# Patient Record
Sex: MALE | Race: WHITE | NOT HISPANIC OR LATINO | Employment: OTHER | URBAN - METROPOLITAN AREA
[De-identification: names, ages, dates, MRNs, and addresses within clinical notes are randomized per-mention and may not be internally consistent; named-entity substitution may affect disease eponyms.]

---

## 2017-01-04 ENCOUNTER — TRANSFERRED RECORDS (OUTPATIENT)
Dept: HEALTH INFORMATION MANAGEMENT | Facility: CLINIC | Age: 42
End: 2017-01-04

## 2017-01-26 ENCOUNTER — ALLIED HEALTH/NURSE VISIT (OUTPATIENT)
Dept: TRANSPLANT | Facility: CLINIC | Age: 42
End: 2017-01-26
Attending: INTERNAL MEDICINE
Payer: MEDICARE

## 2017-01-26 ENCOUNTER — RESULTS ONLY (OUTPATIENT)
Dept: OTHER | Facility: CLINIC | Age: 42
End: 2017-01-26

## 2017-01-26 ENCOUNTER — RADIANT APPOINTMENT (OUTPATIENT)
Dept: CARDIOLOGY | Facility: CLINIC | Age: 42
End: 2017-01-26
Attending: PHYSICIAN ASSISTANT

## 2017-01-26 ENCOUNTER — OFFICE VISIT (OUTPATIENT)
Dept: TRANSPLANT | Facility: CLINIC | Age: 42
End: 2017-01-26
Attending: TRANSPLANT SURGERY
Payer: MEDICARE

## 2017-01-26 VITALS
OXYGEN SATURATION: 96 % | HEIGHT: 64 IN | TEMPERATURE: 98.1 F | SYSTOLIC BLOOD PRESSURE: 149 MMHG | DIASTOLIC BLOOD PRESSURE: 88 MMHG | RESPIRATION RATE: 16 BRPM | HEART RATE: 81 BPM | WEIGHT: 150.5 LBS | BODY MASS INDEX: 25.7 KG/M2

## 2017-01-26 DIAGNOSIS — Z99.2 END STAGE RENAL DISEASE ON DIALYSIS (H): ICD-10-CM

## 2017-01-26 DIAGNOSIS — Q87.81 ALPORT'S SYNDROME: Primary | ICD-10-CM

## 2017-01-26 DIAGNOSIS — N18.6 END STAGE KIDNEY DISEASE (H): ICD-10-CM

## 2017-01-26 DIAGNOSIS — N18.6 CHRONIC KIDNEY DISEASE WITH END STAGE RENAL FAILURE ON DIALYSIS (H): Primary | ICD-10-CM

## 2017-01-26 DIAGNOSIS — Q87.81 ALPORT'S SYNDROME: ICD-10-CM

## 2017-01-26 DIAGNOSIS — N18.6 END STAGE RENAL DISEASE ON DIALYSIS (H): ICD-10-CM

## 2017-01-26 DIAGNOSIS — Z99.2 CHRONIC KIDNEY DISEASE WITH END STAGE RENAL FAILURE ON DIALYSIS (H): Primary | ICD-10-CM

## 2017-01-26 DIAGNOSIS — Z85.6: ICD-10-CM

## 2017-01-26 DIAGNOSIS — Z76.82 ORGAN TRANSPLANT CANDIDATE: Primary | ICD-10-CM

## 2017-01-26 DIAGNOSIS — N18.6 ESRD (END STAGE RENAL DISEASE) (H): Primary | ICD-10-CM

## 2017-01-26 LAB
ALBUMIN SERPL-MCNC: 3.7 G/DL (ref 3.4–5)
ALP SERPL-CCNC: 59 U/L (ref 40–150)
ALT SERPL W P-5'-P-CCNC: 34 U/L (ref 0–70)
ANION GAP SERPL CALCULATED.3IONS-SCNC: 9 MMOL/L (ref 3–14)
APTT PPP: 29 SEC (ref 22–37)
AST SERPL W P-5'-P-CCNC: 25 U/L (ref 0–45)
BASOPHILS # BLD AUTO: 0.1 10E9/L (ref 0–0.2)
BASOPHILS NFR BLD AUTO: 0.9 %
BILIRUB SERPL-MCNC: 0.4 MG/DL (ref 0.2–1.3)
BUN SERPL-MCNC: 39 MG/DL (ref 7–30)
CALCIUM SERPL-MCNC: 8.8 MG/DL (ref 8.5–10.1)
CARDIOLIPIN ANTIBODY IGG: NORMAL GPL-U/ML (ref 0–19.9)
CARDIOLIPIN ANTIBODY IGM: 0.3 MPL-U/ML (ref 0–19.9)
CHLORIDE SERPL-SCNC: 100 MMOL/L (ref 94–109)
CHOLEST SERPL-MCNC: 172 MG/DL
CMV IGG SERPL QL IA: NORMAL AI (ref 0–0.8)
CO2 SERPL-SCNC: 29 MMOL/L (ref 20–32)
CREAT SERPL-MCNC: 9.03 MG/DL (ref 0.66–1.25)
DIFFERENTIAL METHOD BLD: ABNORMAL
EBV VCA IGG SER QL IA: 7.5 AI (ref 0–0.8)
EOSINOPHIL # BLD AUTO: 0.9 10E9/L (ref 0–0.7)
EOSINOPHIL NFR BLD AUTO: 13.4 %
ERYTHROCYTE [DISTWIDTH] IN BLOOD BY AUTOMATED COUNT: 12.8 % (ref 10–15)
GFR SERPL CREATININE-BSD FRML MDRD: 7 ML/MIN/1.7M2
GLUCOSE SERPL-MCNC: 92 MG/DL (ref 70–99)
HBV CORE AB SERPL QL IA: NONREACTIVE
HBV SURFACE AB SERPL IA-ACNC: 29.01 M[IU]/ML
HBV SURFACE AG SERPL QL IA: NONREACTIVE
HCT VFR BLD AUTO: 36.6 % (ref 40–53)
HCV AB SERPL QL IA: NORMAL
HDLC SERPL-MCNC: 38 MG/DL
HGB BLD-MCNC: 11.8 G/DL (ref 13.3–17.7)
HIV 1+2 AB+HIV1 P24 AG SERPL QL IA: NORMAL
HLA TYPING COMPLETE SOT RECIPIENT: NORMAL
IMM GRANULOCYTES # BLD: 0 10E9/L (ref 0–0.4)
IMM GRANULOCYTES NFR BLD: 0.1 %
INR PPP: 0.99 (ref 0.86–1.14)
LDLC SERPL CALC-MCNC: 96 MG/DL
LYMPHOCYTES # BLD AUTO: 1.9 10E9/L (ref 0.8–5.3)
LYMPHOCYTES NFR BLD AUTO: 27.8 %
MCH RBC QN AUTO: 30.2 PG (ref 26.5–33)
MCHC RBC AUTO-ENTMCNC: 32.2 G/DL (ref 31.5–36.5)
MCV RBC AUTO: 94 FL (ref 78–100)
MONOCYTES # BLD AUTO: 0.6 10E9/L (ref 0–1.3)
MONOCYTES NFR BLD AUTO: 9.4 %
NEUTROPHILS # BLD AUTO: 3.3 10E9/L (ref 1.6–8.3)
NEUTROPHILS NFR BLD AUTO: 48.4 %
NONHDLC SERPL-MCNC: 135 MG/DL
NRBC # BLD AUTO: 0 10*3/UL
NRBC BLD AUTO-RTO: 0 /100
PLATELET # BLD AUTO: 152 10E9/L (ref 150–450)
POTASSIUM SERPL-SCNC: 5 MMOL/L (ref 3.4–5.3)
PRA SINGLE ANTIGEN IGG ANTIBODY: NORMAL
PROT SERPL-MCNC: 7 G/DL (ref 6.8–8.8)
RBC # BLD AUTO: 3.91 10E12/L (ref 4.4–5.9)
SODIUM SERPL-SCNC: 138 MMOL/L (ref 133–144)
T PALLIDUM IGG+IGM SER QL: NEGATIVE
THROMBIN TIME: 18.7 SEC (ref 13–19)
TRIGL SERPL-MCNC: 193 MG/DL
VZV IGG SER QL IA: NORMAL AI (ref 0–0.8)
WBC # BLD AUTO: 6.7 10E9/L (ref 4–11)

## 2017-01-26 PROCEDURE — 85730 THROMBOPLASTIN TIME PARTIAL: CPT | Performed by: PHYSICIAN ASSISTANT

## 2017-01-26 PROCEDURE — 86787 VARICELLA-ZOSTER ANTIBODY: CPT | Performed by: PHYSICIAN ASSISTANT

## 2017-01-26 PROCEDURE — 86147 CARDIOLIPIN ANTIBODY EA IG: CPT | Performed by: PHYSICIAN ASSISTANT

## 2017-01-26 PROCEDURE — 87340 HEPATITIS B SURFACE AG IA: CPT | Performed by: PHYSICIAN ASSISTANT

## 2017-01-26 PROCEDURE — 81240 F2 GENE: CPT | Performed by: PHYSICIAN ASSISTANT

## 2017-01-26 PROCEDURE — 86704 HEP B CORE ANTIBODY TOTAL: CPT | Performed by: PHYSICIAN ASSISTANT

## 2017-01-26 PROCEDURE — 86833 HLA CLASS II HIGH DEFIN QUAL: CPT | Performed by: TRANSPLANT SURGERY

## 2017-01-26 PROCEDURE — 86644 CMV ANTIBODY: CPT | Performed by: PHYSICIAN ASSISTANT

## 2017-01-26 PROCEDURE — 86832 HLA CLASS I HIGH DEFIN QUAL: CPT | Performed by: TRANSPLANT SURGERY

## 2017-01-26 PROCEDURE — 86665 EPSTEIN-BARR CAPSID VCA: CPT | Performed by: PHYSICIAN ASSISTANT

## 2017-01-26 PROCEDURE — 40000866 ZZHCL STATISTIC HIV 1/2 ANTIGEN/ANTIBODY PRETRANSPLANT ONLY: Performed by: PHYSICIAN ASSISTANT

## 2017-01-26 PROCEDURE — 86780 TREPONEMA PALLIDUM: CPT | Performed by: PHYSICIAN ASSISTANT

## 2017-01-26 PROCEDURE — 86706 HEP B SURFACE ANTIBODY: CPT | Performed by: PHYSICIAN ASSISTANT

## 2017-01-26 PROCEDURE — 87799 DETECT AGENT NOS DNA QUANT: CPT | Performed by: PHYSICIAN ASSISTANT

## 2017-01-26 PROCEDURE — 86803 HEPATITIS C AB TEST: CPT | Performed by: PHYSICIAN ASSISTANT

## 2017-01-26 PROCEDURE — 85670 THROMBIN TIME PLASMA: CPT | Performed by: PHYSICIAN ASSISTANT

## 2017-01-26 PROCEDURE — 86480 TB TEST CELL IMMUN MEASURE: CPT | Performed by: PHYSICIAN ASSISTANT

## 2017-01-26 PROCEDURE — 85613 RUSSELL VIPER VENOM DILUTED: CPT | Performed by: PHYSICIAN ASSISTANT

## 2017-01-26 PROCEDURE — 81241 F5 GENE: CPT | Performed by: PHYSICIAN ASSISTANT

## 2017-01-26 RX ORDER — CLONIDINE HYDROCHLORIDE 0.1 MG/1
0.1 TABLET ORAL 2 TIMES DAILY
Status: ON HOLD | COMMUNITY
End: 2020-03-06

## 2017-01-26 RX ORDER — SEVELAMER CARBONATE 800 MG/1
800 TABLET, FILM COATED ORAL 2 TIMES DAILY WITH MEALS
Status: ON HOLD | COMMUNITY
End: 2020-03-02

## 2017-01-26 ASSESSMENT — PAIN SCALES - GENERAL: PAINLEVEL: NO PAIN (0)

## 2017-01-26 NOTE — PROGRESS NOTES
Pre Abdominal Organ Transplant Coordinator Evaluation Note:    MD present: Dr. Imtiaz Bain, Alvin Varner MD and CAMMY More    Attendees: self and friend Danay    Type of transplant: kidney    Required Topic(s) Discussed: Evaluation notification document, SRTR data, Multiple wait list brochure, KDPI, Evaluation/approval process, Selection committee process, Wait list process and Living donor process    Teaching: Instruct patient on living donor process/provided contact info, Provided my business card and To call me with any questions    Assessment/Plan: 1) Selection committee 2/1/2017 to review first evaluation day 2) Ed had two kidney transplants 12/7/1988; 3/2/2005 which ended in primary graft non function and he resumed dialysis. 3) After first kidney transplant and before 2nd Ed  received IVIG for high antibody and developed severe complication of spinal Meningitis. He was told never to get IVIG again or he may die. Added to Allergies  4)Ed was delisted for CML and wants to know exactly what dates he will Listed at when we list him again.  How much wait time will he have? 5) Ed will see oncology at [facility] 2/2/2017 Dr. Dee Galeano 2/2/2017 at 2pm  6) Dr. Bain requests the transplant team discuss plan for immunosuppressive medication.  7) Mr. Cruz aware he has 100% antibody. 8) when team approves him, he will remain on the active list 6 months then resume Plan B and after 1 yr on list Ed to come back to meet Dr. Rafi Tavares to discuss the Plan C     Time spent with patient: 25 minutes

## 2017-01-26 NOTE — Clinical Note
1/26/2017       RE: Jeremiah Cruz  [address] [place] [id number]     Dear Colleague,    Thank you for referring your patient, Jeremiah Cruz, to the [facility] SOLID ORGAN TRANSPLANT at [facility]. Please see a copy of my visit note below.    Pt attended the Pre Kidney Transplant Patient Teaching Class today with his BENNIE Jon. Viewed My Transplant Place - written and verbal instructions provided for use. Both patient and SO were very engaged and attentive during class. Both asked good questions which I answered to the best of my ability. Reviewed option of LD and Paired Exchange. Reviewed pictures of recipient/donor kidney transplant surgery and incisions.     Again, thank you for allowing me to participate in the care of your patient.      Sincerely,    Transplant Class

## 2017-01-26 NOTE — Clinical Note
1/26/2017       RE: Jeremiah Cruz  [address] [address]     Dear Colleague,    Thank you for referring your patient, Jeremiah Cruz, to the [facility] SOLID ORGAN TRANSPLANT at [facility]. Please see a copy of my visit note below.    Assessment and Plan:  1. Kidney transplant evaluation - patient is a good candidate overall. Benefits of a living donor transplant were discussed.  2. 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 He admits to some non-compliance during his first transplant due to personal issues, although this does not appear to have been a problem since. He is also s/p DDKT (2005) with failure due to primary non-function and graft nephrectomy within a few weeks after transplant. He has been on hemodialysis since 1999 (except for the few weeks following the 2005 transplant), which is going okay. He would likely benefit from a third kidney transplant. He was previously listed from 2005 until 2015 but was de-listed following a diagnosis of CML. He is highly sensitized and reports no potential offers while he was previously listed.  3. CML - diagnosed 2/2015 and continues to be treated with dasatinib. He is followed by his oncologist, Dr. Tomlinson, in [place] at [facility], and has been noted to have had an excellent response to treatment. He is scheduled to see oncology here in 2/2017.  4. Cardiac risk - no known history of cardiac disease or events. He had a normal DSE in 2015 and denies chest pain or SOB with exertion. Given his chronic dialysis therapy and cardiac risk factors, along with moderate diastolic dysfunction on today's ECHO, he should have cardiac risk assessment.  5. Former tobacco use - he will need PFTs.  6. Possible claudication symptoms - he does have good femoral pulses, but given symptoms and previous transplants, he should have iliac US with  dopplers to assess for potential vascular targets.  7. Scalp lesions, previously considered to be related to chemotherapy treatment, although they continue to recur. He should see dermatology here for further evaluation.     Discussed the risks and benefits of a transplant, including the risk of surgery and immunosuppression medications.  Patients overall evaluation will be discussed in the Transplant Program's regular meeting with a final recommendation on the patients suitability for transplant to be made at that time.  Patient was seen in conjunction with Dr. Alvin Varner as part of a shared visit.    Patient was seen by myself, Dr. Alvin Varner, in conjunction with Loni Rendon PA-C as part of a shared visit.    I personally reviewed past medical and surgical history, vital signs, medications and labs.  Present and past medical history, along with significant physical exam findings were all reviewed with DARRON.    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  Patient reports feeling okay overall with some medical complaints.    Key management decisions made by me and discussed with DARRON:  1. Kidney transplant evaluation - patient is a good candidate overall. Benefits of a living donor transplant were discussed.  2. 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  3. CML - patient appears to be stable on dasatinib.  Will need Oncology clearance prior to transplant.  4. Cardiac risk - patient will require Cardiology evaluation prior transplant.  5. Skin lesions - patient reports recurrent skin infections on his scalp and will refer him to Dermatology for evaluation.  6. H/o medical noncompliance - this was with previous transplant, but patient appears to be compliant lately.    Evaluation:  Jeremiah Cruz was  seen in consultation at the request of Dr. Imtiaz Bain for evaluation as a potential kidney transplant recipient.    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    HPI:  Mr. 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 He admits to some non-compliance with the immunosuppressive medications during that time as he was going through a difficult personal situation with a significant other. Since then, he reports complete compliance with medications and dialysis. He was then on hemodialysis from 1999 until he received a DDKT in 2005 that unfortunately failed due to primary nonfunction due to severe antibody mediated rejection. The graft was removed within the first few weeks following transplant and he re-initiated hemodialysis. His first graft from 1988 has also been removed. He had previously developed aseptic meningitis following plasmapheresis and IVIG treatment in the past and therefore neither was used following the 2005 transplant rejection and failure. He was then put back on the transplant wait list in 2005 and was listed until 2/2015 (he is highly sensitized, and not received any potential offers) when he was diagnosed with CML and subsequently removed from the list. He started treatment with dasatinib in 2015 and continues on it currently. He is followed by his oncologist, Dr. Tomlinson, in [place] at [facility], and has been noted to have had an excellent response to treatment. He is unaware how long he is to be on the dasatinib. His other medical history is significant for former tobacco use (~ 1 PPD x 12-14 years, quit 6 years ago), GERD, and HTN. He has no known cardiac disease or events. He had a normal dobutamine stress ECHO in 2/2015. He stays physically active with  outdoor activities. He reports some bilateral calf aching and soreness with exertion. He denies chest pain or SOB with exertion. He is s/p SVC angioplasty for obstruction thought secondary to retained catheter fragments. Dialysis has been going okay. He has been dialyzing via a right AVF for the past 2 years. He previously had a graft and AVF in his LUE that have since failed. His energy level is lower on dialysis days. He reports a good appetite without nausea, vomiting, or diarrhea. He also denies fevers, sweats, or chills.          Kidney Disease Hx:        Kidney Disease Dx: Alport syndrome        Primary Nephrologist: Dr. Meneses         [facility] Hx:       h/o HTN: Yes         h/o DM:  No       h/o Kidney Stones:  No       h/o UTI: No       h/o Chronic NSAID Use: No         Previous Transplant Hx:        Yes         Transplant Sensitization Hx:       Previous Tx: Yes       Blood Transfusion: Yes              Uremic Symptoms:       Cold: No; Metallic Taste: No; Edema: Yes;         Cardiovascular Hx:       h/o Cardiac Issues: No       Exercise Tolerance: no chest pain or shortness of breath with exertion.            Potential Donor(s): Yes    ROS:  A comprehensive review of systems was obtained and negative, except as noted in the HPI or PMH.    PMH:   Medical record was reviewed and PMH was discussed with patient and noted below.  Past Medical History   Diagnosis Date     Kidney transplant rejection      Tobacco dependence      Anemia      Thrombocytopenia (H)      S/p nephrectomy 12/7/11     Dialysis care      Alport's syndrome      End stage renal disease (H)      Former tobacco use      Anemia in chronic kidney disease      Secondary renal hyperparathyroidism (H)      GERD (gastroesophageal reflux disease)      Hypertension      CML (chronic myelocytic leukemia) (H)      End stage kidney disease (H)        PSH:   Past Surgical History   Procedure Laterality Date     Nephrectomy Bilateral      native      Nephrectomy       transplant     Nephrectomy  2005     transplant     Create fistula arteriovenous upper extremity       Hernia repair, incisional       Svc angioplasty       Insert catheter peritoneal dialysis       Remove catheter peritoneal       Personal or family history of bleeding or anesthesia problems: No    Family Hx:  Family History   Problem Relation Age of Onset     Bone Cancer Brother        Personal Hx:   Social History     Social History     Marital Status: Single     Spouse Name: N/A     Number of Children: N/A     Years of Education: N/A     Occupational History     Not on file.     Social History Main Topics     Smoking status: Former Smoker -- 1.00 packs/day for 12 years     Types: Cigarettes     Quit date: 02/24/2011     Smokeless tobacco: Not on file     Alcohol Use: No     Drug Use: No     Sexual Activity: Not on file     Other Topics Concern     Not on file     Social History Narrative       Allergies:  Allergies   Allergen Reactions     Cephalosporins Other (See Comments) and Rash     fever     Compazine [Prochlorperazine]      Gammagard [Immune Globulin] Other (See Comments)     Ed got spinal meningitis and MD stated to never get again for fear of death.       Penicillins      Vancomycin        Medications:  Prior to Admission medications    Medication Sig Start Date End Date Taking? Authorizing Provider   CLONIDINE HCL PO Take 0.2 mg by mouth 2 times daily   Yes Reported, Patient   AMLODIPINE BESYLATE PO Take 10 mg by mouth daily   Yes Reported, Patient   B complex-C-folic acid (NEPHROCAPS/TRIPHROCAPS) 1 MG capsule Take 1 capsule by mouth daily   Yes Reported, Patient   RANITIDINE HCL PO Take 150 mg by mouth   Yes Reported, Patient   SULFAMETHOXAZOLE-TRIMETHOPRIM PO Take 800 mg by mouth every 12 hours   Yes Reported, Patient   dasatinib (SPRYCEL) 20 MG tablet CHEMO Take 20 mg by mouth daily   Yes Reported, Patient   sevelamer (RENVELA) 800 MG tablet Take 1,600 mg by mouth 3 times daily  "(with meals) 1 tab with snacks   Yes Reported, Patient       Vitals:  /88 mmHg  Pulse 81  Temp(Src) 98.1  F (36.7  C) (Oral)  Resp 16  Ht 1.626 m (5' 4\")  Wt 68.266 kg (150 lb 8 oz)  BMI 25.82 kg/m2  SpO2 96%    Exam:  GENERAL APPEARANCE: alert and no distress  HENT: mouth without ulcers or lesions. Good dentition  LYMPHATICS: no cervical or supraclavicular nodes  RESP: lungs clear to auscultation - no rales, rhonchi or wheezes  CV: regular rhythm, normal rate, no rub, no murmur. No carotid bruit bilaterally  EDEMA: no LE edema bilaterally  ABDOMEN: soft, nondistended, nontender  MS: extremities normal - no gross deformities noted, no evidence of inflammation in joints, no muscle tenderness  SKIN: no rash  Femoral pulses 2+ equal bilaterally    Results:   Recent Results (from the past 336 hour(s))   Lipid Profile [LAB18]    Collection Time: 01/26/17  6:47 AM   Result Value Ref Range    Cholesterol 172 <200 mg/dL    Triglycerides 193 (H) <150 mg/dL    HDL Cholesterol 38 (L) >39 mg/dL    LDL Cholesterol Calculated 96 <100 mg/dL    Non HDL Cholesterol 135 (H) <130 mg/dL   Comprehensive metabolic panel [LAB17]    Collection Time: 01/26/17  6:47 AM   Result Value Ref Range    Sodium 138 133 - 144 mmol/L    Potassium 5.0 3.4 - 5.3 mmol/L    Chloride 100 94 - 109 mmol/L    Carbon Dioxide 29 20 - 32 mmol/L    Anion Gap 9 3 - 14 mmol/L    Glucose 92 70 - 99 mg/dL    Urea Nitrogen 39 (H) 7 - 30 mg/dL    Creatinine 9.03 (H) 0.66 - 1.25 mg/dL    GFR Estimate 7 (L) >60 mL/min/1.7m2    GFR Estimate If Black 8 (L) >60 mL/min/1.7m2    Calcium 8.8 8.5 - 10.1 mg/dL    Bilirubin Total 0.4 0.2 - 1.3 mg/dL    Albumin 3.7 3.4 - 5.0 g/dL    Protein Total 7.0 6.8 - 8.8 g/dL    Alkaline Phosphatase 59 40 - 150 U/L    ALT 34 0 - 70 U/L    AST 25 0 - 45 U/L   Cardiolipin Ariella IgG and IgM [[id number]]    Collection Time: 01/26/17  6:47 AM   Result Value Ref Range    Cardiolipin Antibody IgG  0.0 - 19.9 GPL-U/mL     <1.6  Negative   " New method in use November 28, 2016.      Cardiolipin Antibody IgM 0.3 0.0 - 19.9 MPL-U/mL   M Tuberculosis by Quantiferon [[id number]]    Collection Time: 01/26/17  6:47 AM   Result Value Ref Range    M Tuberculosis Result Negative NEG    M Tuberculosis Antigen Value 0.00 IU/mL   INR [[id number]]    Collection Time: 01/26/17  6:47 AM   Result Value Ref Range    INR 0.99 0.86 - 1.14   Partial thromboplastin time [LAB56]    Collection Time: 01/26/17  6:47 AM   Result Value Ref Range    PTT 29 22 - 37 sec   Thrombin time [[id number]]    Collection Time: 01/26/17  6:47 AM   Result Value Ref Range    Thrombin Time 18.7 13.0 - 19.0 sec   Lupus panel [[id number]]    Collection Time: 01/26/17  6:47 AM   Result Value Ref Range    Lupus Result  NEG     Negative  (Note)  COMMENTS:  The INR is normal.  APTT ratio is normal.  DRVVT Screen ratio is normal.  Thrombin time is normal.  NEGATIVE TEST; A LUPUS ANTICOAGULANT WAS NOT DETECTED IN THIS  SPECIMEN WITHIN THE LIMITS OF THE TESTING REPERTOIRE.  If the clinical picture is strongly suggestive of an antiphospholipid  syndrome, recommend anticardiolipin and beta-2-glycoprotein (IgG and  IgM) antibody tests.  Tressa Perez M.D.  [phone]  1/27/2017    APTT:       Ratio  Patient  =  0.95  1:2 Mix  =  N/A  Reference:  Negative: Less than or equal to 1.16  Positive: Greater than or equal to 1.17     DILUTE SHAYNA VIPER VENOM TEST:  Screen Ratio = 0.93   Normal is less than 1.21       CBC with platelets differential [[id number]]    Collection Time: 01/26/17  6:47 AM   Result Value Ref Range    WBC 6.7 4.0 - 11.0 10e9/L    RBC Count 3.91 (L) 4.4 - 5.9 10e12/L    Hemoglobin 11.8 (L) 13.3 - 17.7 g/dL    Hematocrit 36.6 (L) 40.0 - 53.0 %    MCV 94 78 - 100 fl    MCH 30.2 26.5 - 33.0 pg    MCHC 32.2 31.5 - 36.5 g/dL    RDW 12.8 10.0 - 15.0 %    Platelet Count 152 150 - 450 10e9/L    Diff Method Automated Method     % Neutrophils 48.4 %    % Lymphocytes 27.8 %    % Monocytes 9.4 %    %  Eosinophils 13.4 %    % Basophils 0.9 %    % Immature Granulocytes 0.1 %    Nucleated RBCs 0 0 /100    Absolute Neutrophil 3.3 1.6 - 8.3 10e9/L    Absolute Lymphocytes 1.9 0.8 - 5.3 10e9/L    Absolute Monocytes 0.6 0.0 - 1.3 10e9/L    Absolute Eosinophils 0.9 (H) 0.0 - 0.7 10e9/L    Absolute Basophils 0.1 0.0 - 0.2 10e9/L    Abs Immature Granulocytes 0.0 0 - 0.4 10e9/L    Absolute Nucleated RBC 0.0    HLA Typing Complete SOT Recipient    Collection Time: 01/26/17  6:47 AM   Result Value Ref Range    HLA Typing Complete SOT Recipient       Specimen received - Immunology report to follow upon completion.   PRA Single Antigen IgG Antibody    Collection Time: 01/26/17  6:47 AM   Result Value Ref Range    PRA Single Antigen IgG Antibody       Specimen received - Immunology report to follow upon completion.   BK virus PCR quantitative [[id number]]    Collection Time: 01/26/17  6:47 AM   Result Value Ref Range    BK Virus Specimen Plasma     BK Virus Result BK Virus DNA Not Detected BKNEG copies/mL    BK Virus Log  <2.7 Log copies/mL     Not Calculated   The Real-Time quantitative BK Virus assay was developed and its performance   characteristics determined by the [facility] Laboratory at   the [facility] in [place], [place]. The   primers and probes for each analyte are Analyte Specific Reagents (ASRs)   manufactured by [employer].   ASRs are used in many laboratory tests necessary for standard medical care and   generally do not require U.S. Food and Drug Administration approval. The FDA   has determined that such clearance or approval is not necessary.   This test is used for clinical purposes. It should not be regarded as   investigational or for research. This laboratory is certified under the   Clinical Laboratory Improvement Amendments of 1988 (CLIA-88) as qualified to   perform high complexity clinical laboratory testing.     CMV Antibody IgG [[id number]]    Collection  Time: 01/26/17  6:47 AM   Result Value Ref Range    CMV Antibody IgG  0.0 - 0.8 AI     <0.2  Negative   Antibody index (AI) values reflect qualitative changes in antibody   concentration that cannot be directly associated with clinical condition or   disease state.     EBV Capsid Antibody IgG [[id number]]    Collection Time: 01/26/17  6:47 AM   Result Value Ref Range    EBV Capsid Antibody IgG 7.5 (H) 0.0 - 0.8 AI   Hepatitis B core antibody [[id number]]    Collection Time: 01/26/17  6:47 AM   Result Value Ref Range    Hepatitis B Core Ariella Nonreactive NR   Hepatitis B Surface Antibody [[id number]]    Collection Time: 01/26/17  6:47 AM   Result Value Ref Range    Hepatitis B Surface Antibody 29.01 (H) <8.00 m[IU]/mL   Hepatitis B surface antigen [[id number]]    Collection Time: 01/26/17  6:47 AM   Result Value Ref Range    Hep B Surface Agn Nonreactive NR   Hepatitis C antibody [[id number]]    Collection Time: 01/26/17  6:47 AM   Result Value Ref Range    Hepatitis C Antibody  NR     Nonreactive   Assay performance characteristics have not been established for newborns,   infants, and children     HIV Antigen Antibody Combo Pretransplant    Collection Time: 01/26/17  6:47 AM   Result Value Ref Range    HIV Antigen Antibody Combo Pretransplant  NR     Nonreactive   HIV-1 p24 Ag & HIV-1/HIV-2 Ab Not Detected     Anti Treponema [[id number]]    Collection Time: 01/26/17  6:47 AM   Result Value Ref Range    Treponema pallidum Antibody Negative NEG   Varicella Zoster Virus Antibody IgG [[id number]]    Collection Time: 01/26/17  6:47 AM   Result Value Ref Range    Varicella Zoster Virus Antibody IgG  0.0 - 0.8 AI     <0.2  Negative, suggests no immunologic exposure.   Antibody index (AI) values reflect qualitative changes in antibody   concentration that cannot be directly associated with clinical condition or   disease state.     Factor 2 and 5 mutation analysis    Collection Time: 01/26/17  6:47 AM   Result Value Ref Range    Copath Report        Patient Name: JONNY KINCAID  MR#: [id number]  Specimen #:   Collected: 1/26/2017 06:47  Received: 1/26/2017 10:01  Reported: 1/27/2017 16:28  Ordering Phy(s): HALI WHITMORE    For improved result formatting, select 'View Enhanced Report Format'  under Linked Documents section.  _________________________________________    TEST(S) REQUESTED:  Factor 5 Leiden and Factor 2 by PCR    SPECIMEN DESCRIPTION:  Blood    METHODOLOGY:   The regions of genomic DNA containing the [id number] Factor 5  gene mutation (Factor V Leiden) and the Factor 2(Prothrombin P58088C)  gene mutation were simultaneously amplified using the polymerase chain  reaction.  The amplified products were digested with restriction  endonuclease TaqI and products were analyzed by gel electrophoresis.    RESULTS:    Factor V 1691G>A (Leiden)  RESULTS:  Mutation analyzed:     1691G>A  Factor V 1691G>A (Leiden)  Interpretation:      ABSENT  Factor V 1691G>A (Leiden) mutation  genotype:      G/G    FACTOR 2/PROTHROMBI N RESULTS:  Mutation analyzed:     41027O>A  Factor 2 Mutation Interpretation:      ABSENT  Factor 2 Mutation genotype:      G/G    INTERPRETATION:  The patient is negative for the Factor V 1691G>A (Leiden) and negative  for the Factor 2 mutation.    COMMENTS:  If a patient is the recipient of an allogeneic bone marrow transplant,  this test must be done on a pre-transplant sample or buccal swab.  A  previous allogeneic bone marrow transplant will interfere with test  results.  Call the [facility]([phone]) for  instructions on sample collection for these patients.    This test was developed and its performance characteristics determined  by the [facility],  [facility]  [facility]atory. It has not been cleared or approved by the FDA. The  laboratory is regulated under CLIA as qualified to perform  high-complexity testing. This test is used for clinical purposes. It  should not be  regarded as investigational or for research.    Electr onically Signed Out By:  RICHELLE GreerPhysicians    CPT Codes:  A: 52152-N1FZWW, 34197-Q4FRYZ, -KPUSFR(2)    TESTING LAB LOCATION:  [address] [id number] [phone]    COLLECTION SITE:  Client:  [facility]  Location:  [facility] (B)         Again, thank you for allowing me to participate in the care of your patient.      Sincerely,    ROMARIO

## 2017-01-26 NOTE — PROGRESS NOTES
Psychosocial Assessment  Patient Name/ Age: Jeremiah Cruz 41 year old   Medical Record #: [id number]  Duration of Interview: 45 min  Process:   Face-to-Face Interview                (counseling < 50%)   Present at Appointment: Jeremiah and friend Danay        : STEFFI Emerson  Date:  2017        Type of transplant: Kidney    Donor type:      Cadaver and friend   Prior Transplants:    Yes - Kidney  and  (this one failed immediately) Status of Transplant: on dialysis       Current Living Situation    Location:   [address] [place] [address]  With Whom: lives with their daughter Kiley        Family/ Social Support:    Jeremiah reported he has two children, daughter Kiley (19) and son Martín (20). Jeremiah reported he has three sisters and two brothers. One sister he is not close to, but the others are in his life. Jeremiah reported his father is . Jeremiah reported his mother and stepfather are very supportive.  available, helpful   Committed relationship: Single     other: none   Other supports: Friends   available, helpful       Activities/ Functional Ability    Current level: ambulatory, hearing impaired (wears hearing aids in both ears) and independent with ADL's     Transportation drives self       Vocational/Employment/Financial     Employment   disabled   Job Description   Jeremiah reported he is on SSDI due to ESRD. Discussed possibility of Jeremiah losing SSDI one years post transplant.      Income   SSD   Insurance      At this time, patient can afford medication costs:  Yes  Medicare and [place]    Discussed with Jeremiah having him check with [employer]ON[employer] LOMBARDO regarding travel/lodging assistance.       Medical Status    Current mode of treatment for ESRD Dialysis since    Complications none       Behavioral    Tobacco Use No Chemical Dependency No   Jeremiah denied any tobacco use.  Jeremiah denied any alcohol, substance use, or history of chemical dependency treatment.       Psychiatric Impairment No  Jeremiah reported he has a history of depression but does not have any at this time. Jeremiah reported he has some anxiety, but does not feel it impacts his life. Jeremiah denied any other mental health concerns.     Reading ability Good  Education level: High School Recent Legal History No      Coping Style/Strategies: Staying away from crowds       Ability to Adhere to Complex Medical Regime: Yes     Adherence History: Jeremiah reported he follows his physician's recommendations, takes his medications as prescribed, and attends his appointments.         Education  _X_ Medicare  _X_ Rehabilitation  _X_ Donor issues  _X_ Community resources  _X_ Post discharge housing  _X_ Financial resources  _X_ Medical insurance options  _X_ Psych adjustment  _X_ Family adjustment  _X_ Health Care Directive Yes   Psychosocial Risks of Transplant Reviewed and Discussed:  _X_ Increased stress related to emotional,            family, social, employment or financial           situation  _X_ Affect on work and/or disability benefits  _X_ Affect on future health and life           insurance  _X_ Transplant outcome expectations may           not be met  _X_ Mental Health Risks: anxiety,           depression, PTSD, guilt, grief and           chronic fatigue     Notable Items:   None noted.       Final Evaluation/Assessment   Patient seemed to process information well. Appeared well informed, motivated and able to follow post transplant requirements. Behavior was appropriate during interview. Has adequate income and insurance coverage. Adequate social support. No major contraindications noted for transplant.  At this time patient appears to understand the risks and benefits of transplant.      Recommendation  Acceptable    Selection Criteria Met:  Plan for support Yes   Chemical Dependence Yes   Smoking Yes   Mental Health Yes   Adequate Finances Yes    Signature: STEFFI Emerson   Title: Clinical Social  Worker

## 2017-01-26 NOTE — Clinical Note
2017       RE: Jeremiah Cruz  [address] [place] [id number]     Dear Colleague,    Thank you for referring your patient, Jeremiah Cruz, to the [facility] SOLID ORGAN TRANSPLANT at [facility]. Please see a copy of my visit note below.             RE: Jeremiah Cruz    [facility]# [id number]        I saw your patient, Jeremiah rCuz, in consultation in our pretransplant clinic.  He was at clinic to discuss care for his end-stage renal disease.  Prior to clinic, he attended our pretransplant class.       He has actually had two transplants, the first lasting a number of years and the second failing fairly quickly after transplantation. Since then he has had a number of medical problems including CML. He tells me that he is currently cleared by his oncologist for another transplant. Of course, we will need to get the records and discuss the coordination of immunosuppression with the drug maintenance program he is on for his CML.      We discussed the fact that he has been on dialysis for over 10 years and therefore would be very high on the waiting list for a  donor transplant. He does, however, have a high antibody titer which will make it hard to find a donor kidney for him - perhaps making him wait longer than if he had the waiting time combined with a lower antibody titer.      We talked about the pros and cons of transplantation vs. dialysis.  We discussed the fact that it was important that he think about the pros and cons of each treatment option and make an active decision.  We also discussed the fact that the two were interconnected and he may need to go on dialysis before transplant (if he chose to have a transplant) and that if the transplant failed, he might need dialysis before another transplant.       We also discussed the fact that if he chose to have a transplant, he would need to decide between going on the wait list for a  donor  transplant vs. having a living donor transplant.  We talked about the pros and cons of each option.  Although I didn't express an opinion regarding transplantation or dialysis, I suggested that if he chose to have a transplant, a living donor transplant would be preferable in that the surgery is the same, the immunosuppressive drugs and the risks are the same, but the transplant could be done sooner and the results are better.  I told him that the wait for  donor kidney was approximately five years for patients who are newly put on the waiting list.  In addition, we talked about the fact that the disadvantage of a living donor transplant was the risk to the donor.  I also discussed the new ( donor) kidney (KDPI) scoring system with him.       Because he was interested in living donation, we spent some time discussing donor risks, including the risks of mortality, morbidity, and long-term risks with a single kidney. He says there are possible living donors. Of course, with his high antibody titer there is a low chance that they will match; however, we discussed the possibility of them being evaluated to be entered into a paired-exchange program.     I attempted to answer any remaining questions.  I also told him that should he have any questions, he should feel free to contact us.  We would be glad to answer any questions either over the phone or at another clinic visit.  His transplant coordinator is Ann Barroso and may be reached at [phone].  Thank you for the opportunity to see him.     I spent 20 minutes with this patient.  Over 90% of that time was spent in counseling and coordination of care.             Yours truly,               Imtiaz Bain MD         Professor of Surgery         ([phone])    HORACIO/

## 2017-01-26 NOTE — PROGRESS NOTES
"NUTRITION KIDNEY TRANSPLANT EVALUATION  Medical Nutrition Therapy    Malnutrition Status:    Patient does not meet two of the criteria necessary for diagnosing malnutrition    Weight and BMI:  Current BMI: 25.8  BMI is within criteria for kidney transplant       Visit Type: Initial Assessment    Jeremiah GU Cruz referred by Dr. Bain for MNT related to kidney transplant evaluation    Patient accompanied by his friend    H/o previous txp: kidney 1988, 2005 (failed immediately)    Nutrition Assessment:  Anthropometrics  Height:   64 in   BMI:    25.8    Weight Status:Overweight BMI 25-29.9   Weight:  150 lbs            IBW (lb): 130  % IBW: 115    Wt Hx: No major changes    Adj/dosing BW: 150 lbs/68 kg       Recent Labs   Lab Test  01/26/17   0647   CHOL  172   HDL  38*   LDL  96   TRIG  193*     No results found for this basename: a1c  POTASSIUM   Date Value Ref Range Status   01/26/2017 5.0 3.4 - 5.3 mmol/L Final   Phos HIGH at 9.7 on 12/7/16, he reports it has dropped to the 5's       Vitamins, Supplements, Herbals, Pertinent Meds:   Nephrocaps, renvela     Dialysis Modality: HD  Days/Times: [facility] 6a-1130a  Dialysis Start: 18 years ago   Pt receives protein supplement with dialysis: N    Nutrition History  Pt-reported special diets/eating habits: N/A   Dining out/food not made at home: 1x/week   Appetite: good/baseline  Pt doesn't cook much. He uses a lot of high sodium, processed, packaged, boxed items. He doesn't add salt to food, but he is consuming a lot regardless. He is non-compliant with his diet and reports his K+ and Phos have been elevated with dietary non-compliances several times, yet he \"likes these foods\" so he is not more concerned about following a kidney friendly diet.     Recall:  B: none   L: corn dogs, burgers from a gas station  D: enchilada chix bake, chix fajitas, tacos, 6+ oz meat; they do not use LS products   Sn: pineapple, peaches, doritos, candy, crackers, donuts   Beverages: 60-80 oz " Regular Cherry 7UP or Dr Pepper  ETOH (1 drink = 12 oz beer, 5 oz wine, 1.5 oz liquor): none    Physical Activity  None      MALNUTRITION  % Intake:  No decreased intake noted  % Weight Loss:  None noted  Subcutaneous Fat Loss:  None  Muscle Loss:  None  Fluid Accumulation/Edema:  None noted  Malnutrition Diagnosis: Patient does not meet two of the criteria necessary for diagnosing malnutrition    Nutrition Prescription  Energy:  2728-2461     (25-30 kcal/kg for maintenance)     Protein:  82-95    (1.2-1.4 g/kg for HD/PD needs)         Fluid:  1 ml/kcal or per MD        Micronutrient:  Na+: <2000 mg/day  K+: 0654-2151 mg/day  Phos: 800-1000 mg/day          Nutrition Diagnosis:  Excessive Na+ intake r/t food and nutrition related knowledge deficit AEB diet recall reveals high Na+ foods.    Nutrition Intervention:  Nutrition education provided:  Discussed sodium intake (low sodium foods and drinks, seasoning food without salt and tips for low sodium diet). Reviewed some food labels (corn dogs, taco seasoning) and daily goal for 2000 mg/day. Encouraged him to buy designated low sodium products when able. Provided tips for low sodium diet handout with high sodium foods to avoid. Discussed the fact that he has been on HD x 18 years and that he should be an expert on the dialysis diet. Us clinicians may perceive his non-compliance with diet as overall noncompliance as a txp patient. Also reviewed K+/Phos intakes and avoiding dark sodas, dairy, certain fruits, and overall processed foods with Phos content. Provided him with list of protein and goal per above.   Reviewed post txp diet guidelines in brief (will review in further detail post txp): med regimen and possible side effects, following a low sat/trans fat, low sodium, low simple CHO, high protein and calcium diet, K+ levels post-txp; avoiding excessive weight gain, adequate exercise, and review of proper food safety measures d/t immunosuppressant therapy post-op.  Stressed importance of not taking any herbal/Chinese/alternative medicines or supplements post txp (d/t risk for rejection, unknown effects on the organs, potential interactions with immunosuppresants).  Patient Understanding: Pt verbalized understanding of education provided.  Expected Compliance: Poor  Follow-Up Plans: PRN     Nutrition Goals:  1. Limit Na+ <2000mg/day  2. Limit K+/Phos intakes     Provided pt with contact info.   Time spent with patient: 15 minutes.  Subha Garcia RD, LD

## 2017-01-27 LAB
BKV DNA # SPEC NAA+PROBE: NORMAL COPIES/ML
BKV DNA SPEC NAA+PROBE-LOG#: NORMAL LOG COPIES/ML
COPATH REPORT: NORMAL
LA PPP-IMP: NORMAL
M TB TUBERC IFN-G BLD QL: NEGATIVE
M TB TUBERC IFN-G/MITOGEN IGNF BLD: 0 IU/ML
SPECIMEN SOURCE: NORMAL

## 2017-01-27 NOTE — PROGRESS NOTES
RE: Jeremiah Cruz    [facility]# [id number]        I saw your patient, Jeremiah Cruz, in consultation in our pretransplant clinic.  He was at clinic to discuss care for his end-stage renal disease.  Prior to clinic, he attended our pretransplant class.       He has actually had two transplants, the first lasting a number of years and the second failing fairly quickly after transplantation. Since then he has had a number of medical problems including CML. He tells me that he is currently cleared by his oncologist for another transplant. Of course, we will need to get the records and discuss the coordination of immunosuppression with the drug maintenance program he is on for his CML.      We discussed the fact that he has been on dialysis for over 10 years and therefore would be very high on the waiting list for a  donor transplant. He does, however, have a high antibody titer which will make it hard to find a donor kidney for him - perhaps making him wait longer than if he had the waiting time combined with a lower antibody titer.      We talked about the pros and cons of transplantation vs. dialysis.  We discussed the fact that it was important that he think about the pros and cons of each treatment option and make an active decision.  We also discussed the fact that the two were interconnected and he may need to go on dialysis before transplant (if he chose to have a transplant) and that if the transplant failed, he might need dialysis before another transplant.       We also discussed the fact that if he chose to have a transplant, he would need to decide between going on the wait list for a  donor transplant vs. having a living donor transplant.  We talked about the pros and cons of each option.  Although I didn't express an opinion regarding transplantation or dialysis, I suggested that if he chose to have a transplant, a living donor transplant would be preferable in that the surgery is  the same, the immunosuppressive drugs and the risks are the same, but the transplant could be done sooner and the results are better.  I told him that the wait for  donor kidney was approximately five years for patients who are newly put on the waiting list.  In addition, we talked about the fact that the disadvantage of a living donor transplant was the risk to the donor.  I also discussed the new ( donor) kidney (KDPI) scoring system with him.       Because he was interested in living donation, we spent some time discussing donor risks, including the risks of mortality, morbidity, and long-term risks with a single kidney. He says there are possible living donors. Of course, with his high antibody titer there is a low chance that they will match; however, we discussed the possibility of them being evaluated to be entered into a paired-exchange program.     I attempted to answer any remaining questions.  I also told him that should he have any questions, he should feel free to contact us.  We would be glad to answer any questions either over the phone or at another clinic visit.  His transplant coordinator is Ann Barroso and may be reached at [phone].  Thank you for the opportunity to see him.     I spent 20 minutes with this patient.  Over 90% of that time was spent in counseling and coordination of care.             Yours truly,               Imtiaz Bain MD         Professor of Surgery         ([phone])    HORACIO/

## 2017-01-27 NOTE — PROGRESS NOTES
Pt attended the Pre Kidney Transplant Patient Teaching Class today with his BENNIE Jon. Viewed My Transplant Place - written and verbal instructions provided for use. Both patient and SO were very engaged and attentive during class. Both asked good questions which I answered to the best of my ability. Reviewed option of LD and Paired Exchange. Reviewed pictures of recipient/donor kidney transplant surgery and incisions.

## 2017-02-01 ENCOUNTER — COMMITTEE REVIEW (OUTPATIENT)
Dept: TRANSPLANT | Facility: CLINIC | Age: 42
End: 2017-02-01

## 2017-02-01 NOTE — PROGRESS NOTES
Assessment and Plan:  1. Kidney transplant evaluation - patient is a good candidate overall. Benefits of a living donor transplant were discussed.  2. 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 He admits to some non-compliance during his first transplant due to personal issues, although this does not appear to have been a problem since. He is also s/p DDKT (2005) with failure due to primary non-function and graft nephrectomy within a few weeks after transplant. He has been on hemodialysis since 1999 (except for the few weeks following the 2005 transplant), which is going okay. He would likely benefit from a third kidney transplant. He was previously listed from 2005 until 2015 but was de-listed following a diagnosis of CML. He is highly sensitized and reports no potential offers while he was previously listed.  3. CML - diagnosed 2/2015 and continues to be treated with dasatinib. He is followed by his oncologist, Dr. Tomlinson, in [place] at [facility], and has been noted to have had an excellent response to treatment. He is scheduled to see oncology here in 2/2017.  4. Cardiac risk - no known history of cardiac disease or events. He had a normal DSE in 2015 and denies chest pain or SOB with exertion. Given his chronic dialysis therapy and cardiac risk factors, along with moderate diastolic dysfunction on today's ECHO, he should have cardiac risk assessment.  5. Former tobacco use - he will need PFTs.  6. Possible claudication symptoms - he does have good femoral pulses, but given symptoms and previous transplants, he should have iliac US with dopplers to assess for potential vascular targets.  7. Scalp lesions, previously considered to be related to chemotherapy treatment, although they continue to recur. He should see dermatology here for further evaluation.     Discussed the risks and benefits of a transplant, including the risk of  surgery and immunosuppression medications.  Patients overall evaluation will be discussed in the Transplant Program's regular meeting with a final recommendation on the patients suitability for transplant to be made at that time.  Patient was seen in conjunction with Dr. Alvin Varner as part of a shared visit.    Patient was seen by myself, Dr. Alvin Varner, in conjunction with Loni Rendon PA-C as part of a shared visit.    I personally reviewed past medical and surgical history, vital signs, medications and labs.  Present and past medical history, along with significant physical exam findings were all reviewed with DARRON.    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  Patient reports feeling okay overall with some medical complaints.    Key management decisions made by me and discussed with DARRON:  1. Kidney transplant evaluation - patient is a good candidate overall. Benefits of a living donor transplant were discussed.  2. 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  3. CML - patient appears to be stable on dasatinib.  Will need Oncology clearance prior to transplant.  4. Cardiac risk - patient will require Cardiology evaluation prior transplant.  5. Skin lesions - patient reports recurrent skin infections on his scalp and will refer him to Dermatology for evaluation.  6. H/o medical noncompliance - this was with previous transplant, but patient appears to be compliant lately.    Evaluation:  Jeremiah Cruz was seen in consultation at the request of Dr. Imtiaz Bain for evaluation as a potential kidney transplant recipient.    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    HPI:  Mr. 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 He admits to some non-compliance with the immunosuppressive medications during that time as he was going through a difficult personal situation with a significant other. Since then, he reports complete compliance with medications and dialysis. He was then on hemodialysis from 1999 until he received a DDKT in 2005 that unfortunately failed due to primary nonfunction due to severe antibody mediated rejection. The graft was removed within the first few weeks following transplant and he re-initiated hemodialysis. His first graft from 1988 has also been removed. He had previously developed aseptic meningitis following plasmapheresis and IVIG treatment in the past and therefore neither was used following the 2005 transplant rejection and failure. He was then put back on the transplant wait list in 2005 and was listed until 2/2015 (he is highly sensitized, and not received any potential offers) when he was diagnosed with CML and subsequently removed from the list. He started treatment with dasatinib in 2015 and continues on it currently. He is followed by his oncologist, Dr. Tomlinson, in [place] at [facility], and has been noted to have had an excellent response to treatment. He is unaware how long he is to be on the dasatinib. His other medical history is significant for former tobacco use (~ 1 PPD x 12-14 years, quit 6 years ago), GERD, and HTN. He has no known cardiac disease or events. He had a normal dobutamine stress ECHO in 2/2015. He stays physically active with outdoor activities. He reports some bilateral calf aching and soreness with exertion. He denies chest pain or SOB with exertion. He is s/p SVC angioplasty for obstruction thought secondary to retained catheter fragments. Dialysis has been going okay. He has been dialyzing via a right AVF for the past  2 years. He previously had a graft and AVF in his LUE that have since failed. His energy level is lower on dialysis days. He reports a good appetite without nausea, vomiting, or diarrhea. He also denies fevers, sweats, or chills.          Kidney Disease Hx:        Kidney Disease Dx: Alport syndrome        Primary Nephrologist: Dr. Meneses         [facility] Hx:       h/o HTN: Yes         h/o DM:  No       h/o Kidney Stones:  No       h/o UTI: No       h/o Chronic NSAID Use: No         Previous Transplant Hx:        Yes         Transplant Sensitization Hx:       Previous Tx: Yes       Blood Transfusion: Yes              Uremic Symptoms:       Cold: No; Metallic Taste: No; Edema: Yes;         Cardiovascular Hx:       h/o Cardiac Issues: No       Exercise Tolerance: no chest pain or shortness of breath with exertion.            Potential Donor(s): Yes    ROS:  A comprehensive review of systems was obtained and negative, except as noted in the HPI or PMH.    PMH:   Medical record was reviewed and PMH was discussed with patient and noted below.  Past Medical History   Diagnosis Date     Kidney transplant rejection      Tobacco dependence      Anemia      Thrombocytopenia (H)      S/p nephrectomy 12/7/11     Dialysis care      Alport's syndrome      End stage renal disease (H)      Former tobacco use      Anemia in chronic kidney disease      Secondary renal hyperparathyroidism (H)      GERD (gastroesophageal reflux disease)      Hypertension      CML (chronic myelocytic leukemia) (H)      End stage kidney disease (H)        PSH:   Past Surgical History   Procedure Laterality Date     Nephrectomy Bilateral      native     Nephrectomy       transplant     Nephrectomy  2005     transplant     Create fistula arteriovenous upper extremity       Hernia repair, incisional       Svc angioplasty       Insert catheter peritoneal dialysis       Remove catheter peritoneal       Personal or family history of bleeding or anesthesia  "problems: No    Family Hx:  Family History   Problem Relation Age of Onset     Bone Cancer Brother        Personal Hx:   Social History     Social History     Marital Status: Single     Spouse Name: N/A     Number of Children: N/A     Years of Education: N/A     Occupational History     Not on file.     Social History Main Topics     Smoking status: Former Smoker -- 1.00 packs/day for 12 years     Types: Cigarettes     Quit date: 02/24/2011     Smokeless tobacco: Not on file     Alcohol Use: No     Drug Use: No     Sexual Activity: Not on file     Other Topics Concern     Not on file     Social History Narrative       Allergies:  Allergies   Allergen Reactions     Cephalosporins Other (See Comments) and Rash     fever     Compazine [Prochlorperazine]      Gammagard [Immune Globulin] Other (See Comments)     Ed got spinal meningitis and MD stated to never get again for fear of death.       Penicillins      Vancomycin        Medications:  Prior to Admission medications    Medication Sig Start Date End Date Taking? Authorizing Provider   CLONIDINE HCL PO Take 0.2 mg by mouth 2 times daily   Yes Reported, Patient   AMLODIPINE BESYLATE PO Take 10 mg by mouth daily   Yes Reported, Patient   B complex-C-folic acid (NEPHROCAPS/TRIPHROCAPS) 1 MG capsule Take 1 capsule by mouth daily   Yes Reported, Patient   RANITIDINE HCL PO Take 150 mg by mouth   Yes Reported, Patient   SULFAMETHOXAZOLE-TRIMETHOPRIM PO Take 800 mg by mouth every 12 hours   Yes Reported, Patient   dasatinib (SPRYCEL) 20 MG tablet CHEMO Take 20 mg by mouth daily   Yes Reported, Patient   sevelamer (RENVELA) 800 MG tablet Take 1,600 mg by mouth 3 times daily (with meals) 1 tab with snacks   Yes Reported, Patient       Vitals:  /88 mmHg  Pulse 81  Temp(Src) 98.1  F (36.7  C) (Oral)  Resp 16  Ht 1.626 m (5' 4\")  Wt 68.266 kg (150 lb 8 oz)  BMI 25.82 kg/m2  SpO2 96%    Exam:  GENERAL APPEARANCE: alert and no distress  HENT: mouth without ulcers or " lesions. Good dentition  LYMPHATICS: no cervical or supraclavicular nodes  RESP: lungs clear to auscultation - no rales, rhonchi or wheezes  CV: regular rhythm, normal rate, no rub, no murmur. No carotid bruit bilaterally  EDEMA: no LE edema bilaterally  ABDOMEN: soft, nondistended, nontender  MS: extremities normal - no gross deformities noted, no evidence of inflammation in joints, no muscle tenderness  SKIN: no rash  Femoral pulses 2+ equal bilaterally    Results:   Recent Results (from the past 336 hour(s))   Lipid Profile [LAB18]    Collection Time: 01/26/17  6:47 AM   Result Value Ref Range    Cholesterol 172 <200 mg/dL    Triglycerides 193 (H) <150 mg/dL    HDL Cholesterol 38 (L) >39 mg/dL    LDL Cholesterol Calculated 96 <100 mg/dL    Non HDL Cholesterol 135 (H) <130 mg/dL   Comprehensive metabolic panel [LAB17]    Collection Time: 01/26/17  6:47 AM   Result Value Ref Range    Sodium 138 133 - 144 mmol/L    Potassium 5.0 3.4 - 5.3 mmol/L    Chloride 100 94 - 109 mmol/L    Carbon Dioxide 29 20 - 32 mmol/L    Anion Gap 9 3 - 14 mmol/L    Glucose 92 70 - 99 mg/dL    Urea Nitrogen 39 (H) 7 - 30 mg/dL    Creatinine 9.03 (H) 0.66 - 1.25 mg/dL    GFR Estimate 7 (L) >60 mL/min/1.7m2    GFR Estimate If Black 8 (L) >60 mL/min/1.7m2    Calcium 8.8 8.5 - 10.1 mg/dL    Bilirubin Total 0.4 0.2 - 1.3 mg/dL    Albumin 3.7 3.4 - 5.0 g/dL    Protein Total 7.0 6.8 - 8.8 g/dL    Alkaline Phosphatase 59 40 - 150 U/L    ALT 34 0 - 70 U/L    AST 25 0 - 45 U/L   Cardiolipin Ariella IgG and IgM [[id number]]    Collection Time: 01/26/17  6:47 AM   Result Value Ref Range    Cardiolipin Antibody IgG  0.0 - 19.9 GPL-U/mL     <1.6  Negative   New method in use November 28, 2016.      Cardiolipin Antibody IgM 0.3 0.0 - 19.9 MPL-U/mL   M Tuberculosis by Quantiferon [[id number]]    Collection Time: 01/26/17  6:47 AM   Result Value Ref Range    M Tuberculosis Result Negative NEG    M Tuberculosis Antigen Value 0.00 IU/mL   INR [[id number]]    Collection  Time: 01/26/17  6:47 AM   Result Value Ref Range    INR 0.99 0.86 - 1.14   Partial thromboplastin time [LAB56]    Collection Time: 01/26/17  6:47 AM   Result Value Ref Range    PTT 29 22 - 37 sec   Thrombin time [[id number]]    Collection Time: 01/26/17  6:47 AM   Result Value Ref Range    Thrombin Time 18.7 13.0 - 19.0 sec   Lupus panel [[id number]]    Collection Time: 01/26/17  6:47 AM   Result Value Ref Range    Lupus Result  NEG     Negative  (Note)  COMMENTS:  The INR is normal.  APTT ratio is normal.  DRVVT Screen ratio is normal.  Thrombin time is normal.  NEGATIVE TEST; A LUPUS ANTICOAGULANT WAS NOT DETECTED IN THIS  SPECIMEN WITHIN THE LIMITS OF THE TESTING REPERTOIRE.  If the clinical picture is strongly suggestive of an antiphospholipid  syndrome, recommend anticardiolipin and beta-2-glycoprotein (IgG and  IgM) antibody tests.  Tressa Perez M.D.  [phone]  1/27/2017    APTT:       Ratio  Patient  =  0.95  1:2 Mix  =  N/A  Reference:  Negative: Less than or equal to 1.16  Positive: Greater than or equal to 1.17     DILUTE SHAYNA VIPER VENOM TEST:  Screen Ratio = 0.93   Normal is less than 1.21       CBC with platelets differential [IHI200]    Collection Time: 01/26/17  6:47 AM   Result Value Ref Range    WBC 6.7 4.0 - 11.0 10e9/L    RBC Count 3.91 (L) 4.4 - 5.9 10e12/L    Hemoglobin 11.8 (L) 13.3 - 17.7 g/dL    Hematocrit 36.6 (L) 40.0 - 53.0 %    MCV 94 78 - 100 fl    MCH 30.2 26.5 - 33.0 pg    MCHC 32.2 31.5 - 36.5 g/dL    RDW 12.8 10.0 - 15.0 %    Platelet Count 152 150 - 450 10e9/L    Diff Method Automated Method     % Neutrophils 48.4 %    % Lymphocytes 27.8 %    % Monocytes 9.4 %    % Eosinophils 13.4 %    % Basophils 0.9 %    % Immature Granulocytes 0.1 %    Nucleated RBCs 0 0 /100    Absolute Neutrophil 3.3 1.6 - 8.3 10e9/L    Absolute Lymphocytes 1.9 0.8 - 5.3 10e9/L    Absolute Monocytes 0.6 0.0 - 1.3 10e9/L    Absolute Eosinophils 0.9 (H) 0.0 - 0.7 10e9/L    Absolute Basophils 0.1 0.0 -  0.2 10e9/L    Abs Immature Granulocytes 0.0 0 - 0.4 10e9/L    Absolute Nucleated RBC 0.0    HLA Typing Complete SOT Recipient    Collection Time: 01/26/17  6:47 AM   Result Value Ref Range    HLA Typing Complete SOT Recipient       Specimen received - Immunology report to follow upon completion.   PRA Single Antigen IgG Antibody    Collection Time: 01/26/17  6:47 AM   Result Value Ref Range    PRA Single Antigen IgG Antibody       Specimen received - Immunology report to follow upon completion.   BK virus PCR quantitative [[id number]]    Collection Time: 01/26/17  6:47 AM   Result Value Ref Range    BK Virus Specimen Plasma     BK Virus Result BK Virus DNA Not Detected BKNEG copies/mL    BK Virus Log  <2.7 Log copies/mL     Not Calculated   The Real-Time quantitative BK Virus assay was developed and its performance   characteristics determined by the [facility] Laboratory at   the [facility] in [place], [place]. The   primers and probes for each analyte are Analyte Specific Reagents (ASRs)   manufactured by [employer].   ASRs are used in many laboratory tests necessary for standard medical care and   generally do not require U.S. Food and Drug Administration approval. The FDA   has determined that such clearance or approval is not necessary.   This test is used for clinical purposes. It should not be regarded as   investigational or for research. This laboratory is certified under the   Clinical Laboratory Improvement Amendments of 1988 (CLIA-88) as qualified to   perform high complexity clinical laboratory testing.     CMV Antibody IgG [[id number]]    Collection Time: 01/26/17  6:47 AM   Result Value Ref Range    CMV Antibody IgG  0.0 - 0.8 AI     <0.2  Negative   Antibody index (AI) values reflect qualitative changes in antibody   concentration that cannot be directly associated with clinical condition or   disease state.     EBV Capsid Antibody IgG [[id number]]     Collection Time: 01/26/17  6:47 AM   Result Value Ref Range    EBV Capsid Antibody IgG 7.5 (H) 0.0 - 0.8 AI   Hepatitis B core antibody [[id number]]    Collection Time: 01/26/17  6:47 AM   Result Value Ref Range    Hepatitis B Core Ariella Nonreactive NR   Hepatitis B Surface Antibody [[id number]]    Collection Time: 01/26/17  6:47 AM   Result Value Ref Range    Hepatitis B Surface Antibody 29.01 (H) <8.00 m[IU]/mL   Hepatitis B surface antigen [HNO448]    Collection Time: 01/26/17  6:47 AM   Result Value Ref Range    Hep B Surface Agn Nonreactive NR   Hepatitis C antibody [SFT521]    Collection Time: 01/26/17  6:47 AM   Result Value Ref Range    Hepatitis C Antibody  NR     Nonreactive   Assay performance characteristics have not been established for newborns,   infants, and children     HIV Antigen Antibody Combo Pretransplant    Collection Time: 01/26/17  6:47 AM   Result Value Ref Range    HIV Antigen Antibody Combo Pretransplant  NR     Nonreactive   HIV-1 p24 Ag & HIV-1/HIV-2 Ab Not Detected     Anti Treponema [[id number]]    Collection Time: 01/26/17  6:47 AM   Result Value Ref Range    Treponema pallidum Antibody Negative NEG   Varicella Zoster Virus Antibody IgG [[id number]]    Collection Time: 01/26/17  6:47 AM   Result Value Ref Range    Varicella Zoster Virus Antibody IgG  0.0 - 0.8 AI     <0.2  Negative, suggests no immunologic exposure.   Antibody index (AI) values reflect qualitative changes in antibody   concentration that cannot be directly associated with clinical condition or   disease state.     Factor 2 and 5 mutation analysis    Collection Time: 01/26/17  6:47 AM   Result Value Ref Range    Copath Report       Patient Name: JONNY KINCAID  MR#: [id number]  Specimen #:   Collected: 1/26/2017 06:47  Received: 1/26/2017 10:01  Reported: 1/27/2017 16:28  Ordering Phy(s): HALI WHITMORE    For improved result formatting, select 'View Enhanced Report Format'  under Linked Documents  section.  _________________________________________    TEST(S) REQUESTED:  Factor 5 Leiden and Factor 2 by PCR    SPECIMEN DESCRIPTION:  Blood    METHODOLOGY:   The regions of genomic DNA containing the R2125G Factor 5  gene mutation (Factor V Leiden) and the Factor 2(Prothrombin [id number])  gene mutation were simultaneously amplified using the polymerase chain  reaction.  The amplified products were digested with restriction  endonuclease TaqI and products were analyzed by gel electrophoresis.    RESULTS:    Factor V 1691G>A (Leiden)  RESULTS:  Mutation analyzed:     1691G>A  Factor V 1691G>A (Leiden)  Interpretation:      ABSENT  Factor V 1691G>A (Leiden) mutation  genotype:      G/G    FACTOR 2/PROTHROMBI N RESULTS:  Mutation analyzed:     19609G>A  Factor 2 Mutation Interpretation:      ABSENT  Factor 2 Mutation genotype:      G/G    INTERPRETATION:  The patient is negative for the Factor V 1691G>A (Leiden) and negative  for the Factor 2 mutation.    COMMENTS:  If a patient is the recipient of an allogeneic bone marrow transplant,  this test must be done on a pre-transplant sample or buccal swab.  A  previous allogeneic bone marrow transplant will interfere with test  results.  Call the [facility]([phone]) for  instructions on sample collection for these patients.    This test was developed and its performance characteristics determined  by the [facility],  [facility]. It has not been cleared or approved by the FDA. The  laboratory is regulated under CLIA as qualified to perform  high-complexity testing. This test is used for clinical purposes. It  should not be regarded as investigational or for research.    Electr onically Signed Out By:  RICHELLE Greer    CPT Codes:  A: 13948-E7IUJJ, 09832-T1FTFI, -FTKCSA(2)    TESTING LAB LOCATION:  [address] [place] [address]  [phone]    COLLECTION SITE:  Client[facility]  [facility] acute hospital  [facility]

## 2017-02-01 NOTE — COMMITTEE REVIEW
Abdominal Committee Review Note     Evaluation Date: 1/26/2017  Committee Review Date: 2/1/2017    Organ being evaluated for: Kidney    Transplant Phase: Evaluation  Transplant Status: Active    Transplant Coordinator: Ann Barroso  Transplant Surgeon:       Referring Physician: Alexander Tomlinson    Primary Diagnosis: Alport's Syndrome  Secondary Diagnosis:     Committee Review Members:  Diogo Garcia, PIETER    - Clinical Rosario Shane, MSW, Michelle Montalvo, MSW   Transplant Lonibrit Rendon PA-C, Halie Bee, RN, Christi Klein LPN, Jody Hastings, AURA, Alvin Varner MD, Rafi Tavares MD       Transplant Eligibility: Irreversible chronic kidney disease treated w/dialysis or expected need for dialysis    Committee Review Decision: Needs Re-presentation    Relative Contraindications: None    Absolute Contraindications: Other    Committee Chair Rafi Tavares MD verbally attested to the committee's decision.    Committee Discussion Details:     Here for repeat evaluation; Loni Cruz.   1. Kidney transplant evaluation - patient is a good candidate   2. 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 He admits to some non-compliance during his first transplant due to personal issues, although this does not appear to have been a problem since. He is also s/p DDKT (2005) with failure due to primary non-function and graft nephrectomy within a few weeks after transplant. He has been on hemodialysis since 1999 (except for the few weeks following the 2005 transplant), which is going okay. He would likely benefit from a third kidney transplant. He was previously listed from 2005 until 2015 but was de-listed following a diagnosis of CML. He is highly sensitized and reports no potential offers while he was previously listed.  3. CML - diagnosed 2/2015 and continues to be treated with  dasatinib. He is followed by his oncologist, Dr. Tomlinson, in [place] at [facility], and has been noted to have had an excellent response to treatment. He is scheduled to see oncology here in 2/2017.  4. Cardiac  he should have cardiac risk assessment.  5. Former tobacco use - he will need PFTs. scheduled  6. Possible claudication symptoms - he does have good femoral pulses, but given symptoms and previous transplants, he should have iliac US with dopplers to assess for potential vascular targets.    CML Dee Galeano 2/2/2017 pending  PFT's , cards, iliacs scheduled   Add Derm order to see at [facility] lesion on top of head   Pt on dialysis not listed until evaluation complete       Ed and Danay are able to come 3/23; 3/28; or 3/30 for reevaluation.

## 2017-02-02 ENCOUNTER — OFFICE VISIT (OUTPATIENT)
Dept: TRANSPLANT | Facility: CLINIC | Age: 42
End: 2017-02-02
Attending: INTERNAL MEDICINE
Payer: MEDICARE

## 2017-02-02 ENCOUNTER — TELEPHONE (OUTPATIENT)
Dept: TRANSPLANT | Facility: CLINIC | Age: 42
End: 2017-02-02

## 2017-02-02 VITALS
RESPIRATION RATE: 16 BRPM | HEIGHT: 64 IN | OXYGEN SATURATION: 97 % | WEIGHT: 151.1 LBS | HEART RATE: 72 BPM | SYSTOLIC BLOOD PRESSURE: 132 MMHG | TEMPERATURE: 97.8 F | DIASTOLIC BLOOD PRESSURE: 78 MMHG | BODY MASS INDEX: 25.8 KG/M2

## 2017-02-02 DIAGNOSIS — C92.10 CML (CHRONIC MYELOCYTIC LEUKEMIA) (H): Primary | ICD-10-CM

## 2017-02-02 DIAGNOSIS — L98.9 SKIN LESION: Primary | ICD-10-CM

## 2017-02-02 DIAGNOSIS — Z76.82 ORGAN TRANSPLANT CANDIDATE: ICD-10-CM

## 2017-02-02 DIAGNOSIS — N18.6 END STAGE RENAL DISEASE (H): ICD-10-CM

## 2017-02-02 LAB
SA1 CELL: NORMAL
SA1 COMMENTS: NORMAL
SA1 HI RISK ABY: NORMAL
SA1 MOD RISK ABY: NORMAL
SA1 TEST METHOD: NORMAL
SA2 CELL: NORMAL
SA2 COMMENTS: NORMAL
SA2 HI RISK ABY UA: NORMAL
SA2 MOD RISK ABY: NORMAL
SA2 TEST METHOD: NORMAL
UNOS CPRA: 100

## 2017-02-02 PROCEDURE — 99212 OFFICE O/P EST SF 10 MIN: CPT

## 2017-02-02 NOTE — PROGRESS NOTES
[facility]  January 2, 2017            Imtiaz Bain MD   [facility]   Department of Surgery    [address] [facility] [address]    [place]  [address]      Loni Rendon PA-C   [facility]       Hannah Riley RN, BSN   [facility]       Alexander Tomlinson MD   [facility]   Oncology/Hematology   [address] [place]  [address]        RE: Jeremiah Cruz    REASON FOR VISIT:  Evaluation of CML and clearance prior to kidney transplant.      Dear Doctors and SOT Team:      I had the pleasure of meeting Jeremiah Cruz at the [facility] the [facility].  As you know his history well, I will review it now just for our records.      As you know, he is a very pleasant 41-year-old gentleman who has had a very complicated past medical history notable for Alport's syndrome with resultant visual changes, hearing changes and kidney failure, who underwent a living donor kidney transplant from his sister in 1988 that did well until 1999, and then he underwent a second transplant in 2005, but had relatively quick rejection.  He has had numerous issues with his kidneys and dialysis with prior issues with his peritoneal dialysis catheters, as well as numerous grafts and fistulas that have needed to be repaired and changed and revised.  He has been on dialysis since the last failed kidney transplant.      Of note, in approximately 06/2014 it was noted that his white count was rising and his hemoglobin was falling, and he actually required a handful of units of red cells.  By 02/2015, his white count was up to 27,000, and peripheral blood testing on 02/11/2015 was positive for Branch chromosome, consistent with CML.  He underwent a bone marrow biopsy on 02/25/2015 that was read as chronic phase CML with translocation 09;22 in all 20 cells, and a retained platelet count.      He was started on therapy with dasatinib at 50 mg  daily in 2015, but that was held in April due to thrombocytopenia, and then it was restarted at a lower dose on 2015 at 20 mg daily, and, amazingly, his BCR-ABL PCR dropped really quite quickly to a major molecular remission within a good 3-4 months.  At the lower dose, it did bump up to 0.5% BCR-ABL positivity PCR in 2016, and he has thus been on 40 mg daily since that time with good responses.      The last record that I have from his blood on 10/31/2016 was a BCR-ABL that was undetectable by PCR; thus, indicating a complete molecular remission. He notes he had a repeat BCR-ABL drawn and is seeing his primary oncologist within the upcoming week.     He comes in today to discuss the implications of his CML on his possible upcoming third transplant.  He currently notes that he is overall feeling okay.  He is tolerating the medications fine, and is just anxious to be able to get moving forward with the transplant process.      PAST MEDICAL HISTORY:   1.  Alport's syndrome.   2.  Living donor kidney transplant in , failed in .   3.   donor transplant in  with subsequent rejection.   4.  History of SVC stenosis requiring dilation.   5.  Hyperparathyroidism secondary to his kidney disease.   6.  Hypertension.   7.  GERD.   8.  Bilateral sensorineural hearing loss.   9.  History of 2 prior peritoneal catheters that did not work.   10.  Incisional hernia repair in .   11.  Numerous left arm graft and right arm fistula repairs over the years, with the most recent arm revision as of yesterday.      SOCIAL HISTORY:  Notable for being a previous tobacco smoker, quit in .  Minimal alcohol.  He is not working.  He has a son and daughter. The son is 20, the daughter is 19 and is currently pregnant.  He is not .  He has good support from a friend, who accompanies him today, and his mother.  He does active things such as 4-wheeling and fishing.      ALLERGIES:  Listed in Epic.       FAMILY HISTORY:  Notable for a brother with bone cancer, but no blood cancers.      PHYSICAL EXAMINATION:  He is a chronically ill-appearing gentleman in no acute distress, nontoxic appearing.  He has got bilateral hearing aids.  He does have of kind of a june appearance to his skin.  He is of shorter stature, and a full exam was not performed today as all of our visit was spent reviewing his medical history and recommendations regarding clearance for transplant.      LABORATORY DATA:  Reviewed from all the documents scanned into Epic on 12/14/2016, and all of his BCR-ABL levels, as well as his CBCs, as well as his bone marrow biopsies were reviewed.  I also reviewed all of his imaging studies as well.      ASSESSMENT AND PLAN:  Mr. Cruz is a 41-year-old gentleman with a history of Alport's syndrome, currently on dialysis after 2 previously failed kidney transplants, being worked up for a third transplant with a recent diagnosis approximately 2 years ago of CML.     1.  CML.  I discussed today with Mr. Cruz and his friend the biology of CML, the [place] chromosome and the monitoring with BCR-ABL PCR in the blood.  We discussed the different levels of remission status, specifically the hematologic remission, cytogenetic remission and molecular remission.      I discussed with him that based on the results that I have seen, he obtained a relatively quick hematologic remission, he attained a cytogenetic remission documented on bone marrow biopsy in 02/2016, and as of the 10/2016 BCR-ABL in the blood he had achieved a complete molecular remission.  We discussed that this is a great response to the tyrosine kinase inhibitor therapy.      In terms of his CML it is interesting to think about whether or not this could be related to his prior immunosuppression with his previous kidney transplants. In reviewing the literature there are reports of CML occurring following kidney transplantation.  While there are  numerous publications of post transplant lymphoma, skin cancers, etc, CML is not high on the list of expected cancers post solid organ transplant. In terms of the publications, one by Kwaku Weeks in Leukemia Research published in 2005 described a case report of a CML patient, and a review of the literature that at that time showed approximately 13 patients who had kidney transplants  treated with azathioprine-based immunosuppression and developed CML within on average a couple of years post-transplant.  They commented that it obviously was not definitive that the immunosuppression directly led to the development of the CML, but that it is a possibility.  Other reports have described CML developing post kidney transplant with a cellcept based immunosuppression approach. Numerous studies have described treatment of CML with imatinib, dasatinib, and nilotinib actually after kidney transplantation with successful disease control.    While the concept of post kidney transplant immunosupression potentially leading to CML is interesting from an academic standpoint,  whether his CML was caused by alterations in immune surveillance due to immunosuppression from a prior kidney transplants or developed out of the blue, it should not have major bearing on him moving ahead with the third kidney transplant as he has great disease control currently with a complete molecular remission on dasatinib.  Given that he will remain on dasatinib (or another TKI) indefinitely for disease control of his CML and with that should have an excellent long term survival, moving head with kidney transplant is quite reasonable.     Thus, I see no contraindication to him moving forward with a third kidney transplant, especially if it is a living donor.  I did review medication interactions with our PharmD and there are no interactions with Cellcept, prograf, cyclosporine and thus there should be no issues moving ahead from a  pharmacologic standpoint either.     I spent approximately 30 minutes with this patient, with all in direct face-to-face consultation. Of note, he has a BCR-ABL PCR pending from his primary oncologist and we should get these results for our records here when they are available.    Dee Galeano

## 2017-02-02 NOTE — TELEPHONE ENCOUNTER
Nel Ivey NP called reportedly Adrian Cruz was asked to  repeat Hep B  I am not exactly sure what she means, but see Ed s  virology report in results review.    She states that his quantitative Hep B is 21.5 from last results. She will send the last results.  Any comment? Ann  Sent epic inbasket to Loni.     Oncology Note from Roxane Galeano to Dr. Bain and CAMMY More via Tunezy messages.  I sent to Dr. Bain and Loni via email.    Hello Dr. Bain, I am not sure you view Epic inbasket messages, if you do, forgive the duplicate, if not ok. Roxane Galeano MD Oncology note on Jeremiah Cruz.  (I pasted your /Lnoi s note below).  Mr. Cruz returns for a second day in March, we will rediscuss immunos/candidacy at committee. [facility] Oncology/[place] note, Ann   From: Roxane Galeano MD    Sent: 2/2/2017   2:49 PM To: Imtiaz Bain MD, Loni Rendon PA-C,    I saw Mr. Cruz in oncology clinic today regarding clearance of his CML before moving forward to the third kidney transplant. Per his outside doctors BCR-ABL testing his CML was in a complete molecular remission which is great news. He will need to stay on his dasatinib, though, indefinitely. Interestingly I found a number of case reports of patients (n = < 20) developing CML post kidney transplant with the majority of those previously treated with Azathioprine. Whether or not his CML was caused by this previous treatment or true true and unrelated is unknown but as long as he stays on his dasatinib it shouldn't be a huge issue. What immunosuppression do you plan to use for the transplant? I discussed with our PharmD and there should be no interactions with Cellcept, prednisone, cyclosporine, prograf. Let me know if there are any other questions that I can answer.  roxane

## 2017-02-02 NOTE — NURSING NOTE
"Jeremiah Cruz is a 41 year old male who presents for:  Chief Complaint   Patient presents with     Oncology Clinic Visit     Transplant clearance        Initial Vitals:  /78 mmHg  Pulse 72  Temp(Src) 97.8  F (36.6  C) (Oral)  Resp 16  Ht 1.626 m (5' 4.02\")  Wt 68.539 kg (151 lb 1.6 oz)  BMI 25.92 kg/m2  SpO2 97% Estimated body mass index is 25.92 kg/(m^2) as calculated from the following:    Height as of this encounter: 1.626 m (5' 4.02\").    Weight as of this encounter: 68.539 kg (151 lb 1.6 oz).. Body surface area is 1.76 meters squared. BP completed using cuff size: regular  Data Unavailable No LMP for male patient. Allergies and medications reviewed.     Medications: Medication refills not needed today.  Pharmacy name entered into EPIC: Data Unavailable    Comments:     7 minutes for nursing intake (face to face time)   Jennifer Alford MA          "

## 2017-02-06 ENCOUNTER — DOCUMENTATION ONLY (OUTPATIENT)
Dept: TRANSPLANT | Facility: CLINIC | Age: 42
End: 2017-02-06

## 2017-02-06 NOTE — PROGRESS NOTES
2/3/2017 per Dr. Tavares: Given 11 yrs dialysis time, and that he's still 100%, we should leave him at B and put him forward for C consideration

## 2017-02-07 ENCOUNTER — TELEPHONE (OUTPATIENT)
Dept: TRANSPLANT | Facility: CLINIC | Age: 42
End: 2017-02-07

## 2017-02-08 ENCOUNTER — COMMITTEE REVIEW (OUTPATIENT)
Dept: TRANSPLANT | Facility: CLINIC | Age: 42
End: 2017-02-08

## 2017-02-08 ENCOUNTER — TRANSFERRED RECORDS (OUTPATIENT)
Dept: HEALTH INFORMATION MANAGEMENT | Facility: CLINIC | Age: 42
End: 2017-02-08

## 2017-02-08 NOTE — COMMITTEE REVIEW
Abdominal Patient Discussion Note Transplant Coordinator: Ann Barroso  Transplant Surgeon:       Referring Physician: Alexander Tomlinson    Committee Review Members:  Nephrology Nehemiah Wilkinson MD   Nurse Practitioner Phuc Olivarez, APRN CNP   Nutrition Subha Garcia,    Pharmacist iTp Ornelas, [facility]    - Clinical Rosario Shane, [facility], Michelle Montalvo, [facility]   Transplant Loni Rendon PA-C, Brisa Chnace, Mattehw Magana MD, Ibeth Patel, AURA, Christi Klein LPN, Hannah Riley, AURA, Ann Barroso, AURA, Jody Rosales, AURA, Alvin Varner MD       Additional Discussion Notes and Findings:     Dr. Bain requested Ed be placed on the agenda to talk about Oncology drug for CML and immunosuppressive inactions   Dr. Bain not present, this case is not discussed this week.

## 2017-02-14 ENCOUNTER — TELEPHONE (OUTPATIENT)
Dept: TRANSPLANT | Facility: CLINIC | Age: 42
End: 2017-02-14

## 2017-03-03 ENCOUNTER — DOCUMENTATION ONLY (OUTPATIENT)
Dept: TRANSPLANT | Facility: CLINIC | Age: 42
End: 2017-03-03

## 2017-03-07 NOTE — TELEPHONE ENCOUNTER
Ed called to report he can come on 3/14/2017, are the appts still scheduled? YES.  He will have a  bring him on 3/14/2017.  He requests a DERM appt at [facility].  Order in [employer].  OK to keep the benigno Dhruv appt on 5/16/2017, as the cardiologist may want to see him again after 3/14/3017 appt.  Please schedule DERM consult, see Loni's order in Epic.  laura Jain. I connected Ed with Susy's phone. Susy is out this afternoon 3/7/2017.

## 2017-03-11 ASSESSMENT — ENCOUNTER SYMPTOMS
BOWEL INCONTINENCE: 0
VOMITING: 0
JAUNDICE: 0
BLOOD IN STOOL: 0
HEARTBURN: 0
DIARRHEA: 0
NAUSEA: 0
RECTAL BLEEDING: 0
ABDOMINAL PAIN: 1
RECTAL PAIN: 0
BLOATING: 0
CONSTIPATION: 0

## 2017-03-13 ENCOUNTER — PRE VISIT (OUTPATIENT)
Dept: CARDIOLOGY | Facility: CLINIC | Age: 42
End: 2017-03-13

## 2017-03-13 NOTE — TELEPHONE ENCOUNTER
1/26/17--Echocardiogram  Interpretation Summary  Global and regional left ventricular function is normal with an EF of 60-65%.  Grade II or moderate diastolic dysfunction.  Global right ventricular function is normal.  Severe left atrial enlargement is present.  The inferior vena cava is normal in size with preserved respiratory  variability. The estimated right atrial pressure is < 5 mmHg.  No pericardial effusion is present.    2/24/15--Dobutamine stress echocardiogram  Interpretation Summary  Normal dobutamine stress echocardiogram. No wall motion abnormalities at rest   and with dobutamine infusion. Normal LVEF at rest 55-60% with adequate   increase to 70-75% with dobutamine infusion. No ECG changes at rest and with   dobutamine infusion. No chest pain at rest and with dobutamine infusion. No   significant valvular abnormalities and normal size aortic root.

## 2017-03-14 ENCOUNTER — OFFICE VISIT (OUTPATIENT)
Dept: CARDIOLOGY | Facility: CLINIC | Age: 42
End: 2017-03-14
Attending: INTERNAL MEDICINE
Payer: MEDICARE

## 2017-03-14 VITALS
WEIGHT: 150.1 LBS | BODY MASS INDEX: 25.62 KG/M2 | DIASTOLIC BLOOD PRESSURE: 90 MMHG | HEART RATE: 86 BPM | HEIGHT: 64 IN | SYSTOLIC BLOOD PRESSURE: 154 MMHG | OXYGEN SATURATION: 97 %

## 2017-03-14 DIAGNOSIS — N18.6 END STAGE KIDNEY DISEASE (H): Primary | ICD-10-CM

## 2017-03-14 DIAGNOSIS — Z85.6: ICD-10-CM

## 2017-03-14 DIAGNOSIS — I25.10 CORONARY ARTERY DISEASE DUE TO LIPID RICH PLAQUE: ICD-10-CM

## 2017-03-14 DIAGNOSIS — Q87.81 ALPORT'S SYNDROME: ICD-10-CM

## 2017-03-14 DIAGNOSIS — N18.6 END STAGE RENAL DISEASE ON DIALYSIS (H): ICD-10-CM

## 2017-03-14 DIAGNOSIS — I25.83 CORONARY ARTERY DISEASE DUE TO LIPID RICH PLAQUE: ICD-10-CM

## 2017-03-14 DIAGNOSIS — Z99.2 END STAGE RENAL DISEASE ON DIALYSIS (H): ICD-10-CM

## 2017-03-14 DIAGNOSIS — Z01.818 PRE-TRANSPLANT EVALUATION FOR KIDNEY TRANSPLANT: Primary | ICD-10-CM

## 2017-03-14 PROCEDURE — 99214 OFFICE O/P EST MOD 30 MIN: CPT | Performed by: INTERNAL MEDICINE

## 2017-03-14 PROCEDURE — 99213 OFFICE O/P EST LOW 20 MIN: CPT | Mod: 25,ZF

## 2017-03-14 ASSESSMENT — PAIN SCALES - GENERAL: PAINLEVEL: NO PAIN (0)

## 2017-03-14 NOTE — NURSING NOTE
Chief Complaint   Patient presents with     Eval/Assessment     Cardiac evaluation prior to possible kidney transplant.

## 2017-03-14 NOTE — NURSING NOTE
Cardiac Testing: Patient given instructions regarding  stress echocardiogram in the near future. Discussed purpose, preparation, procedure and when to expect results reported back to the patient. Patient demonstrated understanding of this information and agreed to call with further questions or concerns.  Med Reconcile: Reviewed and verified all current medications with the patient. The updated medication list was printed and given to the patient.  Return Appointment: PRN. Patient given instructions regarding scheduling next clinic visit. Patient demonstrated understanding of this information and agreed to call with further questions or concerns.  Patient stated he understood all health information given and agreed to call with further questions or concerns.

## 2017-03-14 NOTE — PATIENT INSTRUCTIONS
Please fax results of dobutamine stress echo to DR. Bartlett at [phone]    Thank you for your visit today.  Please call me with any questions or concerns.   Arvin Schmidt RN  Cardiology Care Coordinator  [phone], press option 1 then option 3

## 2017-03-14 NOTE — LETTER
3/14/2017      RE: Jeremiah Cruz  [address] [address]       Dear Colleague,    Thank you for the opportunity to participate in the care of your patient, Jeremiah Cruz, at the [facility]. Please see a copy of my visit note below.       SUBJECTIVE:  Jeremiah Cruz is a 41 year old male who presents for Renal Tx evaluation.    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 Had first Tx in 1988. Second transplant in 2005 which did not last. On HD since 1999.    No prior cardiac history. No DM. HTN+. Lipids OK. Non smoker. No premature CAD in familt.    Not very active but no symptoms.    CML in 2015. Under remission as per patient.    Patient Active Problem List    Diagnosis Date Noted     Alport's syndrome      Priority: Medium     Anemia in chronic kidney disease      Priority: Medium     Secondary renal hyperparathyroidism (H)      Priority: Medium     Hypertension      Priority: Medium     CML (chronic myelocytic leukemia) (H)      Priority: Medium     End stage kidney disease (H)      Priority: Medium     GERD (gastroesophageal reflux disease)      Priority: Medium    .  Current Outpatient Prescriptions   Medication Sig     CLONIDINE HCL PO Take 0.1 mg by mouth 2 times daily      AMLODIPINE BESYLATE PO Take 10 mg by mouth daily     B complex-C-folic acid (NEPHROCAPS/TRIPHROCAPS) 1 MG capsule Take 1 capsule by mouth daily     RANITIDINE HCL PO Take 150 mg by mouth daily      dasatinib (SPRYCEL) 20 MG tablet CHEMO Take 20 mg by mouth 2 times daily      sevelamer (RENVELA) 800 MG tablet Take 800 mg by mouth 2 times daily (with meals) 1 tab with snacks     SULFAMETHOXAZOLE-TRIMETHOPRIM PO Take 800 mg by mouth every 12 hours Reported on 3/14/2017     No current facility-administered medications for this visit.      Past Medical History   Diagnosis Date     Alport's syndrome      Anemia      Anemia in chronic kidney disease      CML (chronic  myelocytic leukemia) (H)      Dialysis care      End stage kidney disease (H)      End stage renal disease (H)      Former tobacco use      GERD (gastroesophageal reflux disease)      Hypertension      Kidney transplant rejection      S/p nephrectomy 12/7/11     Secondary renal hyperparathyroidism (H)      Thrombocytopenia (H)      Tobacco dependence      Past Surgical History   Procedure Laterality Date     Nephrectomy Bilateral      native     Nephrectomy       transplant     Nephrectomy  2005     transplant     Create fistula arteriovenous upper extremity       Hernia repair, incisional       Svc angioplasty       Insert catheter peritoneal dialysis       Remove catheter peritoneal       Appendectomy       Parathyroidectomy       Allergies   Allergen Reactions     Cephalosporins Other (See Comments) and Rash     fever     Compazine [Prochlorperazine]      Gammagard [Immune Globulin] Other (See Comments)     Ed got spinal meningitis and MD stated to never get again for fear of death.       Penicillins      Vancomycin      Social History     Social History     Marital status: Single     Spouse name: N/A     Number of children: N/A     Years of education: N/A     Occupational History     Not on file.     Social History Main Topics     Smoking status: Former Smoker     Packs/day: 1.00     Years: 12.00     Types: Cigarettes     Quit date: 2/24/2011     Smokeless tobacco: Not on file     Alcohol use No     Drug use: No     Sexual activity: Not on file     Other Topics Concern     Not on file     Social History Narrative     Family History   Problem Relation Age of Onset     Bone Cancer Brother           REVIEW OF SYSTEMS:  General: negative, fever, chills, night sweats  Skin: negative, acne, rash and scaling  Eyes: negative, double vision, eye pain and photophobia  Ears/Nose/Throat: negative, nasal congestion and purulent rhinorrhea  Respiratory: No dyspnea on exertion, No cough, No hemoptysis and  "negative  Cardiovascular: negative, palpitations, tachycardia, irregular heart beat, chest pain, exertional chest pain or pressure, paroxysmal nocturnal dyspnea, dyspnea on exertion and orthopnea  Gastrointestinal: negative, dysphagia, nausea and vomiting  Genitourinary: negative, frequency, urgency and hesitancy  Musculoskeletal: negative, fracture, back pain and neck pain  Neurologic: negative, headaches, syncope, stroke and seizures  Psychiatric: negative, nervous breakdown, thoughts of self-harm and thoughts of hurting someone else  Hematologic/Lymphatic/Immunologic: negative, bleeding disorder, chills and fever  Endocrine: negative, cold intolerance, heat intolerance and hot flashes       OBJECTIVE:  Blood pressure 154/90, pulse 86, height 1.626 m (5' 4\"), weight 68.1 kg (150 lb 1.6 oz), SpO2 97 %.  General Appearance: alert and no distress  Head: Normocephalic. No masses, lesions, tenderness or abnormalities  Eyes: conjuctiva clear, PERRL, EOM intact  Ears: External ears normal. Canals clear. TM's normal.  Nose: Nares normal  Mouth: normal  Neck: Supple, no cervical adenopathy, no thyromegaly  Lungs: clear to auscultation  Cardiac: regular rate and rhythm, normal S1 and S2, SM.  Abdomen: Soft, nontender.  Normal bowel sounds.  No hepatosplenomegaly or abnormal masses  Extremities: no peripheral edema, peripheral pulses normal  Musculoskeletal: negative  Neurological: Cranial nerves 2-12 intact, motor strength intact       ASSESSMENT/PLAN:  Patient with Alport's syndrome and CKD.  Kidney Txs in 1998 and 2005. On HD dince 1999 after loss of first kidney.  Non smoker. No DM. HTN+. No premature CAD in family. Lipids OK.  Will plan for a DSE,.  DSE from 2015 reviewed. Normal.  Per orders.   Return to Clinic PRN.        Please do not hesitate to contact me if you have any questions/concerns.     Sincerely,     CHRISTIE Larsen MD    "

## 2017-03-14 NOTE — PROGRESS NOTES
SUBJECTIVE:  Jeremiah Cruz is a 41 year old male who presents for Renal Tx evaluation.    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 Had first Tx in 1988. Second transplant in 2005 which did not last. On HD since 1999.    No prior cardiac history. No DM. HTN+. Lipids OK. Non smoker. No premature CAD in familt.    Not very active but no symptoms.    CML in 2015. Under remission as per patient.    Patient Active Problem List    Diagnosis Date Noted     Alport's syndrome      Priority: Medium     Anemia in chronic kidney disease      Priority: Medium     Secondary renal hyperparathyroidism (H)      Priority: Medium     Hypertension      Priority: Medium     CML (chronic myelocytic leukemia) (H)      Priority: Medium     End stage kidney disease (H)      Priority: Medium     GERD (gastroesophageal reflux disease)      Priority: Medium    .  Current Outpatient Prescriptions   Medication Sig     CLONIDINE HCL PO Take 0.1 mg by mouth 2 times daily      AMLODIPINE BESYLATE PO Take 10 mg by mouth daily     B complex-C-folic acid (NEPHROCAPS/TRIPHROCAPS) 1 MG capsule Take 1 capsule by mouth daily     RANITIDINE HCL PO Take 150 mg by mouth daily      dasatinib (SPRYCEL) 20 MG tablet CHEMO Take 20 mg by mouth 2 times daily      sevelamer (RENVELA) 800 MG tablet Take 800 mg by mouth 2 times daily (with meals) 1 tab with snacks     SULFAMETHOXAZOLE-TRIMETHOPRIM PO Take 800 mg by mouth every 12 hours Reported on 3/14/2017     No current facility-administered medications for this visit.      Past Medical History   Diagnosis Date     Alport's syndrome      Anemia      Anemia in chronic kidney disease      CML (chronic myelocytic leukemia) (H)      Dialysis care      End stage kidney disease (H)      End stage renal disease (H)      Former tobacco use      GERD (gastroesophageal reflux disease)      Hypertension      Kidney transplant rejection      S/p nephrectomy 12/7/11     Secondary renal hyperparathyroidism (H)       Thrombocytopenia (H)      Tobacco dependence      Past Surgical History   Procedure Laterality Date     Nephrectomy Bilateral      native     Nephrectomy       transplant     Nephrectomy  2005     transplant     Create fistula arteriovenous upper extremity       Hernia repair, incisional       Svc angioplasty       Insert catheter peritoneal dialysis       Remove catheter peritoneal       Appendectomy       Parathyroidectomy       Allergies   Allergen Reactions     Cephalosporins Other (See Comments) and Rash     fever     Compazine [Prochlorperazine]      Gammagard [Immune Globulin] Other (See Comments)     Ed got spinal meningitis and MD stated to never get again for fear of death.       Penicillins      Vancomycin      Social History     Social History     Marital status: Single     Spouse name: N/A     Number of children: N/A     Years of education: N/A     Occupational History     Not on file.     Social History Main Topics     Smoking status: Former Smoker     Packs/day: 1.00     Years: 12.00     Types: Cigarettes     Quit date: 2/24/2011     Smokeless tobacco: Not on file     Alcohol use No     Drug use: No     Sexual activity: Not on file     Other Topics Concern     Not on file     Social History Narrative     Family History   Problem Relation Age of Onset     Bone Cancer Brother           REVIEW OF SYSTEMS:  General: negative, fever, chills, night sweats  Skin: negative, acne, rash and scaling  Eyes: negative, double vision, eye pain and photophobia  Ears/Nose/Throat: negative, nasal congestion and purulent rhinorrhea  Respiratory: No dyspnea on exertion, No cough, No hemoptysis and negative  Cardiovascular: negative, palpitations, tachycardia, irregular heart beat, chest pain, exertional chest pain or pressure, paroxysmal nocturnal dyspnea, dyspnea on exertion and orthopnea  Gastrointestinal: negative, dysphagia, nausea and vomiting  Genitourinary: negative, frequency, urgency and  "hesitancy  Musculoskeletal: negative, fracture, back pain and neck pain  Neurologic: negative, headaches, syncope, stroke and seizures  Psychiatric: negative, nervous breakdown, thoughts of self-harm and thoughts of hurting someone else  Hematologic/Lymphatic/Immunologic: negative, bleeding disorder, chills and fever  Endocrine: negative, cold intolerance, heat intolerance and hot flashes       OBJECTIVE:  Blood pressure 154/90, pulse 86, height 1.626 m (5' 4\"), weight 68.1 kg (150 lb 1.6 oz), SpO2 97 %.  General Appearance: alert and no distress  Head: Normocephalic. No masses, lesions, tenderness or abnormalities  Eyes: conjuctiva clear, PERRL, EOM intact  Ears: External ears normal. Canals clear. TM's normal.  Nose: Nares normal  Mouth: normal  Neck: Supple, no cervical adenopathy, no thyromegaly  Lungs: clear to auscultation  Cardiac: regular rate and rhythm, normal S1 and S2, SM.  Abdomen: Soft, nontender.  Normal bowel sounds.  No hepatosplenomegaly or abnormal masses  Extremities: no peripheral edema, peripheral pulses normal  Musculoskeletal: negative  Neurological: Cranial nerves 2-12 intact, motor strength intact       ASSESSMENT/PLAN:  Patient with Alport's syndrome and CKD.  Kidney Txs in 1998 and 2005. On HD dince 1999 after loss of first kidney.  Non smoker. No DM. HTN+. No premature CAD in family. Lipids OK.  Will plan for a DSE,.  DSE from 2015 reviewed. Normal.  Per orders.   Return to Clinic PRN.  Answers for HPI/ROS submitted by the patient on 3/11/2017   General Symptoms: No  Skin Symptoms: No  HENT Symptoms: No  EYE SYMPTOMS: No  HEART SYMPTOMS: No  LUNG SYMPTOMS: No  INTESTINAL SYMPTOMS: Yes  URINARY SYMPTOMS: No  REPRODUCTIVE SYMPTOMS: No  SKELETAL SYMPTOMS: No  BLOOD SYMPTOMS: No  NERVOUS SYSTEM SYMPTOMS: No  MENTAL HEALTH SYMPTOMS: No  Heart burn or indigestion: No  Nausea: No  Vomiting: No  Abdominal pain: Yes  Bloating: No  Constipation: No  Diarrhea: No  Blood in stool: No  Black stools: " No  Rectal or Anal pain: No  Fecal incontinence: No  Rectal bleeding: No  Yellowing of skin or eyes: No  Vomit with blood: No  Change in stools: No  Hemorrhoids: No    Reviewed stress echo report.Normal.  Patient may proceed with transplant.

## 2017-03-14 NOTE — MR AVS SNAPSHOT
After Visit Summary   3/14/2017    Jeremiah Cruz    MRN: [id number]           Patient Information     Date Of Birth          1975        Visit Information        Provider Department      3/14/2017 2:00 PM CHRISTIE Larsen MD [facility]        Today's Diagnoses     Pre-transplant evaluation for kidney transplant    -  1      Care Instructions    Please fax results of dobutamine stress echo to DR. Bartlett at [phone]    Thank you for your visit today.  Please call me with any questions or concerns.   Arvin Schmidt RN  Cardiology Care Coordinator  [phone], press option [phone] then option 3        Follow-ups after your visit        Follow-up notes from your care team     Return if symptoms worsen or fail to improve.      Your next 10 appointments already scheduled     May 16, 2017  2:00 PM CDT   (Arrive by 1:45 PM)   NEW PANCREAS/KIDNEY TRANSPLANT WORK-UP with CHRISTIE Larsen MD   [facility] ([facility])    [address] [place] [id number] [phone]              Future tests that were ordered for you today     Open Future Orders        Priority Expected Expires Ordered    Dobutamine Stress Echocardiogram Routine  3/14/2018 3/14/2017            Who to contact     If you have questions or need follow up information about today's clinic visit or your schedule please contact [facility] directly at [phone].  Normal or non-critical lab and imaging results will be communicated to you by MyChart, letter or phone within 4 business days after the clinic has received the results. If you do not hear from us within 7 days, please contact the clinic through MyChart or phone. If you have a critical or abnormal lab result, we will notify you by phone as soon as possible.  Submit refill requests through [employer] or call your pharmacy and they will forward the refill request to us. Please allow 3 business days for your  "refill to be completed.          Additional Information About Your Visit        [employer] Information     [employer] Easy gives you secure access to your electronic health record. If you see a primary care provider, you can also send messages to your care team and make appointments. If you have questions, please call your primary care clinic.  If you do not have a primary care provider, please call [phone] and they will assist you.        Care EveryWhere ID     This is your Care EveryWhere ID. This could be used by other organizations to access your [facility] medical records  [id number]        Your Vitals Were     Pulse Height Pulse Oximetry BMI (Body Mass Index)          86 1.626 m (5' 4\") 97% 25.76 kg/m2         Blood Pressure from Last 3 Encounters:   03/14/17 154/90   02/02/17 132/78   01/26/17 149/88    Weight from Last 3 Encounters:   03/14/17 68.1 kg (150 lb 1.6 oz)   02/02/17 68.5 kg (151 lb 1.6 oz)   01/26/17 68.3 kg (150 lb 8 oz)               Primary Care Provider    None Specified       No primary provider on file.        Thank you!     Thank you for choosing [facility]  for your care. Our goal is always to provide you with excellent care. Hearing back from our patients is one way we can continue to improve our services. Please take a few minutes to complete the written survey that you may receive in the mail after your visit with us. Thank you!             Your Updated Medication List - Protect others around you: Learn how to safely use, store and throw away your medicines at www.disposemymeds.org.          This list is accurate as of: 3/14/17  2:41 PM.  Always use your most recent med list.                   Brand Name Dispense Instructions for use    AMLODIPINE BESYLATE PO      Take 10 mg by mouth daily       B complex-C-folic acid 1 MG capsule      Take 1 capsule by mouth daily       CLONIDINE HCL PO      Take 0.1 mg by mouth 2 times daily       RANITIDINE HCL PO      Take 150 mg by mouth daily "       sevelamer 800 MG tablet    RENVELA     Take 800 mg by mouth 2 times daily (with meals) 1 tab with snacks       SPRYCEL 20 MG tablet CHEMO   Generic drug:  dasatinib      Take 20 mg by mouth 2 times daily       SULFAMETHOXAZOLE-TRIMETHOPRIM PO      Take 800 mg by mouth every 12 hours Reported on 3/14/2017

## 2017-03-15 LAB — INTERPRETATION ECG - MUSE: NORMAL

## 2017-03-21 ENCOUNTER — TELEPHONE (OUTPATIENT)
Dept: CARDIOLOGY | Facility: CLINIC | Age: 42
End: 2017-03-21

## 2017-03-21 NOTE — TELEPHONE ENCOUNTER
"Pt's PA, Vicky Navas, called with a question regarding pt's dobutamine stress ECHO. Vicky stated that pt was there to see her and left an AVS that stated, \"Please fax results of dobutamine stress echo to DR. Bartlett at [phone].\" Vicky was wondering if the test was to be done at their facility and, if so, if she needed to do anything to set it up for the patient. Pt did not seem to know any specific instructions regarding the test. Vicky's number is [phone].    "

## 2017-03-24 ENCOUNTER — TELEPHONE (OUTPATIENT)
Dept: TRANSPLANT | Facility: CLINIC | Age: 42
End: 2017-03-24

## 2017-03-24 NOTE — TELEPHONE ENCOUNTER
Ed Anthony saw Nel Navas NP locally, she called to verify what further immunizations/vaccinations are necessary?  None, Hep B and Pneumococcal are complete.  Dr. Bartlett ordered Lexiscan to be done locally.  Ed wanted to get in better shape before Lexiscan, not necessary, do as soon as possible.  I asked Ed to call when his Lexiscan is done so I can send to Dhruv and review with team for Kidney candidacy.

## 2017-04-07 LAB
DLCOUNC-%PRED-PRE: 72 %
DLCOUNC-PRE: 22.05 ML/MIN/MMHG
DLCOUNC-PRED: 30.34 ML/MIN/MMHG
ERV-%PRED-PRE: 49 %
ERV-PRE: 0.59 L
ERV-PRED: 1.18 L
EXPTIME-PRE: 6.94 SEC
FEF2575-%PRED-PRE: 57 %
FEF2575-PRE: 2 L/SEC
FEF2575-PRED: 3.5 L/SEC
FEFMAX-%PRED-PRE: 76 %
FEFMAX-PRE: 6.79 L/SEC
FEFMAX-PRED: 8.86 L/SEC
FEV1-%PRED-PRE: 71 %
FEV1-PRE: 2.45 L
FEV1FEV6-PRE: 77 %
FEV1FEV6-PRED: 82 %
FEV1FVC-PRE: 77 %
FEV1FVC-PRED: 82 %
FEV1SVC-PRE: 75 %
FEV1SVC-PRED: 79 %
FIFMAX-PRE: 6.41 L/SEC
FRCPLETH-%PRED-PRE: 71 %
FRCPLETH-PRE: 2.2 L
FRCPLETH-PRED: 3.08 L
FVC-%PRED-PRE: 74 %
FVC-PRE: 3.16 L
FVC-PRED: 4.22 L
IC-%PRED-PRE: 84 %
IC-PRE: 2.7 L
IC-PRED: 3.2 L
RVPLETH-%PRED-PRE: 90 %
RVPLETH-PRE: 1.62 L
RVPLETH-PRED: 1.79 L
TLCPLETH-%PRED-PRE: 82 %
TLCPLETH-PRE: 4.9 L
TLCPLETH-PRED: 5.91 L
VA-%PRED-PRE: 82 %
VA-PRE: 4.69 L
VC-%PRED-PRE: 75 %
VC-PRE: 3.29 L
VC-PRED: 4.38 L

## 2017-04-19 ENCOUNTER — COMMITTEE REVIEW (OUTPATIENT)
Dept: TRANSPLANT | Facility: CLINIC | Age: 42
End: 2017-04-19

## 2017-04-19 DIAGNOSIS — Z76.82 ORGAN TRANSPLANT CANDIDATE: Primary | ICD-10-CM

## 2017-04-19 DIAGNOSIS — N18.6 END-STAGE RENAL DISEASE ON PERITONEAL DIALYSIS (H): ICD-10-CM

## 2017-04-19 DIAGNOSIS — Z85.6 HISTORY OF CHRONIC MYELOID LEUKEMIA: ICD-10-CM

## 2017-04-19 DIAGNOSIS — Z99.2 END-STAGE RENAL DISEASE ON PERITONEAL DIALYSIS (H): ICD-10-CM

## 2017-04-19 DIAGNOSIS — Z79.60 LONG-TERM USE OF IMMUNOSUPPRESSANT MEDICATION: ICD-10-CM

## 2017-04-19 NOTE — COMMITTEE REVIEW
Abdominal Committee Review Note     Evaluation Date: 1/26/2017  Committee Review Date: 4/19/2017    Organ being evaluated for: Kidney    Transplant Phase: Evaluation  Transplant Status: Active    Transplant Coordinator: Ann Barroso  Transplant Surgeon:       Referring Physician: Alexander Tomlinson    Primary Diagnosis: Alport's Syndrome  Secondary Diagnosis:     Committee Review Members:  Dietitian, Emily Garcia RD   Nephrology Nehemiah Wilkinson MD   Nurse Practitioner Phuc Olivarez, APRN [facility]   Pharmacy Annemarie Santos [facility]    - Clinical Rosario Shane, [facility]   Transplant Loni Rendon PA-C, Halie Bee, RN, Ibeth Patel, RN, Christi Klein LPN, Hannah Riley, AURA, Ann Barroso, AURA, Jody Rosales, AURA, Jody Hastings, RN, Alvin Varner MD, Rafi Tavares MD       Transplant Eligibility: Irreversible chronic kidney disease treated w/dialysis or expected need for dialysis    Committee Review Decision: Needs Re-presentation    Relative Contraindications:     Absolute Contraindications:     Committee Chair Rafi Tavares MD verbally attested to the committee's decision.    Committee Discussion Details:   Adrian Cruz is 41 yrs old with 2 previous kidney transplant   1988, 2005 on dialysis     Oncology CML on Dasatinib 50 mg QD stable ; remains in complete cytogenetic response; 1031/2016 we requested Oncology note 2/8/2017 report arrived and scanned to CAMMY Garrido from [facility] for transplant.     I sent note to Arvin Rodriguez RN asking [facility] for clearence risk    Needs DSE per [facility] scheduled for 5/16/2017   rediscuss on 5/17/2017 for kidney candidacy

## 2017-04-19 NOTE — TELEPHONE ENCOUNTER
Magdaleno RODRIGUEZ recommends a DSE.  Order in [facility] to Susy. I called Ed Anthony with this plan and Left voice message to Ed.   Order in [facility] to Susy.

## 2017-04-24 ENCOUNTER — TELEPHONE (OUTPATIENT)
Dept: TRANSPLANT | Facility: CLINIC | Age: 42
End: 2017-04-24

## 2017-04-25 DIAGNOSIS — R06.00 DYSPNEA: ICD-10-CM

## 2017-04-25 DIAGNOSIS — R06.09 DYSPNEA ON EXERTION: Primary | ICD-10-CM

## 2017-04-25 NOTE — TELEPHONE ENCOUNTER
Adrian Anthony, left note stating the followin) Stress echo is arranged locally on 2017. 2) He will have carpel tunnel surgery on L hand on 2017 3) Derm is ok to schedule locally.  I called Mr. Cruz back and left him a VM stating good to know about stress test on 2017, please have this faxed to Ann in tx office.  Ok for him to schedule Derm locally.  laura Jain does not need to schedule stress echo or derm here at [facility].

## 2017-05-11 ENCOUNTER — PRE VISIT (OUTPATIENT)
Dept: CARDIOLOGY | Facility: CLINIC | Age: 42
End: 2017-05-11

## 2017-05-11 NOTE — TELEPHONE ENCOUNTER
1/26/17--Echo  Interpretation Summary  Global and regional left ventricular function is normal with an EF of 60-65%.  Grade II or moderate diastolic dysfunction.  Global right ventricular function is normal.  Severe left atrial enlargement is present.  The inferior vena cava is normal in size with preserved respiratory  variability. The estimated right atrial pressure is < 5 mmHg.  No pericardial effusion is present.    2/24/15--Dobutamine stress echo  Interpretation Summary  Normal dobutamine stress echocardiogram. No wall motion abnormalities at rest   and with dobutamine infusion. Normal LVEF at rest 55-60% with adequate   increase to 70-75% with dobutamine infusion. No ECG changes at rest and with   dobutamine infusion. No chest pain at rest and with dobutamine infusion. No   significant valvular abnormalities and normal size aortic root.

## 2017-05-15 ENCOUNTER — TELEPHONE (OUTPATIENT)
Dept: TRANSPLANT | Facility: CLINIC | Age: 42
End: 2017-05-15

## 2017-05-15 NOTE — TELEPHONE ENCOUNTER
From: Jeremiah Cruz Sent: 5/13/2017  2:05 PM CDT      To: Ann Barroso RN Subject: Ech Dobutamine Stress Test on May 16th at 3pm  Rolf Juarez  I thought this test was suppose to be cancelled as I already scheduled it up here at [facility] at 11am on May 16th it was scheduled for earlier but because I had carpal tunnel release surgery on my left hand I had to reschedule it.  So I need my appointments at the clinic down there cancelled as I won't be able to make them due to having the   Ech Dobutamine Stress Test done here    Note to Ed in [employer] 5:58pm   Ed,    I did not receive the Stress Echocardiogram report from [facility].  Can you have the report sent to transplant office fax [phone] on 5/16.     Dr. Bartlett will need to review the outside stress echocardiograt m to give risk assessment from cardiology standpoint.      I called and left you a voice message. I have been out of the office most of today, just read your [employer] message a 5:50 pm 5/15/2017.    I will send this note to Arvin Rodriguez RN with Dr. Bartlett about appt tomorrow.  Call me at [phone].  Thank you Ann

## 2017-05-16 ENCOUNTER — TELEPHONE (OUTPATIENT)
Dept: TRANSPLANT | Facility: CLINIC | Age: 42
End: 2017-05-16

## 2017-05-16 ENCOUNTER — TRANSFERRED RECORDS (OUTPATIENT)
Dept: HEALTH INFORMATION MANAGEMENT | Facility: CLINIC | Age: 42
End: 2017-05-16

## 2017-05-16 NOTE — TELEPHONE ENCOUNTER
I called [place] Leonides's office at [phone] for Jeremiah Cruz's Stress Echocardiogram.   [phone]   The stress echocardiogram will be done today 2017 at 11am  at [facility].  I faxed note: [facility] requests Jeremiah Cruz  1975 Stress echocardiogram done TODAY 2017 at 11AM at [facility].   ONCE completed please FAX to [phone]   I will send the Stress echo to Arvin Rodriguez for Dr. Bartlett to review. I am not sure Adrian Cruz will attend Dr. Bartlett appt today 2017.

## 2017-06-07 ENCOUNTER — TELEPHONE (OUTPATIENT)
Dept: TRANSPLANT | Facility: CLINIC | Age: 42
End: 2017-06-07

## 2017-06-07 ENCOUNTER — COMMITTEE REVIEW (OUTPATIENT)
Dept: TRANSPLANT | Facility: CLINIC | Age: 42
End: 2017-06-07

## 2017-06-07 NOTE — COMMITTEE REVIEW
Abdominal Patient Discussion Note Transplant Coordinator: Ann Barroso  Transplant Surgeon:       Referring Physician: Alexander Tomlinson    Committee Review Members:  Nutrition Subha Garcia, PIETER   Pharmacy [facility]    - Clinical Rosario Shane, MSW, Michelle Montalvo, MSW   Transplant Imtiaz Bain MD, Ibeth Patel, AURA, Christi Klein LPN, Hannah Riley RN, Ann Barroso, AURA, Jody Rosales, AURA, Dong Doan MD       Additional Discussion Notes and Findings:     Unable to present to the team today, not enough time  Pt on agenda for 6/14/2017

## 2017-06-14 ENCOUNTER — COMMITTEE REVIEW (OUTPATIENT)
Dept: TRANSPLANT | Facility: CLINIC | Age: 42
End: 2017-06-14

## 2017-06-14 NOTE — COMMITTEE REVIEW
Abdominal Patient Discussion Note Transplant Coordinator: Ann Barroso  Transplant Surgeon:   Imtiaz Bain    Referring Physician: Alexander Tomlinson    Committee Review Members:  Nephrology Phuc Olivarez, APRN CNP, Tressa Gil PA-C, Samreen Rincon MD   Nurse Jody Hastings, AURA, Nehemiah Wilkinson MD, Amy Murcia, AURA   Nutrition Subha Garcia,    Pharmacy [facility], [facility]    - Clinical Rosario Shane, [facility], Michelle Montalvo, [facility]   Transplant Loni Rendon PA-C, Imtiaz Bain MD, Ibeth Patel, RN, Christi Klein LPN, Hannah Riley RN, Ann Barroso, AURA, Jody Rosales, AURA, Alvin Varner MD, Rafi Tavares MD       Additional Discussion Notes and Findings:   Ann morfin Jeremiah Cruz 41 yr old two previous kidney transplant on dialysis 12.18.1998    Cards Pt may proceed with transplant   Hx of CML Dee Galeano MD From: Dee Galeano MD Sent: 3/1/2017  10:41 AM  To: Ann Barroso RN  Subject: RE: Outside Oncology noteThanks. The report shows he is still in a complete molecular remission so I have no reserve about him moving forward to kidney transplant. dee FOSTER Wait List ACTIVE 6/16/2017 Candidate Jeremiah Cruz (SSN: ) has been added to the Pending list. The candidate will not be eligible for match runs until the ABO is verified by a different user    From: Amanda Yin Sent: 2/2/2017  10:39 AMTo: Rafi Tavares MD, Aaron Mahoney, PHD, Subject: Plan B question       Hi Ty,  [facility] Cruz was plan B, [facility] too [facility] to [facility]. Now he's back in eval, looks like maybe go back to Plan B in 6 months per EPIC. His HistoTrac chart still has him as Plan B currently. Do you want him switched back to Plan A for now and then re-evaluate for Plan B?  ThanksAmanda     From: Christi Klein LPN Sent: 2/6/2017   9:25 AM  To: Ann Barroso RN Subject: FW: Plan B  "question       From: Rafi Tavares MDSent: 2/4/2017  12:49 PM      To: Aaron Mahoney, PHD, Lily Crawford MD, * Subject: RE: Plan B question    Have reviewed all.   Given 11 yrs dialysis time, and that he's still 100%, we should leave him at B and put him forward for C consideration.  \"If approved for relisting, would list as plan B (resume where he was before delisted, and now in the new allocation system see if he can get offers).  If no offers in 6 mo we can do dilution studies and flip to plan C.\" Rafi Tavares MD 12/6/2016 note       UNOS   EPTS:  43 %    Total UNOS Waiting Time:  6758 days 10 hrs 17 mins 42 secs    Waiting Time Qualifying Date:  12/18/1998 00:00:00    Will receive Donation Status points:  No    Number of previous Kidney Transplants:  2    Number of previous solid organ transplants from OPTN database:  2    Organ Transplant Date Graft Fail Date    Kidney 3/2/2005 3/12/2005    Kidney 12/7/1988 12/18/1998    Number of previous solid organ transplants:  2      I reviewed the UNOS listing with Mr. Cruz and his 100% HLA status with the IKTP team review possibly in 6 months.         "

## 2017-06-14 NOTE — LETTER
2017    Jeremiah Cruz  [address] [place] [id number]      Dear Mr. Cruz,    This letter is sent to confirm that you have completed your transplant work-up and you are a candidate in the kidney transplant program at the [facility].  You were placed on the kidney active waitlist on 2017.  Mr. Cruz your wait time will begin with [employer] (UNOS) first chronic dialysis date of 1998.      When you are active on the waitlist and an organ becomes available, an On Call staff provider will need to speak to you immediately.  You could be contacted at any time during the day or night as an organ could become available at any time.  Please make certain our office always has your current telephone numbers and address.      Items we will need from you:      We have received approval from your insurance company for the transplant procedure.  It is critical that you notify us if there is any change in your insurance.  It is also important that you familiarize yourself with the details of your specific insurance policy.  Our patient  is available to assist you if you should have any questions regarding your coverage.      An ALA or PRA blood sample will need to be sent here every 3 months to match you with  donors or any potential living donors.  Special mailing boxes (called mailers) will be sent to you directly from the Outreach Department.  You should take the physician order form and the  to your home laboratory immediately for this testing to be done. Then the ALA is drawn every three months while on active Wait List. Additional mailers can be obtained by calling the Transplant Office and asking to speak to a kidney .      During this waiting period, we may request additional periodic laboratory tests with your primary physician.  It will be your responsibility to remind your  physician to forward your results to the Transplant Office.      We need to be kept informed of any changes in your medical condition such as:    o changes in your medications,   o significant changes in your health  o significant infections (such as pneumonia or abscesses)  o blood transfusions  o any condition which requires hospitalization  o any surgery      We want you to know that our program has physician and surgeon coverage 24 hours a day, 365 days a year. If this coverage changes or there are substantial program changes, you will be notified in writing by letter.     Attached is a letter from the [employer] (UNOS). It describes the services and information offered to patients by UNOS and the Organ Procurement and Transplantation Network.    We appreciate the opportunity to participate in your care.  If you or your providers have questions, please feel free to call the Transplant Office at [phone] option 5.  When you are active on list you have been assigned to Wait List team members: Christi Klein [phone] and Ibeth Patel [phone].      Sincerely,       Kidney Transplant Program    Enclosures: ALA/PRA Physician Order, Telephone Contact List, Travel Resources, UNOS Letter, Waitlist Information Update and While You Are Waiting  CC:   Aaron Michele MD; Alexander Tomlinson MD; Shar Meneses MD; [facility]

## 2017-06-14 NOTE — LETTER
PHYSICIAN ORDER   ALA/PRA BLOOD    DATE & TIME ISSUED: 20175:03 PM  PATIENT NAME: Jeremiah Cruz   : 1975     [facility] MR# [if applicable]: [id number]     DIAGNOSIS:  ERSD on dialysis / HTN   ICD-10 CODE: N18.6 / I 10   EXPIRES: (1 YEAR AFTER DATE ISSUED)  Every 3 months: Immediately then every 3 months    1. Please draw 20ml of blood in red top (plain) tube for Antileukocyte Antibody (ALA or PRA).  2. Label tubes with the patient s name, complete lab slip.    3. Mailers, lab slips with instructions are sent to patient separately.  4. Call the Outreach Lab at [phone] to reorder mailers.  5. Mail blood to (this address is also on the mailers):    IMMUNOLOGY LABORATORY  [facility]  [address] [place] [address]      .

## 2017-06-16 ENCOUNTER — TELEPHONE (OUTPATIENT)
Dept: TRANSPLANT | Facility: CLINIC | Age: 42
End: 2017-06-16

## 2017-06-16 NOTE — TELEPHONE ENCOUNTER
Contacted patient to review outcome of selection committee meeting (See selection committee encounter).   Explained to patient that he is to make dental appointment.  He is  eligible for active status on the waiting list  and can  proceed with a live donor kidney transplant or  donor kidney wait list.    Patient is ACTIVE on [facility] Wait list. He is on dialysis and evaluation is complete), will receive:    -A listing letter indicating active status   -A PRA Order   -PRA Mailers  Notified that patients Transplant Coordinator will change when waitlisted, new coordinator is:  Ibeth Patel RN ; LORELEI Savage reviewed with Jeremiah the need to answer his phone at all times as the K offer can come anytime and they do not leave voice messages.    Pt is 100% HLA and Plan A, B C were reviewed.  HLA lab/Dr. Tavares to determine PLAN B at this time, 6 months for possible Plan C.

## 2017-06-19 ENCOUNTER — DOCUMENTATION ONLY (OUTPATIENT)
Dept: TRANSPLANT | Facility: CLINIC | Age: 42
End: 2017-06-19

## 2017-06-20 ENCOUNTER — TELEPHONE (OUTPATIENT)
Dept: DERMATOLOGY | Facility: CLINIC | Age: 42
End: 2017-06-20

## 2017-06-20 ENCOUNTER — OFFICE VISIT (OUTPATIENT)
Dept: DERMATOLOGY | Facility: CLINIC | Age: 42
End: 2017-06-20

## 2017-06-20 VITALS — DIASTOLIC BLOOD PRESSURE: 84 MMHG | SYSTOLIC BLOOD PRESSURE: 148 MMHG | HEART RATE: 79 BPM

## 2017-06-20 DIAGNOSIS — L28.1 PRURIGO NODULARIS: ICD-10-CM

## 2017-06-20 DIAGNOSIS — L21.9 DERMATITIS, SEBORRHEIC: Primary | ICD-10-CM

## 2017-06-20 DIAGNOSIS — Z99.2 END STAGE RENAL DISEASE ON DIALYSIS (H): Primary | ICD-10-CM

## 2017-06-20 DIAGNOSIS — N18.6 END STAGE RENAL DISEASE ON DIALYSIS (H): Primary | ICD-10-CM

## 2017-06-20 DIAGNOSIS — Z76.82 ORGAN TRANSPLANT CANDIDATE: ICD-10-CM

## 2017-06-20 RX ORDER — KETOCONAZOLE 20 MG/ML
SHAMPOO TOPICAL
Qty: 120 ML | Refills: 1 | Status: SHIPPED | OUTPATIENT
Start: 2017-06-20 | End: 2017-12-11

## 2017-06-20 RX ORDER — CLOBETASOL PROPIONATE 0.5 MG/G
OINTMENT TOPICAL
Qty: 60 G | Refills: 3 | Status: SHIPPED | OUTPATIENT
Start: 2017-06-20 | End: 2019-03-18

## 2017-06-20 ASSESSMENT — PAIN SCALES - GENERAL: PAINLEVEL: NO PAIN (0)

## 2017-06-20 NOTE — PROGRESS NOTES
[facility] Dermatology Note      Dermatology Problem List:  1.prurigo nodules    Encounter Date: Jun 20, 2017    CC:   No chief complaint on file.        History of Present Illness:  Mr. 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 They are not pruritic. He picks at them because they are painful, and when they pop, they drain purulent drainage. Has not had treatment for this in the past.     No family history of cysts.     Past Medical History:   Patient Active Problem List   Diagnosis     Alport's syndrome     Anemia in chronic kidney disease     Secondary renal hyperparathyroidism (H)     Hypertension     CML (chronic myelocytic leukemia) (H)     End stage kidney disease (H)     GERD (gastroesophageal reflux disease)     Past Medical History:   Diagnosis Date     Alport's syndrome      Anemia      Anemia in chronic kidney disease      CML (chronic myelocytic leukemia) (H)      Dialysis care      End stage kidney disease (H)      End stage renal disease (H)      Former tobacco use      GERD (gastroesophageal reflux disease)      Hypertension      Kidney transplant rejection      S/p nephrectomy 12/7/11     Secondary renal hyperparathyroidism (H)      Thrombocytopenia (H)      Tobacco dependence      Past Surgical History:   Procedure Laterality Date     APPENDECTOMY       CREATE FISTULA ARTERIOVENOUS UPPER EXTREMITY       HERNIA REPAIR, INCISIONAL       INSERT CATHETER PERITONEAL DIALYSIS       NEPHRECTOMY Bilateral     native     NEPHRECTOMY      transplant     NEPHRECTOMY  2005    transplant     PARATHYROIDECTOMY       REMOVE CATHETER PERITONEAL       svc angioplasty         Social History:  The patient smoked 1ppd quit 11/11, smoked for 12 yrs    Family History: No hx of skin conditions except  for mom with eczema  Medications:  Current Outpatient Prescriptions   Medication Sig Dispense Refill     CLONIDINE HCL PO Take 0.1 mg by mouth 2 times daily        AMLODIPINE BESYLATE PO Take 10 mg by mouth daily       B complex-C-folic acid (NEPHROCAPS/TRIPHROCAPS) 1 MG capsule Take 1 capsule by mouth daily       RANITIDINE HCL PO Take 150 mg by mouth daily        SULFAMETHOXAZOLE-TRIMETHOPRIM PO Take 800 mg by mouth every 12 hours Reported on 3/14/2017       dasatinib (SPRYCEL) 20 MG tablet CHEMO Take 20 mg by mouth 2 times daily        sevelamer (RENVELA) 800 MG tablet Take 800 mg by mouth 2 times daily (with meals) 1 tab with snacks          Allergies   Allergen Reactions     Cephalosporins Other (See Comments) and Rash     fever     Compazine [Prochlorperazine]      Gammagard [Immune Globulin] Other (See Comments)     Ed got spinal meningitis and MD stated to never get again for fear of death.       Penicillins      Vancomycin          Review of Systems:  -Constitutional: The patient denies fatigue, fevers, chills, unintended weight loss, and night sweats.  -HEENT: Patient denies nonhealing oral sores.  -Skin: As above in HPI. No additional skin concerns.    Physical exam:  Vitals: There were no vitals taken for this visit.  GEN: This is a well developed, well-nourished male in no acute distress, in a pleasant mood.    SKIN: Focused examination of the face, scalp, ears was performed.  -violaceous lichenified nodules with excoriations on the scalp, both hair bearing and nonhairbearing areas. Intermixed atrophic shiny white scars  - one firm ~3mm subq nodule on R frontal scalp  -No other lesions of concern on areas examined.     Impression/Plan:  1. Prurigo nodules- patient admits to picking at cystic lesions on the scalp. Discussed using BPO wash and ketoconazole to reduce inflammation and development of more cystic lesions. Will treat the residual prurigo nodules with topical clobetasol ointment, though at  follow up may need injection of ILK.     Start benzoyl peroxide 5% wash (over the counter) every other day on the scalp, rub in for 20 seconds then rinse    Start ketoconazole 2% shampoo(prescription) to hair bearing areas every other day    Start clobetasol 0.1% ointment (prescription) twice daily to the lesions    Consider ILK or cryo at followup.         CC Loni Rendon on close of this encounter.  Follow-up in 3 months, earlier for new or changing lesions.        staffed the patient.    Staff Involved:  Resident(Rhina Bloom)/Staff(as above)      Patient was seen and examined with the medicine/dermatology resident. I agree with the history, review of systems, physical examination, assessments and plan.    Anai Han MD  Professor and  Chair  Department of Dermatology  [facility]

## 2017-06-20 NOTE — LETTER
6/20/2017       RE: Jeremiah Cruz  [address] [facility] [address]     Dear Colleague,    Thank you for referring your patient, Jeremiah Cruz, to the [facility] DERMATOLOGY at [facility]. Please see a copy of my visit note below.    [facility] Dermatology Note      Dermatology Problem List:  1.prurigo nodules    Encounter Date: Jun 20, 2017    CC:   No chief complaint on file.        History of Present Illness:  Mr. 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 They are not pruritic. He picks at them because they are painful, and when they pop, they drain purulent drainage. Has not had treatment for this in the past.     No family history of cysts.     Past Medical History:   Patient Active Problem List   Diagnosis     Alport's syndrome     Anemia in chronic kidney disease     Secondary renal hyperparathyroidism (H)     Hypertension     CML (chronic myelocytic leukemia) (H)     End stage kidney disease (H)     GERD (gastroesophageal reflux disease)     Past Medical History:   Diagnosis Date     Alport's syndrome      Anemia      Anemia in chronic kidney disease      CML (chronic myelocytic leukemia) (H)      Dialysis care      End stage kidney disease (H)      End stage renal disease (H)      Former tobacco use      GERD (gastroesophageal reflux disease)      Hypertension      Kidney transplant rejection      S/p nephrectomy 12/7/11     Secondary renal hyperparathyroidism (H)      Thrombocytopenia (H)      Tobacco dependence      Past Surgical History:   Procedure Laterality Date     APPENDECTOMY       CREATE FISTULA ARTERIOVENOUS UPPER EXTREMITY       HERNIA REPAIR, INCISIONAL       INSERT CATHETER PERITONEAL DIALYSIS       NEPHRECTOMY Bilateral     native      NEPHRECTOMY      transplant     NEPHRECTOMY  2005    transplant     PARATHYROIDECTOMY       REMOVE CATHETER PERITONEAL       svc angioplasty         Social History:  The patient smoked 1ppd quit 11/11, smoked for 12 yrs    Family History: No hx of skin conditions except for mom with eczema  Medications:  Current Outpatient Prescriptions   Medication Sig Dispense Refill     CLONIDINE HCL PO Take 0.1 mg by mouth 2 times daily        AMLODIPINE BESYLATE PO Take 10 mg by mouth daily       B complex-C-folic acid (NEPHROCAPS/TRIPHROCAPS) 1 MG capsule Take 1 capsule by mouth daily       RANITIDINE HCL PO Take 150 mg by mouth daily        SULFAMETHOXAZOLE-TRIMETHOPRIM PO Take 800 mg by mouth every 12 hours Reported on 3/14/2017       dasatinib (SPRYCEL) 20 MG tablet CHEMO Take 20 mg by mouth 2 times daily        sevelamer (RENVELA) 800 MG tablet Take 800 mg by mouth 2 times daily (with meals) 1 tab with snacks          Allergies   Allergen Reactions     Cephalosporins Other (See Comments) and Rash     fever     Compazine [Prochlorperazine]      Gammagard [Immune Globulin] Other (See Comments)     Ed got spinal meningitis and MD stated to never get again for fear of death.       Penicillins      Vancomycin          Review of Systems:  -Constitutional: The patient denies fatigue, fevers, chills, unintended weight loss, and night sweats.  -HEENT: Patient denies nonhealing oral sores.  -Skin: As above in HPI. No additional skin concerns.    Physical exam:  Vitals: There were no vitals taken for this visit.  GEN: This is a well developed, well-nourished male in no acute distress, in a pleasant mood.    SKIN: Focused examination of the face, scalp, ears was performed.  -violaceous lichenified nodules with excoriations on the scalp, both hair bearing and nonhairbearing areas. Intermixed atrophic shiny white scars  - one firm ~3mm subq nodule on R frontal scalp  -No other lesions of concern on areas examined.      Impression/Plan:  1. Prurigo nodules- patient admits to picking at cystic lesions on the scalp. Discussed using BPO wash and ketoconazole to reduce inflammation and development of more cystic lesions. Will treat the residual prurigo nodules with topical clobetasol ointment, though at follow up may need injection of ILK.     Start benzoyl peroxide 5% wash (over the counter) every other day on the scalp, rub in for 20 seconds then rinse    Start ketoconazole 2% shampoo(prescription) to hair bearing areas every other day    Start clobetasol 0.1% ointment (prescription) twice daily to the lesions    Consider ILK or cryo at followup.         CC Loni Rendon on close of this encounter.  Follow-up in 3 months, earlier for new or changing lesions.        staffed the patient.    Staff Involved:  Resident(Rhina Bloom)/Staff(as above)      Patient was seen and examined with the medicine/dermatology resident. I agree with the history, review of systems, physical examination, assessments and plan.    Anai Han MD  Professor and  Chair  Department of Dermatology  [facility]            Pictures taken of patient today to be placed in chart for future reference.      Again, thank you for allowing me to participate in the care of your patient.      Sincerely,    Anai Han MD

## 2017-06-20 NOTE — PATIENT INSTRUCTIONS
Start benzoyl peroxide 5% wash (over the counter) every other day on the scalp, rub in for 20 seconds then rinse  Start ketoconazole 2% shampoo(prescription) to hair bearing areas every other day  Start clobetasol 0.1% ointment (prescription) twice daily to the lesions

## 2017-06-20 NOTE — NURSING NOTE
"Dermatology Rooming Note    Jeremiah Cruz's goals for this visit include:   Chief Complaint   Patient presents with     Derm Problem     Adrianmakayla comes to clinic with concerns of \" bumps\" on his head. Jeremiah states \" They flare up and have pus.\"     Fernanda Landry,LORELEI    "

## 2017-06-20 NOTE — MR AVS SNAPSHOT
After Visit Summary   6/20/2017    Jeremiah Cruz    MRN: [id number]           Patient Information     Date Of Birth          1975        Visit Information        Provider Department      6/20/2017 3:10 PM Anai Han MD [facility] Dermatology        Today's Diagnoses     Dermatitis, seborrheic    -  1    Prurigo nodularis          Care Instructions    Start benzoyl peroxide 5% wash (over the counter) every other day on the scalp, rub in for 20 seconds then rinse  Start ketoconazole 2% shampoo(prescription) to hair bearing areas every other day  Start clobetasol 0.1% ointment (prescription) twice daily to the lesions          Follow-ups after your visit        Follow-up notes from your care team     Return in about 3 months (around 9/20/2017).      Your next 10 appointments already scheduled     Sep 19, 2017  2:55 PM CDT   (Arrive by 2:40 PM)   Return Visit with Anai Han MD   [facility] Dermatology ([facility])    [address] [place] [id number] [phone]              Who to contact     Please call your clinic at [phone] to:    Ask questions about your health    Make or cancel appointments    Discuss your medicines    Learn about your test results    Speak to your doctor   If you have compliments or concerns about an experience at your clinic, or if you wish to file a complaint, please contact [facility] Patient Relations at [phone] or email us at [email or web address]         Additional Information About Your Visit        MyChart Information     [employer] gives you secure access to your electronic health record. If you see a primary care provider, you can also send messages to your care team and make appointments. If you have questions, please call your primary care clinic.  If you do not have a primary care provider, please call [phone] and they will assist  you.      [employer] is an electronic gateway that provides easy, online access to your medical records. With [employer], you can request a clinic appointment, read your test results, renew a prescription or communicate with your care team.     To access your existing account, please contact your [facility] or call [phone] for assistance.        Care EveryWhere ID     This is your Care EveryWhere ID. This could be used by other organizations to access your [place] medical records  [id number]        Your Vitals Were     Pulse                   79            Blood Pressure from Last 3 Encounters:   06/20/17 148/84   03/14/17 154/90   02/02/17 132/78    Weight from Last 3 Encounters:   03/14/17 68.1 kg (150 lb 1.6 oz)   02/02/17 68.5 kg (151 lb 1.6 oz)   01/26/17 68.3 kg (150 lb 8 oz)              Today, you had the following     No orders found for display         Today's Medication Changes          These changes are accurate as of: 6/20/17  3:55 PM.  If you have any questions, ask your nurse or doctor.               Start taking these medicines.        Dose/Directions    clobetasol 0.05 % ointment   Commonly known as:  TEMOVATE   Used for:  Prurigo nodularis   Started by:  Anai Han MD        Apply to scalp lesions twice daily until they flatten   Quantity:  60 g   Refills:  3       ketoconazole 2 % shampoo   Commonly known as:  NIZORAL   Used for:  Dermatitis, seborrheic   Started by:  Anai Han MD        Apply to the scalp and wash off after 5 minutes every other day   Quantity:  120 mL   Refills:  1            Where to get your medicines      These medications were sent to [facility] [place], [place] - [address] [facility]  [address] [address]     Phone:  [phone]     clobetasol 0.05 % ointment    ketoconazole 2 % shampoo                Primary Care Provider Office Phone # Fax #    Aaron WU  MD Ryne [phone]       [facility] [address] [place] [address]        Thank you!     Thank you for choosing [facility] DERMATOLOGY  for your care. Our goal is always to provide you with excellent care. Hearing back from our patients is one way we can continue to improve our services. Please take a few minutes to complete the written survey that you may receive in the mail after your visit with us. Thank you!             Your Updated Medication List - Protect others around you: Learn how to safely use, store and throw away your medicines at www.disposemymeds.org.          This list is accurate as of: 6/20/17  3:55 PM.  Always use your most recent med list.                   Brand Name Dispense Instructions for use    AMLODIPINE BESYLATE PO      Take 10 mg by mouth daily       B complex-C-folic acid 1 MG capsule      Take 1 capsule by mouth daily       clobetasol 0.05 % ointment    TEMOVATE    60 g    Apply to scalp lesions twice daily until they flatten       CLONIDINE HCL PO      Take 0.1 mg by mouth 2 times daily       ketoconazole 2 % shampoo    NIZORAL    120 mL    Apply to the scalp and wash off after 5 minutes every other day       RANITIDINE HCL PO      Take 150 mg by mouth daily       sevelamer 800 MG tablet    RENVELA     Take 800 mg by mouth 2 times daily (with meals) 1 tab with snacks       SPRYCEL 20 MG tablet CHEMO   Generic drug:  dasatinib      Take 20 mg by mouth 2 times daily       SULFAMETHOXAZOLE-TRIMETHOPRIM PO      Take 800 mg by mouth every 12 hours Reported on 3/14/2017

## 2017-06-23 ENCOUNTER — RESULTS ONLY (OUTPATIENT)
Dept: OTHER | Facility: CLINIC | Age: 42
End: 2017-06-23

## 2017-06-23 DIAGNOSIS — N18.6 END STAGE RENAL DISEASE ON DIALYSIS (H): ICD-10-CM

## 2017-06-23 DIAGNOSIS — Z99.2 END STAGE RENAL DISEASE ON DIALYSIS (H): ICD-10-CM

## 2017-06-23 DIAGNOSIS — Z76.82 ORGAN TRANSPLANT CANDIDATE: ICD-10-CM

## 2017-06-23 PROCEDURE — 86832 HLA CLASS I HIGH DEFIN QUAL: CPT | Performed by: TRANSPLANT SURGERY

## 2017-06-23 PROCEDURE — 86833 HLA CLASS II HIGH DEFIN QUAL: CPT | Performed by: TRANSPLANT SURGERY

## 2017-06-23 NOTE — TELEPHONE ENCOUNTER
Prior Authorization Retail Medication Request  Medication/Dose: clobetasol prop (temovate) 0.05% ointment  Diagnosis and ICD code: Prurigo nodularis (L28.1)  New/Renewal/Insurance Change [place]: New  Previously Tried and Failed Therapies: BPO wash, ketoconazole    Insurance ID (if provided): [id number]  Insurance Phone (if provided): [phone]

## 2017-06-26 NOTE — TELEPHONE ENCOUNTER
[facility] Prior Authorization Team   Phone: [phone]  Fax: [phone]      PA Initiation    Medication: clobetasol prop (temovate) 0.05% ointment  Insurance Company: [employer] Part D - Phone [phone] Fax [phone]  Pharmacy Filling the Rx: [facility] [place], [place] - [address]  Filling Pharmacy Phone: [phone]  Filling Pharmacy Fax: [phone]  Start Date: 6/26/2017

## 2017-06-27 LAB — PRA SINGLE ANTIGEN IGG ANTIBODY: NORMAL

## 2017-06-28 NOTE — TELEPHONE ENCOUNTER
Prior Authorization Approval    Authorization Effective Date: 3/28/2017  Authorization Expiration Date: 6/26/2018  Medication: clobetasol prop (temovate) 0.05% ointment - approved  Approved Dose/Quantity:   Reference #:     Insurance Company: [employer] Part D - Phone [phone] Fax [phone]  Expected CoPay: $0.00     CoPay Card Available:      Foundation Assistance Needed:    Which Pharmacy is filling the prescription (Not needed for infusion/clinic administered): [facility] [place], [place] - [address] [place] [address]  Pharmacy Notified: Yes  Patient Notified: Yes

## 2017-07-05 NOTE — TELEPHONE ENCOUNTER
Rafi Tavares, Transplant Surgical Lead Director called Ann and left voice message asking to verify the Wait Time in [facility] as compared with Epic Transplant Episode list date.   I checked [facility] website listing and Mr. Jeremiah Cruz has  6, 774 days (18.5yrs) to collate with his chronic dialysis start date of 12/18/1998.  I sent note to Ibeth Patel RN asking the process at this point to correct the Transplant list date to reflect 12/18/1998.   Currently Epic has the active list date of 6/16/2017.   His [facility] qualification time has been updated to reflect his initial dialysis start date and the same as [facility] 12/18/1998.

## 2017-07-14 LAB
SA1 CELL: NORMAL
SA1 COMMENTS: NORMAL
SA1 HI RISK ABY: NORMAL
SA1 MOD RISK ABY: NORMAL
SA1 TEST METHOD: NORMAL
SA2 CELL: NORMAL
SA2 COMMENTS: NORMAL
SA2 HI RISK ABY UA: NORMAL
SA2 MOD RISK ABY: NORMAL
SA2 TEST METHOD: NORMAL

## 2017-09-06 ENCOUNTER — RESULTS ONLY (OUTPATIENT)
Dept: OTHER | Facility: CLINIC | Age: 42
End: 2017-09-06

## 2017-09-06 DIAGNOSIS — Z99.2 END STAGE RENAL DISEASE ON DIALYSIS (H): ICD-10-CM

## 2017-09-06 DIAGNOSIS — Z76.82 ORGAN TRANSPLANT CANDIDATE: ICD-10-CM

## 2017-09-06 DIAGNOSIS — N18.6 END STAGE RENAL DISEASE ON DIALYSIS (H): ICD-10-CM

## 2017-09-06 PROCEDURE — 86833 HLA CLASS II HIGH DEFIN QUAL: CPT | Performed by: TRANSPLANT SURGERY

## 2017-09-06 PROCEDURE — 86832 HLA CLASS I HIGH DEFIN QUAL: CPT | Performed by: TRANSPLANT SURGERY

## 2017-09-12 LAB — PRA SINGLE ANTIGEN IGG ANTIBODY: NORMAL

## 2017-09-19 ENCOUNTER — OFFICE VISIT (OUTPATIENT)
Dept: DERMATOLOGY | Facility: CLINIC | Age: 42
End: 2017-09-19

## 2017-09-19 DIAGNOSIS — L73.8 SENILE SEBACEOUS GLAND HYPERPLASIA: ICD-10-CM

## 2017-09-19 DIAGNOSIS — L28.1 PRURIGO NODULARIS: Primary | ICD-10-CM

## 2017-09-19 ASSESSMENT — PAIN SCALES - GENERAL: PAINLEVEL: NO PAIN (0)

## 2017-09-19 NOTE — PROGRESS NOTES
"[facility] Dermatology Note      Dermatology Problem List:  1.Prurigo nodules    Encounter Date: Sep 19, 2017    CC:   Chief Complaint   Patient presents with     Derm Problem     Jeremiah comes to clinic today for Prurigo nodules. States \"they went away three weeks after using the shampoo and were gone all summer but they started coming back again at the end of August.\"         History of Present Illness:  Mr. Jeremiah Cruz is a 42 year old male with history of CML (on dasatinib since 2015- had cutaneous eruption 2/2 this), ESRD 2/2 Alport syndrome (on HD three times a week, s/p kidney transplant x2) who presents for follow up of prurigo nodules. He was last seen on 6/20/17 when he was started on ketoconazole shampoo, clobetasol ointment to apply to scalp BID. He reports complete resolution of scalp nodules within 2 weeks of starting ketoconazole shampoo. More recently over the last 2-3 weeks he's noticed a few red bumps on the posterior scalp. Lesions are not pruritic but are occasionally tender and drain purulent material when manipulated. He has not applied clobetasol ointment to these new lesions. Also notable since the last visit are new skin colored bumps on his bilateral cheeks and forehead. These bumps are not pruritic either but are occasionally tender. Jeremiah has no other complaints today.     Past Medical History:   Patient Active Problem List   Diagnosis     Alport's syndrome     Anemia in chronic kidney disease     Secondary renal hyperparathyroidism (H)     Hypertension     CML (chronic myelocytic leukemia) (H)     End stage kidney disease (H)     GERD (gastroesophageal reflux disease)     Past Medical History:   Diagnosis Date     Alport's syndrome      Anemia      Anemia in chronic kidney disease      CML (chronic myelocytic leukemia) (H)      Dialysis care      End stage kidney disease (H)      End stage renal disease (H)      Former tobacco use      GERD (gastroesophageal " reflux disease)      Hypertension      Kidney transplant rejection      S/p nephrectomy 12/7/11     Secondary renal hyperparathyroidism (H)      Thrombocytopenia (H)      Tobacco dependence      Past Surgical History:   Procedure Laterality Date     APPENDECTOMY       CREATE FISTULA ARTERIOVENOUS UPPER EXTREMITY       HERNIA REPAIR, INCISIONAL       INSERT CATHETER PERITONEAL DIALYSIS       NEPHRECTOMY Bilateral     native     NEPHRECTOMY      transplant     NEPHRECTOMY  2005    transplant     PARATHYROIDECTOMY       REMOVE CATHETER PERITONEAL       svc angioplasty         Social History:  The patient smoked 1ppd quit 11/11, smoked for 12 yrs    Family History: No hx of skin conditions except for mom with eczema    Medications:  Current Outpatient Prescriptions   Medication Sig Dispense Refill     ketoconazole (NIZORAL) 2 % shampoo Apply to the scalp and wash off after 5 minutes every other day 120 mL 1     clobetasol (TEMOVATE) 0.05 % ointment Apply to scalp lesions twice daily until they flatten 60 g 3     CLONIDINE HCL PO Take 0.1 mg by mouth 2 times daily        AMLODIPINE BESYLATE PO Take 10 mg by mouth daily       B complex-C-folic acid (NEPHROCAPS/TRIPHROCAPS) 1 MG capsule Take 1 capsule by mouth daily       RANITIDINE HCL PO Take 150 mg by mouth daily        SULFAMETHOXAZOLE-TRIMETHOPRIM PO Take 800 mg by mouth every 12 hours Reported on 3/14/2017       dasatinib (SPRYCEL) 20 MG tablet CHEMO Take 20 mg by mouth 2 times daily        sevelamer (RENVELA) 800 MG tablet Take 800 mg by mouth 2 times daily (with meals) 1 tab with snacks          Allergies   Allergen Reactions     Cephalosporins Other (See Comments) and Rash     fever     Compazine [Prochlorperazine]      Gammagard [Immune Globulin] Other (See Comments)     Ed got spinal meningitis and MD stated to never get again for fear of death.       Penicillins      Vancomycin          Review of Systems:  -Constitutional: The patient denies fatigue, fevers,  chills, unintended weight loss, and night sweats.  -HEENT: Patient denies nonhealing oral sores.  -Skin: As above in HPI. No additional skin concerns.    Physical exam:  Vitals: There were no vitals taken for this visit.  GEN: This is a well developed, well-nourished male in no acute distress, in a pleasant mood.    SKIN: Focused examination of the face, scalp, ears was performed.  - Vertex scalp is completely clear of nodules compared to last visit. There are still a few erythematous 1-2 mm papules on the occipital scalp.   - Several 1-3 mm oily flesh-colored papules on bilateral cheeks and forehead  - No other lesions of concern on areas examined.         Impression/Plan:  1. Prurigo nodules- almost disease free on scalp after starting ketoconazole shampoo and clobetasol ointment. BPO wash was recommended at last visit as well but patient never started this. Overall patient is happy with results on scalp. He does have several skin-colored papules on the bilateral cheeks and forehead which seem to be more consistent with sebaceous hyperplasia than prurigo nodules.     Apply tretinoin cream nightly to papules on the cheeks and forehead. Will reevaluate lesions at next visit     Continue ketoconazole 2% shampoo(prescription) to hair bearing areas every other day    Continue clobetasol 0.1% ointment (prescription) twice daily to the lesions    Follow up in 4 months               CC Loni Rendon on close of this encounter.  Follow-up in 4 months, earlier for new or changing lesions.       Staff Involved:  Scribed by Delia Butler MS4 for Dr. Han      I agree with the PFSH and ROS as completed by the Medical Student. The remainder of the encounter was performed by me and scribed by the Medical Student. The scribed note accurately reflects my personal services and the medical decisions made by me.      Anai Han MD  Professor and Chair  Department of Dermatology  [facility]

## 2017-09-19 NOTE — NURSING NOTE
"Dermatology Rooming Note    Jeremiah Cruz's goals for this visit include:   Chief Complaint   Patient presents with     Derm Problem     Jeremiah comes to clinic today for Prurigo nodules. States \"they went away three weeks after using the shampoo and were gone all summer but they started coming back again at the end of August.\"     Bre Brown, [facility]    "

## 2017-09-19 NOTE — LETTER
"9/19/2017       RE: Jeremiah Cruz  [address] [facility] [address]     Dear Colleague,    Thank you for referring your patient, Jeremiah Cruz, to the [facility] DERMATOLOGY at [facility]. Please see a copy of my visit note below.    [facility] Dermatology Note      Dermatology Problem List:  1.Prurigo nodules    Encounter Date: Sep 19, 2017    CC:   Chief Complaint   Patient presents with     Derm Problem     Jeremiah comes to clinic today for Prurigo nodules. States \"they went away three weeks after using the shampoo and were gone all summer but they started coming back again at the end of August.\"         History of Present Illness:  Mr. Jeremiah Cruz is a 42 year old male with history of CML (on dasatinib since 2015- had cutaneous eruption 2/2 this), ESRD 2/2 Alport syndrome (on HD three times a week, s/p kidney transplant x2) who presents for follow up of prurigo nodules. He was last seen on 6/20/17 when he was started on ketoconazole shampoo, clobetasol ointment to apply to scalp BID. He reports complete resolution of scalp nodules within 2 weeks of starting ketoconazole shampoo. More recently over the last 2-3 weeks he's noticed a few red bumps on the posterior scalp. Lesions are not pruritic but are occasionally tender and drain purulent material when manipulated. He has not applied clobetasol ointment to these new lesions. Also notable since the last visit are new skin colored bumps on his bilateral cheeks and forehead. These bumps are not pruritic either but are occasionally tender. Jeremiah has no other complaints today.     Past Medical History:   Patient Active Problem List   Diagnosis     Alport's syndrome     Anemia in chronic kidney disease     Secondary renal hyperparathyroidism (H)     Hypertension     CML (chronic myelocytic leukemia) (H)     End stage kidney disease (H)     GERD (gastroesophageal reflux disease)     Past Medical History: "   Diagnosis Date     Alport's syndrome      Anemia      Anemia in chronic kidney disease      CML (chronic myelocytic leukemia) (H)      Dialysis care      End stage kidney disease (H)      End stage renal disease (H)      Former tobacco use      GERD (gastroesophageal reflux disease)      Hypertension      Kidney transplant rejection      S/p nephrectomy 12/7/11     Secondary renal hyperparathyroidism (H)      Thrombocytopenia (H)      Tobacco dependence      Past Surgical History:   Procedure Laterality Date     APPENDECTOMY       CREATE FISTULA ARTERIOVENOUS UPPER EXTREMITY       HERNIA REPAIR, INCISIONAL       INSERT CATHETER PERITONEAL DIALYSIS       NEPHRECTOMY Bilateral     native     NEPHRECTOMY      transplant     NEPHRECTOMY  2005    transplant     PARATHYROIDECTOMY       REMOVE CATHETER PERITONEAL       svc angioplasty         Social History:  The patient smoked 1ppd quit 11/11, smoked for 12 yrs    Family History: No hx of skin conditions except for mom with eczema    Medications:  Current Outpatient Prescriptions   Medication Sig Dispense Refill     ketoconazole (NIZORAL) 2 % shampoo Apply to the scalp and wash off after 5 minutes every other day 120 mL 1     clobetasol (TEMOVATE) 0.05 % ointment Apply to scalp lesions twice daily until they flatten 60 g 3     CLONIDINE HCL PO Take 0.1 mg by mouth 2 times daily        AMLODIPINE BESYLATE PO Take 10 mg by mouth daily       B complex-C-folic acid (NEPHROCAPS/TRIPHROCAPS) 1 MG capsule Take 1 capsule by mouth daily       RANITIDINE HCL PO Take 150 mg by mouth daily        SULFAMETHOXAZOLE-TRIMETHOPRIM PO Take 800 mg by mouth every 12 hours Reported on 3/14/2017       dasatinib (SPRYCEL) 20 MG tablet CHEMO Take 20 mg by mouth 2 times daily        sevelamer (RENVELA) 800 MG tablet Take 800 mg by mouth 2 times daily (with meals) 1 tab with snacks          Allergies   Allergen Reactions     Cephalosporins Other (See Comments) and Rash     fever     Compazine  [Prochlorperazine]      Gammagard [Immune Globulin] Other (See Comments)     Ed got spinal meningitis and MD stated to never get again for fear of death.       Penicillins      Vancomycin          Review of Systems:  -Constitutional: The patient denies fatigue, fevers, chills, unintended weight loss, and night sweats.  -HEENT: Patient denies nonhealing oral sores.  -Skin: As above in HPI. No additional skin concerns.    Physical exam:  Vitals: There were no vitals taken for this visit.  GEN: This is a well developed, well-nourished male in no acute distress, in a pleasant mood.    SKIN: Focused examination of the face, scalp, ears was performed.  - Vertex scalp is completely clear of nodules compared to last visit. There are still a few erythematous 1-2 mm papules on the occipital scalp.   - Several 1-3 mm oily flesh-colored papules on bilateral cheeks and forehead  - No other lesions of concern on areas examined.         Impression/Plan:  1. Prurigo nodules- almost disease free on scalp after starting ketoconazole shampoo and clobetasol ointment. BPO wash was recommended at last visit as well but patient never started this. Overall patient is happy with results on scalp. He does have several skin-colored papules on the bilateral cheeks and forehead which seem to be more consistent with sebaceous hyperplasia than prurigo nodules.     Apply tretinoin cream nightly to papules on the cheeks and forehead. Will reevaluate lesions at next visit     Continue ketoconazole 2% shampoo(prescription) to hair bearing areas every other day    Continue clobetasol 0.1% ointment (prescription) twice daily to the lesions    Follow up in 4 months               CC Loni Rendon on close of this encounter.  Follow-up in 4 months, earlier for new or changing lesions.       Staff Involved:  Scribed by Delia Butler, MS4 for Dr. Han      I agree with the PFSH and ROS as completed by the Medical Student. The remainder of the encounter  was performed by me and scribed by the Medical Student. The scribed note accurately reflects my personal services and the medical decisions made by me.      Anai Han MD  Professor and Chair  Department of Dermatology  [facility]

## 2017-09-19 NOTE — MR AVS SNAPSHOT
After Visit Summary   9/19/2017    Jeremiah Cruz    MRN: [id number]           Patient Information     Date Of Birth          1975        Visit Information        Provider Department      9/19/2017 2:55 PM Anai Han MD [facility] Dermatology        Today's Diagnoses     Prurigo nodularis    -  1    Senile sebaceous gland hyperplasia           Follow-ups after your visit        Who to contact     Please call your clinic at [phone] to:    Ask questions about your health    Make or cancel appointments    Discuss your medicines    Learn about your test results    Speak to your doctor   If you have compliments or concerns about an experience at your clinic, or if you wish to file a complaint, please contact [facility] Patient Relations at [phone] or email us at [email or web address]         Additional Information About Your Visit        MyChart Information     [employer] gives you secure access to your electronic health record. If you see a primary care provider, you can also send messages to your care team and make appointments. If you have questions, please call your primary care clinic.  If you do not have a primary care provider, please call [phone] and they will assist you.      [employer] is an electronic gateway that provides easy, online access to your medical records. With [employer], you can request a clinic appointment, read your test results, renew a prescription or communicate with your care team.     To access your existing account, please contact your [facility] or call [phone] for assistance.        Care EveryWhere ID     This is your Care EveryWhere ID. This could be used by other organizations to access your [facility] medical records  [id number]         Blood Pressure from Last 3 Encounters:   06/20/17 148/84   03/14/17 154/90   02/02/17 132/78    Weight from Last 3 Encounters:    03/14/17 68.1 kg (150 lb 1.6 oz)   02/02/17 68.5 kg (151 lb 1.6 oz)   01/26/17 68.3 kg (150 lb 8 oz)              Today, you had the following     No orders found for display       Primary Care Provider Office Phone # Fax #    Aaron Michele [phone]       [address] [address]        Equal Access to Services     DAVID MULTANI : Hadii aad ku hadasho Soomaali, waaxda luqadaha, qaybta kaalmada adeegyada, waxay idiin hayaan adeeg kharaevaristo lanikko . So [facility] [phone].    ATENCIÓN: Si habla español, tiene a kumar disposición servicios gratuitos de asistencia lingüística. [facility] [phone].    We comply with applicable federal civil rights laws and [place] laws. We do not discriminate on the basis of race, color, national origin, age, disability, sex, sexual orientation, or gender identity.            Thank you!     Thank you for choosing [facility] DERMATOLOGY  for your care. Our goal is always to provide you with excellent care. Hearing back from our patients is one way we can continue to improve our services. Please take a few minutes to complete the written survey that you may receive in the mail after your visit with us. Thank you!             Your Updated Medication List - Protect others around you: Learn how to safely use, store and throw away your medicines at www.disposemymeds.org.          This list is accurate as of: 9/19/17 11:59 PM.  Always use your most recent med list.                   Brand Name Dispense Instructions for use Diagnosis    AMLODIPINE BESYLATE PO      Take 10 mg by mouth daily        clobetasol 0.05 % ointment    TEMOVATE    60 g    Apply to scalp lesions twice daily until they flatten    Prurigo nodularis       CLONIDINE HCL PO      Take 0.1 mg by mouth 2 times daily        ketoconazole 2 % shampoo    NIZORAL    120 mL    Apply to the scalp and wash off after 5 minutes every other day    Dermatitis, seborrheic        NEPHROCAPS 1 MG capsule      Take 1 capsule by mouth daily        RANITIDINE HCL PO      Take 150 mg by mouth daily        sevelamer 800 MG tablet    RENVELA     Take 800 mg by mouth 2 times daily (with meals) 1 tab with snacks        SPRYCEL 20 MG tablet CHEMO   Generic drug:  dasatinib      Take 20 mg by mouth 2 times daily        SULFAMETHOXAZOLE-TRIMETHOPRIM PO      Take 800 mg by mouth every 12 hours Reported on 3/14/2017

## 2017-10-15 PROBLEM — L28.1 PRURIGO NODULARIS: Status: ACTIVE | Noted: 2017-10-15

## 2017-10-15 PROBLEM — L73.8 SENILE SEBACEOUS GLAND HYPERPLASIA: Status: ACTIVE | Noted: 2017-10-15

## 2017-12-06 ENCOUNTER — RESULTS ONLY (OUTPATIENT)
Dept: OTHER | Facility: CLINIC | Age: 42
End: 2017-12-06

## 2017-12-06 DIAGNOSIS — Z99.2 END STAGE RENAL DISEASE ON DIALYSIS (H): ICD-10-CM

## 2017-12-06 DIAGNOSIS — N18.6 END STAGE RENAL DISEASE ON DIALYSIS (H): ICD-10-CM

## 2017-12-06 DIAGNOSIS — Z76.82 ORGAN TRANSPLANT CANDIDATE: ICD-10-CM

## 2017-12-06 PROCEDURE — 86833 HLA CLASS II HIGH DEFIN QUAL: CPT | Performed by: TRANSPLANT SURGERY

## 2017-12-06 PROCEDURE — 86832 HLA CLASS I HIGH DEFIN QUAL: CPT | Performed by: TRANSPLANT SURGERY

## 2017-12-11 DIAGNOSIS — L21.9 DERMATITIS, SEBORRHEIC: ICD-10-CM

## 2017-12-11 DIAGNOSIS — Z76.82 ORGAN TRANSPLANT CANDIDATE: Primary | ICD-10-CM

## 2017-12-11 DIAGNOSIS — N18.6 END STAGE RENAL DISEASE (H): ICD-10-CM

## 2017-12-11 LAB — PRA SINGLE ANTIGEN IGG ANTIBODY: NORMAL

## 2017-12-11 RX ORDER — KETOCONAZOLE 20 MG/ML
SHAMPOO TOPICAL
Qty: 120 ML | Refills: 9 | Status: SHIPPED | OUTPATIENT
Start: 2017-12-11 | End: 2019-03-19

## 2017-12-11 NOTE — TELEPHONE ENCOUNTER
Refill request received  for ketoconazole 2% shampoo. Dr. Han's notes reviewed. Refill appropriate, and accepted under RN Refill protocol.    Rosaura Dominguez RN      LOV 9/19/2017  No f/u scheduled, is on recall list       Impression/Plan:  1. Prurigo nodules- almost disease free on scalp after starting ketoconazole shampoo and clobetasol ointment. BPO wash was recommended at last visit as well but patient never started this. Overall patient is happy with results on scalp. He does have several skin-colored papules on the bilateral cheeks and forehead which seem to be more consistent with sebaceous hyperplasia than prurigo nodules.     Apply tretinoin cream nightly to papules on the cheeks and forehead. Will reevaluate lesions at next visit     Continue ketoconazole 2% shampoo(prescription) to hair bearing areas every other day    Continue clobetasol 0.1% ointment (prescription) twice daily to the lesions    Follow up in 4 months

## 2017-12-27 DIAGNOSIS — Z76.82 ORGAN TRANSPLANT CANDIDATE: Primary | ICD-10-CM

## 2017-12-27 DIAGNOSIS — N18.6 END STAGE RENAL DISEASE (H): ICD-10-CM

## 2018-01-02 LAB — PRA INHIBITOR: NORMAL

## 2018-01-30 ENCOUNTER — TELEPHONE (OUTPATIENT)
Dept: TRANSPLANT | Facility: CLINIC | Age: 43
End: 2018-01-30

## 2018-01-30 NOTE — TELEPHONE ENCOUNTER
Spoke with patient and discussed the schedule set up for [facility] on 3/27 to include meeting with Dr. Tavares to discuss Plan C options. Patient verbalized understanding and has no further questions at this time.

## 2018-01-31 ENCOUNTER — DOCUMENTATION ONLY (OUTPATIENT)
Dept: TRANSPLANT | Facility: CLINIC | Age: 43
End: 2018-01-31

## 2018-01-31 NOTE — PROGRESS NOTES
[facility]:    Dr. Connor presented patient today, specifically his histology history and antibodies. Team decision to suggest changing from plan B to plan C.  Dr. Tavares to see Edward March 27, 2018 during his return wait list appointments to present the plan and gain patient consent.

## 2018-03-07 ENCOUNTER — RESULTS ONLY (OUTPATIENT)
Dept: OTHER | Facility: CLINIC | Age: 43
End: 2018-03-07

## 2018-03-07 DIAGNOSIS — Z76.82 ORGAN TRANSPLANT CANDIDATE: ICD-10-CM

## 2018-03-07 DIAGNOSIS — Z99.2 END STAGE RENAL DISEASE ON DIALYSIS (H): ICD-10-CM

## 2018-03-07 DIAGNOSIS — N18.6 END STAGE RENAL DISEASE ON DIALYSIS (H): ICD-10-CM

## 2018-03-12 LAB — PRA SINGLE ANTIGEN IGG ANTIBODY: NORMAL

## 2018-03-14 ENCOUNTER — TEAM CONFERENCE (OUTPATIENT)
Dept: TRANSPLANT | Facility: CLINIC | Age: 43
End: 2018-03-14

## 2018-03-14 ENCOUNTER — TELEPHONE (OUTPATIENT)
Dept: TRANSPLANT | Facility: CLINIC | Age: 43
End: 2018-03-14

## 2018-03-14 NOTE — TELEPHONE ENCOUNTER
Coordinator left pt & RU Sheridan at dialysis ms that our center will be accepting secondary [place] as of April 1, 2018 but patients will potentially be responsible for 20%. Encouraged pt to call his PFR. Will change pt's status to inactive on the kidney wait list. Also will be cancelling phone call arranged with Dr. Tavares on 3/15/18. Encouraged pt to call back with any questions, contact information provided.

## 2018-03-14 NOTE — TELEPHONE ENCOUNTER
Team Conference:      Attendees: Jaja Varner Dunn, Schumacher, Schenk,   Coordinator, , Dietician, Pharmacy    Discussion and Outcome: Patient status changed to Inactive approved. Status changed due to insurance issues.

## 2018-03-15 LAB
PROTOCOL CUTOFF: NORMAL
SA1 CELL: NORMAL
SA1 COMMENTS: NORMAL
SA1 HI RISK ABY: NORMAL
SA1 MOD RISK ABY: NORMAL
SA1 TEST METHOD: NORMAL
SA2 CELL: NORMAL
SA2 COMMENTS: NORMAL
SA2 HI RISK ABY UA: NORMAL
SA2 MOD RISK ABY: NORMAL
SA2 TEST METHOD: NORMAL
UNACCEPTABLE ANTIGEN: NORMAL

## 2018-03-19 ENCOUNTER — TELEPHONE (OUTPATIENT)
Dept: TRANSPLANT | Facility: CLINIC | Age: 43
End: 2018-03-19

## 2018-03-19 NOTE — TELEPHONE ENCOUNTER
Coordinator returned pt's call. Pt does not need to come to [facility] appointments scheduled on 3/27/18. Requested pt call back if he wants to cancel.

## 2018-03-21 ENCOUNTER — TELEPHONE (OUTPATIENT)
Dept: TRANSPLANT | Facility: CLINIC | Age: 43
End: 2018-03-21

## 2018-03-26 NOTE — TELEPHONE ENCOUNTER
Pt left msg that he would like to cancel appointments scheduled for 3/27/18 d/t being inactive on kidney WL. Pt states he is currently looking at other transplant facilities d/t having [place] for his insurance.

## 2018-12-26 DIAGNOSIS — Z76.82 ORGAN TRANSPLANT CANDIDATE: ICD-10-CM

## 2018-12-26 DIAGNOSIS — N18.6 ESRD (END STAGE RENAL DISEASE) (H): ICD-10-CM

## 2019-03-06 ENCOUNTER — DOCUMENTATION ONLY (OUTPATIENT)
Dept: CARE COORDINATION | Facility: CLINIC | Age: 44
End: 2019-03-06

## 2019-03-18 ENCOUNTER — OFFICE VISIT (OUTPATIENT)
Dept: TRANSPLANT | Facility: CLINIC | Age: 44
End: 2019-03-18
Attending: SURGERY
Payer: MEDICARE

## 2019-03-18 VITALS
RESPIRATION RATE: 18 BRPM | SYSTOLIC BLOOD PRESSURE: 173 MMHG | DIASTOLIC BLOOD PRESSURE: 95 MMHG | BODY MASS INDEX: 26.48 KG/M2 | HEIGHT: 64 IN | WEIGHT: 155.1 LBS | HEART RATE: 66 BPM

## 2019-03-18 DIAGNOSIS — Z76.82 ORGAN TRANSPLANT CANDIDATE: Primary | ICD-10-CM

## 2019-03-18 PROCEDURE — G0463 HOSPITAL OUTPT CLINIC VISIT: HCPCS | Mod: ZF

## 2019-03-18 ASSESSMENT — MIFFLIN-ST. JEOR: SCORE: 1514.53

## 2019-03-18 ASSESSMENT — PAIN SCALES - GENERAL: PAINLEVEL: NO PAIN (0)

## 2019-03-18 NOTE — NURSING NOTE
"Chief Complaint   Patient presents with     Transplant Waitlist Maintenance     wait list      Blood pressure (!) 173/95, pulse 66, resp. rate 18, height 1.626 m (5' 4\"), weight 70.4 kg (155 lb 1.6 oz).    Amina Zavala CMA on 3/18/2019 at 3:24 PM    "

## 2019-03-18 NOTE — LETTER
RE: Jeremiah Cruz  [address]  [facility] [address]     Dear Colleague,    Thank you for referring your patient, Jeremiah Cruz, to the [facility] Please see a copy of my visit note below.  Transplant Surgery Consult Note     Medical record number: [id number]  YOB: 1975,   Consult requested by Dr. Morrell for evaluation of kidney transplant candidacy.    Assessment and Recommendations:Mr. Cruz appears to be a fair candidate for kidney transplantation and has a good understanding of the risks and benefits of this approach to the management of renal failure. The following issues should be addressed prior to finalizing his transplant candidacy:     1) cardiology clearance- will need stress echo. Last in 2017 with EF 65%, increased to 73% with dobutamine stimulation. No WMA. No symptoms with exertion  2) oncology clearance- has CML. In letter from Dr. Tomlinson (2016), CML is in remission with chronic dasatinib 40mg daily and poses no barrier to transplant  3) high sensitization- cPRA 100. Past poor reaction to IVIG requiring hospitalization. During last listing period he received no offers due to extremely high antibody titers. Discussed that we could adjust his listed antibodies under one of the plans to potentially increase his offers. He should also work to find a live donor to give him access to the paired exchange pool. As a third time kidney transplant, he also has a better outcome with live donors.  4) would also discuss at incompatible kidney transplant meeting    The majority of our visit was spent in counselling, discussing the medical and surgical risks of kidney transplantation. We discussed approximate wait time and how that is influenced by issues such as blood type and sensitization (PRA) and access to a living donor. I contrasted potential waiting time for living vs  donor kidneys from  normal (0-85%) or  higher (%) kidney donor profile index (KDPI) donors and their associated outcomes. I would recommend this individual to consider kidneys from high KDPI donors. The reason for this decision is best summarized as: decreased dialysis related morbidity/mortality, accepting lower kidney graft survival rates. Potential surgical complications of kidney transplantation include bleeding, superficial or deep wound complications (infection, hernia, lymphocele), ureteral anastomotic failure (leak or stenosis), graft thrombosis, need for reoperation and other issues such as cardiac complications, pneumonia, deep venous thrombosis, pulmonary embolism, post transplant diabetes and death. The potential for recurrent disease or need for retransplantation was also addressed. We discussed the possible need for ureteral stent (and subsequent removal), and the utility of protocol biopsy and laboratory studies to evaluate for rejection or recurrent disease. We discussed the risk of graft rejection, our center's average graft and patient survival rates, immunosuppression protocols, as well as the potential opportunity to participate in clinical trials.  We also discussed the average length of stay, recovery process, and posttransplant lab and monitoring protocol.  I emphasized the need for strict immunosuppression medication adherence and the potential for complications of immunosuppression such as skin cancer or lymphoma, as well as a very low but not zero risk of donor-derived disease transmission risks (infection, cancer). Mr. Cruz asked good questions and his candidacy will be reviewed at our Multidisciplinary Selection Committee. Thank you for the opportunity to participate in Mr. Cruz's care.    Fellow: Herman Vickers MD    I have reviewed history, examined patient and discussed plan with the fellow/resident/DARRON.  I concur with the findings in this note.       Risks of the surgical procedure including but not  limited to the rare risk of mortality discussed in detail. Patient verbalized good understanding and had several pertinent questions which were answered satisfactorily.     Immunosuppressive regimen, management and long term risks discussed in detail.         Total time: 45 minutes  Counselling time: 40 minutes    .  ---------------------------------------------------------------------------------------------------    HPI: Mr. 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 The patient is non-diabetic. He first underwent living related (sister) kidney transplant in  (R iliac fossa). This kidney functioned well for eleven years until failing (). He was on dialysis from  until now. He underwent  donor transplant in  that was complicated by primary nonfunction with explant a few weeks following. He has remained on dialysis since that time. He currently dialyzes through a right forearm radiocephalic AVF. He tolerates dialysis well except for fatigue following his runs. He is otherwise in his usual state of health, ambulating well without shortness of breath. He does have some arthritis and arthralgias.     The patient is on dialysis.    Has potential kidney donors:  Doesn't know .  Interested in participation in paired exchange if a donor is willing: Yes     The patient has the following pertinent history:       No    Yes  Dialysis:    []      [x] via:  R forearm AVF     Blood Transfusion                  [x]      []  Number of units:   Most recently:  Pregnancy:    [x]      [] Number:       Previous Transplant:  []      [x] Details:   LDKT  R iliac fossa, DDKT  L iliac fossa (explanted)  Cancer    []      [x] Comment: CML in remission. dasatinib 40mg daily  Kidney stones   [x]      [] Comment:      Recurrent infections  [x]      []  Type:                  Bladder dysfunction  [x]      [] Cause:    Claudication   [x]      [] Distance:    Previous Amputation  [x]       [] Cause:     Chronic anticoagulation  [x]      [] Indication:   Sabianism  [x]      []      Past Medical History:   Diagnosis Date     Alport's syndrome      Anemia      Anemia in chronic kidney disease      CML (chronic myelocytic leukemia) (H)      Dialysis care      End stage kidney disease (H)      End stage renal disease (H)      Former tobacco use      GERD (gastroesophageal reflux disease)      Hypertension      Kidney transplant rejection      S/p nephrectomy 11     Secondary renal hyperparathyroidism (H)      Thrombocytopenia (H)      Tobacco dependence      Past Surgical History:   Procedure Laterality Date     APPENDECTOMY       CREATE FISTULA ARTERIOVENOUS UPPER EXTREMITY       HERNIA REPAIR, INCISIONAL       INSERT CATHETER PERITONEAL DIALYSIS       NEPHRECTOMY Bilateral     native     NEPHRECTOMY      transplant     NEPHRECTOMY  2005    transplant     PARATHYROIDECTOMY       REMOVE CATHETER PERITONEAL       svc angioplasty       Family History   Problem Relation Age of Onset     Bone Cancer Brother      Melanoma No family hx of      Skin Cancer No family hx of      Social History     Socioeconomic History     Marital status: Single     Spouse name: Not on file     Number of children: Not on file     Years of education: Not on file     Highest education level: Not on file   Occupational History     Not on file   Social Needs     Financial resource strain: Not on file     Food insecurity:     Worry: Not on file     Inability: Not on file     Transportation needs:     Medical: Not on file     Non-medical: Not on file   Tobacco Use     Smoking status: Former Smoker     Packs/day: 1.00     Years: 12.00     Pack years: 12.00     Types: Cigarettes     Last attempt to quit: 2011     Years since quittin.0     Smokeless tobacco: Never Used   Substance and Sexual Activity     Alcohol use: No     Alcohol/week: 0.0 oz     Drug use: No     Sexual activity: Not on file   Lifestyle     Physical  activity:     Days per week: Not on file     Minutes per session: Not on file     Stress: Not on file   Relationships     Social connections:     Talks on phone: Not on file     Gets together: Not on file     Attends Gnosticist service: Not on file     Active member of club or organization: Not on file     Attends meetings of clubs or organizations: Not on file     Relationship status: Not on file     Intimate partner violence:     Fear of current or ex partner: Not on file     Emotionally abused: Not on file     Physically abused: Not on file     Forced sexual activity: Not on file   Other Topics Concern     Parent/sibling w/ CABG, MI or angioplasty before 65F 55M? Not Asked   Social History Narrative     Not on file     Allergies:   Allergies   Allergen Reactions     Cephalosporins Other (See Comments) and Rash     fever     Compazine [Prochlorperazine]      Gammagard [Immune Globulin] Other (See Comments)     Ed got spinal meningitis and MD stated to never get again for fear of death.       Penicillins      Vancomycin      Medications:  Prescription Medications as of 3/18/2019       Rx Number Disp Refills Start End Last Dispensed Date Next Fill Date Owning Pharmacy    AMLODIPINE BESYLATE PO            Sig: Take 10 mg by mouth daily    Class: Historical    Route: Oral    B complex-C-folic acid (NEPHROCAPS/TRIPHROCAPS) 1 MG capsule            Sig: Take 1 capsule by mouth daily    Class: Historical    Route: Oral    CLONIDINE HCL PO            Sig: Take 0.1 mg by mouth 2 times daily     Class: Historical    Route: Oral    dasatinib (SPRYCEL) 20 MG tablet CHEMO            Sig: Take 20 mg by mouth 2 times daily     Class: Historical    Route: Oral    ketoconazole (NIZORAL) 2 % shampoo  120 mL 9 12/11/2017    [facility] [place], [place] - [address] [address] [facility]    Sig: APPLY TO THE SCALP AND WASH OFF ATER 5 MINUTES EVERY OTHER DAY    Class: E-Prescribe    RANITIDINE HCL PO            Sig: Take 150 mg  by mouth daily     Class: Historical    Route: Oral    sevelamer (RENVELA) 800 MG tablet            Sig: Take 800 mg by mouth 2 times daily (with meals) 1 tab with snacks    Class: Historical    Route: Oral        Exam:   Pulse:  [66] 66  Resp:  [18] 18  BP: (173)/(95) 173/95  Appearance: in no apparent distress.   Skin: normal, warm, dry  Head and Neck: Normal, no rashes or jaundice  Respiratory: easy respirations, no audible wheezing.  Cardiovascular: RRR  Abdomen: soft, nontender/nondistended. Well healed midline, right and left hockeystick incisions. No hernia  Extremeties: failed fistulas/grafts in left forearm/arm. Right forearm fistula with palpable thrill. Palpable radial pulse distally. Palpable femoral pulses bilaterally  Neuro: without deficit     Diagnostics:   No results found for this or any previous visit (from the past 672 hour(s)).  UNOS cPRA   Date Value Ref Range Status   02/02/2017 100  Final     Again, thank you for allowing me to participate in the care of your patient.      Sincerely,    Ron Benítez MD

## 2019-03-18 NOTE — PROGRESS NOTES
Transplant Surgery Consult Note     Medical record number: [id number]  YOB: 1975,   Consult requested by Dr. Morrell for evaluation of kidney transplant candidacy.    Assessment and Recommendations:Mr. Cruz appears to be a fair candidate for kidney transplantation and has a good understanding of the risks and benefits of this approach to the management of renal failure. The following issues should be addressed prior to finalizing his transplant candidacy:     1) cardiology clearance- will need stress echo. Last in 2017 with EF 65%, increased to 73% with dobutamine stimulation. No WMA. No symptoms with exertion  2) oncology clearance- has CML. In letter from Dr. Tomlinson (2016), CML is in remission with chronic dasatinib 40mg daily and poses no barrier to transplant  3) high sensitization- cPRA 100. Past poor reaction to IVIG requiring hospitalization. During last listing period he received no offers due to extremely high antibody titers. Discussed that we could adjust his listed antibodies under one of the plans to potentially increase his offers. He should also work to find a live donor to give him access to the paired exchange pool. As a third time kidney transplant, he also has a better outcome with live donors.  4) would also discuss at incompatible kidney transplant meeting    The majority of our visit was spent in counselling, discussing the medical and surgical risks of kidney transplantation. We discussed approximate wait time and how that is influenced by issues such as blood type and sensitization (PRA) and access to a living donor. I contrasted potential waiting time for living vs  donor kidneys from  normal (0-85%) or higher (%) kidney donor profile index (KDPI) donors and their associated outcomes. I would recommend this individual to consider kidneys from high KDPI donors. The reason for this decision is best summarized as: decreased dialysis related morbidity/mortality,  accepting lower kidney graft survival rates. Potential surgical complications of kidney transplantation include bleeding, superficial or deep wound complications (infection, hernia, lymphocele), ureteral anastomotic failure (leak or stenosis), graft thrombosis, need for reoperation and other issues such as cardiac complications, pneumonia, deep venous thrombosis, pulmonary embolism, post transplant diabetes and death. The potential for recurrent disease or need for retransplantation was also addressed. We discussed the possible need for ureteral stent (and subsequent removal), and the utility of protocol biopsy and laboratory studies to evaluate for rejection or recurrent disease. We discussed the risk of graft rejection, our center's average graft and patient survival rates, immunosuppression protocols, as well as the potential opportunity to participate in clinical trials.  We also discussed the average length of stay, recovery process, and posttransplant lab and monitoring protocol.  I emphasized the need for strict immunosuppression medication adherence and the potential for complications of immunosuppression such as skin cancer or lymphoma, as well as a very low but not zero risk of donor-derived disease transmission risks (infection, cancer). Mr. Cruz asked good questions and his candidacy will be reviewed at our Multidisciplinary Selection Committee. Thank you for the opportunity to participate in Mr. Cruz's care.    Fellow: Herman Vickers MD    I have reviewed history, examined patient and discussed plan with the fellow/resident/DARRON.  I concur with the findings in this note.       Risks of the surgical procedure including but not limited to the rare risk of mortality discussed in detail. Patient verbalized good understanding and had several pertinent questions which were answered satisfactorily.     Immunosuppressive regimen, management and long term risks discussed in detail.         Total time:  45 minutes  Counselling time: 40 minutes    .  ---------------------------------------------------------------------------------------------------    HPI: Mr. 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 The patient is non-diabetic. He first underwent living related (sister) kidney transplant in  (R iliac fossa). This kidney functioned well for eleven years until failing (). He was on dialysis from  until now. He underwent  donor transplant in  that was complicated by primary nonfunction with explant a few weeks following. He has remained on dialysis since that time. He currently dialyzes through a right forearm radiocephalic AVF. He tolerates dialysis well except for fatigue following his runs. He is otherwise in his usual state of health, ambulating well without shortness of breath. He does have some arthritis and arthralgias.     The patient is on dialysis.    Has potential kidney donors:  Doesn't know .  Interested in participation in paired exchange if a donor is willing: Yes     The patient has the following pertinent history:       No    Yes  Dialysis:    []      [x] via:  R forearm AVF     Blood Transfusion                  [x]      []  Number of units:   Most recently:  Pregnancy:    [x]      [] Number:       Previous Transplant:  []      [x] Details:   LDKT  R iliac fossa, DDKT  L iliac fossa (explanted)  Cancer    []      [x] Comment: CML in remission. dasatinib 40mg daily  Kidney stones   [x]      [] Comment:      Recurrent infections  [x]      []  Type:                  Bladder dysfunction  [x]      [] Cause:    Claudication   [x]      [] Distance:    Previous Amputation  [x]      [] Cause:     Chronic anticoagulation  [x]      [] Indication:   Religious  [x]      []      Past Medical History:   Diagnosis Date     Alport's syndrome      Anemia      Anemia in chronic kidney disease      CML (chronic myelocytic leukemia) (H)      Dialysis care       End stage kidney disease (H)      End stage renal disease (H)      Former tobacco use      GERD (gastroesophageal reflux disease)      Hypertension      Kidney transplant rejection      S/p nephrectomy 11     Secondary renal hyperparathyroidism (H)      Thrombocytopenia (H)      Tobacco dependence      Past Surgical History:   Procedure Laterality Date     APPENDECTOMY       CREATE FISTULA ARTERIOVENOUS UPPER EXTREMITY       HERNIA REPAIR, INCISIONAL       INSERT CATHETER PERITONEAL DIALYSIS       NEPHRECTOMY Bilateral     native     NEPHRECTOMY      transplant     NEPHRECTOMY  2005    transplant     PARATHYROIDECTOMY       REMOVE CATHETER PERITONEAL       svc angioplasty       Family History   Problem Relation Age of Onset     Bone Cancer Brother      Melanoma No family hx of      Skin Cancer No family hx of      Social History     Socioeconomic History     Marital status: Single     Spouse name: Not on file     Number of children: Not on file     Years of education: Not on file     Highest education level: Not on file   Occupational History     Not on file   Social Needs     Financial resource strain: Not on file     Food insecurity:     Worry: Not on file     Inability: Not on file     Transportation needs:     Medical: Not on file     Non-medical: Not on file   Tobacco Use     Smoking status: Former Smoker     Packs/day: 1.00     Years: 12.00     Pack years: 12.00     Types: Cigarettes     Last attempt to quit: 2011     Years since quittin.0     Smokeless tobacco: Never Used   Substance and Sexual Activity     Alcohol use: No     Alcohol/week: 0.0 oz     Drug use: No     Sexual activity: Not on file   Lifestyle     Physical activity:     Days per week: Not on file     Minutes per session: Not on file     Stress: Not on file   Relationships     Social connections:     Talks on phone: Not on file     Gets together: Not on file     Attends Hinduism service: Not on file     Active member of club or  organization: Not on file     Attends meetings of clubs or organizations: Not on file     Relationship status: Not on file     Intimate partner violence:     Fear of current or ex partner: Not on file     Emotionally abused: Not on file     Physically abused: Not on file     Forced sexual activity: Not on file   Other Topics Concern     Parent/sibling w/ CABG, MI or angioplasty before 65F 55M? Not Asked   Social History Narrative     Not on file       ROS:   CONSTITUTIONAL:  No fevers or chills  EYES: negative for icterus  ENT:  negative for hearing loss, tinnitus and sore throat  RESPIRATORY:  negative for cough, sputum, dyspnea  CARDIOVASCULAR:  negative for chest pain Gangrene  Neuropathy  GASTROINTESTINAL:  negative for nausea, vomiting, diarrhea or constipation  GENITOURINARY:  negative for incontinence, dysuria, bladder emptying problems  HEME:  No easy bruising  INTEGUMENT:  negative for rash and pruritus  NEURO:  Negative for headache, seizure disorder  Allergies:   Allergies   Allergen Reactions     Cephalosporins Other (See Comments) and Rash     fever     Compazine [Prochlorperazine]      Gammagard [Immune Globulin] Other (See Comments)     Ed got spinal meningitis and MD stated to never get again for fear of death.       Penicillins      Vancomycin      Medications:  Prescription Medications as of 3/18/2019       Rx Number Disp Refills Start End Last Dispensed Date Next Fill Date Owning Pharmacy    AMLODIPINE BESYLATE PO            Sig: Take 10 mg by mouth daily    Class: Historical    Route: Oral    B complex-C-folic acid (NEPHROCAPS/TRIPHROCAPS) 1 MG capsule            Sig: Take 1 capsule by mouth daily    Class: Historical    Route: Oral    CLONIDINE HCL PO            Sig: Take 0.1 mg by mouth 2 times daily     Class: Historical    Route: Oral    dasatinib (SPRYCEL) 20 MG tablet CHEMO            Sig: Take 20 mg by mouth 2 times daily     Class: Historical    Route: Oral    ketoconazole (NIZORAL) 2 %  shampoo  120 mL 9 12/11/2017    [facility] [place] [address] [facility]    Sig: APPLY TO THE SCALP AND WASH OFF ATER 5 MINUTES EVERY OTHER DAY    Class: E-Prescribe    RANITIDINE HCL PO            Sig: Take 150 mg by mouth daily     Class: Historical    Route: Oral    sevelamer (RENVELA) 800 MG tablet            Sig: Take 800 mg by mouth 2 times daily (with meals) 1 tab with snacks    Class: Historical    Route: Oral        Exam:   Pulse:  [66] 66  Resp:  [18] 18  BP: (173)/(95) 173/95  Appearance: in no apparent distress.   Skin: normal, warm, dry  Head and Neck: Normal, no rashes or jaundice  Respiratory: easy respirations, no audible wheezing.  Cardiovascular: RRR  Abdomen: soft, nontender/nondistended. Well healed midline, right and left hockeystick incisions. No hernia  Extremeties: failed fistulas/grafts in left forearm/arm. Right forearm fistula with palpable thrill. Palpable radial pulse distally. Palpable femoral pulses bilaterally  Neuro: without deficit     Diagnostics:   No results found for this or any previous visit (from the past 672 hour(s)).  UNOS cPRA   Date Value Ref Range Status   02/02/2017 100  Final

## 2019-03-19 ENCOUNTER — OFFICE VISIT (OUTPATIENT)
Dept: DERMATOLOGY | Facility: CLINIC | Age: 44
End: 2019-03-19
Payer: MEDICARE

## 2019-03-19 ENCOUNTER — APPOINTMENT (OUTPATIENT)
Dept: TRANSPLANT | Facility: CLINIC | Age: 44
End: 2019-03-19
Attending: PHYSICIAN ASSISTANT
Payer: MEDICARE

## 2019-03-19 ENCOUNTER — ALLIED HEALTH/NURSE VISIT (OUTPATIENT)
Dept: TRANSPLANT | Facility: CLINIC | Age: 44
End: 2019-03-19
Attending: INTERNAL MEDICINE
Payer: MEDICARE

## 2019-03-19 ENCOUNTER — OFFICE VISIT (OUTPATIENT)
Dept: NEPHROLOGY | Facility: CLINIC | Age: 44
End: 2019-03-19
Attending: PHYSICIAN ASSISTANT
Payer: MEDICARE

## 2019-03-19 VITALS
HEART RATE: 69 BPM | HEIGHT: 64 IN | SYSTOLIC BLOOD PRESSURE: 163 MMHG | BODY MASS INDEX: 26.69 KG/M2 | DIASTOLIC BLOOD PRESSURE: 89 MMHG | WEIGHT: 156.3 LBS | OXYGEN SATURATION: 99 %

## 2019-03-19 DIAGNOSIS — N18.6 END STAGE KIDNEY DISEASE (H): Primary | ICD-10-CM

## 2019-03-19 DIAGNOSIS — Z76.82 AWAITING ORGAN TRANSPLANT: Primary | ICD-10-CM

## 2019-03-19 DIAGNOSIS — L21.9 DERMATITIS, SEBORRHEIC: ICD-10-CM

## 2019-03-19 DIAGNOSIS — Q87.81 ALPORT'S SYNDROME: ICD-10-CM

## 2019-03-19 DIAGNOSIS — L73.9 FOLLICULITIS: Primary | ICD-10-CM

## 2019-03-19 PROCEDURE — G0463 HOSPITAL OUTPT CLINIC VISIT: HCPCS | Mod: ZF

## 2019-03-19 RX ORDER — KETOCONAZOLE 20 MG/ML
SHAMPOO TOPICAL
Qty: 120 ML | Refills: 9 | Status: SHIPPED | OUTPATIENT
Start: 2019-03-19 | End: 2020-03-31

## 2019-03-19 RX ORDER — DOXYCYCLINE 50 MG/1
50 CAPSULE ORAL 2 TIMES DAILY
Qty: 60 CAPSULE | Refills: 1 | Status: ON HOLD | OUTPATIENT
Start: 2019-03-19 | End: 2020-03-02

## 2019-03-19 ASSESSMENT — PAIN SCALES - GENERAL
PAINLEVEL: NO PAIN (0)
PAINLEVEL: NO PAIN (0)

## 2019-03-19 ASSESSMENT — MIFFLIN-ST. JEOR: SCORE: 1519.97

## 2019-03-19 NOTE — NURSING NOTE
Chief Complaint   Patient presents with     Skin Check     Edward is here today for a skin check. Areas of concern include several scalp lesions.      Julia Carmona LPN

## 2019-03-19 NOTE — LETTER
"3/19/2019       RE: Jeremiah Cruz  [address] [place] [id number]     Dear Colleague,    Thank you for referring your patient, Jeremiah Cruz, to the [facility] DERMATOLOGY at [facility]. Please see a copy of my visit note below.    [facility] Dermatology Note      Dermatology Problem List:  1. Folliculitis   -ketoconazole 2% shampoo, doxycycline 50 mg BID    Encounter Date: Mar 19, 2019    CC:  Chief Complaint   Patient presents with     Skin Check     Jeremiah is here today for a skin check. Areas of concern include several scalp lesions.          History of Present Illness:  Mr. Jeremiah Cruz is a 43 year old male who presents for evaluation of his scalp. The patient reports the lesions of concern on his scalp developed approximately 3 weeks ago as \"really sore bumps.\" He states that he has been developing similar lesions for 10 years, and that the ketoconazole shampoo has decreased the frequency. They initially develop as a bump and then become a deep red color before rupturing. The never come to a head, and he states that they appear in his hair as well as on exposed skin. He notes that they are fairly painful, and he picks at them frequently. The patient voices no other concerns.      Past Medical History:   Patient Active Problem List   Diagnosis     Alport's syndrome     Anemia in chronic kidney disease     Secondary renal hyperparathyroidism (H)     Hypertension     CML (chronic myelocytic leukemia) (H)     End stage kidney disease (H)     GERD (gastroesophageal reflux disease)     Prurigo nodularis     Senile sebaceous gland hyperplasia     Past Medical History:   Diagnosis Date     Alport's syndrome      Anemia in chronic kidney disease      CML (chronic myelocytic leukemia) (H)      Dialysis care      End stage kidney disease (H)      Former tobacco use      GERD (gastroesophageal reflux disease)      Hypertension      Kidney transplant " rejection      S/p nephrectomy 12/7/11     Secondary renal hyperparathyroidism (H)      Thrombocytopenia (H)      Tobacco dependence      Past Surgical History:   Procedure Laterality Date     APPENDECTOMY       CREATE FISTULA ARTERIOVENOUS UPPER EXTREMITY       HERNIA REPAIR, INCISIONAL       INSERT CATHETER PERITONEAL DIALYSIS       NEPHRECTOMY Bilateral     native     NEPHRECTOMY      transplant     NEPHRECTOMY  2005    transplant     PARATHYROIDECTOMY       REMOVE CATHETER PERITONEAL       svc angioplasty         Social History:  Patient reports that he quit smoking about 8 years ago. His smoking use included cigarettes. He has a 12.00 pack-year smoking history. he has never used smokeless tobacco. He reports that he does not drink alcohol or use drugs.    Family History:  Family History   Problem Relation Age of Onset     Bone Cancer Brother      Melanoma No family hx of      Skin Cancer No family hx of        Medications:  Current Outpatient Medications   Medication Sig Dispense Refill     AMLODIPINE BESYLATE PO Take 10 mg by mouth daily       B complex-C-folic acid (NEPHROCAPS/TRIPHROCAPS) 1 MG capsule Take 1 capsule by mouth daily       CLONIDINE HCL PO Take 0.1 mg by mouth 2 times daily        dasatinib (SPRYCEL) 20 MG tablet CHEMO Take 20 mg by mouth 2 times daily        ketoconazole (NIZORAL) 2 % shampoo APPLY TO THE SCALP AND WASH OFF ATER 5 MINUTES EVERY OTHER  mL 9     RANITIDINE HCL PO Take 150 mg by mouth daily        sevelamer (RENVELA) 800 MG tablet Take 800 mg by mouth 2 times daily (with meals) 1 tab with snacks         Allergies   Allergen Reactions     Cephalosporins Other (See Comments) and Rash     fever     Compazine [Prochlorperazine]      Gammagard [Immune Globulin] Other (See Comments)     Ed got spinal meningitis and MD stated to never get again for fear of death.       Penicillins      Vancomycin        Review of Systems:  -As per HPI  -Constitutional: Otherwise feeling well  today, in usual state of health.  -HEENT: Patient denies nonhealing oral sores.  -Skin: As above in HPI. No additional skin concerns.    Physical exam:  Vitals: There were no vitals taken for this visit.  GEN: This is a well developed, well-nourished male in no acute distress, in a pleasant mood.    SKIN: Focused examination of the scalp was performed.  -3mm folliculocentric papule with central excoriation on the L occiput near the nape of the neck   - the lesions of concern are 4 volcaniform pink centrally ulcerated papules with apparent epidermal hyperplasia, central crusting, and angulated eroded borders   - light pink scalp erythema with mild scale  -No other lesions of concern on areas examined.       Impression/Plan:  1. Folliculitis of the scalp    Discussed the risks and benefits of treatment with doxycycline, Hibiclens wash, or intralesional steroid injections    The patient elects to start doxycycline and follow up in a month     Start doxycycline 50 mg BID    2. Seborrheic dermatitis    Continue ketoconazole 2% shampoo      CC Aaron Michele MD  [place] [phone] on close of this encounter.  Follow-up in 1 month, earlier for new or changing lesions.       Staff Involved:  Scribe/Staff    Scribe Disclosure  I, Dominic Najjar, am serving as a scribe to document services personally performed by Dr. Shady Alcocer MD, based on data collection and the provider's statements to me.     Staff attestation:  The documentation recorded by the scribe accurately reflects the services I personally performed and the decisions I personally made. I have made edits where needed.    Shady Alcocer MD  Staff Dermatologist and Dermatopathologist  , Department of Dermatology

## 2019-03-19 NOTE — PROGRESS NOTES
Patient Name: Jeremiah Cruz  : 1975  Age: 43 year old  MRN: [id number]  Date of Initial Social Work Evaluation: 2017    Patient on kidney transplant wait list.  Saw today to update psychosocial assessment.      Presenting Information   Living Situation: Patient lives alone in [place], [place].  If not local, plans for short term stay:  Patient has a friend in [place] who he is going to see if he can stay with post transplant, if not he will stay in a local hotel. Patient to work with SHONDA LOMBARDO regarding travel and lodging reimbursement.   Previous Functional Status: Hearing aids, independent with ADL's  Cultural/Language/Spiritual Considerations: None identified at this time.    Support System  Primary Support Person: Mother,   Other support:  Children Kiley and Martín, stepfather, siblings, friends  Plan for support in immediate post-transplant period: Friends, mother    Health Care Directive  Decision Maker: Self  Alternate Decision Maker: Children are [facility]  Health Care Directive: Provided education    Mental Health/Coping:   History of Mental Health: History of depression and anxiety  History of Chemical Health: Denied  Current status: Patient denied any depression. Patient reported he only has anxiety when around large crowds. Patient did express some exhaustion around waiting long for a kidney and understanding he is going to be a difficult match.  Coping: Patient appears to be coping well.  Services Needed/Recommended: None identified at this time.    Financial   Income: SSDI due to ESRD  Impact of transplant on income: Discussed possibility of patient losing SSDI eligibility one year post transplant.  Insurance and medication coverage: Medicare and [place]  Financial concerns: None identified at this time.  Resources needed: None identified at this time.    Assessment and recommendations and plan:  Patient had his first kidney transplant in  at age 13. He had his second kidney transplant in  2005 which failed quickly. He has been on dialysis since 2008. Discussed once active he has 20 years of wait time and could get a call quickly if the right match came along. Reviewed transplant education (Medicare, rehabilitation, donor issues, community/financial resources, and psych/family adjustment) as well as psychosocial risks of transplant. Provided patient with a copy of post-transplant informational sheet that includes information on potential costs of medications, Medicare ESRD, post-transplant lodging, etc. Patient seemed to process information well. Appeared well informed, motivated, and able to follow post transplant requirements. Behavior was appropriate during interview. Has adequate income and insurance coverage. Adequate social support. No major contraindications noted for transplant. At this time, patient appears to understand the risks and benefits of transplant.     Rosario Shane, [facility]    Kidney/Pancreas/[facility] Transplant Programs

## 2019-03-19 NOTE — LETTER
3/19/2019       RE: Jeremiah Cruz  [address] [facility] [address]     Dear Colleague,    Thank you for referring your patient, Jeremiah Cruz, to the [facility] NEPHROLOGY at [facility]. Please see a copy of my visit note below.    Assessment and Plan:  # Kidney transplant wait list evaluation - Mr. Cruz is a good candidate overall. Pending resolution of insurance issues and completion of stress test, he should be able to be active on the wait list.  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 He is planning to re-discuss living donors with friends who had previously been interested, but were overweight.   # Cardiac risk - he will need an updated stress ECHO per surgery visit yesterday.   # CML - recent records not available, but per patient report he remains in remission. Most recent records will be obtained.  # Insurance issues - appreciate social work input.   # Health maintenance - he will need to be up to date with dental.     Discussed the risks and benefits of a transplant, including the risk of surgery and immunosuppression medications.    KDPI: We discussed approximate remaining wait time and how that is influenced by issues such as blood type and sensitization (PRA) and access to a living donor. I contrasted potential waiting time for living vs  donor kidneys from  normal (0-85%) or higher (%) kidney donor profile index (KDPI) donors and their associated outcomes. I would recommend Mr. Cruz to consider kidneys from high KDPI donors. The reason for this decision is best summarized as: decreased dialysis related morbidity/mortality, accepting lower kidney graft survival rates.    Patient s overall re-evaluation may require further discussion in the Transplant Program s multidisciplinary selection committee for a final recommendation on the patient s  suitability for transplant.     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     Last Evaluation Clinic Visit Date:  February 2017 nephrology, March 2019 transplant surgery        Wait List Date: 12/18/1998  Current phase/status: Waitlist: Inactive as of 5/3/2018  Transplant coordinator: Ariadna Thakur Transplant Office phone number [phone]     Previous Medical Issues:  # CML - followed by local oncology and evaluated by [facility] oncology in February 2017 with no contraindication to moving forward with transplant.   # Former tobacco use - March 2017 PFTs showed possible airflow obstruction and a mild diffusion defect.  # Claudication - normal iliofemoral US with dopplers March 2017.  # Scalp lesions - evaluated by derm, no malignancy identified. Diagnosed as prurigo nodules.     HPI: Mr. 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         Interim events/issues  Dialysis: going OK without significant issues. He continues to dialyze via a right AV fistula. He is anephric.     CML: continues to follow with local oncologist every 3 months, remains on dasatinib, and has next visit scheduled for May 2019.    Insurance: listed inactive due to insurance issues, although he believes this has been sorted out.    Admits: none over the past several years.     Overall, he is doing well. His energy level is stable, but lower after dialysis. His appetite is good without nausea, vomiting, or diarrhea. He stays physically active with mowing the lawn and shoveling snow. He denies chest pain or SOB, but does note mild bilateral calf burning when walking up stairs that  resolves with rest. This has been stable. No fevers, sweats, chills, or recent illness.         Kidney Disease Hx       Kidney Disease Dx: Alport syndrome       Biopsy Proven: No        On Dialysis: Yes, Date initiated: 1999 and Dialysis Type: [facility] HD;       Primary Nephrologist: Dr. Meneses           Uremic Symptoms: Fatigue: No; Cold: No; Nausea: No; Poor Appetite: No; Metallic Taste: No; Edema: No;          Potential Donor(s): No          Cardiac history:       Last cardiac risk assessment: March 2017       Last stress test: May 2017       Last ECHO: January 2017       Exertional symptoms: denies       Recent cardiac events: no     Pertinent issues:   Blood transfusion: Yes, but not recently   Jehovah s Witness: No  Previous transplant: Yes  Urine output: No  Bladder dysfunction: No  Claudication: No  Previous amputation: No  Cancer: No  Recurrent infection: No  Chronic anticoagulation: No      ROS:  A comprehensive review of systems was obtained and negative, except as noted in the HPI or PMH.     PMH:  Medical records were obtained and reviewed.  Past Medical History:   Diagnosis Date     Alport's syndrome      Anemia in chronic kidney disease      CML (chronic myelocytic leukemia) (H)      Dialysis care      End stage kidney disease (H)      Former tobacco use      GERD (gastroesophageal reflux disease)      Hypertension      Kidney transplant rejection      S/p nephrectomy 12/7/11     Secondary renal hyperparathyroidism (H)      Thrombocytopenia (H)      Tobacco dependence         PSH:  Past Surgical History:   Procedure Laterality Date     APPENDECTOMY       CREATE FISTULA ARTERIOVENOUS UPPER EXTREMITY       HERNIA REPAIR, INCISIONAL       INSERT CATHETER PERITONEAL DIALYSIS       NEPHRECTOMY Bilateral     native     NEPHRECTOMY      transplant     NEPHRECTOMY  2005    transplant     PARATHYROIDECTOMY       REMOVE CATHETER PERITONEAL       svc angioplasty          Personal Hx:  Social History      Socioeconomic History     Marital status: Single     Spouse name: Not on file     Number of children: Not on file     Years of education: Not on file     Highest education level: Not on file   Occupational History     Not on file   Social Needs     Financial resource strain: Not on file     Food insecurity:     Worry: Not on file     Inability: Not on file     Transportation needs:     Medical: Not on file     Non-medical: Not on file   Tobacco Use     Smoking status: Former Smoker     Packs/day: 1.00     Years: 12.00     Pack years: 12.00     Types: Cigarettes     Last attempt to quit: 2011     Years since quittin.0     Smokeless tobacco: Never Used   Substance and Sexual Activity     Alcohol use: No     Alcohol/week: 0.0 oz     Drug use: No     Sexual activity: Not on file   Lifestyle     Physical activity:     Days per week: Not on file     Minutes per session: Not on file     Stress: Not on file   Relationships     Social connections:     Talks on phone: Not on file     Gets together: Not on file     Attends [employer] service: Not on file     Active member of club or organization: Not on file     Attends meetings of clubs or organizations: Not on file     Relationship status: Not on file     Intimate partner violence:     Fear of current or ex partner: Not on file     Emotionally abused: Not on file     Physically abused: Not on file     Forced sexual activity: Not on file   Other Topics Concern     Parent/sibling w/ CABG, MI or angioplasty before 65F 55M? Not Asked   Social History Narrative     Not on file        Allergies:  Allergies   Allergen Reactions     Cephalosporins Other (See Comments) and Rash     fever     Compazine [Prochlorperazine]      Gammagard [Immune Globulin] Other (See Comments)     Ed got spinal meningitis and MD stated to never get again for fear of death.       Penicillins      Vancomycin         Medications:  Prior to Admission medications    Medication Sig Start Date End  "Date Taking? Authorizing Provider   AMLODIPINE BESYLATE PO Take 10 mg by mouth daily    Reported, Patient   B complex-C-folic acid (NEPHROCAPS/TRIPHROCAPS) 1 MG capsule Take 1 capsule by mouth daily    Reported, Patient   CLONIDINE HCL PO Take 0.1 mg by mouth 2 times daily     Reported, Patient   dasatinib (SPRYCEL) 20 MG tablet CHEMO Take 20 mg by mouth 2 times daily     Reported, Patient   ketoconazole (NIZORAL) 2 % shampoo APPLY TO THE SCALP AND WASH OFF ATER 5 MINUTES EVERY OTHER DAY 12/11/17   Anai Han MD   RANITIDINE HCL PO Take 150 mg by mouth daily     Reported, Patient   sevelamer (RENVELA) 800 MG tablet Take 800 mg by mouth 2 times daily (with meals) 1 tab with snacks    Reported, Patient        Vitals:  /89   Pulse 69   Ht 1.626 m (5' 4\")   Wt 70.9 kg (156 lb 4.8 oz)   SpO2 99%   BMI 26.83 kg/m        Exam:  GENERAL APPEARANCE: alert and no distress  HENT: mouth without ulcers or lesions. Fair dentition  LYMPHATICS: no cervical or supraclavicular nodes  RESP: lungs clear to auscultation - no rales, rhonchi or wheezes  CV: regular rhythm, normal rate, no rub, no murmur  EDEMA: no LE edema bilaterally  ABDOMEN: soft, nondistended, nontender  MS: extremities normal - no gross deformities noted, no evidence of inflammation in joints, no muscle tenderness  SKIN: no rash         Again, thank you for allowing me to participate in the care of your patient.      Sincerely,    Loni Rendon PA-C      "

## 2019-03-19 NOTE — NURSING NOTE
"Chief Complaint   Patient presents with     Transplant Waitlist Maintenance     Wait list follow up     Blood pressure 163/89, pulse 69, height 1.626 m (5' 4\"), weight 70.9 kg (156 lb 4.8 oz), SpO2 99 %.    Amina Zavala CMA on 3/19/2019 at 12:57 PM    "

## 2019-03-19 NOTE — PROGRESS NOTES
Assessment and Plan:  # Kidney transplant wait list evaluation - Mr. Cruz is a good candidate overall. Pending resolution of insurance issues and completion of stress test, he should be able to be active on the wait list.  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 He is planning to re-discuss living donors with friends who had previously been interested, but were overweight.   # Cardiac risk - he will need an updated stress ECHO per surgery visit yesterday.   # CML - recent records not available, but per patient report he remains in remission. Most recent records will be obtained.  # Insurance issues - appreciate social work input.   # Health maintenance - he will need to be up to date with dental.     Discussed the risks and benefits of a transplant, including the risk of surgery and immunosuppression medications.    KDPI: We discussed approximate remaining wait time and how that is influenced by issues such as blood type and sensitization (PRA) and access to a living donor. I contrasted potential waiting time for living vs  donor kidneys from  normal (0-85%) or higher (%) kidney donor profile index (KDPI) donors and their associated outcomes. I would recommend Mr. Cruz to consider kidneys from high KDPI donors. The reason for this decision is best summarized as: decreased dialysis related morbidity/mortality, accepting lower kidney graft survival rates.    Patient s overall re-evaluation may require further discussion in the Transplant Program s multidisciplinary selection committee for a final recommendation on the patient s suitability for transplant.     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     Last Evaluation Clinic Visit Date:  2017 nephrology, 2019 transplant surgery         Wait List Date: 12/18/1998  Current phase/status: Waitlist: Inactive as of 5/3/2018  Transplant coordinator: Ariadna Thakur Transplant Office phone number [phone]     Previous Medical Issues:  # CML - followed by local oncology and evaluated by [facility] oncology in February 2017 with no contraindication to moving forward with transplant.   # Former tobacco use - March 2017 PFTs showed possible airflow obstruction and a mild diffusion defect.  # Claudication - normal iliofemoral US with dopplers March 2017.  # Scalp lesions - evaluated by derm, no malignancy identified. Diagnosed as prurigo nodules.     HPI: Mr. 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         Interim events/issues  Dialysis: going OK without significant issues. He continues to dialyze via a right AV fistula. He is anephric.     CML: continues to follow with local oncologist every 3 months, remains on dasatinib, and has next visit scheduled for May 2019.    Insurance: listed inactive due to insurance issues, although he believes this has been sorted out.    Admits: none over the past several years.     Overall, he is doing well. His energy level is stable, but lower after dialysis. His appetite is good without nausea, vomiting, or diarrhea. He stays physically active with mowing the lawn and shoveling snow. He denies chest pain or SOB, but does note mild bilateral calf burning when walking up stairs that resolves with rest. This has been stable. No fevers, sweats, chills, or recent illness.         Kidney Disease Hx       Kidney Disease Dx: Alport syndrome       Biopsy Proven: No        On Dialysis: Yes, Date initiated: 1999 and Dialysis Type: [facility];       Primary  Nephrologist: Dr. Meneses           Uremic Symptoms: Fatigue: No; Cold: No; Nausea: No; Poor Appetite: No; Metallic Taste: No; Edema: No;          Potential Donor(s): No          Cardiac history:       Last cardiac risk assessment: March 2017       Last stress test: May 2017       Last ECHO: January 2017       Exertional symptoms: denies       Recent cardiac events: no     Pertinent issues:   Blood transfusion: Yes, but not recently   Jehovah s Witness: No  Previous transplant: Yes  Urine output: No  Bladder dysfunction: No  Claudication: No  Previous amputation: No  Cancer: No  Recurrent infection: No  Chronic anticoagulation: No      ROS:  A comprehensive review of systems was obtained and negative, except as noted in the HPI or PMH.     PMH:  Medical records were obtained and reviewed.  Past Medical History:   Diagnosis Date     Alport's syndrome      Anemia in chronic kidney disease      CML (chronic myelocytic leukemia) (H)      Dialysis care      End stage kidney disease (H)      Former tobacco use      GERD (gastroesophageal reflux disease)      Hypertension      Kidney transplant rejection      S/p nephrectomy 12/7/11     Secondary renal hyperparathyroidism (H)      Thrombocytopenia (H)      Tobacco dependence         PSH:  Past Surgical History:   Procedure Laterality Date     APPENDECTOMY       CREATE FISTULA ARTERIOVENOUS UPPER EXTREMITY       HERNIA REPAIR, INCISIONAL       INSERT CATHETER PERITONEAL DIALYSIS       NEPHRECTOMY Bilateral     native     NEPHRECTOMY      transplant     NEPHRECTOMY  2005    transplant     PARATHYROIDECTOMY       REMOVE CATHETER PERITONEAL       svc angioplasty          Personal Hx:  Social History     Socioeconomic History     Marital status: Single     Spouse name: Not on file     Number of children: Not on file     Years of education: Not on file     Highest education level: Not on file   Occupational History     Not on file   Social Needs     Financial resource strain:  Not on file     Food insecurity:     Worry: Not on file     Inability: Not on file     Transportation needs:     Medical: Not on file     Non-medical: Not on file   Tobacco Use     Smoking status: Former Smoker     Packs/day: 1.00     Years: 12.00     Pack years: 12.00     Types: Cigarettes     Last attempt to quit: 2011     Years since quittin.0     Smokeless tobacco: Never Used   Substance and Sexual Activity     Alcohol use: No     Alcohol/week: 0.0 oz     Drug use: No     Sexual activity: Not on file   Lifestyle     Physical activity:     Days per week: Not on file     Minutes per session: Not on file     Stress: Not on file   Relationships     Social connections:     Talks on phone: Not on file     Gets together: Not on file     Attends Scientologist service: Not on file     Active member of club or organization: Not on file     Attends meetings of clubs or organizations: Not on file     Relationship status: Not on file     Intimate partner violence:     Fear of current or ex partner: Not on file     Emotionally abused: Not on file     Physically abused: Not on file     Forced sexual activity: Not on file   Other Topics Concern     Parent/sibling w/ CABG, MI or angioplasty before 65F 55M? Not Asked   Social History Narrative     Not on file        Allergies:  Allergies   Allergen Reactions     Cephalosporins Other (See Comments) and Rash     fever     Compazine [Prochlorperazine]      Gammagard [Immune Globulin] Other (See Comments)     Ed got spinal meningitis and MD stated to never get again for fear of death.       Penicillins      Vancomycin         Medications:  Prior to Admission medications    Medication Sig Start Date End Date Taking? Authorizing Provider   AMLODIPINE BESYLATE PO Take 10 mg by mouth daily    Reported, Patient   B complex-C-folic acid (NEPHROCAPS/TRIPHROCAPS) 1 MG capsule Take 1 capsule by mouth daily    Reported, Patient   CLONIDINE HCL PO Take 0.1 mg by mouth 2 times daily      "Reported, Patient   dasatinib (SPRYCEL) 20 MG tablet CHEMO Take 20 mg by mouth 2 times daily     Reported, Patient   ketoconazole (NIZORAL) 2 % shampoo APPLY TO THE SCALP AND WASH OFF ATER 5 MINUTES EVERY OTHER DAY 12/11/17   Anai Han MD   RANITIDINE HCL PO Take 150 mg by mouth daily     Reported, Patient   sevelamer (RENVELA) 800 MG tablet Take 800 mg by mouth 2 times daily (with meals) 1 tab with snacks    Reported, Patient        Vitals:  /89   Pulse 69   Ht 1.626 m (5' 4\")   Wt 70.9 kg (156 lb 4.8 oz)   SpO2 99%   BMI 26.83 kg/m       Exam:  GENERAL APPEARANCE: alert and no distress  HENT: mouth without ulcers or lesions. Fair dentition  LYMPHATICS: no cervical or supraclavicular nodes  RESP: lungs clear to auscultation - no rales, rhonchi or wheezes  CV: regular rhythm, normal rate, no rub, no murmur  EDEMA: no LE edema bilaterally  ABDOMEN: soft, nondistended, nontender  MS: extremities normal - no gross deformities noted, no evidence of inflammation in joints, no muscle tenderness  SKIN: no rash     "

## 2019-03-19 NOTE — PROGRESS NOTES
"[facility] Dermatology Note      Dermatology Problem List:  1. Folliculitis   -ketoconazole 2% shampoo, doxycycline 50 mg BID    Encounter Date: Mar 19, 2019    CC:  Chief Complaint   Patient presents with     Skin Check     Jeremiah is here today for a skin check. Areas of concern include several scalp lesions.          History of Present Illness:  Mr. Jeremiah Cruz is a 43 year old male who presents for evaluation of his scalp. The patient reports the lesions of concern on his scalp developed approximately 3 weeks ago as \"really sore bumps.\" He states that he has been developing similar lesions for 10 years, and that the ketoconazole shampoo has decreased the frequency. They initially develop as a bump and then become a deep red color before rupturing. The never come to a head, and he states that they appear in his hair as well as on exposed skin. He notes that they are fairly painful, and he picks at them frequently. The patient voices no other concerns.      Past Medical History:   Patient Active Problem List   Diagnosis     Alport's syndrome     Anemia in chronic kidney disease     Secondary renal hyperparathyroidism (H)     Hypertension     CML (chronic myelocytic leukemia) (H)     End stage kidney disease (H)     GERD (gastroesophageal reflux disease)     Prurigo nodularis     Senile sebaceous gland hyperplasia     Past Medical History:   Diagnosis Date     Alport's syndrome      Anemia in chronic kidney disease      CML (chronic myelocytic leukemia) (H)      Dialysis care      End stage kidney disease (H)      Former tobacco use      GERD (gastroesophageal reflux disease)      Hypertension      Kidney transplant rejection      S/p nephrectomy 12/7/11     Secondary renal hyperparathyroidism (H)      Thrombocytopenia (H)      Tobacco dependence      Past Surgical History:   Procedure Laterality Date     APPENDECTOMY       CREATE FISTULA ARTERIOVENOUS UPPER EXTREMITY       HERNIA REPAIR, " INCISIONAL       INSERT CATHETER PERITONEAL DIALYSIS       NEPHRECTOMY Bilateral     native     NEPHRECTOMY      transplant     NEPHRECTOMY  2005    transplant     PARATHYROIDECTOMY       REMOVE CATHETER PERITONEAL       svc angioplasty         Social History:  Patient reports that he quit smoking about 8 years ago. His smoking use included cigarettes. He has a 12.00 pack-year smoking history. he has never used smokeless tobacco. He reports that he does not drink alcohol or use drugs.    Family History:  Family History   Problem Relation Age of Onset     Bone Cancer Brother      Melanoma No family hx of      Skin Cancer No family hx of        Medications:  Current Outpatient Medications   Medication Sig Dispense Refill     AMLODIPINE BESYLATE PO Take 10 mg by mouth daily       B complex-C-folic acid (NEPHROCAPS/TRIPHROCAPS) 1 MG capsule Take 1 capsule by mouth daily       CLONIDINE HCL PO Take 0.1 mg by mouth 2 times daily        dasatinib (SPRYCEL) 20 MG tablet CHEMO Take 20 mg by mouth 2 times daily        ketoconazole (NIZORAL) 2 % shampoo APPLY TO THE SCALP AND WASH OFF ATER 5 MINUTES EVERY OTHER  mL 9     RANITIDINE HCL PO Take 150 mg by mouth daily        sevelamer (RENVELA) 800 MG tablet Take 800 mg by mouth 2 times daily (with meals) 1 tab with snacks         Allergies   Allergen Reactions     Cephalosporins Other (See Comments) and Rash     fever     Compazine [Prochlorperazine]      Gammagard [Immune Globulin] Other (See Comments)     Ed got spinal meningitis and MD stated to never get again for fear of death.       Penicillins      Vancomycin        Review of Systems:  -As per HPI  -Constitutional: Otherwise feeling well today, in usual state of health.  -HEENT: Patient denies nonhealing oral sores.  -Skin: As above in HPI. No additional skin concerns.    Physical exam:  Vitals: There were no vitals taken for this visit.  GEN: This is a well developed, well-nourished male in no acute distress, in a  pleasant mood.    SKIN: Focused examination of the scalp was performed.  -3mm folliculocentric papule with central excoriation on the L occiput near the nape of the neck   - the lesions of concern are 4 volcaniform pink centrally ulcerated papules with apparent epidermal hyperplasia, central crusting, and angulated eroded borders   - light pink scalp erythema with mild scale  -No other lesions of concern on areas examined.       Impression/Plan:  1. Folliculitis of the scalp    Discussed the risks and benefits of treatment with doxycycline, Hibiclens wash, or intralesional steroid injections    The patient elects to start doxycycline and follow up in a month     Start doxycycline 50 mg BID    2. Seborrheic dermatitis    Continue ketoconazole 2% shampoo      CC Aaron Michele MD  [address] [address] on close of this encounter.  Follow-up in 1 month, earlier for new or changing lesions.       Staff Involved:  Scribe/Staff    Scribe Disclosure  I, Dominic Najjar, am serving as a scribe to document services personally performed by Dr. Shady Alcocer MD, based on data collection and the provider's statements to me.     Staff attestation:  The documentation recorded by the scribe accurately reflects the services I personally performed and the decisions I personally made. I have made edits where needed.    Shady Alcocer MD  Staff Dermatologist and Dermatopathologist  , Department of Dermatology

## 2019-03-29 ENCOUNTER — MEDICAL CORRESPONDENCE (OUTPATIENT)
Dept: TRANSPLANT | Facility: CLINIC | Age: 44
End: 2019-03-29

## 2019-04-01 ENCOUNTER — TELEPHONE (OUTPATIENT)
Dept: TRANSPLANT | Facility: CLINIC | Age: 44
End: 2019-04-01

## 2019-04-01 NOTE — TELEPHONE ENCOUNTER
Left Jeremiah a message to review his last return waitlist appointment's 3/18/19.   Both Dr. Benítez and Loni Rendon request a cardiac stress test for final active clearance.   Wondering if you want to do that in [place] or come to our [facility] for this exam.   Left my name and contact number as well as Christi Klein LPN contact information.

## 2019-04-04 ENCOUNTER — TELEPHONE (OUTPATIENT)
Dept: TRANSPLANT | Facility: CLINIC | Age: 44
End: 2019-04-04

## 2019-04-04 ENCOUNTER — DOCUMENTATION ONLY (OUTPATIENT)
Dept: TRANSPLANT | Facility: CLINIC | Age: 44
End: 2019-04-04

## 2019-04-04 NOTE — TELEPHONE ENCOUNTER
Called to say that for his stress test, he would like to have it done in [place], [place] at the [facility]  Will place order and send him the hard copy.

## 2019-04-10 ENCOUNTER — TELEPHONE (OUTPATIENT)
Dept: TRANSPLANT | Facility: CLINIC | Age: 44
End: 2019-04-10

## 2019-04-16 ENCOUNTER — OFFICE VISIT (OUTPATIENT)
Dept: DERMATOLOGY | Facility: CLINIC | Age: 44
End: 2019-04-16
Payer: MEDICARE

## 2019-04-16 DIAGNOSIS — L73.9 FOLLICULITIS: Primary | ICD-10-CM

## 2019-04-16 RX ORDER — DOXYCYCLINE 100 MG/1
100 CAPSULE ORAL 2 TIMES DAILY
Qty: 60 CAPSULE | Refills: 1 | Status: ON HOLD | OUTPATIENT
Start: 2019-04-16 | End: 2020-03-02

## 2019-04-16 ASSESSMENT — PAIN SCALES - GENERAL: PAINLEVEL: NO PAIN (0)

## 2019-04-16 NOTE — NURSING NOTE
Chief Complaint   Patient presents with     Derm Problem     Edward is here today to follow up on his scalp. Patient states he is breaking out on his scalp and the medication is not helped.       Julia Carmona LPN

## 2019-04-16 NOTE — PROGRESS NOTES
[facility] Dermatology Note      Dermatology Problem List:  1. Likely bacterial folliculitis of scalp - doxycycline 100 mg BID, chlorhexidine wash   - Previous tx: doxycycline 50mg bid x 1 mo.  - Consider biopsy if persistent at next visit  2. Seborrheic Dermatitis - ketoconazole 2% shampoo    Encounter Date: Apr 16, 2019    CC:  Chief Complaint   Patient presents with     Derm Problem     Jeremiah is here today to follow up on his scalp. Patient states he is breaking out on his scalp and the medication is not helped.         History of Present Illness:  Mr. Jeremiah Cruz is a 43 year old male who presents as a follow-up for folliculitis. The patient was last seen 3/19/19 when he started doxycycline 50mg twice daily and continued ketoconazole shampoo for seborrheic dermatitis. Today, the patient reports that the doxycycline has not provided much improvement. He states that about a week after starting the medication, he noticed three new lesions on his scalp, though he admits that the frequency of the development of new lesions has decreased since then. Since the three in the first week, he has only noticed one additional lesion. He states that the new lesions are painful and tender to the touch. He denies any adverse side effects associated with the doxycycline including GI upset. Of note, he states that his PCP took a culture in the past and diagnosed him with folliculitis. Never had a biopsy in the past. He has also continued to use the ketoconazole shampoo regularly for his seborrheic dermatitis. The patient voices no other concerns.     Past Medical History:   Patient Active Problem List   Diagnosis     Alport's syndrome     Anemia in chronic kidney disease     Secondary renal hyperparathyroidism (H)     Hypertension     CML (chronic myelocytic leukemia) (H)     End stage kidney disease (H)     GERD (gastroesophageal reflux disease)     Prurigo nodularis     Senile sebaceous gland  hyperplasia     Past Medical History:   Diagnosis Date     Alport's syndrome      Anemia in chronic kidney disease      CML (chronic myelocytic leukemia) (H)      Dialysis care      End stage kidney disease (H)      Former tobacco use      GERD (gastroesophageal reflux disease)      Hypertension      Kidney transplant rejection      S/p nephrectomy 12/7/11     Secondary renal hyperparathyroidism (H)      Thrombocytopenia (H)      Tobacco dependence      Past Surgical History:   Procedure Laterality Date     APPENDECTOMY       CREATE FISTULA ARTERIOVENOUS UPPER EXTREMITY       HERNIA REPAIR, INCISIONAL       INSERT CATHETER PERITONEAL DIALYSIS       NEPHRECTOMY Bilateral     native     NEPHRECTOMY      transplant     NEPHRECTOMY  2005    transplant     PARATHYROIDECTOMY       REMOVE CATHETER PERITONEAL       svc angioplasty         Social History:  Patient reports that he quit smoking about 8 years ago. His smoking use included cigarettes. He has a 12.00 pack-year smoking history. He has never used smokeless tobacco. He reports that he does not drink alcohol or use drugs.    Family History:  Family History   Problem Relation Age of Onset     Bone Cancer Brother      Melanoma No family hx of      Skin Cancer No family hx of        Medications:  Current Outpatient Medications   Medication Sig Dispense Refill     AMLODIPINE BESYLATE PO Take 10 mg by mouth daily       B complex-C-folic acid (NEPHROCAPS/TRIPHROCAPS) 1 MG capsule Take 1 capsule by mouth daily       CLONIDINE HCL PO Take 0.1 mg by mouth 2 times daily        dasatinib (SPRYCEL) 20 MG tablet CHEMO Take 20 mg by mouth 2 times daily        doxycycline monohydrate (MONODOX) 50 MG capsule Take 1 capsule (50 mg) by mouth 2 times daily 60 capsule 1     ketoconazole (NIZORAL) 2 % external shampoo APPLY TO THE SCALP AND WASH OFF ATER 5 MINUTES EVERY OTHER  mL 9     RANITIDINE HCL PO Take 150 mg by mouth daily        sevelamer (RENVELA) 800 MG tablet Take 800  mg by mouth 2 times daily (with meals) 1 tab with snacks         Allergies   Allergen Reactions     Cephalosporins Other (See Comments) and Rash     fever     Compazine [Prochlorperazine]      Gammagard [Immune Globulin] Other (See Comments)     Ed got spinal meningitis and MD stated to never get again for fear of death.       Penicillins      Vancomycin        Review of Systems:  -Constitutional: Otherwise feeling well today, in usual state of health.  -HEENT: Patient denies nonhealing oral sores.  -Skin: As above in HPI. No additional skin concerns.    Physical exam:  Vitals: There were no vitals taken for this visit.  GEN: This is a well developed, well-nourished male in no acute distress, in a pleasant mood.    SKIN: Focused examination of the scalp was performed.  -Indurated erythematous papule with central grossman colored crusting on left occipital scalp  -Smaller crusted papule on right occipital scalp  -No other lesions of concern on areas examined.       Impression/Plan:  1. Folliculitis of scalp; likely bacterial in nature. Improved with doxycycline 50mg twice daily, however still with development of new lesions. Low suspicion for pityrosporum folliculitis, but will consider at next visit if persistent. Does not appear to be a primary alopecic process.     Will increase Doxycycline to 100mg BID. Reinforced possible side effects of GI upset, increased sun sensitivity. Take with food, avoid dairy, 2 hours before bedtime.    Start chlorhexidine wash twice weekly to scalp    Can still use ketoconazole shampoo on other days.    Will consider a biopsy if not improved at next visit. Other treatment options include oral clindamycin, intralesional kenalog.    2. Seborrheic Dermatitis    Continue ketoconazole 2% shampoo      Follow-up in 4 weeks, earlier for new or changing lesions.       Staff Involved:  Scribe/Resident/Staff    Scribe Disclosure  I, Dominic Najjar, am serving as a scribe to document services  personally performed by Dr. Shady Alcocer MD, based on data collection and the provider's statements to me.     Elysia Reno MD  PGY-2, Dermatology    Staff Physician Comments:   I saw and evaluated the patient with the resident and I agree with the assessment and plan.  I was present for the examination. I have made edits if needed.    Shady Alcocer MD  Staff Dermatologist and Dermatopathologist  , Department of Dermatology    Staff attestation:  The documentation recorded by the scribe accurately reflects the services I personally performed and the decisions I personally made. I have made edits where needed.    Shady Alcocer MD  Staff Dermatologist and Dermatopathologist  , Department of Dermatology

## 2019-04-16 NOTE — PATIENT INSTRUCTIONS
Start using chlorhexidine wash (hibiclens) twice a week on your scalp  Increase doxycycline to 100mg pills twice a day      Doxycycline     1.Doxycycline treats and prevents infections and may be used to treat acne.   2.Do not use it if you had an allergic reaction to doxycycline or another tetracycline antibiotic, or if you are pregnant or breastfeeding.  3. If you miss a dose, take a dose as soon as you remember. If it is almost time for your next dose, wait until then and take a regular dose. Do not take extra medicine to make up for a missed dose.   4 . Some foods and medicines can affect the medication. Tell your doctor if you are using any of the following: bismuth subsalicylate, isotretinoin, acitretin, medications that contain aluminum, calcium, or iron (such an antacid or vitamin supplement)  5. This medicine may cause birth defects if being used during pregnancy.  Use two forms of birth control to keep from getting pregnant.   6. Tell your doctor if you had stomach surgery or if you have a history of yeast infections.   7. This medicine may cause the following problems (stop the medication if you experience these symptoms and call your doctor):  Permanent change in tooth color (in children younger than 8 years old)   Increased pressure inside the head   Yeast infection   Diarrhea    This medicine may make your skin more sun sensitive and increase sun burns. Wear sunscreen and do not tan.     Allergic reaction with itching, hives, facial swelling, throat swelling, chest tightness, trouble breathing     Blistering, peeling, red skin rash     Burning, pain, or irritation in your upper stomach or throat     Joint pain, fever, rash, and unusual tiredness or weakness     Severe headache, dizziness, or vision changes  8. Call your doctor if your symptoms worsen or do not improve  Who should I call with questions?    [facility]: [phone]     [facility]  [place]: [phone]    For urgent needs outside of business hours call the [facility] at [phone] and ask for the dermatology resident on call

## 2019-04-16 NOTE — LETTER
4/16/2019       RE: Jeremiah Cruz  [address] [facility] [address]     Dear Colleague,    Thank you for referring your patient, Jeremiah Cruz, to the [facility] DERMATOLOGY at [facility]. Please see a copy of my visit note below.    [facility] Dermatology Note      Dermatology Problem List:  1. Likely bacterial folliculitis of scalp - doxycycline 100 mg BID, chlorhexidine wash   - Previous tx: doxycycline 50mg bid x 1 mo.  - Consider biopsy if persistent at next visit  2. Seborrheic Dermatitis - ketoconazole 2% shampoo    Encounter Date: Apr 16, 2019    CC:  Chief Complaint   Patient presents with     Derm Problem     Jeremiah is here today to follow up on his scalp. Patient states he is breaking out on his scalp and the medication is not helped.         History of Present Illness:  Mr. Jeremiah Cruz is a 43 year old male who presents as a follow-up for folliculitis. The patient was last seen 3/19/19 when he started doxycycline 50mg twice daily and continued ketoconazole shampoo for seborrheic dermatitis. Today, the patient reports that the doxycycline has not provided much improvement. He states that about a week after starting the medication, he noticed three new lesions on his scalp, though he admits that the frequency of the development of new lesions has decreased since then. Since the three in the first week, he has only noticed one additional lesion. He states that the new lesions are painful and tender to the touch. He denies any adverse side effects associated with the doxycycline including GI upset. Of note, he states that his PCP took a culture in the past and diagnosed him with folliculitis. Never had a biopsy in the past. He has also continued to use the ketoconazole shampoo regularly for his seborrheic dermatitis. The patient voices no other concerns.     Past Medical History:   Patient Active Problem List   Diagnosis     Alport's syndrome      Anemia in chronic kidney disease     Secondary renal hyperparathyroidism (H)     Hypertension     CML (chronic myelocytic leukemia) (H)     End stage kidney disease (H)     GERD (gastroesophageal reflux disease)     Prurigo nodularis     Senile sebaceous gland hyperplasia     Past Medical History:   Diagnosis Date     Alport's syndrome      Anemia in chronic kidney disease      CML (chronic myelocytic leukemia) (H)      Dialysis care      End stage kidney disease (H)      Former tobacco use      GERD (gastroesophageal reflux disease)      Hypertension      Kidney transplant rejection      S/p nephrectomy 12/7/11     Secondary renal hyperparathyroidism (H)      Thrombocytopenia (H)      Tobacco dependence      Past Surgical History:   Procedure Laterality Date     APPENDECTOMY       CREATE FISTULA ARTERIOVENOUS UPPER EXTREMITY       HERNIA REPAIR, INCISIONAL       INSERT CATHETER PERITONEAL DIALYSIS       NEPHRECTOMY Bilateral     native     NEPHRECTOMY      transplant     NEPHRECTOMY  2005    transplant     PARATHYROIDECTOMY       REMOVE CATHETER PERITONEAL       svc angioplasty         Social History:  Patient reports that he quit smoking about 8 years ago. His smoking use included cigarettes. He has a 12.00 pack-year smoking history. He has never used smokeless tobacco. He reports that he does not drink alcohol or use drugs.    Family History:  Family History   Problem Relation Age of Onset     Bone Cancer Brother      Melanoma No family hx of      Skin Cancer No family hx of        Medications:  Current Outpatient Medications   Medication Sig Dispense Refill     AMLODIPINE BESYLATE PO Take 10 mg by mouth daily       B complex-C-folic acid (NEPHROCAPS/TRIPHROCAPS) 1 MG capsule Take 1 capsule by mouth daily       CLONIDINE HCL PO Take 0.1 mg by mouth 2 times daily        dasatinib (SPRYCEL) 20 MG tablet CHEMO Take 20 mg by mouth 2 times daily        doxycycline monohydrate (MONODOX) 50 MG capsule Take 1 capsule (50  mg) by mouth 2 times daily 60 capsule 1     ketoconazole (NIZORAL) 2 % external shampoo APPLY TO THE SCALP AND WASH OFF ATER 5 MINUTES EVERY OTHER  mL 9     RANITIDINE HCL PO Take 150 mg by mouth daily        sevelamer (RENVELA) 800 MG tablet Take 800 mg by mouth 2 times daily (with meals) 1 tab with snacks         Allergies   Allergen Reactions     Cephalosporins Other (See Comments) and Rash     fever     Compazine [Prochlorperazine]      Gammagard [Immune Globulin] Other (See Comments)     Ed got spinal meningitis and MD stated to never get again for fear of death.       Penicillins      Vancomycin        Review of Systems:  -Constitutional: Otherwise feeling well today, in usual state of health.  -HEENT: Patient denies nonhealing oral sores.  -Skin: As above in HPI. No additional skin concerns.    Physical exam:  Vitals: There were no vitals taken for this visit.  GEN: This is a well developed, well-nourished male in no acute distress, in a pleasant mood.    SKIN: Focused examination of the scalp was performed.  -Indurated erythematous papule with central grossman colored crusting on left occipital scalp  -Smaller crusted papule on right occipital scalp  -No other lesions of concern on areas examined.       Impression/Plan:  1. Folliculitis of scalp; likely bacterial in nature. Improved with doxycycline 50mg twice daily, however still with development of new lesions. Low suspicion for pityrosporum folliculitis, but will consider at next visit if persistent. Does not appear to be a primary alopecic process.     Will increase Doxycycline to 100mg BID. Reinforced possible side effects of GI upset, increased sun sensitivity. Take with food, avoid dairy, 2 hours before bedtime.    Start chlorhexidine wash twice weekly to scalp    Can still use ketoconazole shampoo on other days.    Will consider a biopsy if not improved at next visit. Other treatment options include oral clindamycin, intralesional kenalog.    2.  Seborrheic Dermatitis    Continue ketoconazole 2% shampoo      Follow-up in 4 weeks, earlier for new or changing lesions.       Staff Involved:  Scribe/Resident/Staff    Scribe Disclosure  I, Dominic Najjar, am serving as a scribe to document services personally performed by Dr. Shady Alcocer MD, based on data collection and the provider's statements to me.     Elysia Reno MD  PGY-2, Dermatology    Staff Physician Comments:   I saw and evaluated the patient with the resident and I agree with the assessment and plan.  I was present for the examination. I have made edits if needed.    Shady Alcocer MD  Staff Dermatologist and Dermatopathologist  , Department of Dermatology    Staff attestation:  The documentation recorded by the scribe accurately reflects the services I personally performed and the decisions I personally made. I have made edits where needed.

## 2019-05-07 ENCOUNTER — TRANSFERRED RECORDS (OUTPATIENT)
Dept: HEALTH INFORMATION MANAGEMENT | Facility: CLINIC | Age: 44
End: 2019-05-07

## 2019-05-07 LAB — EJECTION FRACTION: 70

## 2019-05-14 ENCOUNTER — TELEPHONE (OUTPATIENT)
Dept: DERMATOLOGY | Facility: CLINIC | Age: 44
End: 2019-05-14

## 2019-05-14 ENCOUNTER — OFFICE VISIT (OUTPATIENT)
Dept: DERMATOLOGY | Facility: CLINIC | Age: 44
End: 2019-05-14
Payer: MEDICARE

## 2019-05-14 DIAGNOSIS — L73.9 FOLLICULITIS: Primary | ICD-10-CM

## 2019-05-14 RX ORDER — CLINDAMYCIN PHOSPHATE 11.9 MG/ML
SOLUTION TOPICAL
Qty: 60 ML | Refills: 5 | Status: ON HOLD | OUTPATIENT
Start: 2019-05-14 | End: 2020-03-02

## 2019-05-14 ASSESSMENT — PAIN SCALES - GENERAL: PAINLEVEL: NO PAIN (0)

## 2019-05-14 NOTE — PATIENT INSTRUCTIONS
Continue chlorhexidine and ketoconazole  Stop doxycycline  Start clindamycin topical to new lesions

## 2019-05-14 NOTE — PROGRESS NOTES
[facility] Dermatology Note      Dermatology Problem List:  1. Bacterial folliculitis of scalp - chlorhexidine wash, clindamycin solution  - Cleared with doxycycline 100mg BID  2. Seborrheic Dermatitis - ketoconazole 2% shampoo    Encounter Date: May 14, 2019    CC:  Chief Complaint   Patient presents with     Derm Problem     Folliculitis of scalp - notes improvement since his last visit.       History of Present Illness:  Mr. Jeremiha Cruz is a 43 year old male who presents as a follow-up for folliculitis. The patient was last seen 4/16/19 when he was increased from doxycycline 50mg BID to 100mg BID and continued ketoconazole shampoo for seborrheic dermatitis in addition to twice weekly chlorhexidine. Today, the patient reports that he is essentially clear. He denies any adverse side effects associated with the doxycycline including GI upset. The patient voices no other concerns.     Past Medical History:   Patient Active Problem List   Diagnosis     Alport's syndrome     Anemia in chronic kidney disease     Secondary renal hyperparathyroidism (H)     Hypertension     CML (chronic myelocytic leukemia) (H)     End stage kidney disease (H)     GERD (gastroesophageal reflux disease)     Prurigo nodularis     Senile sebaceous gland hyperplasia     Past Medical History:   Diagnosis Date     Alport's syndrome      Anemia in chronic kidney disease      CML (chronic myelocytic leukemia) (H)      Dialysis care      End stage kidney disease (H)      Former tobacco use      GERD (gastroesophageal reflux disease)      Hypertension      Kidney transplant rejection      S/p nephrectomy 12/7/11     Secondary renal hyperparathyroidism (H)      Thrombocytopenia (H)      Tobacco dependence      Past Surgical History:   Procedure Laterality Date     APPENDECTOMY       CREATE FISTULA ARTERIOVENOUS UPPER EXTREMITY       HERNIA REPAIR, INCISIONAL       INSERT CATHETER PERITONEAL DIALYSIS       NEPHRECTOMY  Bilateral     native     NEPHRECTOMY      transplant     NEPHRECTOMY  2005    transplant     PARATHYROIDECTOMY       REMOVE CATHETER PERITONEAL       svc angioplasty         Social History:  Patient reports that he quit smoking about 8 years ago. His smoking use included cigarettes. He has a 12.00 pack-year smoking history. He has never used smokeless tobacco. He reports that he does not drink alcohol or use drugs.    Family History:  Family History   Problem Relation Age of Onset     Bone Cancer Brother      Melanoma No family hx of      Skin Cancer No family hx of        Medications:  Current Outpatient Medications   Medication Sig Dispense Refill     AMLODIPINE BESYLATE PO Take 10 mg by mouth daily       B complex-C-folic acid (NEPHROCAPS/TRIPHROCAPS) 1 MG capsule Take 1 capsule by mouth daily       chlorhexidine (HIBICLENS) 4 % liquid Cleanse scalp twice a week as directed. 473 mL 1     CLONIDINE HCL PO Take 0.1 mg by mouth 2 times daily        dasatinib (SPRYCEL) 20 MG tablet CHEMO Take 20 mg by mouth 2 times daily        doxycycline monohydrate (MONODOX) 100 MG capsule Take 1 capsule (100 mg) by mouth 2 times daily 60 capsule 1     doxycycline monohydrate (MONODOX) 50 MG capsule Take 1 capsule (50 mg) by mouth 2 times daily 60 capsule 1     ketoconazole (NIZORAL) 2 % external shampoo APPLY TO THE SCALP AND WASH OFF ATER 5 MINUTES EVERY OTHER  mL 9     RANITIDINE HCL PO Take 150 mg by mouth daily        sevelamer (RENVELA) 800 MG tablet Take 800 mg by mouth 2 times daily (with meals) 1 tab with snacks         Allergies   Allergen Reactions     Cephalosporins Other (See Comments) and Rash     fever     Compazine [Prochlorperazine]      Gammagard [Immune Globulin] Other (See Comments)     Ed got spinal meningitis and MD stated to never get again for fear of death.       Penicillins      Vancomycin        Review of Systems:  -Constitutional: Otherwise feeling well today, in usual state of health.  -HEENT:  Patient denies nonhealing oral sores.  -Skin: As above in HPI. No additional skin concerns.    Physical exam:  Vitals: There were no vitals taken for this visit.  GEN: This is a well developed, well-nourished male in no acute distress, in a pleasant mood.    SKIN: Focused examination of the scalp was performed.  -Smaller crusted papule on left parietooccipital scalp  -No other lesions of concern on areas examined.       Impression/Plan:  1. Folliculitis of scalp--cleared with doxycycline 100mg BID    Discontinue doxycyline    Chlorhexidine wash twice weekly to scalp    Can still use ketoconazole shampoo on other days    Clindamycin solution    2. Seborrheic dermatitis    Continue ketoconazole 2% shampoo    Follow-up in  1 year, earlier for new or changing lesions.     Staff Involved:  Resident/Staff - [facility]    Staff Physician Comments:   I saw and evaluated the patient with the resident and I agree with the assessment and plan.  I was present for the examination. I have made edits if needed.    Shady Alcocer MD  Staff Dermatologist and Dermatopathologist  , Department of Dermatology

## 2019-05-14 NOTE — TELEPHONE ENCOUNTER
Spoke with pharmacy in regards to amount, and how often medication is to be used.   No further questions were asked.  Cyndi Healy LPN

## 2019-05-14 NOTE — NURSING NOTE
Dermatology Rooming Note    Jeremiah Cruz's goals for this visit include:   Chief Complaint   Patient presents with     Derm Problem     Folliculitis of scalp - notes improvement since his last visit.     Laury Craig, CMA

## 2019-05-14 NOTE — LETTER
5/14/2019       RE: Jeremiah Cruz  [address] [place] [id number]     Dear Colleague,    Thank you for referring your patient, Jeremiah Cruz, to the [facility] DERMATOLOGY at [facility]. Please see a copy of my visit note below.    [facility] Dermatology Note      Dermatology Problem List:  1. Bacterial folliculitis of scalp - chlorhexidine wash, clindamycin solution  - Cleared with doxycycline 100mg BID  2. Seborrheic Dermatitis - ketoconazole 2% shampoo    Encounter Date: May 14, 2019    CC:  Chief Complaint   Patient presents with     Derm Problem     Folliculitis of scalp - notes improvement since his last visit.       History of Present Illness:  Mr. Jeremiah Cruz is a 43 year old male who presents as a follow-up for folliculitis. The patient was last seen 4/16/19 when he was increased from doxycycline 50mg BID to 100mg BID and continued ketoconazole shampoo for seborrheic dermatitis in addition to twice weekly chlorhexidine. Today, the patient reports that he is essentially clear. He denies any adverse side effects associated with the doxycycline including GI upset. The patient voices no other concerns.     Past Medical History:   Patient Active Problem List   Diagnosis     Alport's syndrome     Anemia in chronic kidney disease     Secondary renal hyperparathyroidism (H)     Hypertension     CML (chronic myelocytic leukemia) (H)     End stage kidney disease (H)     GERD (gastroesophageal reflux disease)     Prurigo nodularis     Senile sebaceous gland hyperplasia     Past Medical History:   Diagnosis Date     Alport's syndrome      Anemia in chronic kidney disease      CML (chronic myelocytic leukemia) (H)      Dialysis care      End stage kidney disease (H)      Former tobacco use      GERD (gastroesophageal reflux disease)      Hypertension      Kidney transplant rejection      S/p nephrectomy 12/7/11     Secondary renal hyperparathyroidism (H)       Thrombocytopenia (H)      Tobacco dependence      Past Surgical History:   Procedure Laterality Date     APPENDECTOMY       CREATE FISTULA ARTERIOVENOUS UPPER EXTREMITY       HERNIA REPAIR, INCISIONAL       INSERT CATHETER PERITONEAL DIALYSIS       NEPHRECTOMY Bilateral     native     NEPHRECTOMY      transplant     NEPHRECTOMY  2005    transplant     PARATHYROIDECTOMY       REMOVE CATHETER PERITONEAL       svc angioplasty         Social History:  Patient reports that he quit smoking about 8 years ago. His smoking use included cigarettes. He has a 12.00 pack-year smoking history. He has never used smokeless tobacco. He reports that he does not drink alcohol or use drugs.    Family History:  Family History   Problem Relation Age of Onset     Bone Cancer Brother      Melanoma No family hx of      Skin Cancer No family hx of        Medications:  Current Outpatient Medications   Medication Sig Dispense Refill     AMLODIPINE BESYLATE PO Take 10 mg by mouth daily       B complex-C-folic acid (NEPHROCAPS/TRIPHROCAPS) 1 MG capsule Take 1 capsule by mouth daily       chlorhexidine (HIBICLENS) 4 % liquid Cleanse scalp twice a week as directed. 473 mL 1     CLONIDINE HCL PO Take 0.1 mg by mouth 2 times daily        dasatinib (SPRYCEL) 20 MG tablet CHEMO Take 20 mg by mouth 2 times daily        doxycycline monohydrate (MONODOX) 100 MG capsule Take 1 capsule (100 mg) by mouth 2 times daily 60 capsule 1     doxycycline monohydrate (MONODOX) 50 MG capsule Take 1 capsule (50 mg) by mouth 2 times daily 60 capsule 1     ketoconazole (NIZORAL) 2 % external shampoo APPLY TO THE SCALP AND WASH OFF ATER 5 MINUTES EVERY OTHER  mL 9     RANITIDINE HCL PO Take 150 mg by mouth daily        sevelamer (RENVELA) 800 MG tablet Take 800 mg by mouth 2 times daily (with meals) 1 tab with snacks         Allergies   Allergen Reactions     Cephalosporins Other (See Comments) and Rash     fever     Compazine [Prochlorperazine]      Gammagard  [Immune Globulin] Other (See Comments)     Ed got spinal meningitis and MD stated to never get again for fear of death.       Penicillins      Vancomycin        Review of Systems:  -Constitutional: Otherwise feeling well today, in usual state of health.  -HEENT: Patient denies nonhealing oral sores.  -Skin: As above in HPI. No additional skin concerns.    Physical exam:  Vitals: There were no vitals taken for this visit.  GEN: This is a well developed, well-nourished male in no acute distress, in a pleasant mood.    SKIN: Focused examination of the scalp was performed.  -Smaller crusted papule on left parietooccipital scalp  -No other lesions of concern on areas examined.       Impression/Plan:  1. Folliculitis of scalp--cleared with doxycycline 100mg BID    Discontinue doxycyline    Chlorhexidine wash twice weekly to scalp    Can still use ketoconazole shampoo on other days    Clindamycin solution    2. Seborrheic dermatitis    Continue ketoconazole 2% shampoo    Follow-up in  1 year, earlier for new or changing lesions.     Staff Involved:  Resident/Staff - [facility]    Staff Physician Comments:   I saw and evaluated the patient with the resident and I agree with the assessment and plan.  I was present for the examination. I have made edits if needed.    Shady Alcocer MD  Staff Dermatologist and Dermatopathologist  , Department of Dermatology

## 2019-05-14 NOTE — TELEPHONE ENCOUNTER
[facility] Call Center    Phone Message    May a detailed message be left on voicemail: yes    Reason for Call: Medication Question or concern regarding medication   Prescription Clarification  Name of Medication: clindamycin (CLEOCIN T) 1 % external solution  Prescribing Provider:    Pharmacy: [facility]   What on the order needs clarification? Pharmacy stated they need more specific directions for use, such as how much and how often, please call pharmacy back at [phone] thanks!!          Action Taken: Message routed to:  [facility] ([facility] derm

## 2019-05-17 ENCOUNTER — TELEPHONE (OUTPATIENT)
Dept: TRANSPLANT | Facility: CLINIC | Age: 44
End: 2019-05-17

## 2019-05-17 NOTE — LETTER
PHYSICIAN ORDERS    Cardiac Angiogram    DATE & TIME ISSUED: 2019 2:14 PM  PATIENT NAME: Jeremiah Cruz   : 1975     [facility] MR# [if applicable]: [id number]     DIAGNOSIS:  ESRD  ICD-10 CODE: Z76.82 Awaiting Organ Transplant, N18.6 ESRD     Please fax Angiogram results to [phone], and end images to the address above.    Any questions please call: Christi Klein LPN @ [phone]    .    .

## 2019-05-17 NOTE — LETTER
PHYSICIAN ORDERS    Cardiac Angiogram    DATE & TIME ISSUED: 2019 2:14 PM  PATIENT NAME: Jeremiah Cruz   : 1975     [facility] MR# [if applicable]: [id number]     DIAGNOSIS:  ESRD  ICD-10 CODE: Z76.82 Awaiting Organ Transplant, N18.6 ESRD     Please fax Angiogram results to [phone], and end images to the address above.    Any questions please call: Christi Klein LPN @ [phone]    .    .

## 2019-05-20 NOTE — TELEPHONE ENCOUNTER
Returned patient's call asking where he completed the ordered DSE. Reminded Ed that the wait list team needs to know when he has testing or outside visits.

## 2019-05-28 ENCOUNTER — MEDICAL CORRESPONDENCE (OUTPATIENT)
Dept: TRANSPLANT | Facility: CLINIC | Age: 44
End: 2019-05-28

## 2019-06-04 ENCOUNTER — TELEPHONE (OUTPATIENT)
Dept: TRANSPLANT | Facility: CLINIC | Age: 44
End: 2019-06-04

## 2019-06-04 NOTE — TELEPHONE ENCOUNTER
Left message to see if Ed is having any follow up since his cardiology exam. Left my name and number.

## 2019-06-04 NOTE — TELEPHONE ENCOUNTER
Ed called to say that no one did call him from cardiology in [place] about the results.   He said they told him they would.  I stated Dr. Varner would like him to see cardiology and/or have a liason weight in. I did sent the result to an DARRON for our cardiology here and the  and will wait for their thoughts.  Jeremiah reported about 5+years ago he did have an angiogram and balloon plasty to a vessel. Will wait for DARRON input and make a plan.

## 2019-06-21 ENCOUNTER — TELEPHONE (OUTPATIENT)
Dept: TRANSPLANT | Facility: CLINIC | Age: 44
End: 2019-06-21

## 2019-06-21 NOTE — TELEPHONE ENCOUNTER
Called Ed as he left me a vm stating the need for the angiogram was because of diabetes.   I called and left him a message and said I was sorry I said diabetes, I meant to say DIALYSIS since 1998.  Contact number left for return call.

## 2019-06-21 NOTE — TELEPHONE ENCOUNTER
Called Jeremiah and left him a message regarding the transplant recommendation for him to have a cardiac angiogram given the echo results of ischemia (although small section) and his 8 years of dialysis (per Dr. Varner).   I mentioned that we reviewed his results with our cardiology team here and they agree-you can have this done close to home or come to the [facility].  Left my number and Christi CRONIN's number to call and let us know if he would like us to send the order.

## 2019-08-15 ENCOUNTER — TRANSFERRED RECORDS (OUTPATIENT)
Dept: HEALTH INFORMATION MANAGEMENT | Facility: CLINIC | Age: 44
End: 2019-08-15

## 2019-08-26 ENCOUNTER — DOCUMENTATION ONLY (OUTPATIENT)
Dept: TRANSPLANT | Facility: CLINIC | Age: 44
End: 2019-08-26

## 2019-08-28 ENCOUNTER — TELEPHONE (OUTPATIENT)
Dept: TRANSPLANT | Facility: CLINIC | Age: 44
End: 2019-08-28

## 2019-08-28 ENCOUNTER — COMMITTEE REVIEW (OUTPATIENT)
Dept: TRANSPLANT | Facility: CLINIC | Age: 44
End: 2019-08-28

## 2019-08-28 NOTE — TELEPHONE ENCOUNTER
Left voice message for patient requesting return call regarding his most recent clinic visit with local oncologist.  Need current information prior to being able to activate patient on the kidney list.

## 2019-08-28 NOTE — COMMITTEE REVIEW
Abdominal Patient Discussion Note Transplant Coordinator: Ariadna Thakur  Transplant Surgeon:   Imtiaz Bain    Referring Physician: Alexander Tomlinson    Committee Review Members:  Nickian, Registered Vivienne Bradshaw RD   Nephrology Phuc Olivarez, APRN CNP, Nehemiah Wilkinson MD   Pharmacist Clinician- Clinical Pharmacy Specialist [place], [facility]    - Clinical Rosario Shane, [facility], Michelle Montalvo, [facility]   Transplant Loni Rajni Rendon PA-C, Lisa Beaulieu, AURA, Rebeca Lema, CORBIN, Hannah Riley, AURA, Ann Barroso, AURA, Ron Benítez MD, Alvin Varner MD, Amy Murcia RN       Additional Discussion Notes and Findings:   Case reviewed team. Team reviewed 0815/19 coronary angiogram report.  Team approves patient to have status changed to active on the kidney list, contingent upon current assessment from local oncologist (CML).  Team encourages patient to find a living donor.

## 2019-08-29 ENCOUNTER — TELEPHONE (OUTPATIENT)
Dept: TRANSPLANT | Facility: CLINIC | Age: 44
End: 2019-08-29

## 2019-08-29 NOTE — TELEPHONE ENCOUNTER
Patient reports seeing his oncologist earlier this month; that his leukemia remains in remission.  Note will have a request submitted for these medical records.  Reviewed once coordinator receives the notes, patient may have status changed to Active on the kidney list.  Patient verbalizes understanding and agreement with this plan.

## 2019-09-06 ENCOUNTER — TELEPHONE (OUTPATIENT)
Dept: TRANSPLANT | Facility: CLINIC | Age: 44
End: 2019-09-06

## 2019-09-06 ENCOUNTER — DOCUMENTATION ONLY (OUTPATIENT)
Dept: TRANSPLANT | Facility: CLINIC | Age: 44
End: 2019-09-06

## 2019-09-06 ENCOUNTER — MEDICAL CORRESPONDENCE (OUTPATIENT)
Dept: TRANSPLANT | Facility: CLINIC | Age: 44
End: 2019-09-06

## 2019-09-06 NOTE — TELEPHONE ENCOUNTER
Left voice mail stating we received his oncology notes and he is now cleared for active status.   He needs his ALA drawn every 3 months.   Will send order and kit to dialysis.   Confirmed the location.   Name and contact number left.

## 2019-09-09 ENCOUNTER — DOCUMENTATION ONLY (OUTPATIENT)
Dept: TRANSPLANT | Facility: CLINIC | Age: 44
End: 2019-09-09

## 2019-09-09 NOTE — PROGRESS NOTES
Mailed ALA/JENNY mailers with order to [facility] [place].

## 2019-10-03 DIAGNOSIS — N18.6 ESRD (END STAGE RENAL DISEASE) (H): ICD-10-CM

## 2019-10-03 DIAGNOSIS — Z76.82 ORGAN TRANSPLANT CANDIDATE: ICD-10-CM

## 2019-10-05 ENCOUNTER — HEALTH MAINTENANCE LETTER (OUTPATIENT)
Age: 44
End: 2019-10-05

## 2019-10-28 ENCOUNTER — DOCUMENTATION ONLY (OUTPATIENT)
Dept: TRANSPLANT | Facility: CLINIC | Age: 44
End: 2019-10-28

## 2019-11-06 DIAGNOSIS — Z76.82 ORGAN TRANSPLANT CANDIDATE: ICD-10-CM

## 2019-11-06 DIAGNOSIS — N18.6 ESRD (END STAGE RENAL DISEASE) (H): ICD-10-CM

## 2019-11-12 LAB
PROTOCOL CUTOFF: NORMAL
SA1 CELL: NORMAL
SA1 COMMENTS: NORMAL
SA1 HI RISK ABY: NORMAL
SA1 MOD RISK ABY: NORMAL
SA1 TEST METHOD: NORMAL
SA2 CELL: NORMAL
SA2 COMMENTS: NORMAL
SA2 HI RISK ABY UA: NORMAL
SA2 MOD RISK ABY: NORMAL
SA2 TEST METHOD: NORMAL
UNACCEPTABLE ANTIGEN: NORMAL
UNOS CPRA: 100

## 2019-12-06 ENCOUNTER — TELEPHONE (OUTPATIENT)
Dept: TRANSPLANT | Facility: CLINIC | Age: 44
End: 2019-12-06

## 2020-01-01 ENCOUNTER — TELEPHONE (OUTPATIENT)
Dept: TRANSPLANT | Facility: CLINIC | Age: 45
End: 2020-01-01

## 2020-01-01 ENCOUNTER — INFUSION THERAPY VISIT (OUTPATIENT)
Dept: INFUSION THERAPY | Facility: CLINIC | Age: 45
End: 2020-01-01
Attending: INTERNAL MEDICINE
Payer: MEDICARE

## 2020-01-01 ENCOUNTER — TELEPHONE (OUTPATIENT)
Dept: PHARMACY | Facility: CLINIC | Age: 45
End: 2020-01-01

## 2020-01-01 ENCOUNTER — OFFICE VISIT (OUTPATIENT)
Dept: NEPHROLOGY | Facility: CLINIC | Age: 45
End: 2020-01-01
Attending: INTERNAL MEDICINE
Payer: MEDICARE

## 2020-01-01 ENCOUNTER — RESULTS ONLY (OUTPATIENT)
Dept: OTHER | Facility: CLINIC | Age: 45
End: 2020-01-01

## 2020-01-01 ENCOUNTER — TELEPHONE (OUTPATIENT)
Dept: NEPHROLOGY | Facility: CLINIC | Age: 45
End: 2020-01-01
Payer: MEDICARE

## 2020-01-01 ENCOUNTER — HEALTH MAINTENANCE LETTER (OUTPATIENT)
Age: 45
End: 2020-01-01

## 2020-01-01 VITALS
DIASTOLIC BLOOD PRESSURE: 74 MMHG | OXYGEN SATURATION: 97 % | RESPIRATION RATE: 18 BRPM | HEART RATE: 79 BPM | SYSTOLIC BLOOD PRESSURE: 115 MMHG

## 2020-01-01 VITALS
BODY MASS INDEX: 24.19 KG/M2 | OXYGEN SATURATION: 98 % | DIASTOLIC BLOOD PRESSURE: 71 MMHG | SYSTOLIC BLOOD PRESSURE: 120 MMHG | HEART RATE: 60 BPM | WEIGHT: 141 LBS

## 2020-01-01 DIAGNOSIS — N18.4 ANEMIA OF CHRONIC RENAL FAILURE, STAGE 4 (SEVERE) (H): ICD-10-CM

## 2020-01-01 DIAGNOSIS — Z94.0 HTN, KIDNEY TRANSPLANT RELATED: ICD-10-CM

## 2020-01-01 DIAGNOSIS — I15.1 HTN, KIDNEY TRANSPLANT RELATED: ICD-10-CM

## 2020-01-01 DIAGNOSIS — C92.10 CML (CHRONIC MYELOCYTIC LEUKEMIA) (H): ICD-10-CM

## 2020-01-01 DIAGNOSIS — R05.9 COUGH: ICD-10-CM

## 2020-01-01 DIAGNOSIS — E55.9 VITAMIN D DEFICIENCY: ICD-10-CM

## 2020-01-01 DIAGNOSIS — K21.9 GASTROESOPHAGEAL REFLUX DISEASE, ESOPHAGITIS PRESENCE NOT SPECIFIED: ICD-10-CM

## 2020-01-01 DIAGNOSIS — D84.9 IMMUNOSUPPRESSION (H): ICD-10-CM

## 2020-01-01 DIAGNOSIS — E87.20 METABOLIC ACIDOSIS: ICD-10-CM

## 2020-01-01 DIAGNOSIS — Z48.298 AFTERCARE FOLLOWING ORGAN TRANSPLANT: ICD-10-CM

## 2020-01-01 DIAGNOSIS — Z78.9 POOR INTRAVENOUS ACCESS: ICD-10-CM

## 2020-01-01 DIAGNOSIS — Z94.0 KIDNEY REPLACED BY TRANSPLANT: Primary | ICD-10-CM

## 2020-01-01 DIAGNOSIS — E83.42 HYPOMAGNESEMIA: ICD-10-CM

## 2020-01-01 DIAGNOSIS — N25.81 SECONDARY RENAL HYPERPARATHYROIDISM (H): ICD-10-CM

## 2020-01-01 DIAGNOSIS — Z94.0 KIDNEY REPLACED BY TRANSPLANT: ICD-10-CM

## 2020-01-01 DIAGNOSIS — D63.1 ANEMIA OF CHRONIC RENAL FAILURE, STAGE 4 (SEVERE) (H): ICD-10-CM

## 2020-01-01 DIAGNOSIS — Z87.898 HISTORY OF DIFFICULT VENOUS ACCESS: ICD-10-CM

## 2020-01-01 DIAGNOSIS — Q87.81 ALPORT'S SYNDROME: ICD-10-CM

## 2020-01-01 DIAGNOSIS — Z79.899 LONG TERM USE OF DRUG: ICD-10-CM

## 2020-01-01 DIAGNOSIS — D63.1 ANEMIA IN STAGE 3 CHRONIC KIDNEY DISEASE (H): ICD-10-CM

## 2020-01-01 DIAGNOSIS — N18.4 CKD (CHRONIC KIDNEY DISEASE) STAGE 4, GFR 15-29 ML/MIN (H): ICD-10-CM

## 2020-01-01 DIAGNOSIS — N18.30 ANEMIA IN STAGE 3 CHRONIC KIDNEY DISEASE (H): ICD-10-CM

## 2020-01-01 LAB
DONOR IDENTIFICATION: NORMAL
DSA COMMENTS: NORMAL
DSA PRESENT: NO
DSA TEST METHOD: NORMAL
HCT VFR BLD AUTO: 29.3 %
HCT VFR BLD AUTO: 29.3 %
HCT VFR BLD AUTO: 30.1 %
HEMOGLOBIN: 9.1 G/DL (ref 13.3–17.7)
HEMOGLOBIN: 9.2 G/DL (ref 13.3–17.7)
HEMOGLOBIN: 9.4 G/DL (ref 13.3–17.7)
HEMOGLOBIN: 9.4 G/DL (ref 13.3–17.7)
HEMOGLOBIN: 9.5 G/DL (ref 13.3–17.7)
HEMOGLOBIN: 9.5 G/DL (ref 13.3–17.7)
HEMOGLOBIN: 9.8 G/DL (ref 13.3–17.7)
ORGAN: NORMAL
SA1 CELL: NORMAL
SA1 COMMENTS: NORMAL
SA1 HI RISK ABY: NORMAL
SA1 MOD RISK ABY: NORMAL
SA1 TEST METHOD: NORMAL
SA2 CELL: NORMAL
SA2 COMMENTS: NORMAL
SA2 HI RISK ABY UA: NORMAL
SA2 MOD RISK ABY: NORMAL
SA2 TEST METHOD: NORMAL
UNACCEPTABLE ANTIGEN: NORMAL
UNOS CPRA: 100

## 2020-01-01 PROCEDURE — 86833 HLA CLASS II HIGH DEFIN QUAL: CPT | Performed by: TRANSPLANT SURGERY

## 2020-01-01 PROCEDURE — 99214 OFFICE O/P EST MOD 30 MIN: CPT | Mod: 95

## 2020-01-01 PROCEDURE — G0463 HOSPITAL OUTPT CLINIC VISIT: HCPCS

## 2020-01-01 PROCEDURE — 86832 HLA CLASS I HIGH DEFIN QUAL: CPT | Performed by: TRANSPLANT SURGERY

## 2020-01-01 RX ORDER — TAMSULOSIN HYDROCHLORIDE 0.4 MG/1
0.4 CAPSULE ORAL AT BEDTIME
Qty: 90 CAPSULE | Refills: 1 | Status: SHIPPED | OUTPATIENT
Start: 2020-01-01 | End: 2021-01-01

## 2020-01-01 RX ORDER — CETIRIZINE HYDROCHLORIDE 10 MG/1
10 TABLET ORAL DAILY
COMMUNITY

## 2020-01-01 RX ORDER — DAPSONE 25 MG/1
50 TABLET ORAL DAILY
Qty: 60 TABLET | Refills: 11 | Status: SHIPPED | OUTPATIENT
Start: 2020-01-01

## 2020-01-01 RX ORDER — SODIUM BICARBONATE 650 MG/1
1950 TABLET ORAL 3 TIMES DAILY
Qty: 120 TABLET | Refills: 11 | Status: SHIPPED | OUTPATIENT
Start: 2020-01-01 | End: 2021-01-01

## 2020-01-01 ASSESSMENT — PAIN SCALES - GENERAL: PAINLEVEL: NO PAIN (0)

## 2020-01-10 DIAGNOSIS — N18.6 ESRD (END STAGE RENAL DISEASE) (H): ICD-10-CM

## 2020-01-10 DIAGNOSIS — Z76.82 ORGAN TRANSPLANT CANDIDATE: ICD-10-CM

## 2020-02-10 ENCOUNTER — HEALTH MAINTENANCE LETTER (OUTPATIENT)
Age: 45
End: 2020-02-10

## 2020-02-13 ENCOUNTER — TELEPHONE (OUTPATIENT)
Dept: TRANSPLANT | Facility: CLINIC | Age: 45
End: 2020-02-13

## 2020-02-13 NOTE — TELEPHONE ENCOUNTER
Left Arvin a message updating him that patient is still Active on our wait list and that we have orders in for him to come back for [facility] visits in the near future.

## 2020-02-27 ENCOUNTER — DOCUMENTATION ONLY (OUTPATIENT)
Dept: TRANSPLANT | Facility: CLINIC | Age: 45
End: 2020-02-27

## 2020-02-27 ENCOUNTER — ORGAN (OUTPATIENT)
Dept: TRANSPLANT | Facility: CLINIC | Age: 45
End: 2020-02-27

## 2020-02-27 DIAGNOSIS — Z76.82 AWAITING ORGAN TRANSPLANT: Primary | ICD-10-CM

## 2020-02-27 NOTE — PROGRESS NOTES
PATHOLOGY HLA CROSSMATCH CONSULTATION: DONOR/RECIPIENT VIRTUAL CROSSMATCH - Kidney  Consultation Date: 2020  Consultation Requested by: Dr. Herman Vickers  Regarding: Compatibility of  donor organ UNOS #[id number] from OPO: OKOP with patient Jeremiah Cruz  Findings: Regarding a virtual crossmatch between Jeremiah Cruz and  donor listed above (match ID [id number]):  The most recent and peak patient sera were analyzed.  The patient has one (1) donor-specific antibody(ies)  (DSA) as listed in table below. No other antibodies listed with specificity against donor organ.      ANTIBODY MOST RECENT SERUM (mfi) 19 Peak Serum #1 (mfi)  17      Cw16 None 1752          Record Review Indicates: I personally reviewed the most recent serum and the  peak serum/sera.  In addition, I analyzed 10 more sera:  The patient has only anti-C-locus antibodies against the donor organ; however, there are no DSA in the most recent serum tested dated 19.   Based on historical data from this hospital's histocompatibility lab, this donor organ is considered compatible because DSA to C-locus antigens rarely contribute to a positive lymphocyte crossmatch test; therefore, they were not considered in deriving the probability of positive crossmatch.    The results of this virtual XM are:   -most recent serum: Compatible  -peak #1:  Compatible    Disclaimer: Clinical judgement must take into account other factors, such as non-HLA antibodies not detected in the assay.   The VXM gives probabilities only.  The probability does not account for the potential for auto-antibodies that may be present in the patient's serum.  These autoantibodies may render the physical crossmatch falsely positive, and would be detected by an autologous crossmatch.  When possible, confirm findings with a prospective allogeneic and autologous flow crossmatches before going to transplant.    Aaron Mahoney, PhD PhD,  ANGELA  Medical Director, Immunology/Histocompatibility Laboratory  Pager: [phone]

## 2020-02-27 NOTE — TELEPHONE ENCOUNTER
"\"Donors get scored 1-100 based on their age, sex, race, cause of death and organ function.  This score is a very general predictor of future kidney performance. On average, kidneys with a higher score may not function as long as kidneys with a lower score.  This score does not necessarily predict how this specific kidney will perform.  We use this score plus other information like the creatinine and urine output to help us match kidneys with a higher score with people like you who are predicted to have a survival benefit by getting transplanted earlier rather than staying on dialysis.  Your surgeon has reviewed this offer and feels that it is appropriate for you to consider.\"    "

## 2020-02-28 ENCOUNTER — RESULTS ONLY (OUTPATIENT)
Dept: OTHER | Facility: CLINIC | Age: 45
End: 2020-02-28

## 2020-02-28 ENCOUNTER — HOSPITAL ENCOUNTER (INPATIENT)
Facility: CLINIC | Age: 45
LOS: 11 days | Discharge: HOME | DRG: 652 | End: 2020-03-10
Attending: SURGERY | Admitting: SURGERY
Payer: MEDICARE

## 2020-02-28 ENCOUNTER — APPOINTMENT (OUTPATIENT)
Dept: GENERAL RADIOLOGY | Facility: CLINIC | Age: 45
DRG: 652 | End: 2020-02-28
Attending: SURGERY
Payer: MEDICARE

## 2020-02-28 ENCOUNTER — APPOINTMENT (OUTPATIENT)
Dept: CT IMAGING | Facility: CLINIC | Age: 45
DRG: 652 | End: 2020-02-28
Attending: SURGERY
Payer: MEDICARE

## 2020-02-28 ENCOUNTER — DOCUMENTATION ONLY (OUTPATIENT)
Dept: TRANSPLANT | Facility: CLINIC | Age: 45
End: 2020-02-28

## 2020-02-28 ENCOUNTER — APPOINTMENT (OUTPATIENT)
Dept: ULTRASOUND IMAGING | Facility: CLINIC | Age: 45
DRG: 652 | End: 2020-02-28
Attending: SURGERY
Payer: MEDICARE

## 2020-02-28 ENCOUNTER — ANESTHESIA EVENT (OUTPATIENT)
Dept: SURGERY | Facility: CLINIC | Age: 45
DRG: 652 | End: 2020-02-28
Payer: MEDICARE

## 2020-02-28 ENCOUNTER — ANESTHESIA (OUTPATIENT)
Dept: SURGERY | Facility: CLINIC | Age: 45
DRG: 652 | End: 2020-02-28
Payer: MEDICARE

## 2020-02-28 DIAGNOSIS — Z94.0 KIDNEY REPLACED BY TRANSPLANT: Primary | ICD-10-CM

## 2020-02-28 DIAGNOSIS — Z45.2 PICC (PERIPHERALLY INSERTED CENTRAL CATHETER) IN PLACE: ICD-10-CM

## 2020-02-28 DIAGNOSIS — D84.9 IMMUNOSUPPRESSED STATUS (H): ICD-10-CM

## 2020-02-28 LAB
ALBUMIN SERPL-MCNC: 4 G/DL (ref 3.4–5)
ALP SERPL-CCNC: 108 U/L (ref 40–150)
ALT SERPL W P-5'-P-CCNC: 48 U/L (ref 0–70)
ANION GAP SERPL CALCULATED.3IONS-SCNC: 13 MMOL/L (ref 3–14)
ANION GAP SERPL CALCULATED.3IONS-SCNC: 14 MMOL/L (ref 3–14)
APTT PPP: 30 SEC (ref 22–37)
AST SERPL W P-5'-P-CCNC: 30 U/L (ref 0–45)
BASE DEFICIT BLDA-SCNC: 4.3 MMOL/L
BASE DEFICIT BLDA-SCNC: 5.5 MMOL/L
BASOPHILS # BLD AUTO: 0.1 10E9/L (ref 0–0.2)
BASOPHILS NFR BLD AUTO: 0.8 %
BILIRUB SERPL-MCNC: 0.6 MG/DL (ref 0.2–1.3)
BLD PROD TYP BPU: NORMAL
BLD UNIT ID BPU: 0
BLOOD PRODUCT CODE: NORMAL
BPU ID: NORMAL
BUN SERPL-MCNC: 63 MG/DL (ref 7–30)
BUN SERPL-MCNC: 65 MG/DL (ref 7–30)
CA-I BLD-MCNC: 3.6 MG/DL (ref 4.4–5.2)
CA-I BLD-MCNC: 3.7 MG/DL (ref 4.4–5.2)
CALCIUM SERPL-MCNC: 6.7 MG/DL (ref 8.5–10.1)
CALCIUM SERPL-MCNC: 7.7 MG/DL (ref 8.5–10.1)
CHLORIDE SERPL-SCNC: 102 MMOL/L (ref 94–109)
CHLORIDE SERPL-SCNC: 103 MMOL/L (ref 94–109)
CHOLEST SERPL-MCNC: 231 MG/DL
CMV IGG SERPL QL IA: <0.2 AI (ref 0–0.8)
CMV IGM SERPL QL IA: <0.2 AI (ref 0–0.8)
CO2 SERPL-SCNC: 20 MMOL/L (ref 20–32)
CO2 SERPL-SCNC: 22 MMOL/L (ref 20–32)
CREAT SERPL-MCNC: 11.6 MG/DL (ref 0.66–1.25)
CREAT SERPL-MCNC: 11.9 MG/DL (ref 0.66–1.25)
DIFFERENTIAL METHOD BLD: ABNORMAL
EBV VCA IGG SER QL IA: >8 AI (ref 0–0.8)
EBV VCA IGM SER QL IA: <0.2 AI (ref 0–0.8)
EOSINOPHIL # BLD AUTO: 1.5 10E9/L (ref 0–0.7)
EOSINOPHIL NFR BLD AUTO: 15.1 %
ERYTHROCYTE [DISTWIDTH] IN BLOOD BY AUTOMATED COUNT: 14.1 % (ref 10–15)
ERYTHROCYTE [DISTWIDTH] IN BLOOD BY AUTOMATED COUNT: 14.2 % (ref 10–15)
GFR SERPL CREATININE-BSD FRML MDRD: 5 ML/MIN/{1.73_M2}
GFR SERPL CREATININE-BSD FRML MDRD: 5 ML/MIN/{1.73_M2}
GLUCOSE BLD-MCNC: 107 MG/DL (ref 70–99)
GLUCOSE BLD-MCNC: 95 MG/DL (ref 70–99)
GLUCOSE BLDC GLUCOMTR-MCNC: 114 MG/DL (ref 70–99)
GLUCOSE SERPL-MCNC: 129 MG/DL (ref 70–99)
GLUCOSE SERPL-MCNC: 92 MG/DL (ref 70–99)
HBA1C MFR BLD: 5.2 % (ref 0–5.6)
HBV CORE IGM SERPL QL IA: NONREACTIVE
HBV SURFACE AG SERPL QL IA: NONREACTIVE
HCO3 BLD-SCNC: 19 MMOL/L (ref 21–28)
HCO3 BLD-SCNC: 20 MMOL/L (ref 21–28)
HCT VFR BLD AUTO: 26.4 % (ref 40–53)
HCT VFR BLD AUTO: 37.4 % (ref 40–53)
HCV AB SERPL QL IA: NONREACTIVE
HDLC SERPL-MCNC: 40 MG/DL
HGB BLD-MCNC: 10.2 G/DL (ref 13.3–17.7)
HGB BLD-MCNC: 11.8 G/DL (ref 13.3–17.7)
HGB BLD-MCNC: 8.1 G/DL (ref 13.3–17.7)
HGB BLD-MCNC: 8.3 G/DL (ref 13.3–17.7)
HGB BLD-MCNC: 8.8 G/DL (ref 13.3–17.7)
HIV 1+2 AB+HIV1 P24 AG SERPL QL IA: NONREACTIVE
IMM GRANULOCYTES # BLD: 0 10E9/L (ref 0–0.4)
IMM GRANULOCYTES NFR BLD: 0.3 %
INR PPP: 1.11 (ref 0.86–1.14)
INTERPRETATION ECG - MUSE: NORMAL
LACTATE BLD-SCNC: 0.6 MMOL/L (ref 0.7–2)
LACTATE BLD-SCNC: 0.8 MMOL/L (ref 0.7–2)
LDLC SERPL CALC-MCNC: 156 MG/DL
LYMPHOCYTES # BLD AUTO: 1.8 10E9/L (ref 0.8–5.3)
LYMPHOCYTES NFR BLD AUTO: 18.6 %
MAGNESIUM SERPL-MCNC: 1.9 MG/DL (ref 1.6–2.3)
MCH RBC QN AUTO: 29.2 PG (ref 26.5–33)
MCH RBC QN AUTO: 29.3 PG (ref 26.5–33)
MCHC RBC AUTO-ENTMCNC: 31.4 G/DL (ref 31.5–36.5)
MCHC RBC AUTO-ENTMCNC: 31.6 G/DL (ref 31.5–36.5)
MCV RBC AUTO: 93 FL (ref 78–100)
MCV RBC AUTO: 93 FL (ref 78–100)
MONOCYTES # BLD AUTO: 0.7 10E9/L (ref 0–1.3)
MONOCYTES NFR BLD AUTO: 7.4 %
NEUTROPHILS # BLD AUTO: 5.6 10E9/L (ref 1.6–8.3)
NEUTROPHILS NFR BLD AUTO: 57.8 %
NONHDLC SERPL-MCNC: 191 MG/DL
NRBC # BLD AUTO: 0 10*3/UL
NRBC BLD AUTO-RTO: 0 /100
O2/TOTAL GAS SETTING VFR VENT: 53 %
O2/TOTAL GAS SETTING VFR VENT: 54 %
PCO2 BLD: 35 MM HG (ref 35–45)
PCO2 BLD: 35 MM HG (ref 35–45)
PH BLD: 7.36 PH (ref 7.35–7.45)
PH BLD: 7.38 PH (ref 7.35–7.45)
PHOSPHATE SERPL-MCNC: 7.3 MG/DL (ref 2.5–4.5)
PLATELET # BLD AUTO: 100 10E9/L (ref 150–450)
PLATELET # BLD AUTO: 195 10E9/L (ref 150–450)
PO2 BLD: 202 MM HG (ref 80–105)
PO2 BLD: 222 MM HG (ref 80–105)
POTASSIUM BLD-SCNC: 5.2 MMOL/L (ref 3.4–5.3)
POTASSIUM BLD-SCNC: 5.7 MMOL/L (ref 3.4–5.3)
POTASSIUM SERPL-SCNC: 4.6 MMOL/L (ref 3.4–5.3)
POTASSIUM SERPL-SCNC: 4.8 MMOL/L (ref 3.4–5.3)
POTASSIUM SERPL-SCNC: 5 MMOL/L (ref 3.4–5.3)
PROT SERPL-MCNC: 7.9 G/DL (ref 6.8–8.8)
RBC # BLD AUTO: 2.84 10E12/L (ref 4.4–5.9)
RBC # BLD AUTO: 4.03 10E12/L (ref 4.4–5.9)
SODIUM BLD-SCNC: 138 MMOL/L (ref 133–144)
SODIUM BLD-SCNC: 140 MMOL/L (ref 133–144)
SODIUM SERPL-SCNC: 137 MMOL/L (ref 133–144)
SODIUM SERPL-SCNC: 137 MMOL/L (ref 133–144)
TRANSFUSION STATUS PATIENT QL: NORMAL
TRIGL SERPL-MCNC: 176 MG/DL
WBC # BLD AUTO: 1.4 10E9/L (ref 4–11)
WBC # BLD AUTO: 9.7 10E9/L (ref 4–11)

## 2020-02-28 PROCEDURE — 25000128 H RX IP 250 OP 636: Performed by: NURSE ANESTHETIST, CERTIFIED REGISTERED

## 2020-02-28 PROCEDURE — 86665 EPSTEIN-BARR CAPSID VCA: CPT

## 2020-02-28 PROCEDURE — 83735 ASSAY OF MAGNESIUM: CPT | Performed by: SURGERY

## 2020-02-28 PROCEDURE — 71000015 ZZH RECOVERY PHASE 1 LEVEL 2 EA ADDTL HR: Performed by: SURGERY

## 2020-02-28 PROCEDURE — 71000014 ZZH RECOVERY PHASE 1 LEVEL 2 FIRST HR: Performed by: SURGERY

## 2020-02-28 PROCEDURE — 0T770DZ DILATION OF LEFT URETER WITH INTRALUMINAL DEVICE, OPEN APPROACH: ICD-10-PCS | Performed by: SURGERY

## 2020-02-28 PROCEDURE — 86644 CMV ANTIBODY: CPT

## 2020-02-28 PROCEDURE — 25000125 ZZHC RX 250: Performed by: NURSE ANESTHETIST, CERTIFIED REGISTERED

## 2020-02-28 PROCEDURE — 82330 ASSAY OF CALCIUM: CPT | Performed by: ANESTHESIOLOGY

## 2020-02-28 PROCEDURE — P9041 ALBUMIN (HUMAN),5%, 50ML: HCPCS | Performed by: NURSE ANESTHETIST, CERTIFIED REGISTERED

## 2020-02-28 PROCEDURE — 84132 ASSAY OF SERUM POTASSIUM: CPT | Performed by: SURGERY

## 2020-02-28 PROCEDURE — 36000062 ZZH SURGERY LEVEL 4 1ST 30 MIN - UMMC: Performed by: SURGERY

## 2020-02-28 PROCEDURE — G0499 HEPB SCREEN HIGH RISK INDIV: HCPCS

## 2020-02-28 PROCEDURE — 85018 HEMOGLOBIN: CPT | Performed by: SURGERY

## 2020-02-28 PROCEDURE — 40000169 ZZH STATISTIC PRE-PROCEDURE ASSESSMENT I: Performed by: SURGERY

## 2020-02-28 PROCEDURE — 71046 X-RAY EXAM CHEST 2 VIEWS: CPT

## 2020-02-28 PROCEDURE — 36415 COLL VENOUS BLD VENIPUNCTURE: CPT

## 2020-02-28 PROCEDURE — 25800030 ZZH RX IP 258 OP 636: Performed by: NURSE ANESTHETIST, CERTIFIED REGISTERED

## 2020-02-28 PROCEDURE — 85025 COMPLETE CBC W/AUTO DIFF WBC: CPT

## 2020-02-28 PROCEDURE — 36415 COLL VENOUS BLD VENIPUNCTURE: CPT | Performed by: SURGERY

## 2020-02-28 PROCEDURE — 86900 BLOOD TYPING SEROLOGIC ABO: CPT

## 2020-02-28 PROCEDURE — 83036 HEMOGLOBIN GLYCOSYLATED A1C: CPT

## 2020-02-28 PROCEDURE — 86870 RBC ANTIBODY IDENTIFICATION: CPT

## 2020-02-28 PROCEDURE — 40000985 XR CHEST PORT 1 VW

## 2020-02-28 PROCEDURE — C2617 STENT, NON-COR, TEM W/O DEL: HCPCS | Performed by: SURGERY

## 2020-02-28 PROCEDURE — 84295 ASSAY OF SERUM SODIUM: CPT | Performed by: ANESTHESIOLOGY

## 2020-02-28 PROCEDURE — 80061 LIPID PANEL: CPT

## 2020-02-28 PROCEDURE — 85730 THROMBOPLASTIN TIME PARTIAL: CPT

## 2020-02-28 PROCEDURE — 25800030 ZZH RX IP 258 OP 636

## 2020-02-28 PROCEDURE — 25000128 H RX IP 250 OP 636: Performed by: SURGERY

## 2020-02-28 PROCEDURE — 81200002 ZZH ACQUISITION KIDNEY CADAVER

## 2020-02-28 PROCEDURE — 25000566 ZZH SEVOFLURANE, EA 15 MIN: Performed by: SURGERY

## 2020-02-28 PROCEDURE — 40000196 ZZH STATISTIC RAPCV CVP MONITORING

## 2020-02-28 PROCEDURE — 93010 ELECTROCARDIOGRAM REPORT: CPT | Mod: 59 | Performed by: INTERNAL MEDICINE

## 2020-02-28 PROCEDURE — 93005 ELECTROCARDIOGRAM TRACING: CPT

## 2020-02-28 PROCEDURE — 37000008 ZZH ANESTHESIA TECHNICAL FEE, 1ST 30 MIN: Performed by: SURGERY

## 2020-02-28 PROCEDURE — 25000128 H RX IP 250 OP 636

## 2020-02-28 PROCEDURE — 85027 COMPLETE CBC AUTOMATED: CPT | Performed by: SURGERY

## 2020-02-28 PROCEDURE — 74176 CT ABD & PELVIS W/O CONTRAST: CPT

## 2020-02-28 PROCEDURE — 80053 COMPREHEN METABOLIC PANEL: CPT

## 2020-02-28 PROCEDURE — 37000009 ZZH ANESTHESIA TECHNICAL FEE, EACH ADDTL 15 MIN: Performed by: SURGERY

## 2020-02-28 PROCEDURE — 86922 COMPATIBILITY TEST ANTIGLOB: CPT

## 2020-02-28 PROCEDURE — P9016 RBC LEUKOCYTES REDUCED: HCPCS

## 2020-02-28 PROCEDURE — 86705 HEP B CORE ANTIBODY IGM: CPT

## 2020-02-28 PROCEDURE — 84132 ASSAY OF SERUM POTASSIUM: CPT | Performed by: ANESTHESIOLOGY

## 2020-02-28 PROCEDURE — 87389 HIV-1 AG W/HIV-1&-2 AB AG IA: CPT

## 2020-02-28 PROCEDURE — 40000275 ZZH STATISTIC RCP TIME EA 10 MIN

## 2020-02-28 PROCEDURE — 25000125 ZZHC RX 250

## 2020-02-28 PROCEDURE — 27210794 ZZH OR GENERAL SUPPLY STERILE: Performed by: SURGERY

## 2020-02-28 PROCEDURE — 40000986 XR CHEST PORT 1 VW

## 2020-02-28 PROCEDURE — 40000014 ZZH STATISTIC ARTERIAL MONITORING DAILY

## 2020-02-28 PROCEDURE — 36000064 ZZH SURGERY LEVEL 4 EA 15 ADDTL MIN - UMMC: Performed by: SURGERY

## 2020-02-28 PROCEDURE — 25000132 ZZH RX MED GY IP 250 OP 250 PS 637: Mod: GY | Performed by: SURGERY

## 2020-02-28 PROCEDURE — 25000132 ZZH RX MED GY IP 250 OP 250 PS 637: Mod: GY

## 2020-02-28 PROCEDURE — 00000146 ZZHCL STATISTIC GLUCOSE BY METER IP

## 2020-02-28 PROCEDURE — 25800030 ZZH RX IP 258 OP 636: Performed by: SURGERY

## 2020-02-28 PROCEDURE — 0TY10Z0 TRANSPLANTATION OF LEFT KIDNEY, ALLOGENEIC, OPEN APPROACH: ICD-10-PCS | Performed by: SURGERY

## 2020-02-28 PROCEDURE — 86901 BLOOD TYPING SEROLOGIC RH(D): CPT

## 2020-02-28 PROCEDURE — 12000004 ZZH R&B IMCU UMMC

## 2020-02-28 PROCEDURE — 83605 ASSAY OF LACTIC ACID: CPT | Performed by: ANESTHESIOLOGY

## 2020-02-28 PROCEDURE — 25000131 ZZH RX MED GY IP 250 OP 636 PS 637: Mod: GY | Performed by: SURGERY

## 2020-02-28 PROCEDURE — 86850 RBC ANTIBODY SCREEN: CPT

## 2020-02-28 PROCEDURE — 25000128 H RX IP 250 OP 636: Performed by: ANESTHESIOLOGY

## 2020-02-28 PROCEDURE — 82803 BLOOD GASES ANY COMBINATION: CPT | Performed by: ANESTHESIOLOGY

## 2020-02-28 PROCEDURE — 86803 HEPATITIS C AB TEST: CPT

## 2020-02-28 PROCEDURE — 86645 CMV ANTIBODY IGM: CPT

## 2020-02-28 PROCEDURE — 40000556 ZZH STATISTIC PERIPHERAL IV START W US GUIDANCE

## 2020-02-28 PROCEDURE — 85610 PROTHROMBIN TIME: CPT

## 2020-02-28 PROCEDURE — 40000985 XR ABDOMEN PORT 1 VW

## 2020-02-28 PROCEDURE — 82947 ASSAY GLUCOSE BLOOD QUANT: CPT | Performed by: ANESTHESIOLOGY

## 2020-02-28 PROCEDURE — 80048 BASIC METABOLIC PNL TOTAL CA: CPT | Performed by: SURGERY

## 2020-02-28 PROCEDURE — 76776 US EXAM K TRANSPL W/DOPPLER: CPT

## 2020-02-28 PROCEDURE — 84100 ASSAY OF PHOSPHORUS: CPT | Performed by: SURGERY

## 2020-02-28 DEVICE — STENT URETERAL DBL PIGTAIL INLAY 6FRX16CM G14865
Type: IMPLANTABLE DEVICE | Site: URETER | Status: NON-FUNCTIONAL
Removed: 2020-04-03

## 2020-02-28 RX ORDER — HYDROMORPHONE HYDROCHLORIDE 1 MG/ML
.3-.5 INJECTION, SOLUTION INTRAMUSCULAR; INTRAVENOUS; SUBCUTANEOUS EVERY 5 MIN PRN
Status: DISCONTINUED | OUTPATIENT
Start: 2020-02-28 | End: 2020-02-28 | Stop reason: HOSPADM

## 2020-02-28 RX ORDER — FENTANYL CITRATE 50 UG/ML
INJECTION, SOLUTION INTRAMUSCULAR; INTRAVENOUS PRN
Status: DISCONTINUED | OUTPATIENT
Start: 2020-02-28 | End: 2020-02-28

## 2020-02-28 RX ORDER — LANTHANUM CARBONATE 500 MG/1
500 TABLET, CHEWABLE ORAL
Status: ON HOLD | COMMUNITY
End: 2020-03-06

## 2020-02-28 RX ORDER — SODIUM CHLORIDE, SODIUM GLUCONATE, SODIUM ACETATE, POTASSIUM CHLORIDE AND MAGNESIUM CHLORIDE 526; 502; 368; 37; 30 MG/100ML; MG/100ML; MG/100ML; MG/100ML; MG/100ML
INJECTION, SOLUTION INTRAVENOUS CONTINUOUS PRN
Status: DISCONTINUED | OUTPATIENT
Start: 2020-02-28 | End: 2020-02-28

## 2020-02-28 RX ORDER — LIDOCAINE 40 MG/G
CREAM TOPICAL
Status: DISCONTINUED | OUTPATIENT
Start: 2020-02-28 | End: 2020-02-28 | Stop reason: HOSPADM

## 2020-02-28 RX ORDER — ONDANSETRON 4 MG/1
4 TABLET, ORALLY DISINTEGRATING ORAL EVERY 6 HOURS PRN
Status: DISCONTINUED | OUTPATIENT
Start: 2020-02-28 | End: 2020-02-28

## 2020-02-28 RX ORDER — AMOXICILLIN 250 MG
1 CAPSULE ORAL 2 TIMES DAILY
Status: DISCONTINUED | OUTPATIENT
Start: 2020-02-28 | End: 2020-03-10 | Stop reason: HOSPADM

## 2020-02-28 RX ORDER — LIDOCAINE HYDROCHLORIDE 20 MG/ML
INJECTION, SOLUTION INFILTRATION; PERINEURAL PRN
Status: DISCONTINUED | OUTPATIENT
Start: 2020-02-28 | End: 2020-02-28

## 2020-02-28 RX ORDER — NALOXONE HYDROCHLORIDE 0.4 MG/ML
.1-.4 INJECTION, SOLUTION INTRAMUSCULAR; INTRAVENOUS; SUBCUTANEOUS
Status: DISCONTINUED | OUTPATIENT
Start: 2020-02-28 | End: 2020-03-10 | Stop reason: HOSPADM

## 2020-02-28 RX ORDER — ONDANSETRON 2 MG/ML
4 INJECTION INTRAMUSCULAR; INTRAVENOUS EVERY 6 HOURS PRN
Status: DISCONTINUED | OUTPATIENT
Start: 2020-02-28 | End: 2020-02-28

## 2020-02-28 RX ORDER — AMOXICILLIN 250 MG
2 CAPSULE ORAL 2 TIMES DAILY
Status: DISCONTINUED | OUTPATIENT
Start: 2020-02-28 | End: 2020-03-10 | Stop reason: HOSPADM

## 2020-02-28 RX ORDER — ONDANSETRON 2 MG/ML
4 INJECTION INTRAMUSCULAR; INTRAVENOUS EVERY 30 MIN PRN
Status: DISCONTINUED | OUTPATIENT
Start: 2020-02-28 | End: 2020-02-28 | Stop reason: HOSPADM

## 2020-02-28 RX ORDER — SODIUM CHLORIDE 450 MG/100ML
INJECTION, SOLUTION INTRAVENOUS CONTINUOUS PRN
Status: DISCONTINUED | OUTPATIENT
Start: 2020-02-28 | End: 2020-02-29

## 2020-02-28 RX ORDER — ESMOLOL HYDROCHLORIDE 10 MG/ML
INJECTION INTRAVENOUS PRN
Status: DISCONTINUED | OUTPATIENT
Start: 2020-02-28 | End: 2020-02-28

## 2020-02-28 RX ORDER — FUROSEMIDE 10 MG/ML
INJECTION INTRAMUSCULAR; INTRAVENOUS PRN
Status: DISCONTINUED | OUTPATIENT
Start: 2020-02-28 | End: 2020-02-28

## 2020-02-28 RX ORDER — SODIUM CHLORIDE, SODIUM LACTATE, POTASSIUM CHLORIDE, CALCIUM CHLORIDE 600; 310; 30; 20 MG/100ML; MG/100ML; MG/100ML; MG/100ML
INJECTION, SOLUTION INTRAVENOUS CONTINUOUS
Status: DISCONTINUED | OUTPATIENT
Start: 2020-02-28 | End: 2020-02-28 | Stop reason: HOSPADM

## 2020-02-28 RX ORDER — EPHEDRINE SULFATE 50 MG/ML
INJECTION, SOLUTION INTRAVENOUS PRN
Status: DISCONTINUED | OUTPATIENT
Start: 2020-02-28 | End: 2020-02-28

## 2020-02-28 RX ORDER — ALBUMIN, HUMAN INJ 5% 5 %
SOLUTION INTRAVENOUS CONTINUOUS PRN
Status: DISCONTINUED | OUTPATIENT
Start: 2020-02-28 | End: 2020-02-28

## 2020-02-28 RX ORDER — SULFAMETHOXAZOLE AND TRIMETHOPRIM 400; 80 MG/1; MG/1
1 TABLET ORAL DAILY
Status: DISCONTINUED | OUTPATIENT
Start: 2020-02-29 | End: 2020-03-02

## 2020-02-28 RX ORDER — LIDOCAINE 4 G/G
1 PATCH TOPICAL
Status: DISCONTINUED | OUTPATIENT
Start: 2020-02-29 | End: 2020-03-10 | Stop reason: HOSPADM

## 2020-02-28 RX ORDER — TACROLIMUS 1 MG/1
2 CAPSULE ORAL
Status: DISCONTINUED | OUTPATIENT
Start: 2020-02-29 | End: 2020-03-02

## 2020-02-28 RX ORDER — FENTANYL CITRATE 50 UG/ML
25-50 INJECTION, SOLUTION INTRAMUSCULAR; INTRAVENOUS
Status: DISCONTINUED | OUTPATIENT
Start: 2020-02-28 | End: 2020-02-28 | Stop reason: HOSPADM

## 2020-02-28 RX ORDER — ACETAMINOPHEN 325 MG/1
975 TABLET ORAL ONCE
Status: COMPLETED | OUTPATIENT
Start: 2020-02-28 | End: 2020-02-28

## 2020-02-28 RX ORDER — ATORVASTATIN CALCIUM 10 MG/1
10 TABLET, FILM COATED ORAL DAILY
Status: DISCONTINUED | OUTPATIENT
Start: 2020-02-29 | End: 2020-03-10 | Stop reason: HOSPADM

## 2020-02-28 RX ORDER — PROPOFOL 10 MG/ML
INJECTION, EMULSION INTRAVENOUS PRN
Status: DISCONTINUED | OUTPATIENT
Start: 2020-02-28 | End: 2020-02-28

## 2020-02-28 RX ORDER — LABETALOL 20 MG/4 ML (5 MG/ML) INTRAVENOUS SYRINGE
10
Status: DISCONTINUED | OUTPATIENT
Start: 2020-02-28 | End: 2020-02-28 | Stop reason: HOSPADM

## 2020-02-28 RX ORDER — ONDANSETRON 4 MG/1
4 TABLET, ORALLY DISINTEGRATING ORAL EVERY 30 MIN PRN
Status: DISCONTINUED | OUTPATIENT
Start: 2020-02-28 | End: 2020-02-28 | Stop reason: HOSPADM

## 2020-02-28 RX ORDER — MAGNESIUM OXIDE 400 MG/1
400 TABLET ORAL
Status: DISCONTINUED | OUTPATIENT
Start: 2020-03-01 | End: 2020-03-03

## 2020-02-28 RX ORDER — ONDANSETRON 2 MG/ML
INJECTION INTRAMUSCULAR; INTRAVENOUS PRN
Status: DISCONTINUED | OUTPATIENT
Start: 2020-02-28 | End: 2020-02-28

## 2020-02-28 RX ORDER — CALCIUM CARBONATE 500 MG/1
TABLET, CHEWABLE ORAL
Status: ON HOLD | COMMUNITY
End: 2020-03-06

## 2020-02-28 RX ORDER — LEVOFLOXACIN 5 MG/ML
500 INJECTION, SOLUTION INTRAVENOUS ONCE
Status: COMPLETED | OUTPATIENT
Start: 2020-02-28 | End: 2020-02-28

## 2020-02-28 RX ORDER — MYCOPHENOLATE MOFETIL 250 MG/1
750 CAPSULE ORAL
Status: DISCONTINUED | OUTPATIENT
Start: 2020-02-28 | End: 2020-03-02

## 2020-02-28 RX ORDER — NALOXONE HYDROCHLORIDE 0.4 MG/ML
.1-.4 INJECTION, SOLUTION INTRAMUSCULAR; INTRAVENOUS; SUBCUTANEOUS
Status: DISCONTINUED | OUTPATIENT
Start: 2020-02-28 | End: 2020-02-28

## 2020-02-28 RX ORDER — MANNITOL 20 G/100ML
INJECTION, SOLUTION INTRAVENOUS PRN
Status: DISCONTINUED | OUTPATIENT
Start: 2020-02-28 | End: 2020-02-28

## 2020-02-28 RX ORDER — SODIUM CHLORIDE 9 MG/ML
1000 INJECTION, SOLUTION INTRAVENOUS CONTINUOUS PRN
Status: DISCONTINUED | OUTPATIENT
Start: 2020-02-28 | End: 2020-02-29

## 2020-02-28 RX ORDER — HEPARIN SODIUM 1000 [USP'U]/ML
INJECTION, SOLUTION INTRAVENOUS; SUBCUTANEOUS PRN
Status: DISCONTINUED | OUTPATIENT
Start: 2020-02-28 | End: 2020-02-28

## 2020-02-28 RX ORDER — VALGANCICLOVIR 450 MG/1
450 TABLET, FILM COATED ORAL
Status: DISCONTINUED | OUTPATIENT
Start: 2020-03-02 | End: 2020-03-02

## 2020-02-28 RX ORDER — SODIUM CHLORIDE 9 MG/ML
INJECTION, SOLUTION INTRAVENOUS CONTINUOUS
Status: DISCONTINUED | OUTPATIENT
Start: 2020-02-28 | End: 2020-02-28 | Stop reason: HOSPADM

## 2020-02-28 RX ORDER — ONDANSETRON 2 MG/ML
4 INJECTION INTRAMUSCULAR; INTRAVENOUS EVERY 6 HOURS PRN
Status: DISCONTINUED | OUTPATIENT
Start: 2020-02-28 | End: 2020-03-10

## 2020-02-28 RX ORDER — CALCIUM CHLORIDE 100 MG/ML
INJECTION INTRAVENOUS; INTRAVENTRICULAR PRN
Status: DISCONTINUED | OUTPATIENT
Start: 2020-02-28 | End: 2020-02-28

## 2020-02-28 RX ADMIN — SUGAMMADEX 200 MG: 100 INJECTION, SOLUTION INTRAVENOUS at 16:55

## 2020-02-28 RX ADMIN — FUROSEMIDE 10 MG/HR: 10 INJECTION, SOLUTION INTRAVENOUS at 22:56

## 2020-02-28 RX ADMIN — FENTANYL CITRATE 50 MCG: 50 INJECTION, SOLUTION INTRAMUSCULAR; INTRAVENOUS at 15:29

## 2020-02-28 RX ADMIN — FENTANYL CITRATE 25 MCG: 50 INJECTION, SOLUTION INTRAMUSCULAR; INTRAVENOUS at 17:57

## 2020-02-28 RX ADMIN — ONDANSETRON 4 MG: 2 INJECTION INTRAMUSCULAR; INTRAVENOUS at 22:52

## 2020-02-28 RX ADMIN — SODIUM CHLORIDE 500 MG: 9 INJECTION, SOLUTION INTRAVENOUS at 12:00

## 2020-02-28 RX ADMIN — LIDOCAINE HYDROCHLORIDE 100 MG: 20 INJECTION, SOLUTION INFILTRATION; PERINEURAL at 10:15

## 2020-02-28 RX ADMIN — PHENYLEPHRINE HYDROCHLORIDE 100 MCG: 10 INJECTION INTRAVENOUS at 10:55

## 2020-02-28 RX ADMIN — SODIUM CHLORIDE, SODIUM GLUCONATE, SODIUM ACETATE, POTASSIUM CHLORIDE AND MAGNESIUM CHLORIDE: 526; 502; 368; 37; 30 INJECTION, SOLUTION INTRAVENOUS at 12:00

## 2020-02-28 RX ADMIN — FUROSEMIDE 10 MG/HR: 10 INJECTION, SOLUTION INTRAVENOUS at 14:49

## 2020-02-28 RX ADMIN — PHENYLEPHRINE HYDROCHLORIDE 100 MCG: 10 INJECTION INTRAVENOUS at 11:01

## 2020-02-28 RX ADMIN — EPHEDRINE SULFATE 5 MG: 50 INJECTION, SOLUTION INTRAVENOUS at 10:22

## 2020-02-28 RX ADMIN — SENNOSIDES AND DOCUSATE SODIUM 2 TABLET: 8.6; 5 TABLET ORAL at 22:52

## 2020-02-28 RX ADMIN — PHENYLEPHRINE HYDROCHLORIDE 100 MCG: 10 INJECTION INTRAVENOUS at 11:10

## 2020-02-28 RX ADMIN — MIDAZOLAM 1 MG: 1 INJECTION INTRAMUSCULAR; INTRAVENOUS at 10:04

## 2020-02-28 RX ADMIN — HEPARIN SODIUM 2000 UNITS: 1000 INJECTION INTRAVENOUS; SUBCUTANEOUS at 13:12

## 2020-02-28 RX ADMIN — FENTANYL CITRATE 50 MCG: 50 INJECTION, SOLUTION INTRAMUSCULAR; INTRAVENOUS at 17:19

## 2020-02-28 RX ADMIN — FUROSEMIDE 100 MG: 10 INJECTION, SOLUTION INTRAVENOUS at 13:45

## 2020-02-28 RX ADMIN — ESMOLOL HYDROCHLORIDE 20 MG: 10 INJECTION, SOLUTION INTRAVENOUS at 16:33

## 2020-02-28 RX ADMIN — FENTANYL CITRATE 25 MCG: 50 INJECTION, SOLUTION INTRAMUSCULAR; INTRAVENOUS at 17:50

## 2020-02-28 RX ADMIN — HYDROMORPHONE HYDROCHLORIDE 0.5 MG: 1 INJECTION, SOLUTION INTRAMUSCULAR; INTRAVENOUS; SUBCUTANEOUS at 18:36

## 2020-02-28 RX ADMIN — ROCURONIUM BROMIDE 50 MG: 10 INJECTION INTRAVENOUS at 10:15

## 2020-02-28 RX ADMIN — PHENYLEPHRINE HYDROCHLORIDE 100 MCG: 10 INJECTION INTRAVENOUS at 10:21

## 2020-02-28 RX ADMIN — MYCOPHENOLATE MOFETIL 1000 MG: 500 INJECTION, POWDER, LYOPHILIZED, FOR SOLUTION INTRAVENOUS at 12:00

## 2020-02-28 RX ADMIN — ONDANSETRON 4 MG: 2 INJECTION INTRAMUSCULAR; INTRAVENOUS at 16:26

## 2020-02-28 RX ADMIN — Medication: at 18:08

## 2020-02-28 RX ADMIN — PHENYLEPHRINE HYDROCHLORIDE 100 MCG: 10 INJECTION INTRAVENOUS at 10:39

## 2020-02-28 RX ADMIN — ALBUMIN HUMAN: 0.05 INJECTION, SOLUTION INTRAVENOUS at 13:29

## 2020-02-28 RX ADMIN — ROCURONIUM BROMIDE 50 MG: 10 INJECTION INTRAVENOUS at 12:10

## 2020-02-28 RX ADMIN — FENTANYL CITRATE 50 MCG: 50 INJECTION, SOLUTION INTRAMUSCULAR; INTRAVENOUS at 18:00

## 2020-02-28 RX ADMIN — PROPOFOL 50 MG: 10 INJECTION, EMULSION INTRAVENOUS at 10:15

## 2020-02-28 RX ADMIN — MANNITOL 25 G: 20 INJECTION, SOLUTION INTRAVENOUS at 13:46

## 2020-02-28 RX ADMIN — FENTANYL CITRATE 50 MCG: 50 INJECTION, SOLUTION INTRAMUSCULAR; INTRAVENOUS at 11:34

## 2020-02-28 RX ADMIN — DEXTROSE AND SODIUM CHLORIDE: 5; 900 INJECTION, SOLUTION INTRAVENOUS at 18:19

## 2020-02-28 RX ADMIN — MYCOPHENOLATE MOFETIL 750 MG: 250 CAPSULE ORAL at 22:52

## 2020-02-28 RX ADMIN — HYDROMORPHONE HYDROCHLORIDE 0.4 MG: 1 INJECTION, SOLUTION INTRAMUSCULAR; INTRAVENOUS; SUBCUTANEOUS at 17:57

## 2020-02-28 RX ADMIN — EPHEDRINE SULFATE 5 MG: 50 INJECTION, SOLUTION INTRAVENOUS at 10:21

## 2020-02-28 RX ADMIN — FENTANYL CITRATE 50 MCG: 50 INJECTION, SOLUTION INTRAMUSCULAR; INTRAVENOUS at 14:22

## 2020-02-28 RX ADMIN — FENTANYL CITRATE 50 MCG: 50 INJECTION, SOLUTION INTRAMUSCULAR; INTRAVENOUS at 14:20

## 2020-02-28 RX ADMIN — ROCURONIUM BROMIDE 50 MG: 10 INJECTION INTRAVENOUS at 11:48

## 2020-02-28 RX ADMIN — FENTANYL CITRATE 50 MCG: 50 INJECTION, SOLUTION INTRAMUSCULAR; INTRAVENOUS at 16:05

## 2020-02-28 RX ADMIN — HEPARIN SODIUM 150 MG: 1000 INJECTION INTRAVENOUS; SUBCUTANEOUS at 12:33

## 2020-02-28 RX ADMIN — ROCURONIUM BROMIDE 50 MG: 10 INJECTION INTRAVENOUS at 11:04

## 2020-02-28 RX ADMIN — CALCIUM CHLORIDE 100 MG: 100 INJECTION, SOLUTION INTRAVENOUS at 14:03

## 2020-02-28 RX ADMIN — ALBUMIN HUMAN: 0.05 INJECTION, SOLUTION INTRAVENOUS at 14:01

## 2020-02-28 RX ADMIN — FENTANYL CITRATE 100 MCG: 50 INJECTION, SOLUTION INTRAMUSCULAR; INTRAVENOUS at 12:11

## 2020-02-28 RX ADMIN — ALBUMIN HUMAN: 0.05 INJECTION, SOLUTION INTRAVENOUS at 13:38

## 2020-02-28 RX ADMIN — FENTANYL CITRATE 100 MCG: 50 INJECTION, SOLUTION INTRAMUSCULAR; INTRAVENOUS at 10:18

## 2020-02-28 RX ADMIN — ALBUMIN HUMAN: 0.05 INJECTION, SOLUTION INTRAVENOUS at 11:44

## 2020-02-28 RX ADMIN — ESMOLOL HYDROCHLORIDE 20 MG: 10 INJECTION, SOLUTION INTRAVENOUS at 16:54

## 2020-02-28 RX ADMIN — MIDAZOLAM 1 MG: 1 INJECTION INTRAMUSCULAR; INTRAVENOUS at 10:05

## 2020-02-28 RX ADMIN — LEVOFLOXACIN 500 MG: 5 INJECTION, SOLUTION INTRAVENOUS at 10:51

## 2020-02-28 RX ADMIN — HYDROMORPHONE HYDROCHLORIDE 0.3 MG: 1 INJECTION, SOLUTION INTRAMUSCULAR; INTRAVENOUS; SUBCUTANEOUS at 17:42

## 2020-02-28 RX ADMIN — FENTANYL CITRATE 50 MCG: 50 INJECTION, SOLUTION INTRAMUSCULAR; INTRAVENOUS at 16:57

## 2020-02-28 RX ADMIN — FUROSEMIDE 100 MG: 10 INJECTION, SOLUTION INTRAVENOUS at 14:36

## 2020-02-28 RX ADMIN — ESMOLOL HYDROCHLORIDE 20 MG: 10 INJECTION, SOLUTION INTRAVENOUS at 12:13

## 2020-02-28 RX ADMIN — FENTANYL CITRATE 100 MCG: 50 INJECTION, SOLUTION INTRAMUSCULAR; INTRAVENOUS at 10:15

## 2020-02-28 RX ADMIN — EPHEDRINE SULFATE 5 MG: 50 INJECTION, SOLUTION INTRAVENOUS at 11:43

## 2020-02-28 RX ADMIN — CALCIUM CHLORIDE 100 MG: 100 INJECTION, SOLUTION INTRAVENOUS at 14:06

## 2020-02-28 RX ADMIN — CALCIUM CHLORIDE 600 MG: 100 INJECTION, SOLUTION INTRAVENOUS at 15:25

## 2020-02-28 RX ADMIN — PROPOFOL 50 MG: 10 INJECTION, EMULSION INTRAVENOUS at 10:16

## 2020-02-28 RX ADMIN — ONDANSETRON 4 MG: 2 INJECTION INTRAMUSCULAR; INTRAVENOUS at 18:56

## 2020-02-28 RX ADMIN — CALCIUM CHLORIDE 200 MG: 100 INJECTION, SOLUTION INTRAVENOUS at 13:59

## 2020-02-28 RX ADMIN — SODIUM CHLORIDE, SODIUM GLUCONATE, SODIUM ACETATE, POTASSIUM CHLORIDE AND MAGNESIUM CHLORIDE: 526; 502; 368; 37; 30 INJECTION, SOLUTION INTRAVENOUS at 10:03

## 2020-02-28 RX ADMIN — FENTANYL CITRATE 150 MCG: 50 INJECTION, SOLUTION INTRAMUSCULAR; INTRAVENOUS at 12:14

## 2020-02-28 RX ADMIN — PROPOFOL 170 MG: 10 INJECTION, EMULSION INTRAVENOUS at 10:17

## 2020-02-28 RX ADMIN — PHENYLEPHRINE HYDROCHLORIDE 0.3 MCG/KG/MIN: 10 INJECTION INTRAVENOUS at 11:42

## 2020-02-28 RX ADMIN — ACETAMINOPHEN 975 MG: 325 TABLET, FILM COATED ORAL at 08:37

## 2020-02-28 RX ADMIN — PHENYLEPHRINE HYDROCHLORIDE 100 MCG: 10 INJECTION INTRAVENOUS at 10:51

## 2020-02-28 ASSESSMENT — ACTIVITIES OF DAILY LIVING (ADL)
AMBULATION: 0-->INDEPENDENT
TOILETING: 0-->INDEPENDENT
FALL_HISTORY_WITHIN_LAST_SIX_MONTHS: NO
SWALLOWING: 0-->SWALLOWS FOODS/LIQUIDS WITHOUT DIFFICULTY
TRANSFERRING: 0-->INDEPENDENT
DRESS: 0-->INDEPENDENT
RETIRED_EATING: 0-->INDEPENDENT
COGNITION: 0 - NO COGNITION ISSUES REPORTED
BATHING: 0-->INDEPENDENT
RETIRED_COMMUNICATION: 0-->UNDERSTANDS/COMMUNICATES WITHOUT DIFFICULTY

## 2020-02-28 ASSESSMENT — MIFFLIN-ST. JEOR: SCORE: 1487

## 2020-02-28 ASSESSMENT — LIFESTYLE VARIABLES: TOBACCO_USE: 1

## 2020-02-28 NOTE — LETTER
Transition Communication Hand-off for Care Transitions to Next Level of Care Provider    Name: Jeremiah Cruz  : 1975  MRN #: [id number]  Primary Care Provider: Jimmie Michele     Primary Clinic: [facility] [address] [facility] [address]     Reason for Hospitalization:  Getting kidney transplant  Kidney transplant recipient  Kidney replaced by transplant  Admit Date/Time: 2020  2:11 AM  Discharge Date: 3.10  Payor Source: Payor: MEDICARE / Plan: MEDICARE / Product Type: Medicare /              Reason for Communication Hand-off Referral: Other KT #3    Discharge Plan: [facility] and ATC  +++Need to figure out PICC line--[facility] is aware--will he have [facility]????or just go to PCP clinic for labs/PICC dressing changes???       Concern for non-adherence with plan of care:   Y/N YES!!!!!  He doesn't know what Tacrolimus/Prograf is after his 3rd KT!!!!  He does NOT have a caregiver to stay with him while at hot  Discharge Needs Assessment:  Needs      Most Recent Value   Home Infusion Provider  [facility] [phone], Fax: [phone]          Follow-up specialty is recommended: Yes    Follow-up plan:    Future Appointments   Date Time Provider Department Center   3/11/2020  7:00 AM [facility]   3/11/2020  8:00 AM [facility]kinson [facility]   3/12/2020  7:00 AM [facility] SPEC INFUSION [facility]   3/12/2020  8:00 AM Nehemiah Wilkinson MD [facility]   3/17/2020 11:45 AM Carol Samuel MD [facility]   3/31/2020 10:45 AM UC LAB [facility]   3/31/2020 11:30 AM Transplant, Uc Early Post [facility]   2020 10:00 AM Rebeca Lema NP [facility]   2020  8:45 AM UC LAB [facility]   2020  9:30 AM Transplant, Uc Early Post [facility]   2020 10:30 AM [facility]   2020  8:30 AM Rosario Shane MSW TXO [facility]   2020 10:45 AM  LAB UCLAB [facility]   2020 11:30  AM Transplant, Uc Early Post [email or web address]   8/28/2020 10:45 AM UC LAB UCLAB [facility] [[email or web address]   8/28/2020 11:30 AM [email or web address]       Any outstanding tests or procedures:        Referrals     Future Labs/Procedures    Home infusion referral     Comments:    Your provider has referred you to: [facility]: [facility] [place] [phone]   [email or web address]  PICC line    Local Address (if different from home address): Other (specify full address and phone number): [place] [address]--pt will be coming to the [facility] clinic([phone]), so they can care for PICC while there--will need to f/u with pt for his HOME plan---he prefers [facility] [phone]    Anticipated Length of Therapy: needs for at least 1 month--very hard stick for lab draws    Home Infusion Pharmacist to adjust therapy based on labs and clinical assessments: No (Home Infusion will call for order)    Labs:  May draw labs from Venous Catheter: Yes  Home Infusion Pharmacist to order labs based on therapy type and clinical assessments: No   Labs to be drawn: Outpatient Care Coordinator    Call/Fax Lab Results to: Jody Raquel  Ph: [phone]  Fax: [phone]    Agency Staff to assess nursing needs for Infusion Therapy.    Access Device Management:  IV Access Type: PICC 3/5   Flush with Heparin and Normal Saline IVP PRN and routine site care (per agency protocol) to maintain access device? Yes            Key Recommendations:      Eli Garcia RN    AVS/Discharge Summary is the source of truth; this is a helpful guide for improved communication of patient story

## 2020-02-28 NOTE — TELEPHONE ENCOUNTER
"TRANSPLANT OR REPORT    Organ: Kidney  Laterality (if known): LEFT  Organ Location: Import    UNOS ID: [id number]  Donor OR Time: 1600  Actual Cross Clamp Time: 1806  Expected Organ Arrival Time: 0930    Surgeon: Jimmie  Time in OR: 0930  Time in 3C (N/A for LI): 0830    Recipient Details  Admission ETA: Admitted  Unit: 7A  Isolation: No  Latex Allergy: No  : No  Diagnosis: ESKD    Liver Transplants  Bypass: N/A  Hemodialysis: N/A  ~ \"RENAL STAFF TEACHING SERVICE MEDICINE\" : N/A  ~ CRRT Resource Nurse: N/A  (Telephone Number for CRRT [phone])    Kidney/Panc Transplants  XM Status (Need to wait for XM?): Yes    Liver or KP/PA Recipients:  Can Vessels be Banked: N/A      Transplant Coordinator Contact Info:   Before 0700 Yolanda   After 0700 Sarah       Vessel Bank Information  Transplant hospitals must not store a donor s extra vessels if the donor has tested positive for any of the following:   - HIV by antibody, antigen, or nucleic acid test (SOL)   - Hepatitis B surface antigen (HBsAg)   - Hepatitis B (HBV) by SOL   - Hepatitis C (HCV) by antibody or SOL     Extra vessels from donors that do not test positive for HIV, HBV, or HCV as above may be stored      "

## 2020-02-28 NOTE — PLAN OF CARE
"7963 - 4292  ADMIT:  Reason for Admit: Pre-op kidney  Admitted from home. Belongings kept with patient.    Admit Documentation:   PCS complete  Adult Profile complete  Med Rec complete    Plan of Care: TBD    BP (!) 142/96 (BP Location: Left arm)   Pulse 70   Temp 98.8  F (37.1  C) (Oral)   Resp 16   Ht 1.626 m (5' 4\")   Wt 68.1 kg (150 lb 2.1 oz)   SpO2 100%   BMI 25.77 kg/m      VSS on RA  Labs: Unable to collect UA - pt anuric  Pain/Nausea: Denied  Diet: NPO  LDA: R & L fistula. L PIV saline locked  GI: No BM overnight  : Anuric - pt on hemodialysis  Skin: intact  Mobility: up ad dick  Plan: tentative OR scheduled for 1000    "

## 2020-02-28 NOTE — H&P
[facility]    Transplant Surgery  History and Physical    Jeremiah Cruz  : 1975  MRN # [id number]    ADMIT DATE: 2020    PCP: Aaron Michele    CHIEF COMPLAINT: Kidney Transplant Recipent    HPI: Jeremiah Cruz is a 44 year old male w/PMH of ESRD 2/2 Alport's Disease, HTN and CML who is presenting for Kidney Transplantation. He has undergone two prior Kidney Transplantations, the first in  and the second in . The first failed after 11 years and the second was complicated by primary nonfunction with explant a few weeks later. He was on dialysis starting  and has remained on dialysis since. He dialyzes through his R forearm. He tolerates dialysis well.     Over the past couple weeks to months he reports being in his usual state of health. He has noticed some new concerns with his L eye and has made an appointment with an eye specialist. He denies new symptoms related to fever, chills, nausea, vomiting, chest pain or shortness of breath. He is anuric at baseline but has not had recent change with food/fluid intake. He denies diarrhea or changes to bowel function.     ROS:   Review of Systems was otherwise negative     PMH:  Past Medical History:   Diagnosis Date     Alport's syndrome      Anemia in chronic kidney disease      CML (chronic myelocytic leukemia) (H)      Dialysis care      End stage kidney disease (H)      Former tobacco use      GERD (gastroesophageal reflux disease)      Hypertension      Kidney transplant rejection      S/p nephrectomy 11     Secondary renal hyperparathyroidism (H)      Thrombocytopenia (H)      Tobacco dependence        PSH:  Past Surgical History:   Procedure Laterality Date     APPENDECTOMY       CREATE FISTULA ARTERIOVENOUS UPPER EXTREMITY       HERNIA REPAIR, INCISIONAL       INSERT CATHETER PERITONEAL DIALYSIS       NEPHRECTOMY Bilateral     native     NEPHRECTOMY      transplant     NEPHRECTOMY       transplant     PARATHYROIDECTOMY       REMOVE CATHETER PERITONEAL       svc angioplasty         MEDICATIONS:  Prior to Admission Medications   Prescriptions Last Dose Informant Patient Reported? Taking?   AMLODIPINE BESYLATE PO   Yes No   Sig: Take 10 mg by mouth daily   B complex-C-folic acid (NEPHROCAPS/TRIPHROCAPS) 1 MG capsule   Yes No   Sig: Take 1 capsule by mouth daily   CLONIDINE HCL PO   Yes No   Sig: Take 0.1 mg by mouth 2 times daily    RANITIDINE HCL PO   Yes No   Sig: Take 150 mg by mouth daily    chlorhexidine (HIBICLENS) 4 % liquid   No No   Sig: Cleanse scalp twice a week as directed.   clindamycin (CLEOCIN T) 1 % external solution   No No   Sig: To the scalp   dasatinib (SPRYCEL) 20 MG tablet CHEMO   Yes No   Sig: Take 20 mg by mouth 2 times daily    doxycycline monohydrate (MONODOX) 100 MG capsule   No No   Sig: Take 1 capsule (100 mg) by mouth 2 times daily   doxycycline monohydrate (MONODOX) 50 MG capsule   No No   Sig: Take 1 capsule (50 mg) by mouth 2 times daily   ketoconazole (NIZORAL) 2 % external shampoo   No No   Sig: APPLY TO THE SCALP AND WASH OFF ATER 5 MINUTES EVERY OTHER DAY   sevelamer (RENVELA) 800 MG tablet   Yes No   Sig: Take 800 mg by mouth 2 times daily (with meals) 1 tab with snacks      Facility-Administered Medications: None        ALLERGIES:     Allergies   Allergen Reactions     Cephalosporins Other (See Comments) and Rash     fever     Compazine [Prochlorperazine]      Gammagard [Immune Globulin] Other (See Comments)     Ed got spinal meningitis and MD stated to never get again for fear of death.       Penicillins      Vancomycin        FAMILY HISTORY:  Family History   Problem Relation Age of Onset     Bone Cancer Brother      Melanoma No family hx of      Skin Cancer No family hx of        SOCIAL HISTORY:  Social History     Socioeconomic History     Marital status: Single     Spouse name: Not on file     Number of children: Not on file     Years of education: Not on  "file     Highest education level: Not on file   Occupational History     Not on file   Social Needs     Financial resource strain: Not on file     Food insecurity:     Worry: Not on file     Inability: Not on file     Transportation needs:     Medical: Not on file     Non-medical: Not on file   Tobacco Use     Smoking status: Former Smoker     Packs/day: 1.00     Years: 12.00     Pack years: 12.00     Types: Cigarettes     Last attempt to quit: 2011     Years since quittin.0     Smokeless tobacco: Never Used   Substance and Sexual Activity     Alcohol use: No     Alcohol/week: 0.0 standard drinks     Drug use: No     Sexual activity: Not on file   Lifestyle     Physical activity:     Days per week: Not on file     Minutes per session: Not on file     Stress: Not on file   Relationships     Social connections:     Talks on phone: Not on file     Gets together: Not on file     Attends Anabaptism service: Not on file     Active member of club or organization: Not on file     Attends meetings of clubs or organizations: Not on file     Relationship status: Not on file     Intimate partner violence:     Fear of current or ex partner: Not on file     Emotionally abused: Not on file     Physically abused: Not on file     Forced sexual activity: Not on file   Other Topics Concern     Parent/sibling w/ CABG, MI or angioplasty before 65F 55M? Not Asked   Social History Narrative     Not on file       PHYSICAL EXAM:  Blood pressure (!) 142/96, pulse 70, temperature 98.8  F (37.1  C), temperature source Oral, resp. rate 16, height 1.626 m (5' 4\"), weight 68.1 kg (150 lb 2.1 oz), SpO2 100 %.  GENERAL: A&Ox3. Sitting up at edge of bed.   HEENT: Scleral Icterus. Pupils equal and reactive to light  NECK: Supple and without lymphadenopathy.  CV: RRR, S1S2, has a grade 3/6 systolic murmur   RESPIRATORY: Clear to auscultation bilaterally, no rales or ronchi  GI: Soft and non distended with normoactive bowel sounds present in " all quadrants. No signs of peritonitis  EXTREMITIES: No peripheral edema. 2+ bilateral pedal pulses. AVF in R forearm. Prior AVF site in L arm.   NEUROLOGIC: Alert and orientated x 3. CN II-XII grossly intact. No focal deficits.   MUSCULOSKELETAL: No joint swelling or tenderness.   SKIN: No jaundice. No rashes or lesions.     LABS:  CBC:  Recent Labs   Lab Test 01/26/17  0647   WBC 6.7   RBC 3.91*   HGB 11.8*   HCT 36.6*   MCV 94   MCH 30.2   MCHC 32.2   RDW 12.8          CMP:  Recent Labs   Lab Test 01/26/17  0647      POTASSIUM 5.0   CHLORIDE 100   ANTWAN 8.8   CO2 29   BUN 39*   CR 9.03*   GLC 92   AST 25   ALT 34   BILITOTAL 0.4   ALBUMIN 3.7   PROTTOTAL 7.0   ALKPHOS 59       IMAGING:  CXR:  Pending    CT Non-Cont A/P: Pending        ASSESSMENT & PLAN:  Jeremiah Cruz is a 44 year old M w/PMH of ESRD 2/2 Alport's Syndrome who is presenting for Kidney Transplantation. Has had two prior kidney transplant, one failing after 11 years and the second being explanted weeks following placement. He continues to dialyze and is anuric. No recent illnesses and remains hemodynamically stable.    Plan:  - STAT Labs  - HLA Final Crossmatch  - CT Non-Contrast Abd/Pelvis  - Chest XR   - Admit to Outpatient        Sancho Connelly MD  General Surgery Resident

## 2020-02-28 NOTE — ANESTHESIA PROCEDURE NOTES
Arterial Line Procedure Note  Staff:     Anesthesiologist:  Abelino Plummer MD    Resident/CRNA:  Darius Aleman MD    Arterial line performed by resident/CRNA in presence of a teaching physician    Location: In OR After Induction  Procedure Start/Stop Times:     patient identified, IV checked, site marked, risks and benefits discussed, informed consent, monitors and equipment checked, pre-op evaluation and at physician/surgeon's request      Correct Patient: Yes      Correct Position: Yes      Correct Site: Yes      Correct Procedure: Yes      Correct Laterality:  Yes    Site Marked:  Yes  Line Placement:     Procedure:  Arterial Line    Insertion Site:  Radial    Insertion laterality:  Left    Skin Prep: Chloraprep      Patient Prep: patient draped, mask, sterile gloves, hat and hand hygiene      Local skin infiltration:  None    Ultrasound Guided?: Yes      Artery evaluated via ultrasound confirming patency.   Using realtime imaging, the artery was punctured and the needle was observed entering the artery.      A permanent image is NOT entered into the patient's record.      Catheter size:  20 gauge, Quick cath    Cath secured with: suture      Dressing:  Tegaderm    Complications:  None obvious    Arterial waveform: Yes      IBP within 10% of NIBP: Yes

## 2020-02-28 NOTE — ANESTHESIA PREPROCEDURE EVALUATION
"Anesthesia Pre-Procedure Evaluation    Patient: Jeremiah Cruz   MRN:     [id number] Gender:   male   Age:    44 year old :      1975        Preoperative Diagnosis: End-stage renal disease (ESRD) (H) [N18.6]   Procedure(s):  TRANSPLANT, KIDNEY, RECIPIENT,  DONOR     LABS:  CBC:   Lab Results   Component Value Date    WBC 9.7 2020    WBC 6.7 2017    HGB 11.8 (L) 2020    HGB 11.8 (L) 2017    HCT 37.4 (L) 2020    HCT 36.6 (L) 2017     2020     2017     BMP:   Lab Results   Component Value Date     2020     2017    POTASSIUM 5.0 2020    POTASSIUM 5.0 2017    CHLORIDE 102 2020    CHLORIDE 100 2017    CO2 22 2020    CO2 29 2017    BUN 65 (H) 2020    BUN 39 (H) 2017    CR 11.90 (H) 2020    CR 9.03 (H) 2017    GLC 92 2020    GLC 92 2017     COAGS:   Lab Results   Component Value Date    PTT 30 2020    INR 1.11 2020     POC: No results found for: BGM, HCG, HCGS  OTHER:   Lab Results   Component Value Date    A1C 5.2 2020    ANWTAN 7.7 (L) 2020    PHOS 6.7 (H) 2005    MAG 1.8 2005    ALBUMIN 4.0 2020    PROTTOTAL 7.9 2020    ALT 48 2020    AST 30 2020    ALKPHOS 108 2020    BILITOTAL 0.6 2020        Preop Vitals    BP Readings from Last 3 Encounters:   20 (!) 142/96   19 163/89   19 (!) 173/95    Pulse Readings from Last 3 Encounters:   20 70   19 69   19 66      Resp Readings from Last 3 Encounters:   20 16   19 18   17 16    SpO2 Readings from Last 3 Encounters:   20 100%   19 99%   17 97%      Temp Readings from Last 1 Encounters:   20 37.1  C (98.8  F) (Oral)    Ht Readings from Last 1 Encounters:   20 1.626 m (5' 4\")      Wt Readings from Last 1 Encounters:   20 68.1 kg (150 lb 2.1 oz)    " "Estimated body mass index is 25.77 kg/m  as calculated from the following:    Height as of this encounter: 1.626 m (5' 4\").    Weight as of this encounter: 68.1 kg (150 lb 2.1 oz).     LDA:  Peripheral IV 02/28/20 Left;Medial Lower forearm (Active)   Site Assessment WDL 2/28/2020  3:29 AM   Line Status Saline locked 2/28/2020  3:29 AM   Number of days: 0       Hemodialysis Vascular Access Arteriovenous fistula Right Forearm (Active)   Site Assessment WDL 2/28/2020  3:00 AM   Number of days:        Hemodialysis Vascular Access Arteriovenous fistula Left Forearm (Active)   Site Assessment WDL except;Bruit not present;Thrill not present 2/28/2020  3:00 AM   Number of days:         Past Medical History:   Diagnosis Date     Alport's syndrome      Anemia in chronic kidney disease      CML (chronic myelocytic leukemia) (H)      Dialysis care      End stage kidney disease (H)      Former tobacco use      GERD (gastroesophageal reflux disease)      Hypertension      Kidney transplant rejection      S/p nephrectomy 12/7/11     Secondary renal hyperparathyroidism (H)      Thrombocytopenia (H)      Tobacco dependence       Past Surgical History:   Procedure Laterality Date     APPENDECTOMY       CREATE FISTULA ARTERIOVENOUS UPPER EXTREMITY       HERNIA REPAIR, INCISIONAL       INSERT CATHETER PERITONEAL DIALYSIS       NEPHRECTOMY Bilateral     native     NEPHRECTOMY      transplant     NEPHRECTOMY  2005    transplant     PARATHYROIDECTOMY       REMOVE CATHETER PERITONEAL       svc angioplasty        Allergies   Allergen Reactions     Cephalosporins Other (See Comments) and Rash     fever     Compazine [Prochlorperazine]      Gammagard [Immune Globulin] Other (See Comments)     Ed got spinal meningitis and MD stated to never get again for fear of death.       Penicillins      Vancomycin         Anesthesia Evaluation     . Pt has had prior anesthetic. Type: General           ROS/MED HX    ENT/Pulmonary:     (+)tobacco use, Past " use , . .    Neurologic:     (+)other neuro Alport's Syndrome    Cardiovascular:     (+) hypertension----. : . . . :. .       METS/Exercise Tolerance:     Hematologic:     (+) Anemia, -      Musculoskeletal:         GI/Hepatic:     (+) GERD       Renal/Genitourinary:     (+) chronic renal disease, type: ESRD, Pt requires dialysis, Pt has history of transplant, date: rejected,       Endo:     (+) Other Endocrine Disorder hyperparathyroidism.      Psychiatric:  - neg psychiatric ROS       Infectious Disease:  - neg infectious disease ROS       Malignancy:   (+) Malignancy History of Lymphoma/Leukemia  Lymph CA Remission status post,         Other:                         PHYSICAL EXAM:   Mental Status/Neuro: A/A/O   Airway: Facies: Feasible  Mallampati: I  Mouth/Opening: Full  TM distance: > 6 cm  Neck ROM: Full   Respiratory: Auscultation: CTAB     Resp. Rate: Normal     Resp. Effort: Normal      CV: Rhythm: Regular  Rate: Age appropriate  Heart: Normal Sounds  Edema: None   Comments: Poor dentition. Multiple missing teeth.     Dental: Details                Assessment:   ASA SCORE: 3       NPO Status: NPO Appropriate     Plan:   Anes. Type:  General   Pre-Medication: None   Induction:  IV (Standard)   Airway: ETT; Oral   Access/Monitoring: PIV; CVL; 2nd PIV   Maintenance: Balanced     Postop Plan:   Postop Pain: Opioids  Postop Sedation/Airway: Not planned     PONV Management:   Adult Risk Factors:, Postop Opioids   Prevention: Ondansetron, Dexamethasone     CONSENT: Direct conversation   Plan and risks discussed with: Patient                      Abelino Plummer MD

## 2020-02-28 NOTE — PLAN OF CARE
Pt. Took a pre-op shower.  Discussed transplant procedure with  at bedside.  Transported to pre-op area via wheelchair.  Has one hearing aid in place; the second hearing aid is in a specimen cup in the pt.'s red backpack and is labeled.  Belongings remain on Unit 7A at present.  Pt. Asked that his chart be updated to include his friend, Jere Samuels as a contact who may have information regarding his care and status.  's # is [phone]

## 2020-02-28 NOTE — ANESTHESIA CARE TRANSFER NOTE
Patient: Jeremiah Cruz    Procedure(s):  TRANSPLANT, KIDNEY, RECIPIENT,  DONOR, with ureteral stent placement    Diagnosis: End-stage renal disease (ESRD) (H) [N18.6]  Diagnosis Additional Information: No value filed.    Anesthesia Type:   General     Note:  Airway :Face Mask  Patient transferred to:PACU  Handoff Report: Identifed the Patient, Identified the Reponsible Provider, Reviewed the pertinent medical history, Discussed the surgical course, Reviewed Intra-OP anesthesia mangement and issues during anesthesia, Set expectations for post-procedure period and Allowed opportunity for questions and acknowledgement of understanding      Vitals: (Last set prior to Anesthesia Care Transfer)    CRNA VITALS  2020 1643 - 2020 1723      2020             Resp Rate (observed):  (!) 7                Electronically Signed By: YAYO Moon CRNA  2020  5:23 PM

## 2020-02-29 ENCOUNTER — APPOINTMENT (OUTPATIENT)
Dept: GENERAL RADIOLOGY | Facility: CLINIC | Age: 45
DRG: 652 | End: 2020-02-29
Attending: SURGERY
Payer: MEDICARE

## 2020-02-29 ENCOUNTER — APPOINTMENT (OUTPATIENT)
Dept: ULTRASOUND IMAGING | Facility: CLINIC | Age: 45
DRG: 652 | End: 2020-02-29
Attending: SURGERY
Payer: MEDICARE

## 2020-02-29 LAB
ANION GAP SERPL CALCULATED.3IONS-SCNC: 16 MMOL/L (ref 3–14)
BASOPHILS # BLD AUTO: 0 10E9/L (ref 0–0.2)
BASOPHILS NFR BLD AUTO: 0.1 %
BUN SERPL-MCNC: 71 MG/DL (ref 7–30)
CALCIUM SERPL-MCNC: 6.8 MG/DL (ref 8.5–10.1)
CHLORIDE SERPL-SCNC: 102 MMOL/L (ref 94–109)
CO2 SERPL-SCNC: 18 MMOL/L (ref 20–32)
CREAT SERPL-MCNC: 12.6 MG/DL (ref 0.66–1.25)
DIFFERENTIAL METHOD BLD: ABNORMAL
EOSINOPHIL # BLD AUTO: 0 10E9/L (ref 0–0.7)
EOSINOPHIL NFR BLD AUTO: 0.1 %
ERYTHROCYTE [DISTWIDTH] IN BLOOD BY AUTOMATED COUNT: 14.2 % (ref 10–15)
GFR SERPL CREATININE-BSD FRML MDRD: 4 ML/MIN/{1.73_M2}
GLUCOSE SERPL-MCNC: 213 MG/DL (ref 70–99)
HCT VFR BLD AUTO: 30.6 % (ref 40–53)
HGB BLD-MCNC: 9.2 G/DL (ref 13.3–17.7)
HGB BLD-MCNC: 9.5 G/DL (ref 13.3–17.7)
HGB BLD-MCNC: 9.7 G/DL (ref 13.3–17.7)
IMM GRANULOCYTES # BLD: 0.3 10E9/L (ref 0–0.4)
IMM GRANULOCYTES NFR BLD: 1.6 %
LYMPHOCYTES # BLD AUTO: 0.1 10E9/L (ref 0.8–5.3)
LYMPHOCYTES NFR BLD AUTO: 0.3 %
MAGNESIUM SERPL-MCNC: 2 MG/DL (ref 1.6–2.3)
MCH RBC QN AUTO: 29.8 PG (ref 26.5–33)
MCHC RBC AUTO-ENTMCNC: 31 G/DL (ref 31.5–36.5)
MCV RBC AUTO: 96 FL (ref 78–100)
MONOCYTES # BLD AUTO: 0.4 10E9/L (ref 0–1.3)
MONOCYTES NFR BLD AUTO: 2.4 %
NEUTROPHILS # BLD AUTO: 17.4 10E9/L (ref 1.6–8.3)
NEUTROPHILS NFR BLD AUTO: 95.5 %
NRBC # BLD AUTO: 0 10*3/UL
NRBC BLD AUTO-RTO: 0 /100
PHOSPHATE SERPL-MCNC: 8.5 MG/DL (ref 2.5–4.5)
PLATELET # BLD AUTO: 132 10E9/L (ref 150–450)
POTASSIUM SERPL-SCNC: 4.6 MMOL/L (ref 3.4–5.3)
POTASSIUM SERPL-SCNC: 4.6 MMOL/L (ref 3.4–5.3)
POTASSIUM SERPL-SCNC: 6.6 MMOL/L (ref 3.4–5.3)
RBC # BLD AUTO: 3.19 10E12/L (ref 4.4–5.9)
SODIUM SERPL-SCNC: 136 MMOL/L (ref 133–144)
WBC # BLD AUTO: 18.2 10E9/L (ref 4–11)

## 2020-02-29 PROCEDURE — 25800030 ZZH RX IP 258 OP 636: Performed by: SURGERY

## 2020-02-29 PROCEDURE — 40000556 ZZH STATISTIC PERIPHERAL IV START W US GUIDANCE

## 2020-02-29 PROCEDURE — 90937 HEMODIALYSIS REPEATED EVAL: CPT

## 2020-02-29 PROCEDURE — 25000131 ZZH RX MED GY IP 250 OP 636 PS 637: Mod: GY | Performed by: SURGERY

## 2020-02-29 PROCEDURE — 85018 HEMOGLOBIN: CPT | Performed by: SURGERY

## 2020-02-29 PROCEDURE — 83735 ASSAY OF MAGNESIUM: CPT | Performed by: SURGERY

## 2020-02-29 PROCEDURE — 40000985 XR KUB

## 2020-02-29 PROCEDURE — 84132 ASSAY OF SERUM POTASSIUM: CPT | Performed by: SURGERY

## 2020-02-29 PROCEDURE — 80048 BASIC METABOLIC PNL TOTAL CA: CPT | Performed by: SURGERY

## 2020-02-29 PROCEDURE — 93931 UPPER EXTREMITY STUDY: CPT | Mod: LT

## 2020-02-29 PROCEDURE — 25800030 ZZH RX IP 258 OP 636: Performed by: INTERNAL MEDICINE

## 2020-02-29 PROCEDURE — 25800030 ZZH RX IP 258 OP 636

## 2020-02-29 PROCEDURE — 5A1D70Z PERFORMANCE OF URINARY FILTRATION, INTERMITTENT, LESS THAN 6 HOURS PER DAY: ICD-10-PCS | Performed by: INTERNAL MEDICINE

## 2020-02-29 PROCEDURE — 25000132 ZZH RX MED GY IP 250 OP 250 PS 637: Mod: GY

## 2020-02-29 PROCEDURE — 85025 COMPLETE CBC W/AUTO DIFF WBC: CPT | Performed by: SURGERY

## 2020-02-29 PROCEDURE — 25000128 H RX IP 250 OP 636: Performed by: SURGERY

## 2020-02-29 PROCEDURE — 25000125 ZZHC RX 250: Performed by: SURGERY

## 2020-02-29 PROCEDURE — 83605 ASSAY OF LACTIC ACID: CPT | Performed by: SURGERY

## 2020-02-29 PROCEDURE — 25000128 H RX IP 250 OP 636: Performed by: NURSE PRACTITIONER

## 2020-02-29 PROCEDURE — 25000132 ZZH RX MED GY IP 250 OP 250 PS 637: Mod: GY | Performed by: SURGERY

## 2020-02-29 PROCEDURE — 25800030 ZZH RX IP 258 OP 636: Performed by: NURSE PRACTITIONER

## 2020-02-29 PROCEDURE — 12000004 ZZH R&B IMCU UMMC

## 2020-02-29 PROCEDURE — 36415 COLL VENOUS BLD VENIPUNCTURE: CPT | Performed by: SURGERY

## 2020-02-29 PROCEDURE — 84100 ASSAY OF PHOSPHORUS: CPT | Performed by: SURGERY

## 2020-02-29 PROCEDURE — 25000132 ZZH RX MED GY IP 250 OP 250 PS 637: Mod: GY | Performed by: NURSE PRACTITIONER

## 2020-02-29 RX ORDER — DIPHENHYDRAMINE HCL 12.5MG/5ML
25-50 LIQUID (ML) ORAL ONCE
Status: COMPLETED | OUTPATIENT
Start: 2020-02-29 | End: 2020-02-29

## 2020-02-29 RX ORDER — ACETAMINOPHEN 325 MG/1
650 TABLET ORAL ONCE
Status: COMPLETED | OUTPATIENT
Start: 2020-02-29 | End: 2020-02-29

## 2020-02-29 RX ORDER — ONDANSETRON 2 MG/ML
4 INJECTION INTRAMUSCULAR; INTRAVENOUS EVERY 6 HOURS PRN
Status: DISCONTINUED | OUTPATIENT
Start: 2020-02-29 | End: 2020-02-29

## 2020-02-29 RX ORDER — DIPHENHYDRAMINE HCL 25 MG
25-50 CAPSULE ORAL ONCE
Status: COMPLETED | OUTPATIENT
Start: 2020-02-29 | End: 2020-02-29

## 2020-02-29 RX ORDER — CARVEDILOL 6.25 MG/1
6.25 TABLET ORAL 2 TIMES DAILY WITH MEALS
Status: DISCONTINUED | OUTPATIENT
Start: 2020-02-29 | End: 2020-03-01

## 2020-02-29 RX ORDER — LIDOCAINE HYDROCHLORIDE 20 MG/ML
JELLY TOPICAL ONCE
Status: COMPLETED | OUTPATIENT
Start: 2020-02-29 | End: 2020-02-29

## 2020-02-29 RX ORDER — CALCIUM CARBONATE 500 MG/1
500 TABLET, CHEWABLE ORAL DAILY PRN
Status: DISCONTINUED | OUTPATIENT
Start: 2020-02-29 | End: 2020-03-03

## 2020-02-29 RX ORDER — OXYCODONE HYDROCHLORIDE 10 MG/1
10 TABLET ORAL EVERY 4 HOURS PRN
Status: DISCONTINUED | OUTPATIENT
Start: 2020-02-29 | End: 2020-02-29

## 2020-02-29 RX ORDER — OXYCODONE HYDROCHLORIDE 10 MG/1
10 TABLET ORAL ONCE
Status: COMPLETED | OUTPATIENT
Start: 2020-02-29 | End: 2020-02-29

## 2020-02-29 RX ORDER — DIPHENHYDRAMINE HCL 12.5MG/5ML
25-50 LIQUID (ML) ORAL ONCE
Status: DISCONTINUED | OUTPATIENT
Start: 2020-02-29 | End: 2020-02-29

## 2020-02-29 RX ORDER — LIDOCAINE HYDROCHLORIDE 20 MG/ML
JELLY TOPICAL
Status: DISCONTINUED
Start: 2020-02-29 | End: 2020-03-01 | Stop reason: HOSPADM

## 2020-02-29 RX ORDER — DIPHENHYDRAMINE HCL 25 MG
25-50 CAPSULE ORAL ONCE
Status: DISCONTINUED | OUTPATIENT
Start: 2020-02-29 | End: 2020-02-29

## 2020-02-29 RX ORDER — ACETAMINOPHEN 325 MG/1
650 TABLET ORAL ONCE
Status: DISCONTINUED | OUTPATIENT
Start: 2020-02-29 | End: 2020-02-29

## 2020-02-29 RX ADMIN — SODIUM CHLORIDE 500 ML: 9 INJECTION, SOLUTION INTRAVENOUS at 00:28

## 2020-02-29 RX ADMIN — ONDANSETRON 4 MG: 2 INJECTION INTRAMUSCULAR; INTRAVENOUS at 08:12

## 2020-02-29 RX ADMIN — FUROSEMIDE 20 MG/HR: 10 INJECTION, SOLUTION INTRAVENOUS at 19:31

## 2020-02-29 RX ADMIN — LIDOCAINE HYDROCHLORIDE: 20 JELLY TOPICAL at 17:27

## 2020-02-29 RX ADMIN — FAMOTIDINE 20 MG: 20 INJECTION, SOLUTION INTRAVENOUS at 16:47

## 2020-02-29 RX ADMIN — SODIUM CHLORIDE 300 ML: 9 INJECTION, SOLUTION INTRAVENOUS at 11:00

## 2020-02-29 RX ADMIN — PROMETHAZINE HYDROCHLORIDE 12.5 MG: 25 INJECTION INTRAMUSCULAR; INTRAVENOUS at 18:48

## 2020-02-29 RX ADMIN — DIPHENHYDRAMINE HYDROCHLORIDE 25 MG: 25 CAPSULE ORAL at 19:52

## 2020-02-29 RX ADMIN — PROMETHAZINE HYDROCHLORIDE 12.5 MG: 25 INJECTION INTRAMUSCULAR; INTRAVENOUS at 11:40

## 2020-02-29 RX ADMIN — FUROSEMIDE 20 MG/HR: 10 INJECTION, SOLUTION INTRAVENOUS at 14:27

## 2020-02-29 RX ADMIN — Medication: at 11:41

## 2020-02-29 RX ADMIN — MYCOPHENOLATE MOFETIL 750 MG: 250 CAPSULE ORAL at 19:36

## 2020-02-29 RX ADMIN — DEXTROSE AND SODIUM CHLORIDE: 5; 900 INJECTION, SOLUTION INTRAVENOUS at 14:27

## 2020-02-29 RX ADMIN — ACETAMINOPHEN 650 MG: 325 TABLET, FILM COATED ORAL at 19:48

## 2020-02-29 RX ADMIN — CARVEDILOL 6.25 MG: 6.25 TABLET, FILM COATED ORAL at 19:42

## 2020-02-29 RX ADMIN — ONDANSETRON 4 MG: 2 INJECTION INTRAMUSCULAR; INTRAVENOUS at 15:44

## 2020-02-29 RX ADMIN — HEPARIN SODIUM 150 MG: 1000 INJECTION INTRAVENOUS; SUBCUTANEOUS at 20:31

## 2020-02-29 RX ADMIN — TACROLIMUS 2 MG: 1 CAPSULE ORAL at 19:26

## 2020-02-29 RX ADMIN — SODIUM CHLORIDE 250 ML: 9 INJECTION, SOLUTION INTRAVENOUS at 11:00

## 2020-02-29 RX ADMIN — METHYLPREDNISOLONE SODIUM SUCCINATE 250 MG: 125 INJECTION, POWDER, LYOPHILIZED, FOR SOLUTION INTRAMUSCULAR; INTRAVENOUS at 19:22

## 2020-02-29 RX ADMIN — OXYCODONE HYDROCHLORIDE 10 MG: 10 TABLET ORAL at 02:34

## 2020-02-29 ASSESSMENT — ACTIVITIES OF DAILY LIVING (ADL)
ADLS_ACUITY_SCORE: 14
ADLS_ACUITY_SCORE: 12
ADLS_ACUITY_SCORE: 17

## 2020-02-29 ASSESSMENT — MIFFLIN-ST. JEOR: SCORE: 1546

## 2020-02-29 NOTE — PLAN OF CARE
"BP (!) 167/88   Pulse 104   Temp 98  F (36.7  C) (Oral)   Resp 9   Ht 1.626 m (5' 4\")   Wt 74 kg (163 lb 2.3 oz)   SpO2 100%   BMI 28.00 kg/m      Neuro: Drowsy, oriented x4   Cardiac: SR to ST. BPs 140-160 SBP   Respiratory: Sating >92% on RA.   GI/: Minimal pink/clear urine output via tan catheter- 7-20mL q1h. Hypoactive BS, denies passing gas. + Hiccups and abdominal distention. Awaiting NG placement by team   Diet/appetite: Clear liquid diet. Nausea and vomiting.   Activity:  Repositioning q2h in bed. Pt reports significant nausea with movement.   Pain: Dilaudid PCA at 0.3mg g12adbg. Tolerating well. Patient reports minimal pain  Skin: No new deficits noted. Incision on abdomen with drainage, marked.   LDA's: PIV x2. AV Fistula x2. R. EMMA drain     Plan: Continue with POC. Notify primary team with changes.    Pt in dialysis at this time. Plan for NG placement.     "

## 2020-02-29 NOTE — PROGRESS NOTES
"SURGERY CROSS COVER NOTE     Evaluated patient around 1AM for short episodes of apnea and low O2 sats. Able to wean patient down to 4L while maintaining saturations >98%.     Patient began to complain of severe L forearm pain. Patient has a prior AV Fistula used for dialysis in the past, now uses R AVF for dialysis. Stated pain started suddenly. Denied numbness, tingling. Two separate Peripheral IV's running into hand/forearm.      Vital signs:  Temp: 98.5  F (36.9  C) Temp src: Oral BP: 139/89 Pulse: 101 Heart Rate: 92 Resp: 15 SpO2: 94 % O2 Device: Nasal cannula Oxygen Delivery: 4 LPM Height: 162.6 cm (5' 4\") Weight: 68.1 kg (150 lb 2.1 oz)  Estimated body mass index is 25.77 kg/m  as calculated from the following:    Height as of this encounter: 1.626 m (5' 4\").    Weight as of this encounter: 68.1 kg (150 lb 2.1 oz).       Exam:   General: Patient in moderate distress d/t L forearm  Resp: Non labored breathing, intermittent cough, sats >95% with mask on and off.   Ext: Bilateral extremities cool, no ischemic changes noted.   - L forearm: Swollen, tender with palpation and active movement, Non-erythematous, not warm. Dopplerable Radial pulse.  - R forearm: non-tender, non-swollen, AVF w/thrill,      A/P:   44 year old M POD#1 from Kidney Transplantation. Developed severe sudden pain in L forearm. Arterial line placed in L forearm for surgery yesterday. Hematoma noted by previous RN who removed arterial line. Radial pulse present on doppler.     Plan:  - Removed forearm PIVs, replaced into L upper arm  - Duplex Ultrasound of L forearm  - Continue q4h K+/Hgb  - Recheck Doppler throughout night.     Sancho Connelly MD  General Surgery Resident      Update:  0421 AM    Exam: Patient able to flex and extend all digits, wrist and forearm. Pain has drastically decreased. Radial pulse present without need for Doppler. Arm is mildly swollen, has decreased significantly, no longer tense and tender.    P:   - Continue to " monitor  - Alert if signs of swelling, erythema or numbness occur

## 2020-02-29 NOTE — PROGRESS NOTES
Post-Operative Check  2/28/2020     Subjective:  No acute concerns. Patient sitting up in bed. Denies significant pain. Reports some abdominal soreness. Denies chest pain or shortness of breath. Having some nausea, no vomiting.      Objective:  Temp:  [97.9  F (36.6  C)-99.1  F (37.3  C)] 98.4  F (36.9  C)  Pulse:  [] 101  Heart Rate:  [102-124] 102  Resp:  [14-18] 18  BP: (126-152)/(76-96) 152/86  MAP:  [94 mmHg-102 mmHg] 94 mmHg  Arterial Line BP: (128-143)/(66-78) 128/66  SpO2:  [92 %-100 %] 93 %  I/O last 3 completed shifts:  In: 4000   Out: -     Gen: Awake, alert, NAD, still groggy  Resp: NLB on 3L NC, sats 98+  Abd: Soft, ND, minimal tenderness over incision. Midline incision covered with some saturations through dressing. EMMA drain x1 with serosanguinous discharge.  Ext: WWP  Dressing/Incision: C/d/i w/o erythema/drainage    BMP  Recent Labs   Lab 02/28/20  1740 02/28/20  1417 02/28/20  1308 02/28/20  0341    138 140 137   POTASSIUM 4.6 5.2 5.7* 5.0   CHLORIDE 103  --   --  102   ANTWAN 6.7*  --   --  7.7*   CO2 20  --   --  22   BUN 63*  --   --  65*   CR 11.60*  --   --  11.90*   * 95 107* 92   MAG 1.9  --   --   --    PHOS 7.3*  --   --   --      CBC  Recent Labs   Lab 02/28/20  1740 02/28/20  1417 02/28/20  1308 02/28/20  0341   WBC 1.4*  --   --  9.7   RBC 2.84*  --   --  4.03*   HGB 8.3* 8.1* 10.2* 11.8*   HCT 26.4*  --   --  37.4*   MCV 93  --   --  93   MCH 29.2  --   --  29.3   MCHC 31.4*  --   --  31.6   RDW 14.2  --   --  14.1   *  --   --  195     INR  Recent Labs   Lab 02/28/20  0341   INR 1.11      AST/ALT & Alk Phos  Recent Labs   Lab 02/28/20  0341   AST 30   ALT 48   ALKPHOS 108     Bili  Recent Labs   Lab Test 02/28/20 0341 01/26/17  0647   BILITOTAL 0.6 0.4     Lipase/AmlyaseNo lab results found in last 7 days.    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 Urine output was been anywhere from 10-30cc/hr. He remains on a lasix drip. No central line access, therefore, no CVP available. Hemodynamics remain stable with slight tachycardia.     Plan:  - Continue Lasix gtt  - Hgb/K+ q4h  - Monitor Urine output  - D5/NS @ 50cc  - Dilaudid PCA for pain control   - CLD  - Restart home immunosuppresion    Disposition: per primary team recommendations    Sancho Connelly MD  General Surgery Resident

## 2020-02-29 NOTE — PLAN OF CARE
Neuro: A&Ox4.Drowsy at times. Flat   Cardiac: SR/ST rates in 90's-low 1-teens. Hypertensive. MD's aware. Afebrile.    Respiratory: Sating mid 90's on 4-6L Oxymask, patient apneic during sleep. Capno.  GI/: Low UOP, MD's aware, lasix gtt running and 500cc NS bolug given.  hypoactive bowel sounds. Zofran given for nauseax1  Diet/appetite: Clears  Activity:  Attempted to stand at edge of bed for weight, patient was able to sit at the edge of the bed but reported feeling dizzy during attempt of standing up  Pain: Acute left arm pain reported by patient. IV's removed from arm, ultrasound completed and ordered. Arm firm, a bit cool to the touch, oxycodone 10mg given, --one time dose. PCA dose increased, heat applied. Patient reported decrease in pain shortly after. MD's aware and at bedside during acute change in pain.     Skin: generalized scabbing, L&R fistula. Midline incision covered with dressing, drainage marked.   LDA's: 2 IV's. Stein. EMMA, PCA dilaudid running at 0.3mg/hr. MIVF @ 50ml/hr, Lasix gtt running at 10ml/hr        Plan: Continue with POC. Notify primary team with changes.

## 2020-02-29 NOTE — PROGRESS NOTES
HEMODIALYSIS TREATMENT NOTE    Date: 2/29/2020  Time: 3:11 PM    Data:  Pre Wt: 74 kg (163 lb 2.3 oz)   Desired Wt: 72 kg   Post Wt: 72.3 kg (159 lb 6.3 oz)  Weight change: 1.7 kg  Ultrafiltration - Post Run Net Total Removed (mL): 1700 mL  Vascular Access Status: Fistula  Dialyzer Rinse: Clotted  Total Blood Volume Processed: 86.84 L   Total Dialysis (Treatment) Time: 3 hours 40 mins   Dialysate Bath: K 2, Ca 3  Heparin: None    Lab:   No    Interventions/Assessment:  Dialyzed for 3 hours 40 minutes with 1.7kg fluid removed. Patient hypertensive throughout treatment despite very steep crit-line. AV fistula is in right lower arm and patient also has PIV infusing in right wrist. Cannulated with 15g needles without lidocaine.  for most of run but began clotting in last hour of run so BFR was reduced. System clotted with 20 minutes left in run, blood rinsed back to patient. Hand off report given to floor nurse.     Plan:    Next HD per renal team.

## 2020-02-29 NOTE — OR NURSING
Pt pain not controlled ater 0.7 of dilaudid and 100 fent. Spoke with QI Temple on the phone, continue to give dilaudid IV. Labs resulted, US and xray completed. Kidney transplant MD Vickers aware. Alee aware that urine output has been 35cc 1 hr 15 mins after arriving to PACU. Hearing aide placed in pts ears. Pt will continue to be monitored and assessed.

## 2020-02-29 NOTE — TELEPHONE ENCOUNTER
"Organ Offer Encounter Information    Organ Offer Information  Organ offer date & time:  2/27/2020  3:30 PM  Coordinator/Fellow/Attending name:  Marie Oscar RN   Organ(s):   Organ UNOS ID Match Run ID Comment Organ Laterality   Kidney [id number]       Recent infections?:  No    New medications?:  Yes (Comment: New phosphorus binder- Lanthanum Carbonate 500mg) Recent pregnancy?:  No (Comment: NA)   Angicoagulation medications?:  No Recent vaccinations?:  No (Comment: May have had flu shot after 11/6 serum date)   Recent blood transfusions?:  Yes (Comment: Sprycel--chemo drug \"thins his blood\") Recent hospitalizations?:  No   Has your insurance changed in the last 6-12 months?:  Neg    Patient last dialyzed:  2/26/2020 10:45 AM  Dialysis type:  Hemo  Discussed organ offer with:  Patient  Discussed risk category with Patient/Other:  KDPI > 85  Did not understand donor criteria, questions answered, verbalized understanding  Patient/Other asked to speak to a surgeon?:  No  Discussed program-specific outcomes:  Did not have questions regarding SRTR  Right to decline organ offer without penalty, Patient/Other:  Aware of option to decline without penalty  Organ offer decision status Patient/Other:  Accepted Offer  Organ disposition:  Transplanted  Additional Comments:    2/27/2020 3:52 PM  Kidney: Primary  MD: Alee/Jimmie  OPO Contact: [phone]  [facility] Results: Compatible--per Dr. Mahoney  Plan (XM, NPO, Donor OR): Called patient regarding primary kidney offer. Discussed donor was high KDPI, patient had a lot of questions. Patient is interested in moving forward. Donor OR 1600 due to donor instability. Zayda from [employer] working on potential flight options. Planning to biopsy and pump kidneys. Provided contact information--will update shortly. Blood was requested earlier--ETA 2200. [employer] Job # [id number].  Marie Oscar  Transplant Coordinator    2/27/2020 5:00 PM:  Zayda from " [employer] called back. If we accept kidney, likely flight would be picked up at 0330, delivered to airport desk at 0430--flight would depart 0700--land 0920--Delivered to [facility] 1130.  Marie Oscar  Transplant Coordinator    2/27/2020 10:51 PM:  Anatomy and biopsy discussed with Dr. Vickers. Asked OPO to rebiopsy due to small sample size. Waiting on new biopsy and pump numbers. Called patient to head towards the hospital and be drawn for final XM and possible admission. Discussed we are waiting on biopsy and pump numbers and may decline at any point but wanted to keep this offer moving along if it is good for transplant. He understands. Provided instructions for lab draw at acute care outpatient lab. Placed orders. Notified lab and Nadine in immunology.   Marie Oscar  Transplant Coordinator    Admissions: Alo 2300  Unit: Dawna 2302--Informed patient should already have final xm drawn at outpatient lab unless they were unable to get blood as he is a hard stick.  Update Provider Entering Orders:  Paged [phone]  SABINA DAVIES [ Msg Id [id number] ] --called back and informed patient was stopping at acute care lab unless able to get blood. Informed patient needed non-contrast CT  Immunology: Nadine 2300  Book OR: Jesus Candelaria15--booked for 0930  Vessel Storage Confirmation: N/A  Blood Bank: [place] [id number]  Research: N/A--recips 3rd transplant  TransNet/ABO Verification: Done 0514--confirmed received by Jesus 0520  Add Organ: Done 0507 2/28/2020 2:50 AM:  Accepted the LEFT kidney per instruction of Dr. Jansen after reviewing newest biopsy and pump numbers. Informed Nancy ([phone])- OSC. Given XM waivers and local backup. Called Jessica in [facility] to set up transportation. Job #[id number]. [id number] Depart [facility] 0700--land [facility] 0920 with delivery by [facility]. Waiting on final XM result.  Marie Oscar  Transplant Coordinator  Attestation I have discussed all of the above with the Patient/Legal Guardian/Caregiver  regarding this organ offer.:  Yes  Coordinator/Fellow/Attending name:  Marie Oscar, RN

## 2020-02-29 NOTE — PROGRESS NOTES
Patient removed from UNOS waitlist after  donor kidney transplant. UNOS ID [id number].   Donor PHS increased risk: No.   If Yes, MD documentation N/A.

## 2020-02-29 NOTE — ANESTHESIA POSTPROCEDURE EVALUATION
Anesthesia POST Procedure Evaluation    Patient: Jeremiah Cruz   MRN:     [id number] Gender:   male   Age:    44 year old :      1975        Preoperative Diagnosis: End-stage renal disease (ESRD) (H) [N18.6]   Procedure(s):  TRANSPLANT, KIDNEY, RECIPIENT,  DONOR, with ureteral stent placement   Postop Comments: No value filed.     Anesthesia Type: General     JZG FV AN POST EVALUATION    Last Anesthesia Record Vitals:  CRNA VITALS  2020 1643 - 2020 1743      2020             Resp Rate (observed):  (!) 7          Last PACU Vitals:  Vitals Value Taken Time   /79 2020  7:00 PM   Temp 37.3  C (99.1  F) 2020  5:45 PM   Pulse 111 2020  7:00 PM   Resp 16 2020  6:45 PM   SpO2 100 % 2020  7:07 PM   Temp src     NIBP     Pulse     SpO2     Resp     Temp     Ht Rate     Temp 2     Vitals shown include unvalidated device data.      Electronically Signed By: Lucía Temple MD, 2020, 7:08 PM

## 2020-02-29 NOTE — PROGRESS NOTES
"CLINICAL NUTRITION SERVICES - ASSESSMENT NOTE     Nutrition Prescription    Malnutrition Status:    Patient does not meet two of the established criteria necessary for diagnosing malnutrition    Recommendations already ordered by Registered Dietitian (RD):  None today    Future/Additional Recommendations:  If suspect eating poorly, would offer supplements and start on kcal counts.      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 On HD for 21 years per chart note.   --POD#1 from Kidney Transplantation ( DONOR, with ureteral stent placement).     NUTRITION HISTORY  Chart reviewed: Seen in outpatient clinic back in 2017 for Nutrition therapy related to kidney transplant eval.   --Previously, patient did not follow a low salt diet and did receive education on Low salt/ Low K+/Low Phos diet and post op kidney transplant guidelines.     CURRENT NUTRITION ORDERS  Diet: Clear Liquid  Intake/Tolerance: Ate 50% of popsicle this am per review of RN/flow sheet.    --Attempted to see patient. He was off floor for HD     LABS  BUN: 71, Cr: 12.6, Phos: 8.5, K+: 6.6 this am, GFR: 4 (L)  Mg++: 2.0  WBC: 18.2 (H)    MEDICATIONS  Lasix  D5/NS @ 50cc  Immunosuppression meds (mycophenolate, tacrolimus, prednisone)  Senna and mag oxide    ANTHROPOMETRICS  Height: 162.6 cm (5' 4\")  Most Recent Weight: 68.1 kg (150 lb 2.1 oz) admit dry wt on 20  IBW: 59 kg ( 115% IBW)  BMI: 25.77 kg /m2 Overweight BMI 25-29.9  Weight History:   Wt Readings from Last 10 Encounters:   20 74 kg (163 lb 2.3 oz)   19 70.9 kg (156 lb 4.8 oz)   19 70.4 kg (155 lb 1.6 oz)   17 68.1 kg (150 lb 1.6 oz)   17 68.5 kg (151 lb 1.6 oz)   17 " 68.3 kg (150 lb 8 oz)   02/24/15 72.1 kg (158 lb 14.4 oz)   02/24/15 71.8 kg (158 lb 6.4 oz)   05/16/12 67 kg (147 lb 11.3 oz)       Dosing Weight: 68 kg dry admit wt     ASSESSED NUTRITION NEEDS:  Estimated Energy Needs: 8632-5195 kcals (30-35 Kcal/Kg)  Justification: increased needs post-op SOT  Estimated Protein Needs:  grams protein (1.3-2 gm/Kg)  Justification: increased needs post op SOT  Estimated Fluid Needs: (25-30 mL/Kg)  Justification: maintenance, or per MD pending fluid status and adequate UOP    PHYSICAL FINDINGS  EXTREMITIES: No peripheral edema.    MALNUTRITION  % Intake: NPO/CL diet status since admit x 2 days    % Weight Loss: None noted  Subcutaneous Fat Loss: Unable to assess,   Muscle Loss: Unable to assess,  Fluid Accumulation/Edema: None noted  Malnutrition Diagnosis: Unable to determine due to patient off floor for HD    NUTRITION DIAGNOSIS:  Food and nutrition-related knowledge deficit r/t length of time since previous post-transplant education AEB review of chart record, and MD consult for Assess and educate post SOT    INTERVENTIONS  Implementation  Nutrition Education: Unable to see patient, he was off floor for HD  ---Left handout at bedside: Post-transplant diet guidelines at bedside + food safety booklet post txp    Goals  1. Patient will verbalize understanding of 3 important aspects of post-transplant diet guidelines.   2. PO intake >50% meals TID.    Monitoring/Evaluation  Progress toward goals will be monitored and evaluated per protocol.    Jakub De Leon RD/ELIZABETH  Pager [phone]      .

## 2020-02-29 NOTE — ANESTHESIA POSTPROCEDURE EVALUATION
Anesthesia POST Procedure Evaluation    Patient: Jeremiah Cruz   MRN:     [id number] Gender:   male   Age:    44 year old :      1975        Preoperative Diagnosis: End-stage renal disease (ESRD) (H) [N18.6]   Procedure(s):  TRANSPLANT, KIDNEY, RECIPIENT,  DONOR, with ureteral stent placement   Postop Comments: No value filed.     Anesthesia Type: General       Disposition: Admission   Postop Pain Control: Challenging            Challenges/Interventions: Acute Pain            Sign Out: ONGOING pain issues (increased PCA hydromorpohne dose )   PONV: No   Neuro/Psych:    Airway/Respiratory: Uneventful            Sign Out: Acceptable/Baseline resp. status   CV/Hemodynamics: Uneventful            Sign Out: OTHER CV status               Blood Pressure: Normal               Rate/Rhythm: Tachycardia               Perfusion Indices: unable to assess; 35 cc urine output at OR, 5cc in the PaCU    Other NRE: NONE   DID A NON-ROUTINE EVENT OCCUR?          Last Anesthesia Record Vitals:  CRNA VITALS  2020 1643 - 2020 1743      2020             Resp Rate (observed):  (!) 7          Last PACU Vitals:  Vitals Value Taken Time   /79 2020  7:15 PM   Temp 37.3  C (99.1  F) 2020  5:45 PM   Pulse 109 2020  7:15 PM   Resp 16 2020  7:00 PM   SpO2 99 % 2020  7:19 PM   Temp src     NIBP     Pulse     SpO2     Resp     Temp     Ht Rate     Temp 2     Vitals shown include unvalidated device data.      Electronically Signed By: Lucía Temple MD, 2020, 7:21 PM

## 2020-02-29 NOTE — PROGRESS NOTES
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  He is s/p Kidney transplant x2 (1988, 2005). 2nd graft lost to primary nonfunction with explant a few weeks later. He has been on dialysis since 1999. He is now s/p DDKT 2/28/20.    Graft function:s/p DBD DDKTx with stent: 2/28: POD #1 with delayed graft function. UOP 10-30cc/hr. Lasix gtt 10-increase to 20cc/hr.  Plan for HD today due to hyperkalemia, K  6.6 uncertain, stable.  Immunosuppression management:   Induction with Thymo 150mg Intraop, give 150 today  SM 500mg intraop, give 250mg today  Tacrolimus 2mg BID. Titrate to a goal of 8-10.  MMF 750mg BID.   Complexity of management:Medium. Contributing factors: anemia and delayed graft function  Hematology:Anemia of chronic disease: Hgb stable ~9.   Neurology: Acute postoperative Pain: Continue dilaudid PCA, adequate pain control.   Cardiorespiratory: HTN: -180s, start coreg 6.25 BID.   Tachycardic: 100-110s.   GI/Nutrition: CLD, abdominal distention. Place NGT  Continue bowel regimen.   Endocrine:Steroid induced hyperglycemia: -210. Continue to monitor with Steroid induction.  HgbA1c =5.2  Fluid/Electrolytes: MIVF:D5NS @ 50cc., continue with poor po intake. Stop replacements  Hypervolemia: EDW =67.8 Kg, +7Kg from admit with shortness of breath, dialysis today.  : Stein to remain due to new surgical anastomosis.  Infectious disease: Afebrile, Leukocytosis, WBC 18. Most likely due to steroids. Continue to monitor.   Prophylaxis: DVT, fall, GI, fungal  Disposition: 6B     Medical Decision Making: Medium  Subsequent visit 71544 (moderate level decision making)    DARRON/Fellow/Resident Provider: Marissa Jang NP    Faculty: Carol Samuel MD  _________________________________________________________________  Transplant History: Admitted 2/28/2020 for Kidney  "transplant.  2/28/2020 (Kidney), 12/7/1988 (Kidney), 3/2/2005 (Kidney), Postoperative day: 1     Interval History: History is obtained from the patient  Overnight events: Remains hyperkalemic with slow graft function. UOP has only been 10-30cc/hr despite lasix gtt. C/o abdominal bloating and nausea.     ROS:   A 10-point review of systems was negative except as noted above.    Meds:    acetaminophen  650 mg Oral Once     antithymocyte globulin (THYMOGLOBULIN - Rabbit) w/ hydrocortisone peripheral infusion  150 mg Intravenous Once     atorvastatin  10 mg Oral Daily     diphenhydrAMINE  25-50 mg Oral Once    Or     diphenhydrAMINE  25-50 mg Per NG tube Once     lidocaine  1 patch Transdermal Q24H     lidocaine   Transdermal Q8H     [START ON 3/1/2020] magnesium oxide  400 mg Oral Daily with lunch     methylPREDNISolone  250 mg Intravenous Once     mycophenolate  750 mg Oral BID IS     senna-docusate  1 tablet Oral BID    Or     senna-docusate  2 tablet Oral BID     sodium chloride (PF)  10 mL Intracatheter Q8H     sulfamethoxazole-trimethoprim  1 tablet Oral Daily     tacrolimus  2 mg Oral BID IS     [START ON 3/2/2020] valGANciclovir  450 mg Oral Once per day on Mon Thu       Physical Exam:     Admit Weight: 68.1 kg (150 lb 2.1 oz)    Current vitals:   BP (!) 179/87 (BP Location: Left leg)   Pulse 104   Temp 98.5  F (36.9  C) (Oral)   Resp 14   Ht 1.626 m (5' 4\")   Wt 74 kg (163 lb 2.3 oz)   SpO2 96%   BMI 28.00 kg/m           Vital sign ranges:    Temp:  [97.7  F (36.5  C)-99.1  F (37.3  C)] 98.5  F (36.9  C)  Pulse:  [101-123] 104  Heart Rate:  [] 113  Resp:  [8-20] 14  BP: (126-184)/() 179/87  MAP:  [94 mmHg-102 mmHg] 94 mmHg  Arterial Line BP: (128-143)/(66-78) 128/66  SpO2:  [91 %-100 %] 96 %  Patient Vitals for the past 24 hrs:   BP Temp Temp src Pulse Heart Rate Resp SpO2 Weight   02/29/20 1514 (!) 179/87 98.5  F (36.9  C) Oral -- 113 14 96 % --   02/29/20 1451 (!) 169/81 -- -- -- 103 12 100 " % --   02/29/20 1445 (!) 168/86 98.5  F (36.9  C) Oral -- 118 14 100 % --   02/29/20 1430 (!) 172/75 -- -- -- 112 8 100 % --   02/29/20 1415 (!) 162/79 -- -- -- 112 13 100 % --   02/29/20 1400 (!) 154/75 -- -- -- 112 9 100 % --   02/29/20 1345 (!) 151/89 -- -- -- 109 8 100 % --   02/29/20 1330 (!) 164/80 -- -- -- 116 9 100 % --   02/29/20 1315 (!) 153/74 -- -- -- 120 9 100 % --   02/29/20 1300 (!) 157/82 -- -- -- 117 9 100 % --   02/29/20 1245 (!) 170/76 -- -- -- 105 11 100 % --   02/29/20 1230 (!) 161/104 -- -- -- 112 8 100 % --   02/29/20 1215 (!) 167/88 -- -- -- 106 9 100 % --   02/29/20 1200 (!) 178/88 -- -- -- 110 9 100 % --   02/29/20 1145 (!) 158/102 -- -- -- 110 9 100 % --   02/29/20 1130 (!) 155/108 -- -- -- 96 15 100 % --   02/29/20 1115 (!) 167/94 -- -- -- 90 9 95 % --   02/29/20 1100 (!) 154/76 -- -- -- 101 10 100 % --   02/29/20 1052 (!) 148/82 98  F (36.7  C) Oral -- 99 9 98 % --   02/29/20 1000 (!) 147/87 -- -- -- -- -- 100 % --   02/29/20 0900 (!) 140/88 98.3  F (36.8  C) Oral -- 82 12 -- --   02/29/20 0800 (!) 146/77 -- -- -- -- -- 94 % --   02/29/20 0700 (!) 145/79 97.7  F (36.5  C) Axillary -- 98 12 99 % --   02/29/20 0600 (!) 158/85 98.9  F (37.2  C) -- -- -- -- 97 % --   02/29/20 0500 (!) 161/74 98.5  F (36.9  C) Axillary -- -- 12 -- 74 kg (163 lb 2.3 oz)   02/29/20 0400 (!) 179/64 98  F (36.7  C) Axillary 104 -- -- 97 % --   02/29/20 0300 (!) 184/65 97.9  F (36.6  C) Axillary -- 101 20 93 % --   02/29/20 0200 (!) 183/60 97.9  F (36.6  C) Oral -- -- 18 95 % --   02/29/20 0100 139/89 98.5  F (36.9  C) -- -- -- -- -- --   02/29/20 0000 129/83 98.8  F (37.1  C) -- -- -- 15 94 % --   02/28/20 2300 (!) 148/87 98.5  F (36.9  C) Oral -- 92 16 97 % --   02/28/20 2200 (!) 143/85 98.8  F (37.1  C) Oral -- -- -- 91 % --   02/28/20 2100 (!) 152/86 98.4  F (36.9  C) -- -- 102 18 93 % --   02/28/20 2000 139/87 98.1  F (36.7  C) Axillary -- 110 16 100 % --   02/28/20 1945 137/79 97.9  F (36.6  C) Axillary 101  109 15 92 % --   02/28/20 1930 131/81 -- -- 109 105 15 98 % --   02/28/20 1915 137/79 -- -- 109 111 15 99 % --   02/28/20 1900 132/79 -- -- 111 109 16 99 % --   02/28/20 1845 135/76 98.7  F (37.1  C) Axillary 113 118 16 99 % --   02/28/20 1830 126/87 -- -- 123 121 14 100 % --   02/28/20 1815 134/81 -- -- 121 117 16 100 % --   02/28/20 1800 (!) 144/83 -- -- 115 113 15 100 % --   02/28/20 1745 129/84 99.1  F (37.3  C) Axillary 123 124 14 100 % --   02/28/20 1730 139/85 -- -- 113 114 16 100 % --   02/28/20 1715 (!) 140/87 99  F (37.2  C) Axillary 119 109 16 -- --     General Appearance: in no apparent distress.   Skin: normal, warm, dry  Heart: regular rate and rhythm  Lungs: Diminished to bilateral bases, 2L NC, nonlabored  Abdomen: The abdomen is round, distended, tympanic.  he is not tender over the kidney graft. The wound covered with surgical dressing in place.  : tan is present.  Urine has small gross hematuria.   Extremities: edema: present bilateral. 1+  Neurologic: awake, alert and oriented. Tremor absent..     Data:   CMP  Recent Labs   Lab 02/29/20  0902 02/29/20  0317  02/28/20  1740 02/28/20  1417 02/28/20  1308 02/28/20  0341   NA  --  136  --  137 138 140 137   POTASSIUM 6.6* 4.6   < > 4.6 5.2 5.7* 5.0   CHLORIDE  --  102  --  103  --   --  102   CO2  --  18*  --  20  --   --  22   GLC  --  213*  --  129* 95 107* 92   BUN  --  71*  --  63*  --   --  65*   CR  --  12.60*  --  11.60*  --   --  11.90*   GFRESTIMATED  --  4*  --  5*  --   --  5*   GFRESTBLACK  --  5*  --  5*  --   --  5*   ANTWAN  --  6.8*  --  6.7*  --   --  7.7*   ICAW  --   --   --   --  3.7* 3.6*  --    MAG  --  2.0  --  1.9  --   --   --    PHOS  --  8.5*  --  7.3*  --   --   --    ALBUMIN  --   --   --   --   --   --  4.0   BILITOTAL  --   --   --   --   --   --  0.6   ALKPHOS  --   --   --   --   --   --  108   AST  --   --   --   --   --   --  30   ALT  --   --   --   --   --   --  48    < > = values in this interval not displayed.      CBC  Recent Labs   Lab 02/29/20  0902 02/29/20  0317  02/28/20  1740  02/28/20  0341   HGB 9.7* 9.5*   < > 8.3*   < > 11.8*   WBC  --  18.2*  --  1.4*  --  9.7   PLT  --  132*  --  100*  --  195   A1C  --   --   --   --   --  5.2    < > = values in this interval not displayed.     COAGS  Recent Labs   Lab 02/28/20  0341   INR 1.11   PTT 30      Urinalysis  No lab results found.  Virology:  CMV IgG Antibody   Date Value Ref Range Status   03/01/2005 1.4 EU/mL Final     Comment:     Negative for anti-CMV IgG     Hepatitis C Antibody   Date Value Ref Range Status   02/28/2020 Nonreactive NR^Nonreactive Final     Comment:     Assay performance characteristics have not been established for newborns,   infants, and children       Hep B Surface Ariella   Date Value Ref Range Status   03/01/2005 (A) NEG Final    Positive, Patient is considered to be immune to infection with hepatitis B.     BK Virus Result   Date Value Ref Range Status   01/26/2017 BK Virus DNA Not Detected BKNEG copies/mL Final     \

## 2020-02-29 NOTE — CONSULTS
[facility], [place]  Transplant Nephrology Consult  Date of Admission:  2/28/2020  Today's Date: 02/29/2020  Requesting physician: Carol Samuel MD    Assessment & Plan   # DDKT: Increased. Dialysis today.   ml yesterday.    - Baseline Cr ~ TBD   - Proteinuria: Not checked post transplant   - Date DSA Last Checked: Feb/2020      Latest DSA: No DSA at time of transplant   - BK Viremia: No   - Kidney Tx Biopsy: No    # Immunosuppression: Tacrolimus immediate release (goal 8-10) and Mycophenolate mofetil (goal 5-7)   - Changes: No    # Infection Prophylaxis:   - PJP: Sulfa/TMP (Bactrim)  - CMV: Valcyte   - Thrush: None    # Hypertension: Inadequate control;  Goal BP: < 140/90   - Volume status: Moderately hypervolemic  EDW~ 67.8kg   - Changes: Yes - Increased lasix gtt to 20mg/hr.  Dialysis today    # Elevated Blood Glucose: Glucose generally running ~    - Management as per primary team.    # Post-Transplant Erythrocytosis: Hgb: Stable;  On ACEI/ARB: No    # Mineral Bone Disorder:   - Secondary renal hyperparathyroidism; PTH level: Not checked recently        On treatment: None  - Vitamin D; level: Not checked recently        On Supplement: No  - Calcium; level: Low        On Supplement: No  - Phosphorus; level: High        On Supplement: No    # Electrolytes:   - Potassium; level: High        On Supplement: No  - Magnesium; level: Normal        On Supplement: No  - Bicarbonate; level: Low        On Supplement: No  - Sodium; level: Normal        On Supplement: No    # CML: In remission. On dasatinib 40mg daily     # Hyperkalemia:  K is 6.6.  Dialysis today.  Will monitor      # Transplant History:  Etiology of Kidney Failure:  Alport syndrome s/p kidney transplant x 2  Tx: DDKT  Transplant: 2/28/2020 (Kidney), 12/7/1988 (Kidney), 3/2/2005 (Kidney)  Donor Type: Donation after Brain Death Donor Class:   Crossmatch at time of Tx: negative  DSA at time of Tx:  No  Significant changes in immunosuppression: None  Significant transplant-related complications: None    Recommendations were communicated to the primary team verbally.    Seen and discussed with Dr. Telly Lema, NP  Pager: [phone]    Physician Attestation   I, Alvin Varner, saw and evaluated Jeremiah Cruz as part of a shared visit.  I have reviewed and discussed with the advanced practice provider their history, physical and plan.    I personally reviewed the vital signs, medications and labs.    My key history or physical exam findings: Patient feels a bit nauseated.  High serum potassium.  Low urine output.  Significantly above dry weight.    Key management decisions made by me: Will plan HD today for hyperkalemia and volume.  Immunosuppression per protocol.    Alvin Varner  Date of Service (when I saw the patient): 02/29/20    REASON FOR CONSULT   New transplant    History of Present Illness   Jeremiah Cruz is a 44 year old male  With a history of Alport syndrome s/p kidney transplant x 2.  Had a DDKT on 2/28/2020.     He has been on dialysis since 1999. Was doing well on dialysis.  He did not have dialysis yesterday.     He has a remote history of CML and was cleared by her oncologist and has been in remission.      Review of Systems    The 10 point Review of Systems is negative other than noted in the HPI or here.     States he is fatigued and feels nauseated.      Past Medical History    I have reviewed this patient's medical history and updated it with pertinent information if needed.   Past Medical History:   Diagnosis Date     Alport's syndrome      Anemia in chronic kidney disease      CML (chronic myelocytic leukemia) (H)      Dialysis care      End stage kidney disease (H)      Former tobacco use      GERD (gastroesophageal reflux disease)      Hypertension      Kidney transplant rejection      S/p nephrectomy 12/7/11     Secondary renal hyperparathyroidism (H)       Thrombocytopenia (H)      Tobacco dependence        Past Surgical History   I have reviewed this patient's surgical history and updated it with pertinent information if needed.  Past Surgical History:   Procedure Laterality Date     APPENDECTOMY       CREATE FISTULA ARTERIOVENOUS UPPER EXTREMITY       HERNIA REPAIR, INCISIONAL       INSERT CATHETER PERITONEAL DIALYSIS       NEPHRECTOMY Bilateral     native     NEPHRECTOMY      transplant     NEPHRECTOMY  2005    transplant     PARATHYROIDECTOMY       REMOVE CATHETER PERITONEAL       svc angioplasty         Family History   I have reviewed this patient's family history and updated it with pertinent information if needed.   Family History   Problem Relation Age of Onset     Bone Cancer Brother      Melanoma No family hx of      Skin Cancer No family hx of        Social History   I have reviewed this patient's social history and updated it with pertinent information if needed. Jeremiah Cruz  reports that he quit smoking about 9 years ago. His smoking use included cigarettes. He has a 12.00 pack-year smoking history. He has never used smokeless tobacco. He reports that he does not drink alcohol or use drugs.    Allergies   Allergies   Allergen Reactions     Blood Transfusion Related (Informational Only) Other (See Comments)     Patient has a history of a clinically significant antibody against RBC antigens.  A delay in compatible RBCs may occur.     Cephalosporins Other (See Comments) and Rash     fever     Compazine [Prochlorperazine]      Gammagard [Immune Globulin] Other (See Comments)     Ed got spinal meningitis and MD stated to never get again for fear of death.       Penicillins      Vancomycin      Prior to Admission Medications     sodium chloride 0.9%  250 mL Intravenous Once in dialysis     sodium chloride 0.9%  300 mL Hemodialysis Machine Once     acetaminophen  650 mg Oral Once     acetaminophen  650 mg Oral Once     anti-thymocyte globulin  125 mg  "Intravenous Central line Once     antithymocyte globulin (THYMOGLOBULIN - Rabbit) w/ hydrocortisone peripheral infusion  150 mg Intravenous Once     atorvastatin  10 mg Oral Daily     diphenhydrAMINE  25-50 mg Oral Once    Or     diphenhydrAMINE  25-50 mg Per NG tube Once     diphenhydrAMINE  25-50 mg Oral Once    Or     diphenhydrAMINE  25-50 mg Per NG tube Once     gelatin absorbable  1 each Topical During Hemodialysis (from stock)     lidocaine  1 patch Transdermal Q24H     lidocaine   Transdermal Q8H     [START ON 3/1/2020] magnesium oxide  400 mg Oral Daily with lunch     methylPREDNISolone  250 mg Intravenous Once     mycophenolate  750 mg Oral BID IS     - MEDICATION INSTRUCTIONS -   Does not apply Once     senna-docusate  1 tablet Oral BID    Or     senna-docusate  2 tablet Oral BID     sodium chloride (PF)  10 mL Intracatheter Q8H     sulfamethoxazole-trimethoprim  1 tablet Oral Daily     tacrolimus  2 mg Oral BID IS     [START ON 3/2/2020] valGANciclovir  450 mg Oral Once per day on        dextrose 5% and 0.9% NaCl 50 mL/hr at 20 0822     furosemide (LASIX) infusion ADULT STANDARD 20 mg/hr (20 0930)     HYDROmorphone       - MEDICATION INSTRUCTIONS -         Physical Exam   Temp  Av.4  F (36.9  C)  Min: 97.7  F (36.5  C)  Max: 99.1  F (37.3  C)  Arterial Line BP  Min: 128/66  Max: 143/74  Arterial Line MAP (mmHg)  Av mmHg  Min: 94 mmHg  Max: 102 mmHg      Pulse  Av.9  Min: 70  Max: 123 Resp  Av.7  Min: 9  Max: 20  SpO2  Av.3 %  Min: 91 %  Max: 100 %     BP (!) 167/94   Pulse 104   Temp 98  F (36.7  C) (Oral)   Resp 9   Ht 1.626 m (5' 4\")   Wt 74 kg (163 lb 2.3 oz)   SpO2 95%   BMI 28.00 kg/m     Date 20 0700 - 20 0659   Shift 2590-2481 1649-6179 8327-0139 24 Hour Total   INTAKE   P.O. 30   30   Shift Total(mL/kg) 30(0.41)   30(0.41)   OUTPUT   Urine 52   52   Emesis/NG output 225   225   Drains 45   45   Shift Total(mL/kg) 322(4.35)   " 322(4.35)   Weight (kg) 74 74 74 74      Admit Weight: 68.1 kg (150 lb 2.1 oz)     GENERAL APPEARANCE: alert and no distress  HENT: mouth without ulcers or lesions  LYMPHATICS: no cervical or supraclavicular nodes  RESP: lungs clear to auscultation - no rales, rhonchi or wheezes  CV: regular rhythm, normal rate, no rub, no murmur  EDEMA: + 2 LE edema bilaterally  ABDOMEN: soft, nondistended, nontender, bowel sounds normal  MS: extremities normal - no gross deformities noted, no evidence of inflammation in joints, no muscle tenderness  SKIN: no rash    Data   CMP  Recent Labs   Lab 02/29/20  0902 02/29/20  0317 02/28/20  2245 02/28/20  1740 02/28/20  1417 02/28/20  1308 02/28/20  0341   NA  --  136  --  137 138 140 137   POTASSIUM 6.6* 4.6 4.8 4.6 5.2 5.7* 5.0   CHLORIDE  --  102  --  103  --   --  102   CO2  --  18*  --  20  --   --  22   ANIONGAP  --  16*  --  14  --   --  13   GLC  --  213*  --  129* 95 107* 92   BUN  --  71*  --  63*  --   --  65*   CR  --  12.60*  --  11.60*  --   --  11.90*   GFRESTIMATED  --  4*  --  5*  --   --  5*   GFRESTBLACK  --  5*  --  5*  --   --  5*   ANTWAN  --  6.8*  --  6.7*  --   --  7.7*   MAG  --  2.0  --  1.9  --   --   --    PHOS  --  8.5*  --  7.3*  --   --   --    PROTTOTAL  --   --   --   --   --   --  7.9   ALBUMIN  --   --   --   --   --   --  4.0   BILITOTAL  --   --   --   --   --   --  0.6   ALKPHOS  --   --   --   --   --   --  108   AST  --   --   --   --   --   --  30   ALT  --   --   --   --   --   --  48     CBC  Recent Labs   Lab 02/29/20  0902 02/29/20  0317 02/28/20  2245 02/28/20  1740  02/28/20  0341   HGB 9.7* 9.5* 8.8* 8.3*   < > 11.8*   WBC  --  18.2*  --  1.4*  --  9.7   RBC  --  3.19*  --  2.84*  --  4.03*   HCT  --  30.6*  --  26.4*  --  37.4*   MCV  --  96  --  93  --  93   MCH  --  29.8  --  29.2  --  29.3   MCHC  --  31.0*  --  31.4*  --  31.6   RDW  --  14.2  --  14.2  --  14.1   PLT  --  132*  --  100*  --  195    < > = values in this interval not  displayed.     INR  Recent Labs   Lab 02/28/20  0341   INR 1.11   PTT 30     ABG  Recent Labs   Lab 02/28/20  1417 02/28/20  1308   PH 7.36 7.38   PCO2 35 35   PO2 222* 202*   HCO3 19* 20*   O2PER 53.0 54.0      Urine Studies  No lab results found.  No lab results found.  PTH  No lab results found.  Iron Studies  No lab results found.    IMAGING:  All imaging studies reviewed by me.

## 2020-03-01 LAB
ANION GAP SERPL CALCULATED.3IONS-SCNC: 10 MMOL/L (ref 3–14)
BASOPHILS # BLD AUTO: 0 10E9/L (ref 0–0.2)
BASOPHILS NFR BLD AUTO: 0.1 %
BUN SERPL-MCNC: 57 MG/DL (ref 7–30)
CALCIUM SERPL-MCNC: 7.7 MG/DL (ref 8.5–10.1)
CHLORIDE SERPL-SCNC: 100 MMOL/L (ref 94–109)
CO2 SERPL-SCNC: 27 MMOL/L (ref 20–32)
CREAT SERPL-MCNC: 8.45 MG/DL (ref 0.66–1.25)
DIFFERENTIAL METHOD BLD: ABNORMAL
EOSINOPHIL # BLD AUTO: 0 10E9/L (ref 0–0.7)
EOSINOPHIL NFR BLD AUTO: 0 %
ERYTHROCYTE [DISTWIDTH] IN BLOOD BY AUTOMATED COUNT: 14.3 % (ref 10–15)
GFR SERPL CREATININE-BSD FRML MDRD: 7 ML/MIN/{1.73_M2}
GLUCOSE SERPL-MCNC: 110 MG/DL (ref 70–99)
HCT VFR BLD AUTO: 25.2 % (ref 40–53)
HGB BLD-MCNC: 8 G/DL (ref 13.3–17.7)
IMM GRANULOCYTES # BLD: 0 10E9/L (ref 0–0.4)
IMM GRANULOCYTES NFR BLD: 0.4 %
LACTATE BLD-SCNC: 0.7 MMOL/L (ref 0.7–2)
LYMPHOCYTES # BLD AUTO: 0.1 10E9/L (ref 0.8–5.3)
LYMPHOCYTES NFR BLD AUTO: 0.9 %
MAGNESIUM SERPL-MCNC: 2 MG/DL (ref 1.6–2.3)
MCH RBC QN AUTO: 28.9 PG (ref 26.5–33)
MCHC RBC AUTO-ENTMCNC: 31.7 G/DL (ref 31.5–36.5)
MCV RBC AUTO: 91 FL (ref 78–100)
MONOCYTES # BLD AUTO: 0.2 10E9/L (ref 0–1.3)
MONOCYTES NFR BLD AUTO: 2.3 %
NEUTROPHILS # BLD AUTO: 9.4 10E9/L (ref 1.6–8.3)
NEUTROPHILS NFR BLD AUTO: 96.3 %
NRBC # BLD AUTO: 0 10*3/UL
NRBC BLD AUTO-RTO: 0 /100
PHOSPHATE SERPL-MCNC: 8.2 MG/DL (ref 2.5–4.5)
PLATELET # BLD AUTO: 81 10E9/L (ref 150–450)
PLATELET # BLD AUTO: 82 10E9/L (ref 150–450)
POTASSIUM SERPL-SCNC: 4.7 MMOL/L (ref 3.4–5.3)
RBC # BLD AUTO: 2.77 10E12/L (ref 4.4–5.9)
SODIUM SERPL-SCNC: 136 MMOL/L (ref 133–144)
WBC # BLD AUTO: 9.8 10E9/L (ref 4–11)

## 2020-03-01 PROCEDURE — 83605 ASSAY OF LACTIC ACID: CPT | Performed by: SURGERY

## 2020-03-01 PROCEDURE — 85025 COMPLETE CBC W/AUTO DIFF WBC: CPT | Performed by: SURGERY

## 2020-03-01 PROCEDURE — 84100 ASSAY OF PHOSPHORUS: CPT | Performed by: SURGERY

## 2020-03-01 PROCEDURE — 25000128 H RX IP 250 OP 636: Performed by: SURGERY

## 2020-03-01 PROCEDURE — 80048 BASIC METABOLIC PNL TOTAL CA: CPT | Performed by: SURGERY

## 2020-03-01 PROCEDURE — 25000128 H RX IP 250 OP 636: Performed by: NURSE PRACTITIONER

## 2020-03-01 PROCEDURE — 80197 ASSAY OF TACROLIMUS: CPT | Performed by: STUDENT IN AN ORGANIZED HEALTH CARE EDUCATION/TRAINING PROGRAM

## 2020-03-01 PROCEDURE — 25800030 ZZH RX IP 258 OP 636: Performed by: SURGERY

## 2020-03-01 PROCEDURE — 83735 ASSAY OF MAGNESIUM: CPT | Performed by: SURGERY

## 2020-03-01 PROCEDURE — 85049 AUTOMATED PLATELET COUNT: CPT | Performed by: STUDENT IN AN ORGANIZED HEALTH CARE EDUCATION/TRAINING PROGRAM

## 2020-03-01 PROCEDURE — 25000131 ZZH RX MED GY IP 250 OP 636 PS 637: Mod: GY | Performed by: SURGERY

## 2020-03-01 PROCEDURE — 12000004 ZZH R&B IMCU UMMC

## 2020-03-01 PROCEDURE — 25000132 ZZH RX MED GY IP 250 OP 250 PS 637: Mod: GY | Performed by: SURGERY

## 2020-03-01 PROCEDURE — 36415 COLL VENOUS BLD VENIPUNCTURE: CPT | Performed by: SURGERY

## 2020-03-01 PROCEDURE — 25000128 H RX IP 250 OP 636: Performed by: STUDENT IN AN ORGANIZED HEALTH CARE EDUCATION/TRAINING PROGRAM

## 2020-03-01 PROCEDURE — 25000132 ZZH RX MED GY IP 250 OP 250 PS 637: Mod: GY | Performed by: NURSE PRACTITIONER

## 2020-03-01 PROCEDURE — 25800030 ZZH RX IP 258 OP 636: Performed by: NURSE PRACTITIONER

## 2020-03-01 PROCEDURE — 36415 COLL VENOUS BLD VENIPUNCTURE: CPT | Performed by: STUDENT IN AN ORGANIZED HEALTH CARE EDUCATION/TRAINING PROGRAM

## 2020-03-01 RX ORDER — DIPHENHYDRAMINE HCL 12.5MG/5ML
25-50 LIQUID (ML) ORAL ONCE
Status: COMPLETED | OUTPATIENT
Start: 2020-03-01 | End: 2020-03-01

## 2020-03-01 RX ORDER — AMLODIPINE BESYLATE 5 MG/1
5 TABLET ORAL DAILY
Status: DISCONTINUED | OUTPATIENT
Start: 2020-03-01 | End: 2020-03-02

## 2020-03-01 RX ORDER — DIPHENHYDRAMINE HCL 25 MG
25-50 CAPSULE ORAL ONCE
Status: COMPLETED | OUTPATIENT
Start: 2020-03-01 | End: 2020-03-01

## 2020-03-01 RX ORDER — HEPARIN SODIUM 5000 [USP'U]/.5ML
5000 INJECTION, SOLUTION INTRAVENOUS; SUBCUTANEOUS EVERY 12 HOURS
Status: DISCONTINUED | OUTPATIENT
Start: 2020-03-01 | End: 2020-03-10 | Stop reason: HOSPADM

## 2020-03-01 RX ORDER — CARVEDILOL 12.5 MG/1
12.5 TABLET ORAL 2 TIMES DAILY WITH MEALS
Status: DISCONTINUED | OUTPATIENT
Start: 2020-03-01 | End: 2020-03-10 | Stop reason: HOSPADM

## 2020-03-01 RX ORDER — HYDRALAZINE HYDROCHLORIDE 20 MG/ML
20 INJECTION INTRAMUSCULAR; INTRAVENOUS EVERY 6 HOURS PRN
Status: DISCONTINUED | OUTPATIENT
Start: 2020-03-01 | End: 2020-03-05

## 2020-03-01 RX ORDER — HYDRALAZINE HYDROCHLORIDE 20 MG/ML
10-20 INJECTION INTRAMUSCULAR; INTRAVENOUS EVERY 6 HOURS PRN
Status: DISCONTINUED | OUTPATIENT
Start: 2020-03-01 | End: 2020-03-01

## 2020-03-01 RX ORDER — ACETAMINOPHEN 325 MG/1
650 TABLET ORAL ONCE
Status: COMPLETED | OUTPATIENT
Start: 2020-03-01 | End: 2020-03-01

## 2020-03-01 RX ORDER — LABETALOL 20 MG/4 ML (5 MG/ML) INTRAVENOUS SYRINGE
10-20 EVERY 6 HOURS PRN
Status: DISCONTINUED | OUTPATIENT
Start: 2020-03-01 | End: 2020-03-05

## 2020-03-01 RX ORDER — LABETALOL 20 MG/4 ML (5 MG/ML) INTRAVENOUS SYRINGE
10 ONCE
Status: COMPLETED | OUTPATIENT
Start: 2020-03-01 | End: 2020-03-01

## 2020-03-01 RX ORDER — METHYLPREDNISOLONE SODIUM SUCCINATE 125 MG/2ML
100 INJECTION, POWDER, LYOPHILIZED, FOR SOLUTION INTRAMUSCULAR; INTRAVENOUS ONCE
Status: COMPLETED | OUTPATIENT
Start: 2020-03-01 | End: 2020-03-01

## 2020-03-01 RX ORDER — HYDRALAZINE HYDROCHLORIDE 20 MG/ML
20 INJECTION INTRAMUSCULAR; INTRAVENOUS EVERY 6 HOURS PRN
Status: DISCONTINUED | OUTPATIENT
Start: 2020-03-01 | End: 2020-03-01

## 2020-03-01 RX ADMIN — CARVEDILOL 12.5 MG: 12.5 TABLET, FILM COATED ORAL at 18:04

## 2020-03-01 RX ADMIN — SENNOSIDES AND DOCUSATE SODIUM 2 TABLET: 8.6; 5 TABLET ORAL at 08:59

## 2020-03-01 RX ADMIN — LABETALOL 20 MG/4 ML (5 MG/ML) INTRAVENOUS SYRINGE 20 MG: at 21:41

## 2020-03-01 RX ADMIN — SULFAMETHOXAZOLE AND TRIMETHOPRIM 1 TABLET: 400; 80 TABLET ORAL at 08:55

## 2020-03-01 RX ADMIN — ONDANSETRON 4 MG: 2 INJECTION INTRAMUSCULAR; INTRAVENOUS at 08:35

## 2020-03-01 RX ADMIN — METHYLPREDNISOLONE SODIUM SUCCINATE 100 MG: 125 INJECTION, POWDER, FOR SOLUTION INTRAMUSCULAR; INTRAVENOUS at 16:42

## 2020-03-01 RX ADMIN — LABETALOL 20 MG/4 ML (5 MG/ML) INTRAVENOUS SYRINGE 10 MG: at 13:09

## 2020-03-01 RX ADMIN — DIPHENHYDRAMINE HYDROCHLORIDE 25 MG: 25 CAPSULE ORAL at 16:39

## 2020-03-01 RX ADMIN — HEPARIN SODIUM 125 MG: 1000 INJECTION INTRAVENOUS; SUBCUTANEOUS at 17:17

## 2020-03-01 RX ADMIN — FUROSEMIDE 20 MG/HR: 10 INJECTION, SOLUTION INTRAVENOUS at 04:57

## 2020-03-01 RX ADMIN — TACROLIMUS 2 MG: 1 CAPSULE ORAL at 08:52

## 2020-03-01 RX ADMIN — HYDRALAZINE HYDROCHLORIDE 20 MG: 20 INJECTION INTRAMUSCULAR; INTRAVENOUS at 19:59

## 2020-03-01 RX ADMIN — TACROLIMUS 2 MG: 1 CAPSULE ORAL at 18:03

## 2020-03-01 RX ADMIN — HEPARIN SODIUM 5000 UNITS: 5000 INJECTION, SOLUTION INTRAVENOUS; SUBCUTANEOUS at 12:04

## 2020-03-01 RX ADMIN — ONDANSETRON 4 MG: 2 INJECTION INTRAMUSCULAR; INTRAVENOUS at 16:37

## 2020-03-01 RX ADMIN — FUROSEMIDE 20 MG/HR: 10 INJECTION, SOLUTION INTRAVENOUS at 10:51

## 2020-03-01 RX ADMIN — ONDANSETRON 4 MG: 2 INJECTION INTRAMUSCULAR; INTRAVENOUS at 22:24

## 2020-03-01 RX ADMIN — ATORVASTATIN CALCIUM 10 MG: 10 TABLET, FILM COATED ORAL at 08:55

## 2020-03-01 RX ADMIN — FUROSEMIDE 20 MG/HR: 10 INJECTION, SOLUTION INTRAVENOUS at 00:26

## 2020-03-01 RX ADMIN — FAMOTIDINE 20 MG: 20 INJECTION, SOLUTION INTRAVENOUS at 15:46

## 2020-03-01 RX ADMIN — AMLODIPINE BESYLATE 5 MG: 5 TABLET ORAL at 14:39

## 2020-03-01 RX ADMIN — MYCOPHENOLATE MOFETIL 750 MG: 250 CAPSULE ORAL at 18:03

## 2020-03-01 RX ADMIN — LIDOCAINE 1 PATCH: 560 PATCH PERCUTANEOUS; TOPICAL; TRANSDERMAL at 08:53

## 2020-03-01 RX ADMIN — SENNOSIDES AND DOCUSATE SODIUM 2 TABLET: 8.6; 5 TABLET ORAL at 20:10

## 2020-03-01 RX ADMIN — FUROSEMIDE 20 MG/HR: 10 INJECTION, SOLUTION INTRAVENOUS at 16:09

## 2020-03-01 RX ADMIN — HEPARIN SODIUM 5000 UNITS: 5000 INJECTION, SOLUTION INTRAVENOUS; SUBCUTANEOUS at 22:28

## 2020-03-01 RX ADMIN — CARVEDILOL 6.25 MG: 6.25 TABLET, FILM COATED ORAL at 08:52

## 2020-03-01 RX ADMIN — FUROSEMIDE 20 MG/HR: 10 INJECTION, SOLUTION INTRAVENOUS at 21:16

## 2020-03-01 RX ADMIN — ACETAMINOPHEN 650 MG: 325 TABLET, FILM COATED ORAL at 16:39

## 2020-03-01 RX ADMIN — MYCOPHENOLATE MOFETIL 750 MG: 250 CAPSULE ORAL at 08:35

## 2020-03-01 ASSESSMENT — MIFFLIN-ST. JEOR: SCORE: 1528

## 2020-03-01 ASSESSMENT — ACTIVITIES OF DAILY LIVING (ADL)
ADLS_ACUITY_SCORE: 16
ADLS_ACUITY_SCORE: 17
ADLS_ACUITY_SCORE: 16
ADLS_ACUITY_SCORE: 17

## 2020-03-01 NOTE — PLAN OF CARE
Neuro: A&Ox4. Drowsy, arouses easily. Speech clear. Numbness in right hand, baseline per pt.  Cardiac: ST, low 100s. -150, stable. Temp 100, gave scheduled tylenol, recheck 99.0   Respiratory: Sating 95% on RA. Does IS with encouragement.  GI/: Oliguric. Stein in place. NG placed this afternoon, LIS. Nausea has resolved since NG placement. Bowel sounds hypoactive.  Diet/appetite: Tolerating CL diet. Swallows without difficulty.  Activity:  Refused activity this shift due to nausea.  Pain: At acceptable level on current regimen. Dilaudid PCA in place.  Skin: No new deficits noted. Scattered rash, generalized. Pt denies itching/discomfort.  LDA's: PIV x2. EMMA. Stein. NG.    Plan: Continue with POC. Notify primary team with changes.

## 2020-03-01 NOTE — PLAN OF CARE
"Blood pressure (!) 193/101, pulse 97, temperature 98.8  F (37.1  C), temperature source Oral, resp. rate 18, height 1.626 m (5' 4\"), weight 72.2 kg (159 lb 2.8 oz), SpO2 97 %.  Neuro: A&Ox4.  Numbness in right hand, baseline per pt.  Cardiac: ST, low 90's- 100s. BP elevated- Provider notified, Labetalol IV x1 given, PRN hydralazine ordered.  Respiratory: Sating >95% on RA. Does IS with encouragement.  GI/:  Stein in place-improved urine output, no dialysis. NG to LIS. Nausea has resolved since NG placement. Bowel sound hypoactive.  Diet/appetite: NPO with ice chips. Swallows without difficulty.  Activity:  up to chair, ambulated with standby assist of 1-2  Pain: At acceptable level on current regimen. Dilaudid PCA in place.  Skin: No new deficits noted. Pt denies itching/discomfort.  LDA's: PIV x2. EMMA. Stein. NG.     Plan: Continue with POC. Notify primary team with changes.         "

## 2020-03-01 NOTE — PROGRESS NOTES
[facility], [place]   Transplant Nephrology Progress Note  Date of Admission:  2/28/2020  Today's Date: 03/01/2020    Assessment & Plan   # DDKT: .    ml yesterday.               - Baseline Cr ~ TBD              - Proteinuria: Not checked post transplant              - Date DSA Last Checked: Feb/2020      Latest DSA: No DSA at time of transplant              - BK Viremia: No              - Kidney Tx Biopsy: No    # Immunosuppression: Tacrolimus immediate release (goal 8-10) and Mycophenolate mofetil (goal 5-7)              - Changes: No     # Infection Prophylaxis:   - PJP: Sulfa/TMP (Bactrim)  - CMV: Valcyte   - Thrush: None     # Hypertension: Inadequate control;             Goal BP: < 140/90              - Volume status: Moderately hypervolemic                EDW~ 67.8kg              - Changes: Yes - Coreg 12.5mg BID, and Norvasc 5mg daily.       # Elevated Blood Glucose: Glucose generally running ~               - Management as per primary team.     # Post-Transplant Erythrocytosis: Hgb: Stable;    On ACEI/ARB: No     # Mineral Bone Disorder:   - Secondary renal hyperparathyroidism; PTH level: Not checked recently        On treatment: None  - Vitamin D; level: Not checked recently        On Supplement: No  - Calcium; level: Low        On Supplement: No  - Phosphorus; level: High        On Supplement: No     # Electrolytes:   - Potassium; level: High        On Supplement: No  - Magnesium; level: Normal        On Supplement: No  - Bicarbonate; level: Low        On Supplement: No  - Sodium; level: Normal        On Supplement: No     # CML: In remission. On dasatinib 40mg daily     # Hyperkalemia:  Resolved.      Nausea/Vomiting: Resolved with NG tube placement.  Nausea has improved.        # Transplant History:  Etiology of Kidney Failure:  Alport syndrome s/p kidney transplant x 2  Tx: DDKT  Transplant: 2/28/2020 (Kidney), 12/7/1988 (Kidney), 3/2/2005 (Kidney)  Donor Type:  Donation after Brain Death        Donor Class:   Crossmatch at time of Tx: negative  DSA at time of Tx: No  Significant changes in immunosuppression: None  Significant transplant-related complications: None    Recommendations were communicated to the primary team verbally.    Seen and discussed with Dr. Telly Lema NP   Pager: [phone]    Physician Attestation   I, Alvin Varner, saw and evaluated Jeremiah Cruz as part of a shared visit.  I have reviewed and discussed with the advanced practice provider their history, physical and plan.    I personally reviewed the vital signs, medications and labs.    My key history or physical exam findings: Patient dialyzed yesterday and creatinine down appropriately.  Improving urine output.  Patient feels okay, but swollen.  High BP.    Key management decisions made by me: Would make no changes in immunosuppression.  No acute indications for dialysis.  Recommend increasing carvedilol to 12.5 mg bid and starting amlodipine 5 mg nightly.    Alvin Varner  Date of Service (when I saw the patient): 03/01/20    Interval History   No fevers, chills, nausea or vomiting    States he feels better.  Less fatigued.     No chest pain or shortness of breath.      Review of Systems   4 point ROS was obtained and negative except as noted in the Interval History.    MEDICATIONS:    atorvastatin  10 mg Oral Daily     carvedilol  6.25 mg Oral BID w/meals     famotidine  20 mg Intravenous Q24H     heparin ANTICOAGULANT  5,000 Units Subcutaneous Q12H     lidocaine  1 patch Transdermal Q24H     lidocaine   Transdermal Q8H     magnesium oxide  400 mg Oral Daily with lunch     mycophenolate  750 mg Oral BID IS     senna-docusate  1 tablet Oral BID    Or     senna-docusate  2 tablet Oral BID     sodium chloride (PF)  10 mL Intracatheter Q8H     sulfamethoxazole-trimethoprim  1 tablet Oral Daily     tacrolimus  2 mg Oral BID IS     [START ON 3/2/2020] valGANciclovir   "450 mg Oral Once per day on Mon Thu       dextrose 5% and 0.9% NaCl 50 mL/hr at 20 0958     furosemide (LASIX) infusion ADULT STANDARD 20 mg/hr (20 0900)     HYDROmorphone         Physical Exam   Temp  Av.6  F (37  C)  Min: 97.7  F (36.5  C)  Max: 100  F (37.8  C)  Arterial Line BP  Min: 128/66  Max: 143/74  Arterial Line MAP (mmHg)  Av mmHg  Min: 94 mmHg  Max: 102 mmHg      Pulse  Av.3  Min: 70  Max: 123 Resp  Av.4  Min: 8  Max: 20  SpO2  Av.1 %  Min: 91 %  Max: 100 %     BP (!) 167/88 (BP Location: Left leg, Cuff Size: Adult Regular)   Pulse 111   Temp 98.6  F (37  C) (Oral)   Resp 16   Ht 1.626 m (5' 4\")   Wt 72.2 kg (159 lb 2.8 oz)   SpO2 98%   BMI 27.32 kg/m     Date 20 0700 - 20 0659   Shift 1427-6846 1271-3683 0159-0230 24 Hour Total   INTAKE   I.V. 290   290   Shift Total(mL/kg) 290(4.02)   290(4.02)   OUTPUT   Urine 175   175   Emesis/NG output 250   250   Drains 25   25   Shift Total(mL/kg) 450(6.23)   450(6.23)   Weight (kg) 72.2 72.2 72.2 72.2      Admit Weight: 68.1 kg (150 lb 2.1 oz)     GENERAL APPEARANCE: alert and no distress  HENT: mouth without ulcers or lesions.  NG tube in place  LYMPHATICS: no cervical or supraclavicular nodes  RESP: lungs clear to auscultation - no rales, rhonchi or wheezes  CV: regular rhythm, normal rate, no rub, no murmur  EDEMA: no LE edema bilaterally  ABDOMEN: soft, nondistended, nontender, bowel sounds normal  MS: extremities normal - no gross deformities noted, no evidence of inflammation in joints, no muscle tenderness  SKIN: no rash    Data   All labs reviewed by me.  CMP  Recent Labs   Lab 20  1836 20  0902 20  0317 20  2245 20  1740 20  1417 20  1308 20  0341   NA  --   --  136  --  137 138 140 137   POTASSIUM 4.6 6.6* 4.6 4.8 4.6 5.2 5.7* 5.0   CHLORIDE  --   --  102  --  103  --   --  102   CO2  --   --  18*  --  20  --   --  22   ANIONGAP  --   --  16*  --  " 14  --   --  13   GLC  --   --  213*  --  129* 95 107* 92   BUN  --   --  71*  --  63*  --   --  65*   CR  --   --  12.60*  --  11.60*  --   --  11.90*   GFRESTIMATED  --   --  4*  --  5*  --   --  5*   GFRESTBLACK  --   --  5*  --  5*  --   --  5*   ANTWAN  --   --  6.8*  --  6.7*  --   --  7.7*   MAG  --   --  2.0  --  1.9  --   --   --    PHOS  --   --  8.5*  --  7.3*  --   --   --    PROTTOTAL  --   --   --   --   --   --   --  7.9   ALBUMIN  --   --   --   --   --   --   --  4.0   BILITOTAL  --   --   --   --   --   --   --  0.6   ALKPHOS  --   --   --   --   --   --   --  108   AST  --   --   --   --   --   --   --  30   ALT  --   --   --   --   --   --   --  48     CBC  Recent Labs   Lab 02/29/20  1836 02/29/20  0902 02/29/20  0317 02/28/20  2245 02/28/20  1740  02/28/20  0341   HGB 9.2* 9.7* 9.5* 8.8* 8.3*   < > 11.8*   WBC  --   --  18.2*  --  1.4*  --  9.7   RBC  --   --  3.19*  --  2.84*  --  4.03*   HCT  --   --  30.6*  --  26.4*  --  37.4*   MCV  --   --  96  --  93  --  93   MCH  --   --  29.8  --  29.2  --  29.3   MCHC  --   --  31.0*  --  31.4*  --  31.6   RDW  --   --  14.2  --  14.2  --  14.1   PLT  --   --  132*  --  100*  --  195    < > = values in this interval not displayed.     INR  Recent Labs   Lab 02/28/20  0341   INR 1.11   PTT 30     ABG  Recent Labs   Lab 02/28/20  1417 02/28/20  1308   PH 7.36 7.38   PCO2 35 35   PO2 222* 202*   HCO3 19* 20*   O2PER 53.0 54.0      Urine Studies  No lab results found.  No lab results found.  PTH  No lab results found.  Iron Studies  No lab results found.    IMAGING:  All imaging studies reviewed by me.

## 2020-03-01 NOTE — PROGRESS NOTES
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  He is s/p Kidney transplant x2 (1988, 2005). 2nd graft lost to primary nonfunction with explant a few weeks later. He has been on dialysis since 1999. He is now s/p DDKT 2/28/20.    Graft function:s/p DBD DDKTx with stent: 2/28: POD #1 with delayed graft function. UOP 30-50cc/hr., improving. Lasix gtt 20cc/hr. Underwent HD POD#1 due to hyperkalemia, K  4.7 today. SCR 8.4 -reflective of dialysis yesterday.  Immunosuppression management:   Induction with Thymo 150mg/150/ 125mg today  SM 500mg intraop/250/100mg today  Tacrolimus 2mg BID. Titrate to a goal of 8-10.   MMF 750mg BID.   Complexity of management:Medium. Contributing factors: anemia and delayed graft function  Hematology:Anemia of chronic disease: Hgb stable ~8-9. Start 5000U SQH BID  Neurology: Acute postoperative Pain: On dilaudid PCA, adequate pain control, transition to oral pain medications as tolerated  Cardiorespiratory: HTN: uncontrolled, -200s, Coreg 6.25 BID-increase to 12.5, start norvasc 5.  Tachycardic: 100-110s.   GI/Nutrition: CLD, NGT tube in place with 1.8L output.   Continue bowel regimen.   Endocrine:Steroid induced hyperglycemia: -210. Continue to monitor with Steroid induction.  HgbA1c =5.2  Fluid/Electrolytes: MIVF:D5NS @ 50cc, continue with poor po intake. CIV when able.   Hypervolemia: EDW =67.8 Kg, +4Kg from admit improved with volume removal on HD yesterday   : Stein to remain due to new surgical anastomosis.  Infectious disease: Afebrile, Leukocytosis, WBC 10 (18). Improving,  Most likely due to steroids. Continue to monitor.   Prophylaxis: DVT, fall, GI, fungal, PT/OT  Disposition: 6B     Medical Decision Making: Medium  Subsequent visit 02400 (moderate level decision making)    DARRON/Fellow/Resident Provider: Marissa Jang,  "NP      Faculty: Carol Samuel MD  _________________________________________________________________  Transplant History: Admitted 2/28/2020 for Kidney transplant.  2/28/2020 (Kidney), 12/7/1988 (Kidney), 3/2/2005 (Kidney), Postoperative day: 2     Interval History: History is obtained from the patient  Overnight events: Patient underwent hemodialysis yesterday for hyperkalemia/shortness of breath. NGT placed for abdominal distension and nausea, now resolving. Patient denying lab draws this morning after multiple failed attempts. Chloraseptic ordered for sore throat/irritation with NGT. Reports significant improvement in abdominal discomfort with NGT. Feels like he is ready to pass gas soon. Pain well controlled on current pain regimen.    ROS:   A 10-point review of systems was negative except as noted above.    Meds:    atorvastatin  10 mg Oral Daily     carvedilol  6.25 mg Oral BID w/meals     famotidine  20 mg Intravenous Q24H     lidocaine  1 patch Transdermal Q24H     lidocaine   Transdermal Q8H     magnesium oxide  400 mg Oral Daily with lunch     mycophenolate  750 mg Oral BID IS     senna-docusate  1 tablet Oral BID    Or     senna-docusate  2 tablet Oral BID     sodium chloride (PF)  10 mL Intracatheter Q8H     sulfamethoxazole-trimethoprim  1 tablet Oral Daily     tacrolimus  2 mg Oral BID IS     [START ON 3/2/2020] valGANciclovir  450 mg Oral Once per day on Mon Thu       Physical Exam:     Admit Weight: 68.1 kg (150 lb 2.1 oz)    Current vitals:   BP (!) 156/87   Pulse 111   Temp 98  F (36.7  C) (Oral)   Resp 14   Ht 1.626 m (5' 4\")   Wt 72.2 kg (159 lb 2.8 oz)   SpO2 96%   BMI 27.32 kg/m           Vital sign ranges:    Temp:  [98  F (36.7  C)-100  F (37.8  C)] 98  F (36.7  C)  Pulse:  [102-115] 111  Heart Rate:  [] 111  Resp:  [8-18] 14  BP: (136-179)/() 156/87  SpO2:  [91 %-100 %] 96 %  Patient Vitals for the past 24 hrs:   BP Temp Temp src Pulse Heart Rate Resp SpO2 Weight "   03/01/20 0512 -- -- -- -- -- -- -- 72.2 kg (159 lb 2.8 oz)   03/01/20 0320 (!) 156/87 98  F (36.7  C) Oral 111 -- 14 96 % --   03/01/20 0010 (!) 166/95 99  F (37.2  C) Oral 111 -- 14 95 % --   02/29/20 2100 (!) 151/86 99  F (37.2  C) Oral 112 -- 16 98 % --   02/29/20 1930 (!) 149/79 -- -- 115 -- 18 99 % --   02/29/20 1900 136/73 99.7  F (37.6  C) Oral 110 111 16 100 % --   02/29/20 1858 -- -- -- -- -- -- 96 % --   02/29/20 1856 -- -- -- -- -- -- 91 % --   02/29/20 1805 (!) 143/82 100  F (37.8  C) Oral 102 -- 14 100 % --   02/29/20 1650 (!) 157/75 -- -- 104 -- 18 100 % --   02/29/20 1514 (!) 179/87 98.5  F (36.9  C) Oral -- 113 14 96 % --   02/29/20 1451 (!) 169/81 -- -- -- 103 12 100 % --   02/29/20 1445 (!) 168/86 98.5  F (36.9  C) Oral -- 118 14 100 % --   02/29/20 1430 (!) 172/75 -- -- -- 112 8 100 % --   02/29/20 1415 (!) 162/79 -- -- -- 112 13 100 % --   02/29/20 1400 (!) 154/75 -- -- -- 112 9 100 % --   02/29/20 1345 (!) 151/89 -- -- -- 109 8 100 % --   02/29/20 1330 (!) 164/80 -- -- -- 116 9 100 % --   02/29/20 1315 (!) 153/74 -- -- -- 120 9 100 % --   02/29/20 1300 (!) 157/82 -- -- -- 117 9 100 % --   02/29/20 1245 (!) 170/76 -- -- -- 105 11 100 % --   02/29/20 1230 (!) 161/104 -- -- -- 112 8 100 % --   02/29/20 1215 (!) 167/88 -- -- -- 106 9 100 % --   02/29/20 1200 (!) 178/88 -- -- -- 110 9 100 % --   02/29/20 1145 (!) 158/102 -- -- -- 110 9 100 % --   02/29/20 1130 (!) 155/108 -- -- -- 96 15 100 % --   02/29/20 1115 (!) 167/94 -- -- -- 90 9 95 % --   02/29/20 1100 (!) 154/76 -- -- -- 101 10 100 % --   02/29/20 1052 (!) 148/82 98  F (36.7  C) Oral -- 99 9 98 % --   02/29/20 1000 (!) 147/87 -- -- -- -- -- 100 % --   02/29/20 0900 (!) 140/88 98.3  F (36.8  C) Oral -- 82 12 -- --     General Appearance: in no apparent distress.   Skin: normal, warm, dry  HEENT: Atraumatic, normocephalic, NG tube in place  Heart: regular rate and rhythm  Lungs: Breathing comfortably on room air  Abdomen: Abdomen is round,  less-distended. Non-tender over the kidney graft.    Dressing removed, incision well-healing without javier-incisional erythema or drainage  : tan is present.  Clear, yellow urine draining  Extremities: edema: present bilateral. 1+  Neurologic: awake, alert and oriented. Tremor absent.     Data:   CMP  Recent Labs   Lab 02/29/20 1836 02/29/20 0902 02/29/20 0317 02/28/20 1740 02/28/20  1417 02/28/20  1308 02/28/20  0341   NA  --   --  136  --  137 138 140 137   POTASSIUM 4.6 6.6* 4.6   < > 4.6 5.2 5.7* 5.0   CHLORIDE  --   --  102  --  103  --   --  102   CO2  --   --  18*  --  20  --   --  22   GLC  --   --  213*  --  129* 95 107* 92   BUN  --   --  71*  --  63*  --   --  65*   CR  --   --  12.60*  --  11.60*  --   --  11.90*   GFRESTIMATED  --   --  4*  --  5*  --   --  5*   GFRESTBLACK  --   --  5*  --  5*  --   --  5*   ANTWAN  --   --  6.8*  --  6.7*  --   --  7.7*   ICAW  --   --   --   --   --  3.7* 3.6*  --    MAG  --   --  2.0  --  1.9  --   --   --    PHOS  --   --  8.5*  --  7.3*  --   --   --    ALBUMIN  --   --   --   --   --   --   --  4.0   BILITOTAL  --   --   --   --   --   --   --  0.6   ALKPHOS  --   --   --   --   --   --   --  108   AST  --   --   --   --   --   --   --  30   ALT  --   --   --   --   --   --   --  48    < > = values in this interval not displayed.     CBC  Recent Labs   Lab 02/29/20 1836 02/29/20 0902 02/29/20 0317 02/28/20 1740 02/28/20  0341   HGB 9.2* 9.7* 9.5*   < > 8.3*   < > 11.8*   WBC  --   --  18.2*  --  1.4*  --  9.7   PLT  --   --  132*  --  100*  --  195   A1C  --   --   --   --   --   --  5.2    < > = values in this interval not displayed.     COAGS  Recent Labs   Lab 02/28/20  0341   INR 1.11   PTT 30      Urinalysis  No lab results found.  Virology:  CMV IgG Antibody   Date Value Ref Range Status   03/01/2005 1.4 EU/mL Final     Comment:     Negative for anti-CMV IgG     Hepatitis C Antibody   Date Value Ref Range Status   02/28/2020 Nonreactive  NR^Nonreactive Final     Comment:     Assay performance characteristics have not been established for newborns,   infants, and children       Hep B Surface Ariella   Date Value Ref Range Status   03/01/2005 (A) NEG Final    Positive, Patient is considered to be immune to infection with hepatitis B.     BK Virus Result   Date Value Ref Range Status   01/26/2017 BK Virus DNA Not Detected BKNEG copies/mL Final

## 2020-03-01 NOTE — PROGRESS NOTES
"SURGERY CROSS-COVER     I was paged due to concern for triggering sepsis protocol - patient with persistent tachycardia all day and leukocytosis on AM CBC. Patient denying additional blood draws to obtain lactate at this time.    S: Patient reporting minimal javier-incisional abdominal pain. He denies chest pain, shortness of breath, pain in his extremities. No additional complaints at this time.    O:  BP (!) 151/86 (BP Location: Left leg)   Pulse 112   Temp 99  F (37.2  C) (Oral)   Resp 16   Ht 1.626 m (5' 4\")   Wt 74 kg (163 lb 2.3 oz)   SpO2 98%   BMI 28.00 kg/m    Gen: Awake, alert, uncomfortable appearing  Pulm: Some increased work of breathing, saturating well on room air  CV: Tachycardic  Abd: Soft, appropriately-tender, non-distended. EMMA with serosanginous drainage, dressing in place.   : Stein catheter in place  Extremities: Warm and well perfused, no edema bilaterally in lower extremities    A&P:  Patient's leukocytosis is likely in the setting of high dose steroids and induction immunosuppression following DDKT yesterday. Not necessary to undergo blood draw for lactate at this time. Will continue to closely monitor. Patient's vital sign and lab abnormalities have not acutely worsened and most likely due to post-operative immunosuppression induction, very unlikely to be due to underlying infection.    Discussed with transplant surgery staff.    Wood Kaur MD MS  Urology PGY-1  Surgery Cross-Cover  "

## 2020-03-01 NOTE — PLAN OF CARE
Neuro: Lethargic/withdrawn  Cardiac: ST. BP elevated but acceptable.   Respiratory: 90's ORA.   GI/: Min UOP NG with large output  Diet/appetite: Nausea with PO intake  Activity:  Bedrest.   Pain: o use PCA  Skin: Unchanged  LDA's: PIV x2. AV Fistula x2. R. EMMA drain      Plan: Continue with POC. Notify primary team with changes.

## 2020-03-01 NOTE — PROGRESS NOTES
Admission          2/28/2020  2:11 AM  -----------------------------------------------------------  Reason for admission:  Primary team notified of pt arrival.  Admitted from:  Belongings: Placed in closet; valuables sent home with family  Admission Profile: complete  Teaching: orientation to unit and call light- call light within reach, call don't fall, use of console, meal times, when to call for the RN, and enforced importance of safety   Access: PIV  Telemetry:Placed on pt  Ht./Wt.: complete  2 RN Skin Assessment Completed By: Kin

## 2020-03-01 NOTE — PROGRESS NOTES
SURGERY CROSS-COVER     Brief Progress Note    Paged that patient meeting sepsis trigger for tachycardia, leukocytosis yesterday morning and patient still refusing labs. Discussed with nurse that sepsis protocol trigger had been addressed multiple times and nurse confirmed no new acute changes.    Discussed with patient the importance of obtaining labs and that it serves as an indication of function of his transplanted kidney and helps guide further need for dialysis or other therapies. Patient was adamant that his labs be drawn only through his fistula or at dialysis. Patient voiced understanding of my concerns and the necessity for drawing these labs, but still adamantly refusing labs at this time.     Will discuss alternative options for lab draws with day team.    Wood Kaur MD MS  Urology PGY-1  Surgery [facility]

## 2020-03-01 NOTE — PHARMACY-TRANSPLANT NOTE
Adult Kidney Transplant Post Operative Note    44 year old male s/p  donor kidney transplant on 20 for Alports disease, HTN, CML.      Planned immunosuppression regimen per kidney transplant protocol:  INDUCTION: thymoglobulin to total ~ 6mg/kg and methylprednisolone IV daily x 3 doses used as pre-med for thymoglobulin.  MAINTENANCE:  mycophenolate and tacrolimus with goal trough levels of 8-10 mcg/L for first 6 months post-transplant.    Opportunistic pathogen prophylaxis includes: trimethoprim/sulfamethoxazole and valganciclovir.    Patient is not enrolled in medication study.    Pharmacy will monitor for medication interactions and immunosuppression levels in conjunction with the team. Medication therapy needs for discharge planning will continue to be addressed throughout the current admission via multidisciplinary rounds and order review.  Pharmacy will make recommendations as appropriate.    Tressa Acevedo PharmD

## 2020-03-02 ENCOUNTER — TELEPHONE (OUTPATIENT)
Dept: TRANSPLANT | Facility: CLINIC | Age: 45
End: 2020-03-02

## 2020-03-02 ENCOUNTER — APPOINTMENT (OUTPATIENT)
Dept: OCCUPATIONAL THERAPY | Facility: CLINIC | Age: 45
DRG: 652 | End: 2020-03-02
Attending: SURGERY
Payer: MEDICARE

## 2020-03-02 PROBLEM — E87.5 HYPERKALEMIA: Status: ACTIVE | Noted: 2020-03-02

## 2020-03-02 PROBLEM — T86.19 DELAYED GRAFT FUNCTION OF KIDNEY: Status: ACTIVE | Noted: 2020-03-02

## 2020-03-02 PROBLEM — D84.9 IMMUNOSUPPRESSED STATUS (H): Status: ACTIVE | Noted: 2020-02-28

## 2020-03-02 LAB
ANION GAP SERPL CALCULATED.3IONS-SCNC: 12 MMOL/L (ref 3–14)
BASOPHILS # BLD AUTO: 0 10E9/L (ref 0–0.2)
BASOPHILS NFR BLD AUTO: 0 %
BUN SERPL-MCNC: 76 MG/DL (ref 7–30)
CALCIUM SERPL-MCNC: 8.2 MG/DL (ref 8.5–10.1)
CHLORIDE SERPL-SCNC: 102 MMOL/L (ref 94–109)
CO2 SERPL-SCNC: 25 MMOL/L (ref 20–32)
CREAT SERPL-MCNC: 9.25 MG/DL (ref 0.66–1.25)
DEPRECATED CALCIDIOL+CALCIFEROL SERPL-MC: 14 UG/L (ref 20–75)
DIFFERENTIAL METHOD BLD: ABNORMAL
DONOR IDENTIFICATION: NORMAL
DSA COMMENTS: NORMAL
DSA PRESENT: NO
DSA TEST METHOD: NORMAL
EOSINOPHIL # BLD AUTO: 0 10E9/L (ref 0–0.7)
EOSINOPHIL NFR BLD AUTO: 0 %
ERYTHROCYTE [DISTWIDTH] IN BLOOD BY AUTOMATED COUNT: 14.5 % (ref 10–15)
GFR SERPL CREATININE-BSD FRML MDRD: 6 ML/MIN/{1.73_M2}
GLUCOSE SERPL-MCNC: 138 MG/DL (ref 70–99)
HCT VFR BLD AUTO: 26.6 % (ref 40–53)
HGB BLD-MCNC: 8.3 G/DL (ref 13.3–17.7)
IMM GRANULOCYTES # BLD: 0 10E9/L (ref 0–0.4)
IMM GRANULOCYTES NFR BLD: 0.6 %
LYMPHOCYTES # BLD AUTO: 0.1 10E9/L (ref 0.8–5.3)
LYMPHOCYTES NFR BLD AUTO: 2.1 %
MAGNESIUM SERPL-MCNC: 2.1 MG/DL (ref 1.6–2.3)
MCH RBC QN AUTO: 29 PG (ref 26.5–33)
MCHC RBC AUTO-ENTMCNC: 31.2 G/DL (ref 31.5–36.5)
MCV RBC AUTO: 93 FL (ref 78–100)
MONOCYTES # BLD AUTO: 0.2 10E9/L (ref 0–1.3)
MONOCYTES NFR BLD AUTO: 2.9 %
NEUTROPHILS # BLD AUTO: 4.9 10E9/L (ref 1.6–8.3)
NEUTROPHILS NFR BLD AUTO: 94.4 %
NRBC # BLD AUTO: 0 10*3/UL
NRBC BLD AUTO-RTO: 0 /100
ORGAN: NORMAL
PHOSPHATE SERPL-MCNC: 8.9 MG/DL (ref 2.5–4.5)
PLATELET # BLD AUTO: 83 10E9/L (ref 150–450)
POTASSIUM SERPL-SCNC: 4.2 MMOL/L (ref 3.4–5.3)
POTASSIUM SERPL-SCNC: 4.4 MMOL/L (ref 3.4–5.3)
RBC # BLD AUTO: 2.86 10E12/L (ref 4.4–5.9)
SA1 CELL: NORMAL
SA1 COMMENTS: NORMAL
SA1 HI RISK ABY: NORMAL
SA1 MOD RISK ABY: NORMAL
SA1 TEST METHOD: NORMAL
SA2 CELL: NORMAL
SA2 COMMENTS: NORMAL
SA2 HI RISK ABY UA: NORMAL
SA2 MOD RISK ABY: NORMAL
SA2 TEST METHOD: NORMAL
SODIUM SERPL-SCNC: 140 MMOL/L (ref 133–144)
TACROLIMUS BLD-MCNC: 4.8 UG/L (ref 5–15)
TACROLIMUS BLD-MCNC: NORMAL UG/L (ref 5–15)
TME LAST DOSE: ABNORMAL H
TME LAST DOSE: NORMAL H
WBC # BLD AUTO: 5.2 10E9/L (ref 4–11)

## 2020-03-02 PROCEDURE — 82306 VITAMIN D 25 HYDROXY: CPT | Performed by: NURSE PRACTITIONER

## 2020-03-02 PROCEDURE — 84132 ASSAY OF SERUM POTASSIUM: CPT | Performed by: NURSE PRACTITIONER

## 2020-03-02 PROCEDURE — 25000128 H RX IP 250 OP 636: Performed by: SURGERY

## 2020-03-02 PROCEDURE — 25000131 ZZH RX MED GY IP 250 OP 636 PS 637: Mod: GY | Performed by: NURSE PRACTITIONER

## 2020-03-02 PROCEDURE — 97165 OT EVAL LOW COMPLEX 30 MIN: CPT | Mod: GO | Performed by: OCCUPATIONAL THERAPIST

## 2020-03-02 PROCEDURE — 25000128 H RX IP 250 OP 636: Performed by: STUDENT IN AN ORGANIZED HEALTH CARE EDUCATION/TRAINING PROGRAM

## 2020-03-02 PROCEDURE — 80197 ASSAY OF TACROLIMUS: CPT | Performed by: NURSE PRACTITIONER

## 2020-03-02 PROCEDURE — 80048 BASIC METABOLIC PNL TOTAL CA: CPT | Performed by: SURGERY

## 2020-03-02 PROCEDURE — 85025 COMPLETE CBC W/AUTO DIFF WBC: CPT | Performed by: SURGERY

## 2020-03-02 PROCEDURE — 36415 COLL VENOUS BLD VENIPUNCTURE: CPT | Performed by: NURSE PRACTITIONER

## 2020-03-02 PROCEDURE — 25000132 ZZH RX MED GY IP 250 OP 250 PS 637: Mod: GY

## 2020-03-02 PROCEDURE — 25000128 H RX IP 250 OP 636

## 2020-03-02 PROCEDURE — 36415 COLL VENOUS BLD VENIPUNCTURE: CPT | Performed by: SURGERY

## 2020-03-02 PROCEDURE — 25800030 ZZH RX IP 258 OP 636: Performed by: STUDENT IN AN ORGANIZED HEALTH CARE EDUCATION/TRAINING PROGRAM

## 2020-03-02 PROCEDURE — 25000132 ZZH RX MED GY IP 250 OP 250 PS 637: Mod: GY | Performed by: NURSE PRACTITIONER

## 2020-03-02 PROCEDURE — 97530 THERAPEUTIC ACTIVITIES: CPT | Mod: GO | Performed by: OCCUPATIONAL THERAPIST

## 2020-03-02 PROCEDURE — 25800030 ZZH RX IP 258 OP 636: Performed by: SURGERY

## 2020-03-02 PROCEDURE — 25000131 ZZH RX MED GY IP 250 OP 636 PS 637: Mod: GY | Performed by: SURGERY

## 2020-03-02 PROCEDURE — 84100 ASSAY OF PHOSPHORUS: CPT | Performed by: SURGERY

## 2020-03-02 PROCEDURE — 12000026 ZZH R&B TRANSPLANT

## 2020-03-02 PROCEDURE — 25000132 ZZH RX MED GY IP 250 OP 250 PS 637: Mod: GY | Performed by: SURGERY

## 2020-03-02 PROCEDURE — 25000128 H RX IP 250 OP 636: Performed by: NURSE PRACTITIONER

## 2020-03-02 PROCEDURE — 83735 ASSAY OF MAGNESIUM: CPT | Performed by: SURGERY

## 2020-03-02 PROCEDURE — 25800030 ZZH RX IP 258 OP 636: Performed by: NURSE PRACTITIONER

## 2020-03-02 PROCEDURE — 97535 SELF CARE MNGMENT TRAINING: CPT | Mod: GO | Performed by: OCCUPATIONAL THERAPIST

## 2020-03-02 RX ORDER — LABETALOL 20 MG/4 ML (5 MG/ML) INTRAVENOUS SYRINGE
20 ONCE
Status: COMPLETED | OUTPATIENT
Start: 2020-03-02 | End: 2020-03-02

## 2020-03-02 RX ORDER — VALGANCICLOVIR HYDROCHLORIDE 50 MG/ML
450 POWDER, FOR SOLUTION ORAL
Status: DISCONTINUED | OUTPATIENT
Start: 2020-03-05 | End: 2020-03-03

## 2020-03-02 RX ORDER — HYDRALAZINE HYDROCHLORIDE 20 MG/ML
10 INJECTION INTRAMUSCULAR; INTRAVENOUS ONCE
Status: COMPLETED | OUTPATIENT
Start: 2020-03-02 | End: 2020-03-02

## 2020-03-02 RX ORDER — CEFUROXIME SODIUM 1.5 G/16ML
1.5 INJECTION, POWDER, FOR SOLUTION INTRAVENOUS ONCE
Status: DISCONTINUED | OUTPATIENT
Start: 2020-03-02 | End: 2020-03-02

## 2020-03-02 RX ORDER — FAMOTIDINE 20 MG/1
40 TABLET, FILM COATED ORAL DAILY
Status: DISCONTINUED | OUTPATIENT
Start: 2020-03-03 | End: 2020-03-10 | Stop reason: HOSPADM

## 2020-03-02 RX ORDER — OXYCODONE HYDROCHLORIDE 5 MG/1
5-10 TABLET ORAL EVERY 4 HOURS PRN
Status: DISCONTINUED | OUTPATIENT
Start: 2020-03-02 | End: 2020-03-04

## 2020-03-02 RX ORDER — BISACODYL 10 MG
10 SUPPOSITORY, RECTAL RECTAL ONCE
Status: COMPLETED | OUTPATIENT
Start: 2020-03-02 | End: 2020-03-02

## 2020-03-02 RX ORDER — AMLODIPINE BESYLATE 10 MG/1
10 TABLET ORAL DAILY
Status: DISCONTINUED | OUTPATIENT
Start: 2020-03-02 | End: 2020-03-10 | Stop reason: HOSPADM

## 2020-03-02 RX ORDER — MYCOPHENOLATE MOFETIL 200 MG/ML
750 POWDER, FOR SUSPENSION ORAL
Status: DISCONTINUED | OUTPATIENT
Start: 2020-03-02 | End: 2020-03-03

## 2020-03-02 RX ORDER — FUROSEMIDE 10 MG/ML
80 INJECTION INTRAMUSCULAR; INTRAVENOUS EVERY 12 HOURS
Status: DISCONTINUED | OUTPATIENT
Start: 2020-03-02 | End: 2020-03-02

## 2020-03-02 RX ORDER — PREDNISONE 5 MG/1
5 TABLET ORAL DAILY
Status: DISCONTINUED | OUTPATIENT
Start: 2020-03-02 | End: 2020-03-10 | Stop reason: HOSPADM

## 2020-03-02 RX ORDER — ACETAMINOPHEN 325 MG/1
975 TABLET ORAL EVERY 8 HOURS
Status: DISCONTINUED | OUTPATIENT
Start: 2020-03-02 | End: 2020-03-03

## 2020-03-02 RX ORDER — SULFAMETHOXAZOLE AND TRIMETHOPRIM 400; 80 MG/1; MG/1
1 TABLET ORAL
Status: DISCONTINUED | OUTPATIENT
Start: 2020-03-02 | End: 2020-03-10 | Stop reason: HOSPADM

## 2020-03-02 RX ADMIN — LABETALOL 20 MG/4 ML (5 MG/ML) INTRAVENOUS SYRINGE 20 MG: at 03:14

## 2020-03-02 RX ADMIN — FUROSEMIDE 20 MG/HR: 10 INJECTION, SOLUTION INTRAVENOUS at 01:49

## 2020-03-02 RX ADMIN — SULFAMETHOXAZOLE AND TRIMETHOPRIM 1 TABLET: 400; 80 TABLET ORAL at 09:09

## 2020-03-02 RX ADMIN — ACETAMINOPHEN 975 MG: 325 TABLET, FILM COATED ORAL at 10:13

## 2020-03-02 RX ADMIN — AMLODIPINE BESYLATE 10 MG: 10 TABLET ORAL at 09:11

## 2020-03-02 RX ADMIN — HYDRALAZINE HYDROCHLORIDE 20 MG: 20 INJECTION INTRAMUSCULAR; INTRAVENOUS at 02:04

## 2020-03-02 RX ADMIN — DEXTROSE AND SODIUM CHLORIDE: 5; 900 INJECTION, SOLUTION INTRAVENOUS at 11:48

## 2020-03-02 RX ADMIN — ATORVASTATIN CALCIUM 10 MG: 10 TABLET, FILM COATED ORAL at 09:09

## 2020-03-02 RX ADMIN — SENNOSIDES AND DOCUSATE SODIUM 2 TABLET: 8.6; 5 TABLET ORAL at 09:09

## 2020-03-02 RX ADMIN — ONDANSETRON 4 MG: 2 INJECTION INTRAMUSCULAR; INTRAVENOUS at 05:13

## 2020-03-02 RX ADMIN — TACROLIMUS 2 MG: 1 CAPSULE ORAL at 09:11

## 2020-03-02 RX ADMIN — FAMOTIDINE 20 MG: 20 INJECTION, SOLUTION INTRAVENOUS at 10:15

## 2020-03-02 RX ADMIN — MYCOPHENOLATE MOFETIL 750 MG: 200 POWDER, FOR SUSPENSION ORAL at 17:32

## 2020-03-02 RX ADMIN — HEPARIN SODIUM 5000 UNITS: 5000 INJECTION, SOLUTION INTRAVENOUS; SUBCUTANEOUS at 10:13

## 2020-03-02 RX ADMIN — FUROSEMIDE 20 MG/HR: 10 INJECTION, SOLUTION INTRAVENOUS at 08:53

## 2020-03-02 RX ADMIN — PREDNISONE 5 MG: 5 TABLET ORAL at 09:14

## 2020-03-02 RX ADMIN — HEPARIN SODIUM 5000 UNITS: 5000 INJECTION, SOLUTION INTRAVENOUS; SUBCUTANEOUS at 21:53

## 2020-03-02 RX ADMIN — PROMETHAZINE HYDROCHLORIDE 12.5 MG: 25 INJECTION INTRAMUSCULAR; INTRAVENOUS at 09:25

## 2020-03-02 RX ADMIN — HYDRALAZINE HYDROCHLORIDE 10 MG: 20 INJECTION INTRAMUSCULAR; INTRAVENOUS at 05:13

## 2020-03-02 RX ADMIN — CARVEDILOL 12.5 MG: 12.5 TABLET, FILM COATED ORAL at 09:09

## 2020-03-02 RX ADMIN — LABETALOL 20 MG/4 ML (5 MG/ML) INTRAVENOUS SYRINGE 20 MG: at 00:37

## 2020-03-02 RX ADMIN — MYCOPHENOLATE MOFETIL 750 MG: 250 CAPSULE ORAL at 09:11

## 2020-03-02 RX ADMIN — DEXTROSE AND SODIUM CHLORIDE: 5; 900 INJECTION, SOLUTION INTRAVENOUS at 01:02

## 2020-03-02 RX ADMIN — DEXTROSE AND SODIUM CHLORIDE: 5; 900 INJECTION, SOLUTION INTRAVENOUS at 21:53

## 2020-03-02 RX ADMIN — HYDRALAZINE HYDROCHLORIDE 20 MG: 20 INJECTION INTRAMUSCULAR; INTRAVENOUS at 21:02

## 2020-03-02 RX ADMIN — VALGANCICLOVIR 450 MG: 450 TABLET, FILM COATED ORAL at 09:11

## 2020-03-02 RX ADMIN — BISACODYL 10 MG: 10 SUPPOSITORY RECTAL at 10:14

## 2020-03-02 RX ADMIN — Medication 2 MG: at 17:32

## 2020-03-02 ASSESSMENT — ACTIVITIES OF DAILY LIVING (ADL)
ADLS_ACUITY_SCORE: 16

## 2020-03-02 ASSESSMENT — MIFFLIN-ST. JEOR: SCORE: 1529

## 2020-03-02 NOTE — PHARMACY-ADMISSION MEDICATION HISTORY
Admission medication history interview status for the 2/28/2020 admission is complete. See Epic admission navigator for allergy information, pharmacy, prior to admission medications and immunization status.     Medication history interview sources:  patient, SureScripts, chart review    Changes made to PTA medication list (reason)  Added:   - Clonidine 0.1 mg, added strength of tablet   - Ranitidine 150 mg, added strength of tablet    Deleted:   - Amlodipine 10 mg daily, patient reports stopping medication due to controlled blood pressure per MD instruction   - Clindamycin 1% external solution, patient reports no longer using due to resolved condition  - Doxycycline 100 mg capsule, patient no longer taking due to therapy complete  - Doxycycline 50 mg capsule, patient no longer taking due to therapy complete  - Sevelamer 800 mg tablet, patient reports no longer taking due to controlled condition with calcium carbonate and lanthanum     Changed:   - Calcium carbonate 500 mg twice daily > calcium carbonate 500 mg three times daily with meals, patient reports using medication as a phosphate binder to be taken with meals  - Dasatinib 20 mg twice daily > dasatinib 40 mg daily, patient reports taking at night  - Lanthanum 500 mg twice daily > lanthanum 500 mg three times daily with meals, patient reports using medication as a phosphate binder to be taken with meals    Additional medication history information (including reliability of information, actions taken by pharmacist):  - Patient fills prescriptions as [facility] (Ph: [phone])   - Patient is a reliable historian; patient manages his own medications at home    Prior to Admission medications    Medication Sig Last Dose Taking? Auth Provider   B complex-C-folic acid (NEPHROCAPS/TRIPHROCAPS) 1 MG capsule Take 1 capsule by mouth daily 2/27/2020 at AM Yes Reported, Patient   calcium carbonate (TUMS) 500 MG chewable tablet Take 1 chewable tablet by  mouth 3 times daily with meals. 2/27/2020 at AM Yes Reported, Patient   chlorhexidine (HIBICLENS) 4 % liquid Cleanse scalp twice a week as directed. 2/27/2020 Yes Shady Alcocer MD   cloNIDine (CATAPRES) 0.1 MG tablet Take 0.1 mg by mouth 2 times daily  2/27/2020 at PM Yes Reported, Patient   dasatinib (SPRYCEL) 20 MG tablet CHEMO Take 40 mg by mouth daily  2/27/2020 at PM Yes Reported, Patient   ketoconazole (NIZORAL) 2 % external shampoo APPLY TO THE SCALP AND WASH OFF ATER 5 MINUTES EVERY OTHER DAY 2/27/2020 at PM Yes Shady Alcocer MD   lanthanum (FOSRENOL) 500 MG chewable tablet Take 500 mg by mouth 3 times daily (with meals)  2/27/2020 at PM Yes Reported, Patient   ranitidine (ZANTAC) 150 MG tablet Take 150 mg by mouth daily  2/27/2020 at AM Yes Reported, Patient     Medication history completed by:     Meghna Vasquez  PharmD Student  March 2, 2020

## 2020-03-02 NOTE — PLAN OF CARE
"Blood pressure (!) 149/100, pulse 85, temperature 98.2  F (36.8  C), resp. rate 16, height 1.626 m (5' 4\"), weight 72.3 kg (159 lb 6.3 oz), SpO2 100 %.  Transfer  Transferred to: 7A  Via: wheelchair  Reason for transfer:Pt no longer appropriate for 6B- improved patient condition  Family: Aware of transfer  Belongings: Packed and sent with pt  Chart: Delivered with pt to next unit  Medications: Meds sent to new unit with pt  Report given to: SHASHA RN  Pt status: Pt afebrile, vitals stable, c/o persistent nausea- Phenergan given once with some relief- NG remains to LIWS. Suppository given- small BM, will need tap water enema. Pt remains PCA D/C'd- PRN Oxycodone and tylenol scheduled. NPO. B/P improved. Encourage activity.     "

## 2020-03-02 NOTE — PLAN OF CARE
6B - Per discussion with OT and chart review, plan to HOLD PT Evaluation at this time. Pt was up at EOB with OT, SBA-CGA, declining walking due to dizziness but requiring minimal physical assist. Will HOLD PT Evaluation and re-assess pending further identified needs as OT following for education on ADLs and precautions at this time.

## 2020-03-02 NOTE — PLAN OF CARE
Neuro: A&Ox4. Drowsy, wakes easily. Uses call light appropriately.  Cardiac: SR, 90-100s. BP elevated, -170. Gave prn hydralazine, labetalol. Afebrile.   Respiratory: Sating 98% on RA. Lung sounds clear. Encouraged use of IS.  GI/: Adequate urine output. , 250 at q4 checks. No BM, pt reports passing flatus. Hypoactive bowel sounds. Continue NG to LIS.   Diet/appetite: Clear liquid diet, taking ice chips. Taking meds orally.  Activity:  Assist of SBA, up to chair and in halls. Walk in [place] x1 this shift.  Pain: At acceptable level on current regimen. Advised to use dilaudid PCA as needed.   Skin: No new deficits noted. Preventative mepiliex on sacrum.  LDA's: PIV L upper arm, PIV R wrist. GABBY Stein. NG to LIS.    Plan: Continue with POC. Notify primary team with changes.

## 2020-03-02 NOTE — PROGRESS NOTES
CLINICAL NUTRITION SERVICES - BRIEF NOTE     Nutrition Prescription    Recommendations already ordered by Registered Dietitian (RD):  Discharge diet order written.   Provided brief nutrition education.     Future/Additional Recommendations:  1. If suspect eating poorly, would offer supplements and start on kcal cnts.   2. Provide further nutrition education regarding transplant as appropriate. Unable to provide review to follow low fat/low sodium diet long term and monitoring of K+/Phos lab values with possible need for adjustment of these.    See RD note 2/29 for full assessment      NEW FINDINGS   Diet order: Clear Liquids  Intake: Jeremiah reports that he has drank some water this morning. Pt with emesis while writer present and then wanted to rest.     INTERVENTIONS  Implementation  Nutrition education for nutrition relationship to health/disease:   Provided instruction on post-transplant diet with discussion regarding protein sources and high protein needs ( g PRO) in acute post-tx phase. Reviewed need for food safety precautions to prevent food borne illness.  Handouts: Post-transplant diet guidelines at bedside + food safety booklet post txp already at bedside   Unable to do NFPA given pt with emesis and wanted to rest.     Monitoring/Evaluation  Will continue to monitor and evaluate per protocol.    Halie Guerra, RD, LD  6B RD pager [phone]

## 2020-03-02 NOTE — CONSULTS
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 He is s/p kidney transplant x2 (, ). 2nd graft lost to primary nonfunction with explant a few weeks later. He has been on dialysis since . He is now s/p intra-abdominal  donor kidney transplant w/ ureteral stent on 20.  Intervention: SW attempted to meet with patient 2x to complete assessment. The first attempt, patient was not in his room. The 2nd attempt, patient was sleeping soundly and snoring.   Assessment: SOT SW to meet with patient to complete assessment on 3/3/20.  Education provided by SW: None  Plan:    Discharge Plans in Progress: Home with assist    Barriers to d/c plan: Medical stability    Follow up Plan: SW to meet with patient on 3/3/20 to complete assessment    LON Plummer, MAGGIE  7A   Ph: [phone]  Pager: [phone]

## 2020-03-02 NOTE — PROGRESS NOTES
03/02/20 0800   Quick Adds   Type of Visit Initial Occupational Therapy Evaluation   Living Environment   Lives With child(stef), adult   Living Arrangements house   Home Accessibility stairs within home;stairs to enter home   Number of Stairs, Main Entrance 1   Number of Stairs, Within Home, Primary   (one flight to laundry, pt's daughter can do laundry)   Living Environment Comment OT: Pt reports daughter assists w/ laundry so he doesnt need to use basement   Functional Level   Ambulation 0-->independent   Transferring 0-->independent   Toileting 0-->independent   Bathing 0-->independent   Dressing 0-->independent   Eating 0-->independent   Communication 0-->understands/communicates without difficulty   Swallowing 0-->swallows foods/liquids without difficulty   Cognition 0 - no cognition issues reported   Fall history within last six months no   Which of the above functional risks had a recent onset or change? 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  He is s/p Kidney transplant x2 (1988, 2005). 2nd graft lost to primary nonfunction with explant a few weeks later. He has been on dialysis since 1999. He is now s/p DDKT 2/28/20.   Precautions/Limitations fall precautions;abdominal precautions   Weight-Bearing Status - LUE   (10# lifting restriction)   Weight-Bearing Status - RUE   (10# lifting restriction)   Cognitive Status Examination   Cognitive Comment OT: No concerns at this time, OT will continue to assess prn   Range of Motion (ROM)   ROM Comment OT: BUE WFL   Strength   Strength Comments OT: NT formally 2/2 precautions, overall deconditioned   Muscle Tone Assessment   Muscle Tone Comments OT: overall deconditioned   Mobility   Bed Mobility  "Comments OT: min A   Transfer Skills   Transfer Comments OT: CGA   Activities of Daily Living Analysis   Impairments Contributing to Impaired Activities of Daily Living pain;post surgical precautions;strength decreased   General Therapy Interventions   Planned Therapy Interventions ADL retraining;IADL retraining;bed mobility training;transfer training;home program guidelines;progressive activity/exercise   Clinical Impression   Criteria for Skilled Therapeutic Interventions Met yes, treatment indicated   OT Diagnosis decreased ind in ADLS/IADLS   Influenced by the following impairments generalized weakness and precautions   Assessment of Occupational Performance 1-3 Performance Deficits   Identified Performance Deficits decreased ind in ADLS/IADLS   Clinical Decision Making (Complexity) Low complexity   Therapy Frequency Daily   Predicted Duration of Therapy Intervention (days/wks) 3/15/20   Anticipated Discharge Disposition Home with Assist   Risks and Benefits of Treatment have been explained. Yes   Patient, Family & other staff in agreement with plan of care Yes   [facility] TM \"6 Clicks\"   2016, Trustees of [employer], under license to [employer].  All rights reserved.   6 Clicks Short Forms Daily Activity Inpatient Short Form   [facility]  \"6 Clicks\" Daily Activity Inpatient Short Form   1. Putting on and taking off regular lower body clothing? 2 - A Lot   2. Bathing (including washing, rinsing, drying)? 2 - A Lot   3. Toileting, which includes using toilet, bedpan or urinal? 2 - A Lot   4. Putting on and taking off regular upper body clothing? 4 - None   5. Taking care of personal grooming such as brushing teeth? 4 - None   6. Eating meals? 1 - Total   Daily Activity Raw Score (Score out of 24.Lower scores equate to lower levels of function) 15   Total Evaluation Time   Total Evaluation Time (Minutes) 5     "

## 2020-03-02 NOTE — PLAN OF CARE
Discharge Planner OT   Patient plan for discharge: not stated  Current status: OT educated pt on functional transfers and ADLS w/in precautions. Pt min A supine > sitting EOB. OT educated pt on functional transfers and utlizing proper body mechanics sit <> stand, pt CGA STS taking a few steps EOB> chair. Pt declined ambulation 2/2 nausea and dizziness, pt's VSS throughout and Rn notified of pt symptoms  Barriers to return to prior living situation: medical status  Recommendations for discharge: Home w/ A  Rationale for recommendations: A from family for heavy lifting to adhere to precautions       Entered by: Kaley Harrison 03/02/2020 3:26 PM

## 2020-03-02 NOTE — DISCHARGE SUMMARY
[facility] [place]    Discharge Summary  Transplant Surgery    Date of Admission:  2020  Date of Discharge:  3/10/2020  Discharging Provider: Cyndi Alex NP / Carol Samuel MD    Discharge Diagnoses   Principal Problem:    Kidney replaced by transplant  Active Problems:    Anemia in chronic kidney disease    Hypertension    GERD (gastroesophageal reflux disease)    Immunosuppressed status (H)    Delayed graft function of kidney    Hyperkalemia    History of difficult venous access    History of chronic myeloid leukemia    Postoperative ileus (H)    Steroid-induced hyperglycemia      History of Present Illness   Jeremiah Cruz is an 44 year old male with 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 He is s/p kidney transplant x2 (, ). Second graft lost due to primary nonfunction with explant a few weeks later. He has been on dialysis since . He is now s/p intra-abdominal  donor kidney transplant with ureteral stent on 3/5/2020.    Hospital Course   Kidney transplant with delayed graft function: Patient had delayed graft function post-op. Underwent HD POD#1 due to hyperkalemia. His Cr is now improving. Cr 8.5->7.4->7->6->4.9->4.1->3.7 on discharge. Good urine output. Stein to remain until POD#14. Post-operative pain minimal, managed with PRN Tylenol.   Immunosuppression:  Induction immunosuppression with thymoglobulin 425mg (6.2mg/kg) and steroid taper. Maintenance immunosuppression with tacrolimus and Myfortic.  Tacrolimus goal level 8-10 (12 hour trough).  Infectious prophylaxis with valganciclovir (x6 months) and bactrim (indefinitely).     Transplant coordinator Jody García  [phone]  Donor type:  DBD  DSA at time of transplant:  NO  Ureteral stent: YES  CMV:  Donor + / Recipient -  EBV:  Donor ? / Recipient +  Thymoglobulin:  425mg, 6.2mg/kg    Hypertension: Now controlled. On Coreg 12.5mg BID and amlodipine 10 mg  daily.    Anemia of chronic renal disease: Transfused 1u RBC on 3/5. Hgb tamar appropriately and has been stable at ~7. Possibly r/t dasatinib/tac interaction as plt were also low but have improved; now 258.    History of difficult venous access: will discharge home with PICC line. To remain at least one month.    History of CML: On home dose dasatinib. Interacts with tacrolimus, monitor for dasatinib toxicity (rash, edema, pancytopenia, bone pain, prolonged QT). Consulted Hematology- are not concerned about interactions with dasatinib. EKG yesterday with QTc 436 (previously 441).     GERD: Likely reflux r/t steroids. On famotidine. PPI contraindicated with dasatinib. Added carafate spaced away from other medications on 3/4. Switched from MMF to myfortic. Continue Tums PRN. Slowly improving.     Resolved problems:  Post-op ileus: NG placed post-op for distention and nausea. Removed POD #4. Tolerating diet and having BMs. Continue senna/colace and Miralax BID on discharge.  Hyperkalemia: K 6.6 on POD #1, managed with dialysis. Now resolved (4.1 on discharge).  Thrombocytopenia: Possibly r/t thymoglobulin vs. dasatinib/tac interaction. Plt 258; now resolved.   Steroid induced hyperglycemia: Admit HgbA1c 5.2%.  on discharge.    Significant Results and Procedures    Procedure date:  02/28/20    Preoperative diagnosis:  End Stage renal failure due to Alport's syndrome s/p failed kidney transplant X2    Postoperative diagnosis:  Same    Procedure:  1. Left kidney  Re- transplant,  Donation after Brain Death, intra-abdominal, without vascular reconstruction. A J-J ureteral stent was placed.  2. Kidney allograft preparation on Back Table    Surgeon:  ZAC BOOGIE       Pending Results   These results will be followed up by transplant coordinator  Unresulted Labs Ordered in the Past 30 Days of this Admission     Date and Time Order Name Status Description    3/10/2020 0601 Tacrolimus level In process      2/27/2020 2254 HLA FINAL CROSSMATCH RECIPIENT In process           Code Status   Full    Primary Care Physician   Jimmie Michele    Physical Exam   Temp: 97.9  F (36.6  C) Temp src: Oral BP: 124/87 Pulse: 80 Heart Rate: 88 Resp: 16 SpO2: 96 % O2 Device: None (Room air)    Vitals:    03/06/20 0159 03/08/20 0100 03/09/20 0939   Weight: 67.1 kg (148 lb) 65.5 kg (144 lb 6.4 oz) 64.7 kg (142 lb 9.6 oz)     Vital Signs with Ranges  Temp:  [97.9  F (36.6  C)-98.7  F (37.1  C)] 97.9  F (36.6  C)  Pulse:  [80-84] 80  Heart Rate:  [80-88] 88  Resp:  [16] 16  BP: (119-132)/(68-87) 124/87  SpO2:  [96 %-100 %] 96 %  I/O last 3 completed shifts:  In: -   Out: 2550 [Urine:2550]    Constitutional: seen sitting upright in chair; in no apparent distress  Respiratory: Lungs clear to auscultation  Cardiovascular: RRR; systolic murmur noted  GI: Incision midline closed with staples; no erythema/drainage/ecchymosis noted. Abdomen rounded, soft, non-distended.  Genitourinary: Stein with clear, yellow output  Skin: warm, dry. AVF in RUE +bruit/thrill  Neurologic: A&Ox4  Neuropsychiatric: Behavior appropriate to situation    Time Spent on this Encounter   ICyndi NP, personally saw the patient today and spent greater than 30 minutes discharging this patient.    Discharge Disposition   Discharged to [facility]  Condition at discharge: Stable    Consultations This Hospital Stay   NEPHROLOGY KIDNEY/PANCREAS TRANSPLANT ADULT IP CONSULT  VASCULAR ACCESS CARE ADULT IP CONSULT  VASCULAR ACCESS CARE ADULT IP CONSULT  ADVANCE DIRECTIVE IP CONSULT  NEPHROLOGY KIDNEY/PANCREAS TRANSPLANT ADULT IP CONSULT  SOT MEDICATION HISTORY IP PHARMACY CONSULT  SOCIAL WORK IP CONSULT  PHARMACY IP CONSULT  NUTRITION SERVICES ADULT IP CONSULT  VASCULAR ACCESS CARE ADULT IP CONSULT  PHYSICAL THERAPY ADULT IP CONSULT  OCCUPATIONAL THERAPY ADULT IP CONSULT  ADVANCE DIRECTIVE IP CONSULT  WOUND OSTOMY CONTINENCE NURSE  IP CONSULT  INTERVENTIONAL RADIOLOGY  ADULT/PEDS IP CONSULT  PHYSICAL THERAPY ADULT IP CONSULT  HEMATOLOGY ADULT IP CONSULT    Discharge Orders       Home infusion referral      Reason for your hospital stay    You were admitted for a kidney transplant on 2/28/20. You are discharging home in stable condition.     Adult [facility] Follow-up and recommended labs and tests    Over the next 3-5 days you will be seen in the Advanced [facility] at 7 am (ph. [phone], option 7) .  Your labs will be drawn at the beginning of your appointment at 7:00 am.  DO NOT take your medications prior to having labs drawn. Please bring all your medications with you from home to take after labs are drawn.    LABS:  CBC, BMP, Mg, Phos to be drawn daily while in ATC, then every Monday, Thursday by home health care nurse if arranged, or at an outpatient lab.   Tacrolimus levels (12 hours after administration) daily while in ATC then 2 times weekly.     FOLLOW UP APPOINTMENTS:  Remember to always bring an updated medication list to all appointments.     An appointment with your transplant surgeon will be scheduled for approximately 2 weeks following discharge from the hospital.  Your transplant surgeon is: Dr. Samuel.  You will follow up with transplant nephrology in clinic as scheduled.   Follow up with primary care provider in 4-8 weeks. (Pt to schedule)  You have a ureteral stent in place which needs to be removed in 4-6 weeks.  You will be scheduled for stent removal.  If a  does not contact you for this, please contact your transplant coordinator.  Call scheduling at [phone] if you have not heard about your appointments within 48 hours after discharge.  If you have staples in place, they will be removed in 3 weeks after operation.    WHEN TO CONTACT YOUR  COORDINATOR:  Transplant Coordinator [phone]  Notify your coordinator if you have pain over your kidney, increased redness or drainage from your incision, fever greater than 100.5F, or  decreased urine output.  Notify your coordinator immediately if you are ever unable to take your immunosuppressive medications for any reason.  If it is outside of office hours, please call the hospital switchboard at [phone] and ask to have the kidney transplant surgery fellow paged for urgent medical questions, or present to the emergency department.    OTHER DISCHARGE INSTRUCTIONS:  Tan plan: Please monitor color and amount of urine output. Tan will be removed on POD 14.  Monitor blood pressure and weight daily initially post transplant.  Walk at least four times a day, lift no greater than 10 pounds for 6-8 weeks from the time of surgery.  No driving while taking narcotics or 3 weeks after surgery.    Diet recommendations post-transplant: Heart healthy dietary habits long term (low saturated/trans fat, low sodium). High protein diet x 8 weeks. Practice food safety precautions.    Discharged to [facility].      Appointments on [place] and/or [facility] (with [facility] or [facility] provider or service). Call [phone] if you haven't heard regarding these appointments within 7 days of discharge.     Activity    Your activity upon discharge: walk at least five times per day; no lifting greater than 10lbs for the next 6 weeks     Monitor and record    Blood pressure and Tan output     IV access    You are going home with the following vascular access device: PICC.     Tubes and drains    You are going home with the following tubes or drains: tan catheter.  Tube cares per hospital or home care instructions.     Wound care and dressings    Instructions to care for your wound at home: keep wound clean and dry. You may get your incision wet in the shower, but do not soak in tubs or pools until incision is fully healed.     ABO/Rh Type and Screen     Red blood cell have available     Diet    Follow this diet upon discharge: Regular diet     Discharge Medications    Current Discharge Medication List      START  taking these medications    Details   acetaminophen (TYLENOL) 325 MG tablet Take 1-2 tablets (325-650 mg) by mouth every 6 hours as needed for mild pain or fever  Qty: 1 Bottle, Refills: 0    Associated Diagnoses: Kidney replaced by transplant      amLODIPine (NORVASC) 10 MG tablet Take 1 tablet (10 mg) by mouth daily  Qty: 30 tablet, Refills: 11    Associated Diagnoses: Kidney replaced by transplant      atorvastatin (LIPITOR) 10 MG tablet Take 1 tablet (10 mg) by mouth daily  Qty: 30 tablet, Refills: 11    Associated Diagnoses: Kidney replaced by transplant      calcitRIOL (ROCALTROL) 0.25 MCG capsule Take 1 capsule (0.25 mcg) by mouth daily  Qty: 30 capsule, Refills: 11    Associated Diagnoses: Kidney replaced by transplant      carvedilol (COREG) 12.5 MG tablet Take 1 tablet (12.5 mg) by mouth 2 times daily (with meals)  Qty: 60 tablet, Refills: 11    Associated Diagnoses: Kidney replaced by transplant      famotidine (PEPCID) 40 MG tablet Take 1 tablet (40 mg) by mouth daily  Qty: 30 tablet, Refills: 11    Associated Diagnoses: Kidney replaced by transplant      MYFORTIC (BRAND) 180 MG EC tablet Take 3 tablets (540 mg) by mouth 2 times daily  Qty: 180 tablet, Refills: 11    Associated Diagnoses: Kidney replaced by transplant      polyethylene glycol (MIRALAX) packet Take 17 g by mouth 2 times daily Hold for loose stools  Qty: 60 packet, Refills: 0    Associated Diagnoses: Kidney replaced by transplant      predniSONE (DELTASONE) 5 MG tablet Take 1 tablet (5 mg) by mouth daily  Qty: 30 tablet, Refills: 11    Associated Diagnoses: Kidney replaced by transplant      senna-docusate (SENOKOT-S/PERICOLACE) 8.6-50 MG tablet Take 1-2 tablets by mouth 2 times daily May decrease dose or HOLD if having diarrhea  Qty: 100 tablet, Refills: 0    Associated Diagnoses: Kidney replaced by transplant      sucralfate (CARAFATE) 1 GM/10ML suspension Take 10 mLs (1 g) by mouth 2 times daily  Qty: 600 mL, Refills: 1    Associated  Diagnoses: Kidney replaced by transplant      sulfamethoxazole-trimethoprim (BACTRIM) 400-80 MG tablet Take 1 tablet by mouth Every Mon, Wed, Fri Morning  Qty: 30 tablet, Refills: 11    Associated Diagnoses: Kidney replaced by transplant      tacrolimus (GENERIC EQUIVALENT) 0.5 MG capsule Use a combination of 1 mg and 0.5 mg capsules for a total dose of 2.5 mg by mouth twice daily.  Qty: 60 capsule, Refills: 11    Associated Diagnoses: Kidney replaced by transplant      tacrolimus (GENERIC EQUIVALENT) 1 MG capsule 4 capsules (4 mg) by Oral or Feeding Tube route 2 times daily  Qty: 240 capsule, Refills: 11    Associated Diagnoses: Kidney replaced by transplant      valGANciclovir (VALCYTE) 450 MG tablet Take 1 tablet (450 mg) by mouth twice a week Titrate dose up to a max of 2 tabs (900 mg) by mouth daily when directed by your transplant team.  Qty: 60 tablet, Refills: 5    Associated Diagnoses: Kidney replaced by transplant         CONTINUE these medications which have CHANGED    Details   calcium carbonate (TUMS) 500 MG chewable tablet Take 1 tablet (500 mg) by mouth 3 times daily as needed for heartburn Take 1 chewable tablet by mouth 3 times daily with meals.    Associated Diagnoses: Kidney replaced by transplant         CONTINUE these medications which have NOT CHANGED    Details   chlorhexidine (HIBICLENS) 4 % liquid Cleanse scalp twice a week as directed.  Qty: 473 mL, Refills: 11    Associated Diagnoses: Folliculitis      dasatinib (SPRYCEL) 20 MG tablet CHEMO Take 40 mg by mouth daily       ketoconazole (NIZORAL) 2 % external shampoo APPLY TO THE SCALP AND WASH OFF ATER 5 MINUTES EVERY OTHER DAY  Qty: 120 mL, Refills: 9    Associated Diagnoses: Dermatitis, seborrheic         STOP taking these medications       B complex-C-folic acid (NEPHROCAPS/TRIPHROCAPS) 1 MG capsule Comments:   Reason for Stopping:         cloNIDine (CATAPRES) 0.1 MG tablet Comments:   Reason for Stopping:         lanthanum (FOSRENOL)  500 MG chewable tablet Comments:   Reason for Stopping:         ranitidine (ZANTAC) 150 MG tablet Comments:   Reason for Stopping:             Allergies   Allergies   Allergen Reactions     Blood Transfusion Related (Informational Only) Other (See Comments)     Patient has a history of a clinically significant antibody against RBC antigens.  A delay in compatible RBCs may occur.     Cephalosporins Other (See Comments) and Rash     fever     Compazine [Prochlorperazine]      Gammagard [Immune Globulin] Other (See Comments)     Ed got spinal meningitis and MD stated to never get again for fear of death.       Penicillins      Vancomycin      Data   Most Recent 3 CBC's:  Recent Labs   Lab Test 03/10/20  0601 03/09/20  0546 03/08/20  0627   WBC 8.9 6.6 5.3   HGB 7.1* 7.3* 7.3*   MCV 91 90 91    204 147*      Most Recent 3 BMP's:  Recent Labs   Lab Test 03/10/20  0601 03/09/20  0546 03/08/20  0627    138 136   POTASSIUM 4.1 3.8 3.8   CHLORIDE 108 106 105   CO2 22 23 23   BUN 54* 65* 82*   CR 3.66* 4.10* 4.91*   ANIONGAP 8 8 7   ANTWAN 8.5 8.4* 8.2*   * 136* 109*     Most Recent 2 LFT's:  Recent Labs   Lab Test 03/05/20  0530 02/28/20  0341 01/26/17  0647   AST  --  30 25   ALT  --  48 34   ALKPHOS  --  108 59   BILITOTAL 0.6 0.6 0.4     Most Recent INR's and Anticoagulation Dosing History:  Anticoagulation Dose History     Recent Dosing and Labs Latest Ref Rng & Units 3/11/2005 3/11/2005 3/12/2005 3/24/2005 1/26/2017 2/28/2020 3/5/2020    INR 0.86 - 1.14 1.22(H) 1.30(H) 1.24(H) 1.33(H) 0.99 1.11 1.18(H)        Most Recent 3 Troponin's:No lab results found.  Most Recent Cholesterol Panel:  Recent Labs   Lab Test 02/28/20  0341   CHOL 231*   *   HDL 40   TRIG 176*     Most Recent 6 Bacteria Isolates From Any Culture (See EPIC Reports for Culture Details):No lab results found.  Most Recent TSH, T4 and A1c Labs:  Recent Labs   Lab Test 02/28/20  0341   A1C 5.2     Results for orders placed or  performed during the hospital encounter of 02/28/20   XR Chest 2 Views    Narrative    Exam: XR CHEST 2 VW, 2/28/2020 3:54 AM    Indication: Pre-Op Kidney Transplant    Comparison: Chest x-ray 2/14/2017    Findings:   PA and lateral radiograph the chest. Trachea is midline. The  cardiomediastinal silhouette is within normal limits. There is no  pleural effusion. No pneumothorax. No focal airspace opacity.  Prominent pulmonary vasculature.    Multiple small punctate opacities overlie the left upper quadrant and  mid abdomen. There are no acute osseous abnormalities.       Impression    Impression: No acute radiographically evident pulmonary pathology.    I have personally reviewed the examination and initial interpretation  and I agree with the findings.    ORESTES LONGORIA, DO   CT Abdomen Pelvis w/o Contrast    Narrative    EXAMINATION: CT ABDOMEN PELVIS W/O CONTRAST, 2/28/2020 4:01 AM    TECHNIQUE:  Helical CT images from the lung bases through the  symphysis pubis were obtained without IV contrast.     COMPARISON: CT 3/24/2005    HISTORY: Pre-Op Kidney Transplant; Prior history of kidney transplants    FINDINGS:    Abdomen and pelvis: Calcification along the posterior medial aspect of  the right lobe of liver. 9 mm hypodense focus in the dome of the right  lobe of the liver (series 3, image 34), too small to characterize  though likely a hepatic cyst. The spleen, adrenal glands and pancreas  are within normal limits. There is no intrahepatic or extrahepatic  biliary dilation. No main pancreatic ductal dilation. Small amount of  hyperdense layering fluid in the gallbladder, may represent small  amount of sludge. Postoperative changes of explanted left lower  quadrant renal transplant with presence of multiple surgical clips.  Calcified focus in the right lower quadrant, likely failed renal  transplant. Native kidneys are not visualized.    No dilated loops of small or large bowel. Urinary bladder is  decompressed.  No abdominal or pelvic adenopathy. There are some  prominent lymph nodes in the inguinal regions bilaterally, none of  which are enlarged by size criteria. Small amount of free fluid within  the abdomen and pelvis. Mild calcification of the common/external  iliac arteries without aneurysmal dilation.    Lung bases: Lung bases are clear. Heart size is normal.    Bones and soft tissues: Changes of renal osteodystrophy. Multilevel  degenerative changes with numerous Schmorl's nodes throughout the  visualized thoracolumbar spine. No suspicious osseous lesions. The  right rectus abdominis muscle is thinned and appears scarred  inferiorly. Mild anasarca.      Impression    IMPRESSION:   Failed right renal transplant and excision of left renal transplant.  No acute findings within the abdomen or pelvis. Mild calcification of  the common/external iliac arteries without aneurysmal dilation.    I have personally reviewed the examination and initial interpretation  and I agree with the findings.    ORESTES LONGORIA, DO   XR Chest Port 1 View    Narrative    XR CHEST PORT 1 VW, 2/28/2020 12:03 PM.    Comparison: 2/28/2020.    History: check central line.    Technique: AP chest radiograph    Findings:   Trachea is midline. Endotracheal tube tip projects 1.0 cm from the  lawrence. Esophageal temperature probe tip projects at the GE junction.  Mild interstitial prominence. No apical pneumothorax. No acute upper  abdominal abnormality. Speckled opacities in left upper quadrant,  likely stool, unchanged.No acute intra-abdominal abnormalities.      Impression    Impression:   1.  No pneumothorax.  2.  Endotracheal tube tip projects 1.0 cm from the lawrence, recommend  retracting 1 cm.  3.  Interstitial pulmonary edema.    [Result: Endotracheal tube placement, no pneumothorax]    Finding was identified on 2/28/2020 12:02 PM.     Operating room was contacted by Dr. Dread Engle at 2/28/2020 12:06  PM and verbalized understanding of the  findings.     I have personally reviewed the examination and initial interpretation  and I agree with the findings.    CESILIA MURILLO MD   XR Abdomen Port 1 View    Narrative    EXAM: XR ABDOMEN PORT 1 VW  2/28/2020 4:38 PM     HISTORY:  lost needle       COMPARISON:  None    FINDINGS:   Postsurgical changes in the abdomen. Gastric tube projects in stomach.  Abdominal drain. Right lower quadrant presumed renal transplants with  visualized nephrostomy tube. No radiopaque curve shaped foreign body  identified.      Impression    IMPRESSION: No radiopaque foreign body in the shape of a curved suture  needle identified in the abdomen.     I have personally reviewed the examination and initial interpretation  and I agree with the findings.    PAUL RUIZ MD   XR Chest Port 1 View    Narrative    EXAM: XR CHEST PORT 1 VW  2/28/2020 5:51 PM     HISTORY:  s/p DDKT- line check       COMPARISON:  2/28/2020    FINDINGS:   Single frontal radiograph of the chest.    The trachea is midline. The cardiomediastinal silhouette is  well-defined. The pulmonary vasculature are indistinct. Hazy granular  opacities in the perihilar bibasilar regions.    The included osseous structures, soft tissues and upper abdomen and  are within normal limits.        Impression    IMPRESSION: Hazy perihilar and peripheral opacities. No internal lines  or tubes visualized.     I have personally reviewed the examination and initial interpretation  and I agree with the findings.    PAUL RUIZ MD   US Renal Transplant    Narrative    EXAMINATION: US RENAL TRANSPLANT,  2/28/2020 6:45 PM     COMPARISON: None.    HISTORY: intra-abdominal right transplant kidney- single vessels. on  common iliac vessels of right kidney    TECHNIQUE:  Grey-scale, color Doppler and spectral flow analysis.    FINDINGS:  The transplant kidney is located right lower quadrant, and measures 10  cm. Parenchyma is of normal thickness and echogenicity. No  focal  lesions. No hydronephrosis. Small perinephric fluid collection.    Renal artery flow:   102 cm/sec peak systolic at hilum.  135 peak systolic at anastomosis.  Arcuate artery resistive indices (upper to lower): 0.59 and 0.57    Renal Vein Flow:  67at hilum.   70 at anastomosis.    Iliac artery flow:  91 peak systolic above anastomosis.  124 peak systolic below anastomosis.    Iliac vein flow:  Patent above and below the anastomosis.      Impression    IMPRESSION: Patent Doppler evaluation of the right lower quadrant  transplant kidney.    I have personally reviewed the examination and initial interpretation  and I agree with the findings.    PAUL RUIZ MD   US Upper Extremity Arterial Duplex Left    Narrative    Exam: US UPPER EXTREMITY ARTERIAL DUPLEX LEFT, 2/29/2020 4:02 AM    Indication: Post OP Kidney Tx; Swollen & Firm L arm; Arterial  Line removed from L arm post op    Technique: Grayscale and duplex ultrasound of the upper extremity  arteries. Velocity measurements obtained with an angle correction at  or less than 60 degrees.      Comparison: None available    Findings:   Old left upper extremity dialysis loop graft with calcifications and  shadowing which obscures the origins of the radial and ulnar arteries.    Brachial artery upper arm: 53 cm/s  Brachial artery mid: 94 cm/s  Brachial artery AC fossa: 73 cm/s  Radial artery upper forearm: 45 cm/s  Ulnar artery upper forearm: 52 cm/s  Radial artery at the wrist: 42 cm/s  Ulnar artery at the wrist: 78 cm/s    Normal tri/biphasic arterial Doppler waveforms on duplex evaluation.      Impression    Impression: The evaluated portions of the left brachial, radial, and  ulnar arteries are patent. No arterial thrombus visualized. No  significant hematoma or pseudoaneurysm visualized.    I have personally reviewed the examination and initial interpretation  and I agree with the findings.    LAURA NICHOLSON MD   XR KUB    Narrative    EXAM:  Abdominal radiograph  2/29/2020 5:45 PM      HISTORY: Abdominal distention, nausea/vomiting    COMPARISON: 2/28/2020, CT abdomen and pelvis 2/20/2020    FINDINGS: Supine abdominal radiograph. Gastric tube tip is now  projects over the stomach. Electronic device projecting over the right  lower abdominal quadrant obscures evaluation in this region.  Incompletely visualized right lower quadrant nephroureteral stent.  Postsurgical abdomen. Oral contrast partially opacifies the large  bowel. Nonobstructive bowel gas pattern. No pneumatosis. No portal  venous gas.       Impression    IMPRESSION: Post surgical abdomen with a nonobstructive bowel gas  pattern.         I have personally reviewed the examination and initial interpretation  and I agree with the findings.    CELY GUTIERREZ MD   NM Renogram    Narrative    Examination:  NM RENOGRAM 3/4/2020 2:37 PM     Comparison: None.    History: Eval flow in transplant kidney. POD #5 with delayed graft  function.    Dose: 10 mCi Tc-99m MAG3.    Findings: Evaluation of initial blood flow images demonstrates normal  perfusion of the transplant in the right lower quadrant. There is  delayed renal uptake and excretion.      Impression    Impression: Right lower quadrant renal transplant shows normal  perfusion with delayed uptake and excretion, indicative of vasomotor  nephropathy.     I have personally reviewed the examination and initial interpretation  and I agree with the findings.    WYATT LOVELL MD   IR PICC Placement > 5 Yrs of Age    Narrative    Jonny Cruz.  MRN: [id number]  ?  [id number]    JONNY CRUZ  [id number]  1975;  44 years    IR PICC PLACEMENT > 5 YRS OF AGE  SL picc line placement    -----    PRE-OPERATIVE DIAGNOSIS:  poor venous access    POST-OPERATIVE DIAGNOSIS:  Same    PROCEDURE:  1.  Ultrasound guidance for venous access  2.  Placement of a peripherally inserted central venous catheter  (PICC)  3.  Fluoroscopic guidance placement       Impression    IMPRESSION:  Completed placement of?4?Mexican?32.5?cm single lumen,  valved,?peripherally inserted central catheter (PICC) via  LEFT?Medial?Brachial vein. Tip lying in the?left subclavian  vein.?Venography demonstrates tortuous central venous anatomy.??Okay  to use immediately. Dx:?poor venous access. [place]. ?LOCAL    -----    CLINICAL HISTORY:  per above      PERFORMED BY:  ROBBIN Altman PA-C (Interventional  Radiology)    CONSENT:  The patient understood the limitations, alternatives, and  risks of the procedure and agreed to the procedure.  Written informed  consent was obtained and is documented in the patient record.    MEDICATIONS:  1% lidocaine was used for local anesthesia    NURSING:  The patient was placed on continuous vital signs monitoring.   Vital signs were monitored by nursing staff under my supervision.    FLUOROSCOPY TIME (minutes):  <5    DESCRIPTION:  The LEFT upper extremity was prepped and draped in the  usual sterile fashion.  Ultrasound revealed a patent medial brachial  vein, and the overlying tissue was anesthetized and skin dermatotomy  was made.  Under ultrasound guidance, venipuncture was made with  needle.  A permanent copy of the image documenting a patent vein was  entered is in the patient record.    There is difficulty navigating the wire centrally. Venography  performed through venous access point shows tortuous venous anatomy  leading centrally.     Wire advanced centrally under fluoroscopic guidance and over-the-wire  exchange of needle for peel-away sheath was made.  Under fluoroscopic  guidance, an 0.018 wire was advanced to the left subclavian vein and a  measurement was obtained.  The wire and inner peel-away dilator were  removed and trimmed PICC catheter was advanced through the peel-away  sheath under fluoroscopic guidance, with the catheter tip placed in  the left subclavian vein.  The PICC catheter aspirated and flushed  freely and was locked  "with saline.  PICC secured and sterile dressing  applied.    COMPLICATIONS:  No immediate complication;  the patient remained  stable throughout the procedure    ESTIMATED BLOOD LOSS:  Minimal    SPECIMENS:  None    -----  \"The physician assistant (PA) who performed this procedure and signed  the above report is licensed to practice in the [facility]  pursuant to [place] Statute 147A.09.  This includes meeting the Statute and  [place] Board of Medical [employer] requirement of an active  Delegation Agreement, which documents delegation of services by  primary and alternate supervising physicians.\"   -----    CATARINO TAMAYO PA-C   XR Abdomen 2 Views    Narrative    EXAMINATION: XR ABDOMEN 2 VW  3/8/2020 4:21 PM      CLINICAL HISTORY: Gas pain, nausea, vomiting, assess for stool burden    COMPARISON: Abdominal radiograph from 2/29/2020, abdominal CT from  2/28/2020    FINDINGS:  Multiple upright views of the abdomen. Right lower quadrant  nephroureteral stent. Postsurgical changes over the lower abdomen and  pelvis. Bowel gas is present in a non-obstructive pattern. No  pneumatosis or portal venous gas. There is a moderate amount of stool  in the colon. The visualized lung bases are clear.        Impression    IMPRESSION:  Moderate stool burden. Nonobstructive bowel gas pattern.    I have personally reviewed the examination and initial interpretation  and I agree with the findings.    CELY GUTIERREZ MD       "

## 2020-03-02 NOTE — PLAN OF CARE
[id number]:   Pt admitted 2/28 for DDKT.   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 HTN, CML.     Neuro: A/Ox4.  Cardiac: SR with HR 80s-90s. AVSS except HTN BP 160s-190s/80s-90s. PRNs given with little improvement.    Respiratory: O2 sats stable on RA  GI/: tan in place with adequate output. No BM this shift, last BM 2/27  Diet/appetite: Clear liquid diet. PRN zofran given for nausea.    Activity:  Ax1  Pain: incisional pain controlled with dilaudid PCA.   Skin: abdominal incision approximated. No new deficits noted.   LDA's: NG to LIS. EMMA with minimal output. Tan. L PIV infusing Lasix gtt at 20mg.hr and dilaudid PCA. R PIV infusing MIVF at 50mL/hr.     Plan: Continue with POC. Notify primary team with changes.

## 2020-03-02 NOTE — PLAN OF CARE
"BP (!) 149/100   Pulse 85   Temp 98.2  F (36.8  C)   Resp 16   Ht 1.626 m (5' 4\")   Wt 72.3 kg (159 lb 6.3 oz)   SpO2 100%   BMI 27.36 kg/m      3517-1669. Transfer to 7A from 6B @ 1230. Hypertensive, not within parameters to give PRNs. OVSS on RA. Denies pain. Pt reports having nausea, PRN antiemetics given on 6B. NPO with sips of water. RPIV w/ D5NS @ 40. LPIV is SL. NG to LIS, minimal output. RJP with minimal amounts of serosanguinous output. Stein catheter with adequate amounts of UOP. Small, loose BM before transfer.  Incision stapled, NIKITA. Up with A x 1. Will continue to monitor and update with any changes.   "

## 2020-03-02 NOTE — PROGRESS NOTES
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 He is s/p kidney transplant x2 (, ). 2nd graft lost to primary nonfunction with explant a few weeks later. He has been on dialysis since . He is now s/p intra-abdominal  donor kidney transplant w/ ureteral stent on 20.    Graft function: POD #3 with delayed graft function. UOP good on lasix. Underwent HD POD#1 due to hyperkalemia. Cr 8.5->9.3.  Immunosuppression management:   Thymo: 425mg, 6.2mg/kg completed.  Methylpred: Taper completed.  Tacro: Goal level 8-10.   MMF: 750mg BID.  Prednisone: 5mg daily, 3rd transplant protocol.  Complexity of management: Medium. Contributing factors: anemia and delayed graft function  Hematology:   Anemia of chronic disease: Hgb stable ~8-9.   Hx CML: Restart home dose dasatinib. Interacts with tacrolimus, monitor for dasatinib toxicity (rash, edema, pancytopenia, bone pain, prolonged QT). Check EKG prior to discharge.   Neurology:   Acute postoperative pain: Stop PCA, add scheduled APAP and oxycodone. Continue lidoderm.  Cardiorespiratory:   HTN: Uncontrolled. On Coreg 12.5mg and amlodipine. Increase amlodipine today.  GI/Nutrition:   Diet: NPO  Post-op ileus: NG placed post-op for distention and nausea. Now passing flatus, but dry heaves and nausea with medications.  Bowel regimen: Senna/colase. Suppository today. Enema if ineffective.  Endocrine:  Steroid induced hyperglycemia: Admit HgbA1c 5.2%. Glucose trending down.  Fluid/Electrolytes: MIVF: D5 NS  Hypervolemia: Remains ~4kg above admit weight. Stop lasix gtt and start lasix 80mg IV BID.  Hyperkalemia: K 6.6 on POD #1, managed with dialysis. Continue lasix. Recheck K 1200.  Hyperphosphatemia: Phos 8.9.   : Stein x7 days d/t small bladder.  Infectious disease: Afebrile  Prophylaxis: DVT (heparin), fall, GI, fungal, PT/OT  Disposition: Transfer to  "7A    Medical Decision Making: Medium  Subsequent visit 86257 (moderate level decision making)    DARRON/Fellow/Resident Provider: Purvi Fofana NP [id number]    Faculty: Ron Benítez MD   _________________________________________________________________  Transplant History:   2/28/2020 (Kidney), 12/7/1988 (Kidney), 3/2/2005 (Kidney), Postoperative day: 3     Interval History: History is obtained from the patient  Overnight events: Pain controlled, no nausea, + flatus, some heartburn. Ate a lot of ice chips per nursing. Felt OK early, but developed nausea and dry heaves with medications mid-morning.    ROS:   A 10-point review of systems was negative except as noted above.    Meds:    amLODIPine  10 mg Oral Daily     atorvastatin  10 mg Oral Daily     carvedilol  12.5 mg Oral BID w/meals     famotidine  20 mg Intravenous Q24H     heparin ANTICOAGULANT  5,000 Units Subcutaneous Q12H     lidocaine  1 patch Transdermal Q24H     lidocaine   Transdermal Q8H     magnesium oxide  400 mg Oral Daily with lunch     mycophenolate  750 mg Oral BID IS     predniSONE  5 mg Oral Daily     senna-docusate  1 tablet Oral BID    Or     senna-docusate  2 tablet Oral BID     sodium chloride (PF)  10 mL Intracatheter Q8H     sulfamethoxazole-trimethoprim  1 tablet Oral Q Mon Wed Fri AM     tacrolimus  2 mg Oral BID IS     valGANciclovir  450 mg Oral Once per day on Mon Thu       Physical Exam:     Admit Weight: 68.1 kg (150 lb 2.1 oz)    Current vitals:   BP (!) 167/82   Pulse 86   Temp 98.2  F (36.8  C) (Oral)   Resp 18   Ht 1.626 m (5' 4\")   Wt 72.3 kg (159 lb 6.3 oz)   SpO2 98%   BMI 27.36 kg/m      Vital sign ranges:    Temp:  [98.2  F (36.8  C)-98.8  F (37.1  C)] 98.2  F (36.8  C)  Pulse:  [] 86  Heart Rate:  [] 84  Resp:  [14-18] 18  BP: (157-216)/() 167/82  SpO2:  [97 %-100 %] 98 %  Patient Vitals for the past 24 hrs:   BP Temp Temp src Pulse Heart Rate Resp SpO2 Weight   03/02/20 0600 (!) 167/82 -- -- -- -- -- " 98 % --   03/02/20 0540 (!) 164/86 -- -- -- -- -- 100 % --   03/02/20 0504 (!) 183/94 98.2  F (36.8  C) Oral -- 84 18 99 % 72.3 kg (159 lb 6.3 oz)   03/02/20 0300 (!) 164/89 -- -- -- 93 -- 98 % --   03/02/20 0230 (!) 163/91 -- -- -- 93 -- 99 % --   03/02/20 0130 (!) 189/109 -- -- -- -- -- 100 % --   03/02/20 0102 (!) 172/81 -- -- -- 82 16 100 % --   03/02/20 0035 (!) 191/94 -- -- -- 93 -- -- --   03/02/20 0026 (!) 179/84 -- -- -- -- -- -- --   03/02/20 0011 (!) 181/97 98.3  F (36.8  C) Oral -- 90 18 99 % --   03/01/20 2222 (!) 178/88 -- -- 86 -- -- -- --   03/01/20 2210 (!) 178/78 -- -- 87 -- -- -- --   03/01/20 2200 (!) 180/86 -- -- 88 -- -- -- --   03/01/20 2150 (!) 175/78 -- -- 89 -- -- 98 % --   03/01/20 2120 -- 98.4  F (36.9  C) Oral -- -- -- -- --   03/01/20 2055 (!) 165/95 -- -- 105 -- -- -- --   03/01/20 2045 (!) 162/95 -- -- 108 -- -- -- --   03/01/20 2035 (!) 169/89 -- -- 107 -- -- -- --   03/01/20 2015 (!) 157/86 -- -- 105 -- -- -- --   03/01/20 2005 (!) 161/85 -- -- 98 -- -- -- --   03/01/20 1955 (!) 174/97 98.4  F (36.9  C) -- 100 -- 16 100 % --   03/01/20 1545 (!) 161/99 98.6  F (37  C) Oral 93 -- 14 99 % --   03/01/20 1338 (!) 165/87 -- -- 96 -- -- 98 % --   03/01/20 1213 (!) 193/101 -- -- 97 -- -- -- --   03/01/20 1149 (!) 216/114 98.8  F (37.1  C) Oral -- 100 18 97 % --   03/01/20 0839 (!) 167/88 98.6  F (37  C) Oral -- 107 16 98 % --     General Appearance: in no apparent distress.   Skin: normal, warm, dry  HEENT: Atraumatic, normocephalic, NG tube in place  Heart: NSR  Lungs: Breathing comfortably on room air  Abdomen: Abdomen is slightly rounded. Incision well-healing without javier-incisional erythema or drainage. EMMA serosang.  : tan is present.  Clear, yellow urine draining  Extremities: edema: trace  Neurologic: awake, alert and oriented x4. Tremor absent.     Data:   CMP  Recent Labs   Lab 03/02/20  0448 03/01/20  0958  02/28/20  1417 02/28/20  1308 02/28/20  0341    136   < > 138  140 137   POTASSIUM 4.4 4.7   < > 5.2 5.7* 5.0   CHLORIDE 102 100   < >  --   --  102   CO2 25 27   < >  --   --  22   * 110*   < > 95 107* 92   BUN 76* 57*   < >  --   --  65*   CR 9.25* 8.45*   < >  --   --  11.90*   GFRESTIMATED 6* 7*   < >  --   --  5*   GFRESTBLACK 7* 8*   < >  --   --  5*   ANTWAN 8.2* 7.7*   < >  --   --  7.7*   ICAW  --   --   --  3.7* 3.6*  --    MAG 2.1 2.0   < >  --   --   --    PHOS 8.9* 8.2*   < >  --   --   --    ALBUMIN  --   --   --   --   --  4.0   BILITOTAL  --   --   --   --   --  0.6   ALKPHOS  --   --   --   --   --  108   AST  --   --   --   --   --  30   ALT  --   --   --   --   --  48    < > = values in this interval not displayed.     CBC  Recent Labs   Lab 03/02/20  0448 03/01/20  1201 03/01/20  0958  02/28/20  0341   HGB 8.3*  --  8.0*   < > 11.8*   WBC 5.2  --  9.8   < > 9.7   PLT 83* 81* 82*   < > 195   A1C  --   --   --   --  5.2    < > = values in this interval not displayed.

## 2020-03-02 NOTE — PROVIDER NOTIFICATION
MD paged at job code 0036    Pt BP 160s/80s. No PRN BP meds available. 10mg hydralazine given at 0513.   Increased scheduled Amlodipine from 5mg to 10mg. Will continue to monitor.

## 2020-03-02 NOTE — PROGRESS NOTES
[facility], [place]   Transplant Nephrology Progress Note  Date of Admission:  2/28/2020  Today's Date: 03/02/2020    Assessment & Plan   # DDKT: Stable, but UOP improving. DGF (HD on POD#1 due to hyperkalemia)              - Baseline Cr ~ TBD              - Proteinuria: Not checked post transplant              - Date DSA Last Checked: Feb/2020      Latest DSA: No DSA at time of transplant              - BK Viremia: No              - Kidney Tx Biopsy: No    # Immunosuppression: Tacrolimus immediate release (goal 8-10) and Mycophenolate mofetil (goal 5-7)              - Changes: No     # Infection Prophylaxis:   - PJP: Sulfa/TMP (Bactrim)  - CMV: Valcyte x 6 months (R-/D*)  - Thrush: None     # Hypertension: Inadequate control;             Goal BP: < 140/90              - Volume status: Moderately hypervolemic                EDW~ 67.8kg              - Changes: Yes, increase amlodipine to 10mg daily.    # Elevated Blood Glucose: Glucose generally running ~ 100-140              - Management as per primary team.     # Anemia of Chronic Kidney Disease: Hgb 8.3     DEB: No                                                                    Iron panel: Not done recently     # Mineral Bone Disorder:   - Secondary renal hyperparathyroidism; PTH level: Not checked recently        On treatment: None  - Vitamin D; level: Not checked recently        On Supplement: No  - Calcium; level: Low Normal        On Supplement: No  - Phosphorus; level: High        On Supplement: No, no need for binder, UOP is improving    Get PTH and vitamin D, she might need calcitriol     # Electrolytes:   - Potassium; level: Normal       On Supplement: No  - Magnesium; level: Normal        On Supplement: No  - Bicarbonate; level: Normal       On Supplement: No  - Sodium; level: Normal        On Supplement: No     # CML: In remission. On dasatinib 40mg daily     # Nausea/Vomiting: Resolved with NG tube placement.     #  Transplant History:  Etiology of Kidney Failure:  Alport syndrome s/p kidney transplant x 2  Tx: DDKT  Transplant: 2/28/2020 (Kidney), 12/7/1988 (Kidney), 3/2/2005 (Kidney)  Donor Type: Donation after Brain Death        Donor Class:   Crossmatch at time of Tx: negative  DSA at time of Tx: No  Significant changes in immunosuppression: None  Significant transplant-related complications: None    Recommendations were communicated to the primary team verbally.    Seen and discussed with Dr. Telly Kunz MD   Pager: [phone]    Attestation:  This patient has been seen and evaluated by me, Alvin Varner MD.  I have reviewed the note and agree with plan of care as documented by the fellow.       Interval History   Mr Anthony dillond no overnight issues, and he denie sfevers, chills, or rigors. He has no shortness of breath, and he denies chest pain. He still has NG tube in place, and reports hunger. He had a BM today, and will hopefully get the NG out and start to eat. He has minimal LE swelling. He has mild nausea.     Review of Systems   4 point ROS was obtained and negative except as noted in the Interval History.    MEDICATIONS:    acetaminophen  975 mg Oral Q8H     amLODIPine  10 mg Oral Daily     atorvastatin  10 mg Oral Daily     carvedilol  12.5 mg Oral BID w/meals     dasatinib  40 mg Oral QPM     [START ON 3/3/2020] famotidine  40 mg Oral Daily     heparin ANTICOAGULANT  5,000 Units Subcutaneous Q12H     lidocaine  1 patch Transdermal Q24H     lidocaine   Transdermal Q8H     magnesium oxide  400 mg Oral Daily with lunch     mycophenolate  750 mg Oral or Feeding Tube BID IS     predniSONE  5 mg Oral Daily     senna-docusate  1 tablet Oral BID    Or     senna-docusate  2 tablet Oral BID     sodium chloride (PF)  10 mL Intracatheter Q8H     sulfamethoxazole-trimethoprim  1 tablet Oral Q Mon Wed Fri AM     tacrolimus  2 mg Oral or Feeding Tube BID IS     [START ON 3/5/2020] valGANciclovir  450 mg Oral or  "Feeding Tube Once per day on Mon Thu       dextrose 5% and 0.9% NaCl 40 mL/hr at 03/02/20 1148       Physical Exam      BP (!) 149/100   Pulse 85   Temp 98.2  F (36.8  C)   Resp 16   Ht 1.626 m (5' 4\")   Wt 72.3 kg (159 lb 6.3 oz)   SpO2 100%   BMI 27.36 kg/m       GENERAL APPEARANCE: alert and no distress  HENT: mouth without ulcers or lesions.  NG tube in place  LYMPHATICS: no cervical or supraclavicular nodes  RESP: lungs clear to auscultation - no rales, rhonchi or wheezes  CV: regular rhythm, normal rate, no rub, no murmur  EDEMA: no LE edema bilaterally  ABDOMEN: soft, nondistended, nontender, bowel sounds normal  MS: extremities normal - no gross deformities noted, no evidence of inflammation in joints, no muscle tenderness  SKIN: no rash  ACCESS: None  ALLOGRAFT: Non-tender    Data   All labs reviewed by me.  CMP  Recent Labs   Lab 03/02/20  1140 03/02/20  0448 03/01/20  0958 02/29/20  1836  02/29/20  0317  02/28/20  1740  02/28/20  0341   NA  --  140 136  --   --  136  --  137   < > 137   POTASSIUM 4.2 4.4 4.7 4.6   < > 4.6   < > 4.6   < > 5.0   CHLORIDE  --  102 100  --   --  102  --  103  --  102   CO2  --  25 27  --   --  18*  --  20  --  22   ANIONGAP  --  12 10  --   --  16*  --  14  --  13   GLC  --  138* 110*  --   --  213*  --  129*   < > 92   BUN  --  76* 57*  --   --  71*  --  63*  --  65*   CR  --  9.25* 8.45*  --   --  12.60*  --  11.60*  --  11.90*   GFRESTIMATED  --  6* 7*  --   --  4*  --  5*  --  5*   GFRESTBLACK  --  7* 8*  --   --  5*  --  5*  --  5*   ANTWAN  --  8.2* 7.7*  --   --  6.8*  --  6.7*  --  7.7*   MAG  --  2.1 2.0  --   --  2.0  --  1.9  --   --    PHOS  --  8.9* 8.2*  --   --  8.5*  --  7.3*  --   --    PROTTOTAL  --   --   --   --   --   --   --   --   --  7.9   ALBUMIN  --   --   --   --   --   --   --   --   --  4.0   BILITOTAL  --   --   --   --   --   --   --   --   --  0.6   ALKPHOS  --   --   --   --   --   --   --   --   --  108   AST  --   --   --   --   --   --  "  --   --   --  30   ALT  --   --   --   --   --   --   --   --   --  48    < > = values in this interval not displayed.     CBC  Recent Labs   Lab 03/02/20  0448 03/01/20  1201 03/01/20  0958 02/29/20  1836 02/29/20  0902 02/29/20  0317  02/28/20  1740   HGB 8.3*  --  8.0* 9.2* 9.7* 9.5*   < > 8.3*   WBC 5.2  --  9.8  --   --  18.2*  --  1.4*   RBC 2.86*  --  2.77*  --   --  3.19*  --  2.84*   HCT 26.6*  --  25.2*  --   --  30.6*  --  26.4*   MCV 93  --  91  --   --  96  --  93   MCH 29.0  --  28.9  --   --  29.8  --  29.2   MCHC 31.2*  --  31.7  --   --  31.0*  --  31.4*   RDW 14.5  --  14.3  --   --  14.2  --  14.2   PLT 83* 81* 82*  --   --  132*  --  100*    < > = values in this interval not displayed.     INR  Recent Labs   Lab 02/28/20  0341   INR 1.11   PTT 30     ABG  Recent Labs   Lab 02/28/20  1417 02/28/20  1308   PH 7.36 7.38   PCO2 35 35   PO2 222* 202*   HCO3 19* 20*   O2PER 53.0 54.0      Urine Studies  No lab results found.  No lab results found.  PTH  No lab results found.  Iron Studies  No lab results found.    IMAGING:  All imaging studies reviewed by me.

## 2020-03-03 ENCOUNTER — APPOINTMENT (OUTPATIENT)
Dept: OCCUPATIONAL THERAPY | Facility: CLINIC | Age: 45
DRG: 652 | End: 2020-03-03
Attending: SURGERY
Payer: MEDICARE

## 2020-03-03 DIAGNOSIS — Z79.899 LONG TERM USE OF DRUG: ICD-10-CM

## 2020-03-03 DIAGNOSIS — Z94.0 KIDNEY REPLACED BY TRANSPLANT: ICD-10-CM

## 2020-03-03 LAB
ABO + RH BLD: ABNORMAL
ABO + RH BLD: ABNORMAL
ANION GAP SERPL CALCULATED.3IONS-SCNC: 11 MMOL/L (ref 3–14)
BLD GP AB SCN SERPL QL: ABNORMAL
BLD PROD TYP BPU: ABNORMAL
BLD PROD TYP BPU: NORMAL
BLD UNIT ID BPU: 0
BLOOD BANK CMNT PATIENT-IMP: ABNORMAL
BLOOD BANK CMNT PATIENT-IMP: ABNORMAL
BLOOD PRODUCT CODE: NORMAL
BPU ID: NORMAL
BUN SERPL-MCNC: 100 MG/DL (ref 7–30)
CALCIUM SERPL-MCNC: 7.8 MG/DL (ref 8.5–10.1)
CELL TYPE ALLO: NORMAL
CELL TYPE AUTO: NORMAL
CHANNELSHIFTALLOB1: -24
CHANNELSHIFTALLOB2: -32
CHANNELSHIFTALLOT1: -25
CHANNELSHIFTALLOT2: -55
CHANNELSHIFTAUTOB1: -62
CHANNELSHIFTAUTOB2: -52
CHANNELSHIFTAUTOT1: -34
CHANNELSHIFTAUTOT2: -40
CHLORIDE SERPL-SCNC: 104 MMOL/L (ref 94–109)
CO2 SERPL-SCNC: 26 MMOL/L (ref 20–32)
COMMENT ALLOB2: NORMAL
CREAT SERPL-MCNC: 9.85 MG/DL (ref 0.66–1.25)
CROSSMATCHDATEALLO: NORMAL
CROSSMATCHDATEAUTO: NORMAL
DAT POLY-SP REAG RBC QL: ABNORMAL
DONOR ALLO: NORMAL
DONOR AUTO: NORMAL
DONORCELLDATE ALLO: NORMAL
DONORCELLDATE AUTO: NORMAL
ERYTHROCYTE [DISTWIDTH] IN BLOOD BY AUTOMATED COUNT: 14.7 % (ref 10–15)
FERRITIN SERPL-MCNC: 3397 NG/ML (ref 26–388)
GFR SERPL CREATININE-BSD FRML MDRD: 6 ML/MIN/{1.73_M2}
GLUCOSE SERPL-MCNC: 101 MG/DL (ref 70–99)
HCT VFR BLD AUTO: 16.6 % (ref 40–53)
HGB BLD-MCNC: 5.3 G/DL (ref 13.3–17.7)
HGB BLD-MCNC: 8 G/DL (ref 13.3–17.7)
IRON SATN MFR SERPL: 98 % (ref 15–46)
IRON SERPL-MCNC: 139 UG/DL (ref 35–180)
MAGNESIUM SERPL-MCNC: 2.4 MG/DL (ref 1.6–2.3)
MCH RBC QN AUTO: 29 PG (ref 26.5–33)
MCHC RBC AUTO-ENTMCNC: 31.9 G/DL (ref 31.5–36.5)
MCV RBC AUTO: 91 FL (ref 78–100)
NUM BPU REQUESTED: 1
PHOSPHATE SERPL-MCNC: 7.5 MG/DL (ref 2.5–4.5)
PLATELET # BLD AUTO: 97 10E9/L (ref 150–450)
POS CUT OFF ALLO B: >93
POS CUT OFF ALLO T: >79
POS CUT OFF AUTO B: >93
POS CUT OFF AUTO T: >79
POTASSIUM SERPL-SCNC: 3.9 MMOL/L (ref 3.4–5.3)
PTH-INTACT SERPL-MCNC: 668 PG/ML (ref 18–80)
PTH-INTACT SERPL-MCNC: NORMAL PG/ML (ref 18–80)
RBC # BLD AUTO: 1.83 10E12/L (ref 4.4–5.9)
RESULT ALLO B1: NORMAL
RESULT ALLO B2: NORMAL
RESULT ALLO T1: NORMAL
RESULT ALLO T2: NORMAL
RESULT AUTO B1: NORMAL
RESULT AUTO B2: NORMAL
RESULT AUTO T1: NORMAL
RESULT AUTO T2: NORMAL
SA1 CELL: NORMAL
SA1 COMMENTS: NORMAL
SA1 HI RISK ABY: NORMAL
SA1 MOD RISK ABY: NORMAL
SA1 TEST METHOD: NORMAL
SA2 CELL: NORMAL
SA2 COMMENTS: NORMAL
SA2 HI RISK ABY UA: NORMAL
SA2 MOD RISK ABY: NORMAL
SA2 TEST METHOD: NORMAL
SERUM DATE ALLO B1: NORMAL
SERUM DATE ALLO B2: NORMAL
SERUM DATE ALLO T1: NORMAL
SERUM DATE ALLO T2: NORMAL
SERUM DATE AUTO B1: NORMAL
SERUM DATE AUTO B2: NORMAL
SERUM DATE AUTO T1: NORMAL
SERUM DATE AUTO T2: NORMAL
SODIUM SERPL-SCNC: 141 MMOL/L (ref 133–144)
SPECIMEN EXP DATE BLD: ABNORMAL
TESTMETHODALLO: NORMAL
TESTMETHODAUTO: NORMAL
TIBC SERPL-MCNC: 142 UG/DL (ref 240–430)
TRANSFUSION STATUS PATIENT QL: NORMAL
TRANSFUSION STATUS PATIENT QL: NORMAL
TREATMENT ALLO B1: NORMAL
TREATMENT ALLO B2: NORMAL
TREATMENT ALLO T1: NORMAL
TREATMENT ALLO T2: NORMAL
TREATMENT AUTO B1: NORMAL
TREATMENT AUTO B2: NORMAL
TREATMENT AUTO T1: NORMAL
TREATMENT AUTO T2: NORMAL
UNACCEPTABLE ANTIGEN: NORMAL
UNOS CPRA: 100
WBC # BLD AUTO: 5.3 10E9/L (ref 4–11)

## 2020-03-03 PROCEDURE — 83550 IRON BINDING TEST: CPT | Performed by: STUDENT IN AN ORGANIZED HEALTH CARE EDUCATION/TRAINING PROGRAM

## 2020-03-03 PROCEDURE — 25000128 H RX IP 250 OP 636: Performed by: SURGERY

## 2020-03-03 PROCEDURE — 25000132 ZZH RX MED GY IP 250 OP 250 PS 637: Mod: GY | Performed by: NURSE PRACTITIONER

## 2020-03-03 PROCEDURE — 25800030 ZZH RX IP 258 OP 636: Performed by: NURSE PRACTITIONER

## 2020-03-03 PROCEDURE — 97535 SELF CARE MNGMENT TRAINING: CPT | Mod: GO | Performed by: OCCUPATIONAL THERAPIST

## 2020-03-03 PROCEDURE — 85018 HEMOGLOBIN: CPT | Performed by: SURGERY

## 2020-03-03 PROCEDURE — 84100 ASSAY OF PHOSPHORUS: CPT | Performed by: SURGERY

## 2020-03-03 PROCEDURE — 40000893 ZZH STATISTIC PT IP EVAL DEFER

## 2020-03-03 PROCEDURE — 83735 ASSAY OF MAGNESIUM: CPT | Performed by: SURGERY

## 2020-03-03 PROCEDURE — 86850 RBC ANTIBODY SCREEN: CPT | Performed by: PHYSICIAN ASSISTANT

## 2020-03-03 PROCEDURE — 86922 COMPATIBILITY TEST ANTIGLOB: CPT | Performed by: PHYSICIAN ASSISTANT

## 2020-03-03 PROCEDURE — 86901 BLOOD TYPING SEROLOGIC RH(D): CPT | Performed by: PHYSICIAN ASSISTANT

## 2020-03-03 PROCEDURE — 86900 BLOOD TYPING SEROLOGIC ABO: CPT | Performed by: PHYSICIAN ASSISTANT

## 2020-03-03 PROCEDURE — 25000125 ZZHC RX 250

## 2020-03-03 PROCEDURE — 82728 ASSAY OF FERRITIN: CPT | Performed by: STUDENT IN AN ORGANIZED HEALTH CARE EDUCATION/TRAINING PROGRAM

## 2020-03-03 PROCEDURE — 25000132 ZZH RX MED GY IP 250 OP 250 PS 637: Mod: GY | Performed by: STUDENT IN AN ORGANIZED HEALTH CARE EDUCATION/TRAINING PROGRAM

## 2020-03-03 PROCEDURE — 83540 ASSAY OF IRON: CPT | Performed by: STUDENT IN AN ORGANIZED HEALTH CARE EDUCATION/TRAINING PROGRAM

## 2020-03-03 PROCEDURE — 25000131 ZZH RX MED GY IP 250 OP 636 PS 637: Mod: GY | Performed by: SURGERY

## 2020-03-03 PROCEDURE — 36415 COLL VENOUS BLD VENIPUNCTURE: CPT | Performed by: SURGERY

## 2020-03-03 PROCEDURE — 80048 BASIC METABOLIC PNL TOTAL CA: CPT | Performed by: SURGERY

## 2020-03-03 PROCEDURE — 25000132 ZZH RX MED GY IP 250 OP 250 PS 637: Mod: GY | Performed by: SURGERY

## 2020-03-03 PROCEDURE — 86880 COOMBS TEST DIRECT: CPT | Performed by: PHYSICIAN ASSISTANT

## 2020-03-03 PROCEDURE — 83970 ASSAY OF PARATHORMONE: CPT | Performed by: SURGERY

## 2020-03-03 PROCEDURE — 25000132 ZZH RX MED GY IP 250 OP 250 PS 637: Mod: GY

## 2020-03-03 PROCEDURE — G0463 HOSPITAL OUTPT CLINIC VISIT: HCPCS

## 2020-03-03 PROCEDURE — 25000131 ZZH RX MED GY IP 250 OP 636 PS 637: Mod: GY | Performed by: NURSE PRACTITIONER

## 2020-03-03 PROCEDURE — 85027 COMPLETE CBC AUTOMATED: CPT | Performed by: SURGERY

## 2020-03-03 PROCEDURE — 12000026 ZZH R&B TRANSPLANT

## 2020-03-03 PROCEDURE — 25000128 H RX IP 250 OP 636: Performed by: STUDENT IN AN ORGANIZED HEALTH CARE EDUCATION/TRAINING PROGRAM

## 2020-03-03 PROCEDURE — 25000128 H RX IP 250 OP 636: Performed by: PHYSICIAN ASSISTANT

## 2020-03-03 RX ORDER — FUROSEMIDE 10 MG/ML
40 INJECTION INTRAMUSCULAR; INTRAVENOUS 2 TIMES DAILY
Status: DISCONTINUED | OUTPATIENT
Start: 2020-03-03 | End: 2020-03-06

## 2020-03-03 RX ORDER — TACROLIMUS 1 MG/1
2 CAPSULE ORAL
Status: DISCONTINUED | OUTPATIENT
Start: 2020-03-03 | End: 2020-03-05

## 2020-03-03 RX ORDER — ACETAMINOPHEN 325 MG/1
975 TABLET ORAL EVERY 8 HOURS
Status: DISCONTINUED | OUTPATIENT
Start: 2020-03-03 | End: 2020-03-04

## 2020-03-03 RX ORDER — CALCIUM CARBONATE 500 MG/1
500 TABLET, CHEWABLE ORAL 3 TIMES DAILY PRN
Status: DISCONTINUED | OUTPATIENT
Start: 2020-03-03 | End: 2020-03-10 | Stop reason: HOSPADM

## 2020-03-03 RX ORDER — VALGANCICLOVIR 450 MG/1
450 TABLET, FILM COATED ORAL
Status: DISCONTINUED | OUTPATIENT
Start: 2020-03-05 | End: 2020-03-10 | Stop reason: HOSPADM

## 2020-03-03 RX ORDER — MYCOPHENOLATE MOFETIL 250 MG/1
750 CAPSULE ORAL
Status: DISCONTINUED | OUTPATIENT
Start: 2020-03-03 | End: 2020-03-07

## 2020-03-03 RX ADMIN — HEPARIN SODIUM 5000 UNITS: 5000 INJECTION, SOLUTION INTRAVENOUS; SUBCUTANEOUS at 21:52

## 2020-03-03 RX ADMIN — MYCOPHENOLATE MOFETIL 750 MG: 200 POWDER, FOR SUSPENSION ORAL at 10:12

## 2020-03-03 RX ADMIN — ACETAMINOPHEN 975 MG: 325 SOLUTION ORAL at 10:13

## 2020-03-03 RX ADMIN — AMLODIPINE BESYLATE 10 MG: 10 TABLET ORAL at 10:11

## 2020-03-03 RX ADMIN — MYCOPHENOLATE MOFETIL 750 MG: 250 CAPSULE ORAL at 20:38

## 2020-03-03 RX ADMIN — CARVEDILOL 12.5 MG: 12.5 TABLET, FILM COATED ORAL at 20:42

## 2020-03-03 RX ADMIN — ONDANSETRON 4 MG: 2 INJECTION INTRAMUSCULAR; INTRAVENOUS at 15:27

## 2020-03-03 RX ADMIN — LIDOCAINE HYDROCHLORIDE 30 ML: 20 SOLUTION ORAL; TOPICAL at 22:30

## 2020-03-03 RX ADMIN — DASATINIB 40 MG: 20 TABLET ORAL at 20:39

## 2020-03-03 RX ADMIN — SENNOSIDES AND DOCUSATE SODIUM 2 TABLET: 8.6; 5 TABLET ORAL at 10:12

## 2020-03-03 RX ADMIN — FAMOTIDINE 40 MG: 20 TABLET ORAL at 10:11

## 2020-03-03 RX ADMIN — CALCIUM CARBONATE (ANTACID) CHEW TAB 500 MG 500 MG: 500 CHEW TAB at 19:50

## 2020-03-03 RX ADMIN — ATORVASTATIN CALCIUM 10 MG: 10 TABLET, FILM COATED ORAL at 10:11

## 2020-03-03 RX ADMIN — DEXTROSE AND SODIUM CHLORIDE: 5; 900 INJECTION, SOLUTION INTRAVENOUS at 21:57

## 2020-03-03 RX ADMIN — TACROLIMUS 2 MG: 1 CAPSULE ORAL at 20:38

## 2020-03-03 RX ADMIN — CARVEDILOL 12.5 MG: 12.5 TABLET, FILM COATED ORAL at 10:11

## 2020-03-03 RX ADMIN — FUROSEMIDE 40 MG: 10 INJECTION, SOLUTION INTRAMUSCULAR; INTRAVENOUS at 13:35

## 2020-03-03 RX ADMIN — ACETAMINOPHEN 975 MG: 325 SOLUTION ORAL at 02:38

## 2020-03-03 RX ADMIN — HEPARIN SODIUM 5000 UNITS: 5000 INJECTION, SOLUTION INTRAVENOUS; SUBCUTANEOUS at 10:13

## 2020-03-03 RX ADMIN — Medication 2 MG: at 10:12

## 2020-03-03 RX ADMIN — PREDNISONE 5 MG: 5 TABLET ORAL at 10:11

## 2020-03-03 ASSESSMENT — ACTIVITIES OF DAILY LIVING (ADL)
ADLS_ACUITY_SCORE: 16
ADLS_ACUITY_SCORE: 14
ADLS_ACUITY_SCORE: 14
ADLS_ACUITY_SCORE: 16

## 2020-03-03 ASSESSMENT — MIFFLIN-ST. JEOR
SCORE: 1462.81
SCORE: 1462.81

## 2020-03-03 NOTE — PLAN OF CARE
7A  Discharge Planner PT   Patient plan for discharge: home  Current status: Per chart review and discussion with OT, Pt moving well with ambulation and transfers, CGA-SBA with OT. Pt with no LOB or unsteadiness without IV pole and pt performing head turns, mobility primarily limited by dizziness. OT following for education on precautions and ADLs at this time. Pt with no skilled inpatient PT needs, Will complete consult and defer evaluation, please reconsult as appropriate if patient has decline in functional mobility requiring further skilled inpatient PT needs.  Barriers to return to prior living situation: medical status  Recommendations for discharge: Per OT - Home with Assist  Rationale for recommendations: See current status       Entered by: Joi Ruggiero 03/03/2020 11:04 AM

## 2020-03-03 NOTE — PROGRESS NOTES
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 He is s/p kidney transplant x2 (, ). 2nd graft lost to primary nonfunction with explant a few weeks later. He has been on dialysis since . He is now s/p intra-abdominal  donor kidney transplant w/ ureteral stent on 20.    Graft function: POD #4 with delayed graft function. UOP good on lasix. Underwent HD POD#1 due to hyperkalemia. Cr 9.85 (9.3).  Immunosuppression management:   Thymo: 425mg, 6.2mg/kg completed.  Methylpred: Taper completed.  Tacro: Goal level 8-10.   MMF: 750mg BID.  Prednisone: 5mg daily, 3rd transplant protocol.  Complexity of management: Medium. Contributing factors: anemia and delayed graft function  Hematology:   Anemia of chronic disease: Hgb stable ~8-9.   Hx CML: Restart home dose dasatinib. Interacts with tacrolimus, monitor for dasatinib toxicity (rash, edema, pancytopenia, bone pain, prolonged QT). Check EKG prior to discharge.   Neurology:   Acute postoperative pain: Scheduled APAP and PRN oxycodone. Continue lidoderm.  Cardiorespiratory:   HTN: Uncontrolled. On Coreg 12.5mg and amlodipine, increased to 10 mg daily  GI/Nutrition:   Diet: NPO  Post-op ileus: NG placed post-op for distention and nausea. Passing flatus and did have 2 BMs. Still having nausea with medications and large NG output. Will remove NG tube since having antegrade function, will need to treat nausea, will keep NPO today.  Bowel regimen: Senna/colace.   Endocrine:  Steroid induced hyperglycemia: Admit HgbA1c 5.2%. Glucose trending down.  Fluid/Electrolytes: MIVF: D5 NS at 40 mL/hr  Hypervolemia: Wt now below admit weight. Will order lasix 40 mg IV BID per nephrology recs  Hyperkalemia: K 6.6 on POD #1, managed with dialysis. K now 3.9.  Hyperphosphatemia: Phos 7.5.   : Stein x7 days d/t small bladder.  Infectious disease: Afebrile  Prophylaxis:  "DVT (heparin), fall, GI, fungal, PT/OT  Disposition: 7A, will plan for discharge once tolerating a diet    Medical Decision Making: Medium  Subsequent visit 20735 (moderate level decision making)    DARRON/Fellow/Resident Provider: Emili Cage PA-C [id number]    Faculty: Ron Benítez MD   _________________________________________________________________  Transplant History:   2/28/2020 (Kidney), 12/7/1988 (Kidney), 3/2/2005 (Kidney), Postoperative day: 4     Interval History: History is obtained from the patient  Overnight events: Pain controlled, no nausea, intermittent flatus, still having some dry heaves with medications. Biggest complaint is the NG tube- will remove.    ROS:   A 10-point review of systems was negative except as noted above.    Meds:    acetaminophen  975 mg Oral Q8H     amLODIPine  10 mg Oral Daily     atorvastatin  10 mg Oral Daily     carvedilol  12.5 mg Oral BID w/meals     dasatinib  40 mg Oral QPM     famotidine  40 mg Oral Daily     heparin ANTICOAGULANT  5,000 Units Subcutaneous Q12H     lidocaine  1 patch Transdermal Q24H     lidocaine   Transdermal Q8H     mycophenolate  750 mg Oral or Feeding Tube BID IS     predniSONE  5 mg Oral Daily     senna-docusate  1 tablet Oral BID    Or     senna-docusate  2 tablet Oral BID     sodium chloride (PF)  10 mL Intracatheter Q8H     sulfamethoxazole-trimethoprim  1 tablet Oral Q Mon Wed Fri AM     tacrolimus  2 mg Oral or Feeding Tube BID IS     [START ON 3/5/2020] valGANciclovir  450 mg Oral or Feeding Tube Once per day on Mon Thu       Physical Exam:     Admit Weight: 68.1 kg (150 lb 2.1 oz)    Current vitals:   BP (!) 147/87 (BP Location: Left arm)   Pulse 95   Temp 98  F (36.7  C) (Oral)   Resp 14   Ht 1.626 m (5' 4\")   Wt 65.7 kg (144 lb 12.8 oz)   SpO2 100%   BMI 24.85 kg/m      Vital sign ranges:    Temp:  [98  F (36.7  C)-98.7  F (37.1  C)] 98  F (36.7  C)  Pulse:  [95-98] 95  Heart Rate:  [84-99] 87  Resp:  [14-16] 14  BP: " (146-158)/() 147/87  SpO2:  [96 %-100 %] 100 %  Patient Vitals for the past 24 hrs:   BP Temp Temp src Pulse Heart Rate Resp SpO2 Weight   03/03/20 1117 -- -- -- -- -- -- -- 65.7 kg (144 lb 12.8 oz)   03/03/20 1114 (!) 147/87 98  F (36.7  C) Oral -- 87 14 100 % --   03/03/20 1000 -- -- -- -- -- -- -- 65.7 kg (144 lb 12.8 oz)   03/03/20 0739 (!) 158/90 98.5  F (36.9  C) Oral -- 95 16 99 % --   03/03/20 0400 (!) 149/82 98.5  F (36.9  C) Oral 95 -- 16 96 % --   03/03/20 0019 (!) 149/89 98.7  F (37.1  C) Oral 98 -- 16 100 % --   03/02/20 2208 (!) 147/82 -- -- -- -- -- -- --   03/02/20 2102 (!) 155/91 -- -- -- -- -- -- --   03/02/20 2010 (!) 153/92 98.2  F (36.8  C) Oral -- 99 16 100 % --   03/02/20 1612 (!) 146/83 98  F (36.7  C) Oral -- 88 16 97 % --   03/02/20 1231 (!) 149/100 98.2  F (36.8  C) -- -- 84 16 100 % --     General Appearance: in no apparent distress.   Skin: normal, warm, dry  HEENT: Atraumatic, normocephalic, NG tube in place  Heart: NSR  Lungs: Breathing comfortably on room air  Abdomen: Abdomen is slightly rounded. Incision well-healing without javier-incisional erythema or drainage. EMMA serosang.  : tan is present.  Clear, yellow urine draining  Extremities: edema: trace bilaterally  Neurologic: awake, alert and oriented x4. Tremor absent.     Data:   CMP  Recent Labs   Lab 03/03/20  0729 03/02/20  1140 03/02/20  0448  02/28/20  1417 02/28/20  1308 02/28/20  0341     --  140   < > 138 140 137   POTASSIUM 3.9 4.2 4.4   < > 5.2 5.7* 5.0   CHLORIDE 104  --  102   < >  --   --  102   CO2 26  --  25   < >  --   --  22   *  --  138*   < > 95 107* 92   *  --  76*   < >  --   --  65*   CR 9.85*  --  9.25*   < >  --   --  11.90*   GFRESTIMATED 6*  --  6*   < >  --   --  5*   GFRESTBLACK 7*  --  7*   < >  --   --  5*   ANTWAN 7.8*  --  8.2*   < >  --   --  7.7*   ICAW  --   --   --   --  3.7* 3.6*  --    MAG 2.4*  --  2.1   < >  --   --   --    PHOS 7.5*  --  8.9*   < >  --   --   --     ALBUMIN  --   --   --   --   --   --  4.0   BILITOTAL  --   --   --   --   --   --  0.6   ALKPHOS  --   --   --   --   --   --  108   AST  --   --   --   --   --   --  30   ALT  --   --   --   --   --   --  48    < > = values in this interval not displayed.     CBC  Recent Labs   Lab 03/03/20  0849 03/03/20  0729 03/02/20  0448  02/28/20  0341   HGB 8.0* 5.3* 8.3*   < > 11.8*   WBC  --  5.3 5.2   < > 9.7   PLT  --  97* 83*   < > 195   A1C  --   --   --   --  5.2    < > = values in this interval not displayed.

## 2020-03-03 NOTE — PROGRESS NOTES
"Transplant Admission Psychosocial Assessment    Patient Name: Jeremiah Cruz  : 1975  Age: 44 year old  MRN: [id number]  Completed assessment with: Patient    Patient underwent a  donor kidney transplant. Of note, this is patient's 3 kidney transplant. Met with patient to update psychosocial assessment and provide brief education about SW role while inpatient, as well as expectations/requirements and follow up needs post-transplant. SW also provided education about need for compliance with transplant medications, and explained ESRD Medicare benefits and medication coverage under Medicare part B. Medicare 2728 forms completed and signed by patient.  Presenting Information   Living Situation: Patient living with his 21-year-old daughter Kiley  If not local, plans for short term stay:  Patient lives in [place], [place]. Will be staying at the [facility] under the [facility] protocol  Previous Functional Status: Hearing aids, independent with ADL's  Cultural/Language/Spiritual Considerations: None indicated by patient    Support System  Primary Support Person Mother Kaylin  Other support:  Children Kiley and Martín  Plan for support in immediate post-transplant period: Mother and children    Health Care Directive  Decision Maker: Patient  Alternate Decision Maker: Children are NOK  Health Care Directive: Provided education and Declined completing    Mental Health/Coping:   History of Mental Health: History of anxiety and depression  History of Chemical Health: No concerns   Current status: \"Hangry\"  Coping: Patient having a difficult time with not being able to eat. SW spoke with DARRON who changed patient's diet order to clears  Services Needed/Recommended: None identified at this time    Financial   Income: SSDI  Impact of transplant on income: Potential to lose SSDI if transplant is successful within 1 year of transplant if he has no other qualifying disability  Insurance and medication coverage: SOT*LIAISON " JONNY IS A KIDNEY TRANSPLANT PATIENT (DATE OF TX: 20). PRIMARY HEALTH INSURANCE IS THROUGH MEDICARE ID#[id number] GRP#MEDICAID (MEDICARE PART A & B EFFECTIVE 10/01/1988). BILL IMMUNOS AND PART B MEDICATIONS TO MEDICARE PART B PRIMARY THEN [place] CO[place] SECONDARY TO [place] MANUAL ID#[id number] (EFFECTIVE 08) AND PATIENT WILL PAY $0 AT TIME OF SERVICE. PART D PHARMACY BENEFITS ARE THROUGH [employer]. BILL PART D MEDICATIONS TO RX PROCESSOR SILVERSCRIPT PART D ID#[id number] GRP#[id number] PCN#[id number] BIN#[id number] (EFFECTIVE 01/01/10) . BILL ELIGIBLE OTC MEDICATIONS TO RX PROCESSOR [place] MEDICA[place] ID#[id number] BIN#[id number] (EFFECTIVE 08). FOR PART D MEDICATIONS PATIENT HAS NO DEDUCTIBLE OR MAX OUT OF POCKET (DUE TO LOW INCOME COPAY SUBSIDY). PT WILL PAY $0 FOR GENERIC, $0 FOR BRAND, AND $0 FOR NON-FORMULARY MEDICATIONS. TESTCLAIMS: MYCOPHENOLATE 250MG=$0 AT TIME OF SERVICES, ENVARSUS XR 4MG=$0 AT TIME OF SERVICES, PROGRAF 1MG=$0 AT TIME OF SERVICES, TACROLIMUS 1MG=$0 AT TIME OF SERVICES, CYCLOSPORINE 100MG=$0 AT TIME OF SERVICES, VALGANCICLOVIR 450MG=$0  Financial concerns: None indicated  Resources needed: None indicated    Assessment, recommendations and discharge plan:  Patient is a 44-year-old, male, who underwent a  donor kidney transplant . Patient was on dialysis prior to transplant. This is patient's 3rd kidney transplant. Patient was sitting up in a chair and willing to talk with SW. Patient is unhappy that he cannot eat and would like anything other then water. SW spoke with DARRON who changed patient's diet to clears. SW advised patient on how to order food and directed him to the clears section of the menu. Patient had no plans to stay local but since he is in DGF, he will be staying at the [facility] at discharge. Patient has adequate insurance coverage and social support. No coping issues or psychosocial concerns. Patient was well informed of post-transplant  expectations/follow through.    LON Plummer, SW  7A   Ph: [phone]  Pager: [phone]

## 2020-03-03 NOTE — PLAN OF CARE
"BP (!) 147/82 (BP Location: Left arm)   Pulse 85   Temp 98.2  F (36.8  C) (Oral)   Resp 16   Ht 1.626 m (5' 4\")   Wt 72.3 kg (159 lb 6.3 oz)   SpO2 100%   BMI 27.36 kg/m    [id number] VSS with elevated blood pressure of 155/91 so PRN dose of hydralazine was given and BP decreased to 147/82. Pt. denies any pain. Pt. is NPO with ice chips. Denies nausea however he did have 1 emesis this evening after receiving oral  medications so pt hooked back up to low intermittent suction, NG put out 400 mL brown output this shift. Pt. refused any antiemetics. After the emesis, pt. refused all oral meds for the rest of the shift, MD paged and made aware. Pt. has D5NS running in L PIV at 40mL/hr. EMMA bulb drain is draining serosanguinous drainage, 20 mL emptied this shift. Pt. Has tan in place that is producing adequate urine output, 550 mL emptied this shift.  Pt. has had 2 BM's today.  Patient is up w/ 1 assist. Continue current plan of care and notify MD of any changes.  "

## 2020-03-03 NOTE — PROGRESS NOTES
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 He is s/p kidney transplant x2 (, ). 2nd graft lost to primary nonfunction with explant a few weeks later. He has been on dialysis since . He is now s/p intra-abdominal  donor kidney transplant w/ ureteral stent on 20--per CAMMY Gabriel, note today.  I: Per Emili, pt has DGF--it is coded.Possible dialysis tomorrow.  P: I have sent Danay Nava in accommodations an [facility] or if we will use another--pt is from [place], [place] and needs to stay locally and will need intermittent hemodialysis runs. Wait for Danay's info and then talk with pt.  Per email from Danay today:LAURA Benitez,   No resolve and in talking with Maryan Joel, she supported using [facility]. When will this patient need a room and for about how long?   Thanks,   Nalini   I have informed Nalini that he could potentially discharge tomorrow and may need to stay for up to  2 weeks.  7A haider, Loni WU, has informed pt.

## 2020-03-03 NOTE — PROGRESS NOTES
[facility], [place]   Transplant Nephrology Progress Note  Date of Admission:  2/28/2020  Today's Date: 03/03/2020    Recommendations  1. Diuresis with lasix 40mg BID today  2. No antihypertensives at this time, can treat nausea and remove NG tube, if sBP remains >150 we can increase coreg to 25mg BID    Assessment & Plan   # DDKT: Stable, but UOP 2L daily. DGF (HD on POD#1 due to hyperkalemia). BUN rising and patient is feeling increasingly more lethargic and nauseated. There could be a component of uremia causing these symptoms, and he also has a rising phosphorous. We will reassess on tomorrow and possible start HD.              - Baseline Cr ~ TBD              - Proteinuria: Not checked post transplant              - Date DSA Last Checked: Feb/2020      Latest DSA: No DSA at time of transplant              - BK Viremia: No              - Kidney Tx Biopsy: No    # Immunosuppression: Tacrolimus immediate release (goal 8-10) and Mycophenolate mofetil (goal 5-7)              - Changes: No, tacrolimus 4.8 and keeping the same dose. Next level 3/4/20.      # Infection Prophylaxis:   - PJP: Sulfa/TMP (Bactrim)  - CMV: Valcyte x 6 months (R-/D*)  - Thrush: None     # Hypertension: Inadequate control;             Goal BP: < 140/90              - Volume status: Mildly hypervolemic                EDW~ 67.8kg              - Changes: Yes, would recommend lasix 40mg TID    # Elevated Blood Glucose: Glucose generally running ~ 100-140              - Management as per primary team.     # Anemia of Chronic Kidney Disease: Hgb 8.0 after 1UpRBC for 5.3g/dL     DEB: No                                                                    Iron panel: Not done recently     # Mineral Bone Disorder:   - Secondary renal hyperparathyroidism; PTH level: 668        On treatment: None  - Vitamin D; level: Low        On Supplement: No, would start cholecalciferol 2000U daily  - Calcium; level: Low Normal        On  Supplement: No  - Phosphorus; level: High        On Supplement: No, no need for binder, UOP is improving    # Electrolytes:   - Potassium; level: Normal       On Supplement: No  - Magnesium; level: High-Normal        On Supplement: No  - Bicarbonate; level: Normal       On Supplement: No  - Sodium; level: Normal        On Supplement: No     # CML: In remission. On dasatinib 40mg daily     # Nausea/Vomiting: NGT in place, had several BMs last night but still having large NG output and nausea. NG removed today and keeping him NPO. Treating nausea with zofran.     # Transplant History:  Etiology of Kidney Failure:  Alport syndrome s/p kidney transplant x 2  Tx: DDKT  Transplant: 2/28/2020 (Kidney), 12/7/1988 (Kidney), 3/2/2005 (Kidney)  Donor Type: Donation after Brain Death        Donor Class:   Crossmatch at time of Tx: negative  DSA at time of Tx: No  Significant changes in immunosuppression: None  Significant transplant-related complications: None    Recommendations were communicated to the primary team verbally.    Seen and discussed with Dr. Telly Kunz MD   Pager: [phone]    Attestation:  This patient has been seen and evaluated by me, Alvin Varner MD.  I have reviewed the note and agree with plan of care as documented by the fellow.       Interval History   Mr Cruz had no fevers or chills overnight. He remains nauseated and fatigued. He has no shortness of breath and no chest pain. He denies LE swelling. He had several BMs last night.        Review of Systems   4 point ROS was obtained and negative except as noted in the Interval History.    MEDICATIONS:    acetaminophen  975 mg Oral Q8H     amLODIPine  10 mg Oral Daily     atorvastatin  10 mg Oral Daily     carvedilol  12.5 mg Oral BID w/meals     dasatinib  40 mg Oral QPM     famotidine  40 mg Oral Daily     furosemide  40 mg Intravenous BID     heparin ANTICOAGULANT  5,000 Units Subcutaneous Q12H     lidocaine  1 patch Transdermal Q24H  "    lidocaine   Transdermal Q8H     mycophenolate  750 mg Oral or Feeding Tube BID IS     predniSONE  5 mg Oral Daily     senna-docusate  1 tablet Oral BID    Or     senna-docusate  2 tablet Oral BID     sodium chloride (PF)  10 mL Intracatheter Q8H     sulfamethoxazole-trimethoprim  1 tablet Oral Q Mon Wed Fri AM     tacrolimus  2 mg Oral or Feeding Tube BID IS     [START ON 3/5/2020] valGANciclovir  450 mg Oral or Feeding Tube Once per day on Mon Thu       dextrose 5% and 0.9% NaCl 40 mL/hr at 03/03/20 1000       Physical Exam      BP (!) 147/87 (BP Location: Left arm)   Pulse 95   Temp 98  F (36.7  C) (Oral)   Resp 14   Ht 1.626 m (5' 4\")   Wt 65.7 kg (144 lb 12.8 oz)   SpO2 100%   BMI 24.85 kg/m       GENERAL APPEARANCE: alert and no distress  HENT: mouth without ulcers or lesions.  NG tube removed  LYMPHATICS: no cervical or supraclavicular nodes  RESP: lungs clear to auscultation - no rales, rhonchi or wheezes  CV: regular rhythm, normal rate, no rub, no murmur  EDEMA: no LE edema bilaterally  ABDOMEN: soft, nondistended, nontender, bowel sounds normal  MS: extremities normal - no gross deformities noted, no evidence of inflammation in joints, no muscle tenderness  SKIN: no rash  ACCESS: None  ALLOGRAFT: Non-tender    Data   All labs reviewed by me.  CMP  Recent Labs   Lab 03/03/20  0729 03/02/20  1140 03/02/20  0448 03/01/20  0958  02/29/20  0317  02/28/20  0341     --  140 136  --  136   < > 137   POTASSIUM 3.9 4.2 4.4 4.7   < > 4.6   < > 5.0   CHLORIDE 104  --  102 100  --  102   < > 102   CO2 26  --  25 27  --  18*   < > 22   ANIONGAP 11  --  12 10  --  16*   < > 13   *  --  138* 110*  --  213*   < > 92   *  --  76* 57*  --  71*   < > 65*   CR 9.85*  --  9.25* 8.45*  --  12.60*   < > 11.90*   GFRESTIMATED 6*  --  6* 7*  --  4*   < > 5*   GFRESTBLACK 7*  --  7* 8*  --  5*   < > 5*   ANTWAN 7.8*  --  8.2* 7.7*  --  6.8*   < > 7.7*   MAG 2.4*  --  2.1 2.0  --  2.0   < >  --    PHOS " 7.5*  --  8.9* 8.2*  --  8.5*   < >  --    PROTTOTAL  --   --   --   --   --   --   --  7.9   ALBUMIN  --   --   --   --   --   --   --  4.0   BILITOTAL  --   --   --   --   --   --   --  0.6   ALKPHOS  --   --   --   --   --   --   --  108   AST  --   --   --   --   --   --   --  30   ALT  --   --   --   --   --   --   --  48    < > = values in this interval not displayed.     CBC  Recent Labs   Lab 03/03/20  0849 03/03/20  0729 03/02/20  0448 03/01/20  1201 03/01/20  0958  02/29/20  0317   HGB 8.0* 5.3* 8.3*  --  8.0*   < > 9.5*   WBC  --  5.3 5.2  --  9.8  --  18.2*   RBC  --  1.83* 2.86*  --  2.77*  --  3.19*   HCT  --  16.6* 26.6*  --  25.2*  --  30.6*   MCV  --  91 93  --  91  --  96   MCH  --  29.0 29.0  --  28.9  --  29.8   MCHC  --  31.9 31.2*  --  31.7  --  31.0*   RDW  --  14.7 14.5  --  14.3  --  14.2   PLT  --  97* 83* 81* 82*  --  132*    < > = values in this interval not displayed.     INR  Recent Labs   Lab 02/28/20  0341   INR 1.11   PTT 30     ABG  Recent Labs   Lab 02/28/20  1417 02/28/20  1308   PH 7.36 7.38   PCO2 35 35   PO2 222* 202*   HCO3 19* 20*   O2PER 53.0 54.0      Urine Studies  No lab results found.  No lab results found.  PTH  Recent Labs   Lab Test 03/03/20  0849 03/03/20  0729   PTHI 668* Canceled, Test credited     Iron Studies  Recent Labs   Lab Test 03/03/20 0729   IRON 139   *   IRONSAT 98*   YOLIS 3,397*       IMAGING:  All imaging studies reviewed by me.

## 2020-03-03 NOTE — PLAN OF CARE
Discharge Planner OT   Patient plan for discharge: home  Current status: Pt performed standing G/H and ambulated hallway with CGA and no IV pole. Lightheaded with activity, improving somewhat during session, but all VSS, BPs 150s/90s. Appeared steady on feet despite dizziness.   Barriers to return to prior living situation: Decreased ADL independence and activity tolerance  Recommendations for discharge: home with assist  Rationale for recommendations: Anticipate pt will be safe for home discharge with progress in therapies       Entered by: Xavi Villar 03/03/2020 11:20 AM

## 2020-03-03 NOTE — PLAN OF CARE
Gave scheduled tylenol down NGT. Stated earlier he could not take pills because he would just throw them up. Just gave tylenol elixir and as soon as he felt it on his tummy he first began gagging. I asked him to try and swallow to prevent him from having an emesis. The gagging then stopped, but I suctioned out most of the tylenol. Noted when I irrigated with warm water  he appeared to tolerate. Will continue to monitor and report any significant changes.

## 2020-03-03 NOTE — PROGRESS NOTES
[facility] Nurse Inpatient Pressure Injury Assessment   Reason for consultation: Evaluate and treat nose wound      ASSESSMENT  Pressure Injury: on nose, left facet , hospital acquired ,   This is a Medical Device Related Pressure Injury (MDRPI) due to NG  Pressure Injury is Stage 2   Contributing factor of the pressure injury: moisture  Status: initial assessment     TREATMENT PLAN  Left nose facet wound: Every 4 hours:  Cleanse with sterile NS and q-tip.  Cover with vaseline.  Ensure that tube reyes is placed so that there is no pressure from the tube   Orders Written  WO Nurse follow-up plan:weekly  Nursing to notify the Provider(s) and re-consult the WO Nurse if wound(s) deteriorates or new skin concern.    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 He is s/p kidney transplant x2 (, ). 2nd graft lost to primary nonfunction with explant a few weeks later.  s/p intra-abdominal  donor kidney transplant w/ ureteral stent on 20.    Objective Data    Current Diet/ Nutrition:  Orders Placed This Encounter      NPO for Medical/Clinical Reasons Except for: Meds, Ice Chips, Other; Specify: sips of water      Output:   I/O last 3 completed shifts:  In: 1354.83 [P.O.:150; I.V.:1159.83; NG/GT:45]  Out: 2485 [Urine:1500; Emesis/NG output:950; Drains:35]    Risk Assessment:   Sensory Perception: 3-->slightly limited  Moisture: 4-->rarely moist  Activity: 3-->walks occasionally  Mobility: 3-->slightly limited  Nutrition: 3-->adequate  Friction and Shear: 3-->no apparent problem  Mono Score: 19      Labs:   Recent Labs   Lab 20  0849 20  0729  20  0341   ALBUMIN  --   --   --  4.0   HGB 8.0* 5.3*   < > 11.8*   INR  --   --   --  1.11   WBC  --  5.3   < > 9.7   A1C  --   --   --  5.2    < > = values in this interval not displayed.       Physical Exam  Skin inspection: focused nose   Skin intact, large amt of sweating      Wound Location:  Left nare facet      Date of last Photo 3/3/20  Wound History: NG placed 2/29/20  Measurements (length x width x depth, in cm) 0.5 cm x 0.3 cm  x  0.2 cm   Palpation of the wound bed: normal   Periwound skin: intact  Color: normal and consistent with surrounding tissue  Temperature: normal   Drainage:, none  Description of drainage: none  Odor: none  Pain: with palpation only    Interventions  Current support surface: Standard  Atmos Air mattress  Visual inspection of wound(s) completed   Tube Securement: changed so that tube is not on the skin  Wound Care: was done per plan of care.  Supplies: gathered  Educated provided: plan of care and wound progress  Education provided to: patient   Discussed importance of:off-loading pressure to wound  Discussed plan of care with Nurse

## 2020-03-04 ENCOUNTER — APPOINTMENT (OUTPATIENT)
Dept: NUCLEAR MEDICINE | Facility: CLINIC | Age: 45
DRG: 652 | End: 2020-03-04
Attending: NURSE PRACTITIONER
Payer: MEDICARE

## 2020-03-04 ENCOUNTER — TELEPHONE (OUTPATIENT)
Dept: TRANSPLANT | Facility: CLINIC | Age: 45
End: 2020-03-04

## 2020-03-04 ENCOUNTER — APPOINTMENT (OUTPATIENT)
Dept: OCCUPATIONAL THERAPY | Facility: CLINIC | Age: 45
DRG: 652 | End: 2020-03-04
Attending: SURGERY
Payer: MEDICARE

## 2020-03-04 LAB
ANION GAP SERPL CALCULATED.3IONS-SCNC: 12 MMOL/L (ref 3–14)
BUN SERPL-MCNC: 103 MG/DL (ref 7–30)
CALCIUM SERPL-MCNC: 7.6 MG/DL (ref 8.5–10.1)
CHLORIDE SERPL-SCNC: 101 MMOL/L (ref 94–109)
CO2 SERPL-SCNC: 22 MMOL/L (ref 20–32)
CREAT SERPL-MCNC: 8.49 MG/DL (ref 0.66–1.25)
ERYTHROCYTE [DISTWIDTH] IN BLOOD BY AUTOMATED COUNT: 14.6 % (ref 10–15)
GFR SERPL CREATININE-BSD FRML MDRD: 7 ML/MIN/{1.73_M2}
GLUCOSE SERPL-MCNC: 132 MG/DL (ref 70–99)
HCT VFR BLD AUTO: 25.7 % (ref 40–53)
HGB BLD-MCNC: 8.1 G/DL (ref 13.3–17.7)
MAGNESIUM SERPL-MCNC: 2 MG/DL (ref 1.6–2.3)
MCH RBC QN AUTO: 29.2 PG (ref 26.5–33)
MCHC RBC AUTO-ENTMCNC: 31.5 G/DL (ref 31.5–36.5)
MCV RBC AUTO: 93 FL (ref 78–100)
PHOSPHATE SERPL-MCNC: 5.2 MG/DL (ref 2.5–4.5)
PLATELET # BLD AUTO: 82 10E9/L (ref 150–450)
POTASSIUM SERPL-SCNC: 3.7 MMOL/L (ref 3.4–5.3)
PTH-INTACT SERPL-MCNC: 630 PG/ML (ref 18–80)
RBC # BLD AUTO: 2.77 10E12/L (ref 4.4–5.9)
SODIUM SERPL-SCNC: 135 MMOL/L (ref 133–144)
WBC # BLD AUTO: 5.2 10E9/L (ref 4–11)

## 2020-03-04 PROCEDURE — 36415 COLL VENOUS BLD VENIPUNCTURE: CPT | Performed by: SURGERY

## 2020-03-04 PROCEDURE — 25000132 ZZH RX MED GY IP 250 OP 250 PS 637: Mod: GY | Performed by: SURGERY

## 2020-03-04 PROCEDURE — 80048 BASIC METABOLIC PNL TOTAL CA: CPT | Performed by: SURGERY

## 2020-03-04 PROCEDURE — 25000128 H RX IP 250 OP 636: Performed by: PHYSICIAN ASSISTANT

## 2020-03-04 PROCEDURE — 83970 ASSAY OF PARATHORMONE: CPT | Performed by: SURGERY

## 2020-03-04 PROCEDURE — 25000128 H RX IP 250 OP 636: Performed by: STUDENT IN AN ORGANIZED HEALTH CARE EDUCATION/TRAINING PROGRAM

## 2020-03-04 PROCEDURE — 83735 ASSAY OF MAGNESIUM: CPT | Performed by: SURGERY

## 2020-03-04 PROCEDURE — 84100 ASSAY OF PHOSPHORUS: CPT | Performed by: SURGERY

## 2020-03-04 PROCEDURE — 25000125 ZZHC RX 250: Performed by: NURSE PRACTITIONER

## 2020-03-04 PROCEDURE — 78707 K FLOW/FUNCT IMAGE W/O DRUG: CPT

## 2020-03-04 PROCEDURE — 97110 THERAPEUTIC EXERCISES: CPT | Mod: GO

## 2020-03-04 PROCEDURE — 97535 SELF CARE MNGMENT TRAINING: CPT | Mod: GO

## 2020-03-04 PROCEDURE — 25000131 ZZH RX MED GY IP 250 OP 636 PS 637: Mod: GY | Performed by: NURSE PRACTITIONER

## 2020-03-04 PROCEDURE — 85027 COMPLETE CBC AUTOMATED: CPT | Performed by: SURGERY

## 2020-03-04 PROCEDURE — 25000132 ZZH RX MED GY IP 250 OP 250 PS 637: Mod: GY | Performed by: NURSE PRACTITIONER

## 2020-03-04 PROCEDURE — A9562 TC99M MERTIATIDE: HCPCS | Performed by: SURGERY

## 2020-03-04 PROCEDURE — 12000026 ZZH R&B TRANSPLANT

## 2020-03-04 PROCEDURE — 25000131 ZZH RX MED GY IP 250 OP 636 PS 637: Mod: GY | Performed by: SURGERY

## 2020-03-04 PROCEDURE — 34300033 ZZH RX 343: Performed by: SURGERY

## 2020-03-04 PROCEDURE — 25000132 ZZH RX MED GY IP 250 OP 250 PS 637: Mod: GY

## 2020-03-04 RX ORDER — ATROPA BELLADONNA AND OPIUM 16.2; 3 MG/1; MG/1
15 SUPPOSITORY RECTAL EVERY 6 HOURS PRN
Status: DISCONTINUED | OUTPATIENT
Start: 2020-03-04 | End: 2020-03-10 | Stop reason: HOSPADM

## 2020-03-04 RX ORDER — SUCRALFATE ORAL 1 G/10ML
1 SUSPENSION ORAL 2 TIMES DAILY
Status: DISCONTINUED | OUTPATIENT
Start: 2020-03-04 | End: 2020-03-10 | Stop reason: HOSPADM

## 2020-03-04 RX ORDER — ACETAMINOPHEN 325 MG/1
650 TABLET ORAL EVERY 6 HOURS PRN
Status: DISCONTINUED | OUTPATIENT
Start: 2020-03-04 | End: 2020-03-10 | Stop reason: HOSPADM

## 2020-03-04 RX ORDER — OXYCODONE HYDROCHLORIDE 5 MG/1
5 TABLET ORAL EVERY 4 HOURS PRN
Status: DISCONTINUED | OUTPATIENT
Start: 2020-03-04 | End: 2020-03-05

## 2020-03-04 RX ADMIN — CARVEDILOL 12.5 MG: 12.5 TABLET, FILM COATED ORAL at 18:29

## 2020-03-04 RX ADMIN — AMLODIPINE BESYLATE 10 MG: 10 TABLET ORAL at 09:11

## 2020-03-04 RX ADMIN — SENNOSIDES AND DOCUSATE SODIUM 2 TABLET: 8.6; 5 TABLET ORAL at 09:11

## 2020-03-04 RX ADMIN — HEPARIN SODIUM 5000 UNITS: 5000 INJECTION, SOLUTION INTRAVENOUS; SUBCUTANEOUS at 21:08

## 2020-03-04 RX ADMIN — TACROLIMUS 2 MG: 1 CAPSULE ORAL at 09:11

## 2020-03-04 RX ADMIN — HEPARIN SODIUM 5000 UNITS: 5000 INJECTION, SOLUTION INTRAVENOUS; SUBCUTANEOUS at 09:12

## 2020-03-04 RX ADMIN — SULFAMETHOXAZOLE AND TRIMETHOPRIM 1 TABLET: 400; 80 TABLET ORAL at 09:11

## 2020-03-04 RX ADMIN — ATORVASTATIN CALCIUM 10 MG: 10 TABLET, FILM COATED ORAL at 09:11

## 2020-03-04 RX ADMIN — FAMOTIDINE 40 MG: 20 TABLET ORAL at 10:05

## 2020-03-04 RX ADMIN — TECHNESCAN TC 99M MERTIATIDE 10 MILLICURIE: 1 INJECTION, POWDER, LYOPHILIZED, FOR SOLUTION INTRAVENOUS at 14:00

## 2020-03-04 RX ADMIN — FUROSEMIDE 40 MG: 10 INJECTION, SOLUTION INTRAMUSCULAR; INTRAVENOUS at 13:20

## 2020-03-04 RX ADMIN — SENNOSIDES AND DOCUSATE SODIUM 2 TABLET: 8.6; 5 TABLET ORAL at 20:11

## 2020-03-04 RX ADMIN — TACROLIMUS 2 MG: 1 CAPSULE ORAL at 18:29

## 2020-03-04 RX ADMIN — CARVEDILOL 12.5 MG: 12.5 TABLET, FILM COATED ORAL at 09:11

## 2020-03-04 RX ADMIN — FUROSEMIDE 40 MG: 10 INJECTION, SOLUTION INTRAMUSCULAR; INTRAVENOUS at 09:11

## 2020-03-04 RX ADMIN — PREDNISONE 5 MG: 5 TABLET ORAL at 09:11

## 2020-03-04 RX ADMIN — ATROPA BELLADONNA AND OPIUM 0.5 SUPPOSITORY: 16.2; 3 SUPPOSITORY RECTAL at 10:05

## 2020-03-04 RX ADMIN — SUCRALFATE 1 G: 1 SUSPENSION ORAL at 16:44

## 2020-03-04 RX ADMIN — DASATINIB 40 MG: 20 TABLET ORAL at 20:11

## 2020-03-04 RX ADMIN — CALCIUM CARBONATE (ANTACID) CHEW TAB 500 MG 500 MG: 500 CHEW TAB at 22:45

## 2020-03-04 RX ADMIN — MYCOPHENOLATE MOFETIL 750 MG: 250 CAPSULE ORAL at 18:29

## 2020-03-04 RX ADMIN — MYCOPHENOLATE MOFETIL 750 MG: 250 CAPSULE ORAL at 09:11

## 2020-03-04 RX ADMIN — SUCRALFATE 1 G: 1 SUSPENSION ORAL at 13:23

## 2020-03-04 ASSESSMENT — MIFFLIN-ST. JEOR: SCORE: 1484.13

## 2020-03-04 ASSESSMENT — ACTIVITIES OF DAILY LIVING (ADL)
ADLS_ACUITY_SCORE: 14

## 2020-03-04 NOTE — PROGRESS NOTES
[facility], [place]   Transplant Nephrology Progress Note  Date of Admission:  2/28/2020  Today's Date: 03/04/2020    Recommendations  1. Continue diuresis with lasix 40mg BID  2. Start cholecalciferol 2000U daily    Assessment & Plan   # DDKT: Down-trending, UOP starting to increase to ~2L daily. DGF (HD on POD#1 due to hyperkalemia). No indications for HD today. Getting renogram today              - Baseline Cr ~ TBD              - Proteinuria: Not checked post transplant              - Date DSA Last Checked: Feb/2020      Latest DSA: No DSA at time of transplant              - BK Viremia: No              - Kidney Tx Biopsy: No    # Immunosuppression: Tacrolimus immediate release (goal 8-10) and Mycophenolate mofetil (goal 5-7)              - Changes: No, tacrolimus 4.8 and keeping the same dose. Next level 3/5/20.      # Infection Prophylaxis:   - PJP: Sulfa/TMP (Bactrim)  - CMV: Valcyte x 6 months (R-/D+), EBV +/+  - Thrush: None     # Hypertension: Controlled;             Goal BP: < 140/90              - Volume status: Mildly hypervolemic                EDW~ 67.8kg              - Changes: Yes, would conitnue lasix 40mg BID    # Elevated Blood Glucose: Glucose generally running ~ 100-140              - Management as per primary team.     # Anemia of Chronic Kidney Disease: Stable (8.1)      DEB: No                                                                    Iron panel: Replete     # Mineral Bone Disorder:   - Secondary renal hyperparathyroidism; PTH level: 668        On treatment: None  - Vitamin D; level: Low        On Supplement: No, would start cholecalciferol 2000U daily  - Calcium; level: Low Normal        On Supplement: No  - Phosphorus; level: High        On Supplement: No, no need for binder, UOP is improving    Consider calcitriol once phosphorous is normal.    # Electrolytes:   - Potassium; level: Normal       On Supplement: No  - Magnesium; level: Normal        On  Supplement: No  - Bicarbonate; level: Normal       On Supplement: No  - Sodium; level: Normal        On Supplement: No     # CML: In remission. On dasatinib 40mg daily     # Nausea/Vomiting: NGT removed, had several BMs last night, treating nausea with zofran. Giving carafate for GERD-like symptoms.     # Transplant History:  Etiology of Kidney Failure:  Alport syndrome s/p kidney transplant x 2  Tx: DDKT  Transplant: 2/28/2020 (Kidney), 12/7/1988 (Kidney), 3/2/2005 (Kidney)  Donor Type: Donation after Brain Death        Donor Class:   Crossmatch at time of Tx: negative  DSA at time of Tx: No  Significant changes in immunosuppression: None  Significant transplant-related complications: None    Recommendations were communicated to the primary team verbally.    Seen and discussed with Dr. Telly Kunz MD   Pager: [phone]    Attestation:  This patient has been seen and evaluated by me, Alvin Varner MD.  I have reviewed the note and agree with plan of care as documented by the fellow.       Interval History   Mr Cruz had no overnight events. He denies fevers, chills, or rigors. NG is out and BP is stable. His creatinine is lower today, and UOP is picking up. He has epigastric pain with pills and dysphagia. He had several BMs last night. He has no chest pain or shortness of breath.     Review of Systems   4 point ROS was obtained and negative except as noted in the Interval History.    MEDICATIONS:    amLODIPine  10 mg Oral Daily     atorvastatin  10 mg Oral Daily     carvedilol  12.5 mg Oral BID w/meals     dasatinib  40 mg Oral QPM     famotidine  40 mg Oral Daily     furosemide  40 mg Intravenous BID     heparin ANTICOAGULANT  5,000 Units Subcutaneous Q12H     lidocaine  1 patch Transdermal Q24H     lidocaine   Transdermal Q8H     mycophenolate  750 mg Oral BID IS     predniSONE  5 mg Oral Daily     senna-docusate  1 tablet Oral BID    Or     senna-docusate  2 tablet Oral BID     sodium chloride  "(PF)  10 mL Intracatheter Q8H     sucralfate  1 g Oral BID     sulfamethoxazole-trimethoprim  1 tablet Oral Q Mon Wed Fri AM     tacrolimus  2 mg Oral or Feeding Tube BID IS     [START ON 3/5/2020] valGANciclovir  450 mg Oral or Feeding Tube Once per day on Mon Thu       Physical Exam      BP (!) 151/85 (BP Location: Left arm)   Pulse 95   Temp 97.8  F (36.6  C) (Oral)   Resp 16   Ht 1.626 m (5' 4\")   Wt 67.8 kg (149 lb 8 oz)   SpO2 99%   BMI 25.66 kg/m       GENERAL APPEARANCE: alert and no distress  HENT: mouth without ulcers or lesions.  NG tube removed  LYMPHATICS: no cervical or supraclavicular nodes  RESP: lungs clear to auscultation - no rales, rhonchi or wheezes  CV: regular rhythm, normal rate, no rub, no murmur  EDEMA: no LE edema bilaterally  ABDOMEN: soft, nondistended, nontender, bowel sounds normal  MS: extremities normal - no gross deformities noted, no evidence of inflammation in joints, no muscle tenderness  SKIN: no rash  ACCESS: None  ALLOGRAFT: Non-tender    Data   All labs reviewed by me.  CMP  Recent Labs   Lab 03/04/20  1018 03/03/20  0729 03/02/20  1140 03/02/20  0448 03/01/20  0958  02/28/20  0341    141  --  140 136   < > 137   POTASSIUM 3.7 3.9 4.2 4.4 4.7   < > 5.0   CHLORIDE 101 104  --  102 100   < > 102   CO2 22 26  --  25 27   < > 22   ANIONGAP 12 11  --  12 10   < > 13   * 101*  --  138* 110*   < > 92   * 100*  --  76* 57*   < > 65*   CR 8.49* 9.85*  --  9.25* 8.45*   < > 11.90*   GFRESTIMATED 7* 6*  --  6* 7*   < > 5*   GFRESTBLACK 8* 7*  --  7* 8*   < > 5*   ANTWAN 7.6* 7.8*  --  8.2* 7.7*   < > 7.7*   MAG 2.0 2.4*  --  2.1 2.0   < >  --    PHOS 5.2* 7.5*  --  8.9* 8.2*   < >  --    PROTTOTAL  --   --   --   --   --   --  7.9   ALBUMIN  --   --   --   --   --   --  4.0   BILITOTAL  --   --   --   --   --   --  0.6   ALKPHOS  --   --   --   --   --   --  108   AST  --   --   --   --   --   --  30   ALT  --   --   --   --   --   --  48    < > = values in this " interval not displayed.     CBC  Recent Labs   Lab 03/04/20  1018 03/03/20  0849 03/03/20  0729 03/02/20  0448 03/01/20  1201 03/01/20  0958   HGB 8.1* 8.0* 5.3* 8.3*  --  8.0*   WBC 5.2  --  5.3 5.2  --  9.8   RBC 2.77*  --  1.83* 2.86*  --  2.77*   HCT 25.7*  --  16.6* 26.6*  --  25.2*   MCV 93  --  91 93  --  91   MCH 29.2  --  29.0 29.0  --  28.9   MCHC 31.5  --  31.9 31.2*  --  31.7   RDW 14.6  --  14.7 14.5  --  14.3   PLT 82*  --  97* 83* 81* 82*     INR  Recent Labs   Lab 02/28/20  0341   INR 1.11   PTT 30     ABG  Recent Labs   Lab 02/28/20  1417 02/28/20  1308   PH 7.36 7.38   PCO2 35 35   PO2 222* 202*   HCO3 19* 20*   O2PER 53.0 54.0      Urine Studies  No lab results found.  No lab results found.  PTH  Recent Labs   Lab Test 03/04/20  1018 03/03/20  0849 03/03/20  0729   PTHI 630* 668* Canceled, Test credited     Iron Studies  Recent Labs   Lab Test 03/03/20  0729   IRON 139   *   IRONSAT 98*   YOLIS 3,397*       IMAGING:  All imaging studies reviewed by me.

## 2020-03-04 NOTE — PLAN OF CARE
AVSS, denies pain, however endorses severe heartburn (Carafate initiated per order).  Patient up in chair for majority of day, ambulating in room independently, tan patent with good output, reporting intermittent bladder spasms (B&O suppository given with minimal to no effectiveness), tolerating regular diet with fair appetite, reports flatus, and loose stools.  Wound care to left nare.  Med card and lab book updated.  Patient met with [facility]ty Pharm[facility] today.  Patient currently at nuclear medicine for renogram.  Scheduled meds given as ordered.  Continue to monitor, treat as ordered, notify team with changes.  Plan for patient to receive PICC in IR.

## 2020-03-04 NOTE — PLAN OF CARE
"BP (!) 165/94 (BP Location: Left arm)   Pulse 95   Temp 98.3  F (36.8  C) (Oral)   Resp 18   Ht 1.626 m (5' 4\")   Wt 65.7 kg (144 lb 12.8 oz)   SpO2 99%   BMI 24.85 kg/m       [id number]:  HTN (165/94), AOVSS on RA. Denies pain. Endorses heartburn and slight nausea, given PRN zofran x 1. Stein catheter patent, UO clear michael. Bowel sounds hypoactive. R EMMA dressing CDI, output serosanguineous. Midline incision approximated, WNL. Up with SBA to chair and back to bed. Fair appetite on clear liquid diet. Continue to monitor and update kidney team with acute changes.   "

## 2020-03-04 NOTE — PLAN OF CARE
Discharge Planner OT   Patient plan for discharge: Home   Current status: Pt report some heart burn, BP at 123/73 (89) seated, demonstrated sit > stand I, ambulated total of ~ 320 ft w/o UE support SBA, x1 standing and x1 seated restbreak, BP stable at 122/65 (91), report weak back and LE, feeling slightly light headed with walking. Performed high/low reaching using proper ergonomic, side step tub tranfer w/ wall support, x1 seated rest. Demonstrated LE AE, pt demonstrate verbal understanding. Demonstrated standing ADLs SBA, x1 seated rest, educated on EC, pt receptive. /77 at the end.   Barriers to return to prior living situation: medical status, deconditioning.   Recommendations for discharge: Home w/ assist  Rationale for recommendations: pt will be able to perform most ADLs/IADLs safely home w/ assistance as needed.       Entered by: Leana Sierra 03/04/2020 12:22 PM      7A OT

## 2020-03-04 NOTE — CONSULTS
Patient is on IR schedule 3/5/2020 for a SL PICC placement vs Tunneled IJ line placement.   Labs WNL for procedure.   Orders for NPO have been entered.   Medications to be held include: subcutaneous heparin x1 dose  Consent will be done prior to procedure.     Please contact the IR charge RN at [phone] for estimated time of procedure.     Case discussed with Dr. Mahan from IR and YAYO Ross. 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 He is s/p kidney transplant x2 (, ). He has been on dialysis since . He is now s/p intra-abdominal  donor kidney transplant w/ ureteral stent on 20. IR consulted for vascular access due to difficulty with venipuncture for frequent labs. Pt currently has a right sided fistula. He has an abandoned left sided fistula. Reportedly the tried to place IJ lines in the OR at time of transplant and were unsuccessful. IR will plan to attempt LEFT sided SL PICC placement. If we are unsuccessful we will look to the IJs for possible tunneled chest line. Transplant surgery has requested a call prior to placing an IJ line- pager [phone].    Em Marc DNP, APRN  Interventional Radiology  Pager: [phone]

## 2020-03-04 NOTE — PROGRESS NOTES
CLINICAL NUTRITION SERVICES     Nutrition Prescription  Recommendations already ordered by Registered Dietitian (RD):  Chocolate Boost Plus BID between meals     Pt has been NPO/clear liquid from 2/28-3/3. Diet advanced today to regular.     Visit:  -Pt stated having heart burn which is a main barrier for him to eat.   -Upon visit, pt has first solid meal of broccoli, chocolate milk, chocolate pudding, cream of wheat, and PB&J on the plate with minimal bites of each food.     INTERVENTIONS:  Implementation:  Nutrition education:  -Encouraged pt to increase PO intake.  -Briefly explained heart healthy diet, encouraging choosing low sodium food.   Supplement:  -Chocolate Boost Plus BID between meals    Kristan Henley  Dietetic Intern    Cyndi Weiss MS, RD, LD  Pager [phone]

## 2020-03-04 NOTE — PROGRESS NOTES
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 He is s/p kidney transplant x2 (, ). 2nd graft lost to primary nonfunction with explant a few weeks later. He has been on dialysis since . He is now s/p intra-abdominal  donor kidney transplant w/ ureteral stent on 20.    Graft function: POD #5 with delayed graft function. Underwent HD POD#1 due to hyperkalemia. UOP good on lasix. Cr 9.9->8.5. Renogram today.  Immunosuppression management:   Thymo: 425mg, 6.2mg/kg completed.  Methylpred: Taper completed.  Tacro: Goal level 8-10. Blood drawn late, no level today.  MMF: 750mg BID.  Prednisone: 5mg daily, 3rd transplant protocol.  Complexity of management: Medium. Contributing factors: anemia and delayed graft function  Hematology:   Anemia of chronic disease: Hgb stable ~8-9.   Hx CML: On home dose dasatinib. Interacts with tacrolimus, monitor for dasatinib toxicity (rash, edema, pancytopenia, bone pain, prolonged QT). Check EKG prior to discharge.   Neurology:   Acute postoperative pain: Scheduled APAP and PRN oxycodone. Continue lidoderm.  Cardiorespiratory:   HTN: Now controlled. On Coreg 12.5mg and amlodipine 10 mg daily.  GI/Nutrition:   Diet: Regular diet  Post-op ileus: NG placed post-op for distention and nausea. Removed POD #4. Advance diet today.  Heartburn/odynophagia: Likely reflux r/t steroids. On famotidine. PPI contraindicated with dasatinib. Will add carafate spaced away from other medications.  Bowel regimen: Senna/colace.   Endocrine:  Steroid induced hyperglycemia: Admit HgbA1c 5.2%. Glucose trending down.   Fluid/Electrolytes: MIVF: Stop today.  Hypervolemia: Resolved.   Hyperkalemia: K 6.6 on POD #1, managed with dialysis. Now resolved.  Hyperphosphatemia: Phos 7.5->5.2.   : Stein x14 days d/t small bladder.  Bladder spasms: Add B&O suppository.  Infectious disease:  "Afebrile  Access: Very difficult lab draws taking hours per day. IR consult to eval for PICC placement. Anesthesia unable to place internal jugular in OR.  Prophylaxis: DVT (heparin), fall, GI, fungal, PT/OT  Disposition: 7A    Medical Decision Making: Medium  Subsequent visit 99916 (moderate level decision making)    DARRON/Fellow/Resident Provider: Purvi Fofana NP  [id number]    Faculty: Ron Benítez MD   _________________________________________________________________  Transplant History:   2/28/2020 (Kidney), 12/7/1988 (Kidney), 3/2/2005 (Kidney), Postoperative day: 5     Interval History: History is obtained from the patient  Overnight events: Pain controlled, no nausea, tolerating liquids. C/o heartburn, worse with drinking.    ROS:   A 10-point review of systems was negative except as noted above.    Meds:    amLODIPine  10 mg Oral Daily     atorvastatin  10 mg Oral Daily     carvedilol  12.5 mg Oral BID w/meals     dasatinib  40 mg Oral QPM     famotidine  40 mg Oral Daily     furosemide  40 mg Intravenous BID     heparin ANTICOAGULANT  5,000 Units Subcutaneous Q12H     lidocaine  1 patch Transdermal Q24H     lidocaine   Transdermal Q8H     mycophenolate  750 mg Oral BID IS     predniSONE  5 mg Oral Daily     senna-docusate  1 tablet Oral BID    Or     senna-docusate  2 tablet Oral BID     sodium chloride (PF)  10 mL Intracatheter Q8H     sucralfate  1 g Oral BID     sulfamethoxazole-trimethoprim  1 tablet Oral Q Mon Wed Fri AM     tacrolimus  2 mg Oral or Feeding Tube BID IS     [START ON 3/5/2020] valGANciclovir  450 mg Oral or Feeding Tube Once per day on Mon Thu       Physical Exam:     Admit Weight: 68.1 kg (150 lb 2.1 oz)    Current vitals:   BP (!) 151/85 (BP Location: Left arm)   Pulse 95   Temp 97.8  F (36.6  C) (Oral)   Resp 16   Ht 1.626 m (5' 4\")   Wt 67.8 kg (149 lb 8 oz)   SpO2 99%   BMI 25.66 kg/m      Vital sign ranges:    Temp:  [97.6  F (36.4  C)-98.4  F (36.9  C)] 97.8  F (36.6 "  C)  Heart Rate:  [83-96] 85  Resp:  [16-18] 16  BP: (130-165)/(81-94) 151/85  SpO2:  [99 %-100 %] 99 %  Patient Vitals for the past 24 hrs:   BP Temp Temp src Heart Rate Resp SpO2 Weight   03/04/20 0959 -- -- -- -- -- -- 67.8 kg (149 lb 8 oz)   03/04/20 0724 (!) 151/85 97.8  F (36.6  C) Oral 85 16 99 % --   03/04/20 0346 130/81 98.4  F (36.9  C) Oral 84 18 100 % --   03/03/20 2232 (!) 155/89 98.4  F (36.9  C) Oral 85 16 100 % --   03/03/20 1921 (!) 165/94 98.3  F (36.8  C) Oral 83 18 99 % --   03/03/20 1552 (!) 152/93 97.6  F (36.4  C) Oral 96 16 100 % --     General Appearance: in no apparent distress.   Skin: normal, warm, dry  Heart: Perfused  Lungs: Breathing comfortably on room air  Abdomen: Abdomen flat. Incision well-healing without javier-incisional erythema or drainage.   : tan is present.  Clear, yellow urine draining  Extremities: edema: trace bilaterally  Neurologic: awake, alert and oriented x4. Tremor absent.     Data:   CMP  Recent Labs   Lab 03/04/20  1018 03/03/20  0729  02/28/20  1417 02/28/20  1308 02/28/20  0341    141   < > 138 140 137   POTASSIUM 3.7 3.9   < > 5.2 5.7* 5.0   CHLORIDE 101 104   < >  --   --  102   CO2 22 26   < >  --   --  22   * 101*   < > 95 107* 92   * 100*   < >  --   --  65*   CR 8.49* 9.85*   < >  --   --  11.90*   GFRESTIMATED 7* 6*   < >  --   --  5*   GFRESTBLACK 8* 7*   < >  --   --  5*   ANTWAN 7.6* 7.8*   < >  --   --  7.7*   ICAW  --   --   --  3.7* 3.6*  --    MAG 2.0 2.4*   < >  --   --   --    PHOS 5.2* 7.5*   < >  --   --   --    ALBUMIN  --   --   --   --   --  4.0   BILITOTAL  --   --   --   --   --  0.6   ALKPHOS  --   --   --   --   --  108   AST  --   --   --   --   --  30   ALT  --   --   --   --   --  48    < > = values in this interval not displayed.     CBC  Recent Labs   Lab 03/04/20  1018 03/03/20  0849 03/03/20  0729  02/28/20  0341   HGB 8.1* 8.0* 5.3*   < > 11.8*   WBC 5.2  --  5.3   < > 9.7   PLT 82*  --  97*   < > 195   A1C   --   --   --   --  5.2    < > = values in this interval not displayed.

## 2020-03-04 NOTE — TELEPHONE ENCOUNTER
Pharmacist provided medication teaching for discharge with a focus on new medications/dose changes.  The discharge medication list was reviewed with the patient/family and the following points were discussed, as applicable: Name, description, purpose, dose/strength, duration of medications, common side effects, food/medications to avoid, action to be taken if dose is missed, when to call MD, safe disposal of unused medications and how to obtain refills.    Patient chooses to receive medications from  specialty pharmacy.  Updated pharmacy patient call list. Notified pharmacy.      Pharmacist gave bp monitor, thermometer and 7 day pill organizer.     Clinical Pharmacy Consult:                                                      Transplant Specific:   Date of Transplant: 02/28/2020  Type of Transplant: kidney  First Transplant: no  History of rejection: yes    Immunosuppression Regimen   TAC 4mg qAM & 4mg qPM, Prednisone 5mg qAM  and MMF 750mg qAM & 750mg qPM  Immunosuppressant Levels:  Subtherapeutic  Patient specific goal: tac: 8-10, mpa: 1-3.5  Pt adherent to lab draws: yes  Scr:   Lab Results   Component Value Date    CR 8.49 03/04/2020     Side effects: Nausea    Prophylactic Medications  Antibacterial:  Bactrim 400/80 M,W,Fr   Scheduled Discontinue Date: indefinitely    Antifungal: Not needed thus far  Scheduled Discontinue Date: NA    Antiviral: CrCl 10 to 24 mL/minute: Valcyte 450 mg twice weekly   Scheduled Discontinue Date: 6 months    Acid Reducer: Pepcid (famotidine)  Scheduled Reviewed Date: per MD.     Blood Pressure Management  Frequency of home Blood Pressure checks: twice daily  Most recent home BP: 151/85  Patient Blood pressure goal: <140/90  Patient blood pressure at goal:  no  Hospitalizations/ER visits since last assessment: 0    Adrianmakayla answered all questions and was able to repeat names of anti-rejection medications. He did say he is overwhelmed by the amount of information he is getting.     He also was very uncomfortable expressing a lot of heartburn pain.  I discussed with unit pharmacist and DARRON.  They will review and see what medications or therapy can be used to improve his heartburn. Limited choices of medications due to interactions with dasatinib.       Med rec/DUR performed: yes  Med Rec Discrepancies: no    Reminders:    1.  Bring to first clinic appt: med box, med card, bp monitor, all medications being taken, and lab book.  2.    MTM pharmacist visit on first clinic appt and if ok, again in 3 to 4 months during follow up appt.  3.   Avoid Grapefruit and Grapefruit juice.   4.   Avoid herbal supplements. If wish to take other medications or supplements, call your coordinator.   5.   Keep lab appts.   6.  Can use apps on phone like [employer] to help manage medication lists and reminders.   7.  Make sure you are protecting your skin by wearing long sleeves and applying sunscreen to exposed skin, for any significant time in the sun.       Transplant Coordinator is Jody PRESLEY      No further questions for this pharmacist.     [facility]

## 2020-03-04 NOTE — PLAN OF CARE
VS: VS, afebrile, on RA  Mental Status: A&Ox4; flat affect   Cardiac: WNL; denies chest pain   Respiratory: LS clear, diminished in bases; denies SOB  : tan patent to adequate amount urine; see I&Os  GI: abdomen rounded, irregular contour, + bowel tones, no nausea/BM this shift; + acid reflux this shift (PRN Tums and maalox given w/o relief in symptoms/MD team aware); midline incision with staples/NIKITA; EMMA removed on previous shift   Pain: denies pain; refusing scheduled tylenol   Diet: clear liquid diet; fair intake   Mobility: up w/ 1; NOOB this shift   Treatment: continue post kidney transplant care/s; advance diet per MD team order   DC plan: TBD; home once tolerating diet per MD progress note

## 2020-03-05 ENCOUNTER — APPOINTMENT (OUTPATIENT)
Dept: INTERVENTIONAL RADIOLOGY/VASCULAR | Facility: CLINIC | Age: 45
DRG: 652 | End: 2020-03-05
Attending: NURSE PRACTITIONER
Payer: MEDICARE

## 2020-03-05 PROBLEM — Z87.898 HISTORY OF DIFFICULT VENOUS ACCESS: Status: ACTIVE | Noted: 2020-03-05

## 2020-03-05 LAB
ABO + RH BLD: ABNORMAL
ABO + RH BLD: ABNORMAL
ANION GAP SERPL CALCULATED.3IONS-SCNC: 9 MMOL/L (ref 3–14)
APTT PPP: 30 SEC (ref 22–37)
BASOPHILS # BLD AUTO: 0 10E9/L (ref 0–0.2)
BASOPHILS NFR BLD AUTO: 0.2 %
BILIRUB DIRECT SERPL-MCNC: 0.1 MG/DL (ref 0–0.2)
BILIRUB SERPL-MCNC: 0.6 MG/DL (ref 0.2–1.3)
BLD GP AB INVEST PLASRBC-IMP: ABNORMAL
BLD GP AB SCN SERPL QL: ABNORMAL
BLD PROD TYP BPU: ABNORMAL
BLD PROD TYP BPU: NORMAL
BLD UNIT ID BPU: 0
BLOOD BANK CMNT PATIENT-IMP: ABNORMAL
BLOOD BANK CMNT PATIENT-IMP: ABNORMAL
BLOOD PRODUCT CODE: NORMAL
BPU ID: NORMAL
BUN SERPL-MCNC: 107 MG/DL (ref 7–30)
CALCIUM SERPL-MCNC: 7.5 MG/DL (ref 8.5–10.1)
CHLORIDE SERPL-SCNC: 101 MMOL/L (ref 94–109)
CO2 SERPL-SCNC: 23 MMOL/L (ref 20–32)
CREAT SERPL-MCNC: 7.93 MG/DL (ref 0.66–1.25)
DIFFERENTIAL METHOD BLD: ABNORMAL
EOSINOPHIL # BLD AUTO: 0 10E9/L (ref 0–0.7)
EOSINOPHIL NFR BLD AUTO: 0.6 %
ERYTHROCYTE [DISTWIDTH] IN BLOOD BY AUTOMATED COUNT: 14.1 % (ref 10–15)
ERYTHROCYTE [DISTWIDTH] IN BLOOD BY AUTOMATED COUNT: 14.4 % (ref 10–15)
FIBRINOGEN PPP-MCNC: 344 MG/DL (ref 200–420)
FOLATE SERPL-MCNC: 23.8 NG/ML
GFR SERPL CREATININE-BSD FRML MDRD: 7 ML/MIN/{1.73_M2}
GLUCOSE SERPL-MCNC: 116 MG/DL (ref 70–99)
HCT VFR BLD AUTO: 21.5 % (ref 40–53)
HCT VFR BLD AUTO: 23.3 % (ref 40–53)
HEMOCCULT STL QL IA: POSITIVE
HGB BLD-MCNC: 6.6 G/DL (ref 13.3–17.7)
HGB BLD-MCNC: 6.8 G/DL (ref 13.3–17.7)
HGB BLD-MCNC: 7.6 G/DL (ref 13.3–17.7)
HGB BLD-MCNC: 7.9 G/DL (ref 13.3–17.7)
IMM GRANULOCYTES # BLD: 0.1 10E9/L (ref 0–0.4)
IMM GRANULOCYTES NFR BLD: 0.9 %
INR PPP: 1.18 (ref 0.86–1.14)
LDH SERPL L TO P-CCNC: 269 U/L (ref 85–227)
LYMPHOCYTES # BLD AUTO: 0.1 10E9/L (ref 0.8–5.3)
LYMPHOCYTES NFR BLD AUTO: 2.4 %
MAGNESIUM SERPL-MCNC: 2.1 MG/DL (ref 1.6–2.3)
MCH RBC QN AUTO: 28.8 PG (ref 26.5–33)
MCH RBC QN AUTO: 29.8 PG (ref 26.5–33)
MCHC RBC AUTO-ENTMCNC: 31.6 G/DL (ref 31.5–36.5)
MCHC RBC AUTO-ENTMCNC: 32.6 G/DL (ref 31.5–36.5)
MCV RBC AUTO: 91 FL (ref 78–100)
MCV RBC AUTO: 91 FL (ref 78–100)
MONOCYTES # BLD AUTO: 0.4 10E9/L (ref 0–1.3)
MONOCYTES NFR BLD AUTO: 7.1 %
NEUTROPHILS # BLD AUTO: 4.8 10E9/L (ref 1.6–8.3)
NEUTROPHILS NFR BLD AUTO: 88.8 %
NRBC # BLD AUTO: 0 10*3/UL
NRBC BLD AUTO-RTO: 0 /100
NUM BPU REQUESTED: 6
PHOSPHATE SERPL-MCNC: 4.9 MG/DL (ref 2.5–4.5)
PLATELET # BLD AUTO: 77 10E9/L (ref 150–450)
PLATELET # BLD AUTO: 83 10E9/L (ref 150–450)
POTASSIUM SERPL-SCNC: 3.7 MMOL/L (ref 3.4–5.3)
RBC # BLD AUTO: 2.36 10E12/L (ref 4.4–5.9)
RBC # BLD AUTO: 2.55 10E12/L (ref 4.4–5.9)
RETICS # AUTO: 10.9 10E9/L (ref 25–95)
RETICS # AUTO: 7.5 10E9/L (ref 25–95)
RETICS/RBC NFR AUTO: 0.3 % (ref 0.5–2)
RETICS/RBC NFR AUTO: 0.4 % (ref 0.5–2)
SODIUM SERPL-SCNC: 134 MMOL/L (ref 133–144)
SPECIMEN EXP DATE BLD: ABNORMAL
TACROLIMUS BLD-MCNC: 5.9 UG/L (ref 5–15)
TME LAST DOSE: NORMAL H
TRANSFUSION STATUS PATIENT QL: NORMAL
TRANSFUSION STATUS PATIENT QL: NORMAL
VIT B12 SERPL-MCNC: 1407 PG/ML (ref 193–986)
WBC # BLD AUTO: 5.4 10E9/L (ref 4–11)
WBC # BLD AUTO: 5.8 10E9/L (ref 4–11)

## 2020-03-05 PROCEDURE — 82668 ASSAY OF ERYTHROPOIETIN: CPT | Performed by: INTERNAL MEDICINE

## 2020-03-05 PROCEDURE — 83615 LACTATE (LD) (LDH) ENZYME: CPT | Performed by: INTERNAL MEDICINE

## 2020-03-05 PROCEDURE — 82247 BILIRUBIN TOTAL: CPT | Performed by: SURGERY

## 2020-03-05 PROCEDURE — 36415 COLL VENOUS BLD VENIPUNCTURE: CPT | Performed by: SURGERY

## 2020-03-05 PROCEDURE — 99223 1ST HOSP IP/OBS HIGH 75: CPT | Mod: GC | Performed by: INTERNAL MEDICINE

## 2020-03-05 PROCEDURE — 83735 ASSAY OF MAGNESIUM: CPT | Performed by: SURGERY

## 2020-03-05 PROCEDURE — 85045 AUTOMATED RETICULOCYTE COUNT: CPT | Performed by: INTERNAL MEDICINE

## 2020-03-05 PROCEDURE — 25000132 ZZH RX MED GY IP 250 OP 250 PS 637: Mod: GY | Performed by: NURSE PRACTITIONER

## 2020-03-05 PROCEDURE — 85384 FIBRINOGEN ACTIVITY: CPT | Performed by: INTERNAL MEDICINE

## 2020-03-05 PROCEDURE — C1751 CATH, INF, PER/CENT/MIDLINE: HCPCS

## 2020-03-05 PROCEDURE — 85610 PROTHROMBIN TIME: CPT | Performed by: INTERNAL MEDICINE

## 2020-03-05 PROCEDURE — 85730 THROMBOPLASTIN TIME PARTIAL: CPT | Performed by: INTERNAL MEDICINE

## 2020-03-05 PROCEDURE — 36415 COLL VENOUS BLD VENIPUNCTURE: CPT | Performed by: NURSE PRACTITIONER

## 2020-03-05 PROCEDURE — 25500064 ZZH RX 255 OP 636: Performed by: PHYSICIAN ASSISTANT

## 2020-03-05 PROCEDURE — 25000125 ZZHC RX 250: Performed by: RADIOLOGY

## 2020-03-05 PROCEDURE — 25000131 ZZH RX MED GY IP 250 OP 636 PS 637: Mod: GY | Performed by: NURSE PRACTITIONER

## 2020-03-05 PROCEDURE — 82746 ASSAY OF FOLIC ACID SERUM: CPT | Performed by: INTERNAL MEDICINE

## 2020-03-05 PROCEDURE — 83615 LACTATE (LD) (LDH) ENZYME: CPT | Performed by: SURGERY

## 2020-03-05 PROCEDURE — 80048 BASIC METABOLIC PNL TOTAL CA: CPT | Performed by: SURGERY

## 2020-03-05 PROCEDURE — 82248 BILIRUBIN DIRECT: CPT | Performed by: SURGERY

## 2020-03-05 PROCEDURE — 80197 ASSAY OF TACROLIMUS: CPT | Performed by: NURSE PRACTITIONER

## 2020-03-05 PROCEDURE — 84100 ASSAY OF PHOSPHORUS: CPT | Performed by: SURGERY

## 2020-03-05 PROCEDURE — 40000611 ZZHCL STATISTIC MORPHOLOGY W/INTERP HEMEPATH TC 85060: Performed by: INTERNAL MEDICINE

## 2020-03-05 PROCEDURE — 27210908 ZZH NEEDLE CR4

## 2020-03-05 PROCEDURE — 36592 COLLECT BLOOD FROM PICC: CPT | Performed by: INTERNAL MEDICINE

## 2020-03-05 PROCEDURE — 25000131 ZZH RX MED GY IP 250 OP 636 PS 637: Mod: GY | Performed by: SURGERY

## 2020-03-05 PROCEDURE — 82274 ASSAY TEST FOR BLOOD FECAL: CPT | Performed by: NURSE PRACTITIONER

## 2020-03-05 PROCEDURE — 85018 HEMOGLOBIN: CPT | Performed by: NURSE PRACTITIONER

## 2020-03-05 PROCEDURE — 05H633Z INSERTION OF INFUSION DEVICE INTO LEFT SUBCLAVIAN VEIN, PERCUTANEOUS APPROACH: ICD-10-PCS | Performed by: PHYSICIAN ASSISTANT

## 2020-03-05 PROCEDURE — 25000128 H RX IP 250 OP 636: Performed by: PHYSICIAN ASSISTANT

## 2020-03-05 PROCEDURE — 25000132 ZZH RX MED GY IP 250 OP 250 PS 637: Mod: GY | Performed by: SURGERY

## 2020-03-05 PROCEDURE — 85027 COMPLETE CBC AUTOMATED: CPT | Performed by: SURGERY

## 2020-03-05 PROCEDURE — 83010 ASSAY OF HAPTOGLOBIN QUANT: CPT | Performed by: INTERNAL MEDICINE

## 2020-03-05 PROCEDURE — P9016 RBC LEUKOCYTES REDUCED: HCPCS | Performed by: PHYSICIAN ASSISTANT

## 2020-03-05 PROCEDURE — 25000128 H RX IP 250 OP 636: Performed by: SURGERY

## 2020-03-05 PROCEDURE — 85025 COMPLETE CBC W/AUTO DIFF WBC: CPT | Performed by: INTERNAL MEDICINE

## 2020-03-05 PROCEDURE — 82607 VITAMIN B-12: CPT | Performed by: INTERNAL MEDICINE

## 2020-03-05 PROCEDURE — 27210886 ZZH ACCESSORY CR5

## 2020-03-05 PROCEDURE — 36573 INSJ PICC RS&I 5 YR+: CPT

## 2020-03-05 PROCEDURE — 85045 AUTOMATED RETICULOCYTE COUNT: CPT | Performed by: NURSE PRACTITIONER

## 2020-03-05 PROCEDURE — 36592 COLLECT BLOOD FROM PICC: CPT | Performed by: NURSE PRACTITIONER

## 2020-03-05 PROCEDURE — 12000026 ZZH R&B TRANSPLANT

## 2020-03-05 RX ORDER — LIDOCAINE HYDROCHLORIDE 10 MG/ML
1-30 INJECTION, SOLUTION EPIDURAL; INFILTRATION; INTRACAUDAL; PERINEURAL
Status: COMPLETED | OUTPATIENT
Start: 2020-03-05 | End: 2020-03-05

## 2020-03-05 RX ORDER — BENZOCAINE/MENTHOL 6 MG-10 MG
LOZENGE MUCOUS MEMBRANE 2 TIMES DAILY PRN
Status: DISCONTINUED | OUTPATIENT
Start: 2020-03-05 | End: 2020-03-10 | Stop reason: HOSPADM

## 2020-03-05 RX ORDER — NALOXONE HYDROCHLORIDE 0.4 MG/ML
.1-.4 INJECTION, SOLUTION INTRAMUSCULAR; INTRAVENOUS; SUBCUTANEOUS
Status: DISCONTINUED | OUTPATIENT
Start: 2020-03-05 | End: 2020-03-05 | Stop reason: HOSPADM

## 2020-03-05 RX ORDER — HEPARIN SODIUM,PORCINE 10 UNIT/ML
5 VIAL (ML) INTRAVENOUS
Status: DISCONTINUED | OUTPATIENT
Start: 2020-03-05 | End: 2020-03-05 | Stop reason: HOSPADM

## 2020-03-05 RX ORDER — IODIXANOL 320 MG/ML
50 INJECTION, SOLUTION INTRAVASCULAR ONCE
Status: COMPLETED | OUTPATIENT
Start: 2020-03-05 | End: 2020-03-05

## 2020-03-05 RX ORDER — FENTANYL CITRATE 50 UG/ML
25-50 INJECTION, SOLUTION INTRAMUSCULAR; INTRAVENOUS EVERY 5 MIN PRN
Status: DISCONTINUED | OUTPATIENT
Start: 2020-03-05 | End: 2020-03-05 | Stop reason: HOSPADM

## 2020-03-05 RX ORDER — FLUMAZENIL 0.1 MG/ML
0.2 INJECTION, SOLUTION INTRAVENOUS
Status: DISCONTINUED | OUTPATIENT
Start: 2020-03-05 | End: 2020-03-05 | Stop reason: HOSPADM

## 2020-03-05 RX ADMIN — LIDOCAINE HYDROCHLORIDE 5 ML: 10 INJECTION, SOLUTION EPIDURAL; INFILTRATION; INTRACAUDAL; PERINEURAL at 12:14

## 2020-03-05 RX ADMIN — FUROSEMIDE 40 MG: 10 INJECTION, SOLUTION INTRAMUSCULAR; INTRAVENOUS at 13:54

## 2020-03-05 RX ADMIN — MYCOPHENOLATE MOFETIL 750 MG: 250 CAPSULE ORAL at 08:29

## 2020-03-05 RX ADMIN — SUCRALFATE 1 G: 1 SUSPENSION ORAL at 15:31

## 2020-03-05 RX ADMIN — FAMOTIDINE 40 MG: 20 TABLET ORAL at 10:16

## 2020-03-05 RX ADMIN — CARVEDILOL 12.5 MG: 12.5 TABLET, FILM COATED ORAL at 08:30

## 2020-03-05 RX ADMIN — SUCRALFATE 1 G: 1 SUSPENSION ORAL at 10:16

## 2020-03-05 RX ADMIN — MYCOPHENOLATE MOFETIL 750 MG: 250 CAPSULE ORAL at 17:33

## 2020-03-05 RX ADMIN — TACROLIMUS 2.5 MG: 1 CAPSULE ORAL at 17:32

## 2020-03-05 RX ADMIN — PREDNISONE 5 MG: 5 TABLET ORAL at 08:30

## 2020-03-05 RX ADMIN — ATORVASTATIN CALCIUM 10 MG: 10 TABLET, FILM COATED ORAL at 08:30

## 2020-03-05 RX ADMIN — TACROLIMUS 2 MG: 1 CAPSULE ORAL at 08:29

## 2020-03-05 RX ADMIN — AMLODIPINE BESYLATE 10 MG: 10 TABLET ORAL at 08:29

## 2020-03-05 RX ADMIN — CARVEDILOL 12.5 MG: 12.5 TABLET, FILM COATED ORAL at 17:33

## 2020-03-05 RX ADMIN — DASATINIB 40 MG: 20 TABLET ORAL at 19:59

## 2020-03-05 RX ADMIN — IODIXANOL 20 ML: 320 INJECTION, SOLUTION INTRAVASCULAR at 12:15

## 2020-03-05 RX ADMIN — CALCIUM CARBONATE (ANTACID) CHEW TAB 500 MG 500 MG: 500 CHEW TAB at 17:32

## 2020-03-05 RX ADMIN — VALGANCICLOVIR 450 MG: 450 TABLET, FILM COATED ORAL at 08:29

## 2020-03-05 RX ADMIN — SENNOSIDES AND DOCUSATE SODIUM 2 TABLET: 8.6; 5 TABLET ORAL at 19:59

## 2020-03-05 RX ADMIN — FUROSEMIDE 40 MG: 10 INJECTION, SOLUTION INTRAMUSCULAR; INTRAVENOUS at 08:29

## 2020-03-05 RX ADMIN — ONDANSETRON 4 MG: 2 INJECTION INTRAMUSCULAR; INTRAVENOUS at 12:59

## 2020-03-05 ASSESSMENT — MIFFLIN-ST. JEOR: SCORE: 1480.05

## 2020-03-05 ASSESSMENT — ACTIVITIES OF DAILY LIVING (ADL)
ADLS_ACUITY_SCORE: 12
ADLS_ACUITY_SCORE: 14
ADLS_ACUITY_SCORE: 14
ADLS_ACUITY_SCORE: 12
ADLS_ACUITY_SCORE: 14

## 2020-03-05 NOTE — PROGRESS NOTES
Care Coordinator    Care Coordinator - Discharge Planning    Admission Date/Time:  2020  Attending MD:  Carol Samuel MD     Data  Date of initial CC assessment:  3.3.2020  Chart reviewed, discussed with interdisciplinary team.   Patient was admitted for:   1. 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 He is s/p kidney transplant x2 (, ). 2nd graft lost to primary nonfunction with explant a few weeks later. He has been on dialysis since . He is now s/p intra-abdominal  donor kidney transplant w/ ureteral stent on 20.   Graft function: POD #6 with delayed graft function. Underwent HD POD#1 due to hyperkalemia. UOP good on lasix. Cr 8.5->7.4. --note per Purvi Fofana NP, today--hold discharge.   Assessment   Concerns with insurance coverage for discharge needs: None.  Current Living Situation: Patient lives with adult child.  Support System: Supportive and Involved  Services Involved: none  Transportation at Discharge: Car and Family or friend will provide  Transportation to Medical Appointments:    - Name of caregiver: Kiley morris: daughter  Barriers to Discharge: low hemoglobin/DGF/Needs PICC for access    Coordination of Care and Referrals: Provided patient/family with options for Home Care.  I met with the pt, in their room, and explained my role and went over discharge routine. Pt was tired and doesn't feel well. Sitting in the dark eating.  Pt had previous 2 KT's here--he used to call his labs in.  I explained our DGF protocol and that we have a hotel room for him, he does NOT have anyone to come and stay with him, his daughter is currently ill. Hotel has a shutlle.  I have emailed Marianna Morales to change hotel start to tomorrow.  Pt said tx nephrologist said that he will not need dialysis anymore.    I explained that they return to ATC clinic the next morning @ 0645 for lab draw and then go to clinic and  "first visit is approx.4- 6 hour visit with subsequent visits approx 2-3 hours long. On lab draw days (ATC and HHN visit days M-Th), they wait to take their immunosuppression pills until their labs are drawn and then they take them (bring them with to the ATC clinic). If you will have a HHN visit, they do not begin visits until ATC clinic appointments are complete and then the ATC nurse calls them to let them know what day to begin. Pt is unsure of where he will stay when he gets home as his daughter is ill--doesn't know if he wants HHN or PCC for labs.  I have verified phone #.  If pt decides to have a HHN he would like [facility]: [phone]___________  Pt's Outpatient Care Coordinator is:  Jody García  Pt said \"it is a lot to take in right now.\".  PCP: Aaron Michele @ [place], [place] Ph: [phone]--he has NOT seen him for approx 1 year, IF he has HHN he will need to see PCP.    Plan  Anticipated Discharge Date:  3/6 to local [facility].  Anticipated Discharge Plan:  See above.  PICC placed 3/5--ATC will need to take care of it--I have updated them. He is a hard stick, so a PICC was placed today and per makayla , needs to remain for at least 1 month(for frequent lab draws).  I called [facility]: [phone] Lia to check for Line coverage and informed her that he wants [facility]. Wait for coverage: Per Margret Rosales he has line coverage.  CTS Handoff completed:  YES    Eli Garcia RN  "

## 2020-03-05 NOTE — PROGRESS NOTES
VS: Afebrile, 's/60-70's, HR 80's, O2 sat 98-99% on room air.  LDA: Right and left AV fistula intact, left PIV saline locked.  Neuro: A&Ox4.  GI/: Stein with adequate urine output, no BM during the shift, passing gas.  Diet/appetite: NPO.  Activity: Encourage ambulation 4 times a day.  Pain/Nausea/Vomiting: Incisional pain tolerable, no pain medication given during the shift.  Skin: Surgical incision stapled, c/d/i.  Mobility: SBA.  Test/Procedure: IR for PICC placement today.  Plan: Continue POC.

## 2020-03-05 NOTE — IR NOTE
Patient Name: Jeremiah Cruz  Medical Record Number: [id number]  Today's Date: 3/5/2020    Procedure: peripherally inserted central line placement single lument  Proceduralist: Oliver Ramirez PA-C    Procedure start time: 1150  Puncture time: 1152  Procedure end time: 1220    Report given to: AURA Cash   : n/a    Other Notes: Pt arrived to IR room 5 from . Consent reviewed. Pt denies any questions or concerns regarding procedure. Pt positioned supine and monitored per protocol.     4french, 32.5cm, single lumen PICC placed, blood return noted, saline locked, central line dressing applied. 1cm at the skin. Pt tolerated procedure without any noted complications. Upon departure- alert, oriented, calm. Respirations eupneic on room air. Central line dressing clean, dry and intact.    Pt tolerated procedure without any noted complications. Pt transferred back to .

## 2020-03-05 NOTE — PROGRESS NOTES
Patient's Hgb was 6.8 at 0530, team notified, and ordered STAT recheck and was 6.6, team again was notified.

## 2020-03-05 NOTE — PRE-PROCEDURE
GENERAL PRE-PROCEDURE:   Procedure:  TCVC vs PICC  Date/Time:  3/5/2020 11:28 AM    Verbal consent obtained?: Yes    Written consent obtained?: Yes    Risks and benefits: Risks, benefits and alternatives were discussed    Consent given by:  Patient  Patient states understanding of procedure being performed: Yes    Patient's understanding of procedure matches consent: Yes    Procedure consent matches procedure scheduled: Yes    Expected level of sedation:  Moderate  Appropriately NPO:  Yes  ASA Class:  Class 3- Severe systemic disease, definite functional limitations  Mallampati  :  Grade 2- soft palate, base of uvula, tonsillar pillars, and portion of posterior pharyngeal wall visible  Lungs:  Lungs clear with good breath sounds bilaterally  Heart:  Systolic murmur and RRR  History & Physical reviewed:  History and physical reviewed and no updates needed  Statement of review:  I have reviewed the lab findings, diagnostic data, medications, and the plan for sedation

## 2020-03-05 NOTE — PROGRESS NOTES
[facility], [place]   Transplant Nephrology Progress Note  Date of Admission:  2/28/2020  Today's Date: 03/05/2020    Recommendations  1. Consult hematology regarding continued dasatinib use.     Assessment & Plan   # DDKT: Down-trending albeit slow, UOP ~2.5L daily. DGF (HD on POD#1 due to hyperkalemia). No indications for HD today. Renogram revealed vasomotor nephropathy which was somewhat expected with his slowed graft function.               - Baseline Cr ~ TBD              - Proteinuria: Not checked post transplant              - Date DSA Last Checked: Feb/2020      Latest DSA: No DSA at time of transplant              - BK Viremia: No              - Kidney Tx Biopsy: No    # Immunosuppression: Tacrolimus immediate release (goal 8-10) and Mycophenolate mofetil (goal 5-7)              - Changes: No, tacrolimus 5.9, increase tac dose to 3mg BID.      # Infection Prophylaxis:   - PJP: Sulfa/TMP (Bactrim)  - CMV: Valcyte x 6 months (R-/D+), EBV +/+  - Thrush: None     # Hypertension: Borderline controlled;             Goal BP: < 140/90              - Volume status: Mildly hypervolemic                EDW~ 67.8kg              - Changes: No, keep on lasix 40mg BID    # Elevated Blood Glucose: Glucose generally running ~ 100-140              - Management as per primary team.     # Anemia of Chronic Kidney Disease:                - Decreased (6.8),         DEB: No,              Iron panel: Replete               - Getting 1u pRBC today               - Would get hematology consult regarding continuation of dasatinib vs alternatives, given the interaction with tacrolimus and possible pancytopenia.                                                                      # Mineral Bone Disorder:   - Secondary renal hyperparathyroidism; PTH level: 668        On treatment: None  - Vitamin D; level: Low        On Supplement: No, would start cholecalciferol 2000U daily  - Calcium; level: Low Normal         On Supplement: No  - Phosphorus; level: High        On Supplement: No    Consider calcitriol once phosphorous is normal.    # Electrolytes:   - Potassium; level: Normal       On Supplement: No  - Magnesium; level: Normal        On Supplement: No  - Bicarbonate; level: Normal       On Supplement: No  - Sodium; level: Normal        On Supplement: No     # CML: In remission. On dasatinib 40mg daily. There may be an interaction with tacrolimus that is causing worsening pancytopenia. Hematology will be consulted about possible alternative agents    # Nausea/Vomiting: Several BMs last night, treating nausea with zofran. Giving carafate for GERD-like symptoms but not helping well.      # Transplant History:  Etiology of Kidney Failure:  Alport syndrome s/p kidney transplant x 2  Tx: DDKT  Transplant: 2/28/2020 (Kidney), 12/7/1988 (Kidney), 3/2/2005 (Kidney)  Donor Type: Donation after Brain Death        Donor Class:   Crossmatch at time of Tx: negative  DSA at time of Tx: No  Significant changes in immunosuppression: None  Significant transplant-related complications: None    Recommendations were communicated to the primary team verbally.    Seen and discussed with Dr. Telly Kunz MD   Pager: [phone]    Attestation:  This patient has been seen and evaluated by me, Alvin Varner MD.  I have reviewed the note and agree with plan of care as documented by the fellow.       Interval History   Mr Cruz had no overnight fevers, chills, or rigors. Creatinine is slowly trending down, and his UOP remains stable while on lasix. He has no chest pain or SOB. He endorses worsening heartburn, despite carafate added yesterday. Her has mild LE edema, but denies scrotal edema. He has no new rashes or lesions. He had several BMs today.     Review of Systems   4 point ROS was obtained and negative except as noted in the Interval History.    MEDICATIONS:    amLODIPine  10 mg Oral Daily     atorvastatin  10 mg Oral Daily  "    carvedilol  12.5 mg Oral BID w/meals     dasatinib  40 mg Oral QPM     famotidine  40 mg Oral Daily     furosemide  40 mg Intravenous BID     [Held by provider] heparin ANTICOAGULANT  5,000 Units Subcutaneous Q12H     lidocaine  1 patch Transdermal Q24H     lidocaine   Transdermal Q8H     mycophenolate  750 mg Oral BID IS     predniSONE  5 mg Oral Daily     senna-docusate  1 tablet Oral BID    Or     senna-docusate  2 tablet Oral BID     sodium chloride (PF)  10 mL Intracatheter Q8H     sucralfate  1 g Oral BID     sulfamethoxazole-trimethoprim  1 tablet Oral Q Mon Wed Fri AM     tacrolimus  2.5 mg Oral or Feeding Tube BID IS     valGANciclovir  450 mg Oral or Feeding Tube Once per day on Mon Thu       Physical Exam      BP (!) 144/80 (BP Location: Left arm)   Pulse 75   Temp 99  F (37.2  C) (Oral)   Resp 14   Ht 1.626 m (5' 4\")   Wt 67.4 kg (148 lb 9.6 oz)   SpO2 98%   BMI 25.51 kg/m       GENERAL APPEARANCE: alert and no distress  HENT: mouth without ulcers or lesions.  NG tube removed  LYMPHATICS: no cervical or supraclavicular nodes  RESP: lungs clear to auscultation - no rales, rhonchi or wheezes  CV: regular rhythm, normal rate, no rub, no murmur  EDEMA: mild LE edema bilaterally  ABDOMEN: soft, nondistended, nontender, bowel sounds normal  MS: extremities normal - no gross deformities noted, no evidence of inflammation in joints, no muscle tenderness  SKIN: no rash  ACCESS: PICC placed 03/05/20  ALLOGRAFT: Non-tender    Data   All labs reviewed by me.  CMP  Recent Labs   Lab 03/05/20  0530 03/04/20  1018 03/03/20  0729 03/02/20  1140 03/02/20  0448  02/28/20  0341    135 141  --  140   < > 137   POTASSIUM 3.7 3.7 3.9 4.2 4.4   < > 5.0   CHLORIDE 101 101 104  --  102   < > 102   CO2 23 22 26  --  25   < > 22   ANIONGAP 9 12 11  --  12   < > 13   * 132* 101*  --  138*   < > 92   * 103* 100*  --  76*   < > 65*   CR 7.93* 8.49* 9.85*  --  9.25*   < > 11.90*   GFRESTIMATED 7* 7* 6*  " --  6*   < > 5*   GFRESTBLACK 9* 8* 7*  --  7*   < > 5*   ANTWAN 7.5* 7.6* 7.8*  --  8.2*   < > 7.7*   MAG 2.1 2.0 2.4*  --  2.1   < >  --    PHOS 4.9* 5.2* 7.5*  --  8.9*   < >  --    PROTTOTAL  --   --   --   --   --   --  7.9   ALBUMIN  --   --   --   --   --   --  4.0   BILITOTAL 0.6  --   --   --   --   --  0.6   ALKPHOS  --   --   --   --   --   --  108   AST  --   --   --   --   --   --  30   ALT  --   --   --   --   --   --  48    < > = values in this interval not displayed.     CBC  Recent Labs   Lab 03/05/20  1327 03/05/20  0601 03/05/20  0530 03/04/20  1018  03/03/20  0729   HGB 7.6* 6.6* 6.8* 8.1*   < > 5.3*   WBC 5.4  --  5.8 5.2  --  5.3   RBC 2.55*  --  2.36* 2.77*  --  1.83*   HCT 23.3*  --  21.5* 25.7*  --  16.6*   MCV 91  --  91 93  --  91   MCH 29.8  --  28.8 29.2  --  29.0   MCHC 32.6  --  31.6 31.5  --  31.9   RDW 14.1  --  14.4 14.6  --  14.7   PLT 83*  --  77* 82*  --  97*    < > = values in this interval not displayed.     INR  Recent Labs   Lab 03/05/20  1327 02/28/20  0341   INR 1.18* 1.11   PTT 30 30     ABG  Recent Labs   Lab 02/28/20  1417 02/28/20  1308   PH 7.36 7.38   PCO2 35 35   PO2 222* 202*   HCO3 19* 20*   O2PER 53.0 54.0      Urine Studies  No lab results found.  No lab results found.  PTH  Recent Labs   Lab Test 03/04/20  1018 03/03/20  0849 03/03/20  0729   PTHI 630* 668* Canceled, Test credited     Iron Studies  Recent Labs   Lab Test 03/03/20  0729   IRON 139   *   IRONSAT 98*   YOLIS 3,397*       IMAGING:  All imaging studies reviewed by me.

## 2020-03-05 NOTE — CONSULTS
HEMATOLOGY CONSULT NOTE    Subjective     REFERRAL SOURCE  Kidney Transplant team    CONSULT INDICATION:  Anemia/thrombocytopenia, dasatinib dosing?    HISTORY OF PRESENT ILLNESS:  Mr. Jeremiah Cruz is a 44 year old male with history of ESRD s/p Alport disease s/p kidney transplant x 2 (1988, 2005 with 2nd graft lost to primary nonfunction), CML dx 2015 in remission (on maintenance dasatinib), and HTN who was admitted for decreased donor kidney transplant w/ ureteral stenting on 2/28/20. He had delayed graft functio and underwent HD on POD #1 due to hyperkalemia.     Patient's platelets have been decreasing slowly from 195 pre-op on 2/28 to 100 post-op down to 77 today. Hgb also decreasing from 11.8 pre-op on 2/28 to 8.3 post-op down to 6.8 today. 3/3 Hgb was reported at 5.3 but it appears that repeat was 8.0 without transfusion. Patient received ATG from 2/28 to 3/1 for immunosuppression and is also on tacrolimus, MMF, and prednisone, as well as Valcyte and Bactrim for infection prophylaxis.     He follows with Oncology in [facility] (no Care Everywhere notes) for CML which was diagnosed in 2015. He was started on dasatinib and has been in remission since 2016. Continues on maintenance dasatinib 40 mg daily (chronically reduced dose due to skin toxicities per patient), which was restarted here on 3/2/20.       Past Medical History:   Diagnosis Date     Alport's syndrome      Anemia in chronic kidney disease      CML (chronic myelocytic leukemia) (H) 2015    In remission since 2016. Managed at [facility].     Dialysis care      End stage kidney disease (H)      Former tobacco use      GERD (gastroesophageal reflux disease)      Hypertension      Kidney transplant rejection 1999     Kidney transplanted 2005    Primary graft non-function d/t acute AMR     Kidney transplanted 1988    Failed in 1999     Kidney transplanted 02/28/2020    Induction with  thymoglobulin 6.2mg/kg.     S/p nephrectomy 11     Secondary renal hyperparathyroidism (H)      Thrombocytopenia (H)      Tobacco dependence         Family History   Problem Relation Age of Onset     Bone Cancer Brother      Melanoma No family hx of      Skin Cancer No family hx of         Social History     Socioeconomic History     Marital status: Single     Spouse name: Not on file     Number of children: Not on file     Years of education: Not on file     Highest education level: Not on file   Occupational History     Not on file   Social Needs     Financial resource strain: Not on file     Food insecurity:     Worry: Not on file     Inability: Not on file     Transportation needs:     Medical: Not on file     Non-medical: Not on file   Tobacco Use     Smoking status: Former Smoker     Packs/day: 1.00     Years: 12.00     Pack years: 12.00     Types: Cigarettes     Last attempt to quit: 2011     Years since quittin.0     Smokeless tobacco: Never Used   Substance and Sexual Activity     Alcohol use: No     Alcohol/week: 0.0 standard drinks     Drug use: No     Sexual activity: Not on file   Lifestyle     Physical activity:     Days per week: Not on file     Minutes per session: Not on file     Stress: Not on file   Relationships     Social connections:     Talks on phone: Not on file     Gets together: Not on file     Attends Synagogue service: Not on file     Active member of club or organization: Not on file     Attends meetings of clubs or organizations: Not on file     Relationship status: Not on file     Intimate partner violence:     Fear of current or ex partner: Not on file     Emotionally abused: Not on file     Physically abused: Not on file     Forced sexual activity: Not on file   Other Topics Concern     Parent/sibling w/ CABG, MI or angioplasty before 65F 55M? Not Asked   Social History Narrative     Not on file           Allergies   Allergen Reactions     Blood Transfusion Related  "(Informational Only) Other (See Comments)     Patient has a history of a clinically significant antibody against RBC antigens.  A delay in compatible RBCs may occur.     Cephalosporins Other (See Comments) and Rash     fever     Compazine [Prochlorperazine]      Gammagard [Immune Globulin] Other (See Comments)     Ed got spinal meningitis and MD stated to never get again for fear of death.       Penicillins      Vancomycin      Medications Prior to Admission   Medication Sig Dispense Refill Last Dose     B complex-C-folic acid (NEPHROCAPS/TRIPHROCAPS) 1 MG capsule Take 1 capsule by mouth daily   2/27/2020 at AM     calcium carbonate (TUMS) 500 MG chewable tablet Take 1 chewable tablet by mouth 3 times daily with meals.   2/27/2020 at AM     chlorhexidine (HIBICLENS) 4 % liquid Cleanse scalp twice a week as directed. 473 mL 11 2/27/2020     cloNIDine (CATAPRES) 0.1 MG tablet Take 0.1 mg by mouth 2 times daily    2/27/2020 at PM     dasatinib (SPRYCEL) 20 MG tablet CHEMO Take 40 mg by mouth daily    2/27/2020 at PM     ketoconazole (NIZORAL) 2 % external shampoo APPLY TO THE SCALP AND WASH OFF ATER 5 MINUTES EVERY OTHER  mL 9 2/27/2020 at PM     lanthanum (FOSRENOL) 500 MG chewable tablet Take 500 mg by mouth 3 times daily (with meals)    2/27/2020 at PM     ranitidine (ZANTAC) 150 MG tablet Take 150 mg by mouth daily    2/27/2020 at AM       REVIEW OF SYSTEMS:  Reviewed and negative other than that mentioned in HPI.     Objective     VITAL SIGNS:  /69   Pulse 80   Temp 98.2  F (36.8  C) (Oral)   Resp 13   Ht 1.626 m (5' 4\")   Wt 67.4 kg (148 lb 9.6 oz)   SpO2 99%   BMI 25.51 kg/m       PHYSICAL EXAM:  Vitals reviewed.  General: Chronically ill-appearing but no acute distress.   ENT: Normocephalic, atraumatic. Mucous membranes moist.   Resp: Normal work of breathing.   Abd: Soft, nondistended.   Ext: Trace edema bilaterally.   Neuro: CN II-XII grossly intact. Moves all extremities spontaneously. "   Skin: No rash, unusual bruising or prominent lesions. LUE PICC.     LABS:  Lab Results   Component Value Date    WBC 5.8 2020    HGB 6.6 (LL) 2020    HCT 21.5 (L) 2020    MCV 91 2020    PLT 77 (L) 2020     Lab Results   Component Value Date     2020    HCO3 19 (L) 2020     (H) 2020    ANIONGAP 9 2020     Lab Results   Component Value Date    ALT 48 2020    AST 30 2020    ALKPHOS 108 2020     Lab Results   Component Value Date    INR 1.11 2020     Work-up:  3/3 ferritin 3397, iron 139, TIBC 142, PCT 98    IMAGIN20 CT Abd/pelvis:  Abdomen and pelvis: Calcification along the posterior medial aspect of the right lobe of liver. 9 mm hypodense focus in the dome of the right lobe of the liver (series 3, image 34), too small to characterize though likely a hepatic cyst. The spleen, adrenal glands and pancreas are within normal limits. There is no intrahepatic or extrahepatic biliary dilation. No main pancreatic ductal dilation. Small amount of hyperdense layering fluid in the gallbladder, may represent small amount of sludge. Postoperative changes of explanted left lower quadrant renal transplant with presence of multiple surgical clips. Calcified focus in the right lower quadrant, likely failed renal transplant. Native kidneys are not visualized.     No dilated loops of small or large bowel. Urinary bladder is decompressed. No abdominal or pelvic adenopathy. There are some prominent lymph nodes in the inguinal regions bilaterally, none of which are enlarged by size criteria. Small amount of free fluid within the abdomen and pelvis. Mild calcification of the common/external iliac arteries without aneurysmal dilation.      Assessment & Plan   #1 Normocytic anemia and thrombocytopenia  #2 CML in remission  #3 ESRD s/p 3rd DDKT on 20    Pleasant 44-year-old male now status post his 3rd kidney transplant for ESRD   Alport disease. Noted to have slowly decreasing Hgb and platelets post-op. We reviewed his peripheral smear which was fairly unremarkable (just some anisocytosis and large platelets) without signs of hemolysis. Added on hemolysis and DIC labs as well. His absolute retic is markedly low, likely secondary to epo deficiency +/- immunosuppressants. He was previously on an DEB with dialysis. After losing some blood during transplant, it is likely that he does not produce enough epo to compensate. He is also on a number of medications that can affect his blood counts including tacrolimus, Cellcept, Valcyte, and Bactrim (also received ATG from  to 3/2 which is especially known to cause thrombocytopenia).     Per Micromedex and discussion with pharmacy, there is no interaction between dasatinib and tacrolimus other than that they both can cause QTc prolongation and the QTc interval should be monitored closely. Tacrolimus is not a significant CY inhibitor or inducer so should not affect dasatinib metabolism. On the other hand, there are case reports of TKIs (nilotinib) affecting tacrolimus metabolism, which may require lower doses of tacrolimus but his levels will be monitored closely regardless. Additionally, he is already on a very low dose of dasatinib (40 mg compared to usual dose of 100 to 140 mg) due to toxicities when on higher doses, so we do not feel that this is contributing to his cytopenias. The patient also states that his oncologist is considering discontinuing his dasatinib at the 5-year benito coming up soon (although current standard of care is to continue indefinitely).     Recommendations:  1. Added on retic count, bilirubins, haptoglobin, LDH, coags, peripheral smear, B12, folate, and epo level.  2. Agree with evaluating for occult bleeding.   3. Continue dasatinib 40 mg daily. No need to hold unless ANC <500 or platelets <50.   4. If platelets continue to decrease, would consider adjusting  medications starting with Bactrim.  5. If epo level returns inappropriately low, consider DEB per Nephrology while waiting for new kidney to start producing epo.     Thank you for this consult. This patient was seen and discussed with Dr. Gomez.    Mai Aguilera MD   Hematology-Oncology Fellow, PGY-4        HEMATOLOGY STAFF:  Seen with fellow, whose note reflects our joint evaluation, assessment, and plan.      Arvin Gomez MD  Associate Professor of Medicine  Division of Hematology, Oncology, and Transplantation  Director, [facility]

## 2020-03-05 NOTE — PLAN OF CARE
"/67 (BP Location: Left arm)   Pulse 95   Temp 97.9  F (36.6  C) (Oral)   Resp 16   Ht 1.626 m (5' 4\")   Wt 67.8 kg (149 lb 8 oz)   SpO2 100%   BMI 25.66 kg/m       4619-2374  AVSS on RA. Patient up with SB, calls appropriately. Denies pain and nausea. Continues to have heartburn after eating and drinking despite starting Carafate today. Patient wondering if we can increase the amount of times patient can have this during day. Stated tums did not help much. Reinforced anti-rejection medication teaching. PIV saline locked. NPO at midnight for PICC placement. Regular diet, fair appetite and PO intake. Stein- good urine output. BM today. Continue plan of care, please notify MD with any changes.     "

## 2020-03-05 NOTE — PROGRESS NOTES
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 He is s/p kidney transplant x2 (, ). 2nd graft lost to primary nonfunction with explant a few weeks later. He has been on dialysis since . He is now s/p intra-abdominal  donor kidney transplant w/ ureteral stent on 20.    Graft function: POD #6 with delayed graft function. Underwent HD POD#1 due to hyperkalemia. UOP good on lasix. Cr 8.5->7.4. .  Immunosuppression management:   Thymo: 425mg, 6.2mg/kg completed.  Methylpred: Taper completed.  Tacro: Goal level 8-10. Level pending.  MMF: 750mg BID.  Prednisone: 5mg daily, 3rd transplant protocol.  Complexity of management: Medium. Contributing factors: anemia and delayed graft function  Hematology:   Anemia of chronic disease: Hgb drop to 6.6 today. No obvious bleeding, no melena. Transfuse 1u RBC and monitor. Possibly r/t dasatinib/tac interaction as plt are also down.  Thrombocytopenia: Plt 77, trending down. Did receive thymo recently. May also be r/t dasatinib/tac interaction.  Hx CML: On home dose dasatinib. Interacts with tacrolimus, monitor for dasatinib toxicity (rash, edema, pancytopenia, bone pain, prolonged QT). Consult Hematology today for guidance with dasatinib due to anemia and thrombocytopenia. Check EKG prior to discharge.   Neurology:   Acute postoperative pain: PRN APAP. Continue lidoderm.  Cardiorespiratory:   HTN: Now controlled. On Coreg 12.5mg and amlodipine 10 mg daily.  GI/Nutrition:   Diet: Regular diet  Post-op ileus: NG placed post-op for distention and nausea. Removed POD #4. Tolerating diet.  Heartburn/odynophagia: Likely reflux r/t steroids. On famotidine. PPI contraindicated with dasatinib. Added carafate spaced away from other medications on 3/4. Minimal improvement.  Bowel regimen: Senna/colace.   Endocrine:  Steroid induced hyperglycemia: Admit HgbA1c  5.2%. , monitor.  Fluid/Electrolytes:  Hyperkalemia: K 6.6 on POD #1, managed with dialysis. Now resolved.  Hyperphosphatemia: Phos 5.2->4.9.   : Stein x14 days d/t small bladder.  Bladder spasms: PRN B&O suppository.  Infectious disease: Afebrile  Access: Very difficult lab draws taking hours per day. IR consulted to eval for PICC placement today. Anesthesia unable to place internal jugular in OR.  Prophylaxis: DVT (heparin, hold today for PICC/anemia), fall, GI, fungal, PT/OT  Disposition: 7A    Medical Decision Making: Medium  Subsequent visit 81239 (moderate level decision making)    DARRON/Fellow/Resident Provider: Purvi Fofana NP  [id number]    Faculty: Ron Benítez MD   _________________________________________________________________  Transplant History:   2/28/2020 (Kidney), 12/7/1988 (Kidney), 3/2/2005 (Kidney), Postoperative day: 6     Interval History: History is obtained from the patient  Overnight events: Pain controlled, no nausea, tolerating food. C/o heartburn, worse with drinking. C/o fatigue, but no dizziness or dyspnea.    ROS:   A 10-point review of systems was negative except as noted above.    Meds:    amLODIPine  10 mg Oral Daily     atorvastatin  10 mg Oral Daily     carvedilol  12.5 mg Oral BID w/meals     dasatinib  40 mg Oral QPM     famotidine  40 mg Oral Daily     furosemide  40 mg Intravenous BID     [Held by provider] heparin ANTICOAGULANT  5,000 Units Subcutaneous Q12H     lidocaine  1 patch Transdermal Q24H     lidocaine   Transdermal Q8H     mycophenolate  750 mg Oral BID IS     predniSONE  5 mg Oral Daily     senna-docusate  1 tablet Oral BID    Or     senna-docusate  2 tablet Oral BID     sodium chloride (PF)  10 mL Intracatheter Q8H     sucralfate  1 g Oral BID     sulfamethoxazole-trimethoprim  1 tablet Oral Q Mon Wed Fri AM     tacrolimus  2 mg Oral or Feeding Tube BID IS     valGANciclovir  450 mg Oral or Feeding Tube Once per day on Mon Thu       Physical Exam:     Admit  "Weight: 68.1 kg (150 lb 2.1 oz)    Current vitals:   /78   Pulse 86   Temp 98.6  F (37  C) (Oral)   Resp 16   Ht 1.626 m (5' 4\")   Wt 67.4 kg (148 lb 9.6 oz)   SpO2 98%   BMI 25.51 kg/m      Vital sign ranges:    Temp:  [97.9  F (36.6  C)-98.8  F (37.1  C)] 98.6  F (37  C)  Pulse:  [83-86] 86  Heart Rate:  [81-89] 83  Resp:  [16] 16  BP: (120-137)/(67-78) 137/78  SpO2:  [96 %-100 %] 98 %  Patient Vitals for the past 24 hrs:   BP Temp Temp src Pulse Heart Rate Resp SpO2 Weight   03/05/20 0909 137/78 98.6  F (37  C) Oral -- 83 16 98 % --   03/05/20 0827 137/78 98.8  F (37.1  C) Oral -- 89 16 96 % --   03/05/20 0500 128/68 98.2  F (36.8  C) Oral 86 84 16 98 % 67.4 kg (148 lb 9.6 oz)   03/05/20 0041 129/71 98.8  F (37.1  C) Oral 83 81 16 98 % --   03/04/20 1531 120/67 97.9  F (36.6  C) Oral -- 85 16 100 % --   03/04/20 0959 -- -- -- -- -- -- -- 67.8 kg (149 lb 8 oz)     General Appearance: in no apparent distress.   Skin: normal, warm, dry  Heart: Perfused  Lungs: Breathing comfortably on room air  Abdomen: Abdomen flat. Incision well-healing without javier-incisional erythema or drainage.   : tan is present.  Clear, yellow urine  Extremities: edema: none  Neurologic: awake, alert and oriented x4. Tremor absent.     Data:   CMP  Recent Labs   Lab 03/05/20  0530 03/04/20  1018  02/28/20  1417 02/28/20  1308 02/28/20  0341    135   < > 138 140 137   POTASSIUM 3.7 3.7   < > 5.2 5.7* 5.0   CHLORIDE 101 101   < >  --   --  102   CO2 23 22   < >  --   --  22   * 132*   < > 95 107* 92   * 103*   < >  --   --  65*   CR 7.93* 8.49*   < >  --   --  11.90*   GFRESTIMATED 7* 7*   < >  --   --  5*   GFRESTBLACK 9* 8*   < >  --   --  5*   ANTWAN 7.5* 7.6*   < >  --   --  7.7*   ICAW  --   --   --  3.7* 3.6*  --    MAG 2.1 2.0   < >  --   --   --    PHOS 4.9* 5.2*   < >  --   --   --    ALBUMIN  --   --   --   --   --  4.0   BILITOTAL  --   --   --   --   --  0.6   ALKPHOS  --   --   --   --   --  108 "   AST  --   --   --   --   --  30   ALT  --   --   --   --   --  48    < > = values in this interval not displayed.     CBC  Recent Labs   Lab 03/05/20  0601 03/05/20  0530 03/04/20  1018  02/28/20  0341   HGB 6.6* 6.8* 8.1*   < > 11.8*   WBC  --  5.8 5.2   < > 9.7   PLT  --  77* 82*   < > 195   A1C  --   --   --   --  5.2    < > = values in this interval not displayed.

## 2020-03-05 NOTE — PLAN OF CARE
"BP (!) 144/80 (BP Location: Left arm)   Pulse 75   Temp 99  F (37.2  C) (Oral)   Resp 14   Ht 1.626 m (5' 4\")   Wt 67.4 kg (148 lb 9.6 oz)   SpO2 98%   BMI 25.51 kg/m       8942-0989:  Hypertensive (144/80), AOVSS on RA. Endorses incisional abdominal pain, declines intervention. Endorses heartburn and nausea, given PRN zofran 4 mg. Endorsed bladder spasms in morning, improved without intervention. Urethral catheter in place, endorsed burning at insertion; team aware. UO 1100 mL michael, clear; on scheduled IV lasix. BM x 1. Occult stool sample collected & pending. Fair appetite on regular diet. L PIV NS locked. L PICC NS locked, + blood return. Up ad dick to bathroom. Hgb 6.6 this AM; 1 unit RBCs given without reaction. Creat 7.9 (8.4 yesterday). Continue to monitor and update kidney team with acute changes.    "

## 2020-03-06 ENCOUNTER — APPOINTMENT (OUTPATIENT)
Dept: OCCUPATIONAL THERAPY | Facility: CLINIC | Age: 45
DRG: 652 | End: 2020-03-06
Attending: SURGERY
Payer: MEDICARE

## 2020-03-06 LAB
ANION GAP SERPL CALCULATED.3IONS-SCNC: 8 MMOL/L (ref 3–14)
BUN SERPL-MCNC: 106 MG/DL (ref 7–30)
CALCIUM SERPL-MCNC: 7.7 MG/DL (ref 8.5–10.1)
CHLORIDE SERPL-SCNC: 102 MMOL/L (ref 94–109)
CO2 SERPL-SCNC: 24 MMOL/L (ref 20–32)
COPATH REPORT: NORMAL
CREAT SERPL-MCNC: 6.98 MG/DL (ref 0.66–1.25)
EPO SERPL-ACNC: 2 MU/ML (ref 4–27)
ERYTHROCYTE [DISTWIDTH] IN BLOOD BY AUTOMATED COUNT: 14 % (ref 10–15)
GFR SERPL CREATININE-BSD FRML MDRD: 9 ML/MIN/{1.73_M2}
GLUCOSE SERPL-MCNC: 117 MG/DL (ref 70–99)
HAPTOGLOB SERPL-MCNC: 184 MG/DL (ref 32–197)
HCT VFR BLD AUTO: 24.8 % (ref 40–53)
HGB BLD-MCNC: 8.1 G/DL (ref 13.3–17.7)
MAGNESIUM SERPL-MCNC: 2.2 MG/DL (ref 1.6–2.3)
MCH RBC QN AUTO: 29.7 PG (ref 26.5–33)
MCHC RBC AUTO-ENTMCNC: 32.7 G/DL (ref 31.5–36.5)
MCV RBC AUTO: 91 FL (ref 78–100)
PHOSPHATE SERPL-MCNC: 4.5 MG/DL (ref 2.5–4.5)
PLATELET # BLD AUTO: 105 10E9/L (ref 150–450)
POTASSIUM SERPL-SCNC: 3.7 MMOL/L (ref 3.4–5.3)
RBC # BLD AUTO: 2.73 10E12/L (ref 4.4–5.9)
SODIUM SERPL-SCNC: 134 MMOL/L (ref 133–144)
WBC # BLD AUTO: 6.4 10E9/L (ref 4–11)

## 2020-03-06 PROCEDURE — 40000893 ZZH STATISTIC PT IP EVAL DEFER

## 2020-03-06 PROCEDURE — 25000128 H RX IP 250 OP 636: Performed by: SURGERY

## 2020-03-06 PROCEDURE — 25000132 ZZH RX MED GY IP 250 OP 250 PS 637: Mod: GY | Performed by: SURGERY

## 2020-03-06 PROCEDURE — 25000132 ZZH RX MED GY IP 250 OP 250 PS 637: Mod: GY | Performed by: NURSE PRACTITIONER

## 2020-03-06 PROCEDURE — 36592 COLLECT BLOOD FROM PICC: CPT | Performed by: SURGERY

## 2020-03-06 PROCEDURE — 97535 SELF CARE MNGMENT TRAINING: CPT | Mod: GO

## 2020-03-06 PROCEDURE — 84100 ASSAY OF PHOSPHORUS: CPT | Performed by: SURGERY

## 2020-03-06 PROCEDURE — 25000128 H RX IP 250 OP 636: Performed by: PHYSICIAN ASSISTANT

## 2020-03-06 PROCEDURE — 83735 ASSAY OF MAGNESIUM: CPT | Performed by: SURGERY

## 2020-03-06 PROCEDURE — 99231 SBSQ HOSP IP/OBS SF/LOW 25: CPT | Mod: GC | Performed by: INTERNAL MEDICINE

## 2020-03-06 PROCEDURE — 25000131 ZZH RX MED GY IP 250 OP 636 PS 637: Mod: GY | Performed by: SURGERY

## 2020-03-06 PROCEDURE — 25000132 ZZH RX MED GY IP 250 OP 250 PS 637: Mod: GY

## 2020-03-06 PROCEDURE — 25000128 H RX IP 250 OP 636: Performed by: STUDENT IN AN ORGANIZED HEALTH CARE EDUCATION/TRAINING PROGRAM

## 2020-03-06 PROCEDURE — 80048 BASIC METABOLIC PNL TOTAL CA: CPT | Performed by: SURGERY

## 2020-03-06 PROCEDURE — 85027 COMPLETE CBC AUTOMATED: CPT | Performed by: SURGERY

## 2020-03-06 PROCEDURE — 25000132 ZZH RX MED GY IP 250 OP 250 PS 637: Mod: GY | Performed by: STUDENT IN AN ORGANIZED HEALTH CARE EDUCATION/TRAINING PROGRAM

## 2020-03-06 PROCEDURE — 12000026 ZZH R&B TRANSPLANT

## 2020-03-06 PROCEDURE — 25000131 ZZH RX MED GY IP 250 OP 636 PS 637: Mod: GY | Performed by: NURSE PRACTITIONER

## 2020-03-06 PROCEDURE — 97530 THERAPEUTIC ACTIVITIES: CPT | Mod: GO

## 2020-03-06 RX ORDER — VALGANCICLOVIR 450 MG/1
450 TABLET, FILM COATED ORAL
Qty: 60 TABLET | Refills: 5 | Status: SHIPPED | OUTPATIENT
Start: 2020-03-09 | End: 2020-03-12

## 2020-03-06 RX ORDER — AMLODIPINE BESYLATE 10 MG/1
10 TABLET ORAL DAILY
Qty: 30 TABLET | Refills: 11 | Status: SHIPPED | OUTPATIENT
Start: 2020-03-07 | End: 2021-01-01

## 2020-03-06 RX ORDER — FAMOTIDINE 40 MG/1
40 TABLET, FILM COATED ORAL DAILY
Qty: 30 TABLET | Refills: 11 | Status: SHIPPED | OUTPATIENT
Start: 2020-03-07 | End: 2021-01-01

## 2020-03-06 RX ORDER — MYCOPHENOLATE MOFETIL 250 MG/1
750 CAPSULE ORAL 2 TIMES DAILY
Qty: 180 CAPSULE | Refills: 11 | Status: SHIPPED | OUTPATIENT
Start: 2020-03-06 | End: 2020-03-10

## 2020-03-06 RX ORDER — FUROSEMIDE 10 MG/ML
40 INJECTION INTRAMUSCULAR; INTRAVENOUS DAILY
Status: DISCONTINUED | OUTPATIENT
Start: 2020-03-07 | End: 2020-03-06

## 2020-03-06 RX ORDER — BISACODYL 10 MG
10 SUPPOSITORY, RECTAL RECTAL DAILY PRN
Status: DISCONTINUED | OUTPATIENT
Start: 2020-03-06 | End: 2020-03-10 | Stop reason: HOSPADM

## 2020-03-06 RX ORDER — PREDNISONE 5 MG/1
5 TABLET ORAL DAILY
Qty: 30 TABLET | Refills: 11 | Status: SHIPPED | OUTPATIENT
Start: 2020-03-07 | End: 2021-01-01

## 2020-03-06 RX ORDER — CALCITRIOL 0.25 UG/1
0.25 CAPSULE, LIQUID FILLED ORAL DAILY
Qty: 30 CAPSULE | Refills: 11 | Status: SHIPPED | OUTPATIENT
Start: 2020-03-07 | End: 2020-07-01

## 2020-03-06 RX ORDER — FUROSEMIDE 40 MG
40 TABLET ORAL DAILY
Status: DISCONTINUED | OUTPATIENT
Start: 2020-03-07 | End: 2020-03-07

## 2020-03-06 RX ORDER — CALCIUM CARBONATE 500 MG/1
1 TABLET, CHEWABLE ORAL 3 TIMES DAILY PRN
Start: 2020-03-06 | End: 2020-01-01

## 2020-03-06 RX ORDER — ATROPA BELLADONNA AND OPIUM 16.2; 3 MG/1; MG/1
30 SUPPOSITORY RECTAL ONCE
Status: DISCONTINUED | OUTPATIENT
Start: 2020-03-06 | End: 2020-03-07

## 2020-03-06 RX ORDER — ATORVASTATIN CALCIUM 10 MG/1
10 TABLET, FILM COATED ORAL DAILY
Qty: 30 TABLET | Refills: 11 | Status: SHIPPED | OUTPATIENT
Start: 2020-03-07 | End: 2021-01-01

## 2020-03-06 RX ORDER — SULFAMETHOXAZOLE AND TRIMETHOPRIM 400; 80 MG/1; MG/1
1 TABLET ORAL
Qty: 30 TABLET | Refills: 11 | Status: SHIPPED | OUTPATIENT
Start: 2020-03-09 | End: 2020-06-17

## 2020-03-06 RX ORDER — CARVEDILOL 12.5 MG/1
12.5 TABLET ORAL 2 TIMES DAILY WITH MEALS
Qty: 60 TABLET | Refills: 11 | Status: SHIPPED | OUTPATIENT
Start: 2020-03-06 | End: 2020-03-25

## 2020-03-06 RX ORDER — CALCITRIOL 0.25 UG/1
0.25 CAPSULE, LIQUID FILLED ORAL DAILY
Status: DISCONTINUED | OUTPATIENT
Start: 2020-03-06 | End: 2020-03-10 | Stop reason: HOSPADM

## 2020-03-06 RX ORDER — ACETAMINOPHEN 325 MG/1
325-650 TABLET ORAL EVERY 6 HOURS PRN
Qty: 1 BOTTLE | Refills: 0 | Status: SHIPPED | OUTPATIENT
Start: 2020-03-06 | End: 2020-03-31

## 2020-03-06 RX ORDER — TACROLIMUS 0.5 MG/1
CAPSULE ORAL
Qty: 60 CAPSULE | Refills: 11 | Status: SHIPPED | OUTPATIENT
Start: 2020-03-06 | End: 2020-03-25

## 2020-03-06 RX ORDER — AMOXICILLIN 250 MG
1-2 CAPSULE ORAL 2 TIMES DAILY
Qty: 100 TABLET | Refills: 0 | Status: SHIPPED | OUTPATIENT
Start: 2020-03-06 | End: 2020-03-31

## 2020-03-06 RX ADMIN — DASATINIB 40 MG: 20 TABLET ORAL at 21:02

## 2020-03-06 RX ADMIN — ONDANSETRON 4 MG: 2 INJECTION INTRAMUSCULAR; INTRAVENOUS at 09:34

## 2020-03-06 RX ADMIN — TACROLIMUS 2.5 MG: 1 CAPSULE ORAL at 18:44

## 2020-03-06 RX ADMIN — MYCOPHENOLATE MOFETIL 750 MG: 250 CAPSULE ORAL at 18:44

## 2020-03-06 RX ADMIN — SUCRALFATE 1 G: 1 SUSPENSION ORAL at 16:32

## 2020-03-06 RX ADMIN — CARVEDILOL 12.5 MG: 12.5 TABLET, FILM COATED ORAL at 09:40

## 2020-03-06 RX ADMIN — SENNOSIDES AND DOCUSATE SODIUM 2 TABLET: 8.6; 5 TABLET ORAL at 09:41

## 2020-03-06 RX ADMIN — CALCITRIOL CAPSULES 0.25 MCG 0.25 MCG: 0.25 CAPSULE ORAL at 09:41

## 2020-03-06 RX ADMIN — ATORVASTATIN CALCIUM 10 MG: 10 TABLET, FILM COATED ORAL at 09:42

## 2020-03-06 RX ADMIN — MYCOPHENOLATE MOFETIL 750 MG: 250 CAPSULE ORAL at 09:41

## 2020-03-06 RX ADMIN — FUROSEMIDE 40 MG: 10 INJECTION, SOLUTION INTRAMUSCULAR; INTRAVENOUS at 09:47

## 2020-03-06 RX ADMIN — SULFAMETHOXAZOLE AND TRIMETHOPRIM 1 TABLET: 400; 80 TABLET ORAL at 09:41

## 2020-03-06 RX ADMIN — CARVEDILOL 12.5 MG: 12.5 TABLET, FILM COATED ORAL at 18:44

## 2020-03-06 RX ADMIN — SUCRALFATE 1 G: 1 SUSPENSION ORAL at 09:46

## 2020-03-06 RX ADMIN — SENNOSIDES AND DOCUSATE SODIUM 2 TABLET: 8.6; 5 TABLET ORAL at 21:02

## 2020-03-06 RX ADMIN — AMLODIPINE BESYLATE 10 MG: 10 TABLET ORAL at 09:42

## 2020-03-06 RX ADMIN — TACROLIMUS 2.5 MG: 1 CAPSULE ORAL at 09:41

## 2020-03-06 RX ADMIN — FAMOTIDINE 40 MG: 20 TABLET ORAL at 09:40

## 2020-03-06 RX ADMIN — PREDNISONE 5 MG: 5 TABLET ORAL at 09:41

## 2020-03-06 ASSESSMENT — MIFFLIN-ST. JEOR: SCORE: 1477.32

## 2020-03-06 ASSESSMENT — ACTIVITIES OF DAILY LIVING (ADL)
ADLS_ACUITY_SCORE: 12

## 2020-03-06 ASSESSMENT — PAIN DESCRIPTION - DESCRIPTORS: DESCRIPTORS: ACHING

## 2020-03-06 NOTE — PROGRESS NOTES
VS:  Afebrile, /78, HR 85, O2 sat 100% on room air.  LDA: Right and left AV fistula intact, left PICC-SL saline locked, left PIV saline locked.  Labs:  Hgb 7.9 at 1821.  Neuro: A&Ox4.  GI/: Stein catheter with adequate urine output, no BM during the shift, passing gas.  Diet/appetite: Regular diet, poor PO intake.  Activity: Encourage ambulation 4 times a day.  Pain/Nausea/Vomiting: Incisional pain tolerable, no pain medication given during the shift. Denies nausea.  Skin: Surgical incision stapled, c/d/i.  Mobility: Up ad dick.  Test/Procedure:  N/A.  Plan: Continue POC.

## 2020-03-06 NOTE — PROGRESS NOTES
Care Coordinator  D/I: [facility]  [address] [place] [address]  Ph: [phone]  Confirmation #: [id number]   Date: Saturday, March 7th, for up to 2 weeks--the [facility] nephrologists will let you know when you can return home.  I have given pt the information.  I have seen Jeremiah and explained the Medicare Very important Message and he signed it--copy in his chart.  PICC--[facility] and [facility] will determine the plan for it when he goes home.  P: Plan for discharge to local \[facility]"" tomorrow with ATC visits Starting Sunday. I have called the \[facility]\"" and confirmed that their shuttle runs on the weekend and it does--starts at 7am.

## 2020-03-06 NOTE — PROGRESS NOTES
CLINICAL NUTRITION SERVICES - REASSESSMENT NOTE     Nutrition Prescription    RECOMMENDATIONS FOR MDs/PROVIDERS TO ORDER:  Recommend bowel regimen as pt feels bloated and constipated.     Malnutrition Status:    Unable to determine due to pt's condition.     Recommendations already ordered by Registered Dietitian (RD):  -Continue supplements orders    Future/Additional Recommendations:  -If PO continues to be poor, start calorie counts  -If calorie counts showing PO less than 2/3 of assessed needs (1360kcal and 59 g protein), start supplemental TF.     EVALUATION OF THE PROGRESS TOWARD GOALS   Diet: Regular  Supplements: Boost Plus Chocolate BID between meals  Intake:   -Per [employer], pt ordering 2 meals/day.   -Per flowsheet, pt has consumed 50-75% of meals  -Per RN's note on 3/6, pt has Poor PO intake .     NEW FINDINGS   Chart Review:  -Per MD's note on 3/5, pt has vasomotor nephropathy with slowed graft function.    Labs:  Lytes (K+, Mg++, Phos) WNL  Phos previously 4.9 (H) on 3/5  BUN (H) 106  Cr (H) 6.98  GFR (L) 9  Vitamin B12 (H) 1,407  Glucose trend: 101 <- 116 <- 117 <- 132    Medications:  Lasix   Mycophenolate  Tacrolimus  prednisone  Senna  Calcium Carbonate PRN  Zofran PRN    GI:  -Per I/O, last bowel movement on 3/5 with 1x occurrence.   -Pt felt heart burn, nauseous, constipated, and bloated and unable to provide detailed information. Pt complained discomfort around esophagus area, but no choking on foods or liquids.   -Pt had 1x emesis during visit.     Weight: Pt los 6.9 kg (9%) within a week. Pt is on diuretics with negative I/O of 1396 ml/day in the past 7 days, suspecting a mix of fluid shift and true weight loss.   03/06/20 0159 67.1 kg (148 lb)   03/05/20 0500 67.4 kg (148 lb 9.6 oz)   03/04/20 0959 67.8 kg (149 lb 8 oz)   03/03/20 1117 65.7 kg (144 lb 12.8 oz)   03/03/20 1000 65.7 kg (144 lb 12.8 oz)   03/02/20 0504 72.3 kg (159 lb 6.3 oz)   03/01/20 0512 72.2 kg (159 lb 2.8 oz)   02/29/20  0500 74 kg (163 lb 2.3 oz)   02/28/20 0220 68.1 kg (150 lb 2.1 oz)       MALNUTRITION  % Intake: < 75% for > 7 days (non-severe)  % Weight Loss: > 2% in 1 week (severe), suspecting a mix of fluid shift and true weight loss.  Subcutaneous Fat Loss: Unable to assess  Muscle Loss: Unable to assess  Fluid Accumulation/Edema: trace   Malnutrition Diagnosis: Unable to determine due to pt's condition.     Previous Goals   1. Patient will verbalize understanding of 3 important aspects of post-transplant diet guidelines.   Evaluation: Met    2. PO intake >50% meals TID.  Evaluation: Not met    Previous Nutrition Diagnosis  Food and nutrition-related knowledge deficit r/t length of time since previous post-transplant education AEB review of chart record, and MD consult for Assess and educate post SOT  Evaluation: Improving    CURRENT NUTRITION DIAGNOSIS  Inadequate oral intake related to feeling bloated and nauseous as evidenced by  < 75% of food intake for > 7 days and 1x emesis on 3/6.     INTERVENTIONS  Implementation  -Encouraged PO intake.   -Notified pt about potential supplemental TF to maximize nutrition intake.     Goals  Patient to consume % of nutritionally adequate meal trays TID, or the equivalent with supplements/snacks.    Monitoring/Evaluation  Progress toward goals will be monitored and evaluated per protocol.    Kristan Henley  Dietetic Intern    I have read the above reassessment by the dietetic intern and agree with the note.  Ana Fernandez, MS/RD/ELIZABETH/CNSC  [address]A RD Pager: [phone]

## 2020-03-06 NOTE — PROGRESS NOTES
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 He is s/p kidney transplant x2 (, ). 2nd graft lost to primary nonfunction with explant a few weeks later. He has been on dialysis since . He is now s/p intra-abdominal  donor kidney transplant w/ ureteral stent on 20.    Graft function: POD #7 with delayed graft function. Underwent HD POD#1 due to hyperkalemia. UOP good on lasix. Cr 8.5->7.4->7. .  Immunosuppression management:   Thymo: 425mg, 6.2mg/kg completed.  Methylpred: Taper completed.  Tacro: Goal level 8-10. Dose 2.5 mg BID  MMF: 750mg BID.  Prednisone: 5mg daily, 3rd transplant protocol.  Complexity of management: Medium. Contributing factors: anemia and delayed graft function  Hematology:   Anemia of chronic disease: Transfused 1u RBC on 3/5, hgb tamar appropriately and has been stable this AM. Possibly r/t dasatinib/tac interaction as plt are also down.  Thrombocytopenia: Plt 83. Did receive thymo recently. May also be r/t dasatinib/tac interaction.  Hx CML: On home dose dasatinib. Interacts with tacrolimus, monitor for dasatinib toxicity (rash, edema, pancytopenia, bone pain, prolonged QT). Consulted Hematology- are not concerned about interactions with dasatinib. Check EKG prior to discharge.   Neurology:   Acute postoperative pain: PRN APAP. Continue lidoderm.  Cardiorespiratory:   HTN: Now controlled. On Coreg 12.5mg and amlodipine 10 mg daily.  GI/Nutrition:   Diet: Regular diet  Post-op ileus: NG placed post-op for distention and nausea. Removed POD #4. Tolerating diet.  Heartburn/odynophagia: Likely reflux r/t steroids. On famotidine. PPI contraindicated with dasatinib. Added carafate spaced away from other medications on 3/4. Minimal improvement.  Bowel regimen: Senna/colace.   Endocrine:  Steroid induced hyperglycemia: Admit HgbA1c 5.2%. ,  monitor.  Fluid/Electrolytes:  Hyperkalemia: K 6.6 on POD #1, managed with dialysis. Now resolved.  Hyperphosphatemia: Phos 5.2->4.9.   : Stein x14 days d/t small bladder.  Bladder spasms: PRN B&O suppository.  Infectious disease: Afebrile  Access: Very difficult lab draws taking hours per day. IR consulted to eval for PICC placement. Anesthesia unable to place internal jugular in OR.  Prophylaxis: DVT (heparin, hold today for PICC/anemia), fall, GI, fungal, PT/OT  Disposition: 7A, planning for discharge to [facility] tomorrow.    Medical Decision Making: Medium  Subsequent visit 93841 (moderate level decision making)    DARRON/Fellow/Resident Provider: Emili Cage PA-C [id number]    Faculty: Ron Benítez MD   _________________________________________________________________  Transplant History:   2/28/2020 (Kidney), 12/7/1988 (Kidney), 3/2/2005 (Kidney), Postoperative day: 7     Interval History: History is obtained from the patient  Overnight events: Pain controlled, did have one episode of emesis this AM. C/o heartburn. C/o fatigue, weakness, unable to dress himself.    ROS:   A 10-point review of systems was negative except as noted above.    Meds:    amLODIPine  10 mg Oral Daily     atorvastatin  10 mg Oral Daily     calcitRIOL  0.25 mcg Oral Daily     carvedilol  12.5 mg Oral BID w/meals     dasatinib  40 mg Oral QPM     famotidine  40 mg Oral Daily     [START ON 3/7/2020] furosemide  40 mg Oral Daily     [Held by provider] heparin ANTICOAGULANT  5,000 Units Subcutaneous Q12H     lidocaine  1 patch Transdermal Q24H     lidocaine   Transdermal Q8H     mycophenolate  750 mg Oral BID IS     opium-belladonna  30 mg Rectal Once     predniSONE  5 mg Oral Daily     senna-docusate  1 tablet Oral BID    Or     senna-docusate  2 tablet Oral BID     sodium chloride (PF)  10 mL Intravenous Q7 Days     sodium chloride (PF)  10 mL Intracatheter Q8H     sucralfate  1 g Oral BID     sulfamethoxazole-trimethoprim  1 tablet Oral Q  "Mon Wed Fri AM     tacrolimus  2.5 mg Oral or Feeding Tube BID IS     valGANciclovir  450 mg Oral or Feeding Tube Once per day on Mon Thu       Physical Exam:     Admit Weight: 68.1 kg (150 lb 2.1 oz)    Current vitals:   /85 (BP Location: Left arm)   Pulse 84   Temp 98.4  F (36.9  C) (Oral)   Resp 18   Ht 1.626 m (5' 4\")   Wt 67.1 kg (148 lb)   SpO2 100%   BMI 25.40 kg/m      Vital sign ranges:    Temp:  [98.2  F (36.8  C)-98.9  F (37.2  C)] 98.4  F (36.9  C)  Pulse:  [76-84] 84  Heart Rate:  [82-86] 85  Resp:  [16-18] 18  BP: (123-149)/(75-88) 139/85  SpO2:  [99 %-100 %] 100 %  Patient Vitals for the past 24 hrs:   BP Temp Temp src Pulse Heart Rate Resp SpO2 Weight   03/06/20 1147 139/85 98.4  F (36.9  C) Oral -- 85 18 100 % --   03/06/20 0719 (!) 142/83 98.8  F (37.1  C) Oral -- 82 16 100 % --   03/06/20 0544 123/88 98.6  F (37  C) Oral 84 86 16 99 % --   03/06/20 0159 -- -- -- -- -- -- -- 67.1 kg (148 lb)   03/05/20 2345 136/78 98.8  F (37.1  C) Oral 82 85 16 100 % --   03/05/20 1935 135/75 98.9  F (37.2  C) Oral 76 -- 16 100 % --   03/05/20 1553 (!) 149/85 98.2  F (36.8  C) Oral -- 82 16 100 % --     General Appearance: in no apparent distress.   Skin: normal, warm, dry  Heart: Perfused  Lungs: Breathing comfortably on room air  Abdomen: Abdomen flat. Incision well-healing without javier-incisional erythema or drainage.   : tan is present.  Clear, yellow urine  Extremities: edema: none  Neurologic: awake, alert and oriented x4. Tremor absent.     Data:   CMP  Recent Labs   Lab 03/06/20  0708 03/05/20  0530    134   POTASSIUM 3.7 3.7   CHLORIDE 102 101   CO2 24 23   * 116*   * 107*   CR 6.98* 7.93*   GFRESTIMATED 9* 7*   GFRESTBLACK 10* 9*   ANTWAN 7.7* 7.5*   MAG 2.2 2.1   PHOS 4.5 4.9*   BILITOTAL  --  0.6     CBC  Recent Labs   Lab 03/06/20  0708 03/05/20  1821 03/05/20  1327   HGB 8.1* 7.9* 7.6*   WBC 6.4  --  5.4   *  --  83*       "

## 2020-03-06 NOTE — PLAN OF CARE
"BP (!) 149/85 (BP Location: Left arm)   Pulse 75   Temp 98.2  F (36.8  C) (Oral)   Resp 16   Ht 1.626 m (5' 4\")   Wt 67.4 kg (148 lb 9.6 oz)   SpO2 100%   BMI 25.51 kg/m       4059-6131:  HTN (149/85) on scheduled norvasc 10 mg & BID coreg 12.5 mg. AOVSS on RA, afebrile. Endorsing rectal discomfort from hemorrhoids, MD paged & hydrocortisone cream ordered. Endorsing heartburn, given PRN Tums 500 mg x 1. Stein catheter patent,  mg. No BM. Poor appetite on regular diet due to heartburn; pt encouraged to order more conservatively (toast vs. Cheeseburger w nolan) to see if that helps. Incision stapled, NIKITA. R EMMA site small amt dried drainage. Ambulated the newberry with SBA. Med card & lab book up to date. Hgb recheck 7.6 @ 1330, next check due @ 1600. Continue to monitor and update liver team with acute changes.     "

## 2020-03-06 NOTE — PLAN OF CARE
PT Defer    Discharge Planner PT   Patient plan for discharge: home  Current status: pt observed to complete transfers and ambulation in hallway at SBA to IND level, ambulates > 700' without device, steady on feet without LOB. Reports hx visual impairment that at times limits stability however he states he needs to pursue an eye appointment to assist with this. Denies needs for IP PT. PT to complete orders and sign off.   Barriers to return to prior living situation: medical status  Recommendations for discharge: home with assist, possible OP PT  Rationale for recommendations: pt mobilizing well, OT to continue to follow while IP to improve IND with ADLs and activity tolerance. Pt may benefit from OP PT to address higher level balance however he reports he needs to pursue an eye appointment to fix deficits.        Entered by: Ld Denis 03/06/2020 3:25 PM

## 2020-03-06 NOTE — PROGRESS NOTES
CLINICAL NUTRITION SERVICES - DISCHARGE NOTE    Patient s discharge needs assessed and discharge planning has been conducted with the multidisciplinary transplant care team including physicians, pharmacy, social work and transplant coordinator.    Follow up/Monitoring:  Once discharged, place outpatient nutrition consult via the transplant team if nutrition concerns arise.    Ana Fernandez, MS/RD/ELIZABETH/CNSC  7A RD Pager: [phone]

## 2020-03-06 NOTE — PLAN OF CARE
Discharge Planner OT   [facility] plan for discharge: [facility]   Current status: Pt requires SBA and vc's for transfer supine<->seated EOB and sit<->standing. Therapist educated pt on total body dressing within precautions. Pt able to don/doff clothing with SBA and vc's within precautions. Pt ambulated >700ft with SBA and vc's. Pt required one standing rest break. Pt screened for PT needs and demonstrated no instances of LOB with head turns or abrupt redirection. Pt tolerated therapy session well. VSS.   Barriers to return to prior living situation: Decreased strength/endurance, Fatigue, abdominal precautions.   Recommendations for discharge: Discharge to [facility] with OP PT to for continued strength/endurance.   Rationale for recommendations: Pt will benefit from continued therapy while IP to maximize functional independence, strength and endurance.        Entered by: Narinder Gutierrez 03/06/2020 5:04 PM

## 2020-03-06 NOTE — PROGRESS NOTES
[facility], [place]   Transplant Nephrology Progress Note  Date of Admission:  2/28/2020  Today's Date: 03/06/2020    Assessment & Plan   # DDKT: Continued slow trend down in creatinine, now at ~ 7.0. Good urine output.  He did have DGF (HD on POD#1 due to hyperkalemia). No acute indications for HD. Renogram revealed vasomotor nephropathy which was somewhat expected with his slowed graft function.               - Baseline Cr ~ TBD              - Proteinuria: Not checked post transplant              - Date DSA Last Checked: Feb/2020      Latest DSA: No DSA at time of transplant              - BK Viremia: Not checked post transplant              - Kidney Tx Biopsy: No    # Immunosuppression: Tacrolimus immediate release (goal 8-10) and Mycophenolate mofetil (goal 5-7)              - Changes: No     # Infection Prophylaxis:   - PJP: Sulfa/TMP (Bactrim)  - CMV: Valcyte; Patient is CMV IgG Ab discordant (D+/R-) and will continue on Valcyte x 6 months, then check CMV PCR monthly until 12 months post transplant.  - Thrush: None     # Hypertension: Controlled;             Goal BP: < 140/90              - Volume status: Euvolemic                EDW ~ 67.5 kg and today's weight is ~ 67.1 kg              - Changes: Yes - Would recommend decreasing furosemide to 40 mg daily.  Patient is below his dry weight.    # Elevated Blood Glucose: Glucose generally running ~ 100-140s              - Management as per primary team.     # Anemia of Chronic Kidney Disease: Hgb: Stable after requiring blood transfusion yesterday      DEB: No   - Iron panel: Replete               - Getting 1u pRBC today                                                                     # Mineral Bone Disorder:   - Secondary renal hyperparathyroidism; PTH level: 668        On treatment: None; Will start calcitriol 0.25 mcg daily  - Vitamin D; level: Low        On Supplement: Yes  - Calcium; level: Low Normal when corrected for low  albumin       On Supplement: No  - Phosphorus; level: Normal        On Supplement: No    # Electrolytes:   - Potassium; level: Normal       On Supplement: No  - Magnesium; level: Normal        On Supplement: No  - Bicarbonate; level: Normal       On Supplement: No     # CML: In remission. On dasatinib 40mg daily. There may be an interaction with tacrolimus that is causing worsening pancytopenia. Hematology will be consulted about possible alternative agents    # Nausea/Vomiting: Patient reports feeling bloated and says no bowel movement for a couple of days. Giving carafate for GERD-like symptoms but not helping well.   - Would recommend suppository.     # Transplant History:  Etiology of Kidney Failure:  Alport's syndrome  Tx: DDKT  Transplant: 2/28/2020 (Kidney), 12/7/1988 (Kidney), 3/2/2005 (Kidney)  Donor Type: Donation after Brain Death        Donor Class:   Crossmatch at time of Tx: negative  DSA at time of Tx: No  Significant changes in immunosuppression: None  Significant transplant-related complications: None    Recommendations were communicated to the primary team via this note.    Alvin Varner MD   Pager: [phone]    Interval History   Mr Cruz's kidney function improved with creatinine ~ 7.0.  Good urine output.  No new events overnight, but patient had an episode of nausea and vomiting this morning.  He reports getting full quickly and some abdominal bloating.  No nausea or vomiting at this time.  He reports passing gas, but no bowel movement in a couple of days.  No fever, sweats or chills.  No chest pain or shortness of breath.  No leg swelling, but some puffiness in his hands.    Review of Systems   4 point ROS was obtained and negative except as noted in the Interval History.    MEDICATIONS:    amLODIPine  10 mg Oral Daily     atorvastatin  10 mg Oral Daily     calcitRIOL  0.25 mcg Oral Daily     carvedilol  12.5 mg Oral BID w/meals     dasatinib  40 mg Oral QPM     famotidine  40 mg Oral  "Daily     [START ON 3/7/2020] furosemide  40 mg Intravenous Daily     [Held by provider] heparin ANTICOAGULANT  5,000 Units Subcutaneous Q12H     lidocaine  1 patch Transdermal Q24H     lidocaine   Transdermal Q8H     mycophenolate  750 mg Oral BID IS     predniSONE  5 mg Oral Daily     senna-docusate  1 tablet Oral BID    Or     senna-docusate  2 tablet Oral BID     sodium chloride (PF)  10 mL Intravenous Q7 Days     sodium chloride (PF)  10 mL Intracatheter Q8H     sucralfate  1 g Oral BID     sulfamethoxazole-trimethoprim  1 tablet Oral Q Mon Wed Fri AM     tacrolimus  2.5 mg Oral or Feeding Tube BID IS     valGANciclovir  450 mg Oral or Feeding Tube Once per day on Mon Thu       Physical Exam      BP (!) 142/83 (BP Location: Left arm)   Pulse 84   Temp 98.8  F (37.1  C) (Oral)   Resp 16   Ht 1.626 m (5' 4\")   Wt 67.1 kg (148 lb)   SpO2 100%   BMI 25.40 kg/m       GENERAL APPEARANCE: alert and no distress  HENT: mouth without ulcers or lesions.  RESP: lungs clear to auscultation - no rales, rhonchi or wheezes  CV: regular rhythm, normal rate, no rub, 2/6 systolic murmur  EDEMA: no LE edema bilaterally  ABDOMEN: soft, nondistended, nontender, bowel sounds normal  MS: extremities normal - no gross deformities noted, no evidence of inflammation in joints, no muscle tenderness  SKIN: no rash  TX KIDNEY: mild TTP  DIALYSIS ACCESS: RUE AV fistula with good thrill    Data   All labs reviewed by me.  CMP  Recent Labs   Lab 03/06/20  0708 03/05/20  0530 03/04/20  1018 03/03/20  0729    134 135 141   POTASSIUM 3.7 3.7 3.7 3.9   CHLORIDE 102 101 101 104   CO2 24 23 22 26   ANIONGAP 8 9 12 11   * 116* 132* 101*   * 107* 103* 100*   CR 6.98* 7.93* 8.49* 9.85*   GFRESTIMATED 9* 7* 7* 6*   GFRESTBLACK 10* 9* 8* 7*   ANTWAN 7.7* 7.5* 7.6* 7.8*   MAG 2.2 2.1 2.0 2.4*   PHOS 4.5 4.9* 5.2* 7.5*   BILITOTAL  --  0.6  --   --      CBC  Recent Labs   Lab 03/06/20  0708 03/05/20  1821 03/05/20  1327 " 03/05/20  0601 03/05/20  0530 03/04/20  1018   HGB 8.1* 7.9* 7.6* 6.6* 6.8* 8.1*   WBC 6.4  --  5.4  --  5.8 5.2   RBC 2.73*  --  2.55*  --  2.36* 2.77*   HCT 24.8*  --  23.3*  --  21.5* 25.7*   MCV 91  --  91  --  91 93   MCH 29.7  --  29.8  --  28.8 29.2   MCHC 32.7  --  32.6  --  31.6 31.5   RDW 14.0  --  14.1  --  14.4 14.6   *  --  83*  --  77* 82*     INR  Recent Labs   Lab 03/05/20  1327   INR 1.18*   PTT 30     ABG  Recent Labs   Lab 02/28/20  1417 02/28/20  1308   PH 7.36 7.38   PCO2 35 35   PO2 222* 202*   HCO3 19* 20*   O2PER 53.0 54.0      Urine Studies  No lab results found.  No lab results found.  PTH  Recent Labs   Lab Test 03/04/20  1018 03/03/20  0849 03/03/20  0729   PTHI 630* 668* Canceled, Test credited     Iron Studies  Recent Labs   Lab Test 03/03/20  0729   IRON 139   *   IRONSAT 98*   YOLIS 3,397*       IMAGING:  All imaging studies reviewed by me.

## 2020-03-07 LAB
ANION GAP SERPL CALCULATED.3IONS-SCNC: 8 MMOL/L (ref 3–14)
BUN SERPL-MCNC: 101 MG/DL (ref 7–30)
CALCIUM SERPL-MCNC: 7.9 MG/DL (ref 8.5–10.1)
CHLORIDE SERPL-SCNC: 102 MMOL/L (ref 94–109)
CO2 SERPL-SCNC: 25 MMOL/L (ref 20–32)
CREAT SERPL-MCNC: 5.97 MG/DL (ref 0.66–1.25)
ERYTHROCYTE [DISTWIDTH] IN BLOOD BY AUTOMATED COUNT: 13.6 % (ref 10–15)
GFR SERPL CREATININE-BSD FRML MDRD: 11 ML/MIN/{1.73_M2}
GLUCOSE SERPL-MCNC: 105 MG/DL (ref 70–99)
HCT VFR BLD AUTO: 22 % (ref 40–53)
HGB BLD-MCNC: 7.2 G/DL (ref 13.3–17.7)
MAGNESIUM SERPL-MCNC: 2.3 MG/DL (ref 1.6–2.3)
MCH RBC QN AUTO: 29.5 PG (ref 26.5–33)
MCHC RBC AUTO-ENTMCNC: 32.7 G/DL (ref 31.5–36.5)
MCV RBC AUTO: 90 FL (ref 78–100)
PHOSPHATE SERPL-MCNC: 4.5 MG/DL (ref 2.5–4.5)
PLATELET # BLD AUTO: 109 10E9/L (ref 150–450)
POTASSIUM SERPL-SCNC: 3.5 MMOL/L (ref 3.4–5.3)
RBC # BLD AUTO: 2.44 10E12/L (ref 4.4–5.9)
SODIUM SERPL-SCNC: 136 MMOL/L (ref 133–144)
TACROLIMUS BLD-MCNC: 4.2 UG/L (ref 5–15)
TME LAST DOSE: ABNORMAL H
WBC # BLD AUTO: 4.6 10E9/L (ref 4–11)

## 2020-03-07 PROCEDURE — 25000132 ZZH RX MED GY IP 250 OP 250 PS 637: Mod: GY | Performed by: STUDENT IN AN ORGANIZED HEALTH CARE EDUCATION/TRAINING PROGRAM

## 2020-03-07 PROCEDURE — 36592 COLLECT BLOOD FROM PICC: CPT | Performed by: SURGERY

## 2020-03-07 PROCEDURE — 83735 ASSAY OF MAGNESIUM: CPT | Performed by: SURGERY

## 2020-03-07 PROCEDURE — 80048 BASIC METABOLIC PNL TOTAL CA: CPT | Performed by: SURGERY

## 2020-03-07 PROCEDURE — 25000132 ZZH RX MED GY IP 250 OP 250 PS 637: Mod: GY | Performed by: SURGERY

## 2020-03-07 PROCEDURE — 25000125 ZZHC RX 250: Performed by: PHYSICIAN ASSISTANT

## 2020-03-07 PROCEDURE — 85027 COMPLETE CBC AUTOMATED: CPT | Performed by: SURGERY

## 2020-03-07 PROCEDURE — 80197 ASSAY OF TACROLIMUS: CPT | Performed by: SURGERY

## 2020-03-07 PROCEDURE — 84100 ASSAY OF PHOSPHORUS: CPT | Performed by: SURGERY

## 2020-03-07 PROCEDURE — 25000131 ZZH RX MED GY IP 250 OP 636 PS 637: Mod: GY | Performed by: NURSE PRACTITIONER

## 2020-03-07 PROCEDURE — 12000026 ZZH R&B TRANSPLANT

## 2020-03-07 PROCEDURE — 25000132 ZZH RX MED GY IP 250 OP 250 PS 637: Mod: GY | Performed by: PHYSICIAN ASSISTANT

## 2020-03-07 PROCEDURE — 25000132 ZZH RX MED GY IP 250 OP 250 PS 637: Mod: GY | Performed by: NURSE PRACTITIONER

## 2020-03-07 PROCEDURE — 25000131 ZZH RX MED GY IP 250 OP 636 PS 637: Mod: GY | Performed by: PHYSICIAN ASSISTANT

## 2020-03-07 RX ORDER — BISACODYL 10 MG
10 SUPPOSITORY, RECTAL RECTAL ONCE
Status: COMPLETED | OUTPATIENT
Start: 2020-03-07 | End: 2020-03-07

## 2020-03-07 RX ORDER — TACROLIMUS 1 MG/1
4 CAPSULE ORAL
Status: DISCONTINUED | OUTPATIENT
Start: 2020-03-07 | End: 2020-03-10 | Stop reason: HOSPADM

## 2020-03-07 RX ADMIN — SUCRALFATE 1 G: 1 SUSPENSION ORAL at 15:53

## 2020-03-07 RX ADMIN — CARVEDILOL 12.5 MG: 12.5 TABLET, FILM COATED ORAL at 09:51

## 2020-03-07 RX ADMIN — PREDNISONE 5 MG: 5 TABLET ORAL at 09:50

## 2020-03-07 RX ADMIN — BISACODYL 10 MG: 10 SUPPOSITORY RECTAL at 14:52

## 2020-03-07 RX ADMIN — SUCRALFATE 1 G: 1 SUSPENSION ORAL at 10:02

## 2020-03-07 RX ADMIN — MYCOPHENOLIC ACID 540 MG: 360 TABLET, DELAYED RELEASE ORAL at 19:43

## 2020-03-07 RX ADMIN — CARVEDILOL 12.5 MG: 12.5 TABLET, FILM COATED ORAL at 17:53

## 2020-03-07 RX ADMIN — ATORVASTATIN CALCIUM 10 MG: 10 TABLET, FILM COATED ORAL at 09:50

## 2020-03-07 RX ADMIN — FAMOTIDINE 40 MG: 20 TABLET ORAL at 09:50

## 2020-03-07 RX ADMIN — LIDOCAINE HYDROCHLORIDE 30 ML: 20 SOLUTION ORAL; TOPICAL at 14:51

## 2020-03-07 RX ADMIN — SENNOSIDES AND DOCUSATE SODIUM 2 TABLET: 8.6; 5 TABLET ORAL at 19:43

## 2020-03-07 RX ADMIN — TACROLIMUS 2.5 MG: 1 CAPSULE ORAL at 10:02

## 2020-03-07 RX ADMIN — TACROLIMUS 4 MG: 1 CAPSULE ORAL at 17:53

## 2020-03-07 RX ADMIN — MYCOPHENOLIC ACID 540 MG: 360 TABLET, DELAYED RELEASE ORAL at 10:02

## 2020-03-07 RX ADMIN — CALCIUM CARBONATE (ANTACID) CHEW TAB 500 MG 500 MG: 500 CHEW TAB at 19:51

## 2020-03-07 RX ADMIN — AMLODIPINE BESYLATE 10 MG: 10 TABLET ORAL at 09:51

## 2020-03-07 RX ADMIN — DASATINIB 40 MG: 20 TABLET ORAL at 19:44

## 2020-03-07 RX ADMIN — CALCITRIOL CAPSULES 0.25 MCG 0.25 MCG: 0.25 CAPSULE ORAL at 09:51

## 2020-03-07 RX ADMIN — SENNOSIDES AND DOCUSATE SODIUM 2 TABLET: 8.6; 5 TABLET ORAL at 09:52

## 2020-03-07 ASSESSMENT — ACTIVITIES OF DAILY LIVING (ADL)
ADLS_ACUITY_SCORE: 12

## 2020-03-07 NOTE — PROGRESS NOTES
HEMATOLOGY PROGRESS NOTE    Platelets are improving and Hgb stable at 8.1 following 1U pRBC transfusion yesterday. Work-up for nutritional deficiencies or hemolysis was negative.  Note positive fecal occult blood. Of note, epo level returned markedly low at 2, consistent with his very low absolute retic count of 7.5. This likely explains why he is not able to compensate for any blood loss experienced during surgery (or occult GI bleeding).     Recommendations:  1. Consider epo supplementation if it appears that his new kidney is not going to be producing epo anytime soon, but will defer to Transplant Nephrology.  Otherwise transfuse as needed.  2. Continue dasatinib 40 mg daily. No need to hold unless ANC <500 or platelets <50.     Hematology will sign off at this time, but please feel free to contact us if additional questions arise.    Mai Aguilera MD  Hematology-Oncology Fellow, PGY4

## 2020-03-07 NOTE — PLAN OF CARE
"  /89 (BP Location: Left arm)   Pulse 81   Temp 98.5  F (36.9  C) (Oral)   Resp 16   Ht 1.626 m (5' 4\")   Wt 67.1 kg (148 lb)   SpO2 97%   BMI 25.40 kg/m      [id number]  VSS on RA, no s/s of distress. A/Ox4, pleasant and cooperative with cares. Endorsed RLQ discomfort from old EMMA drain but declining pain interventions. Pt had one 500ml tan/brown emesis this shift--MD paged and aware; pt declining antiemetics or antacids but requesting restart of his home medication ranitidine (currently on pepcid); oncall MD aware and to pass on to day team; sticky note also left for team to address. Up ad dick in room; showered this shift and tan patent with good UOP this shift and tan cares done. No BMs overnight--pt still c/o feeling bladder spasms, even when trying to have BM (declining B&O suppositories at this time). Incision stapled, CDI. LPICC SLx1; LPIV SL. R AV fistula, +bruit/+thrill. Pt slept for part of shift; resting quietly now; call light and belongings within reach. Possible discharge today. Will continue to monitor and continue POC/notify team as needed.   "

## 2020-03-07 NOTE — PLAN OF CARE
"BP (!) 142/88   Pulse 84   Temp 97.7  F (36.5  C) (Oral)   Resp 20   Ht 1.626 m (5' 4\")   Wt 67.1 kg (148 lb)   SpO2 100%   BMI 25.40 kg/m      Patient slightly hypertensive, AOVSS on RA, afebrile. C/o abdominal pain, patient declining intervention, patient resting today in between activities. Tolerating regular diet with fair appetite, nausea this AM with 400 ml brown emesis, IV zofran given x1 with improvement of nausea, heartburn throughout day, patient requesting zantac, on call notified, awaiting page back. L PICC-SL with good blood return, L PIV-SL, old inactive left AV fistula, active right AV fistula. No BM today, patient requesting suppository, bladder spasms intermittently. 1200 ml yellow UOP via tan. Incision stapled, old EMMA site with primapore intact, dried drainage. Up ad dick, ambulating in [place] many times today. Educated for transplant meds, patient updated labs. Plan to shower and potential discharge tomorrow. Continue to monitor heartburn and bladder spasms overnight.   Will continue with POC and notify MD with changes or concerns.    "

## 2020-03-07 NOTE — PROGRESS NOTES
[facility], [place]   Transplant Nephrology Progress Note  Date of Admission:  2/28/2020  Today's Date: 03/07/2020    Assessment & Plan   # DDKT: Continued slow trend down in creatinine, now at ~ 6.0. Good urine output.  He did have DGF (HD on POD#1 due to hyperkalemia). No acute indications for HD. Renogram revealed vasomotor nephropathy which was somewhat expected with his slowed graft function.               - Baseline Cr ~ TBD              - Proteinuria: Not checked post transplant              - Date DSA Last Checked: Feb/2020      Latest DSA: No DSA at time of transplant              - BK Viremia: Not checked post transplant              - Kidney Tx Biopsy: No    # Immunosuppression: Tacrolimus immediate release (goal 8-10) and Mycophenolate mofetil (goal 5-7)              - Changes: No     # Infection Prophylaxis:   - PJP: Sulfa/TMP (Bactrim)  - CMV: Valcyte; Patient is CMV IgG Ab discordant (D+/R-) and will continue on Valcyte x 6 months, then check CMV PCR monthly until 12 months post transplant.  - Thrush: None     # Hypertension: Controlled;             Goal BP: < 140/90              - Volume status: Euvolemic                EDW ~ 67.5 kg and yesterday's weight was ~ 67.1 kg              - Changes: Yes - Would recommend decreasing furosemide to 40 mg daily.  Patient is below his dry weight.    # Elevated Blood Glucose: Glucose generally running ~ 100-140s              - Management as per primary team.     # Anemia of Chronic Kidney Disease: Hgb: Stable to slight decrease after blood transfusion 3/3      DEB: No   - Iron panel: Replete                                                                     # Mineral Bone Disorder:   - Secondary renal hyperparathyroidism; PTH level: 668        On treatment: Calcitriol  - Vitamin D; level: Low        On Supplement: Yes  - Calcium; level: Low Normal when corrected for low albumin       On Supplement: No  - Phosphorus; level: Normal         On Supplement: No    # Electrolytes:   - Potassium; level: Normal       On Supplement: No  - Magnesium; level: Normal        On Supplement: No  - Bicarbonate; level: Normal       On Supplement: No     # CML: In remission. On dasatinib 40mg daily. Hematology recommended no changes.    # Heartburn Symptoms with Nausea/Vomiting: Ongoing, intermittent symptoms despite being on H2 blocker and sucralfate.   - Would recommend changing mycophenolate mofetil to mycophenolic acid.    # Bladder Spasms: Intermittent, mostly when patient is having a bowel movement.  Offered B&O suppositories, but patient has deferred for now.        # Transplant History:  Etiology of Kidney Failure:  Alport's syndrome  Tx: DDKT  Transplant: 2/28/2020 (Kidney), 12/7/1988 (Kidney), 3/2/2005 (Kidney)  Donor Type: Donation after Brain Death        Donor Class:   Crossmatch at time of Tx: negative  DSA at time of Tx: No  Significant changes in immunosuppression: None  Significant transplant-related complications: None    Recommendations were communicated to the primary team via this note.    Alvin Varner MD   Pager: [phone]    Interval History   Mr Cruz's kidney function improved with creatinine ~ 6.0.  Good urine output.  Patient continues to report significant heartburn symptoms, which lead to nausea and vomiting about an hour after eating.  He had two episodes yesterday of emesis.  No nausea at this time.  No diarrhea and hasn't had a bowel movement in a couple of days.  He is passing gas.  Some bladder spasms when he tries to have a bowel movement.  No fever, sweats or chills.  No chest pain or shortness of breath.  No leg swelling.    Review of Systems   4 point ROS was obtained and negative except as noted in the Interval History.    MEDICATIONS:    amLODIPine  10 mg Oral Daily     atorvastatin  10 mg Oral Daily     bisacodyl  10 mg Rectal Once     calcitRIOL  0.25 mcg Oral Daily     carvedilol  12.5 mg Oral BID w/meals      "dasatinib  40 mg Oral QPM     famotidine  40 mg Oral Daily     [Held by provider] heparin ANTICOAGULANT  5,000 Units Subcutaneous Q12H     lidocaine  1 patch Transdermal Q24H     lidocaine   Transdermal Q8H     mycophenolate  750 mg Oral BID IS     opium-belladonna  30 mg Rectal Once     predniSONE  5 mg Oral Daily     senna-docusate  1 tablet Oral BID    Or     senna-docusate  2 tablet Oral BID     sodium chloride (PF)  10 mL Intravenous Q7 Days     sodium chloride (PF)  10 mL Intracatheter Q8H     sucralfate  1 g Oral BID     sulfamethoxazole-trimethoprim  1 tablet Oral Q Mon Wed Fri AM     tacrolimus  2.5 mg Oral or Feeding Tube BID IS     valGANciclovir  450 mg Oral or Feeding Tube Once per day on Mon Thu       Physical Exam      /89 (BP Location: Left arm)   Pulse 81   Temp 98.5  F (36.9  C) (Oral)   Resp 16   Ht 1.626 m (5' 4\")   Wt 67.1 kg (148 lb)   SpO2 97%   BMI 25.40 kg/m       GENERAL APPEARANCE: alert and no distress  HENT: mouth without ulcers or lesions.  RESP: lungs clear to auscultation - no rales, rhonchi or wheezes  CV: regular rhythm, normal rate, no rub, 2/6 systolic murmur  EDEMA: no LE edema bilaterally  ABDOMEN: soft, nondistended, nontender, bowel sounds normal  MS: extremities normal - no gross deformities noted, no evidence of inflammation in joints, no muscle tenderness  SKIN: no rash  TX KIDNEY: mild TTP  DIALYSIS ACCESS: RUE AV fistula with good thrill    Data   All labs reviewed by me.  CMP  Recent Labs   Lab 03/07/20  0624 03/06/20  0708 03/05/20  0530 03/04/20  1018    134 134 135   POTASSIUM 3.5 3.7 3.7 3.7   CHLORIDE 102 102 101 101   CO2 25 24 23 22   ANIONGAP 8 8 9 12   * 117* 116* 132*   * 106* 107* 103*   CR 5.97* 6.98* 7.93* 8.49*   GFRESTIMATED 11* 9* 7* 7*   GFRESTBLACK 12* 10* 9* 8*   ANTWAN 7.9* 7.7* 7.5* 7.6*   MAG 2.3 2.2 2.1 2.0   PHOS 4.5 4.5 4.9* 5.2*   BILITOTAL  --   --  0.6  --      CBC  Recent Labs   Lab 03/07/20  0624 03/06/20  0708 " 03/05/20  1821 03/05/20  1327  03/05/20  0530   HGB 7.2* 8.1* 7.9* 7.6*   < > 6.8*   WBC 4.6 6.4  --  5.4  --  5.8   RBC 2.44* 2.73*  --  2.55*  --  2.36*   HCT 22.0* 24.8*  --  23.3*  --  21.5*   MCV 90 91  --  91  --  91   MCH 29.5 29.7  --  29.8  --  28.8   MCHC 32.7 32.7  --  32.6  --  31.6   RDW 13.6 14.0  --  14.1  --  14.4   * 105*  --  83*  --  77*    < > = values in this interval not displayed.     INR  Recent Labs   Lab 03/05/20  1327   INR 1.18*   PTT 30     ABG  No lab results found in last 7 days.   Urine Studies  No lab results found.  No lab results found.  PTH  Recent Labs   Lab Test 03/04/20  1018 03/03/20  0849 03/03/20  0729   PTHI 630* 668* Canceled, Test credited     Iron Studies  Recent Labs   Lab Test 03/03/20  0729   IRON 139   *   IRONSAT 98*   YOLIS 3,397*       IMAGING:  All imaging studies reviewed by me.

## 2020-03-07 NOTE — PLAN OF CARE
"/82 (BP Location: Left arm)   Pulse 90   Temp 98.3  F (36.8  C) (Oral)   Resp 16   Ht 1.626 m (5' 4\")   Wt 67.1 kg (148 lb)   SpO2 100%   BMI 25.40 kg/m   VS stable on room air. Patient denies pain. Patient denies nausea. Urine Output - tan catheter patent, occasional small blood clot. Bowel Function - passing gas. Nutrition - tolerating regular diet. Drains - none. Activity - up ad dick in room.     "

## 2020-03-07 NOTE — PLAN OF CARE
"/79 (BP Location: Left arm)   Pulse 90   Temp 97.7  F (36.5  C) (Oral)   Resp 16   Ht 1.626 m (5' 4\")   Wt 67.1 kg (148 lb)   SpO2 100%   BMI 25.40 kg/m       1084-2613: AVSS on RA. Patient denies pain and nausea. States heartburn is resolving, using GI cocktail. Stein patent with yellow urine output. BM x1. Regular diet with fair appetite. L PICC and PIV SL. Incision stapled and NIKITA. UAL. Plan to discharge tomorrow. Will continue with POC, notify team with changes/concerns.      "

## 2020-03-07 NOTE — PROGRESS NOTES
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 He is s/p kidney transplant x2 (, ). 2nd graft lost to primary nonfunction with explant a few weeks later. He has been on dialysis since . He is now s/p intra-abdominal  donor kidney transplant w/ ureteral stent on 20.    Graft function: POD #8 with delayed graft function. Underwent HD POD#1 due to hyperkalemia. UOP good on lasix. Cr 8.5->7.4->7->6. .  Immunosuppression management:   Thymo: 425mg, 6.2mg/kg completed.  Methylpred: Taper completed.  Tacro: Goal level 8-10. Dose 2.5 mg BID, level pending  MMF: 750mg BID. Will switch today to myfortic for heartburn issues  Prednisone: 5mg daily, 3rd transplant protocol.  Complexity of management: Medium. Contributing factors: anemia and delayed graft function  Hematology:   Anemia of chronic disease: Transfused 1u RBC on 3/5, hgb tamar appropriately and has been stable this AM. Possibly r/t dasatinib/tac interaction as plt are also down.  Thrombocytopenia: Plt 83. Did receive thymo recently. May also be r/t dasatinib/tac interaction.  Hx CML: On home dose dasatinib. Interacts with tacrolimus, monitor for dasatinib toxicity (rash, edema, pancytopenia, bone pain, prolonged QT). Consulted Hematology- are not concerned about interactions with dasatinib. Check EKG prior to discharge.   Neurology:   Acute postoperative pain: PRN APAP. Continue lidoderm.  Cardiorespiratory:   HTN: Now controlled. On Coreg 12.5mg and amlodipine 10 mg daily.  GI/Nutrition:   Diet: Regular diet  Post-op ileus: NG placed post-op for distention and nausea. Removed POD #4. Tolerating diet.  Heartburn/odynophagia: Likely reflux r/t steroids. On famotidine. PPI contraindicated with dasatinib. Added carafate spaced away from other medications on 3/4. Minimal improvement. Switching from MMF to myfortic  Bowel regimen:  Senna/colace.   Endocrine:  Steroid induced hyperglycemia: Admit HgbA1c 5.2%. , monitor.  Fluid/Electrolytes:  Hyperkalemia: K 6.6 on POD #1, managed with dialysis. Now resolved.  Hyperphosphatemia: Phos 5.2->4.9.   : Stein x14 days d/t small bladder.  Bladder spasms: PRN B&O suppository.  Infectious disease: Afebrile  Access: PICC  Prophylaxis: DVT (heparin, hold today for PICC/anemia), fall, GI, fungal, PT/OT  Disposition: 7A, planning for discharge to hotel today vs tomorrow.    Medical Decision Making: Medium  Subsequent visit 61297 (moderate level decision making)    DARRON/Fellow/Resident Provider: Odette Guerrero MD Fellow [id number]    Faculty: Ron Benítez MD   _________________________________________________________________  Transplant History:   2/28/2020 (Kidney), 12/7/1988 (Kidney), 3/2/2005 (Kidney), Postoperative day: 8     Interval History: History is obtained from the patient  Overnight events: Pain controlled, did have one episode of emesis yesterday from heartburn.    No nausea  Having bowel function    ROS:   A 10-point review of systems was negative except as noted above.    Meds:    amLODIPine  10 mg Oral Daily     atorvastatin  10 mg Oral Daily     bisacodyl  10 mg Rectal Once     calcitRIOL  0.25 mcg Oral Daily     carvedilol  12.5 mg Oral BID w/meals     dasatinib  40 mg Oral QPM     famotidine  40 mg Oral Daily     [Held by provider] heparin ANTICOAGULANT  5,000 Units Subcutaneous Q12H     lidocaine  1 patch Transdermal Q24H     lidocaine   Transdermal Q8H     mycophenolic acid  540 mg Oral BID     predniSONE  5 mg Oral Daily     senna-docusate  1 tablet Oral BID    Or     senna-docusate  2 tablet Oral BID     sodium chloride (PF)  10 mL Intravenous Q7 Days     sodium chloride (PF)  10 mL Intracatheter Q8H     sucralfate  1 g Oral BID     sulfamethoxazole-trimethoprim  1 tablet Oral Q Mon Wed Fri AM     tacrolimus  2.5 mg Oral or Feeding Tube BID IS     valGANciclovir  450 mg Oral or  "Feeding Tube Once per day on Mon Thu       Physical Exam:     Admit Weight: 68.1 kg (150 lb 2.1 oz)    Current vitals:   /82 (BP Location: Left arm)   Pulse 90   Temp 98.3  F (36.8  C) (Oral)   Resp 16   Ht 1.626 m (5' 4\")   Wt 67.1 kg (148 lb)   SpO2 100%   BMI 25.40 kg/m      Vital sign ranges:    Temp:  [97.7  F (36.5  C)-98.5  F (36.9  C)] 98.3  F (36.8  C)  Pulse:  [79-90] 90  Heart Rate:  [76-85] 76  Resp:  [16-20] 16  BP: (117-142)/(82-98) 135/82  SpO2:  [97 %-100 %] 100 %  Patient Vitals for the past 24 hrs:   BP Temp Temp src Pulse Heart Rate Resp SpO2   03/07/20 0901 135/82 98.3  F (36.8  C) Oral 90 -- 16 100 %   03/07/20 0424 134/89 98.5  F (36.9  C) Oral 81 -- 16 97 %   03/07/20 0026 137/86 98.4  F (36.9  C) Axillary 79 -- 16 100 %   03/06/20 2026 (!) 117/98 98.5  F (36.9  C) Oral 83 -- 18 100 %   03/06/20 1625 (!) 142/88 97.7  F (36.5  C) Oral -- 76 20 100 %   03/06/20 1147 139/85 98.4  F (36.9  C) Oral -- 85 18 100 %     General Appearance: in no apparent distress.   Skin: normal, warm, dry  Heart: Perfused  Lungs: Breathing comfortably on room air  Abdomen: Abdomen flat. Incision well-healing without javier-incisional erythema or drainage.   : tan is present.  Clear, yellow urine  Extremities: edema: none  Neurologic: awake, alert and oriented x4. Tremor absent.     Data:   CMP  Recent Labs   Lab 03/07/20  0624 03/06/20  0708 03/05/20  0530    134 134   POTASSIUM 3.5 3.7 3.7   CHLORIDE 102 102 101   CO2 25 24 23   * 117* 116*   * 106* 107*   CR 5.97* 6.98* 7.93*   GFRESTIMATED 11* 9* 7*   GFRESTBLACK 12* 10* 9*   ANTWAN 7.9* 7.7* 7.5*   MAG 2.3 2.2 2.1   PHOS 4.5 4.5 4.9*   BILITOTAL  --   --  0.6     CBC  Recent Labs   Lab 03/07/20  0624 03/06/20  0708   HGB 7.2* 8.1*   WBC 4.6 6.4   * 105*     "

## 2020-03-08 ENCOUNTER — APPOINTMENT (OUTPATIENT)
Dept: GENERAL RADIOLOGY | Facility: CLINIC | Age: 45
DRG: 652 | End: 2020-03-08
Attending: PHYSICIAN ASSISTANT
Payer: MEDICARE

## 2020-03-08 LAB
ABO + RH BLD: ABNORMAL
ABO + RH BLD: ABNORMAL
ANION GAP SERPL CALCULATED.3IONS-SCNC: 7 MMOL/L (ref 3–14)
BLD GP AB SCN SERPL QL: ABNORMAL
BLOOD BANK CMNT PATIENT-IMP: ABNORMAL
BLOOD BANK CMNT PATIENT-IMP: ABNORMAL
BUN SERPL-MCNC: 82 MG/DL (ref 7–30)
CALCIUM SERPL-MCNC: 8.2 MG/DL (ref 8.5–10.1)
CHLORIDE SERPL-SCNC: 105 MMOL/L (ref 94–109)
CO2 SERPL-SCNC: 23 MMOL/L (ref 20–32)
CREAT SERPL-MCNC: 4.91 MG/DL (ref 0.66–1.25)
ERYTHROCYTE [DISTWIDTH] IN BLOOD BY AUTOMATED COUNT: 13.8 % (ref 10–15)
GFR SERPL CREATININE-BSD FRML MDRD: 13 ML/MIN/{1.73_M2}
GLUCOSE SERPL-MCNC: 109 MG/DL (ref 70–99)
HCT VFR BLD AUTO: 22.1 % (ref 40–53)
HGB BLD-MCNC: 7.3 G/DL (ref 13.3–17.7)
MAGNESIUM SERPL-MCNC: 2.4 MG/DL (ref 1.6–2.3)
MCH RBC QN AUTO: 30.2 PG (ref 26.5–33)
MCHC RBC AUTO-ENTMCNC: 33 G/DL (ref 31.5–36.5)
MCV RBC AUTO: 91 FL (ref 78–100)
PHOSPHATE SERPL-MCNC: 3.7 MG/DL (ref 2.5–4.5)
PLATELET # BLD AUTO: 147 10E9/L (ref 150–450)
POTASSIUM SERPL-SCNC: 3.8 MMOL/L (ref 3.4–5.3)
RBC # BLD AUTO: 2.42 10E12/L (ref 4.4–5.9)
SODIUM SERPL-SCNC: 136 MMOL/L (ref 133–144)
SPECIMEN EXP DATE BLD: ABNORMAL
WBC # BLD AUTO: 5.3 10E9/L (ref 4–11)

## 2020-03-08 PROCEDURE — 25000131 ZZH RX MED GY IP 250 OP 636 PS 637: Mod: GY | Performed by: PHYSICIAN ASSISTANT

## 2020-03-08 PROCEDURE — 86850 RBC ANTIBODY SCREEN: CPT | Performed by: SURGERY

## 2020-03-08 PROCEDURE — 40000802 ZZH SITE CHECK

## 2020-03-08 PROCEDURE — 74019 RADEX ABDOMEN 2 VIEWS: CPT

## 2020-03-08 PROCEDURE — 25000131 ZZH RX MED GY IP 250 OP 636 PS 637: Mod: GY | Performed by: NURSE PRACTITIONER

## 2020-03-08 PROCEDURE — 25000132 ZZH RX MED GY IP 250 OP 250 PS 637: Mod: GY | Performed by: NURSE PRACTITIONER

## 2020-03-08 PROCEDURE — 25000132 ZZH RX MED GY IP 250 OP 250 PS 637: Mod: GY | Performed by: SURGERY

## 2020-03-08 PROCEDURE — 25000132 ZZH RX MED GY IP 250 OP 250 PS 637: Mod: GY | Performed by: PHYSICIAN ASSISTANT

## 2020-03-08 PROCEDURE — 86901 BLOOD TYPING SEROLOGIC RH(D): CPT | Performed by: SURGERY

## 2020-03-08 PROCEDURE — 25000128 H RX IP 250 OP 636: Performed by: SURGERY

## 2020-03-08 PROCEDURE — 36415 COLL VENOUS BLD VENIPUNCTURE: CPT | Performed by: SURGERY

## 2020-03-08 PROCEDURE — 85027 COMPLETE CBC AUTOMATED: CPT | Performed by: SURGERY

## 2020-03-08 PROCEDURE — 83735 ASSAY OF MAGNESIUM: CPT | Performed by: SURGERY

## 2020-03-08 PROCEDURE — 12000026 ZZH R&B TRANSPLANT

## 2020-03-08 PROCEDURE — 84100 ASSAY OF PHOSPHORUS: CPT | Performed by: SURGERY

## 2020-03-08 PROCEDURE — 80048 BASIC METABOLIC PNL TOTAL CA: CPT | Performed by: SURGERY

## 2020-03-08 PROCEDURE — 86900 BLOOD TYPING SEROLOGIC ABO: CPT | Performed by: SURGERY

## 2020-03-08 PROCEDURE — 25000132 ZZH RX MED GY IP 250 OP 250 PS 637: Mod: GY | Performed by: STUDENT IN AN ORGANIZED HEALTH CARE EDUCATION/TRAINING PROGRAM

## 2020-03-08 RX ORDER — POLYETHYLENE GLYCOL 3350 17 G/17G
17 POWDER, FOR SOLUTION ORAL 2 TIMES DAILY
Status: DISCONTINUED | OUTPATIENT
Start: 2020-03-08 | End: 2020-03-10 | Stop reason: HOSPADM

## 2020-03-08 RX ORDER — SIMETHICONE 80 MG
80 TABLET,CHEWABLE ORAL EVERY 6 HOURS PRN
Status: DISCONTINUED | OUTPATIENT
Start: 2020-03-08 | End: 2020-03-10 | Stop reason: HOSPADM

## 2020-03-08 RX ADMIN — ONDANSETRON 4 MG: 2 INJECTION INTRAMUSCULAR; INTRAVENOUS at 00:23

## 2020-03-08 RX ADMIN — MYCOPHENOLIC ACID 540 MG: 360 TABLET, DELAYED RELEASE ORAL at 20:23

## 2020-03-08 RX ADMIN — ONDANSETRON 4 MG: 2 INJECTION INTRAMUSCULAR; INTRAVENOUS at 12:42

## 2020-03-08 RX ADMIN — MYCOPHENOLIC ACID 540 MG: 360 TABLET, DELAYED RELEASE ORAL at 08:20

## 2020-03-08 RX ADMIN — SENNOSIDES AND DOCUSATE SODIUM 2 TABLET: 8.6; 5 TABLET ORAL at 20:23

## 2020-03-08 RX ADMIN — SUCRALFATE 1 G: 1 SUSPENSION ORAL at 10:07

## 2020-03-08 RX ADMIN — AMLODIPINE BESYLATE 10 MG: 10 TABLET ORAL at 08:20

## 2020-03-08 RX ADMIN — TACROLIMUS 4 MG: 1 CAPSULE ORAL at 18:02

## 2020-03-08 RX ADMIN — CARVEDILOL 12.5 MG: 12.5 TABLET, FILM COATED ORAL at 08:20

## 2020-03-08 RX ADMIN — DASATINIB 40 MG: 20 TABLET ORAL at 20:25

## 2020-03-08 RX ADMIN — CARVEDILOL 12.5 MG: 12.5 TABLET, FILM COATED ORAL at 18:02

## 2020-03-08 RX ADMIN — MAGNESIUM HYDROXIDE 30 ML: 400 SUSPENSION ORAL at 18:03

## 2020-03-08 RX ADMIN — SENNOSIDES AND DOCUSATE SODIUM 2 TABLET: 8.6; 5 TABLET ORAL at 08:20

## 2020-03-08 RX ADMIN — ONDANSETRON 4 MG: 2 INJECTION INTRAMUSCULAR; INTRAVENOUS at 20:22

## 2020-03-08 RX ADMIN — SUCRALFATE 1 G: 1 SUSPENSION ORAL at 16:23

## 2020-03-08 RX ADMIN — FAMOTIDINE 40 MG: 20 TABLET ORAL at 10:07

## 2020-03-08 RX ADMIN — TACROLIMUS 4 MG: 1 CAPSULE ORAL at 08:20

## 2020-03-08 RX ADMIN — CALCITRIOL CAPSULES 0.25 MCG 0.25 MCG: 0.25 CAPSULE ORAL at 08:20

## 2020-03-08 RX ADMIN — ATORVASTATIN CALCIUM 10 MG: 10 TABLET, FILM COATED ORAL at 08:20

## 2020-03-08 RX ADMIN — SIMETHICONE CHEW TAB 80 MG 80 MG: 80 TABLET ORAL at 18:02

## 2020-03-08 RX ADMIN — PREDNISONE 5 MG: 5 TABLET ORAL at 08:20

## 2020-03-08 RX ADMIN — SIMETHICONE CHEW TAB 80 MG 80 MG: 80 TABLET ORAL at 10:07

## 2020-03-08 RX ADMIN — POLYETHYLENE GLYCOL 3350 17 G: 17 POWDER, FOR SOLUTION ORAL at 20:22

## 2020-03-08 ASSESSMENT — ACTIVITIES OF DAILY LIVING (ADL)
ADLS_ACUITY_SCORE: 12

## 2020-03-08 ASSESSMENT — MIFFLIN-ST. JEOR: SCORE: 1461

## 2020-03-08 NOTE — PLAN OF CARE
"/82 (BP Location: Left arm)   Pulse 90   Temp 98  F (36.7  C) (Oral)   Resp 16   Ht 1.626 m (5' 4\")   Wt 65.5 kg (144 lb 6.4 oz)   SpO2 100%   BMI 24.79 kg/m      Shift: 0700 - 1530  VS: VSS on RA    Neuro: A&O  Mobility: independent, ambulating halls. Reports weakness d/t emesis overnight   Pain/Nausea/Vomiting: Denies pain. Simethicone ordered and given with meal to prevent gas pain. C/o nausea, 4mg IV zofran given x1 with relief.   Endocrine: N/A  Diet: Regular diet, fair tolerance, intermittent nausea.  LDAs: PICC & PIV saline locked  Skin: Incision stapled and NIKITA  GI/: Tan with adequate amounts of urine OP. No BM this shift  Tests/Procedures: Abdominal xray ordered to assess for stool burden  Labs: Cr 4.91, trending down  Education: Transplant medications reviewed with patient - needs reinforcement. Patient signed up for M[employer] account but has yet to watch any videos. Pt instructed to watch med and tan videos, stating he wants to wait until he feels more up to it  Plan: Plan for abdominal xray, encourage patient to watch MTP videos. Will continue to monitor and notify of any changes.     "

## 2020-03-08 NOTE — PROGRESS NOTES
[facility], [place]   Transplant Nephrology Progress Note  Date of Admission:  2/28/2020  Today's Date: 03/08/2020    Assessment & Plan   # DDKT: Continued slow trend down in creatinine, now at ~ 4.9. Good urine output.  He did have DGF (HD on POD#1 due to hyperkalemia). No acute indications for HD. Renogram revealed vasomotor nephropathy which was somewhat expected with his slowed graft function.               - Baseline Cr ~ TBD              - Proteinuria: Not checked post transplant              - Date DSA Last Checked: Feb/2020      Latest DSA: No DSA at time of transplant              - BK Viremia: Not checked post transplant              - Kidney Tx Biopsy: No    # Immunosuppression: Tacrolimus immediate release (goal 8-10) and Mycophenolic acid (goal 1.0-3.5)              - Changes: No     # Infection Prophylaxis:   - PJP: Sulfa/TMP (Bactrim)  - CMV: Valcyte; Patient is CMV IgG Ab discordant (D+/R-) and will continue on Valcyte x 6 months, then check CMV PCR monthly until 12 months post transplant.  - Thrush: None     # Hypertension: Controlled;             Goal BP: < 140/90              - Volume status: Euvolemic                EDW ~ 67.5 kg and yesterday's weight was ~ 65.5 kg              - Changes: Yes - Would recommend stopping furosemide.  Patient is below his dry weight.    # Elevated Blood Glucose: Glucose generally running ~ 100-140s              - Management as per primary team.     # Anemia of Chronic Kidney Disease: Hgb: Stable, low after blood transfusion 3/3      DEB: No   - Iron panel: Replete                                                                     # Mineral Bone Disorder:   - Secondary renal hyperparathyroidism; PTH level: 668        On treatment: Calcitriol  - Vitamin D; level: Low        On Supplement: Yes  - Calcium; level: Low Normal when corrected for low albumin       On Supplement: No  - Phosphorus; level: Normal        On Supplement: No    #  "Electrolytes:   - Potassium; level: Normal       On Supplement: No  - Magnesium; level: High       On Supplement: No  - Bicarbonate; level: Normal       On Supplement: No     # CML: In remission. On dasatinib 40mg daily. Hematology recommended no changes.    # Heartburn Symptoms with Nausea/Vomiting: Ongoing, intermittent symptoms despite being on H2 blocker and sucralfate.  Now patient reports more gas pain and was started on simethicone.   - If symptoms continue, may consider EGD to rule out other issues.    # Bladder Spasms: Intermittent, mostly when patient is having a bowel movement.  Offered B&O suppositories, but patient has deferred for now.      # Transplant History:  Etiology of Kidney Failure:  Alport's syndrome  Tx: DDKT  Transplant: 2/28/2020 (Kidney), 12/7/1988 (Kidney), 3/2/2005 (Kidney)  Donor Type: Donation after Brain Death        Donor Class:   Crossmatch at time of Tx: negative  DSA at time of Tx: No  Significant changes in immunosuppression: None  Significant transplant-related complications: None    Recommendations were communicated to the primary team via this note.    Alvin Varner MD   Pager: [phone]    Interval History   Mr Cruz's kidney function improved with creatinine ~ 4.9.  Good urine output.  He continues to report some abdominal pain and nausea.  Patient feels this is mostly \"gas pain.\"  He was started on simethicone, which helps, but not completely relieved his symptoms.  He has had a couple of episodes of nausea and vomiting with his symptoms.  Patient had a couple of bowel movements last night, but the second one was so painful from the abdominal cramping that he \"almost blacked out.\"  No chest pain or shortness of breath.  No fever, sweats or chills.  No leg swelling.    Review of Systems   4 point ROS was obtained and negative except as noted in the Interval History.    MEDICATIONS:    amLODIPine  10 mg Oral Daily     atorvastatin  10 mg Oral Daily     calcitRIOL  " "0.25 mcg Oral Daily     carvedilol  12.5 mg Oral BID w/meals     dasatinib  40 mg Oral QPM     famotidine  40 mg Oral Daily     [Held by provider] heparin ANTICOAGULANT  5,000 Units Subcutaneous Q12H     lidocaine  1 patch Transdermal Q24H     lidocaine   Transdermal Q8H     mycophenolic acid  540 mg Oral BID     predniSONE  5 mg Oral Daily     senna-docusate  1 tablet Oral BID    Or     senna-docusate  2 tablet Oral BID     sodium chloride (PF)  10 mL Intravenous Q7 Days     sodium chloride (PF)  10 mL Intracatheter Q8H     sucralfate  1 g Oral BID     sulfamethoxazole-trimethoprim  1 tablet Oral Q Mon Wed Fri AM     tacrolimus  4 mg Oral or Feeding Tube BID IS     valGANciclovir  450 mg Oral or Feeding Tube Once per day on Mon Thu       Physical Exam      /82 (BP Location: Left arm)   Pulse 90   Temp 98  F (36.7  C) (Oral)   Resp 16   Ht 1.626 m (5' 4\")   Wt 65.5 kg (144 lb 6.4 oz)   SpO2 100%   BMI 24.79 kg/m       GENERAL APPEARANCE: alert and no distress  HENT: mouth without ulcers or lesions.  RESP: lungs clear to auscultation - no rales, rhonchi or wheezes  CV: regular rhythm, normal rate, no rub, 2/6 systolic murmur  EDEMA: no LE edema bilaterally  ABDOMEN: soft, nondistended, nontender, bowel sounds normal  MS: extremities normal - no gross deformities noted, no evidence of inflammation in joints, no muscle tenderness  SKIN: no rash  TX KIDNEY: mild TTP  DIALYSIS ACCESS: RUE AV fistula with good thrill    Data   All labs reviewed by me.  CMP  Recent Labs   Lab 03/08/20  0627 03/07/20  0624 03/06/20  0708 03/05/20  0530    136 134 134   POTASSIUM 3.8 3.5 3.7 3.7   CHLORIDE 105 102 102 101   CO2 23 25 24 23   ANIONGAP 7 8 8 9   * 105* 117* 116*   BUN 82* 101* 106* 107*   CR 4.91* 5.97* 6.98* 7.93*   GFRESTIMATED 13* 11* 9* 7*   GFRESTBLACK 15* 12* 10* 9*   ANTWAN 8.2* 7.9* 7.7* 7.5*   MAG 2.4* 2.3 2.2 2.1   PHOS 3.7 4.5 4.5 4.9*   BILITOTAL  --   --   --  0.6     CBC  Recent Labs   Lab " 03/08/20  0627 03/07/20  0624 03/06/20  0708 03/05/20  1821 03/05/20  1327   HGB 7.3* 7.2* 8.1* 7.9* 7.6*   WBC 5.3 4.6 6.4  --  5.4   RBC 2.42* 2.44* 2.73*  --  2.55*   HCT 22.1* 22.0* 24.8*  --  23.3*   MCV 91 90 91  --  91   MCH 30.2 29.5 29.7  --  29.8   MCHC 33.0 32.7 32.7  --  32.6   RDW 13.8 13.6 14.0  --  14.1   * 109* 105*  --  83*     INR  Recent Labs   Lab 03/05/20  1327   INR 1.18*   PTT 30     ABG  No lab results found in last 7 days.   Urine Studies  No lab results found.  No lab results found.  PTH  Recent Labs   Lab Test 03/04/20  1018 03/03/20  0849 03/03/20  0729   PTHI 630* 668* Canceled, Test credited     Iron Studies  Recent Labs   Lab Test 03/03/20  0729   IRON 139   *   IRONSAT 98*   YOLIS 3,397*       IMAGING:  All imaging studies reviewed by me.

## 2020-03-08 NOTE — PROGRESS NOTES
Care Coordinator - Discharge Planning    Admission Date/Time:  2/28/2020  Attending MD:  Carol Samuel MD     Data  Chart reviewed, discussed with interdisciplinary team.   Patient was admitted for:   1. Kidney replaced by transplant    2. PICC (peripherally inserted central catheter) in place    3. Immunosuppressed status (H)         Coordination of Care and Referrals: Per provider, patient will not discharge today. He has a reservation at the [facility] (ph [phone]). Writer canceled this evenings reservation. Writer also notified ATC that patient will not need tomorrow morning's appt.       Jessica Puga, PATRIZIA (wked coverage)

## 2020-03-08 NOTE — PLAN OF CARE
"VS: /78 (BP Location: Left arm)   Pulse 90   Temp 98.7  F (37.1  C) (Oral)   Resp 16   Ht 1.626 m (5' 4\")   Wt 65.5 kg (144 lb 6.4 oz)   SpO2 98%   BMI 24.79 kg/m      0306-3729: Pt had one bout of emesis stating it was due to severe gas pain. Stated he went to the bathroom and almost passed out when he was trying to have a BM. Stated he started to feel nauseous after getting back into bed. PRN IV Zofran was given when seemed to relieve symptoms.     Current condition: Stable on RA   Neuro: WDL  Cardio: WDL  Respiratory: WDL  GI/: Voiding adequate amounts into tan. Small soft BM during shift.   Skin: ecchymotic, WDL  Diet:   Regular.   LDA:  L AV fistula not active. R forearm fistula bruit and thrill present. LPIV Sl'd. Left single lumen PICC Sl'd. Abd incision stapled NIKITA> Tan.   Mobility:  UAL.   Pain:Intermittent gas pain.   PRN medications: Zofran x1.   Education:  diet education.   Plan of Care: Will Continue to monitor.   "

## 2020-03-08 NOTE — DOWNTIME EVENT NOTE
The EMR was down for 2.5 hours on 3/8/2020.    Jarrod Roth was responsible for completing the paper charting during this time period.       The following information will remain in the paper chart: All information entered into epic at appropriate time.     Wilbert Roth RN  3/8/2020

## 2020-03-08 NOTE — PROGRESS NOTES
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 He is s/p kidney transplant x2 (, ). 2nd graft lost to primary nonfunction with explant a few weeks later. He has been on dialysis since . He is now s/p intra-abdominal  donor kidney transplant w/ ureteral stent on 20.    Graft function: POD #9 with delayed graft function. Underwent HD POD#1 due to hyperkalemia. UOP good on lasix. Cr 8.5->7.4->7->6->4.9. BUN 82.  Immunosuppression management:   Thymo: 425mg, 6.2mg/kg completed.  Methylpred: Taper completed.  Tacro: Goal level 8-10. Dose increased to 4mg BID yesterday after level was 4.2  MMF: 750mg BID. Switched to Myfortic 540mg BID due to heartburn  Prednisone: 5mg daily, 3rd transplant protocol.  Complexity of management: Medium. Contributing factors: anemia and delayed graft function  Hematology:   Anemia of chronic disease: Transfused 1u RBC on 3/5, hgb tamar appropriately and has been stable this AM (7.3). Possibly r/t dasatinib/tac interaction as plt are also down (but improving, now 147).  Thrombocytopenia: Plt 147. Did receive thymo recently. May also be r/t dasatinib/tac interaction.  Hx CML: On home dose dasatinib. Interacts with tacrolimus, monitor for dasatinib toxicity (rash, edema, pancytopenia, bone pain, prolonged QT). Consulted Hematology- are not concerned about interactions with dasatinib. Check EKG prior to discharge.   Neurology:   Acute postoperative pain: PRN APAP. Continue lidoderm.  Cardiorespiratory:   HTN: Now controlled. On Coreg 12.5mg BID and amlodipine 10 mg daily.  GI/Nutrition:   Diet: Regular diet  Post-op ileus: NG placed post-op for distention and nausea. Removed POD #4. Tolerating diet.   Heartburn/odynophagia: Likely reflux r/t steroids. On famotidine. PPI contraindicated with dasatinib. Added carafate spaced away from other medications on 3/4. Minimal  improvement. Switched from MMF to myfortic  Bowel regimen: Senna/colace. Simethicone added 3/8 for gas pains  Endocrine:  Steroid induced hyperglycemia: Admit HgbA1c 5.2%. , monitor.  Fluid/Electrolytes:  Hyperkalemia: K 6.6 on POD #1, managed with dialysis. Now resolved (3.8 today).  Hyperphosphatemia: Phos 5.2->4.9. Now 3.7 today  : Stein x14 days d/t small bladder.  Bladder spasms: PRN B&O suppository.  Infectious disease: Afebrile  Access: PICC  Prophylaxis: DVT (heparin, hold today for PICC/anemia), fall, GI, fungal, PT/OT  Disposition: 7A, planning for possible discharge today if tolerates diet, nausea resolves over the day    Medical Decision Making: Medium  Subsequent visit 00464 (moderate level decision making)    DARRON/Fellow/Resident Provider: Wood Kaur MD MS, Resident    Faculty: Ron Benítez MD   _________________________________________________________________  Transplant History:   2/28/2020 (Kidney), 12/7/1988 (Kidney), 3/2/2005 (Kidney), Postoperative day: 9     Interval History: History is obtained from the patient  Overnight events: Patient reports significant gas pains with bowel movements, had vomiting and sweating while trying to pass bowel movements and states he almost backed out.     ROS:   A 10-point review of systems was negative except as noted above.    Meds:    amLODIPine  10 mg Oral Daily     atorvastatin  10 mg Oral Daily     calcitRIOL  0.25 mcg Oral Daily     carvedilol  12.5 mg Oral BID w/meals     dasatinib  40 mg Oral QPM     famotidine  40 mg Oral Daily     [Held by provider] heparin ANTICOAGULANT  5,000 Units Subcutaneous Q12H     lidocaine  1 patch Transdermal Q24H     lidocaine   Transdermal Q8H     mycophenolic acid  540 mg Oral BID     predniSONE  5 mg Oral Daily     senna-docusate  1 tablet Oral BID    Or     senna-docusate  2 tablet Oral BID     sodium chloride (PF)  10 mL Intravenous Q7 Days     sodium chloride (PF)  10 mL Intracatheter Q8H      "sucralfate  1 g Oral BID     sulfamethoxazole-trimethoprim  1 tablet Oral Q Mon Wed Fri AM     tacrolimus  4 mg Oral or Feeding Tube BID IS     valGANciclovir  450 mg Oral or Feeding Tube Once per day on Mon Thu       Physical Exam:     Admit Weight: 68.1 kg (150 lb 2.1 oz)    Current vitals:   BP (!) 141/86 (BP Location: Left arm)   Pulse 90   Temp 98.3  F (36.8  C) (Oral)   Resp 16   Ht 1.626 m (5' 4\")   Wt 65.5 kg (144 lb 6.4 oz)   SpO2 98%   BMI 24.79 kg/m      Vital sign ranges:    Temp:  [97.7  F (36.5  C)-98.7  F (37.1  C)] 98.3  F (36.8  C)  Heart Rate:  [83-87] 83  Resp:  [16] 16  BP: (128-151)/(59-90) 141/86  SpO2:  [98 %-100 %] 98 %  Patient Vitals for the past 24 hrs:   BP Temp Temp src Heart Rate Resp SpO2 Weight   03/08/20 0859 (!) 141/86 98.3  F (36.8  C) Oral 83 16 98 % --   03/08/20 0500 128/78 98.7  F (37.1  C) Oral 83 16 98 % --   03/08/20 0100 -- -- -- -- -- -- 65.5 kg (144 lb 6.4 oz)   03/07/20 2334 132/59 98.7  F (37.1  C) Oral 84 16 -- --   03/07/20 2049 (!) 151/90 97.9  F (36.6  C) Oral 87 16 98 % --   03/07/20 1558 135/79 97.7  F (36.5  C) Oral 85 16 100 % --     General Appearance: in no apparent distress.   Skin: normal, warm, dry  Heart: Perfused  Lungs: Breathing comfortably on room air  Abdomen: Abdomen flat. Incision well-healing without javier-incisional erythema or drainage.   : tan is present.  Clear, yellow urine  Extremities: edema: none  Neurologic: awake, alert and oriented x4. Tremor absent.     Data:   CMP  Recent Labs   Lab 03/08/20  0627 03/07/20  0624  03/05/20  0530    136   < > 134   POTASSIUM 3.8 3.5   < > 3.7   CHLORIDE 105 102   < > 101   CO2 23 25   < > 23   * 105*   < > 116*   BUN 82* 101*   < > 107*   CR 4.91* 5.97*   < > 7.93*   GFRESTIMATED 13* 11*   < > 7*   GFRESTBLACK 15* 12*   < > 9*   ANTWAN 8.2* 7.9*   < > 7.5*   MAG 2.4* 2.3   < > 2.1   PHOS 3.7 4.5   < > 4.9*   BILITOTAL  --   --   --  0.6    < > = values in this interval not displayed. "     CBC  Recent Labs   Lab 03/08/20  0627 03/07/20  0624   HGB 7.3* 7.2*   WBC 5.3 4.6   * 109*

## 2020-03-09 ENCOUNTER — HOME INFUSION (PRE-WILLOW HOME INFUSION) (OUTPATIENT)
Dept: PHARMACY | Facility: CLINIC | Age: 45
End: 2020-03-09

## 2020-03-09 ENCOUNTER — APPOINTMENT (OUTPATIENT)
Dept: OCCUPATIONAL THERAPY | Facility: CLINIC | Age: 45
DRG: 652 | End: 2020-03-09
Attending: SURGERY
Payer: MEDICARE

## 2020-03-09 LAB
ANION GAP SERPL CALCULATED.3IONS-SCNC: 8 MMOL/L (ref 3–14)
BUN SERPL-MCNC: 65 MG/DL (ref 7–30)
CALCIUM SERPL-MCNC: 8.4 MG/DL (ref 8.5–10.1)
CHLORIDE SERPL-SCNC: 106 MMOL/L (ref 94–109)
CO2 SERPL-SCNC: 23 MMOL/L (ref 20–32)
CREAT SERPL-MCNC: 4.1 MG/DL (ref 0.66–1.25)
ERYTHROCYTE [DISTWIDTH] IN BLOOD BY AUTOMATED COUNT: 13.5 % (ref 10–15)
GFR SERPL CREATININE-BSD FRML MDRD: 17 ML/MIN/{1.73_M2}
GLUCOSE SERPL-MCNC: 136 MG/DL (ref 70–99)
HCT VFR BLD AUTO: 22.2 % (ref 40–53)
HGB BLD-MCNC: 7.3 G/DL (ref 13.3–17.7)
MAGNESIUM SERPL-MCNC: 2.5 MG/DL (ref 1.6–2.3)
MCH RBC QN AUTO: 29.7 PG (ref 26.5–33)
MCHC RBC AUTO-ENTMCNC: 32.9 G/DL (ref 31.5–36.5)
MCV RBC AUTO: 90 FL (ref 78–100)
PHOSPHATE SERPL-MCNC: 3.5 MG/DL (ref 2.5–4.5)
PLATELET # BLD AUTO: 204 10E9/L (ref 150–450)
POTASSIUM SERPL-SCNC: 3.8 MMOL/L (ref 3.4–5.3)
RBC # BLD AUTO: 2.46 10E12/L (ref 4.4–5.9)
SODIUM SERPL-SCNC: 138 MMOL/L (ref 133–144)
TACROLIMUS BLD-MCNC: 4 UG/L (ref 5–15)
TME LAST DOSE: ABNORMAL H
WBC # BLD AUTO: 6.6 10E9/L (ref 4–11)

## 2020-03-09 PROCEDURE — 80197 ASSAY OF TACROLIMUS: CPT | Performed by: SURGERY

## 2020-03-09 PROCEDURE — 80048 BASIC METABOLIC PNL TOTAL CA: CPT | Performed by: SURGERY

## 2020-03-09 PROCEDURE — 93010 ELECTROCARDIOGRAM REPORT: CPT | Performed by: INTERNAL MEDICINE

## 2020-03-09 PROCEDURE — 25000132 ZZH RX MED GY IP 250 OP 250 PS 637: Mod: GY | Performed by: SURGERY

## 2020-03-09 PROCEDURE — 93005 ELECTROCARDIOGRAM TRACING: CPT

## 2020-03-09 PROCEDURE — 12000026 ZZH R&B TRANSPLANT

## 2020-03-09 PROCEDURE — 25000132 ZZH RX MED GY IP 250 OP 250 PS 637: Mod: GY | Performed by: PHYSICIAN ASSISTANT

## 2020-03-09 PROCEDURE — 25000131 ZZH RX MED GY IP 250 OP 636 PS 637: Mod: GY | Performed by: NURSE PRACTITIONER

## 2020-03-09 PROCEDURE — 36592 COLLECT BLOOD FROM PICC: CPT | Performed by: SURGERY

## 2020-03-09 PROCEDURE — 25000132 ZZH RX MED GY IP 250 OP 250 PS 637: Mod: GY

## 2020-03-09 PROCEDURE — 25000131 ZZH RX MED GY IP 250 OP 636 PS 637: Mod: GY | Performed by: PHYSICIAN ASSISTANT

## 2020-03-09 PROCEDURE — 25000132 ZZH RX MED GY IP 250 OP 250 PS 637: Mod: GY | Performed by: NURSE PRACTITIONER

## 2020-03-09 PROCEDURE — 25000132 ZZH RX MED GY IP 250 OP 250 PS 637: Mod: GY | Performed by: STUDENT IN AN ORGANIZED HEALTH CARE EDUCATION/TRAINING PROGRAM

## 2020-03-09 PROCEDURE — 85027 COMPLETE CBC AUTOMATED: CPT | Performed by: SURGERY

## 2020-03-09 PROCEDURE — 84100 ASSAY OF PHOSPHORUS: CPT | Performed by: SURGERY

## 2020-03-09 PROCEDURE — 83735 ASSAY OF MAGNESIUM: CPT | Performed by: SURGERY

## 2020-03-09 PROCEDURE — 97530 THERAPEUTIC ACTIVITIES: CPT | Mod: GO

## 2020-03-09 PROCEDURE — 40000802 ZZH SITE CHECK

## 2020-03-09 PROCEDURE — 25000128 H RX IP 250 OP 636: Performed by: SURGERY

## 2020-03-09 RX ADMIN — POLYETHYLENE GLYCOL 3350 17 G: 17 POWDER, FOR SOLUTION ORAL at 09:02

## 2020-03-09 RX ADMIN — FAMOTIDINE 40 MG: 20 TABLET ORAL at 09:06

## 2020-03-09 RX ADMIN — ATORVASTATIN CALCIUM 10 MG: 10 TABLET, FILM COATED ORAL at 09:05

## 2020-03-09 RX ADMIN — SUCRALFATE 1 G: 1 SUSPENSION ORAL at 11:52

## 2020-03-09 RX ADMIN — MYCOPHENOLIC ACID 540 MG: 360 TABLET, DELAYED RELEASE ORAL at 09:05

## 2020-03-09 RX ADMIN — MYCOPHENOLIC ACID 540 MG: 360 TABLET, DELAYED RELEASE ORAL at 19:56

## 2020-03-09 RX ADMIN — CALCITRIOL CAPSULES 0.25 MCG 0.25 MCG: 0.25 CAPSULE ORAL at 09:04

## 2020-03-09 RX ADMIN — VALGANCICLOVIR 450 MG: 450 TABLET, FILM COATED ORAL at 09:03

## 2020-03-09 RX ADMIN — SUCRALFATE 1 G: 1 SUSPENSION ORAL at 17:22

## 2020-03-09 RX ADMIN — SENNOSIDES AND DOCUSATE SODIUM 2 TABLET: 8.6; 5 TABLET ORAL at 09:07

## 2020-03-09 RX ADMIN — CALCIUM CARBONATE (ANTACID) CHEW TAB 500 MG 500 MG: 500 CHEW TAB at 05:48

## 2020-03-09 RX ADMIN — PREDNISONE 5 MG: 5 TABLET ORAL at 09:05

## 2020-03-09 RX ADMIN — SENNOSIDES AND DOCUSATE SODIUM 2 TABLET: 8.6; 5 TABLET ORAL at 19:56

## 2020-03-09 RX ADMIN — POLYETHYLENE GLYCOL 3350 17 G: 17 POWDER, FOR SOLUTION ORAL at 19:57

## 2020-03-09 RX ADMIN — ONDANSETRON 4 MG: 2 INJECTION INTRAMUSCULAR; INTRAVENOUS at 14:50

## 2020-03-09 RX ADMIN — CARVEDILOL 12.5 MG: 12.5 TABLET, FILM COATED ORAL at 09:05

## 2020-03-09 RX ADMIN — SULFAMETHOXAZOLE AND TRIMETHOPRIM 1 TABLET: 400; 80 TABLET ORAL at 09:03

## 2020-03-09 RX ADMIN — TACROLIMUS 4 MG: 1 CAPSULE ORAL at 17:23

## 2020-03-09 RX ADMIN — CARVEDILOL 12.5 MG: 12.5 TABLET, FILM COATED ORAL at 17:23

## 2020-03-09 RX ADMIN — CALCIUM CARBONATE (ANTACID) CHEW TAB 500 MG 500 MG: 500 CHEW TAB at 02:21

## 2020-03-09 RX ADMIN — AMLODIPINE BESYLATE 10 MG: 10 TABLET ORAL at 09:05

## 2020-03-09 RX ADMIN — HYDROCORTISONE: 10 CREAM TOPICAL at 01:11

## 2020-03-09 RX ADMIN — CALCIUM CARBONATE (ANTACID) CHEW TAB 500 MG 500 MG: 500 CHEW TAB at 13:52

## 2020-03-09 RX ADMIN — TACROLIMUS 4 MG: 1 CAPSULE ORAL at 09:04

## 2020-03-09 RX ADMIN — DASATINIB 40 MG: 20 TABLET ORAL at 20:02

## 2020-03-09 ASSESSMENT — ACTIVITIES OF DAILY LIVING (ADL)
ADLS_ACUITY_SCORE: 12
ADLS_ACUITY_SCORE: 13
ADLS_ACUITY_SCORE: 13
ADLS_ACUITY_SCORE: 12

## 2020-03-09 ASSESSMENT — MIFFLIN-ST. JEOR: SCORE: 1452.83

## 2020-03-09 NOTE — PROGRESS NOTES
[facility], [place]   Transplant Nephrology Progress Note  Date of Admission:  2/28/2020  Today's Date: 03/09/2020    Assessment & Plan   # DDKT: Continued slow trend down in creatinine, now at ~ 4.1. Good urine output.  He did have DGF (HD on POD#1 due to hyperkalemia). No acute indications for HD. Renogram revealed vasomotor nephropathy which was somewhat expected with his slowed graft function.               - Baseline Cr ~ TBD              - Proteinuria: Not checked post transplant              - Date DSA Last Checked: Feb/2020      Latest DSA: No DSA at time of transplant              - BK Viremia: Not checked post transplant              - Kidney Tx Biopsy: No    # Immunosuppression: Tacrolimus immediate release (goal 8-10) and Mycophenolic acid (goal 1.0-3.5)              - Changes: No     # Infection Prophylaxis:   - PJP: Sulfa/TMP (Bactrim)  - CMV: Valcyte; Patient is CMV IgG Ab discordant (D+/R-) and will continue on Valcyte x 6 months, then check CMV PCR monthly until 12 months post transplant.  - Thrush: None     # Hypertension: Controlled;             Goal BP: < 150/90              - Volume status: Euvolemic                EDW ~ 67.5 kg and yesterday's weight was ~ 64.7 kg              - Changes: Yes - Would consider increasing carvedilol to 25 mg bid.    # Elevated Blood Glucose: Glucose generally running ~ 100-140s              - Management as per primary team.     # Anemia of Chronic Kidney Disease: Hgb: Stable, low after blood transfusion 3/3      DEB: No   - Iron panel: Replete                                                                     # Mineral Bone Disorder:   - Secondary renal hyperparathyroidism; PTH level: 668        On treatment: Calcitriol  - Vitamin D; level: Low        On Supplement: Yes  - Calcium; level: Normal when corrected for low albumin       On Supplement: No  - Phosphorus; level: Normal        On Supplement: No    # Electrolytes:   -  Potassium; level: Normal       On Supplement: No  - Magnesium; level: High       On Supplement: No  - Bicarbonate; level: Normal       On Supplement: No     # CML: In remission. On dasatinib 40mg daily. Hematology recommended no changes.    # Heartburn Symptoms with Nausea/Vomiting: Ongoing, intermittent symptoms despite being on H2 blocker and sucralfate.  Now patient reports more gas pain and was started on simethicone.   - If symptoms continue, may consider EGD to rule out other issues.    # Bladder Spasms: Improved symptoms.     # Transplant History:  Etiology of Kidney Failure:  Alport's syndrome  Tx: DDKT  Transplant: 2/28/2020 (Kidney), 12/7/1988 (Kidney), 3/2/2005 (Kidney)  Donor Type: Donation after Brain Death        Donor Class:   Crossmatch at time of Tx: negative  DSA at time of Tx: No  Significant changes in immunosuppression: None  Significant transplant-related complications: None    Recommendations were communicated to the primary team via this note.    Alvin Varner MD   Pager: [phone]    Interval History   Mr Jarrell kidney function improved with creatinine ~ 4.1.  Good urine output.  Patient reports having a large bowel movement with an enema and feels much better.  Still some heartburn symptoms, but resolves with calcium carbonate.  Occasional nausea when he gets the heartburn, but otherwise that is better and no vomiting.  Denies any chest pain or shortness of breath.  No fever, sweats or chills.  No leg swelling.    Review of Systems   4 point ROS was obtained and negative except as noted in the Interval History.    MEDICATIONS:    amLODIPine  10 mg Oral Daily     atorvastatin  10 mg Oral Daily     calcitRIOL  0.25 mcg Oral Daily     carvedilol  12.5 mg Oral BID w/meals     dasatinib  40 mg Oral QPM     famotidine  40 mg Oral Daily     [Held by provider] heparin ANTICOAGULANT  5,000 Units Subcutaneous Q12H     lidocaine  1 patch Transdermal Q24H     lidocaine   Transdermal Q8H      "mycophenolic acid  540 mg Oral BID     polyethylene glycol  17 g Oral BID     predniSONE  5 mg Oral Daily     senna-docusate  1 tablet Oral BID    Or     senna-docusate  2 tablet Oral BID     sodium chloride (PF)  10 mL Intravenous Q7 Days     sodium chloride (PF)  10 mL Intracatheter Q8H     sucralfate  1 g Oral BID     sulfamethoxazole-trimethoprim  1 tablet Oral Q Mon Wed Fri AM     tacrolimus  4 mg Oral or Feeding Tube BID IS     valGANciclovir  450 mg Oral or Feeding Tube Once per day on Mon Thu       Physical Exam      BP (!) 149/84 (BP Location: Left arm)   Pulse 90   Temp 98.8  F (37.1  C) (Oral)   Resp 16   Ht 1.626 m (5' 4\")   Wt 64.7 kg (142 lb 9.6 oz)   SpO2 100%   BMI 24.48 kg/m       GENERAL APPEARANCE: alert and no distress  HENT: mouth without ulcers or lesions.  RESP: lungs clear to auscultation - no rales, rhonchi or wheezes  CV: regular rhythm, normal rate, no rub, 2/6 systolic murmur  EDEMA: no LE edema bilaterally  ABDOMEN: soft, nondistended, nontender, bowel sounds normal  MS: extremities normal - no gross deformities noted, no evidence of inflammation in joints, no muscle tenderness  SKIN: no rash  TX KIDNEY: minimal TTP  DIALYSIS ACCESS: RUE AV fistula with good thrill    Data   All labs reviewed by me.  CMP  Recent Labs   Lab 03/09/20  0546 03/08/20  0627 03/07/20  0624 03/06/20  0708 03/05/20  0530    136 136 134 134   POTASSIUM 3.8 3.8 3.5 3.7 3.7   CHLORIDE 106 105 102 102 101   CO2 23 23 25 24 23   ANIONGAP 8 7 8 8 9   * 109* 105* 117* 116*   BUN 65* 82* 101* 106* 107*   CR 4.10* 4.91* 5.97* 6.98* 7.93*   GFRESTIMATED 17* 13* 11* 9* 7*   GFRESTBLACK 19* 15* 12* 10* 9*   ANTWAN 8.4* 8.2* 7.9* 7.7* 7.5*   MAG 2.5* 2.4* 2.3 2.2 2.1   PHOS 3.5 3.7 4.5 4.5 4.9*   BILITOTAL  --   --   --   --  0.6     CBC  Recent Labs   Lab 03/09/20  0546 03/08/20  0627 03/07/20  0624 03/06/20  0708   HGB 7.3* 7.3* 7.2* 8.1*   WBC 6.6 5.3 4.6 6.4   RBC 2.46* 2.42* 2.44* 2.73*   HCT 22.2* " 22.1* 22.0* 24.8*   MCV 90 91 90 91   MCH 29.7 30.2 29.5 29.7   MCHC 32.9 33.0 32.7 32.7   RDW 13.5 13.8 13.6 14.0    147* 109* 105*     INR  Recent Labs   Lab 03/05/20  1327   INR 1.18*   PTT 30     ABG  No lab results found in last 7 days.   Urine Studies  No lab results found.  No lab results found.  PTH  Recent Labs   Lab Test 03/04/20  1018 03/03/20  0849 03/03/20  0729   PTHI 630* 668* Canceled, Test credited     Iron Studies  Recent Labs   Lab Test 03/03/20  0729   IRON 139   *   IRONSAT 98*   YOLIS 3,397*       IMAGING:  All imaging studies reviewed by me.

## 2020-03-09 NOTE — PLAN OF CARE
"BP (!) 153/86 (BP Location: Left arm)   Pulse 90   Temp 99.1  F (37.3  C) (Oral)   Resp 16   Ht 1.626 m (5' 4\")   Wt 65.5 kg (144 lb 6.4 oz)   SpO2 100%   BMI 24.79 kg/m        Patient VSS on RA; afebrile. Endorses abdominal pain/cramping. Given simethicone x1. Educated on bowel medications and abdominal x-ray, enema refused. Agreeable to milk of mag. Tolerating regular diet with poor appetite. PICC and PIV SL. Stein with good UOP, cares completed. Incision CDI. Up ad dick.    "

## 2020-03-09 NOTE — PROGRESS NOTES
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 He is s/p kidney transplant x2 (, ). 2nd graft lost to primary nonfunction with explant a few weeks later. He has been on dialysis since . He is now s/p intra-abdominal  donor kidney transplant w/ ureteral stent on 20.    Graft function: POD #10 with delayed graft function. Underwent HD POD#1 due to hyperkalemia. Good UOP. Cr 8.5->7.4->7->6->4.9->4.1. BUN 65.  Immunosuppression management:   Thymo: 425mg, 6.2mg/kg completed.  Methylpred: Taper completed.  Tacro: Goal level 8-10. Dose recently increased to 4mg BID. Level today 4 (~12hr); continue at current dose given recent change.  MMF: 750mg BID. Switched to Myfortic 540mg BID due to heartburn  Prednisone: 5mg daily, 3rd transplant protocol.  Complexity of management: Medium. Contributing factors: anemia and delayed graft function  Hematology:   Anemia of chronic disease: Transfused 1u RBC on 3/5, hgb tamar appropriately and has been stable this AM (7.3). Possibly r/t dasatinib/tac interaction as plt are also down (but improving, now 204).  Thrombocytopenia: Possibly r/t thymoglobulin vs. dasatinib/tac interaction. Plt 204; now resolved.   Hx CML: On home dose dasatinib. Interacts with tacrolimus, monitor for dasatinib toxicity (rash, edema, pancytopenia, bone pain, prolonged QT). Consulted Hematology- are not concerned about interactions with dasatinib. EKG today with QTc 436 (previously 441).   Neurology:   Acute postoperative pain: PRN APAP. Continue lidoderm.  Cardiorespiratory:   HTN: Now controlled. On Coreg 12.5mg BID and amlodipine 10 mg daily.  GI/Nutrition:   Diet: Regular diet  Post-op ileus: NG placed post-op for distention and nausea. Removed POD #4. Tolerating diet and having BMs  Heartburn/odynophagia: Likely reflux r/t steroids. On famotidine. PPI contraindicated with  dasatinib. Added carafate spaced away from other medications on 3/4. Switched from MMF to myfortic. Minimal improvement.   Bowel regimen: Senna/colace, Miralax BID. Simethicone for gas pains.  Endocrine:  Steroid induced hyperglycemia: Admit HgbA1c 5.2%. , monitor.  Fluid/Electrolytes:  Hyperkalemia: K 6.6 on POD #1, managed with dialysis. Now resolved (3.8 today).  : Stein x14 days d/t small bladder. Plan for removal Friday with PVR monitoring.   Bladder spasms: PRN B&O suppository.  Infectious disease: Afebrile. WBC 6.6 (from 5.3).  Access: PICC d/t difficult access  Prophylaxis: DVT, fall, GI (pepcid), PJP (Bactrim), viral (Valcyte), PT/OT  Disposition: 7A, planning for possible discharge tomorrow pending resolution of nausea    Medical Decision Making: Medium  Subsequent visit 31481 (moderate level decision making)    DARRON/Fellow/Resident Provider: Cyndi Alex, NP [id number]    Faculty: Carol Samuel MD  _________________________________________________________________  Transplant History:   2/28/2020 (Kidney), 12/7/1988 (Kidney), 3/2/2005 (Kidney), Postoperative day: 10     Interval History: History is obtained from the patient  Overnight events: Patient was able to have BMs last night after enema and MOM yesterday. Today his biggest complaint is of GERD. He reports Tums is helping but the Pepcid is not working. Zantac worked best for him in the past. He also continues to experience nausea, this afternoon he reports the nausea came after having a bowel movement and experiencing cramping pain.     ROS:   A 10-point review of systems was negative except as noted above.    Meds:    amLODIPine  10 mg Oral Daily     atorvastatin  10 mg Oral Daily     calcitRIOL  0.25 mcg Oral Daily     carvedilol  12.5 mg Oral BID w/meals     dasatinib  40 mg Oral QPM     famotidine  40 mg Oral Daily     [Held by provider] heparin ANTICOAGULANT  5,000 Units Subcutaneous Q12H     lidocaine  1 patch Transdermal Q24H      "lidocaine   Transdermal Q8H     mycophenolic acid  540 mg Oral BID     polyethylene glycol  17 g Oral BID     predniSONE  5 mg Oral Daily     senna-docusate  1 tablet Oral BID    Or     senna-docusate  2 tablet Oral BID     sodium chloride (PF)  10 mL Intravenous Q7 Days     sodium chloride (PF)  10 mL Intracatheter Q8H     sucralfate  1 g Oral BID     sulfamethoxazole-trimethoprim  1 tablet Oral Q Mon Wed Fri AM     tacrolimus  4 mg Oral or Feeding Tube BID IS     valGANciclovir  450 mg Oral or Feeding Tube Once per day on Mon Thu       Physical Exam:     Admit Weight: 68.1 kg (150 lb 2.1 oz)    Current vitals:   /75 (BP Location: Left arm)   Pulse 90   Temp 98.2  F (36.8  C) (Oral)   Resp 16   Ht 1.626 m (5' 4\")   Wt 64.7 kg (142 lb 9.6 oz)   SpO2 100%   BMI 24.48 kg/m      Vital sign ranges:    Temp:  [97.9  F (36.6  C)-99.3  F (37.4  C)] 98.2  F (36.8  C)  Heart Rate:  [82-85] 83  Resp:  [16] 16  BP: (124-153)/(75-89) 124/75  SpO2:  [98 %-100 %] 100 %  Patient Vitals for the past 24 hrs:   BP Temp Temp src Heart Rate Resp SpO2 Weight   03/09/20 1155 124/75 98.2  F (36.8  C) Oral 83 16 100 % --   03/09/20 0939 -- -- -- -- -- -- 64.7 kg (142 lb 9.6 oz)   03/09/20 0831 (!) 149/84 98.8  F (37.1  C) Oral 83 16 100 % --   03/09/20 0426 134/79 98.6  F (37  C) Oral 85 16 100 % --   03/08/20 2308 (!) 142/89 99.3  F (37.4  C) Oral 84 16 99 % --   03/08/20 1930 (!) 148/88 97.9  F (36.6  C) Oral 82 16 98 % --   03/08/20 1711 (!) 153/86 99.1  F (37.3  C) Oral 85 16 100 % --     General Appearance: in no apparent distress.   Skin: normal, warm, dry  Heart: Perfused  Lungs: Breathing comfortably on room air  Abdomen: Abdomen flat. Incision sealed with staples and well-healing without javier-incisional erythema or drainage.   : tan is present. Clear, yellow urine  Extremities: edema: none  Neurologic: awake, alert and oriented x4. Tremor absent.     Data:   CMP  Recent Labs   Lab 03/09/20  0546 03/08/20  0627  " 03/05/20  0530    136   < > 134   POTASSIUM 3.8 3.8   < > 3.7   CHLORIDE 106 105   < > 101   CO2 23 23   < > 23   * 109*   < > 116*   BUN 65* 82*   < > 107*   CR 4.10* 4.91*   < > 7.93*   GFRESTIMATED 17* 13*   < > 7*   GFRESTBLACK 19* 15*   < > 9*   ANTWAN 8.4* 8.2*   < > 7.5*   MAG 2.5* 2.4*   < > 2.1   PHOS 3.5 3.7   < > 4.9*   BILITOTAL  --   --   --  0.6    < > = values in this interval not displayed.     CBC  Recent Labs   Lab 03/09/20  0546 03/08/20  0627   HGB 7.3* 7.3*   WBC 6.6 5.3    147*

## 2020-03-09 NOTE — PLAN OF CARE
"VS: /79 (BP Location: Left arm)   Pulse 90   Temp 98.6  F (37  C) (Oral)   Resp 16   Ht 1.626 m (5' 4\")   Wt 65.5 kg (144 lb 6.4 oz)   SpO2 100%   BMI 24.79 kg/m      Current condition: Stable on RA   Neuro: WDL  Cardio: WDL  Respiratory: WDL  GI/: Voiding adequate amounts into Stein, Tap water enema was administered per order with relief of symptoms. 2 BM's during the shift.   Skin: WDL  Diet:   Regular  LDA:  L AV fistula: not active, LPIV Sl'd, LPICC 1x lumen Sl'd. R AV fistula: bruit and thrill present. Abd incision stapled NIKITA. Stein.   Mobility:  UAL.   Pain: Heartburn.   PRN medications: TUMS x2.  Hydrocortisone x1,   Education:  Call light education.   Plan of Care: Will continue to monitor.   "

## 2020-03-09 NOTE — PLAN OF CARE
Discharge Planner OT   Patient plan for discharge: [facility]     Current status: Pt ambulated to therapy gym IND. Pt completed 12 stairs with SBA and use of railing.  Pt demonstrated tub transfer with SBA and use of grab bars.  Pt ambulated x 1000 ft IND with one standing rest breaks, tolerates well. VSS. No further OT concerns, pt has met OT goals. D/c     Barriers to return to prior living situation: medical status     Recommendations for discharge: discharge to local housing with possible OP PT pending follow up with eye doctor.      Rationale for recommendations: pt has met IP OT goals. Encourage pt to continue to ambulate 3-4x/day to progress ax tolerance. Pt will benefit from OP PT for continued strengthening and endurance training pending follow up from eye appointment.        Entered by: Halie Dhillon 03/09/2020 11:57 AM     Occupational Therapy Discharge Summary    Reason for therapy discharge:    All goals and outcomes met, no further needs identified.    Progress towards therapy goal(s). See goals on Care Plan in [facility] electronic health record for goal details.  Goals met    Therapy recommendation(s):    Continued therapy is recommended.  Rationale/Recommendations:  OP PT see above.

## 2020-03-09 NOTE — PLAN OF CARE
VSS.  Denies pain.  Acknowledges some reflux and upper GI discomfort but states that problem is much improved since successful enema last night.  Gets scheduled Pepcid and Carafate. Able to eat small breakfast. Up with slight assist.  Ambulating with therapy.  Stein catheter in place draining good amts. Of michael urine. Probable discharge in near future.  Continue to monitor, support.

## 2020-03-09 NOTE — PHARMACY-TRANSPLANT NOTE
Solid Organ Transplant Recipient Prior to Discharge Note    44 year old male s/p  donor kidney transplant (stent - yes) on 2020.    Tacrolimus level (goal: 8-10):  3/7 tacrolimus level 4.2 micrograms/L (11.75 hr trough), increased dose to tacrolimus 4 mg by mouth twice daily  3/9 tacrolimus level 4 micrograms/L (11.7 hr trough)    Pharmacy has monitored for medication interactions and immunosuppression levels in conjunction with the multidisciplinary team. In anticipation for discharge, medication therapy needs have been addressed daily throughout the current admission via multidisciplinary rounds and/or discussions, order verification, daily clinical pharmacy review, and communication with prescribers.

## 2020-03-10 ENCOUNTER — DOCUMENTATION ONLY (OUTPATIENT)
Dept: CARE COORDINATION | Facility: CLINIC | Age: 45
End: 2020-03-10

## 2020-03-10 ENCOUNTER — HOME INFUSION (PRE-WILLOW HOME INFUSION) (OUTPATIENT)
Dept: PHARMACY | Facility: CLINIC | Age: 45
End: 2020-03-10

## 2020-03-10 VITALS
OXYGEN SATURATION: 96 % | HEART RATE: 80 BPM | RESPIRATION RATE: 16 BRPM | SYSTOLIC BLOOD PRESSURE: 124 MMHG | WEIGHT: 142.6 LBS | TEMPERATURE: 97.9 F | HEIGHT: 64 IN | DIASTOLIC BLOOD PRESSURE: 87 MMHG | BODY MASS INDEX: 24.34 KG/M2

## 2020-03-10 PROBLEM — R73.9 STEROID-INDUCED HYPERGLYCEMIA: Status: ACTIVE | Noted: 2020-03-10

## 2020-03-10 PROBLEM — Z85.6 HISTORY OF CHRONIC MYELOID LEUKEMIA: Status: ACTIVE | Noted: 2020-03-10

## 2020-03-10 PROBLEM — K21.00 GASTROESOPHAGEAL REFLUX DISEASE WITH ESOPHAGITIS: Status: ACTIVE | Noted: 2020-03-10

## 2020-03-10 PROBLEM — K91.89 POSTOPERATIVE ILEUS (H): Status: ACTIVE | Noted: 2020-03-10

## 2020-03-10 PROBLEM — T38.0X5A STEROID-INDUCED HYPERGLYCEMIA: Status: ACTIVE | Noted: 2020-03-10

## 2020-03-10 PROBLEM — K56.7 POSTOPERATIVE ILEUS (H): Status: ACTIVE | Noted: 2020-03-10

## 2020-03-10 LAB
ANION GAP SERPL CALCULATED.3IONS-SCNC: 8 MMOL/L (ref 3–14)
BUN SERPL-MCNC: 54 MG/DL (ref 7–30)
CALCIUM SERPL-MCNC: 8.5 MG/DL (ref 8.5–10.1)
CHLORIDE SERPL-SCNC: 108 MMOL/L (ref 94–109)
CO2 SERPL-SCNC: 22 MMOL/L (ref 20–32)
CREAT SERPL-MCNC: 3.66 MG/DL (ref 0.66–1.25)
ERYTHROCYTE [DISTWIDTH] IN BLOOD BY AUTOMATED COUNT: 13.7 % (ref 10–15)
GFR SERPL CREATININE-BSD FRML MDRD: 19 ML/MIN/{1.73_M2}
GLUCOSE SERPL-MCNC: 129 MG/DL (ref 70–99)
HCT VFR BLD AUTO: 22.2 % (ref 40–53)
HGB BLD-MCNC: 7.1 G/DL (ref 13.3–17.7)
INTERPRETATION ECG - MUSE: NORMAL
MAGNESIUM SERPL-MCNC: 2.5 MG/DL (ref 1.6–2.3)
MCH RBC QN AUTO: 29.2 PG (ref 26.5–33)
MCHC RBC AUTO-ENTMCNC: 32 G/DL (ref 31.5–36.5)
MCV RBC AUTO: 91 FL (ref 78–100)
PHOSPHATE SERPL-MCNC: 3.3 MG/DL (ref 2.5–4.5)
PLATELET # BLD AUTO: 258 10E9/L (ref 150–450)
POTASSIUM SERPL-SCNC: 4.1 MMOL/L (ref 3.4–5.3)
RBC # BLD AUTO: 2.43 10E12/L (ref 4.4–5.9)
SODIUM SERPL-SCNC: 138 MMOL/L (ref 133–144)
TACROLIMUS BLD-MCNC: 4.8 UG/L (ref 5–15)
TME LAST DOSE: ABNORMAL H
WBC # BLD AUTO: 8.9 10E9/L (ref 4–11)

## 2020-03-10 PROCEDURE — 25000132 ZZH RX MED GY IP 250 OP 250 PS 637: Mod: GY | Performed by: NURSE PRACTITIONER

## 2020-03-10 PROCEDURE — 80048 BASIC METABOLIC PNL TOTAL CA: CPT | Performed by: SURGERY

## 2020-03-10 PROCEDURE — 25000131 ZZH RX MED GY IP 250 OP 636 PS 637: Mod: GY | Performed by: PHYSICIAN ASSISTANT

## 2020-03-10 PROCEDURE — 36592 COLLECT BLOOD FROM PICC: CPT | Performed by: SURGERY

## 2020-03-10 PROCEDURE — 25000131 ZZH RX MED GY IP 250 OP 636 PS 637: Mod: GY | Performed by: NURSE PRACTITIONER

## 2020-03-10 PROCEDURE — 25000132 ZZH RX MED GY IP 250 OP 250 PS 637: Mod: GY | Performed by: STUDENT IN AN ORGANIZED HEALTH CARE EDUCATION/TRAINING PROGRAM

## 2020-03-10 PROCEDURE — 25000132 ZZH RX MED GY IP 250 OP 250 PS 637: Mod: GY | Performed by: SURGERY

## 2020-03-10 PROCEDURE — 84100 ASSAY OF PHOSPHORUS: CPT | Performed by: SURGERY

## 2020-03-10 PROCEDURE — 25000128 H RX IP 250 OP 636: Performed by: NURSE PRACTITIONER

## 2020-03-10 PROCEDURE — 85027 COMPLETE CBC AUTOMATED: CPT | Performed by: SURGERY

## 2020-03-10 PROCEDURE — 85049 AUTOMATED PLATELET COUNT: CPT | Performed by: SURGERY

## 2020-03-10 PROCEDURE — 80197 ASSAY OF TACROLIMUS: CPT | Performed by: SURGERY

## 2020-03-10 PROCEDURE — 83735 ASSAY OF MAGNESIUM: CPT | Performed by: SURGERY

## 2020-03-10 RX ORDER — DIPHENHYDRAMINE HCL 25 MG
25-50 CAPSULE ORAL ONCE
Status: DISCONTINUED | OUTPATIENT
Start: 2020-03-10 | End: 2020-03-10

## 2020-03-10 RX ORDER — ONDANSETRON 4 MG/1
4 TABLET, ORALLY DISINTEGRATING ORAL EVERY 6 HOURS PRN
Status: DISCONTINUED | OUTPATIENT
Start: 2020-03-10 | End: 2020-03-10 | Stop reason: HOSPADM

## 2020-03-10 RX ORDER — ACETAMINOPHEN 325 MG/1
650 TABLET ORAL ONCE
Status: DISCONTINUED | OUTPATIENT
Start: 2020-03-10 | End: 2020-03-10

## 2020-03-10 RX ORDER — SUCRALFATE ORAL 1 G/10ML
1 SUSPENSION ORAL 2 TIMES DAILY
Qty: 600 ML | Refills: 1 | Status: SHIPPED | OUTPATIENT
Start: 2020-03-10 | End: 2020-03-31

## 2020-03-10 RX ORDER — METHYLPREDNISOLONE SODIUM SUCCINATE 125 MG/2ML
100 INJECTION, POWDER, LYOPHILIZED, FOR SOLUTION INTRAMUSCULAR; INTRAVENOUS ONCE
Status: DISCONTINUED | OUTPATIENT
Start: 2020-03-10 | End: 2020-03-10

## 2020-03-10 RX ORDER — DIPHENHYDRAMINE HCL 12.5MG/5ML
25-50 LIQUID (ML) ORAL ONCE
Status: DISCONTINUED | OUTPATIENT
Start: 2020-03-10 | End: 2020-03-10

## 2020-03-10 RX ORDER — POLYETHYLENE GLYCOL 3350 17 G/17G
17 POWDER, FOR SOLUTION ORAL 2 TIMES DAILY
Qty: 60 PACKET | Refills: 0 | Status: SHIPPED | OUTPATIENT
Start: 2020-03-10 | End: 2020-06-30

## 2020-03-10 RX ORDER — MYCOPHENOLIC ACID 180 MG/1
540 TABLET, DELAYED RELEASE ORAL 2 TIMES DAILY
Qty: 180 TABLET | Refills: 11 | Status: SHIPPED | OUTPATIENT
Start: 2020-03-10 | End: 2021-01-01

## 2020-03-10 RX ORDER — TACROLIMUS 1 MG/1
4 CAPSULE ORAL 2 TIMES DAILY
Qty: 240 CAPSULE | Refills: 11 | Status: SHIPPED | OUTPATIENT
Start: 2020-03-10 | End: 2020-03-25

## 2020-03-10 RX ORDER — ONDANSETRON 4 MG/1
4 TABLET, ORALLY DISINTEGRATING ORAL EVERY 8 HOURS PRN
Qty: 5 TABLET | Refills: 0 | Status: SHIPPED | OUTPATIENT
Start: 2020-03-10 | End: 2020-06-16

## 2020-03-10 RX ADMIN — MYCOPHENOLIC ACID 540 MG: 360 TABLET, DELAYED RELEASE ORAL at 08:39

## 2020-03-10 RX ADMIN — FAMOTIDINE 40 MG: 20 TABLET ORAL at 08:38

## 2020-03-10 RX ADMIN — SUCRALFATE 1 G: 1 SUSPENSION ORAL at 11:06

## 2020-03-10 RX ADMIN — CALCIUM CARBONATE (ANTACID) CHEW TAB 500 MG 500 MG: 500 CHEW TAB at 11:42

## 2020-03-10 RX ADMIN — PREDNISONE 5 MG: 5 TABLET ORAL at 08:40

## 2020-03-10 RX ADMIN — CALCITRIOL CAPSULES 0.25 MCG 0.25 MCG: 0.25 CAPSULE ORAL at 08:40

## 2020-03-10 RX ADMIN — CARVEDILOL 12.5 MG: 12.5 TABLET, FILM COATED ORAL at 08:40

## 2020-03-10 RX ADMIN — ONDANSETRON 4 MG: 4 TABLET, ORALLY DISINTEGRATING ORAL at 11:57

## 2020-03-10 RX ADMIN — TACROLIMUS 4 MG: 1 CAPSULE ORAL at 08:38

## 2020-03-10 RX ADMIN — SENNOSIDES AND DOCUSATE SODIUM 2 TABLET: 8.6; 5 TABLET ORAL at 08:42

## 2020-03-10 RX ADMIN — ATORVASTATIN CALCIUM 10 MG: 10 TABLET, FILM COATED ORAL at 08:39

## 2020-03-10 RX ADMIN — AMLODIPINE BESYLATE 10 MG: 10 TABLET ORAL at 08:39

## 2020-03-10 ASSESSMENT — ACTIVITIES OF DAILY LIVING (ADL)
ADLS_ACUITY_SCORE: 12
ADLS_ACUITY_SCORE: 13
ADLS_ACUITY_SCORE: 12
ADLS_ACUITY_SCORE: 13
ADLS_ACUITY_SCORE: 12

## 2020-03-10 NOTE — DISCHARGE INSTRUCTIONS
________________________________________________________  Discharge RN please fax discharge orders to home care agency: [facility]    ________________________________________________________    [address]  [place] and confirmation # [id number].  They have a shuttle and I have given Edward the information  __________________________________________________________________        Diet recommendations post-transplant: High protein diet x 8 weeks.  Heart healthy dietary habits long term (low saturated/trans fat, low sodium). Practice food safety precautions. See nutrition handout and food safety booklet for more information.

## 2020-03-10 NOTE — PLAN OF CARE
VSS.  Denies incisional pain. Gas pains and reflux ongoing.  Getting Zofran, tums, carafate and scheduled Pepcid.  Tan catheter in place draining adequate amts. of clear yellow urine.  Patient OK'd for discharge to [facility] today.  Has no family or friend with him.  Med.card and lab book updated.  Has all need ed scripts.  To leave with tan catheter until Friday. Has tan supplies and instructions. Report called to ATC.  Knows to take no meds prior to coming in AM.  Patient to get shuttle to [facility].  Will call shuttle in AM to go to ATC.  Left facility at 1500 via wc and transport.  Will follow-up as outpatient.

## 2020-03-10 NOTE — PROGRESS NOTES
Patient has had a transplant within 6 months. Transplant coordinator should follow up with patient for post-hospital call.     Joi Coats CMA   Post Hospital Discharge Team

## 2020-03-10 NOTE — PROGRESS NOTES
This is a recent snapshot of the patient's [facility] medical record.  For current drug dose and complete information and questions, call [phone] or In Basket pool, [facility] home infusion ([id number])  CSN Number:  [id number]

## 2020-03-10 NOTE — PROGRESS NOTES
Care Coordinator  D/I: I have explained the hotel info /confirmation number to pt and hotel number for the shuttle to call.  I explained the Medicare Very Important Message and pt signed--copy is in the chart.  P: I have sent the ATC nurses and ABBY Prince a message that they HAVE TO call Saturday 3/14 and verify he needs more nights or cancel, if he can go home.  ALISIA Rosales spoke with pt on 3/9 about PICC and follow up plans. I have sent this to [facility]: Jody García.

## 2020-03-10 NOTE — PROGRESS NOTES
[facility] [place]   Transplant Nephrology Progress Note  Date of Admission:  2/28/2020  Today's Date: 03/10/2020    Assessment & Plan   # DDKT: Continued slow trend down in creatinine, now at ~ 3.7. Good urine output.  He did have DGF (HD on POD#1 due to hyperkalemia). No acute indications for HD. Renogram revealed vasomotor nephropathy which was somewhat expected with his slowed graft function.               - Baseline Cr ~ TBD              - Proteinuria: Not checked post transplant              - Date DSA Last Checked: Feb/2020      Latest DSA: No DSA at time of transplant              - BK Viremia: Not checked post transplant              - Kidney Tx Biopsy: No    # Immunosuppression: Tacrolimus immediate release (goal 8-10) and Mycophenolic acid (goal 1.0-3.5)              - Changes: No     # Infection Prophylaxis:   - PJP: Sulfa/TMP (Bactrim)  - CMV: Valcyte; Patient is CMV IgG Ab discordant (D+/R-) and will continue on Valcyte x 6 months, then check CMV PCR monthly until 12 months post transplant.  - Thrush: None     # Hypertension: Controlled;             Goal BP: < 150/90              - Volume status: Euvolemic                EDW ~ 67.5 kg and yesterday's weight was ~ 64.7 kg              - Changes: Yes - Would consider increasing carvedilol to 25 mg bid.    # Elevated Blood Glucose: Glucose generally running ~ 100-140s              - Management as per primary team.     # Anemia of Chronic Kidney Disease: Hgb: Trend down and low, last blood transfusion 3/3      DEB: No   - Iron panel: Replete                                                                     # Mineral Bone Disorder:   - Secondary renal hyperparathyroidism; PTH level: 668        On treatment: Calcitriol  - Vitamin D; level: Low        On Supplement: Yes  - Calcium; level: Normal       On Supplement: No  - Phosphorus; level: Normal        On Supplement: No    # Electrolytes:   - Potassium; level: Normal       On  Supplement: No  - Magnesium; level: High       On Supplement: No  - Bicarbonate; level: Normal       On Supplement: No     # CML: In remission. On dasatinib 40mg daily. Hematology recommended no changes.    # Heartburn Symptoms with Nausea/Vomiting: Ongoing, intermittent symptoms despite being on H2 blocker and sucralfate.  Now intermittent gas pains and was started on simethicone.     # Transplant History:  Etiology of Kidney Failure:  Alport's syndrome  Tx: DDKT  Transplant: 2/28/2020 (Kidney), 12/7/1988 (Kidney), 3/2/2005 (Kidney)  Donor Type: Donation after Brain Death        Donor Class:   Crossmatch at time of Tx: negative  DSA at time of Tx: No  Significant changes in immunosuppression: None  Significant transplant-related complications: None    Recommendations were communicated to the primary team via this note.    Alvin Varner MD   Pager: [phone]    Interval History   Mr Jarrell kidney function improved with creatinine ~ 3.7.  Good urine output.  Stein remains in place.  Patient continues to complain of gas pain, but otherwise is doing well.  Only nausea if the abdominal gas pains get bad, but otherwise none and no vomiting.  Passing gas, but last bowel movement yesterday.  No chest pain or shortness of breath.  No fever, sweats or chills.  No leg swelling.    Review of Systems   4 point ROS was obtained and negative except as noted in the Interval History.    MEDICATIONS:    amLODIPine  10 mg Oral Daily     atorvastatin  10 mg Oral Daily     calcitRIOL  0.25 mcg Oral Daily     carvedilol  12.5 mg Oral BID w/meals     dasatinib  40 mg Oral QPM     famotidine  40 mg Oral Daily     [Held by provider] heparin ANTICOAGULANT  5,000 Units Subcutaneous Q12H     lidocaine  1 patch Transdermal Q24H     lidocaine   Transdermal Q8H     mycophenolic acid  540 mg Oral BID     polyethylene glycol  17 g Oral BID     predniSONE  5 mg Oral Daily     senna-docusate  1 tablet Oral BID    Or     senna-docusate  2  "tablet Oral BID     sodium chloride (PF)  10 mL Intravenous Q7 Days     sodium chloride (PF)  10 mL Intracatheter Q8H     sucralfate  1 g Oral BID     sulfamethoxazole-trimethoprim  1 tablet Oral Q Mon Wed Fri AM     tacrolimus  4 mg Oral or Feeding Tube BID IS     valGANciclovir  450 mg Oral or Feeding Tube Once per day on Mon Thu       Physical Exam      /87   Pulse 80   Temp 97.9  F (36.6  C)   Resp 16   Ht 1.626 m (5' 4\")   Wt 64.7 kg (142 lb 9.6 oz)   SpO2 96%   BMI 24.48 kg/m       GENERAL APPEARANCE: alert and no distress  HENT: mouth without ulcers or lesions.  RESP: lungs clear to auscultation - no rales, rhonchi or wheezes  CV: regular rhythm, normal rate, no rub, 2/6 systolic murmur  EDEMA: no LE edema bilaterally  ABDOMEN: soft, nondistended, nontender, bowel sounds normal  MS: extremities normal - no gross deformities noted, no evidence of inflammation in joints, no muscle tenderness  SKIN: no rash  TX KIDNEY: minimal TTP  DIALYSIS ACCESS: RUE AV fistula with good thrill    Data   All labs reviewed by me.  CMP  Recent Labs   Lab 03/10/20  0601 03/09/20  0546 03/08/20  0627 03/07/20  0624  03/05/20  0530    138 136 136   < > 134   POTASSIUM 4.1 3.8 3.8 3.5   < > 3.7   CHLORIDE 108 106 105 102   < > 101   CO2 22 23 23 25   < > 23   ANIONGAP 8 8 7 8   < > 9   * 136* 109* 105*   < > 116*   BUN 54* 65* 82* 101*   < > 107*   CR 3.66* 4.10* 4.91* 5.97*   < > 7.93*   GFRESTIMATED 19* 17* 13* 11*   < > 7*   GFRESTBLACK 22* 19* 15* 12*   < > 9*   ANTWAN 8.5 8.4* 8.2* 7.9*   < > 7.5*   MAG 2.5* 2.5* 2.4* 2.3   < > 2.1   PHOS 3.3 3.5 3.7 4.5   < > 4.9*   BILITOTAL  --   --   --   --   --  0.6    < > = values in this interval not displayed.     CBC  Recent Labs   Lab 03/10/20  0601 03/09/20  0546 03/08/20  0627 03/07/20  0624   HGB 7.1* 7.3* 7.3* 7.2*   WBC 8.9 6.6 5.3 4.6   RBC 2.43* 2.46* 2.42* 2.44*   HCT 22.2* 22.2* 22.1* 22.0*   MCV 91 90 91 90   MCH 29.2 29.7 30.2 29.5   MCHC 32.0 32.9 " 33.0 32.7   RDW 13.7 13.5 13.8 13.6    204 147* 109*     INR  Recent Labs   Lab 03/05/20  1327   INR 1.18*   PTT 30     ABG  No lab results found in last 7 days.   Urine Studies  No lab results found.  No lab results found.  PTH  Recent Labs   Lab Test 03/04/20  1018 03/03/20  0849 03/03/20  0729   PTHI 630* 668* Canceled, Test credited     Iron Studies  Recent Labs   Lab Test 03/03/20  0729   IRON 139   *   IRONSAT 98*   YOLIS 3,397*       IMAGING:  All imaging studies reviewed by me.

## 2020-03-10 NOTE — PROGRESS NOTES
VS: Afebrile, -130's/60-70's, HR 80's, O2 sat % on room air.  LDA: Right and left AV fistula intact, left PICC-SL catheter saline locked.  Neuro: A&Ox4.  GI/: Stein catheter with adequate urine output, no BM during the shift, passing gas.  Diet/appetite: Regular diet, fair PO intake.  Activity: Encourage ambulation 4 times a day.  Pain/Nausea/Vomiting: Incisional pain tolerable, no pain medication given during the shift.  Skin: Surgical incision with staples, c/d/i.  Mobility: Up ad dick.  Test/Procedure: None.  Plan: Discharge to [facility] today. Continue POC.

## 2020-03-10 NOTE — PLAN OF CARE
"/70 (BP Location: Left arm)   Pulse 90   Temp 98.2  F (36.8  C) (Oral)   Resp 16   Ht 1.626 m (5' 4\")   Wt 64.7 kg (142 lb 9.6 oz)   SpO2 100%   BMI 24.48 kg/m      2051-2305: Patient VSS on RA, afebrile. No c/o pain, nausea or SOB. Tolerating regular diet with good appetite. L PICC and PIV SL. Stein with good UO. LBM 3/9. Incision stapled. Up with SBA. Plans to discharge home tomorrow.   Will continue with POC and notify MD with changes or concerns.    "

## 2020-03-11 ENCOUNTER — INFUSION THERAPY VISIT (OUTPATIENT)
Dept: INFUSION THERAPY | Facility: CLINIC | Age: 45
End: 2020-03-11
Attending: INTERNAL MEDICINE
Payer: MEDICARE

## 2020-03-11 ENCOUNTER — TELEPHONE (OUTPATIENT)
Dept: TRANSPLANT | Facility: CLINIC | Age: 45
End: 2020-03-11

## 2020-03-11 ENCOUNTER — DOCUMENTATION ONLY (OUTPATIENT)
Dept: TRANSPLANT | Facility: CLINIC | Age: 45
End: 2020-03-11

## 2020-03-11 ENCOUNTER — OFFICE VISIT (OUTPATIENT)
Dept: INFUSION THERAPY | Facility: CLINIC | Age: 45
DRG: 652 | End: 2020-03-11
Attending: INTERNAL MEDICINE
Payer: MEDICARE

## 2020-03-11 VITALS
DIASTOLIC BLOOD PRESSURE: 84 MMHG | RESPIRATION RATE: 18 BRPM | SYSTOLIC BLOOD PRESSURE: 146 MMHG | HEART RATE: 82 BPM | TEMPERATURE: 98.5 F | OXYGEN SATURATION: 100 %

## 2020-03-11 DIAGNOSIS — Z94.0 KIDNEY REPLACED BY TRANSPLANT: ICD-10-CM

## 2020-03-11 DIAGNOSIS — Z94.0 KIDNEY REPLACED BY TRANSPLANT: Primary | ICD-10-CM

## 2020-03-11 DIAGNOSIS — R10.13 ABDOMINAL PAIN, EPIGASTRIC: Primary | ICD-10-CM

## 2020-03-11 LAB
ANION GAP SERPL CALCULATED.3IONS-SCNC: 8 MMOL/L (ref 3–14)
BASOPHILS # BLD AUTO: 0 10E9/L (ref 0–0.2)
BASOPHILS NFR BLD AUTO: 0.2 %
BUN SERPL-MCNC: 43 MG/DL (ref 7–30)
CALCIUM SERPL-MCNC: 8.9 MG/DL (ref 8.5–10.1)
CHLORIDE SERPL-SCNC: 108 MMOL/L (ref 94–109)
CO2 SERPL-SCNC: 21 MMOL/L (ref 20–32)
CREAT SERPL-MCNC: 3.23 MG/DL (ref 0.66–1.25)
DIFFERENTIAL METHOD BLD: ABNORMAL
EOSINOPHIL # BLD AUTO: 0.4 10E9/L (ref 0–0.7)
EOSINOPHIL NFR BLD AUTO: 3.7 %
ERYTHROCYTE [DISTWIDTH] IN BLOOD BY AUTOMATED COUNT: 13.6 % (ref 10–15)
GFR SERPL CREATININE-BSD FRML MDRD: 22 ML/MIN/{1.73_M2}
GLUCOSE SERPL-MCNC: 128 MG/DL (ref 70–99)
HCT VFR BLD AUTO: 22.8 % (ref 40–53)
HGB BLD-MCNC: 7.6 G/DL (ref 13.3–17.7)
IMM GRANULOCYTES # BLD: 0.2 10E9/L (ref 0–0.4)
IMM GRANULOCYTES NFR BLD: 1.5 %
LYMPHOCYTES # BLD AUTO: 0.3 10E9/L (ref 0.8–5.3)
LYMPHOCYTES NFR BLD AUTO: 2.6 %
MAGNESIUM SERPL-MCNC: 2.4 MG/DL (ref 1.6–2.3)
MCH RBC QN AUTO: 29.8 PG (ref 26.5–33)
MCHC RBC AUTO-ENTMCNC: 33.3 G/DL (ref 31.5–36.5)
MCV RBC AUTO: 89 FL (ref 78–100)
MONOCYTES # BLD AUTO: 0.6 10E9/L (ref 0–1.3)
MONOCYTES NFR BLD AUTO: 5.7 %
NEUTROPHILS # BLD AUTO: 8.5 10E9/L (ref 1.6–8.3)
NEUTROPHILS NFR BLD AUTO: 86.3 %
NRBC # BLD AUTO: 0 10*3/UL
NRBC BLD AUTO-RTO: 0 /100
PHOSPHATE SERPL-MCNC: 2.4 MG/DL (ref 2.5–4.5)
PLATELET # BLD AUTO: 351 10E9/L (ref 150–450)
POTASSIUM SERPL-SCNC: 4 MMOL/L (ref 3.4–5.3)
RBC # BLD AUTO: 2.55 10E12/L (ref 4.4–5.9)
SODIUM SERPL-SCNC: 137 MMOL/L (ref 133–144)
TACROLIMUS BLD-MCNC: 4.9 UG/L (ref 5–15)
TME LAST DOSE: ABNORMAL H
WBC # BLD AUTO: 9.8 10E9/L (ref 4–11)

## 2020-03-11 PROCEDURE — 96374 THER/PROPH/DIAG INJ IV PUSH: CPT

## 2020-03-11 PROCEDURE — 80048 BASIC METABOLIC PNL TOTAL CA: CPT | Performed by: INTERNAL MEDICINE

## 2020-03-11 PROCEDURE — 25800030 ZZH RX IP 258 OP 636: Mod: ZF | Performed by: INTERNAL MEDICINE

## 2020-03-11 PROCEDURE — 80197 ASSAY OF TACROLIMUS: CPT | Performed by: INTERNAL MEDICINE

## 2020-03-11 PROCEDURE — 84100 ASSAY OF PHOSPHORUS: CPT | Performed by: INTERNAL MEDICINE

## 2020-03-11 PROCEDURE — 25000128 H RX IP 250 OP 636: Mod: ZF | Performed by: INTERNAL MEDICINE

## 2020-03-11 PROCEDURE — G0463 HOSPITAL OUTPT CLINIC VISIT: HCPCS | Mod: 25

## 2020-03-11 PROCEDURE — 83735 ASSAY OF MAGNESIUM: CPT | Performed by: INTERNAL MEDICINE

## 2020-03-11 PROCEDURE — 85025 COMPLETE CBC W/AUTO DIFF WBC: CPT | Performed by: INTERNAL MEDICINE

## 2020-03-11 RX ORDER — ONDANSETRON 2 MG/ML
4 INJECTION INTRAMUSCULAR; INTRAVENOUS ONCE
Status: DISCONTINUED | OUTPATIENT
Start: 2020-03-11 | End: 2020-03-11 | Stop reason: CLARIF

## 2020-03-11 RX ORDER — L.ACIDOPHIL/L.PLANTAR/BIFIDO 7 15B CELL
1 CAPSULE ORAL DAILY
Qty: 30 CAPSULE | Refills: 0 | Status: SHIPPED | OUTPATIENT
Start: 2020-03-11 | End: 2020-03-25

## 2020-03-11 RX ORDER — ONDANSETRON 2 MG/ML
4 INJECTION INTRAMUSCULAR; INTRAVENOUS ONCE
Status: COMPLETED | OUTPATIENT
Start: 2020-03-11 | End: 2020-03-11

## 2020-03-11 RX ADMIN — SODIUM CHLORIDE 1000 ML: 9 INJECTION, SOLUTION INTRAVENOUS at 10:07

## 2020-03-11 RX ADMIN — ONDANSETRON 4 MG: 2 INJECTION INTRAMUSCULAR; INTRAVENOUS at 10:59

## 2020-03-11 NOTE — PROGRESS NOTES
This is a recent snapshot of the patient's [facility] medical record.  For current drug dose and complete information and questions, call [phone] or In Basket pool, [facility] home infusion ([id number])  CSN Number:  [id number]

## 2020-03-11 NOTE — PATIENT INSTRUCTIONS
Dear Jeremiah Cruz    Thank you for choosing [facility] ([facility]) for your transplant cares.  The following information is a summary of our appointment as well as important reminders.      Please make sure your phone is available today because I will call to update you with your anti-rejection drug levels and possibly make changes to your anti-rejection dosages.    Please  Probiotic at pharmacy on 1st floor prior to discharge.     Take Miralax twice Daily until bowel function improves.    Return to specialty infusion center tomorrow at 7:00AM for labs and assessment.     We look forward in seeing you on your next appointment here at [facility] ([facility]).  Please don t hesitate to call us at [phone] to reschedule any of your appointments or to speak with one of the [facility] registered nurses.  It was a pleasure taking care of you today.    Sincerely,    [facility]  [address] [place] [address]  [id number]  Phone:  [phone]

## 2020-03-11 NOTE — TELEPHONE ENCOUNTER
Post Kidney and Pancreas Transplant Team Conference  Date: 3/11/2020  Transplant Coordinator: Jody García     Attendees:  [x]  Dr. Varner  [x] Danuta Melo LPN     [x]  Dr. Wilkinson [x] Jody García RN [] Inna Stanley LPN   []  Dr. Victor [] Joan Barbosa, RN     [] Judy Garcia RN [x] Conner Downey, ArabellaD   [] Dr. Magana [x] Danelle Jaime, AURA    [] Dr. Benítez [] Nathan Eaton RN    [] Dr. Mcdaniels [] Elizabeth Ernst RN    [x] Dr. Bain [] Christi Leal RN     [] Sharee Burnham, AURA    [] Surgery Fellow [x] Cyndi Bush RN    [] Rebeca Lema NP [] Barbara Eugene RN        Verbal Plan Read Back:   No changes at this time    Routed to RN Coordinator   Danuta Melo LPN

## 2020-03-11 NOTE — PROGRESS NOTES
"Jeremiah Cruz came to [facility] today for a lab and assess following a Kidney transplant on 2/28/2020.      Discharge date: 3/10/2020  Transplant coordinator: Jody García  Phone number patient can be reached at: [phone]  Patient staying at [facility] nearby    Physical Assessment:  See physical assessment located under \"Document Flowsheets\".  Incision site: Abdominal ML incision CDI with staples intact. RN cleansed incision with wound cleanser and educated patient on process.  Lines: L arm PICC. Will need dressing change complete on 3/12. PICC will remain in place at least one month (placed on 3/5/20).  Stein: CDI, patient educated on catheter cares. Patient is recording urine output. Has had large amount of clear, yellow urine today. [facility]cc [facility].    Hydration: Patient drank approximately 2 liters of non-caffienated fluid in last 24 hours. Educated to increase intake. Patient sent with pitcher for measurement.   Nutrition: Appetite poor but improving. Patient has been struggling with nausea/vomiting and abdominal cramps. Patient taking home prescription of zofran. 1 dose of IVP zofran admin today after patient had [facility].    Last BM: LBM today, soft. Prior last BM was Monday, 3/9 after enema. Patient educated on importance of taking Senna and Miralax twice daily. After encouragement patient refused to fill medication box with Senna.  Pain: Pain rated 0/10 incisionally, however patient is having significant abdominal cramping, but could not rate on scale. Heat applied to abdomen was helpful, patient encouraged to keep up with activity goals.     Labs drawn by [facility] staff Yes    Plan of care for today:  - Lab draw   - RN assessment  - Physician assessment   - Transplant coordinator visit (working to set up either home health for labs vs. clinic visits).    - RU (Rosario Shane: [phone]) messaged regarding setting up rides for patient after [facility] discharge (from home in [place], [place] " to f/u appointments at [facility]).  Coordinator also reaching out to  regarding this matter.  - BP cuff checked for accuracy.  - Encouraged high fiber diet and increased use of stool softeners.     Plan for 3/12  - Pharmacy Visit: patient would like to set up mailing future meds.  - PICC dressing/cap change  - SW to visit?    Medication changes: Start Probiotic- sent to [facility] pharmacy, patient will  today.  - Carafate changed to twice daily AS NEEDED.       Medications administered: 1 liter Normal Saline, 4mg IVP Zofran.      Administrations This Visit     ondansetron (ZOFRAN) injection 4 mg     Admin Date  03/11/2020 Action  Given Dose  4 mg Route  Intravenous Administered By  Meghna Canada RN                Patient education:    The following teaching topics were addressed: Importance of drinking 2L of non-caffeinated fluids daily, Incisional care, Signs/symptoms of infection, Good handwashing, Medications (purposes, doses and times of administration), Phone numbers to call with concenrs (Transplant coordinator, [facility]), 7A discharge check list, Plan of care, Drain care and Stein cath care   Patient verbalized understanding and all questions answered.    Drug level:  Tacrolimus level today reviewed with Dr Wilkinson who gave orders to increase dose to 6mg twice daily.  Patient was updated with this information and verbalized understanding.    Face to face time: 120  Discharge Plan    Pt will follow up with [facility] tomorrow at 0700  Discharge instructions reviewed with patient: YES  Patient/Representative verbalized understanding, all questions answered: YES    Discharged from unit at 1210 with whom: independently to hotel.    Meghna Canada RN,

## 2020-03-11 NOTE — PROGRESS NOTES
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     # DDKT: creatinine slowly downtrending, now 3.2. Did have DGF but without dialysis indications now. Has AVF. Giving 1L NS today (with orthostatic vitals) and encouraged 6-8 bottles of water daily in addition to ongoing osm intake.    - Baseline Cr ~ remains to be seen    - Proteinuria: Not checked post transplant   - Date DSA Last Checked: Feb/2020      Latest DSA: No DSA at time of transplant   - BK Viremia: Not checked recently due to time from transplant   - Kidney Tx Biopsy: No    # Immunosuppression: Tacrolimus immediate release (goal 8-10), Mycophenolic acid (goal 1.0-3.5) and Prednisone (dose 5 mg daily)   - Changes: Yes - pending tacro level; continue myfortic 540mg bid    # Infection Prophylaxis:   - PJP: Sulfa/TMP (Bactrim) MWF  - CMV: Valcyte M/Th  - Thrush: None    # Hypertension: Orthostatic hypotension;  Goal BP: > 100, but < 130 systolic   - Volume status: Euvolemic to mildly hypovolemic  EDW~ 67.5kg   - Changes: Yes - give 1L NS. Diuretics stopped in hospital. Amlodipine 10mg daily and carvedilol 12.5mg bid.     # Anemia in Chronic Renal Disease: Hgb: Stable      DEB: No   - Iron studies: Replete    # Mineral Bone Disorder:   - Secondary renal hyperparathyroidism; PTH level: Significantly elevated (601-1200 pg/ml)        On treatment: Calcitriol  - Vitamin D; level: Low        On Supplement: will plan to also prescribe cholecalciferol   - Calcium; level: Normal        On Supplement: No  - Phosphorus; level: Low normal        On Supplement: No    # Electrolytes:   - Potassium; level: Normal        On Supplement: No  - Magnesium; level: Normal         On Supplement: No  - Bicarbonate; level: Low normal        On Supplement: No  - Sodium; level: Normal            # abdominal pain: Can't use PPI due to his dasatinib. On H2RA without improvemet. Stool burden on KUB done in hospital and noteworthy that he denies his former heartburn pain (and H pylori infxn), improvement with food (PUD). SIBO is possible and will prescribe probiotic as low-risk med with potential beneift. Constipation is likely. Will have pt restart miralax and continue senna. Discussed dietary fiber as well and will CTM symptoms pending additional BMs.     # CML: In remission. On dasatinib 40mg daily. Hematology recommended no changes.    # Medical Compliance: Yes    # Transplant History:  Etiology of Kidney Failure: Alport's syndrome  Tx: DDKT  Transplant: 2/28/2020 (Kidney), 12/7/1988 (Kidney), 3/2/2005 (Kidney)  Donor Type: Donation after Brain Death Donor Class:   Crossmatch at time of Tx: negative  DSA at time of Tx: No  Significant changes in immunosuppression: None  CMV IgG Ab High Risk Discordance (D+/R-): Yes  EBV IgG Ab High Risk Discordance (D+/R-): No  Significant transplant-related complications: None    Transplant Office Phone Number: [phone]    Assessment and plan was discussed with the patient and he voiced his understanding and agreement.    Return visit: tomorrow    Seen and discussed with Dr Wilkinson.     Ld Riggs MD  Renal Fellow      Chief Complaint   Mr. Cruz is a 44 year old here for kidney transplant.     History of Present Illness    The patient presents his first day out of the hospital post-kidney tx. He continues to have the abdominal issues he had inpt. He has low abd pain that he experiences post-BM, which he had this AM. The BM was soft but well formed. No fever. No heartburn pain. Some foods make it worse and foods don't make it better. No vomiting but some nausea. The pain is spasmodic pain. He hasn't been taking miralax since leaving yesterday.      He hate well, including chicken fingers. He drank 0.5L water inpt and then 2 bottles of 17oz of water outpt in the hotel.     No LE swelling, SOB, or CP. He reports occasional issues with lightheadedness with a BP drop upon standing today.     Recent Hospitalizations:  [] No [x] Yes    New Medical Issues: [] No [x] Yes    Decreased energy: [x] No [] Yes    Chest pain or SOB with exertion:  [x] No [] Yes    Appetite change or weight change: [x] No [] Yes    Nausea, vomiting or diarrhea:  [] No [x] Yes    Fever, sweats or chills: [x] No [] Yes    Leg swelling: [x] No [] Yes      Home BP: not discussed    Review of Systems   A comprehensive review of systems was obtained and negative, except as noted in the HPI or PMH.    Problem List   Patient Active Problem List   Diagnosis     Alport's syndrome     Anemia in chronic kidney disease     Secondary renal hyperparathyroidism (H)     Hypertension     CML (chronic myelocytic leukemia) (H)     End stage kidney disease (H)     GERD (gastroesophageal reflux disease)     Prurigo nodularis     Senile sebaceous gland hyperplasia     Immunosuppressed status (H)     Kidney replaced by transplant     Delayed graft function of kidney     Hyperkalemia     History of difficult venous access     History of chronic myeloid leukemia     Postoperative ileus (H)     Steroid-induced hyperglycemia       Social History   Social History     Tobacco Use     Smoking status: Former Smoker     Packs/day: 1.00     Years: 12.00     Pack years: 12.00     Types: Cigarettes     Last attempt to quit: 2011     Years since quittin.0     Smokeless tobacco: Never Used   Substance Use Topics     Alcohol use: No     Alcohol/week: 0.0 standard drinks     Drug use: No       Allergies   Allergies   Allergen Reactions     Blood Transfusion Related (Informational Only) Other (See Comments)     Patient has a history of a clinically significant antibody against RBC antigens.  A delay in compatible RBCs  may occur.     Cephalosporins Other (See Comments) and Rash     fever     Compazine [Prochlorperazine]      Gammagard [Immune Globulin] Other (See Comments)     Ed got spinal meningitis and MD stated to never get again for fear of death.       Penicillins      Vancomycin        Medications   Current Outpatient Medications   Medication Sig     Probiotic Product (UP4 PROBIOTICS ADULT) CAPS Take 1 capsule by mouth daily     acetaminophen (TYLENOL) 325 MG tablet Take 1-2 tablets (325-650 mg) by mouth every 6 hours as needed for mild pain or fever     amLODIPine (NORVASC) 10 MG tablet Take 1 tablet (10 mg) by mouth daily     atorvastatin (LIPITOR) 10 MG tablet Take 1 tablet (10 mg) by mouth daily     calcitRIOL (ROCALTROL) 0.25 MCG capsule Take 1 capsule (0.25 mcg) by mouth daily     calcium carbonate (TUMS) 500 MG chewable tablet Take 1 tablet (500 mg) by mouth 3 times daily as needed for heartburn Take 1 chewable tablet by mouth 3 times daily with meals.     carvedilol (COREG) 12.5 MG tablet Take 1 tablet (12.5 mg) by mouth 2 times daily (with meals)     chlorhexidine (HIBICLENS) 4 % liquid Cleanse scalp twice a week as directed.     dasatinib (SPRYCEL) 20 MG tablet CHEMO Take 40 mg by mouth daily      famotidine (PEPCID) 40 MG tablet Take 1 tablet (40 mg) by mouth daily     ketoconazole (NIZORAL) 2 % external shampoo APPLY TO THE SCALP AND WASH OFF ATER 5 MINUTES EVERY OTHER DAY     MYFORTIC (BRAND) 180 MG EC tablet Take 3 tablets (540 mg) by mouth 2 times daily     ondansetron (ZOFRAN-ODT) 4 MG ODT tab Take 1 tablet (4 mg) by mouth every 8 hours as needed for nausea or vomiting     polyethylene glycol (MIRALAX) packet Take 17 g by mouth 2 times daily Hold for loose stools     predniSONE (DELTASONE) 5 MG tablet Take 1 tablet (5 mg) by mouth daily     senna-docusate (SENOKOT-S/PERICOLACE) 8.6-50 MG tablet Take 1-2 tablets by mouth 2 times daily May decrease dose or HOLD if having diarrhea     sucralfate (CARAFATE) 1  GM/10ML suspension Take 10 mLs (1 g) by mouth 2 times daily     sulfamethoxazole-trimethoprim (BACTRIM) 400-80 MG tablet Take 1 tablet by mouth Every Mon, Wed, Fri Morning     tacrolimus (GENERIC EQUIVALENT) 0.5 MG capsule Use a combination of 1 mg and 0.5 mg capsules for a total dose of 2.5 mg by mouth twice daily.     tacrolimus (GENERIC EQUIVALENT) 1 MG capsule 4 capsules (4 mg) by Oral or Feeding Tube route 2 times daily     valGANciclovir (VALCYTE) 450 MG tablet Take 1 tablet (450 mg) by mouth twice a week Titrate dose up to a max of 2 tabs (900 mg) by mouth daily when directed by your transplant team.     No current facility-administered medications for this visit.      There are no discontinued medications.    Physical Exam   Vital Signs: There were no vitals taken for this visit.    GENERAL APPEARANCE: alert and no distress  HENT: mouth without ulcers or lesions  LYMPHATICS: no cervical or supraclavicular nodes  CV: regular rhythm, normal rate, no rub, no murmur  EDEMA: no LE edema bilaterally  ABDOMEN: soft, NTND, surgical wound c/d/i  MS: extremities normal - no gross deformities noted, no evidence of inflammation in joints, no muscle tenderness  SKIN: no rash    Data     Renal Latest Ref Rng & Units 3/11/2020 3/10/2020 3/9/2020   Na 133 - 144 mmol/L 137 138 138   K 3.4 - 5.3 mmol/L 4.0 4.1 3.8   Cl 94 - 109 mmol/L 108 108 106   CO2 20 - 32 mmol/L 21 22 23   BUN 7 - 30 mg/dL 43(H) 54(H) 65(H)   Cr 0.66 - 1.25 mg/dL 3.23(H) 3.66(H) 4.10(H)   Glucose 70 - 99 mg/dL 128(H) 129(H) 136(H)   Ca  8.5 - 10.1 mg/dL 8.9 8.5 8.4(L)   Mg 1.6 - 2.3 mg/dL 2.4(H) 2.5(H) 2.5(H)     Bone Health Latest Ref Rng & Units 3/11/2020 3/10/2020 3/9/2020   Phos 2.5 - 4.5 mg/dL 2.4(L) 3.3 3.5   PTHi 18 - 80 pg/mL - - -   Vit D Def 20 - 75 ug/L - - -     Heme Latest Ref Rng & Units 3/11/2020 3/10/2020 3/9/2020   WBC 4.0 - 11.0 10e9/L 9.8 8.9 6.6   Hgb 13.3 - 17.7 g/dL 7.6(L) 7.1(L) 7.3(L)   Plt 150 - 450 10e9/L 351 258 204     Liver  Latest Ref Rng & Units 3/5/2020 2/28/2020 1/26/2017   AP 40 - 150 U/L - 108 59   TBili 0.2 - 1.3 mg/dL 0.6 0.6 0.4   DBili 0.0 - 0.2 mg/dL 0.1 - -   ALT 0 - 70 U/L - 48 34   AST 0 - 45 U/L - 30 25   Tot Protein 6.8 - 8.8 g/dL - 7.9 7.0   Albumin 3.4 - 5.0 g/dL - 4.0 3.7     Pancreas Latest Ref Rng & Units 2/28/2020   A1C 0 - 5.6 % 5.2     Iron studies Latest Ref Rng & Units 3/3/2020 3/8/2005   Iron 35 - 180 ug/dL 139 43   Iron sat 15 - 46 % 98(H) -   Ferritin 26 - 388 ng/mL 3,397(H) -     [facility] Txp Virology Latest Ref Rng & Units 1/26/2017 3/1/2005 2/26/2005   CMV IgG EU/mL - 1.4 1.4   BK Spec - Plasma - -   BK Res BKNEG copies/mL BK Virus DNA Not Detected - -   BK Log <2.7 Log copies/mL Not Calculated   The Real-Time quantitative BK Virus assay was developed and its performance   characteristics determined by the Infectious Diseases Diagnostic Laboratory at   the [facility] in [place], [place]. The   primers and probes for each analyte are Analyte Specific Reagents (ASRs)   manufactured by [employer].   ASRs are used in many laboratory tests necessary for standard medical care and   generally do not require U.S. Food and Drug Administration approval. The FDA   has determined that such clearance or approval is not necessary.   This test is used for clinical purposes. It should not be regarded as   investigational or for research. This laboratory is certified under the   Clinical Laboratory Improvement Amendments of 1988 (CLIA-88) as qualified to   perform high complexity clinical laboratory testing.   - -   EBV IgG - - 5.35 5.18   Hep B Core NR Nonreactive - -   Hep B Surf NEG - Positive, Patient is considered to be immune to infection with hepatitis B.(A) Positive, Patient is considered to be immune to infection with hepatitis B.(A)   HIV 1&2 NEG - Negative -        Recent Labs   Lab Test 03/07/20  0624 03/09/20  0546 03/10/20  0601   DOSTAC Not Provided Not Provided Not Provided    TACROL 4.2* 4.0* 4.8*         Patient was seen and evaluated by me, Nehemiah Wilkinson MD. I have reviewed the note and agree with the the plan of care as documented by the fellow.

## 2020-03-12 ENCOUNTER — TELEPHONE (OUTPATIENT)
Dept: TRANSPLANT | Facility: CLINIC | Age: 45
End: 2020-03-12

## 2020-03-12 ENCOUNTER — OFFICE VISIT (OUTPATIENT)
Dept: INFUSION THERAPY | Facility: CLINIC | Age: 45
End: 2020-03-12
Attending: INTERNAL MEDICINE
Payer: MEDICARE

## 2020-03-12 ENCOUNTER — OFFICE VISIT (OUTPATIENT)
Dept: PHARMACY | Facility: CLINIC | Age: 45
End: 2020-03-12
Payer: MEDICAID

## 2020-03-12 VITALS
HEART RATE: 78 BPM | DIASTOLIC BLOOD PRESSURE: 80 MMHG | SYSTOLIC BLOOD PRESSURE: 128 MMHG | RESPIRATION RATE: 16 BRPM | TEMPERATURE: 99 F | BODY MASS INDEX: 24.89 KG/M2 | WEIGHT: 145 LBS

## 2020-03-12 DIAGNOSIS — Z94.0 KIDNEY REPLACED BY TRANSPLANT: Primary | ICD-10-CM

## 2020-03-12 DIAGNOSIS — K59.00 CONSTIPATION, UNSPECIFIED CONSTIPATION TYPE: ICD-10-CM

## 2020-03-12 DIAGNOSIS — E55.9 HYPOVITAMINOSIS D: Primary | ICD-10-CM

## 2020-03-12 DIAGNOSIS — Z94.0 KIDNEY REPLACED BY TRANSPLANT: ICD-10-CM

## 2020-03-12 LAB
ALBUMIN UR-MCNC: NEGATIVE MG/DL
ANION GAP SERPL CALCULATED.3IONS-SCNC: 7 MMOL/L (ref 3–14)
APPEARANCE UR: CLEAR
BASOPHILS # BLD AUTO: 0 10E9/L (ref 0–0.2)
BASOPHILS NFR BLD AUTO: 0.2 %
BILIRUB UR QL STRIP: NEGATIVE
BUN SERPL-MCNC: 32 MG/DL (ref 7–30)
CALCIUM SERPL-MCNC: 8.9 MG/DL (ref 8.5–10.1)
CHLORIDE SERPL-SCNC: 110 MMOL/L (ref 94–109)
CO2 SERPL-SCNC: 22 MMOL/L (ref 20–32)
COLOR UR AUTO: ABNORMAL
CREAT SERPL-MCNC: 2.77 MG/DL (ref 0.66–1.25)
DIFFERENTIAL METHOD BLD: ABNORMAL
EOSINOPHIL # BLD AUTO: 0.4 10E9/L (ref 0–0.7)
EOSINOPHIL NFR BLD AUTO: 3.8 %
ERYTHROCYTE [DISTWIDTH] IN BLOOD BY AUTOMATED COUNT: 13.7 % (ref 10–15)
GFR SERPL CREATININE-BSD FRML MDRD: 27 ML/MIN/{1.73_M2}
GLUCOSE SERPL-MCNC: 104 MG/DL (ref 70–99)
GLUCOSE UR STRIP-MCNC: >499 MG/DL
HCT VFR BLD AUTO: 22.1 % (ref 40–53)
HGB BLD-MCNC: 7.2 G/DL (ref 13.3–17.7)
HGB UR QL STRIP: ABNORMAL
IMM GRANULOCYTES # BLD: 0.2 10E9/L (ref 0–0.4)
IMM GRANULOCYTES NFR BLD: 1.6 %
KETONES UR STRIP-MCNC: NEGATIVE MG/DL
LEUKOCYTE ESTERASE UR QL STRIP: ABNORMAL
LYMPHOCYTES # BLD AUTO: 0.3 10E9/L (ref 0.8–5.3)
LYMPHOCYTES NFR BLD AUTO: 2.8 %
MAGNESIUM SERPL-MCNC: 2.1 MG/DL (ref 1.6–2.3)
MCH RBC QN AUTO: 29.1 PG (ref 26.5–33)
MCHC RBC AUTO-ENTMCNC: 32.6 G/DL (ref 31.5–36.5)
MCV RBC AUTO: 90 FL (ref 78–100)
MONOCYTES # BLD AUTO: 0.6 10E9/L (ref 0–1.3)
MONOCYTES NFR BLD AUTO: 5.6 %
NEUTROPHILS # BLD AUTO: 8.9 10E9/L (ref 1.6–8.3)
NEUTROPHILS NFR BLD AUTO: 86 %
NITRATE UR QL: NEGATIVE
NRBC # BLD AUTO: 0 10*3/UL
NRBC BLD AUTO-RTO: 0 /100
PH UR STRIP: 7 PH (ref 5–7)
PHOSPHATE SERPL-MCNC: 2.4 MG/DL (ref 2.5–4.5)
PLATELET # BLD AUTO: 360 10E9/L (ref 150–450)
POTASSIUM SERPL-SCNC: 4.3 MMOL/L (ref 3.4–5.3)
RBC # BLD AUTO: 2.47 10E12/L (ref 4.4–5.9)
RBC #/AREA URNS AUTO: 3 /HPF (ref 0–2)
SODIUM SERPL-SCNC: 139 MMOL/L (ref 133–144)
SOURCE: ABNORMAL
SP GR UR STRIP: 1.01 (ref 1–1.03)
TACROLIMUS BLD-MCNC: 5.9 UG/L (ref 5–15)
TME LAST DOSE: NORMAL H
UROBILINOGEN UR STRIP-MCNC: 0 MG/DL (ref 0–2)
WBC # BLD AUTO: 10.4 10E9/L (ref 4–11)
WBC #/AREA URNS AUTO: 7 /HPF (ref 0–5)

## 2020-03-12 PROCEDURE — G0463 HOSPITAL OUTPT CLINIC VISIT: HCPCS

## 2020-03-12 PROCEDURE — 25800030 ZZH RX IP 258 OP 636: Mod: ZF | Performed by: INTERNAL MEDICINE

## 2020-03-12 PROCEDURE — 84100 ASSAY OF PHOSPHORUS: CPT | Performed by: INTERNAL MEDICINE

## 2020-03-12 PROCEDURE — 85025 COMPLETE CBC W/AUTO DIFF WBC: CPT | Performed by: INTERNAL MEDICINE

## 2020-03-12 PROCEDURE — 36415 COLL VENOUS BLD VENIPUNCTURE: CPT

## 2020-03-12 PROCEDURE — 99605 MTMS BY PHARM NP 15 MIN: CPT | Performed by: PHARMACIST

## 2020-03-12 PROCEDURE — 80048 BASIC METABOLIC PNL TOTAL CA: CPT | Performed by: INTERNAL MEDICINE

## 2020-03-12 PROCEDURE — 80197 ASSAY OF TACROLIMUS: CPT | Performed by: INTERNAL MEDICINE

## 2020-03-12 PROCEDURE — 83735 ASSAY OF MAGNESIUM: CPT | Performed by: INTERNAL MEDICINE

## 2020-03-12 PROCEDURE — 81001 URINALYSIS AUTO W/SCOPE: CPT | Performed by: INTERNAL MEDICINE

## 2020-03-12 PROCEDURE — 99607 MTMS BY PHARM ADDL 15 MIN: CPT | Performed by: PHARMACIST

## 2020-03-12 RX ORDER — CHOLECALCIFEROL (VITAMIN D3) 50 MCG
1 TABLET ORAL DAILY
Qty: 90 TABLET | Refills: 3 | Status: SHIPPED | OUTPATIENT
Start: 2020-03-12 | End: 2021-01-01

## 2020-03-12 RX ORDER — VALGANCICLOVIR 450 MG/1
450 TABLET, FILM COATED ORAL DAILY
Qty: 90 TABLET | Refills: 3 | Status: SHIPPED | OUTPATIENT
Start: 2020-03-12 | End: 2020-01-01

## 2020-03-12 RX ADMIN — SODIUM CHLORIDE 1000 ML: 9 INJECTION, SOLUTION INTRAVENOUS at 09:50

## 2020-03-12 NOTE — PATIENT INSTRUCTIONS
Dear Jeremiah Cruz    Thank you for choosing [facility] ([facility]) for your transplant cares.  The following information is a summary of our appointment as well as important reminders.      Please make sure your phone is available today because I will call to update you with your anti-rejection drug levels and possibly make changes to your anti-rejection dosages.    Change Valcyte to daily, and begin taking vitamin D daily.  vitamin D at [facility] pharmacy on main floor.     To set up transportation for f/u appointments : [phone]    Return to [facility] at 0700 tomorrow, 3/13 for labs and assessment. Catheter will also be removed at tomorrow's appointment.    We look forward in seeing you on your next appointment here at [facility] ([facility]).  Please don t hesitate to call us at [phone] to reschedule any of your appointments or to speak with one of the [facility] registered nurses.  It was a pleasure taking care of you today.    Sincerely,    [facility]  [address] [place] [address]  [address]  Phone:  [phone]

## 2020-03-12 NOTE — PROGRESS NOTES
"Jeremiah Cruz came to [facility] today for a lab and assess following a Kidney transplant on 2/28/2020.      Discharge date: 3/10/2020  Transplant coordinator: Jody García  Phone number patient can be reached at: [phone]      Physical Assessment:  See physical assessment located under \"Document Flowsheets\".  Incision site: Abdominal midline incision CDI with staples intact. Patient cleansing incision daily and monitoring for signs of infection.  Lines: L arm PICC. Dressing and cap change complete today. Will be due again on 3/19/20.   Stein: CDI, patient performing catheter care daily.  Urine clarity: Clear/yellow, patient did not bring urine amounts with him today but states he has emptied full bag several times/day and output has increased.  Hydration: Patient drank 2-3L fluid in last 24 hours.   Nutrition: Appetite continues to improve. Patient states Nausea is also improved from yesterday. Patient did have one emesis at hotel yesterday evening.   Last BM: 3 soft BM's yesterday. Patient taking 1 tab senna BID and also taking Mirilax.  Pain: 0/10. Patient is not experiencing abdominal cramping today.      Labs drawn by [facility] staff Yes    Plan of care for today:   - Lab draw/RN assessment.  - Physician assessment.  - Specialty pharmacist to visit patient and help set up mailing medication.  - PICC dressing/cap change complete.  -  supplied Medicare phone number for patient to set up future appointment transportation : [phone] Patient comfortable calling to set up.  - Medication box checked for accuracy with changes from yesterday.  - Coordinator still working on setting up either [facility] or clinic visits for future lab draws/PICC care.  - UA sent to lab.     3/13 Plan:  - Remove Stein.  - Follow up with lab draw/PICC care plan post discharge. Patient's sister's house (where patient will go after discharge) is near clinic. Patient stated he would like to go to clinic for labs/PICC care. mohit Verainator is " working on this.     Medication changes:   - Change Valcyte to 450mg DAILY. Updated on med card and in med box.   - Start Vitamin D, 2000units daily. Updated on med card. Patient picking up from [facility] pharmacy today and will put in medication box.    Medications administered: 1 liter Normal Saline.      Patient education:    The following teaching topics were addressed: Importance of drinking 2L of non-caffeinated fluids daily, Incisional care, Signs/symptoms of infection, Good handwashing and Stein cath care   Patient verbalized understanding and all questions answered.    Drug level:  Tacrolimus level today reviewed with Dr Wilkinson who gave orders to continue same dose (6mg BID).  Patient was updated with this information and verbalized understanding.    Face to face time: 60  Discharge Plan    Pt will follow up with [facility] tomorrow at 0700.  Discharge instructions reviewed with patient: YES  Patient/Representative verbalized understanding, all questions answered: YES    Discharged from unit at 1140 with whom: independently to [facility].    Meghna Canada RN

## 2020-03-12 NOTE — PATIENT INSTRUCTIONS
Recommendations from today's MTM visit:                                                      1. Move your Amlodipine to the evening, this will work better for your blood pressure if taken before bed.    2. Try to avoid taking Sucralfate within 2-4 hours of your transplant medications if you can.    3. [employer] mail order will call you three weeks post discharge. If you are running low on any medications before then, call the number on the back of the pill box. ([phone])    It was great to speak with you today.  I value your experience and would be very thankful for your time with providing feedback on our clinic survey. You may receive a survey via email or text message in the next few days.     Next MTM visit: 2 months    To schedule another MTM appointment, please call the clinic directly or you may call the MTM scheduling line at [phone] or toll-free at [phone].     My Clinical Pharmacist's contact information:                                                      It was a pleasure talking with you today!  Please feel free to contact me with any questions or concerns you have.      Conner Downey, PharmD  MTM Pharmacist    Phone: [phone]

## 2020-03-12 NOTE — LETTER
PHYSICIAN ORDERS      DATE & TIME ISSUED: 2020 3:06 PM updated skilled home care nurse    PATIENT NAME: Jeremiah Cruz   : 1975     [facility] MR# [if applicable]: [id number]     DIAGNOSIS: kidney transplant   ICD-10 CODE: z94.0     Skilled RN Home care nurse visits   To obtain transplant  Labs twice per week - via picc line   Assess abdominal  incision    Assist and teach  Patient to monitor temp BP weight   Assist and teach  Medication Management      Documentation of Face to Face and Certification for Home Health Services    I certify that patient: Jeremiah Cruz is under my care and that I, or a nurse practitioner or physician's assistant working with me, had a face-to-face encounter that meets the physician face-to-face encounter requirements with this patient on: 3/13/2020.    This encounter with the patient was in whole, or in part, for the following medical condition, which is the primary reason for home health care: kidney transplant     I certify that, based on my findings, the following services are medically necessary home health services: Nursing.    My clinical findings support the need for the above services because: Nurse is needed: For complex aftercare of surgical procedures because the patient needs instruction and cannot perform care on their own due to: Post op  cares ..    Further, I certify that my clinical findings support that this patient is homebound (i.e. absences from home require considerable and taxing effort and are for medical reasons or [employer] services or infrequently or of short duration when for other reasons) because: Leaving home is medically contraindicated for the following reason(s): Infection risk / immunocompromised state where it is safer for them to receive services in the home...    Based on the above findings. I certify that this patient is confined to the home and needs intermittent skilled nursing care, physical therapy and/or speech therapy.  The  patient is under my care, and I have initiated the establishment of the plan of care.  This patient will be followed by a physician who will periodically review the plan of care.  Physician/Provider to provide follow up care: Aaron Michele    Attending [facility] physician (the Medicare certified PECOS provider):  Date: 3/16/2020       Any questions please call [phone].      Dr Nehemiah Wilkinson  Transplant nephrology

## 2020-03-12 NOTE — PROGRESS NOTES
MTM ENCOUNTER  SUBJECTIVE/OBJECTIVE:                           Jeremiah Cruz is a 44 year old male coming in for a transitions of care visit. He was discharged from [facility] on 3/10 for kidney transplant.      Chief Complaint: initial post txp med review.    Allergies/ADRs: Reviewed in Epic  Tobacco:  reports that he quit smoking about 9 years ago. His smoking use included cigarettes. He has a 12.00 pack-year smoking history. He has never used smokeless tobacco.  Alcohol: not currently using  Caffeine: no caffeine, gets heartburn if he drinks pop.   PMH: Reviewed in Epic    Medication Adherence/Access: Patient uses pill box(es).  Patient takes medications 2 time(s) per day. 8am and 8pm  Per patient, misses medication 0 times per week.   Medication barriers: none.   The patient fills medications at [place]: YES.  Refills: discussed  Pt seen in [facility] today: yes  Patient seen in hospital by [facility]: yes  Patient receive supplies: yes  Stools: crampy, painful- started probiotic, seemed to help. Soft consistently 3 BMs yesterday.  Reviewed Anti-rejection doses with bottles: no, using med card.     Renal Transplant:  Current immunosuppressants include Tacrolimus 6mg BID (0-6 months post tx, goal 8-10), Myfortic 540mg BID and Prednisone 5mg daily.  Pt reports switching to Myfrotic due to nausea and heartburn, has improved since switching to Myfortic.   Transplant date: 2/28/20 3rd txp. Others 1988, 2005.  Estimated Creatinine Clearance: 32 mL/min (A) (based on SCr of 2.77 mg/dL (H)).  CMV prophylaxis: Valcyte Donor (+), Recipient (-), treat 6 months post tx   PCP prophylaxis: Bactrim S S daily  PPI use: Famotidine 40mg daily, Tums PRN. . Certain foods causing heartburn: sugary, salty. Has Sucralfate, but has not been using.   Current supplements for electrolyte replacement: Calcium.D BID .  Tx Coordinator: Jody García Tx MD: Dr. Wilkinson, Using Med Card: Yes  BP: Amlodipine taking 5mg qAM.   BP Readings from Last 3 Encounters:    03/12/20 128/80   03/11/20 (!) 146/84   03/10/20 124/87   Immunizations: annual flu shot unknown; Illeyndtdq15:  2017; Prevnar 13: 2017; TDaP:  2016; Shingrix: unknown    Constipation: Senna prn, used one this morning. Had 3 soft BMs yesterday. Was taking 2 BID. Using probiotic once daily and Miralax as well. Stools improved his cramping and abdominal pain.     Today's Vitals: There were no vitals taken for this visit.      ASSESSMENT:                            Medication Adherence: good, no issues identified    Renal Transplant:  Pt instructed to take Amlodipine in the evening. Pt currently not taking Sucralfate, but if he does need it, recommended he avoid giving it within 2-4 hours of transplant meds to avoid possible absorption interactions.     Constipation: Improved.    PLAN:                          Post Discharge Medication Reconciliation Status: discharge medications reconciled and changed, per note/orders (see AVS).    Pt to...  1. Take Amlodipine 5mg qPM.  2. Avoid using Sucralfate within 2-4 hours of transplant medications. Pt has not needed this medication so far.     I spent 30 minutes with this patient today. I offer these suggestions for consideration by txp team. A copy of the visit note was provided to the patient's referring provider.    Will follow up in 2 months.    The patient was given a summary of these recommendations.     Conner Downey, PharmD  [facility] Pharmacist    Phone: [phone]

## 2020-03-12 NOTE — LETTER
OUTPATIENT LABORATORY TEST ORDER    Patient: Jeremiah Cruz    Transplant Date: 2/28/2020  YOB: 1975              MRN: [id number]   Issued Date/Time: 03/03/20  1:50 PM         Expiration Date: 2/28/2021    Diagnosis: Kidney Transplant (ICD-10 Z94.0)    Aftercare following organ transplant (ICD-10 Z48.288)    Long term use of medications (ICD-10 Z79.899)      Lab results to be available on the same day drawn    Patient should release information to the [facility] [place], Transplant Center.     Please fax all results to [phone]    First 8 weeks post-transplant (2/28/2020 - 4/28/2020)  Labs 2x/week (Monday and Thursday)    CBC with platelets    Basic Metabolic Panel (Sodium, Potassium, Chloride, Creatinine, CO2, Urea Nitrogen, glucose, Calcium)    Tacrolimus/Prograf/ drug level send to [facility]   Labs weekly (Monday)    Phosphorus, magnesium  Monthly   BK PCR QR   Please draw routine transplant labs via PICC line  Standard Cares per your institution       PICC line placed for blood draws -   Jeremiah Cruz instructed to call for lab appointment  And nurse appointment     Questions call [phone] option 5   Jody/Danuta     .

## 2020-03-12 NOTE — TELEPHONE ENCOUNTER
Issue labs drawn via PICC line   Jeremiah Cruz lives 1/2 mile from this lab   He should be instructed to call # below make a nurse and lab draw appointment    [facility] [place]  [address]   [phone]    Nurse Fax [phone]          OUTPATIENT LABORATORY TEST ORDER    Patient: Jeremiah Cruz    Transplant Date: 2/28/2020  YOB: 1975              MRN: [id number]   Issued Date/Time: 03/03/20  1:50 PM         Expiration Date: 2/28/2021    Diagnosis: Kidney Transplant (ICD-10 Z94.0)    Aftercare following organ transplant (ICD-10 Z48.288)    Long term use of medications (ICD-10 Z79.899)      Lab results to be available on the same day drawn    Patient should release information to the [facility] [place], Transplant Center.     Please fax all results to [phone]    First 8 weeks post-transplant (2/28/2020 - 4/28/2020)  Labs 2x/week (Monday and Thursday)    CBC with platelets    Basic Metabolic Panel (Sodium, Potassium, Chloride, Creatinine, CO2, Urea Nitrogen, glucose, Calcium)    Tacrolimus/Prograf/ drug level send to [facility]   Labs weekly (Monday)    Phosphorus, magnesium  Monthly   BK PCR QR   Please draw routine transplant labs via PICC line  Standard Cares per your institution       PICC line placed for blood draws -   Jeremiah Cruz instructed to call for lab appointment  And nurse appointment     Questions call [phone] option 5   Jody/Danuta     .

## 2020-03-12 NOTE — PROGRESS NOTES
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      # DDKT: creatinine slowly downtrending, now 2.8. Did have DGF but without dialysis indications now. Has AVF. Giving 1L NS again 3/12 after also giving 1L NS 3/11 for orthostatic vitals and encouraged 6-8 bottles of water daily in addition to ongoing osm intake. Checking UA/UC with Stein and stent in place. Encouraged even more PO intake of fluid and osms given orthostasis (as below).               - Baseline Cr ~ remains to be seen               - Proteinuria: Not checked post transplant              - Date DSA Last Checked: Feb/2020      Latest DSA: No DSA at time of transplant              - BK Viremia: Not checked recently due to time from transplant              - Kidney Tx Biopsy: No     # Immunosuppression: Tacrolimus immediate release (goal 8-10), Mycophenolic acid (goal 1.0-3.5) and Prednisone (dose 5 mg daily)              - Changes: Yes - pending tacro level but yesterday increased dose from 4.5mg to 6mg bid yesterday; continue myfortic 540mg bid     # Infection Prophylaxis:   - PJP: Sulfa/TMP (Bactrim) MWF  - CMV: Valcyte - increased t 450mg daily given ongoing creatinine improvement (discordant for CMV)   - Thrush: None     # Hypertension: Orthostatic hypotension;     Goal BP: > 100, but < 130 systolic              - Volume status: Euvolemic to mildly hypovolemic                EDW~ 67.5kg              - Changes: Give 1L NS. Diuretics stopped in hospital. Amlodipine 10mg daily and carvedilol 12.5mg bid not changed.      # Anemia in Chronic Renal Disease: Hgb: Stable      DEB: No              - Iron studies: Replete     # Mineral Bone Disorder:   -  Secondary renal hyperparathyroidism; PTH level: Significantly elevated (601-1200 pg/ml)        On treatment: Calcitriol  - Vitamin D; level: Low        On Supplement: start cholecalciferol 2000 units daily   - Calcium; level: Normal        On Supplement: No  - Phosphorus; level: Low normal        On Supplement: No     # Electrolytes:   - Potassium; level: Normal        On Supplement: No  - Magnesium; level: Normal        On Supplement: No  - Bicarbonate; level: Low normal        On Supplement: No  - Sodium; level: Normal             # abdominal pain: Can't use PPI due to his dasatinib. On H2RA without improvemet. Stool burden on KUB done in hospital and noteworthy that he denies his former heartburn pain (and H pylori infxn), improvement with food (PUD). SIBO is possible and will prescribe probiotic as low-risk med with potential beneift. Constipation is likely and pt now improved with 3 BMs (though pt thinks the first day of probiotics was the solution) - continue both bowel regimen and probiotics.      # CML: In remission. On dasatinib 40mg daily. Hematology recommended no changes.     # Medical Compliance: Yes     # Transplant History:  Etiology of Kidney Failure: Alport's syndrome  Tx: DDKT  Transplant: 2/28/2020 (Kidney), 12/7/1988 (Kidney), 3/2/2005 (Kidney)  Donor Type: Donation after Brain Death        Donor Class:   Crossmatch at time of Tx: negative  DSA at time of Tx: No  Significant changes in immunosuppression: None  CMV IgG Ab High Risk Discordance (D+/R-): Yes  EBV IgG Ab High Risk Discordance (D+/R-): No  Significant transplant-related complications: None     Transplant Office Phone Number: [phone]     Assessment and plan was discussed with the patient and he voiced his understanding and agreement.     Return visit: tomorrow     Seen and discussed with Dr Wilkinson.      Ld Riggs MD  Renal Fellow      Chief Complaint   Mr. Cruz is a 44 year old here for kidney transplant.     History  of Present Illness    The patient had 3 BMs yesterday and abdominal pain is gone. He did have emesis in [facility] yesterday and states that he vomited immediately after eating very spicy chinese food last night. No other N/V. No fever. No issues with surgical site. He believes the probiotic begun yesterday is the reason his abdominal pain has resolved.     He ate a quarter steak for dinner and a few shrimp. He drank about 77 oz of fluids.     No lightheadedness. No SOB. No LE swelling.     Stein in place.     Recent Hospitalizations:  [x] No [] Yes    New Medical Issues: [x] No [] Yes    Decreased energy: [x] No [] Yes    Chest pain or SOB with exertion:  [x] No [] Yes    Appetite change or weight change: [x] No [] Yes    Nausea, vomiting or diarrhea:  [] No [x] Yes    Fever, sweats or chills: [x] No [] Yes    Leg swelling: [x] No [] Yes      Home BP: not discussed     Review of Systems   A comprehensive review of systems was obtained and negative, except as noted in the HPI or PMH.    Problem List   Patient Active Problem List   Diagnosis     Alport's syndrome     Anemia in chronic kidney disease     Secondary renal hyperparathyroidism (H)     Hypertension     CML (chronic myelocytic leukemia) (H)     End stage kidney disease (H)     GERD (gastroesophageal reflux disease)     Prurigo nodularis     Senile sebaceous gland hyperplasia     Immunosuppressed status (H)     Kidney replaced by transplant     Delayed graft function of kidney     Hyperkalemia     History of difficult venous access     History of chronic myeloid leukemia     Postoperative ileus (H)     Steroid-induced hyperglycemia       Social History   Social History     Tobacco Use     Smoking status: Former Smoker     Packs/day: 1.00     Years: 12.00     Pack years: 12.00     Types: Cigarettes     Last attempt to quit: 2011     Years since quittin.0     Smokeless tobacco: Never Used   Substance Use Topics     Alcohol use: No     Alcohol/week: 0.0  standard drinks     Drug use: No       Allergies   Allergies   Allergen Reactions     Blood Transfusion Related (Informational Only) Other (See Comments)     Patient has a history of a clinically significant antibody against RBC antigens.  A delay in compatible RBCs may occur.     Cephalosporins Other (See Comments) and Rash     fever     Compazine [Prochlorperazine]      Gammagard [Immune Globulin] Other (See Comments)     Ed got spinal meningitis and MD stated to never get again for fear of death.       Penicillins      Vancomycin        Medications   Current Outpatient Medications   Medication Sig     valGANciclovir (VALCYTE) 450 MG tablet Take 1 tablet (450 mg) by mouth daily Titrate dose up to a max of 2 tabs (900 mg) by mouth daily when directed by your transplant team.     vitamin D3 (CHOLECALCIFEROL) 2000 units (50 mcg) tablet Take 1 tablet (2,000 Units) by mouth daily     acetaminophen (TYLENOL) 325 MG tablet Take 1-2 tablets (325-650 mg) by mouth every 6 hours as needed for mild pain or fever     amLODIPine (NORVASC) 10 MG tablet Take 1 tablet (10 mg) by mouth daily     atorvastatin (LIPITOR) 10 MG tablet Take 1 tablet (10 mg) by mouth daily     calcitRIOL (ROCALTROL) 0.25 MCG capsule Take 1 capsule (0.25 mcg) by mouth daily     calcium carbonate (TUMS) 500 MG chewable tablet Take 1 tablet (500 mg) by mouth 3 times daily as needed for heartburn Take 1 chewable tablet by mouth 3 times daily with meals.     carvedilol (COREG) 12.5 MG tablet Take 1 tablet (12.5 mg) by mouth 2 times daily (with meals)     dasatinib (SPRYCEL) 20 MG tablet CHEMO Take 40 mg by mouth daily      famotidine (PEPCID) 40 MG tablet Take 1 tablet (40 mg) by mouth daily     ketoconazole (NIZORAL) 2 % external shampoo APPLY TO THE SCALP AND WASH OFF ATER 5 MINUTES EVERY OTHER DAY     MYFORTIC (BRAND) 180 MG EC tablet Take 3 tablets (540 mg) by mouth 2 times daily     ondansetron (ZOFRAN-ODT) 4 MG ODT tab Take 1 tablet (4 mg) by mouth  every 8 hours as needed for nausea or vomiting     polyethylene glycol (MIRALAX) packet Take 17 g by mouth 2 times daily Hold for loose stools     predniSONE (DELTASONE) 5 MG tablet Take 1 tablet (5 mg) by mouth daily     Probiotic Product (UP4 PROBIOTICS ADULT) CAPS Take 1 capsule by mouth daily     senna-docusate (SENOKOT-S/PERICOLACE) 8.6-50 MG tablet Take 1-2 tablets by mouth 2 times daily May decrease dose or HOLD if having diarrhea     sucralfate (CARAFATE) 1 GM/10ML suspension Take 10 mLs (1 g) by mouth 2 times daily (Patient not taking: Reported on 3/11/2020)     sulfamethoxazole-trimethoprim (BACTRIM) 400-80 MG tablet Take 1 tablet by mouth Every Mon, Wed, Fri Morning     tacrolimus (GENERIC EQUIVALENT) 0.5 MG capsule Use a combination of 1 mg and 0.5 mg capsules for a total dose of 2.5 mg by mouth twice daily. (Patient not taking: Reported on 3/12/2020)     tacrolimus (GENERIC EQUIVALENT) 1 MG capsule 4 capsules (4 mg) by Oral or Feeding Tube route 2 times daily     No current facility-administered medications for this visit.      Medications Discontinued During This Encounter   Medication Reason     valGANciclovir (VALCYTE) 450 MG tablet        Physical Exam   Vital Signs: Reviewed     GENERAL APPEARANCE: alert and no distress  HENT: mouth without ulcers or lesions  RESP: lungs clear to auscultation - no rales, rhonchi or wheezes  CV: regular rhythm, normal rate, no rub, no murmur  EDEMA: no LE edema bilaterally  ABDOMEN: soft, nondistended, nontender, bowel sounds normal, no erythema of midline surgical wound   MS: extremities normal - no gross deformities noted, no evidence of inflammation in joints, no muscle tenderness  SKIN: no rash  Access: AVF in place     Data     Renal Latest Ref Rng & Units 3/12/2020 3/11/2020 3/10/2020   Na 133 - 144 mmol/L 139 137 138   K 3.4 - 5.3 mmol/L 4.3 4.0 4.1   Cl 94 - 109 mmol/L 110(H) 108 108   CO2 20 - 32 mmol/L 22 21 22   BUN 7 - 30 mg/dL 32(H) 43(H) 54(H)   Cr  0.66 - 1.25 mg/dL 2.77(H) 3.23(H) 3.66(H)   Glucose 70 - 99 mg/dL 104(H) 128(H) 129(H)   Ca  8.5 - 10.1 mg/dL 8.9 8.9 8.5   Mg 1.6 - 2.3 mg/dL 2.1 2.4(H) 2.5(H)     Bone Health Latest Ref Rng & Units 3/12/2020 3/11/2020 3/10/2020   Phos 2.5 - 4.5 mg/dL 2.4(L) 2.4(L) 3.3   PTHi 18 - 80 pg/mL - - -   Vit D Def 20 - 75 ug/L - - -     Heme Latest Ref Rng & Units 3/12/2020 3/11/2020 3/10/2020   WBC 4.0 - 11.0 10e9/L 10.4 9.8 8.9   Hgb 13.3 - 17.7 g/dL 7.2(L) 7.6(L) 7.1(L)   Plt 150 - 450 10e9/L 360 351 258     Liver Latest Ref Rng & Units 3/5/2020 2/28/2020 1/26/2017   AP 40 - 150 U/L - 108 59   TBili 0.2 - 1.3 mg/dL 0.6 0.6 0.4   DBili 0.0 - 0.2 mg/dL 0.1 - -   ALT 0 - 70 U/L - 48 34   AST 0 - 45 U/L - 30 25   Tot Protein 6.8 - 8.8 g/dL - 7.9 7.0   Albumin 3.4 - 5.0 g/dL - 4.0 3.7     Pancreas Latest Ref Rng & Units 2/28/2020   A1C 0 - 5.6 % 5.2     Iron studies Latest Ref Rng & Units 3/3/2020 3/8/2005   Iron 35 - 180 ug/dL 139 43   Iron sat 15 - 46 % 98(H) -   Ferritin 26 - 388 ng/mL 3,397(H) -     [facility] Txp Virology Latest Ref Rng & Units 1/26/2017 3/1/2005 2/26/2005   CMV IgG EU/mL - 1.4 1.4   BK Spec - Plasma - -   BK Res BKNEG copies/mL BK Virus DNA Not Detected - -   BK Log <2.7 Log copies/mL Not Calculated   The Real-Time quantitative BK Virus assay was developed and its performance   characteristics determined by the Infectious Diseases Diagnostic Laboratory at   the [facility] in [place], [place]. The   primers and probes for each analyte are Analyte Specific Reagents (ASRs)   manufactured by [employer].   ASRs are used in many laboratory tests necessary for standard medical care and   generally do not require U.S. Food and Drug Administration approval. The FDA   has determined that such clearance or approval is not necessary.   This test is used for clinical purposes. It should not be regarded as   investigational or for research. This laboratory is certified under the   Clinical  Laboratory Improvement Amendments of 1988 (CLIA-88) as qualified to   perform high complexity clinical laboratory testing.   - -   EBV IgG - - 5.35 5.18   Hep B Core NR Nonreactive - -   Hep B Surf NEG - Positive, Patient is considered to be immune to infection with hepatitis B.(A) Positive, Patient is considered to be immune to infection with hepatitis B.(A)   HIV 1&2 NEG - Negative -        Recent Labs   Lab Test 03/09/20  0546 03/10/20  0601 03/11/20  0718   DOSTAC Not Provided Not Provided Not Provided   TACROL 4.0* 4.8* 4.9*     Patient was seen and evaluated by me, Nehemiah Wilkinson MD. I have reviewed the note and agree with the the plan of care as documented by the fellow.

## 2020-03-13 ENCOUNTER — OFFICE VISIT (OUTPATIENT)
Dept: INFUSION THERAPY | Facility: CLINIC | Age: 45
End: 2020-03-13
Attending: INTERNAL MEDICINE
Payer: MEDICARE

## 2020-03-13 ENCOUNTER — TELEPHONE (OUTPATIENT)
Dept: TRANSPLANT | Facility: CLINIC | Age: 45
End: 2020-03-13

## 2020-03-13 ENCOUNTER — ANCILLARY PROCEDURE (OUTPATIENT)
Dept: ULTRASOUND IMAGING | Facility: CLINIC | Age: 45
End: 2020-03-13
Attending: INTERNAL MEDICINE
Payer: MEDICARE

## 2020-03-13 ENCOUNTER — HOME INFUSION (PRE-WILLOW HOME INFUSION) (OUTPATIENT)
Dept: PHARMACY | Facility: CLINIC | Age: 45
End: 2020-03-13

## 2020-03-13 VITALS
BODY MASS INDEX: 25.47 KG/M2 | OXYGEN SATURATION: 100 % | SYSTOLIC BLOOD PRESSURE: 148 MMHG | WEIGHT: 148.4 LBS | DIASTOLIC BLOOD PRESSURE: 88 MMHG | HEART RATE: 73 BPM | RESPIRATION RATE: 16 BRPM | TEMPERATURE: 98.7 F

## 2020-03-13 DIAGNOSIS — Z79.899 LONG TERM USE OF DRUG: ICD-10-CM

## 2020-03-13 DIAGNOSIS — D72.829 LEUKOCYTOSIS, UNSPECIFIED TYPE: Primary | ICD-10-CM

## 2020-03-13 DIAGNOSIS — T83.511A URINARY TRACT INFECTION ASSOCIATED WITH INDWELLING URETHRAL CATHETER, INITIAL ENCOUNTER (H): ICD-10-CM

## 2020-03-13 DIAGNOSIS — N39.0 URINARY TRACT INFECTION ASSOCIATED WITH INDWELLING URETHRAL CATHETER, INITIAL ENCOUNTER (H): ICD-10-CM

## 2020-03-13 DIAGNOSIS — Z94.0 KIDNEY REPLACED BY TRANSPLANT: Primary | ICD-10-CM

## 2020-03-13 LAB
ABO + RH BLD: ABNORMAL
ABO + RH BLD: ABNORMAL
ANION GAP SERPL CALCULATED.3IONS-SCNC: 7 MMOL/L (ref 3–14)
BASOPHILS # BLD AUTO: 0 10E9/L (ref 0–0.2)
BASOPHILS NFR BLD AUTO: 0.2 %
BLD GP AB SCN SERPL QL: ABNORMAL
BLD PROD TYP BPU: ABNORMAL
BLD PROD TYP BPU: NORMAL
BLD PROD TYP BPU: NORMAL
BLD UNIT ID BPU: 0
BLD UNIT ID BPU: 0
BLOOD BANK CMNT PATIENT-IMP: ABNORMAL
BLOOD BANK CMNT PATIENT-IMP: ABNORMAL
BLOOD PRODUCT CODE: NORMAL
BLOOD PRODUCT CODE: NORMAL
BPU ID: NORMAL
BPU ID: NORMAL
BUN SERPL-MCNC: 28 MG/DL (ref 7–30)
CALCIUM SERPL-MCNC: 8.9 MG/DL (ref 8.5–10.1)
CHLORIDE SERPL-SCNC: 111 MMOL/L (ref 94–109)
CO2 SERPL-SCNC: 20 MMOL/L (ref 20–32)
CREAT SERPL-MCNC: 2.52 MG/DL (ref 0.66–1.25)
DIFFERENTIAL METHOD BLD: ABNORMAL
EOSINOPHIL # BLD AUTO: 0.4 10E9/L (ref 0–0.7)
EOSINOPHIL NFR BLD AUTO: 3.1 %
ERYTHROCYTE [DISTWIDTH] IN BLOOD BY AUTOMATED COUNT: 13.9 % (ref 10–15)
GFR SERPL CREATININE-BSD FRML MDRD: 30 ML/MIN/{1.73_M2}
GLUCOSE SERPL-MCNC: 111 MG/DL (ref 70–99)
HCT VFR BLD AUTO: 20.3 % (ref 40–53)
HGB BLD-MCNC: 6.7 G/DL (ref 13.3–17.7)
IMM GRANULOCYTES # BLD: 0.2 10E9/L (ref 0–0.4)
IMM GRANULOCYTES NFR BLD: 1.5 %
LYMPHOCYTES # BLD AUTO: 0.3 10E9/L (ref 0.8–5.3)
LYMPHOCYTES NFR BLD AUTO: 2.6 %
MAGNESIUM SERPL-MCNC: 2 MG/DL (ref 1.6–2.3)
MCH RBC QN AUTO: 30.2 PG (ref 26.5–33)
MCHC RBC AUTO-ENTMCNC: 33 G/DL (ref 31.5–36.5)
MCV RBC AUTO: 91 FL (ref 78–100)
MONOCYTES # BLD AUTO: 0.6 10E9/L (ref 0–1.3)
MONOCYTES NFR BLD AUTO: 5.2 %
NEUTROPHILS # BLD AUTO: 10.7 10E9/L (ref 1.6–8.3)
NEUTROPHILS NFR BLD AUTO: 87.4 %
NRBC # BLD AUTO: 0 10*3/UL
NRBC BLD AUTO-RTO: 0 /100
NUM BPU REQUESTED: 2
PHOSPHATE SERPL-MCNC: 2.4 MG/DL (ref 2.5–4.5)
PLATELET # BLD AUTO: 357 10E9/L (ref 150–450)
POTASSIUM SERPL-SCNC: 4.2 MMOL/L (ref 3.4–5.3)
RBC # BLD AUTO: 2.22 10E12/L (ref 4.4–5.9)
SODIUM SERPL-SCNC: 138 MMOL/L (ref 133–144)
SPECIMEN EXP DATE BLD: ABNORMAL
TACROLIMUS BLD-MCNC: 6.9 UG/L (ref 5–15)
TME LAST DOSE: NORMAL H
TRANSFUSION STATUS PATIENT QL: NORMAL
WBC # BLD AUTO: 12.1 10E9/L (ref 4–11)

## 2020-03-13 PROCEDURE — 86900 BLOOD TYPING SEROLOGIC ABO: CPT | Performed by: INTERNAL MEDICINE

## 2020-03-13 PROCEDURE — 85027 COMPLETE CBC AUTOMATED: CPT | Performed by: INTERNAL MEDICINE

## 2020-03-13 PROCEDURE — 36430 TRANSFUSION BLD/BLD COMPNT: CPT

## 2020-03-13 PROCEDURE — 84100 ASSAY OF PHOSPHORUS: CPT | Performed by: INTERNAL MEDICINE

## 2020-03-13 PROCEDURE — 83735 ASSAY OF MAGNESIUM: CPT | Performed by: INTERNAL MEDICINE

## 2020-03-13 PROCEDURE — 80197 ASSAY OF TACROLIMUS: CPT | Performed by: INTERNAL MEDICINE

## 2020-03-13 PROCEDURE — 85025 COMPLETE CBC W/AUTO DIFF WBC: CPT | Performed by: INTERNAL MEDICINE

## 2020-03-13 PROCEDURE — 86901 BLOOD TYPING SEROLOGIC RH(D): CPT | Performed by: INTERNAL MEDICINE

## 2020-03-13 PROCEDURE — G0463 HOSPITAL OUTPT CLINIC VISIT: HCPCS | Mod: 25

## 2020-03-13 PROCEDURE — 86850 RBC ANTIBODY SCREEN: CPT | Performed by: INTERNAL MEDICINE

## 2020-03-13 PROCEDURE — 85004 AUTOMATED DIFF WBC COUNT: CPT | Performed by: INTERNAL MEDICINE

## 2020-03-13 PROCEDURE — 86922 COMPATIBILITY TEST ANTIGLOB: CPT | Performed by: INTERNAL MEDICINE

## 2020-03-13 PROCEDURE — 80048 BASIC METABOLIC PNL TOTAL CA: CPT | Performed by: INTERNAL MEDICINE

## 2020-03-13 PROCEDURE — P9016 RBC LEUKOCYTES REDUCED: HCPCS | Performed by: INTERNAL MEDICINE

## 2020-03-13 RX ORDER — HEPARIN SODIUM,PORCINE 10 UNIT/ML
5 VIAL (ML) INTRAVENOUS
Status: CANCELLED | OUTPATIENT
Start: 2020-03-13

## 2020-03-13 RX ORDER — HEPARIN SODIUM (PORCINE) LOCK FLUSH IV SOLN 100 UNIT/ML 100 UNIT/ML
5 SOLUTION INTRAVENOUS
Status: CANCELLED | OUTPATIENT
Start: 2020-03-13

## 2020-03-13 NOTE — TELEPHONE ENCOUNTER
Patient chooses to receive medications from  specialty pharmacy.  Updated pharmacy patient call list. Notified pharmacy.      Wood Powell, R.Ph.  [facility]  [phone] pager

## 2020-03-13 NOTE — PROGRESS NOTES
"Jeremiah Cruz came to [facility] today for a lab and assess following a kidney transplant on 02/28/2020.      Discharge date: 03/10/2020  Transplant coordinator: Jody Raquel  Phone number patient can be reached at: [phone] (Patient cell)      Physical Assessment:  See physical assessment located under \"Document Flowsheets\".  Incision site: Midline abdominal incision intact with staples; no s/s of infection noted. Incisional care and s/s of infection reviewed w/ patient who verbalized understanding. Patient states he has an adequate amount of supplies.  Lines: PICC line to LUE intact and patent. Dressing changed yesterday.  Tan: Draining clear yellow urine upon arrival to [facility] with no foul odor (per patient); per discharge summary by Cyndi Alex NP, tan to be removed today (POD 14). Dr Riggs and Dr Wilkinson in agreement. Tan removed intact at 10:00 without difficulty; patient then voided approximately 50 ml without difficulty and was bladder scanned for 11 ml. Patient voided approximately 75 ml about an hour later and was bladder scanned for zero ml. Patient states he feels as if he is completely emptying bladder w/ each void. Dr Wilkinson aware.   Urine clarity: See above.  Hydration: Patient reports drinking approximately 80+ oz of non-caffeinated fluids in the last 24 hours. Reviewed fluid intake requirements w/ patient who verbalized understanding.  Nutrition: Patient reports appetite is fair but improving; patient states he feels full easily. Discussed eating small frequent meals throughout the day and patient verbalized understanding/agreement.  Last BM: Patient reports two bowel movements yesterday that were normal size and consistency.   Pain: Patient denies pain and states he is not taking any pain medication.   Blood pressure/heart rate: 147/90 - 84 (sitting); 134/95 - 93 (standing)     DATE:  3/13/2020   TIME OF RECEIPT FROM LAB:  08:00  LAB TEST:  Hgb  LAB VALUE:  6.7  RESULTS GIVEN WITH READ-BACK TO " (PROVIDER):  Dr Nehemiah Wilkinson  TIME LAB VALUE REPORTED TO PROVIDER:   08:15  Order received from Dr Wilkinson to transfuse 2 units PRBCs today.    Labs drawn by [facility] staff: Yes    Plan of care for today:   1. Today's labs, vitals and assessment were reviewed w/ Dr Nehemiah Wilkinson and Dr Ld Riggs who gave the following orders:       * Discontinue tan catheter.      * Ultrasound of transplanted kidney today.      * Transfuse 2 units PRBCs today for hgb of 6.7.      * Return to [facility] tomorrow for LBA at 8:00.    Medication changes: None    Medications administered: None      Patient education:    The following teaching topics were addressed: Importance of drinking 2L of non-caffeinated fluids daily, Incisional care, Signs/symptoms of infection, Good handwashing, Medications (purposes, doses and times of administration), Phone numbers to call with concenrs (Transplant coordinator, [facility] [address] and [facility]) and Plan of care   Patient verbalized understanding and all questions answered.    Drug level:  Prograf level today reviewed with Dr Wilkinson who gave orders to increase Prograf to 7 mg PO BID (from 6 mg PO BID).  Patient was updated with this information and verbalized understanding.    Face to face time: 30 minutes  Discharge Plan    Pt will follow up with [facility] tomorrow for LBA.  Discharge instructions reviewed with patient: YES  Patient/Representative verbalized understanding, all questions answered: YES    Discharged from unit at 1700 with self to ultrasound in [facility].    Hayden Moss, RN, RN     Blood Product Transfusion Nursing Note:    Jeremiah Cruz presents today to [facility] for a PRBC transfusion.  During today's [facility] appointment orders from Dr KRYSTYNA Wilkinson were completed.    Progress note:  ID verified by name and .  Assessment completed.  Vitals were stable throughout time in [facility].    Verbal and printed education given to patient/representative regarding transfusion and possible side effects.  Patient/representative  verbalized understanding.     present during visit today: Not Applicable.    All pertinent labs reviewed prior to infusion: YES    Date of consent or authorization: 03/13/2020    Patient tolerated the procedure well    Transfusion given over approximately 1.5 hours per unit.     Discharge Plan:    Discharge instructions were reviewed with patient Yes  Patient/representative verbalized understanding of discharge instructions and all questions answered Yes.        Hayden Moss RN    BP (!) 141/87   Pulse 76   Temp 98.7  F (37.1  C) (Oral)   Resp 16   Wt 67.3 kg (148 lb 6.4 oz)   SpO2 100%   BMI 25.47 kg/m

## 2020-03-13 NOTE — PROGRESS NOTES
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      # DDKT: creatinine slowly downtrending, now 2.5. Did have DGF but without dialysis indications now. Has AVF. Giving 1L NS again 3/12 after also giving 1L NS 3/11 for orthostatic vitals and encouraged 6-8 bottles of water daily in addition to ongoing osm intake. Graft U/S 3/13 (as below).               - Baseline Cr ~ remains to be seen               - Proteinuria: Not checked post transplant              - Date DSA Last Checked: Feb/2020      Latest DSA: No DSA at time of transplant              - BK Viremia: Not checked recently due to time from transplant              - Kidney Tx Biopsy: No     # Immunosuppression: Tacrolimus immediate release (goal 8-10), Mycophenolic acid (goal 1.0-3.5) and Prednisone (dose 5 mg daily)              - Changes: Yes - pending tacro level but yesterday increased dose from 4.5mg to 6mg bid yesterday; continue myfortic 540mg bid     # Infection Prophylaxis:   - PJP: Sulfa/TMP (Bactrim) MWF  - CMV: Valcyte - increased t 450mg daily given ongoing creatinine improvement (discordant for CMV)   - Thrush: None     # Hypertension: Orthostatic hypotension;     Goal BP: > 100, but < 130 systolic              - Volume status: Euvolemic to mildly hypovolemic                EDW~ 67.5kg              - Changes: Give 1L NS. Diuretics stopped in hospital. Amlodipine 10mg daily and carvedilol 12.5mg bid not changed.      # Anemia in Chronic Renal Disease: Hgb: decreased to 6s now - no s/s of intra-abdominal bleed but will obtain graft U/S. Transfuse 2 units pRBCs.         - DEB: No              - Iron studies: Replete     # Mineral Bone  "Disorder:   - Secondary renal hyperparathyroidism; PTH level: Significantly elevated (601-1200 pg/ml)        On treatment: Calcitriol  - Vitamin D; level: Low        On Supplement: start cholecalciferol 2000 units daily   - Calcium; level: Normal        On Supplement: No  - Phosphorus; level: Low normal        On Supplement: No, encouraged increased dietary phos intake (pt is aware of high phos foods, says he struggled with phos in the 8s on dialysis)      # Electrolytes:   - Potassium; level: Normal        On Supplement: No  - Magnesium; level: Normal        On Supplement: No  - Bicarbonate; level: Low normal        On Supplement: No  - Sodium; level: Normal             # leukocytosis: UA with trace LE and 7 WBCs may just be c/w his Stein. Added on UCx that did not reflex to what otherwise isn't strongly suggesting UTI. Will also obtain a graft U/S to further work up.     # abdominal pain: appears resolved. Can't use PPI due to his dasatinib. On H2RA without improvemet. Stool burden on KUB done in hospital and noteworthy that he denies his former heartburn pain (and H pylori infxn), improvement with food (PUD). SIBO is possible and will prescribe probiotic as low-risk med with potential beneift. Constipation is symptoms have resolved with ongoing bowel medication use (and probiotic use).      # Stein, stent in place: Stein will be removed 3/13. Surgery following. Pt did not make urine for \"20 year\" he states so likely bladder atrophy.     # CML: In remission. On dasatinib 40mg daily. Hematology recommended no changes.     # Medical Compliance: Yes     # Transplant History:  Etiology of Kidney Failure: Alport's syndrome  Tx: DDKT  Transplant: 2/28/2020 (Kidney), 12/7/1988 (Kidney), 3/2/2005 (Kidney)  Donor Type: Donation after Brain Death        Donor Class:   Crossmatch at time of Tx: negative  DSA at time of Tx: No  Significant changes in immunosuppression: None  CMV IgG Ab High Risk Discordance (D+/R-): Yes  EBV " IgG Ab High Risk Discordance (D+/R-): No  Significant transplant-related complications: None     Transplant Office Phone Number: [phone]     Assessment and plan was discussed with the patient and he voiced his understanding and agreement.     Return visit: tomorrow (Saturday)       Seen and discussed with Dr Wilkinson.      Ld Riggs MD  Renal Fellow        Chief Complaint   Mr. Cruz is a 44 year old here for kidney transplant.     History of Present Illness    The patient reports that he is feeling well. Had more BMs without pain (aside from some spasm just before the BM). No more vomiting and no nausea. Stools are soft but not watery. No fever.     Hgb did decrease to 6s. No lighteadedness. No abdominal pain. No GI bleeding.     He ate soup for lunch and 3/4ths of a dinner. He drank a few bottles of water but states he has early satiety.      Recent Hospitalizations:  [x] No [] Yes    New Medical Issues: [] No [x] Yes Anemia    Decreased energy: [x] No [] Yes    Chest pain or SOB with exertion:  [x] No [] Yes    Appetite change or weight change: [x] No [] Yes    Nausea, vomiting or diarrhea:  [x] No [] Yes    Fever, sweats or chills: [x] No [] Yes    Leg swelling: [x] No [] Yes      Home BP: not discussed    Review of Systems   A comprehensive review of systems was obtained and negative, except as noted in the HPI or PMH.    Problem List   Patient Active Problem List   Diagnosis     Alport's syndrome     Anemia in chronic kidney disease     Secondary renal hyperparathyroidism (H)     Hypertension     CML (chronic myelocytic leukemia) (H)     End stage kidney disease (H)     GERD (gastroesophageal reflux disease)     Prurigo nodularis     Senile sebaceous gland hyperplasia     Immunosuppressed status (H)     Kidney replaced by transplant     Delayed graft function of kidney     Hyperkalemia     History of difficult venous access     History of chronic myeloid leukemia     Postoperative ileus (H)      Steroid-induced hyperglycemia       Social History   Social History     Tobacco Use     Smoking status: Former Smoker     Packs/day: 1.00     Years: 12.00     Pack years: 12.00     Types: Cigarettes     Last attempt to quit: 2011     Years since quittin.0     Smokeless tobacco: Never Used   Substance Use Topics     Alcohol use: No     Alcohol/week: 0.0 standard drinks     Drug use: No       Allergies   Allergies   Allergen Reactions     Blood Transfusion Related (Informational Only) Other (See Comments)     Patient has a history of a clinically significant antibody against RBC antigens.  A delay in compatible RBCs may occur.     Cephalosporins Other (See Comments) and Rash     fever     Compazine [Prochlorperazine]      Gammagard [Immune Globulin] Other (See Comments)     Ed got spinal meningitis and MD stated to never get again for fear of death.       Penicillins      Vancomycin        Medications   Current Outpatient Medications   Medication Sig     acetaminophen (TYLENOL) 325 MG tablet Take 1-2 tablets (325-650 mg) by mouth every 6 hours as needed for mild pain or fever (Patient not taking: Reported on 3/13/2020)     amLODIPine (NORVASC) 10 MG tablet Take 1 tablet (10 mg) by mouth daily     atorvastatin (LIPITOR) 10 MG tablet Take 1 tablet (10 mg) by mouth daily     calcitRIOL (ROCALTROL) 0.25 MCG capsule Take 1 capsule (0.25 mcg) by mouth daily     calcium carbonate (TUMS) 500 MG chewable tablet Take 1 tablet (500 mg) by mouth 3 times daily as needed for heartburn Take 1 chewable tablet by mouth 3 times daily with meals.     carvedilol (COREG) 12.5 MG tablet Take 1 tablet (12.5 mg) by mouth 2 times daily (with meals)     dasatinib (SPRYCEL) 20 MG tablet CHEMO Take 40 mg by mouth daily      famotidine (PEPCID) 40 MG tablet Take 1 tablet (40 mg) by mouth daily     ketoconazole (NIZORAL) 2 % external shampoo APPLY TO THE SCALP AND WASH OFF ATER 5 MINUTES EVERY OTHER DAY (Patient not taking: Reported on  3/13/2020)     MYFORTIC (BRAND) 180 MG EC tablet Take 3 tablets (540 mg) by mouth 2 times daily     ondansetron (ZOFRAN-ODT) 4 MG ODT tab Take 1 tablet (4 mg) by mouth every 8 hours as needed for nausea or vomiting (Patient not taking: Reported on 3/13/2020)     polyethylene glycol (MIRALAX) packet Take 17 g by mouth 2 times daily Hold for loose stools     predniSONE (DELTASONE) 5 MG tablet Take 1 tablet (5 mg) by mouth daily     Probiotic Product (UP4 PROBIOTICS ADULT) CAPS Take 1 capsule by mouth daily     senna-docusate (SENOKOT-S/PERICOLACE) 8.6-50 MG tablet Take 1-2 tablets by mouth 2 times daily May decrease dose or HOLD if having diarrhea     sucralfate (CARAFATE) 1 GM/10ML suspension Take 10 mLs (1 g) by mouth 2 times daily (Patient not taking: Reported on 3/11/2020)     sulfamethoxazole-trimethoprim (BACTRIM) 400-80 MG tablet Take 1 tablet by mouth Every Mon, Wed, Fri Morning     tacrolimus (GENERIC EQUIVALENT) 0.5 MG capsule Use a combination of 1 mg and 0.5 mg capsules for a total dose of 2.5 mg by mouth twice daily. (Patient not taking: Reported on 3/12/2020)     tacrolimus (GENERIC EQUIVALENT) 1 MG capsule 4 capsules (4 mg) by Oral or Feeding Tube route 2 times daily (Patient taking differently: 6 mg by Oral or Feeding Tube route 2 times daily )     valGANciclovir (VALCYTE) 450 MG tablet Take 1 tablet (450 mg) by mouth daily Titrate dose up to a max of 2 tabs (900 mg) by mouth daily when directed by your transplant team.     vitamin D3 (CHOLECALCIFEROL) 2000 units (50 mcg) tablet Take 1 tablet (2,000 Units) by mouth daily     No current facility-administered medications for this visit.      There are no discontinued medications.    Physical Exam   Vital Signs: There were no vitals taken for this visit.    GENERAL APPEARANCE: alert and no distress  HENT: mouth without ulcers or lesions  RESP: lungs clear to auscultation - no rales, rhonchi or wheezes  CV: regular rhythm, normal rate, no rub, no  murmur  EDEMA: no LE edema bilaterally  ABDOMEN: soft, NTND, midline would c/d/i and without erythema or tenderness   MS: extremities normal - no gross deformities noted, no evidence of inflammation in joints, no muscle tenderness  SKIN: no rash    Data     Renal Latest Ref Rng & Units 3/13/2020 3/12/2020 3/11/2020   Na 133 - 144 mmol/L 138 139 137   K 3.4 - 5.3 mmol/L 4.2 4.3 4.0   Cl 94 - 109 mmol/L 111(H) 110(H) 108   CO2 20 - 32 mmol/L 20 22 21   BUN 7 - 30 mg/dL 28 32(H) 43(H)   Cr 0.66 - 1.25 mg/dL 2.52(H) 2.77(H) 3.23(H)   Glucose 70 - 99 mg/dL 111(H) 104(H) 128(H)   Ca  8.5 - 10.1 mg/dL 8.9 8.9 8.9   Mg 1.6 - 2.3 mg/dL 2.0 2.1 2.4(H)     Bone Health Latest Ref Rng & Units 3/13/2020 3/12/2020 3/11/2020   Phos 2.5 - 4.5 mg/dL 2.4(L) 2.4(L) 2.4(L)   PTHi 18 - 80 pg/mL - - -   Vit D Def 20 - 75 ug/L - - -     Heme Latest Ref Rng & Units 3/13/2020 3/12/2020 3/11/2020   WBC 4.0 - 11.0 10e9/L 12.1(H) 10.4 9.8   Hgb 13.3 - 17.7 g/dL 6.7(LL) 7.2(L) 7.6(L)   Plt 150 - 450 10e9/L 357 360 351     Liver Latest Ref Rng & Units 3/5/2020 2/28/2020 1/26/2017   AP 40 - 150 U/L - 108 59   TBili 0.2 - 1.3 mg/dL 0.6 0.6 0.4   DBili 0.0 - 0.2 mg/dL 0.1 - -   ALT 0 - 70 U/L - 48 34   AST 0 - 45 U/L - 30 25   Tot Protein 6.8 - 8.8 g/dL - 7.9 7.0   Albumin 3.4 - 5.0 g/dL - 4.0 3.7     Pancreas Latest Ref Rng & Units 2/28/2020   A1C 0 - 5.6 % 5.2     Iron studies Latest Ref Rng & Units 3/3/2020 3/8/2005   Iron 35 - 180 ug/dL 139 43   Iron sat 15 - 46 % 98(H) -   Ferritin 26 - 388 ng/mL 3,397(H) -     [facility] Txp Virology Latest Ref Rng & Units 1/26/2017 3/1/2005 2/26/2005   CMV IgG EU/mL - 1.4 1.4   BK Spec - Plasma - -   BK Res BKNEG copies/mL BK Virus DNA Not Detected - -   BK Log <2.7 Log copies/mL Not Calculated   The Real-Time quantitative BK Virus assay was developed and its performance   characteristics determined by the Infectious Diseases Diagnostic Laboratory at   the [facility] in [place],  [place]. The   primers and probes for each analyte are Analyte Specific Reagents (ASRs)   manufactured by [employer].   ASRs are used in many laboratory tests necessary for standard medical care and   generally do not require U.S. Food and Drug Administration approval. The FDA   has determined that such clearance or approval is not necessary.   This test is used for clinical purposes. It should not be regarded as   investigational or for research. This laboratory is certified under the   Clinical Laboratory Improvement Amendments of 1988 (CLIA-88) as qualified to   perform high complexity clinical laboratory testing.   - -   EBV IgG - - 5.35 5.18   Hep B Core NR Nonreactive - -   Hep B Surf NEG - Positive, Patient is considered to be immune to infection with hepatitis B.(A) Positive, Patient is considered to be immune to infection with hepatitis B.(A)   HIV 1&2 NEG - Negative -        Recent Labs   Lab Test 03/10/20  0601 03/11/20  0718 03/12/20  0717   DOSTAC Not Provided Not Provided Not Provided   TACROL 4.8* 4.9* 5.9     Patient was seen and evaluated by me, Nehemiah Wilkinson MD. I have reviewed the note and agree with the the plan of care as documented by the fellow.

## 2020-03-13 NOTE — PATIENT INSTRUCTIONS
Return to [facility] tomorrow at 7:00 for lab and assess.    Contact Information:    Transplant Coordinator: Jody Raquel   Transplant Office:  [phone]  [facility]:  [phone]  Ask for physician on call for kidney transplant.  Unit [address] (Transplant Unit):  [phone]  [facility]:  [phone]    HOME CARE FOR PATIENTS RECEIVING BLOOD PRODUCTS    You have just received a blood product.  During the next 48 hours please be aware of the following symptoms of a blood product reaction.    The possible symptoms of a blood reaction are:      Chills    Fever temperature above 100.0 orally    Headache    Nausea    Persistent non-productive cough    Hives    Itching    Facial swelling or flushing    Difficulty breathing or wheezing    Bloody urine      If you experience any of these symptoms contact:    [phone]  Emergency Room    [phone]  Transplant Center  7:00 am - 6:30 pm     [phone] [facility] Ask for the physician on call for kidney transplant.    If you do not live locally you should contact your local physician.

## 2020-03-14 ENCOUNTER — INFUSION THERAPY VISIT (OUTPATIENT)
Dept: INFUSION THERAPY | Facility: CLINIC | Age: 45
End: 2020-03-14
Attending: INTERNAL MEDICINE
Payer: MEDICARE

## 2020-03-14 VITALS
TEMPERATURE: 98.3 F | BODY MASS INDEX: 25.3 KG/M2 | WEIGHT: 147.4 LBS | RESPIRATION RATE: 16 BRPM | OXYGEN SATURATION: 100 %

## 2020-03-14 DIAGNOSIS — Z94.0 KIDNEY REPLACED BY TRANSPLANT: ICD-10-CM

## 2020-03-14 DIAGNOSIS — Z79.899 LONG TERM USE OF DRUG: ICD-10-CM

## 2020-03-14 LAB
ANION GAP SERPL CALCULATED.3IONS-SCNC: 7 MMOL/L (ref 3–14)
BUN SERPL-MCNC: 24 MG/DL (ref 7–30)
CALCIUM SERPL-MCNC: 9.1 MG/DL (ref 8.5–10.1)
CHLORIDE SERPL-SCNC: 114 MMOL/L (ref 94–109)
CO2 SERPL-SCNC: 20 MMOL/L (ref 20–32)
CREAT SERPL-MCNC: 2.49 MG/DL (ref 0.66–1.25)
ERYTHROCYTE [DISTWIDTH] IN BLOOD BY AUTOMATED COUNT: 14.2 % (ref 10–15)
GFR SERPL CREATININE-BSD FRML MDRD: 30 ML/MIN/{1.73_M2}
GLUCOSE SERPL-MCNC: 104 MG/DL (ref 70–99)
HCT VFR BLD AUTO: 27.1 % (ref 40–53)
HGB BLD-MCNC: 8.9 G/DL (ref 13.3–17.7)
MAGNESIUM SERPL-MCNC: 1.8 MG/DL (ref 1.6–2.3)
MCH RBC QN AUTO: 30.3 PG (ref 26.5–33)
MCHC RBC AUTO-ENTMCNC: 32.8 G/DL (ref 31.5–36.5)
MCV RBC AUTO: 92 FL (ref 78–100)
PHOSPHATE SERPL-MCNC: 2.2 MG/DL (ref 2.5–4.5)
PLATELET # BLD AUTO: 336 10E9/L (ref 150–450)
POTASSIUM SERPL-SCNC: 4.7 MMOL/L (ref 3.4–5.3)
RBC # BLD AUTO: 2.94 10E12/L (ref 4.4–5.9)
SODIUM SERPL-SCNC: 140 MMOL/L (ref 133–144)
TACROLIMUS BLD-MCNC: 9.2 UG/L (ref 5–15)
TME LAST DOSE: NORMAL H
WBC # BLD AUTO: 11.4 10E9/L (ref 4–11)

## 2020-03-14 PROCEDURE — 85027 COMPLETE CBC AUTOMATED: CPT | Performed by: INTERNAL MEDICINE

## 2020-03-14 PROCEDURE — 36592 COLLECT BLOOD FROM PICC: CPT

## 2020-03-14 PROCEDURE — 84100 ASSAY OF PHOSPHORUS: CPT | Performed by: INTERNAL MEDICINE

## 2020-03-14 PROCEDURE — 80197 ASSAY OF TACROLIMUS: CPT | Performed by: INTERNAL MEDICINE

## 2020-03-14 PROCEDURE — 80048 BASIC METABOLIC PNL TOTAL CA: CPT | Performed by: INTERNAL MEDICINE

## 2020-03-14 PROCEDURE — 83735 ASSAY OF MAGNESIUM: CPT | Performed by: INTERNAL MEDICINE

## 2020-03-14 NOTE — PATIENT INSTRUCTIONS
1. Push fluids.    2. Increase Coreg to 18.75 mg twice daily for high blood pressure.    3. Return to [facility] on Monday for lab and assess at 8:00.

## 2020-03-14 NOTE — PROGRESS NOTES
"Jeremiah Cruz came to [facility] today for a lab and assess following a kidney transplant on 02/28/2020.       Discharge date: 03/10/2020  Transplant coordinator: Jody Raquel  Phone number patient can be reached at: [phone] (Patient cell)      Physical Assessment:  See physical assessment located under \"Document Flowsheets\".  Incision site: Midline abdominal incision intact with staples; no s/s of infection noted.   Lines: PICC to LUE intact and patent; no s/s of infection noted.   Stein: None  Urine clarity: Clear yellow w/ no foul odor. Stein removed yesterday. Patient reports voiding small amounts almost every half hour; denies pain/difficulty; did not make urine for almost 22 years while on dialysis. Dr Wilkinson aware.  Hydration: Patient reports drinking approximately 2 liters in the last 24 hours. Reviewed fluid intake requirements w/ patient who verbalized understanding.  Nutrition: Patient reports appetite is fair but improving.   Last BM: This morning (normal size and consistency); reports \"orange\" bowel movement this morning (states he drank orange gatorade last night); Dr Wilkinson informed.  Pain: Denies pain and states he is not taking any pain medication.    Blood pressure/heart rate: 156/106 - 81 (sitting); 159/114 - 79 (standing); asymptomatic. Dr Wilkinson informed-see new order below to increase Coreg.    Labs drawn by [facility] staff: Yes    Plan of care for today:   1. Today's labs, vitals and assessment were reviewed w/ Dr Nehemiah Wilkinson who gave the following orders:           * Push fluids.          * Increase Coreg to 18.75 mg PO BID. Med card and med box updated.          * Return to [facility] Monday for LBA. (Patient will stay at local hotel until Wednesday to accommodate surgery follow up appointment on Tuesday).     Medication changes: See above.    Medications administered: None      Patient education:    The following teaching topics were addressed: Importance of drinking 2L of non-caffeinated fluids daily, " Medications (purposes, doses and times of administration) and Plan of care   Patient verbalized understanding and all questions answered.    Drug level:  Prograf level today reviewed with Dr Wilkinson who gave orders to continue with current dose of Prograf 7 mg PO BID.  Patient was updated with this information and verbalized understanding.    Face to face time: 20 minutes  Discharge Plan    [facility].  Discharge instructions reviewed with patient: YES  Patient/Representative verbalized understanding, all questions answered: YES    Discharged from unit at 1100 with self to local hotel.    Hayden Moss, RN, RN

## 2020-03-15 LAB
BACTERIA SPEC CULT: NORMAL
SPECIMEN SOURCE: NORMAL

## 2020-03-16 ENCOUNTER — HOME INFUSION (PRE-WILLOW HOME INFUSION) (OUTPATIENT)
Dept: PHARMACY | Facility: CLINIC | Age: 45
End: 2020-03-16

## 2020-03-16 ENCOUNTER — INFUSION THERAPY VISIT (OUTPATIENT)
Dept: INFUSION THERAPY | Facility: CLINIC | Age: 45
End: 2020-03-16
Attending: INTERNAL MEDICINE
Payer: MEDICARE

## 2020-03-16 VITALS — WEIGHT: 143.5 LBS | OXYGEN SATURATION: 100 % | BODY MASS INDEX: 24.63 KG/M2 | TEMPERATURE: 98.5 F

## 2020-03-16 DIAGNOSIS — Z48.298 AFTERCARE FOLLOWING ORGAN TRANSPLANT: Primary | ICD-10-CM

## 2020-03-16 DIAGNOSIS — Z94.0 KIDNEY REPLACED BY TRANSPLANT: Primary | ICD-10-CM

## 2020-03-16 LAB
ALBUMIN UR-MCNC: 30 MG/DL
ANION GAP SERPL CALCULATED.3IONS-SCNC: 6 MMOL/L (ref 3–14)
APPEARANCE UR: CLEAR
BASOPHILS # BLD AUTO: 0 10E9/L (ref 0–0.2)
BASOPHILS NFR BLD AUTO: 0.4 %
BILIRUB UR QL STRIP: NEGATIVE
BUN SERPL-MCNC: 26 MG/DL (ref 7–30)
CALCIUM SERPL-MCNC: 9.2 MG/DL (ref 8.5–10.1)
CHLORIDE SERPL-SCNC: 113 MMOL/L (ref 94–109)
CO2 SERPL-SCNC: 19 MMOL/L (ref 20–32)
COLOR UR AUTO: YELLOW
CREAT SERPL-MCNC: 2.42 MG/DL (ref 0.66–1.25)
DIFFERENTIAL METHOD BLD: ABNORMAL
EOSINOPHIL # BLD AUTO: 0.2 10E9/L (ref 0–0.7)
EOSINOPHIL NFR BLD AUTO: 2.2 %
ERYTHROCYTE [DISTWIDTH] IN BLOOD BY AUTOMATED COUNT: 14.4 % (ref 10–15)
GFR SERPL CREATININE-BSD FRML MDRD: 31 ML/MIN/{1.73_M2}
GLUCOSE SERPL-MCNC: 110 MG/DL (ref 70–99)
GLUCOSE UR STRIP-MCNC: 150 MG/DL
HCT VFR BLD AUTO: 27.6 % (ref 40–53)
HGB BLD-MCNC: 9 G/DL (ref 13.3–17.7)
HGB UR QL STRIP: ABNORMAL
IMM GRANULOCYTES # BLD: 0.1 10E9/L (ref 0–0.4)
IMM GRANULOCYTES NFR BLD: 0.7 %
KETONES UR STRIP-MCNC: NEGATIVE MG/DL
LEUKOCYTE ESTERASE UR QL STRIP: ABNORMAL
LYMPHOCYTES # BLD AUTO: 0.2 10E9/L (ref 0.8–5.3)
LYMPHOCYTES NFR BLD AUTO: 2.5 %
MAGNESIUM SERPL-MCNC: 1.6 MG/DL (ref 1.6–2.3)
MCH RBC QN AUTO: 30.3 PG (ref 26.5–33)
MCHC RBC AUTO-ENTMCNC: 32.6 G/DL (ref 31.5–36.5)
MCV RBC AUTO: 93 FL (ref 78–100)
MONOCYTES # BLD AUTO: 0.6 10E9/L (ref 0–1.3)
MONOCYTES NFR BLD AUTO: 6.3 %
MUCOUS THREADS #/AREA URNS LPF: PRESENT /LPF
NEUTROPHILS # BLD AUTO: 8.4 10E9/L (ref 1.6–8.3)
NEUTROPHILS NFR BLD AUTO: 87.9 %
NITRATE UR QL: NEGATIVE
NRBC # BLD AUTO: 0 10*3/UL
NRBC BLD AUTO-RTO: 0 /100
PH UR STRIP: 6 PH (ref 5–7)
PHOSPHATE SERPL-MCNC: 2.1 MG/DL (ref 2.5–4.5)
PLATELET # BLD AUTO: 309 10E9/L (ref 150–450)
POTASSIUM SERPL-SCNC: 4.6 MMOL/L (ref 3.4–5.3)
RBC # BLD AUTO: 2.97 10E12/L (ref 4.4–5.9)
RBC #/AREA URNS AUTO: 13 /HPF (ref 0–2)
SODIUM SERPL-SCNC: 138 MMOL/L (ref 133–144)
SOURCE: ABNORMAL
SP GR UR STRIP: 1.01 (ref 1–1.03)
SQUAMOUS #/AREA URNS AUTO: <1 /HPF (ref 0–1)
TACROLIMUS BLD-MCNC: 10.8 UG/L (ref 5–15)
TME LAST DOSE: NORMAL H
UROBILINOGEN UR STRIP-MCNC: 0 MG/DL (ref 0–2)
WBC # BLD AUTO: 9.6 10E9/L (ref 4–11)
WBC #/AREA URNS AUTO: 18 /HPF (ref 0–5)

## 2020-03-16 PROCEDURE — 84100 ASSAY OF PHOSPHORUS: CPT | Performed by: INTERNAL MEDICINE

## 2020-03-16 PROCEDURE — 81001 URINALYSIS AUTO W/SCOPE: CPT | Performed by: INTERNAL MEDICINE

## 2020-03-16 PROCEDURE — G0463 HOSPITAL OUTPT CLINIC VISIT: HCPCS

## 2020-03-16 PROCEDURE — 87086 URINE CULTURE/COLONY COUNT: CPT | Performed by: INTERNAL MEDICINE

## 2020-03-16 PROCEDURE — 85025 COMPLETE CBC W/AUTO DIFF WBC: CPT | Performed by: INTERNAL MEDICINE

## 2020-03-16 PROCEDURE — 83735 ASSAY OF MAGNESIUM: CPT | Performed by: INTERNAL MEDICINE

## 2020-03-16 PROCEDURE — 36592 COLLECT BLOOD FROM PICC: CPT

## 2020-03-16 PROCEDURE — 80048 BASIC METABOLIC PNL TOTAL CA: CPT | Performed by: INTERNAL MEDICINE

## 2020-03-16 PROCEDURE — 80197 ASSAY OF TACROLIMUS: CPT | Performed by: INTERNAL MEDICINE

## 2020-03-16 RX ORDER — TAMSULOSIN HYDROCHLORIDE 0.4 MG/1
0.4 CAPSULE ORAL AT BEDTIME
Qty: 90 CAPSULE | Refills: 1 | Status: SHIPPED | OUTPATIENT
Start: 2020-03-16 | End: 2020-01-01

## 2020-03-16 NOTE — PATIENT INSTRUCTIONS
Dear Jeremiah Cruz    Thank you for choosing [facility] ([facility]) for your transplant cares.  The following information is a summary of our appointment as well as important reminders.      Please make sure your phone is available today because I will call to update you with your anti-rejection drug levels and possibly make changes to your anti-rejection dosages., Please refer to your hospital discharge instructions for details on home care services, future appointments, phone numbers, and diet/activity levels.    We look forward in seeing you on your next appointment here at [facility] ([facility]).  Please don t hesitate to call us at [phone] to reschedule any of your appointments or to speak with one of the [facility] registered nurses.  It was a pleasure taking care of you today.    Sincerely,    [facility]  [address] [place] [address]  [id number]  Phone:  [phone]      1.  Follow up with home care labs on Thursday    [facility] [phone], Fax: [phone]       2.  Your discharged from outpatient [facility] clinic

## 2020-03-16 NOTE — PROGRESS NOTES
This is a recent snapshot of the patient's [facility] medical record.  For current drug dose and complete information and questions, call [phone] or In Basket pool, [facility]v home infusion ([id number])  CSN Number:  [id number]

## 2020-03-16 NOTE — PROGRESS NOTES
"Jeremiah Cruz came to [facility] today for a lab and assess following a kidney transplant on 02/28/2020.       Discharge date: 03/10/2020  Transplant coordinator: Jody García  Phone number patient can be reached at: [phone] (Patient cell)    Physical Assessment:  See physical assessment located under \"Document Flowsheets\".  Incision site: staples, CDI per patient  Lines: NA  Stein: NA  Urine clarity: yellow, lite in color, no red specks  Hydration: patient drinking 3-4 liters daily, drank cranberry juice also this weekend  Nutrition: patient eating well   Last BM: this morning, main complaint is hemorrhoid  Pain: just hemorrhoid mostly   Labs drawn by [facility] staff Yes, drawn through PICC    Plan of care for today:   1.  Labs and assessment    Medication changes: none  Medications administered: none    Patient education:  The following teaching topics were addressed: Importance of drinking 2L of non-caffeinated fluids daily, Incisional care, Signs/symptoms of infection, Good handwashing and Medications (purposes, doses and times of administration)   Patient verbalized understanding and all questions answered.    Drug level:  Prograf/tacrolimus level 10.8 today reviewed with Dr Wilkinson who gave orders to have patient change current dose of 7 mg every 12 hours to 6.5 mg every 12 hours.  Jeremiah was updated with this information and verbalized understanding.    Face to face time: 30 minutes    Discharge Plan  Pt will follow up with surgery appointment 3/17/2020, patient should get home care on Thursday for labs, sent message to Jody García RN due to patient did not think it was [place] but CTS handoff states it is.    Discharge instructions reviewed with patient: YES  Patient/Representative verbalized understanding, all questions answered: YES    Discharged from unit at 0945 with whom: self to hotel, patient will come back 3/17/20 for surgery appointment.    Yoly Reyes, RN, BSN      "

## 2020-03-16 NOTE — PROGRESS NOTES
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      # DDKT: creatinine slowly downtrending, now 2.42. Did have DGF but without dialysis indications now. Has AVF. Giving 1L NS again 3/12 after also giving 1L NS 3/11 for orthostatic vitals and encouraged 6-8 bottles of water daily in addition to ongoing osm intake. Graft U/S 3/13 (as below).               - Baseline Cr ~ remains to be seen               - Proteinuria: Not checked post transplant              - Date DSA Last Checked: Feb/2020      Latest DSA: No DSA at time of transplant              - BK Viremia: Not checked recently due to time from transplant              - Kidney Tx Biopsy: No     # Immunosuppression: Tacrolimus immediate release (goal 8-10), Mycophenolic acid (goal 1.0-3.5) and Prednisone (dose 5 mg daily)              - Changes: No     # Infection Prophylaxis:   - PJP: Sulfa/TMP (Bactrim) MWF  - CMV: Valcyte - continued on 450mg daily given ongoing creatinine improvement (discordant for CMV)   - Thrush: None     # Hypertension: Orthostatic hypotension;     Goal BP: > 100, but < 130 systolic              - Volume status: Euvolemic to mildly hypovolemic                EDW~ 67.5kg              - Changes: No     # Anemia in Chronic Renal Disease: Hgb: trending up to 9 now - no s/s of intra-abdominal bleed but will obtain graft U/S. Transfuse 2 units pRBCs.         - DEB: No              - Iron studies: Replete     # Mineral Bone Disorder:   - Secondary renal hyperparathyroidism; PTH level: Significantly elevated (601-1200 pg/ml) as of 3/4/20        On treatment: Calcitriol  - Vitamin D; level: Low       On Supplement: on cholecalciferol 2000  "units daily   - Calcium; level: Normal        On Supplement: No  - Phosphorus; level: Low normal        On Supplement: No, encouraged increased dietary phos intake (pt is aware of high phos foods, says he struggled with phos in the 8s on dialysis)      # Electrolytes:   - Potassium; level: Normal        On Supplement: No  - Magnesium; level: Normal        On Supplement: No  - Bicarbonate; level: Low normal        On Supplement: No  - Sodium; level: Normal             # Leukocytosis: UA with trace LE and 7 WBCs may just be c/w his Stein. Added on UCx that did not reflex to what otherwise isn't strongly suggesting UTI. Will also obtain a graft U/S to further work up.     # Abdominal pain: appears resolved. Can't use PPI due to his dasatinib. On H2RA without improvement. Stool burden on KUB done in hospital and noteworthy that he denies his former heartburn pain (and H pylori infxn), improvement with food (PUD). SIBO is possible and will prescribe probiotic as low-risk med with potential benefit. Constipation is symptoms have resolved with ongoing bowel medication use (and probiotic use).      # Stein, stent in place: Stein will be removed 3/13. Surgery following. Pt did not make urine for \"20 year\" he states so likely bladder atrophy.     # CML: In remission. On dasatinib 40mg daily. Hematology recommended no changes.    # Urinary Frequency: Likely related to bladder disuse. Appears to be voiding well as evident by stable creatinine. Will track UA to rule out secondary processes. Will start Flomax to facilitate bladder emptying.       # Medical Compliance: Yes     # Transplant History:  Etiology of Kidney Failure: Alport's syndrome  Tx: DDKT  Transplant: 2/28/2020 (Kidney), 12/7/1988 (Kidney), 3/2/2005 (Kidney)  Donor Type: Donation after Brain Death        Donor Class:   Crossmatch at time of Tx: negative  DSA at time of Tx: No  Significant changes in immunosuppression: None  CMV IgG Ab High Risk Discordance " (D+/R-): Yes  EBV IgG Ab High Risk Discordance (D+/R-): No  Significant transplant-related complications: None     Transplant Office Phone Number: [phone]     Assessment and plan was discussed with the patient and he voiced his understanding and agreement.     Return visit: Patient can be discharged from [facility] today to follow-up with home health and his coordinator.         Chief Complaint   Mr. Cruz is a 44 year old here for kidney transplant.     History of Present Illness   Mr. Cruz reports that he is doing well overall. He is able to empty his bladder without issues, but notes increased frequency about every 30-45 minutes. His bowel movement are more frequent with softer than normal. Denies dysuria. His carvedilol was recently increased to 25mg daily. He feels that he is able to hydrate appropriately.    Recent Hospitalizations:  [x] No [] Yes    New Medical Issues: [x] No [] Yes    Decreased energy: [x] No [] Yes    Chest pain or SOB with exertion:  [x] No [] Yes    Appetite change or weight change: [x] No [] Yes    Nausea, vomiting or diarrhea:  [x] No [] Yes    Fever, sweats or chills: [x] No [] Yes    Leg swelling: [x] No [] Yes      Home BP: not discussed    Review of Systems   A comprehensive review of systems was obtained and negative, except as noted in the HPI or PMH.    Problem List   Patient Active Problem List   Diagnosis     Alport's syndrome     Anemia in chronic kidney disease     Secondary renal hyperparathyroidism (H)     Hypertension     CML (chronic myelocytic leukemia) (H)     End stage kidney disease (H)     GERD (gastroesophageal reflux disease)     Prurigo nodularis     Senile sebaceous gland hyperplasia     Immunosuppressed status (H)     Kidney replaced by transplant     Delayed graft function of kidney     Hyperkalemia     History of difficult venous access     History of chronic myeloid leukemia     Postoperative ileus (H)     Steroid-induced hyperglycemia       Social  History   Social History     Tobacco Use     Smoking status: Former Smoker     Packs/day: 1.00     Years: 12.00     Pack years: 12.00     Types: Cigarettes     Last attempt to quit: 2011     Years since quittin.0     Smokeless tobacco: Never Used   Substance Use Topics     Alcohol use: No     Alcohol/week: 0.0 standard drinks     Drug use: No       Allergies   Allergies   Allergen Reactions     Blood Transfusion Related (Informational Only) Other (See Comments)     Patient has a history of a clinically significant antibody against RBC antigens.  A delay in compatible RBCs may occur.     Cephalosporins Other (See Comments) and Rash     fever     Compazine [Prochlorperazine]      Gammagard [Immune Globulin] Other (See Comments)     Ed got spinal meningitis and MD stated to never get again for fear of death.       Penicillins      Vancomycin        Medications   Current Outpatient Medications   Medication Sig     acetaminophen (TYLENOL) 325 MG tablet Take 1-2 tablets (325-650 mg) by mouth every 6 hours as needed for mild pain or fever (Patient not taking: Reported on 3/13/2020)     amLODIPine (NORVASC) 10 MG tablet Take 1 tablet (10 mg) by mouth daily     atorvastatin (LIPITOR) 10 MG tablet Take 1 tablet (10 mg) by mouth daily     calcitRIOL (ROCALTROL) 0.25 MCG capsule Take 1 capsule (0.25 mcg) by mouth daily     calcium carbonate (TUMS) 500 MG chewable tablet Take 1 tablet (500 mg) by mouth 3 times daily as needed for heartburn Take 1 chewable tablet by mouth 3 times daily with meals.     carvedilol (COREG) 12.5 MG tablet Take 1 tablet (12.5 mg) by mouth 2 times daily (with meals) (Patient taking differently: Take 18.75 mg by mouth 2 times daily (with meals) )     dasatinib (SPRYCEL) 20 MG tablet CHEMO Take 40 mg by mouth daily      famotidine (PEPCID) 40 MG tablet Take 1 tablet (40 mg) by mouth daily     ketoconazole (NIZORAL) 2 % external shampoo APPLY TO THE SCALP AND WASH OFF ATER 5 MINUTES EVERY OTHER  DAY (Patient not taking: Reported on 3/13/2020)     MYFORTIC (BRAND) 180 MG EC tablet Take 3 tablets (540 mg) by mouth 2 times daily     ondansetron (ZOFRAN-ODT) 4 MG ODT tab Take 1 tablet (4 mg) by mouth every 8 hours as needed for nausea or vomiting (Patient not taking: Reported on 3/13/2020)     polyethylene glycol (MIRALAX) packet Take 17 g by mouth 2 times daily Hold for loose stools     predniSONE (DELTASONE) 5 MG tablet Take 1 tablet (5 mg) by mouth daily     Probiotic Product (UP4 PROBIOTICS ADULT) CAPS Take 1 capsule by mouth daily     senna-docusate (SENOKOT-S/PERICOLACE) 8.6-50 MG tablet Take 1-2 tablets by mouth 2 times daily May decrease dose or HOLD if having diarrhea     sucralfate (CARAFATE) 1 GM/10ML suspension Take 10 mLs (1 g) by mouth 2 times daily (Patient not taking: Reported on 3/11/2020)     sulfamethoxazole-trimethoprim (BACTRIM) 400-80 MG tablet Take 1 tablet by mouth Every Mon, Wed, Fri Morning     tacrolimus (GENERIC EQUIVALENT) 0.5 MG capsule Use a combination of 1 mg and 0.5 mg capsules for a total dose of 2.5 mg by mouth twice daily. (Patient not taking: Reported on 3/12/2020)     tacrolimus (GENERIC EQUIVALENT) 1 MG capsule 4 capsules (4 mg) by Oral or Feeding Tube route 2 times daily (Patient taking differently: 7 mg by Oral or Feeding Tube route 2 times daily )     valGANciclovir (VALCYTE) 450 MG tablet Take 1 tablet (450 mg) by mouth daily Titrate dose up to a max of 2 tabs (900 mg) by mouth daily when directed by your transplant team.     vitamin D3 (CHOLECALCIFEROL) 2000 units (50 mcg) tablet Take 1 tablet (2,000 Units) by mouth daily     No current facility-administered medications for this visit.      There are no discontinued medications.    Physical Exam   Vital Signs: Temp 98.5  F (36.9  C) (Oral)   Wt 65.1 kg (143 lb 8 oz)   SpO2 100%   BMI 24.63 kg/m      GENERAL APPEARANCE: alert and no distress  HENT: mouth without ulcers or lesions  RESP: lungs clear to auscultation  - no rales, rhonchi or wheezes  CV: regular rhythm, normal rate, no rub, no murmur  EDEMA: no LE edema bilaterally  ABDOMEN: soft, NTND, midline would c/d/i and without erythema or tenderness   MS: extremities normal - no gross deformities noted, no evidence of inflammation in joints, no muscle tenderness  SKIN: no rash  DIALYSIS ACCESS: Right forearm fistula. Good thrill.     Data     Renal Latest Ref Rng & Units 3/16/2020 3/14/2020 3/13/2020   Na 133 - 144 mmol/L 138 140 138   K 3.4 - 5.3 mmol/L 4.6 4.7 4.2   Cl 94 - 109 mmol/L 113(H) 114(H) 111(H)   CO2 20 - 32 mmol/L 19(L) 20 20   BUN 7 - 30 mg/dL 26 24 28   Cr 0.66 - 1.25 mg/dL 2.42(H) 2.49(H) 2.52(H)   Glucose 70 - 99 mg/dL 110(H) 104(H) 111(H)   Ca  8.5 - 10.1 mg/dL 9.2 9.1 8.9   Mg 1.6 - 2.3 mg/dL 1.6 1.8 2.0     Bone Health Latest Ref Rng & Units 3/16/2020 3/14/2020 3/13/2020   Phos 2.5 - 4.5 mg/dL 2.1(L) 2.2(L) 2.4(L)   PTHi 18 - 80 pg/mL - - -   Vit D Def 20 - 75 ug/L - - -     Heme Latest Ref Rng & Units 3/16/2020 3/14/2020 3/13/2020   WBC 4.0 - 11.0 10e9/L 9.6 11.4(H) 12.1(H)   Hgb 13.3 - 17.7 g/dL 9.0(L) 8.9(L) 6.7(LL)   Plt 150 - 450 10e9/L 309 336 357     Liver Latest Ref Rng & Units 3/5/2020 2/28/2020 1/26/2017   AP 40 - 150 U/L - 108 59   TBili 0.2 - 1.3 mg/dL 0.6 0.6 0.4   DBili 0.0 - 0.2 mg/dL 0.1 - -   ALT 0 - 70 U/L - 48 34   AST 0 - 45 U/L - 30 25   Tot Protein 6.8 - 8.8 g/dL - 7.9 7.0   Albumin 3.4 - 5.0 g/dL - 4.0 3.7     Pancreas Latest Ref Rng & Units 2/28/2020   A1C 0 - 5.6 % 5.2     Iron studies Latest Ref Rng & Units 3/3/2020 3/8/2005   Iron 35 - 180 ug/dL 139 43   Iron sat 15 - 46 % 98(H) -   Ferritin 26 - 388 ng/mL 3,397(H) -     [facility] Txp Virology Latest Ref Rng & Units 1/26/2017 3/1/2005 2/26/2005   CMV IgG EU/mL - 1.4 1.4   BK Spec - Plasma - -   BK Res BKNEG copies/mL BK Virus DNA Not Detected - -   BK Log <2.7 Log copies/mL Not Calculated   The Real-Time quantitative BK Virus assay was developed and its performance    characteristics determined by the Infect[facility] Diagnostic Laboratory at   the [facility] in [place], [place]. The   primers and probes for each analyte are Analyte Specific Reagents (ASRs)   manufactured by [employer].   ASRs are used in many laboratory tests necessary for standard medical care and   generally do not require U.S. Food and Drug Administration approval. The FDA   has determined that such clearance or approval is not necessary.   This test is used for clinical purposes. It should not be regarded as   investigational or for research. This laboratory is certified under the   Clinical Laboratory Improvement Amendments of 1988 (CLIA-88) as qualified to   perform high complexity clinical laboratory testing.   - -   EBV IgG - - 5.35 5.18   Hep B Core NR Nonreactive - -   Hep B Surf NEG - Positive, Patient is considered to be immune to infection with hepatitis B.(A) Positive, Patient is considered to be immune to infection with hepatitis B.(A)   HIV 1&2 NEG - Negative -        Recent Labs   Lab Test 03/12/20  0717 03/13/20  0735 03/14/20  0830   DOSTAC Not Provided Not Provided Not Provided   TACROL 5.9 6.9 9.2         Scribe Disclosure:  IDion, am serving as a scribe to document services personally performed by Nehemiah Wilkinson MD at this visit, based upon the provider's statements to me. All documentation has been reviewed by the aforementioned provider prior to being entered into the official medical record.

## 2020-03-17 ENCOUNTER — OFFICE VISIT (OUTPATIENT)
Dept: TRANSPLANT | Facility: CLINIC | Age: 45
End: 2020-03-17
Attending: NURSE PRACTITIONER
Payer: MEDICARE

## 2020-03-17 ENCOUNTER — TELEPHONE (OUTPATIENT)
Dept: TRANSPLANT | Facility: CLINIC | Age: 45
End: 2020-03-17

## 2020-03-17 VITALS
TEMPERATURE: 98.2 F | HEART RATE: 71 BPM | BODY MASS INDEX: 24.65 KG/M2 | WEIGHT: 143.6 LBS | SYSTOLIC BLOOD PRESSURE: 133 MMHG | OXYGEN SATURATION: 100 % | DIASTOLIC BLOOD PRESSURE: 75 MMHG

## 2020-03-17 DIAGNOSIS — Z94.0 KIDNEY REPLACED BY TRANSPLANT: ICD-10-CM

## 2020-03-17 DIAGNOSIS — Z94.0 KIDNEY REPLACED BY TRANSPLANT: Primary | ICD-10-CM

## 2020-03-17 LAB
BACTERIA SPEC CULT: NO GROWTH
Lab: NORMAL
SPECIMEN SOURCE: NORMAL

## 2020-03-17 NOTE — PROGRESS NOTES
This is a recent snapshot of the patient's [facility] medical record.  For current drug dose and complete information and questions, call [phone] or In Basket pool, [facility]v home infusion ([id number])  CSN Number:  [id number]

## 2020-03-17 NOTE — LETTER
3/17/2020       RE: Jeremiah Cruz  [address] [facility] [address]     Dear Colleague,    Thank you for referring your patient, Jeremiah Cruz, to the [facility] SOLID ORGAN TRANSPLANT at [facility]. Please see a copy of my visit note below.    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 He is s/p kidney transplant x2 (, ). 2nd graft lost to primary nonfunction with explant a few weeks later. He has been on dialysis since . He is now s/p intra-abdominal  donor kidney transplant w/ ureteral stent on 20.      No fevers, chills, nausea or vomiting    No hematuria, dysuria, frequency.    No chest pain or shortness of breath.    Transplant History:    Transplant Type:  DDKT  Donor Type: Donation after Brain Death   Transplant Date:  2020 (Kidney), 1988 (Kidney), 3/2/2005 (Kidney)   Ureteral Stent:  Yes   Crossmatch:  negative   DSA at Tx:  No  Baseline Cr: TBD   DeNovo DSA: No    Acute Rejection Hx:  No    Present Maintenance Immunosuppression:  Tacrolimus and Mycophenolate mofetil    CMV IgG Ab Discordance:  No  EBV IgG Ab Discordance:  No    BK Viremia:  No  EBV Viremia:  No    Transplant Coordinator: Jody García     Transplant Office Phone Number: [phone]     Immunosuppressant Medications     Immunosuppressive Agents Disp Start End     MYFORTIC (BRAND) 180 MG EC tablet    180 tablet 3/10/2020     Sig - Route: Take 3 tablets (540 mg) by mouth 2 times daily - Oral    Class: E-Prescribe     tacrolimus (GENERIC EQUIVALENT) 0.5 MG capsule    60 capsule 3/6/2020     Sig: Use a combination of 1 mg and 0.5 mg capsules for a total dose of 2.5 mg by mouth twice daily.    Class: E-Prescribe     tacrolimus (GENERIC EQUIVALENT) 1 MG capsule    240 capsule 3/10/2020     Sig - Route: 4 capsules (4  mg) by Oral or Feeding Tube route 2 times daily - Oral or Feeding Tube    Patient taking differently:  7 mg by Oral or Feeding Tube route 2 times daily        Class: E-Prescribe          Possible Immunosuppression-related side effects:   []             headache  []             vivid dreams  []             irritability  []             cognitive difficuties  []             fine tremor  []             nausea  []             diarrhea  []             neuropathy      []             edema  []             renal calcineurin toxicity  []             hyperkalemia  []             post-transplant diabetes  []             decreased appetite  []             increased appetite  []             other:  []             none    Prescription Medications as of 3/20/2020       Rx Number Disp Refills Start End Last Dispensed Date Next Fill Date Owning Pharmacy    acetaminophen (TYLENOL) 325 MG tablet  1 Bottle 0 3/6/2020    [address]    Sig: Take 1-2 tablets (325-650 mg) by mouth every 6 hours as needed for mild pain or fever    Class: E-Prescribe    Route: Oral    amLODIPine (NORVASC) 10 MG tablet  30 tablet 11 3/7/2020    [address]    Sig: Take 1 tablet (10 mg) by mouth daily    Class: E-Prescribe    Route: Oral    atorvastatin (LIPITOR) 10 MG tablet  30 tablet 11 3/7/2020    [address]    Sig: Take 1 tablet (10 mg) by mouth daily    Class: E-Prescribe    Route: Oral    calcitRIOL (ROCALTROL) 0.25 MCG capsule  30 capsule 11 3/7/2020    [address]    Sig: Take 1 capsule (0.25 mcg) by mouth daily    Class: E-Prescribe    Route: Oral    calcium carbonate (TUMS) 500 MG chewable tablet    3/6/2020    [address]    Sig: Take 1 tablet (500 mg) by  mouth 3 times daily as needed for heartburn Take 1 chewable tablet by mouth 3 times daily with meals.    Class: No Print Out    Route: Oral    carvedilol (COREG) 12.5 MG tablet  60 tablet 11 3/6/2020    [address]    Sig: Take 1 tablet (12.5 mg) by mouth 2 times daily (with meals)    Class: E-Prescribe    Route: Oral    dasatinib (SPRYCEL) 20 MG tablet CHEMO            Sig: Take 40 mg by mouth daily     Class: Historical    Route: Oral    famotidine (PEPCID) 40 MG tablet  30 tablet 11 3/7/2020    [address]    Sig: Take 1 tablet (40 mg) by mouth daily    Class: E-Prescribe    Route: Oral    ketoconazole (NIZORAL) 2 % external shampoo  120 mL 9 3/19/2019    [facility] [address] [place] [address]    Sig: APPLY TO THE SCALP AND WASH OFF ATER 5 MINUTES EVERY OTHER DAY    Class: E-Prescribe    Renewals     Renewal provider:  Anai Han MD          MYFORTIC (BRAND) 180 MG EC tablet  180 tablet 11 3/10/2020    [address]    Sig: Take 3 tablets (540 mg) by mouth 2 times daily    Class: E-Prescribe    Route: Oral    ondansetron (ZOFRAN-ODT) 4 MG ODT tab  5 tablet 0 3/10/2020    [address]    Sig: Take 1 tablet (4 mg) by mouth every 8 hours as needed for nausea or vomiting    Class: E-Prescribe    Route: Oral    polyethylene glycol (MIRALAX) packet  60 packet 0 3/10/2020    [address]    Sig: Take 17 g by mouth 2 times daily Hold for loose stools    Class: E-Prescribe    Route: Oral    predniSONE (DELTASONE) 5 MG tablet  30 tablet 11 3/7/2020    [address]    Sig: Take 1 tablet (5 mg) by mouth daily    Class: E-Prescribe     Route: Oral    Probiotic Product (UP4 PROBIOTICS ADULT) CAPS  30 capsule 0 3/11/2020    [address]    Sig: Take 1 capsule by mouth daily    Class: E-Prescribe    Route: Oral    senna-docusate (SENOKOT-S/PERICOLACE) 8.6-50 MG tablet  100 tablet 0 3/6/2020    [address]    Sig: Take 1-2 tablets by mouth 2 times daily May decrease dose or HOLD if having diarrhea    Class: E-Prescribe    Route: Oral    sucralfate (CARAFATE) 1 GM/10ML suspension  600 mL 1 3/10/2020    [address]    Sig: Take 10 mLs (1 g) by mouth 2 times daily    Class: E-Prescribe    Route: Oral    sulfamethoxazole-trimethoprim (BACTRIM) 400-80 MG tablet  30 tablet 11 3/9/2020    [address]    Sig: Take 1 tablet by mouth Every Mon, Wed, Fri Morning    Class: E-Prescribe    Route: Oral    tacrolimus (GENERIC EQUIVALENT) 0.5 MG capsule  60 capsule 11 3/6/2020    [address]    Sig: Use a combination of 1 mg and 0.5 mg capsules for a total dose of 2.5 mg by mouth twice daily.    Class: E-Prescribe    tacrolimus (GENERIC EQUIVALENT) 1 MG capsule  240 capsule 11 3/10/2020    [address]    Si capsules (4 mg) by Oral or Feeding Tube route 2 times daily    Class: E-Prescribe    Route: Oral or Feeding Tube    tamsulosin (FLOMAX) 0.4 MG capsule  90 capsule 1 3/16/2020    [address]    Sig: Take 1 capsule (0.4 mg) by mouth At Bedtime    Class: E-Prescribe    Route: Oral    valGANciclovir (VALCYTE) 450 MG tablet  90 tablet 3 3/12/2020    [address]    Sig:  Take 1 tablet (450 mg) by mouth daily Titrate dose up to a max of 2 tabs (900 mg) by mouth daily when directed by your transplant team.    Class: E-Prescribe    Route: Oral    vitamin D3 (CHOLECALCIFEROL) 2000 units (50 mcg) tablet  90 tablet 3 3/12/2020    [place], [place] - [address] [place] [address] [address]    Sig: Take 1 tablet (2,000 Units) by mouth daily    Class: E-Prescribe    Route: Oral          O:      General Appearance: in no apparent distress.   Skin: Normal, no rashes or jaundice  Heart: regular rate and rhythm, normal S1 and S2  Lungs: easy respirations, no audible wheezing.  Abdomen: flat, The wound is dry and intact, without hernia. The abdomen is non-tender. The kidney graft is not tender.  There is no ascites.  Extremities: Tremor absent.   Edema: absent.     Transplant Immunosuppression Labs Latest Ref Rng & Units 3/16/2020 3/14/2020 3/13/2020 3/12/2020 3/11/2020   Tacro Level 5.0 - 15.0 ug/L 10.8 9.2 6.9 5.9 4.9(L)   Tacro Level - Not Provided Not Provided Not Provided Not Provided Not Provided   Cyclo Level 50 - 400 ug/L - - - - -   Cyclo Level - - - - - -   Creat 0.66 - 1.25 mg/dL 2.42(H) 2.49(H) 2.52(H) 2.77(H) 3.23(H)   BUN 7 - 30 mg/dL 26 24 28 32(H) 43(H)   WBC 4.0 - 11.0 10e9/L 9.6 11.4(H) 12.1(H) 10.4 9.8   Neutrophil % 87.9 - 87.4 86.0 86.3   ANEU 1.6 - 8.3 10e9/L 8.4(H) - 10.7(H) 8.9(H) 8.5(H)       Chemistries:   Recent Labs   Lab Test 03/16/20  0807   BUN 26   CR 2.42*   GFRESTIMATED 31*   *     Lab Results   Component Value Date    A1C 5.2 02/28/2020     Recent Labs   Lab Test 03/05/20  0530 02/28/20  0341   ALBUMIN  --  4.0   BILITOTAL 0.6 0.6   ALKPHOS  --  108   AST  --  30   ALT  --  48     Urine Studies:  Recent Labs   Lab Test 03/16/20  0940   COLOR Yellow   APPEARANCE Clear   URINEGLC 150*   URINEBILI Negative   URINEKETONE Negative   SG 1.014   UBLD Small*   URINEPH 6.0   PROTEIN 30*   NITRITE Negative   LEUKEST Small*   RBCU 13*   WBCU  18*     No lab results found.  Hematology:   Recent Labs   Lab Test 03/16/20  0807 03/14/20  0830 03/13/20  0735   HGB 9.0* 8.9* 6.7*    336 357   WBC 9.6 11.4* 12.1*     Coags:   Recent Labs   Lab Test 03/05/20  1327 02/28/20  0341   INR 1.18* 1.11     HLA antibodies:   SA1 Hi Risk Ariella   Date Value Ref Range Status   02/28/2020   Final    A:1 3 11 26 29 32 36 80 B:7 8 18 27 39 41 42 51 54 55 56 61 64 65 67 71 75   78 81       SA1 Mod Risk Ariella   Date Value Ref Range Status   02/28/2020   Final    A:25 43 66 74 B:13 35 37 38 45 46 48 49 50 52 53 57 59 60 62 63 72 73 76   77 82 Cw:1 8 9 10 12 14       SA2 Hi Risk Ariella   Date Value Ref Range Status   02/28/2020 DR:11 17 18 DRw:52 DQ:7 8 9  Final     SA2 Mod Risk Ariella   Date Value Ref Range Status   02/28/2020 DR:1 103 4 9 10 13 14 15 16 DRw:51  Final       Assessment: Jeremiah Cruz is doing well s/p DDKT:  Issues we addressed during his visit include:    Plan:    1. Graft function: cr is   2. Immunosuppression Management: No change continue myfortic 540mg BID  and prograf 4mg BID.  Complexity of management:Medium.  Contributing factors: rejection  3. Incision: Healing well  4. Nausea:  Improved.  States he is eating well.    Followup: as directed    Total Time: 20 min,   Counselling Time: 10 min.          Again, thank you for allowing me to participate in the care of your patient.      Sincerely,    Rebeca Lema NP

## 2020-03-17 NOTE — TELEPHONE ENCOUNTER
Discharge from [facility]         Spoke to Adrianmakayla Cruz who is now 14 days post kidney transplant    Discharged from [facility] pn  3/16/ and followed up with  Rebeca Lema Transplant Surgery NP today pm 3/17/2020     Ed is going to his sister home post kidney transplant      Sister house   [address] [place] [address]     Sent agency face to face,medication list ,lab letter, last clinic notes discharge altagracia   Spoke to intake at [facility] visit to start on 3/19//2020    [phone] phone [facility]  home care     [phone] fax home care        Reviewed home care plan with Ed       ++++++++++++++++++++++++++++++++++++++  spoke with SW and she gave me the number for Ed to set up his transportation for future appointments on his own through Medicare. He said he is comfortable doing this on his own.        Patient underwent DDKT 2/28 with Jimmie.     Kidney transplant with delayed graft function: Patient had delayed graft function post-op. Underwent HD POD#1 due to hyperkalemia. His Cr is now improving. Cr 8.5->7.4->7->6->4.9->4.1->3.7 on discharge. Good urine output. Stein to remain until POD#14. Post-operative pain minimal, managed with PRN Tylenol.     Immunosuppression:  Induction immunosuppression with thymoglobulin 425mg (6.2mg/kg) and steroid taper. Maintenance immunosuppression with tacrolimus and Myfortic.  Tacrolimus goal level 8-10 (12 hour trough).  Infectious prophylaxis with valganciclovir (x6 months) and bactrim (indefinitely).     Donor type:  DBD   DSA at time of transplant:  NO   Ureteral stent: YES   CMV:  Donor + / Recipient -   EBV:  Donor ? / Recipient +   Thymoglobulin:  425mg, 6.2mg/kg     Hypertension: Now controlled. On Coreg 12.5mg BID and amlodipine 10 mg daily.     Anemia of chronic renal disease: Transfused 1u RBC on 3/5. Hgb tamar appropriately and has been stable at ~7. Possibly r/t dasatinib/tac interaction as plt were also low but have improved;  now 258.

## 2020-03-18 ENCOUNTER — HOME INFUSION (PRE-WILLOW HOME INFUSION) (OUTPATIENT)
Dept: PHARMACY | Facility: CLINIC | Age: 45
End: 2020-03-18

## 2020-03-19 ENCOUNTER — HOME INFUSION (PRE-WILLOW HOME INFUSION) (OUTPATIENT)
Dept: PHARMACY | Facility: CLINIC | Age: 45
End: 2020-03-19

## 2020-03-19 NOTE — PROGRESS NOTES
This is a recent snapshot of the patient's [facility] medical record.  For current drug dose and complete information and questions, call [phone] or In Basket pool, fv home infusion ([id number])  CSN Number:  [id number]

## 2020-03-20 NOTE — PROGRESS NOTES
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 He is s/p kidney transplant x2 (, ). 2nd graft lost to primary nonfunction with explant a few weeks later. He has been on dialysis since . He is now s/p intra-abdominal  donor kidney transplant w/ ureteral stent on 20.      No fevers, chills, nausea or vomiting    No hematuria, dysuria, frequency.    No chest pain or shortness of breath.    Transplant History:    Transplant Type:  DDKT  Donor Type: Donation after Brain Death   Transplant Date:  2020 (Kidney), 1988 (Kidney), 3/2/2005 (Kidney)   Ureteral Stent:  Yes   Crossmatch:  negative   DSA at Tx:  No  Baseline Cr: TBD   DeNovo DSA: No    Acute Rejection Hx:  No    Present Maintenance Immunosuppression:  Tacrolimus and Mycophenolate mofetil    CMV IgG Ab Discordance:  No  EBV IgG Ab Discordance:  No    BK Viremia:  No  EBV Viremia:  No    Transplant Coordinator: Jody García     Transplant Office Phone Number: [phone]     Immunosuppressant Medications     Immunosuppressive Agents Disp Start End     MYFORTIC (BRAND) 180 MG EC tablet    180 tablet 3/10/2020     Sig - Route: Take 3 tablets (540 mg) by mouth 2 times daily - Oral    Class: E-Prescribe     tacrolimus (GENERIC EQUIVALENT) 0.5 MG capsule    60 capsule 3/6/2020     Sig: Use a combination of 1 mg and 0.5 mg capsules for a total dose of 2.5 mg by mouth twice daily.    Class: E-Prescribe     tacrolimus (GENERIC EQUIVALENT) 1 MG capsule    240 capsule 3/10/2020     Sig - Route: 4 capsules (4 mg) by Oral or Feeding Tube route 2 times daily - Oral or Feeding Tube    Patient taking differently:  7 mg by Oral or Feeding Tube route 2 times daily        Class: E-Prescribe          Possible Immunosuppression-related side effects:   []             headache  []             vivid  dreams  []             irritability  []             cognitive difficuties  []             fine tremor  []             nausea  []             diarrhea  []             neuropathy      []             edema  []             renal calcineurin toxicity  []             hyperkalemia  []             post-transplant diabetes  []             decreased appetite  []             increased appetite  []             other:  []             none    Prescription Medications as of 3/20/2020       Rx Number Disp Refills Start End Last Dispensed Date Next Fill Date Owning Pharmacy    acetaminophen (TYLENOL) 325 MG tablet  1 Bottle 0 3/6/2020    [address]    Sig: Take 1-2 tablets (325-650 mg) by mouth every 6 hours as needed for mild pain or fever    Class: E-Prescribe    Route: Oral    amLODIPine (NORVASC) 10 MG tablet  30 tablet 11 3/7/2020    [address]    Sig: Take 1 tablet (10 mg) by mouth daily    Class: E-Prescribe    Route: Oral    atorvastatin (LIPITOR) 10 MG tablet  30 tablet 11 3/7/2020    [address]    Sig: Take 1 tablet (10 mg) by mouth daily    Class: E-Prescribe    Route: Oral    calcitRIOL (ROCALTROL) 0.25 MCG capsule  30 capsule 11 3/7/2020    [address]    Sig: Take 1 capsule (0.25 mcg) by mouth daily    Class: E-Prescribe    Route: Oral    calcium carbonate (TUMS) 500 MG chewable tablet    3/6/2020    [address]    Sig: Take 1 tablet (500 mg) by mouth 3 times daily as needed for heartburn Take 1 chewable tablet by mouth 3 times daily with meals.    Class: No Print Out    Route: Oral    carvedilol (COREG) 12.5 MG tablet  60 tablet 11 3/6/2020    [address]    Sig: Take  1 tablet (12.5 mg) by mouth 2 times daily (with meals)    Class: E-Prescribe    Route: Oral    dasatinib (SPRYCEL) 20 MG tablet CHEMO            Sig: Take 40 mg by mouth daily     Class: Historical    Route: Oral    famotidine (PEPCID) 40 MG tablet  30 tablet 11 3/7/2020    [address]    Sig: Take 1 tablet (40 mg) by mouth daily    Class: E-Prescribe    Route: Oral    ketoconazole (NIZORAL) 2 % external shampoo  120 mL 9 3/19/2019    [place], [place] - [address] [facility]    Sig: APPLY TO THE SCALP AND WASH OFF ATER 5 MINUTES EVERY OTHER DAY    Class: E-Prescribe    Renewals     Renewal provider:  Anai Han MD          MYFORTIC (BRAND) 180 MG EC tablet  180 tablet 11 3/10/2020    [address]    Sig: Take 3 tablets (540 mg) by mouth 2 times daily    Class: E-Prescribe    Route: Oral    ondansetron (ZOFRAN-ODT) 4 MG ODT tab  5 tablet 0 3/10/2020    [address]    Sig: Take 1 tablet (4 mg) by mouth every 8 hours as needed for nausea or vomiting    Class: E-Prescribe    Route: Oral    polyethylene glycol (MIRALAX) packet  60 packet 0 3/10/2020    [address]    Sig: Take 17 g by mouth 2 times daily Hold for loose stools    Class: E-Prescribe    Route: Oral    predniSONE (DELTASONE) 5 MG tablet  30 tablet 11 3/7/2020    [address]    Sig: Take 1 tablet (5 mg) by mouth daily    Class: E-Prescribe    Route: Oral    Probiotic Product (UP4 PROBIOTICS ADULT) CAPS  30 capsule 0 3/11/2020    [facility] [address]    Sig: Take 1 capsule by mouth daily    Class: E-Prescribe    Route: Oral    senna-docusate (SENOKOT-S/PERICOLACE)  8.6-50 MG tablet  100 tablet 0 3/6/2020    [address]    Sig: Take 1-2 tablets by mouth 2 times daily May decrease dose or HOLD if having diarrhea    Class: E-Prescribe    Route: Oral    sucralfate (CARAFATE) 1 GM/10ML suspension  600 mL 1 3/10/2020    [address]    Sig: Take 10 mLs (1 g) by mouth 2 times daily    Class: E-Prescribe    Route: Oral    sulfamethoxazole-trimethoprim (BACTRIM) 400-80 MG tablet  30 tablet 11 3/9/2020    [address]    Sig: Take 1 tablet by mouth Every Mon, Wed, Fri Morning    Class: E-Prescribe    Route: Oral    tacrolimus (GENERIC EQUIVALENT) 0.5 MG capsule  60 capsule 11 3/6/2020    [address]    Sig: Use a combination of 1 mg and 0.5 mg capsules for a total dose of 2.5 mg by mouth twice daily.    Class: E-Prescribe    tacrolimus (GENERIC EQUIVALENT) 1 MG capsule  240 capsule 11 3/10/2020    [address]    Si capsules (4 mg) by Oral or Feeding Tube route 2 times daily    Class: E-Prescribe    Route: Oral or Feeding Tube    tamsulosin (FLOMAX) 0.4 MG capsule  90 capsule 1 3/16/2020    [address]    Sig: Take 1 capsule (0.4 mg) by mouth At Bedtime    Class: E-Prescribe    Route: Oral    valGANciclovir (VALCYTE) 450 MG tablet  90 tablet 3 3/12/2020    [address]    Sig: Take 1 tablet (450 mg) by mouth daily Titrate dose up to a max of 2 tabs (900 mg) by mouth daily when directed by your transplant team.    Class: E-Prescribe    Route: Oral    vitamin D3 (CHOLECALCIFEROL) 2000 units (50 mcg) tablet  90 tablet 3 3/12/2020    [facility]  [facility] - [address]    Sig: Take 1 tablet (2,000 Units) by mouth daily    Class: E-Prescribe    Route: Oral          O:      General Appearance: in no apparent distress.   Skin: Normal, no rashes or jaundice  Heart: regular rate and rhythm, normal S1 and S2  Lungs: easy respirations, no audible wheezing.  Abdomen: flat, The wound is dry and intact, without hernia. The abdomen is non-tender. The kidney graft is not tender.  There is no ascites.  Extremities: Tremor absent.   Edema: absent.     Transplant Immunosuppression Labs Latest Ref Rng & Units 3/16/2020 3/14/2020 3/13/2020 3/12/2020 3/11/2020   Tacro Level 5.0 - 15.0 ug/L 10.8 9.2 6.9 5.9 4.9(L)   Tacro Level - Not Provided Not Provided Not Provided Not Provided Not Provided   Cyclo Level 50 - 400 ug/L - - - - -   Cyclo Level - - - - - -   Creat 0.66 - 1.25 mg/dL 2.42(H) 2.49(H) 2.52(H) 2.77(H) 3.23(H)   BUN 7 - 30 mg/dL 26 24 28 32(H) 43(H)   WBC 4.0 - 11.0 10e9/L 9.6 11.4(H) 12.1(H) 10.4 9.8   Neutrophil % 87.9 - 87.4 86.0 86.3   ANEU 1.6 - 8.3 10e9/L 8.4(H) - 10.7(H) 8.9(H) 8.5(H)       Chemistries:   Recent Labs   Lab Test 03/16/20  0807   BUN 26   CR 2.42*   GFRESTIMATED 31*   *     Lab Results   Component Value Date    A1C 5.2 02/28/2020     Recent Labs   Lab Test 03/05/20  0530 02/28/20  0341   ALBUMIN  --  4.0   BILITOTAL 0.6 0.6   ALKPHOS  --  108   AST  --  30   ALT  --  48     Urine Studies:  Recent Labs   Lab Test 03/16/20  0940   COLOR Yellow   APPEARANCE Clear   URINEGLC 150*   URINEBILI Negative   URINEKETONE Negative   SG 1.014   UBLD Small*   URINEPH 6.0   PROTEIN 30*   NITRITE Negative   LEUKEST Small*   RBCU 13*   WBCU 18*     No lab results found.  Hematology:   Recent Labs   Lab Test 03/16/20  0807 03/14/20  0830 03/13/20  0735   HGB 9.0* 8.9* 6.7*    336 357   WBC 9.6 11.4* 12.1*     Coags:   Recent Labs   Lab Test 03/05/20  1327 02/28/20  0341   INR 1.18* 1.11     HLA antibodies:   SA1 Hi  Risk Ariella   Date Value Ref Range Status   02/28/2020   Final    A:1 3 11 26 29 32 36 80 B:7 8 18 27 39 41 42 51 54 55 56 61 64 65 67 71 75   78 81       SA1 Mod Risk Ariella   Date Value Ref Range Status   02/28/2020   Final    A:25 43 66 74 B:13 35 37 38 45 46 48 49 50 52 53 57 59 60 62 63 72 73 76   77 82 Cw:1 8 9 10 12 14       SA2 Hi Risk Ariella   Date Value Ref Range Status   02/28/2020 DR:11 17 18 DRw:52 DQ:7 8 9  Final     SA2 Mod Risk Ariella   Date Value Ref Range Status   02/28/2020 DR:1 103 4 9 10 13 14 15 16 DRw:51  Final       Assessment: Jeremiah Cruz is doing well s/p DDKT:  Issues we addressed during his visit include:    Plan:    1. Graft function: cr is   2. Immunosuppression Management: No change continue myfortic 540mg BID  and prograf 4mg BID.  Complexity of management:Medium.  Contributing factors: rejection  3. Incision: Healing well  4. Nausea:  Improved.  States he is eating well.    Followup: as directed    Total Time: 20 min,   Counselling Time: 10 min.

## 2020-03-20 NOTE — PROGRESS NOTES
This is a recent snapshot of the patient's [facility] medical record.  For current drug dose and complete information and questions, call [phone] or In Basket pool, fv home infusion ([id number])  CSN Number:  [id number]

## 2020-03-24 ENCOUNTER — TELEPHONE (OUTPATIENT)
Dept: TRANSPLANT | Facility: CLINIC | Age: 45
End: 2020-03-24

## 2020-03-25 ENCOUNTER — TELEPHONE (OUTPATIENT)
Dept: TRANSPLANT | Facility: CLINIC | Age: 45
End: 2020-03-25

## 2020-03-25 DIAGNOSIS — Z94.0 KIDNEY REPLACED BY TRANSPLANT: ICD-10-CM

## 2020-03-25 DIAGNOSIS — R10.13 ABDOMINAL PAIN, EPIGASTRIC: ICD-10-CM

## 2020-03-25 RX ORDER — CARVEDILOL 12.5 MG/1
25 TABLET ORAL 2 TIMES DAILY WITH MEALS
Qty: 120 TABLET | Refills: 11 | Status: SHIPPED | OUTPATIENT
Start: 2020-03-25 | End: 2020-04-28

## 2020-03-25 RX ORDER — L.ACIDOPHIL/L.PLANTAR/BIFIDO 7 15B CELL
1 CAPSULE ORAL DAILY
Qty: 30 CAPSULE | Refills: 0 | Status: CANCELLED | OUTPATIENT
Start: 2020-03-25

## 2020-03-25 RX ORDER — L.ACIDOPHIL/L.PLANTAR/BIFIDO 7 15B CELL
1 CAPSULE ORAL DAILY
Qty: 30 CAPSULE | Refills: 1 | Status: SHIPPED | OUTPATIENT
Start: 2020-03-25 | End: 2021-01-01

## 2020-03-25 RX ORDER — TACROLIMUS 1 MG/1
6 CAPSULE ORAL 2 TIMES DAILY
Qty: 360 CAPSULE | Refills: 11 | Status: SHIPPED | OUTPATIENT
Start: 2020-03-25 | End: 2020-04-04

## 2020-03-25 RX ORDER — TACROLIMUS 0.5 MG/1
0.5 CAPSULE ORAL 2 TIMES DAILY
Qty: 60 CAPSULE | Refills: 11 | Status: SHIPPED | OUTPATIENT
Start: 2020-03-25 | End: 2020-04-04

## 2020-03-25 RX ORDER — TACROLIMUS 1 MG/1
6 CAPSULE ORAL 2 TIMES DAILY
Qty: 360 CAPSULE | Refills: 11 | Status: SHIPPED | OUTPATIENT
Start: 2020-03-25 | End: 2020-03-25

## 2020-03-25 NOTE — TELEPHONE ENCOUNTER
Notes recorded by Alvin Varner MD on 3/24/2020 at 9:08 AM CDT   Slight increase in serum creatinine, awaiting drug level.  Recommend good hydration and will follow.

## 2020-03-25 NOTE — TELEPHONE ENCOUNTER
Post Kidney and Pancreas Transplant Team Conference  Date: 3/25/2020  Transplant Coordinator: Jody García     Attendees, via web conference:  [x]  Dr. Varner  [] Danuta Melo LPN     [x]  Dr. Wilkinson [x] Jody García, AURA [] Inna Stanley LPN   [x]  Dr. Victor [] Joan Barbosa, RN     [] Judy Garcia RN [x] Conner Downey, PharmD   [] Dr. Magana [x] Danelle Jaime RN    [x] Dr. Benítez [] Nathan Eaton RN    [x] Dr. Mcdaniels [] Elizabeth Ernst RN    [] Dr. Bain [] Christi Leal RN     [] Sharee Burnham RN    [] Surgery Fellow [x] Cyndi Bush RN    [] Rebeca Lema, NP [] Barbara Eugene RN        Verbal Plan Read Back:   Biopsy    Routed to RN Coordinator   Mari Jaime RN

## 2020-03-30 ENCOUNTER — TELEPHONE (OUTPATIENT)
Dept: TRANSPLANT | Facility: CLINIC | Age: 45
End: 2020-03-30

## 2020-03-30 NOTE — PROGRESS NOTES
Jeremiah Cruz is an 44 year old male with 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 He is s/p kidney transplant x2 (, ). Second graft lost due to primary nonfunction with explant a few weeks later. He has been on dialysis since . He is now s/p intra-abdominal  donor kidney transplant with ureteral stent on 3/5/2020.    Donor type:  DBD  DSA at time of transplant:  NO  Ureteral stent: YES  CMV:  Donor + / Recipient -  EBV:  Donor ? / Recipient +  Thymoglobulin:  425mg, 6.2mg/kg    Kidney and Pancreas Transplant Coordinator Transition to Outpatient Discharge Note    Pt Name: Jeremiah Cruz  : 1975    Transplant Date: 2020      Surgeon: Dr Carol Samuel     intra-abdominal, without vascular reconstruction. A J-J ureteral stent was placed.  Transplant Coordinator: Jody García RN        High Risk DSA: No.  PHS Increased Risk: No.    Immunosuppression:  Tacrolimus  Myfortic Prednisone     Valcyte tmp sulfa single strength  Sulfa       Has PICC line place             Patient Active Problem List   Diagnosis     Alport's syndrome     Anemia in chronic kidney disease     Secondary renal hyperparathyroidism (H)     Hypertension     CML (chronic myelocytic leukemia) (H)     End stage kidney disease (H)     GERD (gastroesophageal reflux disease)     Prurigo nodularis     Senile sebaceous gland hyperplasia     Immunosuppressed status (H)     Kidney replaced by transplant     Delayed graft function of kidney     Hyperkalemia     History of difficult venous access     History of chronic myeloid leukemia     Postoperative ileus (H)     Steroid-induced hyperglycemia       Education:  Writer met with Jeremiah Cruz and  Sister r. We discussed immunosuppression medications, indications, side effects, dose adjustments, and lab monitoring. Lab draw schedule was given to pt and fully explained - Mailers not given; no questions. Further education was provided on typical  post transplant course, labs examined with thresholds, biopsy, infection prevention, office contact numbers, and when to call.     Pt questions for follow up:      Discharge Transition Plan:   Pt will transition home, with assistance from sister . Pt will have [facility] care  [facility] .   Pt states having working BP monitor, scale, and thermometer at home.      Stent removal date:   Rebeca Torey       Patient has viewed [facility], and has no additional questions at this time. RNCC encouraged on-going review.  Patient has voiced understanding of ability to utilize [facility] for self-review of lab values.

## 2020-03-31 ENCOUNTER — TELEPHONE (OUTPATIENT)
Dept: TRANSPLANT | Facility: CLINIC | Age: 45
End: 2020-03-31

## 2020-03-31 ENCOUNTER — VIRTUAL VISIT (OUTPATIENT)
Dept: NEPHROLOGY | Facility: CLINIC | Age: 45
End: 2020-03-31
Attending: INTERNAL MEDICINE
Payer: MEDICARE

## 2020-03-31 VITALS
WEIGHT: 137 LBS | TEMPERATURE: 98.2 F | HEART RATE: 73 BPM | BODY MASS INDEX: 23.52 KG/M2 | DIASTOLIC BLOOD PRESSURE: 60 MMHG | SYSTOLIC BLOOD PRESSURE: 118 MMHG

## 2020-03-31 DIAGNOSIS — Z48.298 AFTERCARE FOLLOWING ORGAN TRANSPLANT: Primary | ICD-10-CM

## 2020-03-31 DIAGNOSIS — Z94.0 KIDNEY REPLACED BY TRANSPLANT: Primary | ICD-10-CM

## 2020-03-31 NOTE — PROGRESS NOTES
"TRANSPLANT NEPHROLOGY TELEMEDICINE VISIT    Jeremiah Cruz is a 44 year old male who is being evaluated via a billable telemedicine (video/telephone) visit on March 31, 2020.     The patient has been notified of following:     \"This telemedicine (video/telephone) visit will be conducted via a video/phone call between you and your physician. We have found that certain health care needs can be provided without the need for a physical exam.  This service lets us provide the care you need with a short video/phone conversation.  If a prescription is necessary, we can send it directly to your pharmacy.  If lab work is needed, we can place an order for that and you can then stop by our lab to have the test done at a later time.    If during the course of this video/phone call the physician feels a telemedicine (video/telephone) visit is not appropriate, you will not be charged for this service and an in clinic visit will be arranged at a later date.\"     Assessment & Plan   # DDKT: creatinine slowly down trending but stalled will get a biopsy on Friday               - Baseline Cr ~ TBD              - Proteinuria: Not checked post transplant              - Date DSA Last Checked: Feb/2020      Latest DSA: No DSA at time of transplant              - BK Viremia: Not checked recently due to time from transplant              - Kidney Tx Biopsy: No     # Immunosuppression: Tacrolimus immediate release (goal 8-10), Mycophenolic acid (goal 1.0-3.5) and Prednisone (dose 5 mg daily)              - Changes: No     # Infection Prophylaxis:   - PJP: Sulfa/TMP (Bactrim) MWF  - CMV: Valcyte - (discordant for CMV)   - Thrush: None     # Hypertension: Orthostatic hypotension;     Goal BP: > 100, but < 130 systolic              - Volume status: Euvolemic to mildly hypovolemic                EDW~ 67.5kg              - Changes: No     # Anemia in Chronic Renal Disease: Hgb: stable               - DEB: No              - Iron studies: " Replete     # Mineral Bone Disorder:   - Secondary renal hyperparathyroidism; PTH level: Significantly elevated (601-1200 pg/ml) as of 3/4/20        On treatment: Calcitriol  - Vitamin D; level: Low       On Supplement: on cholecalciferol 2000 units daily   - Calcium; level: Normal        On Supplement: No  - Phosphorus; level: Normal        On Supplement: No     # Electrolytes:   - Potassium; level: Normal        On Supplement: No  - Magnesium; level: Normal        On Supplement: No  - Bicarbonate; level: Low normal        On Supplement: No  - Sodium; level: Normal             # CML: In remission. On dasatinib 40mg daily. Hematology recommended no changes.     # Medical Compliance: Yes     # COVID-19 Virus Review: Discussed COVID-19 virus and the potential medical risks.  Reviewed preventative health recommendations, which includes washing hands for 20 seconds, avoid touching your face, and social distancing.  Asked patient to inform the transplant center if they are exposed or diagnosed with this virus.    # Transplant History:  Etiology of Kidney Failure: Alport's syndrome  Tx: DDKT  Transplant: 2/28/2020 (Kidney), 12/7/1988 (Kidney), 3/2/2005 (Kidney)  Donor Type: Donation after Brain Death        Donor Class:   Crossmatch at time of Tx: negative  DSA at time of Tx: No  Significant changes in immunosuppression: None  CMV IgG Ab High Risk Discordance (D+/R-): Yes  EBV IgG Ab High Risk Discordance (D+/R-): No  Significant transplant-related complications: None     Transplant Office Phone Number: [phone]  Assessment and plan was discussed with the patient and he voiced his understanding and agreement.    Return Visit: 1 month      Jeremiah Cruz has a clinical telephone visit for kidney transplant and immunosuppression management.    History of Present Illness      Ed is doing ok, some GI symptoms and fatigue. No fevers or chills. He is taking his medications regularly and trying to hydrate well. He is  scheduled for biopsy this week and we talked about covid precautions around the time of the biopsy.     Recent Hospitalizations:  [x] No [] Yes    New Medical Issues: [x] No [] Yes    Decreased energy: [] No [x] Yes Feels weak    Chest pain or SOB with exertion:  [x] No [] Yes    Appetite change or weight change: [x] No [] Yes    Nausea, vomiting or diarrhea:  [x] No [] Yes Occasional    Fever, sweats or chills: [x] No [] Yes    Leg swelling: [x] No [] Yes      Home BP: 118/60    Review of Systems   A comprehensive review of systems was obtained and negative, except as noted in the HPI or PMH.    I have reviewed and updated the patient's Past Medical History, Social History, and Medication List.    Active Medical Problems  Patient Active Problem List   Diagnosis     Alport's syndrome     Anemia in chronic kidney disease     Secondary renal hyperparathyroidism (H)     Hypertension     CML (chronic myelocytic leukemia) (H)     End stage kidney disease (H)     GERD (gastroesophageal reflux disease)     Prurigo nodularis     Senile sebaceous gland hyperplasia     Immunosuppressed status (H)     Kidney replaced by transplant     Delayed graft function of kidney     Hyperkalemia     History of difficult venous access     History of chronic myeloid leukemia     Postoperative ileus (H)     Steroid-induced hyperglycemia     Allergies  Blood transfusion related (informational only); Cephalosporins; Compazine [prochlorperazine]; Gammagard [immune globulin]; Penicillins; and Vancomycin    Medications  Current Outpatient Medications   Medication Sig     acetaminophen (TYLENOL) 325 MG tablet Take 1-2 tablets (325-650 mg) by mouth every 6 hours as needed for mild pain or fever (Patient not taking: Reported on 3/13/2020)     amLODIPine (NORVASC) 10 MG tablet Take 1 tablet (10 mg) by mouth daily     atorvastatin (LIPITOR) 10 MG tablet Take 1 tablet (10 mg) by mouth daily     calcitRIOL (ROCALTROL) 0.25 MCG capsule Take 1 capsule  (0.25 mcg) by mouth daily     calcium carbonate (TUMS) 500 MG chewable tablet Take 1 tablet (500 mg) by mouth 3 times daily as needed for heartburn Take 1 chewable tablet by mouth 3 times daily with meals.     carvedilol (COREG) 12.5 MG tablet Take 2 tablets (25 mg) by mouth 2 times daily (with meals)     dasatinib (SPRYCEL) 20 MG tablet CHEMO Take 40 mg by mouth daily      famotidine (PEPCID) 40 MG tablet Take 1 tablet (40 mg) by mouth daily     ketoconazole (NIZORAL) 2 % external shampoo APPLY TO THE SCALP AND WASH OFF ATER 5 MINUTES EVERY OTHER DAY (Patient not taking: Reported on 3/13/2020)     MYFORTIC (BRAND) 180 MG EC tablet Take 3 tablets (540 mg) by mouth 2 times daily     ondansetron (ZOFRAN-ODT) 4 MG ODT tab Take 1 tablet (4 mg) by mouth every 8 hours as needed for nausea or vomiting (Patient not taking: Reported on 3/13/2020)     polyethylene glycol (MIRALAX) packet Take 17 g by mouth 2 times daily Hold for loose stools     predniSONE (DELTASONE) 5 MG tablet Take 1 tablet (5 mg) by mouth daily     Probiotic Product (UP4 PROBIOTICS ADULT) CAPS Take 1 capsule by mouth daily     senna-docusate (SENOKOT-S/PERICOLACE) 8.6-50 MG tablet Take 1-2 tablets by mouth 2 times daily May decrease dose or HOLD if having diarrhea     sucralfate (CARAFATE) 1 GM/10ML suspension Take 10 mLs (1 g) by mouth 2 times daily (Patient not taking: Reported on 3/11/2020)     sulfamethoxazole-trimethoprim (BACTRIM) 400-80 MG tablet Take 1 tablet by mouth Every Mon, Wed, Fri Morning     tacrolimus (GENERIC EQUIVALENT) 0.5 MG capsule Take 1 capsule (0.5 mg) by mouth 2 times daily Take a total dose of 6.5 mg twice day     tacrolimus (GENERIC EQUIVALENT) 1 MG capsule Take 6 capsules (6 mg) by mouth 2 times daily Take total dose of 6.5 mg twice per day     tamsulosin (FLOMAX) 0.4 MG capsule Take 1 capsule (0.4 mg) by mouth At Bedtime     valGANciclovir (VALCYTE) 450 MG tablet Take 1 tablet (450 mg) by mouth daily Titrate dose up to a max  of 2 tabs (900 mg) by mouth daily when directed by your transplant team.     vitamin D3 (CHOLECALCIFEROL) 2000 units (50 mcg) tablet Take 1 tablet (2,000 Units) by mouth daily     No current facility-administered medications for this visit.        Phone call contact time:  Total time spent 22 min     Nehemiah Wilkinson MD

## 2020-03-31 NOTE — TELEPHONE ENCOUNTER
Spoke tot Jeremiah Cruz regarding transplant  Team Dr Varner recommendation kidney biopsy  If creatinine remains above 2.0      Ed verbalized understanding although reluctant returning for kidney biopsy

## 2020-03-31 NOTE — PROGRESS NOTES
Called patient to change appointment to a video appointment. Patient declined and stated he would like to keep it as a regular phone call. Sheila Aguila on 3/31/2020 at 9:37 AM

## 2020-04-01 ENCOUNTER — TELEPHONE (OUTPATIENT)
Dept: INTERVENTIONAL RADIOLOGY/VASCULAR | Facility: CLINIC | Age: 45
End: 2020-04-01

## 2020-04-01 ENCOUNTER — TELEPHONE (OUTPATIENT)
Dept: TRANSPLANT | Facility: CLINIC | Age: 45
End: 2020-04-01

## 2020-04-01 RX ORDER — LIDOCAINE 40 MG/G
CREAM TOPICAL
Status: CANCELLED | OUTPATIENT
Start: 2020-04-01

## 2020-04-01 NOTE — TELEPHONE ENCOUNTER
Post Kidney and Pancreas Transplant Team Conference  Date: 4/1/2020  Transplant Coordinator: Jody García     Attendees:  [x]  Dr. Varner  [x] Danuta Melo LPN     []  Dr. Wilkinson [x] Jody García RN [] Inna Stanley LPN   [x]  Dr. Victor [] Joan Barbosa, RN     [] Judy Garcia RN [x] Conner Downey, ArabellaD   [] Dr. Magana [x] Danelle Jaime, AURA    [] Dr. Benítez [] Nathan Eaton RN    [x] Dr. Mcdaniels [] Elizabeth Ernst RN    [] Dr. Bain [] Christi Leal RN     [] Sharee Burnham RN    [] Surgery Fellow [x] Cyndi Bush RN    [] Rebeca Lema NP [] Barbara Eugene RN        Verbal Plan Read Back:   No changes at this time    Routed to RN Coordinator   Danuta Melo LPN

## 2020-04-02 ENCOUNTER — TELEPHONE (OUTPATIENT)
Dept: TRANSPLANT | Facility: CLINIC | Age: 45
End: 2020-04-02

## 2020-04-02 NOTE — TELEPHONE ENCOUNTER
----- Message from Jody García RN sent at 4/2/2020  9:25 AM CDT -----  Regarding: Please call ed regarding the plan for stent removal he wants sedation URGENT  Ed will be  in [facility] this afternoon for kidney biopsy  in am  in IR   Please call him regarding plan

## 2020-04-02 NOTE — TELEPHONE ENCOUNTER
Contacted on ed regarding stent removal.   Informed patient we do not sedate patient for stent removal.    Patient states understanding.

## 2020-04-03 ENCOUNTER — TELEPHONE (OUTPATIENT)
Dept: TRANSPLANT | Facility: CLINIC | Age: 45
End: 2020-04-03

## 2020-04-03 ENCOUNTER — OFFICE VISIT (OUTPATIENT)
Dept: TRANSPLANT | Facility: CLINIC | Age: 45
End: 2020-04-03
Attending: NURSE PRACTITIONER
Payer: MEDICARE

## 2020-04-03 ENCOUNTER — APPOINTMENT (OUTPATIENT)
Dept: INTERVENTIONAL RADIOLOGY/VASCULAR | Facility: CLINIC | Age: 45
End: 2020-04-03
Attending: INTERNAL MEDICINE
Payer: MEDICARE

## 2020-04-03 ENCOUNTER — APPOINTMENT (OUTPATIENT)
Dept: MEDSURG UNIT | Facility: CLINIC | Age: 45
End: 2020-04-03
Attending: INTERNAL MEDICINE
Payer: MEDICARE

## 2020-04-03 ENCOUNTER — RESULTS ONLY (OUTPATIENT)
Dept: OTHER | Facility: CLINIC | Age: 45
End: 2020-04-03

## 2020-04-03 ENCOUNTER — HOSPITAL ENCOUNTER (OUTPATIENT)
Facility: CLINIC | Age: 45
Discharge: HOME | End: 2020-04-03
Attending: INTERNAL MEDICINE | Admitting: INTERNAL MEDICINE
Payer: MEDICARE

## 2020-04-03 VITALS
TEMPERATURE: 98.3 F | WEIGHT: 134.92 LBS | DIASTOLIC BLOOD PRESSURE: 58 MMHG | HEART RATE: 77 BPM | BODY MASS INDEX: 23.03 KG/M2 | SYSTOLIC BLOOD PRESSURE: 114 MMHG | OXYGEN SATURATION: 99 % | HEIGHT: 64 IN

## 2020-04-03 VITALS
BODY MASS INDEX: 23.05 KG/M2 | HEART RATE: 77 BPM | OXYGEN SATURATION: 99 % | WEIGHT: 135 LBS | SYSTOLIC BLOOD PRESSURE: 114 MMHG | TEMPERATURE: 98.3 F | HEIGHT: 64 IN | DIASTOLIC BLOOD PRESSURE: 58 MMHG | RESPIRATION RATE: 18 BRPM

## 2020-04-03 DIAGNOSIS — Z94.0 KIDNEY REPLACED BY TRANSPLANT: ICD-10-CM

## 2020-04-03 DIAGNOSIS — Z94.0 KIDNEY REPLACED BY TRANSPLANT: Primary | ICD-10-CM

## 2020-04-03 DIAGNOSIS — Z79.899 LONG TERM USE OF DRUG: ICD-10-CM

## 2020-04-03 DIAGNOSIS — Z96.0 URETERAL STENT RETAINED: ICD-10-CM

## 2020-04-03 LAB
GLUCOSE SERPL-MCNC: 133 MG/DL (ref 70–99)
INR PPP: 1.21 (ref 0.86–1.14)

## 2020-04-03 PROCEDURE — 27210903 ZZH KIT CR5

## 2020-04-03 PROCEDURE — C1769 GUIDE WIRE: HCPCS

## 2020-04-03 PROCEDURE — 88350 IMFLUOR EA ADDL 1ANTB STN PX: CPT | Performed by: INTERNAL MEDICINE

## 2020-04-03 PROCEDURE — 99152 MOD SED SAME PHYS/QHP 5/>YRS: CPT

## 2020-04-03 PROCEDURE — 50200 RENAL BIOPSY PERQ: CPT | Mod: RT

## 2020-04-03 PROCEDURE — 27210909 ZZH NEEDLE CR5

## 2020-04-03 PROCEDURE — 85610 PROTHROMBIN TIME: CPT | Performed by: PHYSICIAN ASSISTANT

## 2020-04-03 PROCEDURE — 87799 DETECT AGENT NOS DNA QUANT: CPT | Performed by: INTERNAL MEDICINE

## 2020-04-03 PROCEDURE — 52310 CYSTOSCOPY AND TREATMENT: CPT | Mod: ZF | Performed by: NURSE PRACTITIONER

## 2020-04-03 PROCEDURE — 40000167 ZZH STATISTIC PP CARE STAGE 2

## 2020-04-03 PROCEDURE — 27810159 ZZH COIL/EMBOLIC DEVICE CR8

## 2020-04-03 PROCEDURE — 27210732 ZZH ACCESSORY CR1

## 2020-04-03 PROCEDURE — 88313 SPECIAL STAINS GROUP 2: CPT | Performed by: INTERNAL MEDICINE

## 2020-04-03 PROCEDURE — 88348 ELECTRON MICROSCOPY DX: CPT | Performed by: INTERNAL MEDICINE

## 2020-04-03 PROCEDURE — 86832 HLA CLASS I HIGH DEFIN QUAL: CPT | Performed by: NURSE PRACTITIONER

## 2020-04-03 PROCEDURE — 86833 HLA CLASS II HIGH DEFIN QUAL: CPT | Performed by: NURSE PRACTITIONER

## 2020-04-03 PROCEDURE — 25000128 H RX IP 250 OP 636: Performed by: STUDENT IN AN ORGANIZED HEALTH CARE EDUCATION/TRAINING PROGRAM

## 2020-04-03 PROCEDURE — 82947 ASSAY GLUCOSE BLOOD QUANT: CPT | Performed by: STUDENT IN AN ORGANIZED HEALTH CARE EDUCATION/TRAINING PROGRAM

## 2020-04-03 PROCEDURE — 25000125 ZZHC RX 250: Mod: ZF | Performed by: NURSE PRACTITIONER

## 2020-04-03 PROCEDURE — 25000132 ZZH RX MED GY IP 250 OP 250 PS 637: Mod: GY,ZF | Performed by: NURSE PRACTITIONER

## 2020-04-03 PROCEDURE — 88305 TISSUE EXAM BY PATHOLOGIST: CPT | Performed by: INTERNAL MEDICINE

## 2020-04-03 PROCEDURE — 40000428 ZZHCL STATISTIC R-RUSH PROCESSING: Performed by: INTERNAL MEDICINE

## 2020-04-03 PROCEDURE — 86828 HLA CLASS I&II ANTIBODY QUAL: CPT | Performed by: NURSE PRACTITIONER

## 2020-04-03 PROCEDURE — 25000125 ZZHC RX 250: Performed by: STUDENT IN AN ORGANIZED HEALTH CARE EDUCATION/TRAINING PROGRAM

## 2020-04-03 PROCEDURE — 88346 IMFLUOR 1ST 1ANTB STAIN PX: CPT | Performed by: INTERNAL MEDICINE

## 2020-04-03 RX ORDER — NICOTINE POLACRILEX 4 MG
15-30 LOZENGE BUCCAL
Status: DISCONTINUED | OUTPATIENT
Start: 2020-04-03 | End: 2020-04-03 | Stop reason: HOSPADM

## 2020-04-03 RX ORDER — LIDOCAINE HYDROCHLORIDE 20 MG/ML
JELLY TOPICAL ONCE
Status: COMPLETED | OUTPATIENT
Start: 2020-04-03 | End: 2020-04-03

## 2020-04-03 RX ORDER — FENTANYL CITRATE 50 UG/ML
25-50 INJECTION, SOLUTION INTRAMUSCULAR; INTRAVENOUS EVERY 5 MIN PRN
Status: DISCONTINUED | OUTPATIENT
Start: 2020-04-03 | End: 2020-04-03 | Stop reason: HOSPADM

## 2020-04-03 RX ORDER — LEVOFLOXACIN 250 MG/1
500 TABLET, FILM COATED ORAL ONCE
Status: COMPLETED | OUTPATIENT
Start: 2020-04-03 | End: 2020-04-03

## 2020-04-03 RX ORDER — SODIUM CHLORIDE 9 MG/ML
INJECTION, SOLUTION INTRAVENOUS CONTINUOUS
Status: DISCONTINUED | OUTPATIENT
Start: 2020-04-03 | End: 2020-04-03 | Stop reason: HOSPADM

## 2020-04-03 RX ORDER — FLUMAZENIL 0.1 MG/ML
0.2 INJECTION, SOLUTION INTRAVENOUS
Status: DISCONTINUED | OUTPATIENT
Start: 2020-04-03 | End: 2020-04-03 | Stop reason: HOSPADM

## 2020-04-03 RX ORDER — NALOXONE HYDROCHLORIDE 0.4 MG/ML
.1-.4 INJECTION, SOLUTION INTRAMUSCULAR; INTRAVENOUS; SUBCUTANEOUS
Status: DISCONTINUED | OUTPATIENT
Start: 2020-04-03 | End: 2020-04-03 | Stop reason: HOSPADM

## 2020-04-03 RX ORDER — DEXTROSE MONOHYDRATE 25 G/50ML
25-50 INJECTION, SOLUTION INTRAVENOUS
Status: DISCONTINUED | OUTPATIENT
Start: 2020-04-03 | End: 2020-04-03 | Stop reason: HOSPADM

## 2020-04-03 RX ADMIN — LIDOCAINE HYDROCHLORIDE 10 ML: 10 INJECTION, SOLUTION EPIDURAL; INFILTRATION; INTRACAUDAL; PERINEURAL at 08:49

## 2020-04-03 RX ADMIN — FENTANYL CITRATE 100 MCG: 50 INJECTION, SOLUTION INTRAMUSCULAR; INTRAVENOUS at 08:50

## 2020-04-03 RX ADMIN — MIDAZOLAM HYDROCHLORIDE 2 MG: 2 INJECTION, SOLUTION INTRAMUSCULAR; INTRAVENOUS at 08:50

## 2020-04-03 RX ADMIN — LIDOCAINE HYDROCHLORIDE: 20 JELLY TOPICAL at 13:45

## 2020-04-03 RX ADMIN — LEVOFLOXACIN 500 MG: 250 TABLET, FILM COATED ORAL at 13:44

## 2020-04-03 ASSESSMENT — MIFFLIN-ST. JEOR
SCORE: 1418.61
SCORE: 1418.32
SCORE: 1418.36

## 2020-04-03 ASSESSMENT — PAIN SCALES - GENERAL: PAINLEVEL: NO PAIN (0)

## 2020-04-03 NOTE — PROGRESS NOTES
Patient Name: Jeremiah Cruz  Medical Record Number: [id number]  Today's Date: 4/3/2020    Procedure: Transplanted renal biopsy  Proceduralist: Dr. Donahue and Dr. Braxton    Procedure Start: 0835  Procedure end: 0845  Sedation medications administered: 100 mcg fentanyl and 2 mg versed     Report given to: 2A    Other Notes: Pt arrived to IR room 1 from . Consent reviewed. Pt denies any questions or concerns regarding procedure. Pt positioned supine and monitored per protocol. Pt tolerated procedure without any noted complications. Samples obtained and sent to pathology. Pt transferred back to .

## 2020-04-03 NOTE — PROCEDURES
[facility], [place]    Procedure: IR Procedure Note    Date/Time: 4/3/2020 8:52 AM  Performed by: Tressa Sutton MD  Authorized by: Tressa Sutton MD   IR Fellow Physician: Owen Donahue MD  Radiology Resident Physician: Tressa Sutton DO  Other(s) attending procedure: Chayo Braxton MD    UNIVERSAL PROTOCOL   Site Marked: Yes  Prior Images Obtained and Reviewed:  Yes  Required items: Required blood products, implants, devices and special equipment available    Patient identity confirmed:  Verbally with patient, arm band, provided demographic data and hospital-assigned identification number  Patient was reevaluated immediately before administering moderate or deep sedation or anesthesia  Confirmation Checklist:  Patient's identity using two indicators, relevant allergies, procedure was appropriate and matched the consent or emergent situation and correct equipment/implants were available  Time out: Immediately prior to the procedure a time out was called    Universal Protocol: the Joint Commission Universal Protocol was followed    Preparation: Patient was prepped and draped in usual sterile fashion    ESBL (mL):  1         ANESTHESIA    Anesthesia: Local infiltration  Local Anesthetic:  Lidocaine 1% without epinephrine  Anesthetic Total (mL):  10      SEDATION    Patient Sedated: Yes    Sedation:  Fentanyl and midazolam  Vital signs: Vital signs monitored during sedation    See dictated procedure note for full details.  Findings: Successful biopsy of right lower quadrant renal transplant, 2 core biopsies obtained.     Specimens: core needle biopsy specimens sent for pathological analysis    Complications: None    Condition: Stable    Plan: 2 hours bed rest. Can remove bandages in 24 hours.     PROCEDURE   Patient Tolerance:  Patient tolerated the procedure well with no immediate complications  Describe Procedure: Successful biopsy of right lower quadrant renal transplant, 2 core  biopsies obtained.   Length of time physician/provider present for 1:1 monitoring during sedation: 10

## 2020-04-03 NOTE — IP AVS SNAPSHOT
[address] [address]                                    After Visit Summary   4/3/2020    Jeremiah Cruz    MRN: [id number]           After Visit Summary Signature Page    I have received my discharge instructions, and my questions have been answered. I have discussed any challenges I see with this plan with the nurse or doctor.    ..........................................................................................................................................  Patient/Patient Representative Signature      ..........................................................................................................................................  Patient Representative Print Name and Relationship to Patient    ..................................................               ................................................  Date                                   Time    ..........................................................................................................................................  Reviewed by Signature/Title    ...................................................              ..............................................  Date                                               Time          22EPIC Rev 08/18

## 2020-04-03 NOTE — PROGRESS NOTES
Pt returned to 2A at 0900 via liter accompanied nurse.RLQ is C/D/I and no hematoma noted.Tolerating fluids.

## 2020-04-03 NOTE — IP AVS SNAPSHOT
MRN:[id number]                      After Visit Summary   4/3/2020    Jeremiah Cruz    MRN: [id number]           Visit Information        Department      4/3/2020  6:36 AM [address] [facility]          Review of your medicines      UNREVIEWED medicines. Ask your doctor about these medicines       Dose / Directions   amLODIPine 10 MG tablet  Commonly known as:  NORVASC  Used for:  Kidney replaced by transplant      Dose:  10 mg  Take 1 tablet (10 mg) by mouth daily  Quantity:  30 tablet  Refills:  11     atorvastatin 10 MG tablet  Commonly known as:  LIPITOR  Used for:  Kidney replaced by transplant      Dose:  10 mg  Take 1 tablet (10 mg) by mouth daily  Quantity:  30 tablet  Refills:  11     calcitRIOL 0.25 MCG capsule  Commonly known as:  ROCALTROL  Used for:  Kidney replaced by transplant      Dose:  0.25 mcg  Take 1 capsule (0.25 mcg) by mouth daily  Quantity:  30 capsule  Refills:  11     calcium carbonate 500 MG chewable tablet  Commonly known as:  TUMS  Used for:  Kidney replaced by transplant      Dose:  1 chew tab  Take 1 tablet (500 mg) by mouth 3 times daily as needed for heartburn Take 1 chewable tablet by mouth 3 times daily with meals.  Refills:  0     carvedilol 12.5 MG tablet  Commonly known as:  COREG  Used for:  Kidney replaced by transplant      Dose:  25 mg  Take 2 tablets (25 mg) by mouth 2 times daily (with meals)  Quantity:  120 tablet  Refills:  11     famotidine 40 MG tablet  Commonly known as:  PEPCID  Used for:  Kidney replaced by transplant      Dose:  40 mg  Take 1 tablet (40 mg) by mouth daily  Quantity:  30 tablet  Refills:  11     mycophenolic acid 180 MG EC tablet  Used for:  Kidney replaced by transplant      Dose:  540 mg  Take 3 tablets (540 mg) by mouth 2 times daily  Quantity:  180 tablet  Refills:  11     ondansetron 4 MG ODT tab  Commonly known as:  ZOFRAN-ODT  Used for:  Kidney replaced by transplant      Dose:  4 mg  Take 1 tablet (4 mg) by mouth every  8 hours as needed for nausea or vomiting  Quantity:  5 tablet  Refills:  0     polyethylene glycol packet  Commonly known as:  MIRALAX  Used for:  Kidney replaced by transplant      Dose:  17 g  Take 17 g by mouth 2 times daily Hold for loose stools  Quantity:  60 packet  Refills:  0     predniSONE 5 MG tablet  Commonly known as:  DELTASONE  Used for:  Kidney replaced by transplant      Dose:  5 mg  Take 1 tablet (5 mg) by mouth daily  Quantity:  30 tablet  Refills:  11     Sprycel 20 MG tablet  Generic drug:  dasatinib      Dose:  40 mg  Take 40 mg by mouth daily  Refills:  0     sulfamethoxazole-trimethoprim 400-80 MG tablet  Commonly known as:  BACTRIM  Indication:  PCP prophylaxis  Used for:  Kidney replaced by transplant      Dose:  1 tablet  Take 1 tablet by mouth Every Mon, Wed, Fri Morning  Quantity:  30 tablet  Refills:  11     * tacrolimus 0.5 MG capsule  Commonly known as:  GENERIC EQUIVALENT  Used for:  Kidney replaced by transplant      Dose:  0.5 mg  Take 1 capsule (0.5 mg) by mouth 2 times daily Take a total dose of 6.5 mg twice day  Quantity:  60 capsule  Refills:  11     * tacrolimus 1 MG capsule  Commonly known as:  GENERIC EQUIVALENT  Used for:  Kidney replaced by transplant      Dose:  6 mg  Take 6 capsules (6 mg) by mouth 2 times daily Take total dose of 6.5 mg twice per day  Quantity:  360 capsule  Refills:  11     tamsulosin 0.4 MG capsule  Commonly known as:  Flomax  Used for:  Kidney replaced by transplant      Dose:  0.4 mg  Take 1 capsule (0.4 mg) by mouth At Bedtime  Quantity:  90 capsule  Refills:  1     Up4 Probiotics Adult Caps  Used for:  Abdominal pain, epigastric      Dose:  1 capsule  Take 1 capsule by mouth daily  Quantity:  30 capsule  Refills:  1     valGANciclovir 450 MG tablet  Commonly known as:  VALCYTE  Indication:  Medication Treatment to Prevent Cytomegalovirus Disease  Used for:  Kidney replaced by transplant      Dose:  450 mg  Take 1 tablet (450 mg) by mouth daily  Titrate dose up to a max of 2 tabs (900 mg) by mouth daily when directed by your transplant team.  Quantity:  90 tablet  Refills:  3     vitamin D3 2000 units (50 mcg) tablet  Commonly known as:  CHOLECALCIFEROL  Used for:  Hypovitaminosis D      Dose:  1 tablet  Take 1 tablet (2,000 Units) by mouth daily  Quantity:  90 tablet  Refills:  3         * This list has 2 medication(s) that are the same as other medications prescribed for you. Read the directions carefully, and ask your doctor or other care provider to review them with you.                  Protect others around you: Learn how to safely use, store and throw away your medicines at www.disposemymeds.org.       Follow-ups after your visit       Your next 10 appointments already scheduled    Apr 28, 2020  8:45 AM CDT  Lab with UC LAB  [facility] Lab ([facility]) [address] [place] [address] [id number] [phone]      Apr 28, 2020  9:30 AM CDT  (Arrive by 9:15 AM)  Return Kidney Transplant with Deepak Early Post Transplant  [facility] Nephrology ([facility]) [address] [place] [address] [id number] [phone]      Apr 28, 2020 10:30 AM CDT  (Arrive by 10:15 AM)  Office Visit with Conner Downey [facility] Therapy Management ([facility]) [address] [place] [address] [phone]   Bring a current list of meds and any records pertaining to this visit.  For Physicals, please bring immunization records and any forms needing to be filled out. Please arrive 10 minutes early to complete paperwork.     Jun 29, 2020  8:30 AM CDT  (Arrive by 8:15 AM)  SOT EVALUATION RETURN VISIT with LON Jaramillo  [facility] Solid Organ Transplant ([facility]) [address] [place] [address] [id number] [phone]      Jun 29, 2020 10:45 AM CDT  Lab with UC LAB  [facility] Lab ([facility]) [address] [place] [address]  1st [facility] [id number]  [phone]      Jun 29, 2020 11:30 AM CDT  (Arrive by 11:15 AM)  Return Kidney Transplant with Uc Early Post Transplant  [facility] Nephrology ([facility]) [address] [place] [phone]  [phone]      Aug 28, 2020 10:45 AM CDT  Lab with UC LAB  [facility] Lab ([facility]) [address] [place] [phone]  [phone]      Aug 28, 2020 11:30 AM CDT  (Arrive by 11:15 AM)  Return Kidney Transplant with Uc Early Post Transplant  [facility] Nephrology ([facility]) [address] [place] [phone]  [phone]         Care Instructions       Further instructions from your care team       [facility] Care Following Kidney Biopsy    Physician:  Dr Owen Donahue   Date:April 3, 2020    ACTIVITY:      Relax and take it easy, no strenuous activity for 24 hours.    No heavy lifting (>10 lbs.) for 1 week    DIET:            Resume your regular diet and drink plenty of fluids, unless you are fluid restricted                    DRAINAGE:    There should be minimal drainage from the biopsy site. If bleeding soaks the dressing, you should lie down and apply pressure to the site for a minimum of 10 minutes. If the bleeding persists, refer to the emergency contact numbers below; whether the bleeding persists or not, you should report the occurrence to one of the numbers below.    Refer to the emergency numbers below for the following:     Excessive bleeding or drainage    Excessive swelling, redness, or tenderness at the site    Fever above 100.5 degrees orally    Severe pain    Drainage that is green, yellow, thick white, or has a bad odor    Passage of bloody urine or clots after you are discharged.     Emergency Contact Numbers:             [phone]   " Ask for the Adult Nephrology Fellow on Call, someone is  available 24 hours a day.                Additional Information About Your Visit       [facility] [employer] [facility] Information    [employer] gives you secure access to your electronic health record. If you see a primary care provider, you can also send messages to your care team and make appointments. If you have questions, please call your primary care clinic.  If you do not have a primary care provider, please call [phone] and they will assist you.       Care EveryWhere ID    This is your Care EveryWhere ID. This could be used by other organizations to access your [facility] medical records  [id number]       Your Vitals Were  Most recent update: 4/3/2020 10:06 AM    Blood Pressure   100/48 (BP Location: Left leg)          Pulse   77          Temperature   98.3  F (36.8  C) (Oral)          Respirations   18          Height   1.626 m (5' 4.02\")             Weight   61.2 kg (135 lb)    Pulse Oximetry   99%    BMI (Body Mass Index)   23.16 kg/m           Primary Care Provider Office Phone # Fax #    Aaron Michele [phone]      Equal Access to Services    [facility] : Hadii amanda ku hadasho Soomaali, waaxda luqadaha, qaybta kaalmada matt, issa nance. So [facility] [phone].    ATENCIÓN: Si habla español, tiene a kumar disposición servicios gratuitos de asistencia lingüística. Jose Antonio al [phone].    We comply with applicable federal and state civil rights laws, including the [place] Human Rights Act. We do not discriminate on the basis of race, color, creed, Shinto, national origin, marital status, age, disability, sex, sexual orientation, or gender identity.       Thank you!    Thank you for choosing [facility] for your care. Our goal is always to provide you with excellent care. Hearing back from our patients is one way we can continue to improve our services. Please take a few minutes to complete the written survey " that you may receive in the mail after you visit with us. Thank you!            Medication List      ASK your doctor about these medications          Morning Afternoon Evening Bedtime As Needed    amLODIPine 10 MG tablet  Also known as:  NORVASC  INSTRUCTIONS:  Take 1 tablet (10 mg) by mouth daily                     atorvastatin 10 MG tablet  Also known as:  LIPITOR  INSTRUCTIONS:  Take 1 tablet (10 mg) by mouth daily                     calcitRIOL 0.25 MCG capsule  Also known as:  ROCALTROL  INSTRUCTIONS:  Take 1 capsule (0.25 mcg) by mouth daily                     calcium carbonate 500 MG chewable tablet  Also known as:  TUMS  INSTRUCTIONS:  Take 1 tablet (500 mg) by mouth 3 times daily as needed for heartburn Take 1 chewable tablet by mouth 3 times daily with meals.                     carvedilol 12.5 MG tablet  Also known as:  COREG  INSTRUCTIONS:  Take 2 tablets (25 mg) by mouth 2 times daily (with meals)                     famotidine 40 MG tablet  Also known as:  PEPCID  INSTRUCTIONS:  Take 1 tablet (40 mg) by mouth daily                     mycophenolic acid 180 MG EC tablet  INSTRUCTIONS:  Take 3 tablets (540 mg) by mouth 2 times daily                     ondansetron 4 MG ODT tab  Also known as:  ZOFRAN-ODT  INSTRUCTIONS:  Take 1 tablet (4 mg) by mouth every 8 hours as needed for nausea or vomiting                     polyethylene glycol packet  Also known as:  MIRALAX  INSTRUCTIONS:  Take 17 g by mouth 2 times daily Hold for loose stools                     predniSONE 5 MG tablet  Also known as:  DELTASONE  INSTRUCTIONS:  Take 1 tablet (5 mg) by mouth daily                     Sprycel 20 MG tablet  INSTRUCTIONS:  Take 40 mg by mouth daily  Generic drug:  dasatinib                     sulfamethoxazole-trimethoprim 400-80 MG tablet  Also known as:  BACTRIM  INSTRUCTIONS:  Take 1 tablet by mouth Every Mon, Wed, Fri Morning  Reason for med:  PCP prophylaxis                     * tacrolimus 0.5 MG  capsule  Also known as:  GENERIC EQUIVALENT  INSTRUCTIONS:  Take 1 capsule (0.5 mg) by mouth 2 times daily Take a total dose of 6.5 mg twice day  Doctor's comments:  TXP DT 2/28/2020 (Kidney), 12/7/1988 (Kidney), 3/2/2005 (Kidney) TXP Dischg DT 3/10/2020 DX Kidney replaced by transplant Z94.0 TX [facility] [place] ([place], [place])                     * tacrolimus 1 MG capsule  Also known as:  GENERIC EQUIVALENT  INSTRUCTIONS:  Take 6 capsules (6 mg) by mouth 2 times daily Take total dose of 6.5 mg twice per day  Doctor's comments:  TXP DT 2/28/2020 (Kidney), 12/7/1988 (Kidney), 3/2/2005 (Kidney) TXP Dischg DT 3/10/2020 DX Kidney replaced by transplant Z94.0 [facility] ([place], [place])                     tamsulosin 0.4 MG capsule  Also known as:  Flomax  INSTRUCTIONS:  Take 1 capsule (0.4 mg) by mouth At Bedtime                     Up4 Probiotics Adult Caps  INSTRUCTIONS:  Take 1 capsule by mouth daily                     valGANciclovir 450 MG tablet  Also known as:  VALCYTE  INSTRUCTIONS:  Take 1 tablet (450 mg) by mouth daily Titrate dose up to a max of 2 tabs (900 mg) by mouth daily when directed by your transplant team.  Reason for med:  Medication Treatment to Prevent Cytomegalovirus Disease                     vitamin D3 2000 units (50 mcg) tablet  Also known as:  CHOLECALCIFEROL  INSTRUCTIONS:  Take 1 tablet (2,000 Units) by mouth daily                        * This list has 2 medication(s) that are the same as other medications prescribed for you. Read the directions carefully, and ask your doctor or other care provider to review them with you.

## 2020-04-03 NOTE — PRE-PROCEDURE
GENERAL PRE-PROCEDURE:   Procedure:  Image-guided biopsy of RLQ renal transplant allograft  Date/Time:  4/3/2020 7:24 AM    Verbal consent obtained?: Yes    Written consent obtained?: Yes    Risks and benefits: Risks, benefits and alternatives were discussed    Consent given by:  Patient  Patient states understanding of procedure being performed: Yes    Patient's understanding of procedure matches consent: Yes    Procedure consent matches procedure scheduled: Yes    Expected level of sedation:  Moderate  Appropriately NPO:  Yes  ASA Class:  Class 2- mild systemic disease, no acute problems, no functional limitations  Mallampati  :  Grade 1- soft palate, uvula, tonsillar pillars, and posterior pharyngeal wall visible  Lungs:  Lungs clear with good breath sounds bilaterally  Heart:  Normal heart sounds and rate  History & Physical reviewed:  History and physical reviewed and no updates needed  Statement of review:  I have reviewed the lab findings, diagnostic data, medications, and the plan for sedation

## 2020-04-03 NOTE — LETTER
4/3/2020       RE: Jeremiah Cruz  [address] [facility] [address]     Dear Colleague,    Thank you for referring your patient, Jeremiah Cruz, to the [facility] SOLID ORGAN TRANSPLANT at [facility]. Please see a copy of my visit note below.    .TX2    See procedure note     Again, thank you for allowing me to participate in the care of your patient.      Sincerely,    Rebeca Lema NP

## 2020-04-03 NOTE — PROGRESS NOTES
Patient tolerated recovery stage well. VSS, RLQ site clean/dry/intact, no hematoma, and denies pain. Patient tolerated PO food and fluids. Teaching was done and discharge instructions were given. Patient ambulated and PICC flushed with saline. Patient discharged from the hospital via wheel chair to home with sister.

## 2020-04-03 NOTE — TELEPHONE ENCOUNTER
Spoke to Jeremiah Cruz  Regarding the need to have his Donor Specific Antibodies  Drawn today  --  After his transplant  Kidney biopsy/stent removal

## 2020-04-03 NOTE — NURSING NOTE
"Chief Complaint   Patient presents with     Cystoscopy     Stent Removal     Blood pressure 114/58, pulse 77, temperature 98.3  F (36.8  C), temperature source Oral, height 1.626 m (5' 4.02\"), weight 61.2 kg (134 lb 14.7 oz), SpO2 99 %.    Prepped patient for procedure with no complications.  Assisted with stent removal.  Administered Levaquin after procedure.  Patient was discharged in stable condition.    Zayda Falcon, YASMIN    "

## 2020-04-03 NOTE — PROGRESS NOTES
Pt prepped for renal biopsy.Pt has a PICC so labs sent and NS started.Consent signed and questions answered.Sister Joann will back up pt.

## 2020-04-03 NOTE — DISCHARGE INSTRUCTIONS
[facility] Care Following Kidney Biopsy    Physician:  Dr Owen Donahue   Date:April 3, 2020    ACTIVITY:      Relax and take it easy, no strenuous activity for 24 hours.    No heavy lifting (>10 lbs.) for 1 week    DIET:            Resume your regular diet and drink plenty of fluids, unless you are fluid restricted                    DRAINAGE:    There should be minimal drainage from the biopsy site. If bleeding soaks the dressing, you should lie down and apply pressure to the site for a minimum of 10 minutes. If the bleeding persists, refer to the emergency contact numbers below; whether the bleeding persists or not, you should report the occurrence to one of the numbers below.    Refer to the emergency numbers below for the following:     Excessive bleeding or drainage    Excessive swelling, redness, or tenderness at the site    Fever above 100.5 degrees orally    Severe pain    Drainage that is green, yellow, thick white, or has a bad odor    Passage of bloody urine or clots after you are discharged.     Emergency Contact Numbers:             [phone]   Ask for the Adult Nephrology Fellow on Call, someone is  available 24 hours a day.

## 2020-04-04 ENCOUNTER — TELEPHONE (OUTPATIENT)
Dept: TRANSPLANT | Facility: CLINIC | Age: 45
End: 2020-04-04

## 2020-04-04 DIAGNOSIS — Z94.0 KIDNEY REPLACED BY TRANSPLANT: ICD-10-CM

## 2020-04-04 RX ORDER — TACROLIMUS 1 MG/1
6 CAPSULE ORAL 2 TIMES DAILY
Qty: 360 CAPSULE | Refills: 11 | Status: SHIPPED | OUTPATIENT
Start: 2020-04-04 | End: 2020-04-24

## 2020-04-04 RX ORDER — TACROLIMUS 0.5 MG/1
0.5 CAPSULE ORAL 2 TIMES DAILY
Qty: 60 CAPSULE | Refills: 11 | COMMUNITY
Start: 2020-04-04 | End: 2020-05-18

## 2020-04-04 NOTE — TELEPHONE ENCOUNTER
Call from Dr Varner regarding kidney biopsy    Preliminary biopsy  Negative     Tacrolimus  Level 11.5 prior to kidney biopsy   Lowered tacrolimus  From 6.5  mg twice per day   To tacrolimus  6.0 mg twice per day      Ed verbalized understanding of dose change  Will follow up with him on Monday     Confirmed  Donor Specific Antibodies  Drawn on Friday and stent removed

## 2020-04-06 ENCOUNTER — TELEPHONE (OUTPATIENT)
Dept: TRANSPLANT | Facility: CLINIC | Age: 45
End: 2020-04-06

## 2020-04-06 LAB
DONOR IDENTIFICATION: NORMAL
DSA COMMENTS: NORMAL
DSA PRESENT: NO
DSA TEST METHOD: NORMAL
ORGAN: NORMAL

## 2020-04-06 NOTE — TELEPHONE ENCOUNTER
Calling Ed   Ed states not heartburn but having bowel cramping   He has this since transplant

## 2020-04-06 NOTE — TELEPHONE ENCOUNTER
Patient Call: General  Route to LPN    Reason for call: per sister pt having terrlble  heart burn  And has terrible cramps before he an a stool and severe cramping after he is done  Takes about 1 1/2 hr to get any relief     Call back needed? Yes    Return Call Needed  Same as documented in contacts section  When to return call?: Greater than one day: Route standard priority

## 2020-04-08 ENCOUNTER — TELEPHONE (OUTPATIENT)
Dept: TRANSPLANT | Facility: CLINIC | Age: 45
End: 2020-04-08

## 2020-04-08 NOTE — TELEPHONE ENCOUNTER
Post Kidney and Pancreas Transplant Team Conference  Date: 4/8/2020  Transplant Coordinator: Jody García     Attendees:  [x]  Dr. Varner  [x] Danuta Melo LPN     [x]  Dr. Wilkinson [x] Jody García RN [] Inna Stanley LPN   []  Dr. Victor [] Joan Barbosa RN     [] Judy Garcia RN [x] Conner Downey, PharmD   [] Dr. Magana [x] Danelle Jaime, AURA    [x] Dr. Benítez [] Nathan Eaton RN    [x] Dr. Mcdaniels [] Elizabeth Ernst RN    [] Dr. Bain [] Christi Leal RN     [] Sharee Burnham RN    [] Surgery Fellow [x] Cyndi Bush RN    [] Rebeca Lema NP [] Barbara Eugene RN        Verbal Plan Read Back:   Increase bicarb dose  Ok given for upper endoscopy    Routed to RN Coordinator   Danuta Melo LPN

## 2020-04-08 NOTE — PROCEDURES
Transplant Surgery  Operative Note    Preop dx: Status post  Donor kidney  Re-transplant.  Post op dx: same   Procedure: Flexible cystoscopy and ureteral stent removal   Surgeon: Rebeca Lema CNP  Assistant: Zayda Falcon MA  Anesthesia: local  EBL: 0   Specimens: none.  Findings: none abnormal. Stent inspected and noted to be intact.   Indication: The patient is status post kidney transplant and the ureteral stent is no longer needed.    Procedure: The patient was positioned supine on the table.  The groin was sterilely prepped and draped in the usual fashion. Time out was done. Urojet was applied to the urethra. A flexible cystoscope was inserted and advanced thru the urethra into the bladder. The stent was visualized and grasped. The cystoscope, grasper and stent were removed en-mass. The stent was visualized to be intact.  The bladder mucosa was normal in appearance. Antibiotics were administered. The patient tolerated the procedure well and was asked to void before leaving the clinic.

## 2020-04-09 ENCOUNTER — TELEPHONE (OUTPATIENT)
Dept: TRANSPLANT | Facility: CLINIC | Age: 45
End: 2020-04-09

## 2020-04-09 NOTE — TELEPHONE ENCOUNTER
Provider Call: Home Care  Route to LPN    Reason for Call: Please connect with HC regarding her having trouble drawing labs from his pic line.   Callback needed? Yes    Return Call Needed  Same as documented in contacts section  When to return call?: Greater than one day: Route standard priority

## 2020-04-09 NOTE — LETTER
PHYSICIAN ORDERS      DATE & TIME ISSUED: 2020 10:30 AM  PATIENT NAME: Jonny Cruz   : 1975     [facility] MR# [if applicable]: [id number]     DIAGNOSIS:  kidney transplant  PICC line  ICD-10 CODE: z 94.0  Z07(picc line ) z45.2(adjustment of vascular access device       Order   Reposition PICC line  Or may replace PICC line- Provider to determine     Hx kidney transplant  On 20   PICC line placed for labs draw only   Home care nurse (Jeimy) reports able to flush but not able to draw             Dr Nehemiah Wilkinson  Transplant  Nephrology   Any questions  transplant  Office  Transplant Coordinator  [phone]   IR provider phone if need    Jonny Cruz is staying at his sister house in [place] [phone]            Order   IR PICC Placement > 5 Yrs of Age [[id number]] (Order [id number])   Exam Information     Exam Date  Exam Time  Accession #  Performing Department  Results     3/5/20  12:13 PM  [id number]  [facility], [place], Interventional Radiology     PACS Images      Show images for IR PICC Placement > 5 Yrs of Age    Study Result     Jonny Cruz  MRN: [id number]  ?  [id number]     JONNY CRUZ  [id number]  1975;  44 years     IR PICC PLACEMENT > 5 YRS OF AGE  SL picc line placement     -----     PRE-OPERATIVE DIAGNOSIS:  poor venous access     POST-OPERATIVE DIAGNOSIS:  Same     PROCEDURE:  1.  Ultrasound guidance for venous access  2.  Placement of a peripherally inserted central venous catheter  (PICC)  3.  Fluoroscopic guidance placement                                                                      IMPRESSION:  Completed placement of?4?Sami?32.5?cm single lumen,  valved,?peripherally inserted central catheter (PICC) via  LEFT?Medial?Brachial vein. Tip lying in the?left subclavian  vein.?Venography demonstrates tortuous central venous anatomy.??Okay  to use immediately. Dx:?poor venous access. Ashley.  "?LOCAL     -----     CLINICAL HISTORY:  per above       PERFORMED BY:  ROBBIN Altman, BELLA (Interventional  Radiology)     CONSENT:  The patient understood the limitations, alternatives, and  risks of the procedure and agreed to the procedure.  Written informed  consent was obtained and is documented in the patient record.     MEDICATIONS:  1% lidocaine was used for local anesthesia     NURSING:  The patient was placed on continuous vital signs monitoring.   Vital signs were monitored by nursing staff under my supervision.     FLUOROSCOPY TIME (minutes):  <5     DESCRIPTION:  The LEFT upper extremity was prepped and draped in the  usual sterile fashion.  Ultrasound revealed a patent medial brachial  vein, and the overlying tissue was anesthetized and skin dermatotomy  was made.  Under ultrasound guidance, venipuncture was made with  needle.  A permanent copy of the image documenting a patent vein was  entered is in the patient record.     There is difficulty navigating the wire centrally. Venography  performed through venous access point shows tortuous venous anatomy  leading centrally.      Wire advanced centrally under fluoroscopic guidance and over-the-wire  exchange of needle for peel-away sheath was made.  Under fluoroscopic  guidance, an 0.018 wire was advanced to the left subclavian vein and a  measurement was obtained.  The wire and inner peel-away dilator were  removed and trimmed PICC catheter was advanced through the peel-away  sheath under fluoroscopic guidance, with the catheter tip placed in  the left subclavian vein.  The PICC catheter aspirated and flushed  freely and was locked with saline.  PICC secured and sterile dressing  applied.     COMPLICATIONS:  No immediate complication;  the patient remained  stable throughout the procedure     ESTIMATED BLOOD LOSS:  Minimal     SPECIMENS:  None     -----  \"The physician assistant (PA) who performed this procedure and signed  the above report " "is licensed to practice in the [facility]  pursuant to [place] Statute 147A.09.  This includes meeting the Statute and  [place] Board of [employer] requirement of an active  Delegation Agreement, which documents delegation of services by  primary and alternate supervising physicians.\"   -----     CATARINO TAMAYO PA-C       "

## 2020-04-10 ENCOUNTER — TELEPHONE (OUTPATIENT)
Dept: TRANSPLANT | Facility: CLINIC | Age: 45
End: 2020-04-10

## 2020-04-10 ENCOUNTER — TELEPHONE (OUTPATIENT)
Dept: PHARMACY | Facility: CLINIC | Age: 45
End: 2020-04-10

## 2020-04-10 DIAGNOSIS — Z94.0 KIDNEY REPLACED BY TRANSPLANT: Primary | ICD-10-CM

## 2020-04-10 RX ORDER — SODIUM BICARBONATE 650 MG/1
650 TABLET ORAL 2 TIMES DAILY
Qty: 60 TABLET | Refills: 1 | Status: SHIPPED | OUTPATIENT
Start: 2020-04-10 | End: 2020-04-24

## 2020-04-10 NOTE — TELEPHONE ENCOUNTER
Reviewed transplant labs with Ed    Reviewed negative kidney biopsy plan to continue medications   Reviewed the need take sodium bicarb tabs 1 tab twice per day

## 2020-04-10 NOTE — TELEPHONE ENCOUNTER
Clinical Pharmacy Consult:                                                      Transplant Specific:  1 month post transplant med  review  Date of Transplant: 02/28/2020  Type of Transplant: kidney  First Transplant: no this is #3  History of rejection: yes    Immunosuppression Regimen   TAC 6mg qAM & 6mg qPM, Prednisone 5mg qAM and Myforitic 540mg qAM & 540mg qPM  Immunosuppressant Levels: 12.7 4/6/20 Supratherapeutic  Patient specific goal: 8-10  Pt adherent to lab draws: yes  Scr:   Lab Results   Component Value Date    CR 2.42 03/16/2020     Side effects: no side effects    Prophylactic Medications  Antibacterial:  Bactrim ss 3  Times a week  Scheduled Discontinue Date: life long    Antifungal: Not needed thus far  Scheduled Discontinue Date: not on    Antiviral: CrCl 40 to 59 mL/minute: Valcyte 450 mg once daily   Scheduled Discontinue Date: 6 months    Acid Reducer: Pepcid (famotidine)  Scheduled Reviewed Date: unknown    Thrombosis Prevention: not on   Scheduled Discontinue Date: not on    Blood Pressure Management  Frequency of home Blood Pressure checks: weekly  Most recent home BP: 124/87  Patient Blood pressure goal: <140/90  Patient blood pressure at goal:  yes  Hospitalizations/ER visits since last assessment: 0      Med rec/DUR performed: yes  Med Rec Discrepancies: no    Ed reports that he is improving slowly.  He has cramping when he has a bowel movement.  It is improving.  He knew his meds well.      Shady Mott [facility]

## 2020-04-13 NOTE — TELEPHONE ENCOUNTER
"Follow up with Ed regarding bowel cramping    Ed states related to fatty foods or if eats \"junk food \" that he has more bowel issues     "

## 2020-04-14 ENCOUNTER — TELEPHONE (OUTPATIENT)
Dept: TRANSPLANT | Facility: CLINIC | Age: 45
End: 2020-04-14

## 2020-04-14 NOTE — TELEPHONE ENCOUNTER
Call from Ed sister Joann   Stating that Ed has been having green stool   And abdominal pain for several hour     Please review previous not that Ed had abdominal cramping last week related to fatty foods but had resolved

## 2020-04-15 ENCOUNTER — TELEPHONE (OUTPATIENT)
Dept: TRANSPLANT | Facility: CLINIC | Age: 45
End: 2020-04-15

## 2020-04-15 NOTE — TELEPHONE ENCOUNTER
Post Kidney and Pancreas Transplant Team Conference  Date: 4/15/2020  Transplant Coordinator: Jody García     Attendees:  [x]  Dr. Varner  [x] Danuta Melo LPN     [x]  Dr. Wilkinson [x] Jody García RN [] Inna Stanley LPN   [x]  Dr. Victor [] Joan Barbosa, RN     [] Judy Garcia RN [x] Conner Downey, ArabellaD   [] Dr. Magana [x] Danelle Jaime, AURA    [x] Dr. Benítez [] Nathan Eaton RN    [x] Dr. Mcdaniels [] Elizabeth Ernst RN    [] Dr. Bain [] Christi Leal RN     [] Sharee Burnham, AURA    [] Surgery Fellow [x] Cyndi Bush RN    [] Rebeca Lema NP [] Barbara Eugene RN        Verbal Plan Read Back:   Due for BK PCR QT    Routed to RN Coordinator   Danuta Melo LPN

## 2020-04-16 ENCOUNTER — TELEPHONE (OUTPATIENT)
Dept: TRANSPLANT | Facility: CLINIC | Age: 45
End: 2020-04-16

## 2020-04-16 ENCOUNTER — TRANSFERRED RECORDS (OUTPATIENT)
Dept: HEALTH INFORMATION MANAGEMENT | Facility: CLINIC | Age: 45
End: 2020-04-16

## 2020-04-16 DIAGNOSIS — Z94.0 KIDNEY REPLACED BY TRANSPLANT: Primary | ICD-10-CM

## 2020-04-16 NOTE — LETTER
PHYSICIAN ORDERS      DATE & TIME ISSUED: 2020 7:41 AM  PATIENT NAME: Jeremiah Cruz   : 1975     [facility] MR# [if applicable]: [id number]     DIAGNOSIS:  Kidney transplant   ICD-10 CODE: z 94.0        Please draw a BK PCR QT with next lab draw     Any questions please call:  [phone].    .

## 2020-04-16 NOTE — TELEPHONE ENCOUNTER
Issue overdue  BK PCR QT       [facility]  home care   [phone] phone    [phone] fax home care      PHYSICIAN ORDERS      DATE & TIME ISSUED: 2020 7:41 AM  PATIENT NAME: Jeremiah Cruz   : 1975     [facility] MR# [if applicable]: [id number]     DIAGNOSIS:  Kidney transplant   ICD-10 CODE: z 94.0        Please draw a BK PCR QT with next lab draw     Any questions please call:  [phone].    .

## 2020-04-21 DIAGNOSIS — Z79.899 LONG TERM USE OF DRUG: ICD-10-CM

## 2020-04-21 DIAGNOSIS — Z94.0 KIDNEY REPLACED BY TRANSPLANT: ICD-10-CM

## 2020-04-23 ENCOUNTER — TELEPHONE (OUTPATIENT)
Dept: TRANSPLANT | Facility: CLINIC | Age: 45
End: 2020-04-23

## 2020-04-23 DIAGNOSIS — Z94.0 KIDNEY REPLACED BY TRANSPLANT: ICD-10-CM

## 2020-04-23 NOTE — TELEPHONE ENCOUNTER
Tacrolimus  Level 11.6 above goal level   Called Jeremiah Cruz confirmed Taking  Tacrolimus  6 mg per day   Confirmed 12 hour trough level   Lowered tacrolimus  Tacrolimus  5 mg twice per day

## 2020-04-24 DIAGNOSIS — Z94.0 KIDNEY REPLACED BY TRANSPLANT: ICD-10-CM

## 2020-04-24 RX ORDER — SODIUM BICARBONATE 650 MG/1
1300 TABLET ORAL 2 TIMES DAILY
Qty: 120 TABLET | Refills: 11 | Status: SHIPPED | OUTPATIENT
Start: 2020-04-24 | End: 2020-04-28

## 2020-04-24 RX ORDER — TACROLIMUS 1 MG/1
5 CAPSULE ORAL 2 TIMES DAILY
Qty: 300 CAPSULE | Refills: 11 | Status: SHIPPED | OUTPATIENT
Start: 2020-04-24 | End: 2020-05-18

## 2020-04-24 NOTE — TELEPHONE ENCOUNTER
Telemedicine appointment on April 30 2020  Reviewed transplant  labs  Dr Varner requesting to increase Sodium Bicarb 2 tabs twice per day     Tabs to prevent high potassium and low level 17     Started Bicarb tabs 1 tab twice per day on April 20     co2 17 K 5.2

## 2020-04-27 ENCOUNTER — TELEPHONE (OUTPATIENT)
Dept: TRANSPLANT | Facility: CLINIC | Age: 45
End: 2020-04-27

## 2020-04-27 DIAGNOSIS — Z94.0 KIDNEY REPLACED BY TRANSPLANT: ICD-10-CM

## 2020-04-27 DIAGNOSIS — D64.9 ANEMIA: Primary | ICD-10-CM

## 2020-04-27 NOTE — LETTER
PHYSICIAN ORDERS      DATE & TIME ISSUED: May 4, 2020 8:12 AM  PATIENT NAME: Jeremiah Cruz   : 1975     [facility] MR# [if applicable]: [id number]     DIAGNOSIS:  Kidney Transplant  ICD-10 CODE: Z94.0     Please complete the following:  Right upper quadrant ultrasound to look at gallbladder    Any questions please call: [phone]  Please fax lab results to [phone].    .

## 2020-04-27 NOTE — LETTER
PHYSICIAN ORDERS      DATE & TIME ISSUED: May 4, 2020 8:09 AM  PATIENT NAME: Jeremiah Cruz   : 1975     [facility] MR# [if applicable]: [id number]     DIAGNOSIS:  Kidney Transplant  ICD-10 CODE: Z94.0     Please complete the following labs at your next visit:  C diff  Enteric SOL stool  LDH  Haptoglobin  peripheral smear  CMV PCR QT  Parvovirus PCR  HbA1c    Any questions please call: [phone]  Please fax lab results to [phone].    .

## 2020-04-27 NOTE — PROGRESS NOTES
ACUTE TRANSPLANT NEPHROLOGY VISIT    Assessment & Plan   # DDKT: Stable, but higher creatinine in the low 2s.  Kidney transplant biopsy 4/3 showed no acute rejection, but did have moderate IFTA.  Will follow for now.   - Baseline Cr ~ 2.0-2.3   - Proteinuria: Not checked post transplant   - Date DSA Last Checked: Apr/2020      Latest DSA: No DSA at time of transplant   - BK Viremia: No   - Kidney Tx Biopsy: Apr 03, 2020; Result: No diagnostic evidence of acute rejection.  Moderate interstitial fibrosis and tubular atrophy.    # Immunosuppression: Tacrolimus immediate release (goal 8-10), Mycophenolic acid (goal 1.0-3.5) and Prednisone (dose 5 mg daily)   - Changes: No    # Infection Prophylaxis:   - PJP: Sulfa/TMP (Bactrim)  - CMV: Valcyte; Patient is CMV IgG Ab discordant (D+/R-) and will continue on Valcyte x 6 months, then check CMV PCR monthly until 12 months post transplant.  - Thrush: None    # Hypertension: Controlled;  Goal BP: < 140/90   - Changes: Yes - With lower BP, will decrease carvedilol to 12.5 mg bid.    # Elevated Blood Glucose: Glucose generally running ~ 120-130s  Last HbA1c: 5.2% (2/28/20)   - Management as per primary care.    # Anemia in Chronic Renal Disease: Hgb: Decreased      DEB: No   - Iron studies: Replete    - With decreased Hgb, will work up further with ruling out hemolysis and GI bleeding.  Will check LDH, haptoglobin, peripheral smear, CMV and parvovirus PCR.   - If abdominal symptoms worsen and with drop in hemoglobin, would consider EGD in the future.   - Will refer patient to Anemia Services.    # Mineral Bone Disorder:   - Secondary renal hyperparathyroidism; PTH level: Significantly elevated (601-1200 pg/ml)        On treatment: Calcitriol  - Vitamin D; level: Low        On Supplement: Yes  - Calcium; level: Normal        On Supplement: Yes  - Phosphorus; level: Normal        On Supplement: No    # Electrolytes:   - Potassium; level: High normal        On Supplement: No  -  Magnesium; level: Low        On Supplement: No; Will start magnesium oxide 400 mg daily.  - Bicarbonate; level: Low        On Supplement: Yes; Will increase sodium bicarbonate to 1300 mg tid.    # CML: Appears stable on dasatinib.  Patient is followed by Hematology.    # Abdominal Pain: Overall symptoms appear improved.  Patient previously had some greenish diarrhea, suggestive of bile, but that has resolved.  No other GI symptoms outside of diarrhea for the most part now.  With diarrheal symptoms that are worse with fatty foods, might suggest gallbladder issues.   - Will obtain right upper quadrant ultrasound to assess gallbladder.  Depending on findings and symptoms, may consider HIDA scan in the future.   - If abdominal pain worsens or hemoglobin drops further, would also consider EGD.    # Diarrhea: Unclear etiology, but with recent transplant and hospitalization, would recommend evaluating for infectious work up.  Also some suggestion of worsening with fatty/greasy foods, possibly suggestive of gallbladder issue versus rapid transit.   - Will check C. diff and enteric SOL stool testing.   - Will obtain right upper quadrant ultrasound to look at the gallbladder.    # GERD: Appears to be mostly controlled famotidine.    # Medical Compliance: Yes    # COVID-19 Virus Review: Discussed COVID-19 virus and the potential medical risks.  Reviewed preventative health recommendations, which includes washing hands for 20 seconds, avoid touching your face, and social distancing.  Asked patient to inform the transplant center if they are exposed or diagnosed with this virus.    # Transplant History:  Etiology of Kidney Failure: Alport's syndrome  Tx: DDKT  Transplant: 2/28/2020 (Kidney), 12/7/1988 (Kidney), 3/2/2005 (Kidney)  Donor Type: Donation after Brain Death Donor Class:   Crossmatch at time of Tx: negative  DSA at time of Tx: No  Significant changes in immunosuppression: None  CMV IgG Ab High Risk Discordance (D+/R-):  Yes  EBV IgG Ab High Risk Discordance (D+/R-): No  Significant transplant-related complications: None    Transplant Office Phone Number: [phone]    Assessment and plan was discussed with the patient and he voiced his understanding and agreement.    Return visit: Return for 4 month post transplant visit.    Alvin Varner MD    Chief Complaint   Mr. Cruz is a 44 year old here for kidney transplant and immunosuppression management.     History of Present Illness    Mr. Cruz reports feeling good overall with some medical complaints.  He was last seen about a month ago.  His energy level is slowly improving, but certainly not normal yet.  He has been active and gets a little exercise with walking around.  Denies any chest pain, but some shortness of breath with exertion and just tires easy.  Patient said his leg really get tired before anything else does.  No leg swelling.  Appetite is good and his weight is up a couple of pounds.  No nausea or vomiting.  He has had some diarrhea over the last 3-4 days with watery to loose or even soft stools.  When it started he had more than 5 bowel movements overnight, but now just had two bowel movements all day yesterday.  The diarrhea was also initially greenish in color, but is now more normal tan color.  Only rare crampy abdominal pain, but that has mostly resolved.  No bloody or black, tarry stools.  No fever, sweats or chills.  He feels the diarrhea was worse with eating greasy or fatty foods.    Recent Hospitalizations:  [x] No [] Yes    New Medical Issues: [] No [x] Yes See above   Decreased energy: [] No [x] Yes Slowly improving   Chest pain or SOB with exertion:  [] No [x] Yes Some shortness of breath   Appetite change or weight change: [x] No [] Yes    Nausea, vomiting or diarrhea:  [] No [x] Yes Diarrhea for 3-4 days   Fever, sweats or chills: [x] No [] Yes    Leg swelling: [x] No [] Yes      Home BP: 110-120/70-80s    Review of Systems   A  comprehensive review of systems was obtained and negative, except as noted in the HPI or PMH.    Problem List   Patient Active Problem List   Diagnosis     Alport's syndrome     Anemia in chronic kidney disease     Secondary renal hyperparathyroidism (H)     HTN, kidney transplant related     CML (chronic myelocytic leukemia) (H)     GERD (gastroesophageal reflux disease)     Prurigo nodularis     Senile sebaceous gland hyperplasia     Immunosuppression (H)     Kidney replaced by transplant     Hyperkalemia     History of difficult venous access     Steroid-induced hyperglycemia     Aftercare following organ transplant     Vitamin D deficiency     Metabolic acidosis     Hypomagnesemia       Social History   Social History     Tobacco Use     Smoking status: Former Smoker     Packs/day: 1.00     Years: 12.00     Pack years: 12.00     Types: Cigarettes     Last attempt to quit: 2011     Years since quittin.1     Smokeless tobacco: Never Used   Substance Use Topics     Alcohol use: No     Alcohol/week: 0.0 standard drinks     Drug use: No       Allergies   Allergies   Allergen Reactions     Blood Transfusion Related (Informational Only) Other (See Comments)     Patient has a history of a clinically significant antibody against RBC antigens.  A delay in compatible RBCs may occur.     Cephalosporins Other (See Comments) and Rash     fever     Compazine [Prochlorperazine]      Gammagard [Immune Globulin] Other (See Comments)     Ed got spinal meningitis and MD stated to never get again for fear of death.       Penicillins      Vancomycin        Medications   Current Outpatient Medications   Medication Sig     amLODIPine (NORVASC) 10 MG tablet Take 1 tablet (10 mg) by mouth daily     atorvastatin (LIPITOR) 10 MG tablet Take 1 tablet (10 mg) by mouth daily     calcitRIOL (ROCALTROL) 0.25 MCG capsule Take 1 capsule (0.25 mcg) by mouth daily     calcium carbonate (TUMS) 500 MG chewable tablet Take 1 tablet (500 mg)  by mouth 3 times daily as needed for heartburn Take 1 chewable tablet by mouth 3 times daily with meals.     carvedilol (COREG) 12.5 MG tablet Take 1 tablet (12.5 mg) by mouth 2 times daily (with meals)     dasatinib (SPRYCEL) 20 MG tablet CHEMO Take 40 mg by mouth daily      famotidine (PEPCID) 40 MG tablet Take 1 tablet (40 mg) by mouth daily     magnesium oxide (MAG-OX) 400 MG tablet Take 1 tablet (400 mg) by mouth daily (with lunch)     MYFORTIC (BRAND) 180 MG EC tablet Take 3 tablets (540 mg) by mouth 2 times daily     ondansetron (ZOFRAN-ODT) 4 MG ODT tab Take 1 tablet (4 mg) by mouth every 8 hours as needed for nausea or vomiting     polyethylene glycol (MIRALAX) packet Take 17 g by mouth 2 times daily Hold for loose stools     predniSONE (DELTASONE) 5 MG tablet Take 1 tablet (5 mg) by mouth daily     Probiotic Product (UP4 PROBIOTICS ADULT) CAPS Take 1 capsule by mouth daily     sodium bicarbonate 650 MG tablet Take 2 tablets (1,300 mg) by mouth 3 times daily     sulfamethoxazole-trimethoprim (BACTRIM) 400-80 MG tablet Take 1 tablet by mouth Every Mon, Wed, Fri Morning     tacrolimus (GENERIC EQUIVALENT) 0.5 MG capsule Take 1 capsule (0.5 mg) by mouth 2 times daily Hold for future dose changes     tacrolimus (GENERIC EQUIVALENT) 1 MG capsule Take 5 capsules (5 mg) by mouth 2 times daily     tamsulosin (FLOMAX) 0.4 MG capsule Take 1 capsule (0.4 mg) by mouth At Bedtime     valGANciclovir (VALCYTE) 450 MG tablet Take 1 tablet (450 mg) by mouth daily Titrate dose up to a max of 2 tabs (900 mg) by mouth daily when directed by your transplant team.     vitamin D3 (CHOLECALCIFEROL) 2000 units (50 mcg) tablet Take 1 tablet (2,000 Units) by mouth daily     No current facility-administered medications for this visit.      Medications Discontinued During This Encounter   Medication Reason     carvedilol (COREG) 12.5 MG tablet      sodium bicarbonate 650 MG tablet        Physical Exam   Vital Signs: /81   Temp  97.7  F (36.5  C)   Wt 62 kg (136 lb 9.6 oz)   BMI 23.43 kg/m      Data     Renal Latest Ref Rng & Units 4/23/2020 4/20/2020 4/16/2020   Na 133 - 144 mmol/L - - -   Na (external) 133 - 144 mmol/L 137 135 137   K 3.4 - 5.3 mmol/L - - -   K (external) 3.4 - 5.1 mmol/L 5.2(H) 4.7 5.0   Cl 94 - 109 mmol/L - - -   Cl (external) 96 - 109 mmol/L 108 106 107   CO2 20 - 32 mmol/L - - -   CO2 (external) 21 - 33 mmol/L 17(L) 17(L) 17(L)   BUN 7 - 30 mg/dL - - -   BUN (external) 6 - 24 mg/dL 31(H) 29(H) 38(H)   Cr 0.66 - 1.25 mg/dL - - -   Cr (external) 0.6 - 1.2 mg/dL 2.2(H) 2.0(H) 2.3(H)   Glucose 70 - 99 mg/dL - - -   Glucose (external) 70 - 99 mg/dL 136(H) 123(H) 131(H)   Ca  8.5 - 10.1 mg/dL - - -   Ca (external) 8.2 - 10.0 mg/dL 9.4 9.5 9.4   Mg 1.6 - 2.3 mg/dL - - -   Mg (external) 1.7 - 2.4 mg/dL - 1.5(L) -     Bone Health Latest Ref Rng & Units 4/20/2020 4/13/2020 4/6/2020   Phos 2.5 - 4.5 mg/dL - - -   Phos (external) 2.6 - 4.7 mg/dL 3.0 2.7 2.7   PTHi 18 - 80 pg/mL - - -   Vit D Def 20 - 75 ug/L - - -     Heme Latest Ref Rng & Units 4/23/2020 4/20/2020 4/16/2020   WBC 4.0 - 11.0 10e9/L - - -   WBC (external) 4.1 - 10.9 10(3)/uL 6.6 5.8 6.7   Hgb 13.3 - 17.7 g/dL - - -   Hgb (external) 12.9 - 17.3 g/dL 7.9(L) 8.1(L) 8.8(L)   Plt 150 - 450 10e9/L - - -   Plt (external) 150 - 450 10(3)/uL 182 188 202     Liver Latest Ref Rng & Units 3/5/2020 2/28/2020 1/26/2017   AP 40 - 150 U/L - 108 59   TBili 0.2 - 1.3 mg/dL 0.6 0.6 0.4   DBili 0.0 - 0.2 mg/dL 0.1 - -   ALT 0 - 70 U/L - 48 34   AST 0 - 45 U/L - 30 25   Tot Protein 6.8 - 8.8 g/dL - 7.9 7.0   Albumin 3.4 - 5.0 g/dL - 4.0 3.7     Pancreas Latest Ref Rng & Units 2/28/2020   A1C 0 - 5.6 % 5.2     Iron studies Latest Ref Rng & Units 3/3/2020 3/8/2005   Iron 35 - 180 ug/dL 139 43   Iron sat 15 - 46 % 98(H) -   Ferritin 26 - 388 ng/mL 3,397(H) -     [facility] Virology Latest Ref Rng & Units 4/16/2020 1/26/2017 3/1/2005   CMV IgG EU/mL - - 1.4   BK Spec - - Plasma -   BK Res  BKNEG copies/mL - BK Virus DNA Not Detected -   BK Log <2.7 Log copies/mL - Not Calculated   The Real-Time quantitative BK Virus assay was developed and its performance   characteristics determined by the [facility] Laboratory at   the [facility] in [place], [place]. The   primers and probes for each analyte are Analyte Specific Reagents (ASRs)   manufactured by [employer].   ASRs are used in many laboratory tests necessary for standard medical care and   generally do not require U.S. Food and Drug Administration approval. The FDA   has determined that such clearance or approval is not necessary.   This test is used for clinical purposes. It should not be regarded as   investigational or for research. This laboratory is certified under the   Clinical Laboratory Improvement Amendments of 1988 (CLIA-88) as qualified to   perform high complexity clinical laboratory testing.   -   BK Quant Result Ext None Detected IU/mL None Detected - -   BK Quant Spec Ext - Plasma - -   EBV IgG - - - 5.35   Hep B Core NR - Nonreactive -   Hep B Surf NEG - - Positive, Patient is considered to be immune to infection with hepatitis B.(A)   HIV 1&2 NEG - - Negative        Recent Labs   Lab Test 03/13/20  0735 03/14/20  0830 03/16/20  0807   DOSTAC Not Provided Not Provided Not Provided   TACROL 6.9 9.2 10.8

## 2020-04-28 ENCOUNTER — VIRTUAL VISIT (OUTPATIENT)
Dept: NEPHROLOGY | Facility: CLINIC | Age: 45
End: 2020-04-28
Attending: INTERNAL MEDICINE
Payer: MEDICARE

## 2020-04-28 VITALS
TEMPERATURE: 97.7 F | SYSTOLIC BLOOD PRESSURE: 120 MMHG | WEIGHT: 136.6 LBS | BODY MASS INDEX: 23.43 KG/M2 | DIASTOLIC BLOOD PRESSURE: 81 MMHG

## 2020-04-28 DIAGNOSIS — E87.20 METABOLIC ACIDOSIS: ICD-10-CM

## 2020-04-28 DIAGNOSIS — N18.30 ANEMIA IN STAGE 3 CHRONIC KIDNEY DISEASE (H): ICD-10-CM

## 2020-04-28 DIAGNOSIS — E55.9 VITAMIN D DEFICIENCY: ICD-10-CM

## 2020-04-28 DIAGNOSIS — Z48.298 AFTERCARE FOLLOWING ORGAN TRANSPLANT: ICD-10-CM

## 2020-04-28 DIAGNOSIS — N25.81 SECONDARY RENAL HYPERPARATHYROIDISM (H): ICD-10-CM

## 2020-04-28 DIAGNOSIS — E83.42 HYPOMAGNESEMIA: Primary | ICD-10-CM

## 2020-04-28 DIAGNOSIS — D84.9 IMMUNOSUPPRESSION (H): ICD-10-CM

## 2020-04-28 DIAGNOSIS — I15.1 HTN, KIDNEY TRANSPLANT RELATED: ICD-10-CM

## 2020-04-28 DIAGNOSIS — D63.1 ANEMIA IN STAGE 3 CHRONIC KIDNEY DISEASE (H): ICD-10-CM

## 2020-04-28 DIAGNOSIS — Z94.0 HTN, KIDNEY TRANSPLANT RELATED: ICD-10-CM

## 2020-04-28 DIAGNOSIS — Z94.0 KIDNEY REPLACED BY TRANSPLANT: ICD-10-CM

## 2020-04-28 PROBLEM — K56.7 POSTOPERATIVE ILEUS (H): Status: RESOLVED | Noted: 2020-03-10 | Resolved: 2020-04-28

## 2020-04-28 PROBLEM — T86.19 DELAYED GRAFT FUNCTION OF KIDNEY: Status: RESOLVED | Noted: 2020-03-02 | Resolved: 2020-04-28

## 2020-04-28 PROBLEM — K91.89 POSTOPERATIVE ILEUS (H): Status: RESOLVED | Noted: 2020-03-10 | Resolved: 2020-04-28

## 2020-04-28 LAB — COPATH REPORT: NORMAL

## 2020-04-28 RX ORDER — MAGNESIUM OXIDE 400 MG/1
400 TABLET ORAL
Qty: 90 TABLET | Refills: 1 | Status: SHIPPED | OUTPATIENT
Start: 2020-04-28 | End: 2020-07-01

## 2020-04-28 RX ORDER — CARVEDILOL 12.5 MG/1
12.5 TABLET ORAL 2 TIMES DAILY WITH MEALS
Qty: 120 TABLET | Refills: 11 | COMMUNITY
Start: 2020-04-28 | End: 2020-06-17

## 2020-04-28 RX ORDER — SODIUM BICARBONATE 650 MG/1
1300 TABLET ORAL 3 TIMES DAILY
Qty: 120 TABLET | Refills: 11 | Status: SHIPPED | OUTPATIENT
Start: 2020-04-28 | End: 2020-01-01

## 2020-04-28 ASSESSMENT — PAIN SCALES - GENERAL: PAINLEVEL: NO PAIN (0)

## 2020-04-28 NOTE — PATIENT INSTRUCTIONS
Decreasing carvedilol to 12.5 mg twice daily.    Increasing sodium bicarbonate to 1300 mg three times a day.    Start magnesium oxide 400 mg once daily with lunch.

## 2020-04-28 NOTE — LETTER
4/28/2020      RE: Jeremiah Cruz  [address] [facility] [address]       ACUTE TRANSPLANT NEPHROLOGY VISIT    Assessment & Plan   # DDKT: Stable, but higher creatinine in the low 2s.  Kidney transplant biopsy 4/3 showed no acute rejection, but did have moderate IFTA.  Will follow for now.   - Baseline Cr ~ 2.0-2.3   - Proteinuria: Not checked post transplant   - Date DSA Last Checked: Apr/2020      Latest DSA: No DSA at time of transplant   - BK Viremia: No   - Kidney Tx Biopsy: Apr 03, 2020; Result: No diagnostic evidence of acute rejection.  Moderate interstitial fibrosis and tubular atrophy.    # Immunosuppression: Tacrolimus immediate release (goal 8-10), Mycophenolic acid (goal 1.0-3.5) and Prednisone (dose 5 mg daily)   - Changes: No    # Infection Prophylaxis:   - PJP: Sulfa/TMP (Bactrim)  - CMV: Valcyte; Patient is CMV IgG Ab discordant (D+/R-) and will continue on Valcyte x 6 months, then check CMV PCR monthly until 12 months post transplant.  - Thrush: None    # Hypertension: Controlled;  Goal BP: < 140/90   - Changes: Yes - With lower BP, will decrease carvedilol to 12.5 mg bid.    # Elevated Blood Glucose: Glucose generally running ~ 120-130s  Last HbA1c: 5.2% (2/28/20)   - Management as per primary care.    # Anemia in Chronic Renal Disease: Hgb: Decreased      DEB: No   - Iron studies: Replete    - With decreased Hgb, will work up further with ruling out hemolysis and GI bleeding.  Will check LDH, haptoglobin, peripheral smear, CMV and parvovirus PCR.   - If abdominal symptoms worsen and with drop in hemoglobin, would consider EGD in the future.   - Will refer patient to Anemia Services.    # Mineral Bone Disorder:   - Secondary renal hyperparathyroidism; PTH level: Significantly elevated (601-1200 pg/ml)        On treatment: Calcitriol  - Vitamin D; level: Low        On Supplement: Yes  - Calcium; level: Normal        On Supplement: Yes  - Phosphorus; level: Normal        On Supplement: No    #  Electrolytes:   - Potassium; level: High normal        On Supplement: No  - Magnesium; level: Low        On Supplement: No; Will start magnesium oxide 400 mg daily.  - Bicarbonate; level: Low        On Supplement: Yes; Will increase sodium bicarbonate to 1300 mg tid.    # CML: Appears stable on dasatinib.  Patient is followed by Hematology.    # Abdominal Pain: Overall symptoms appear improved.  Patient previously had some greenish diarrhea, suggestive of bile, but that has resolved.  No other GI symptoms outside of diarrhea for the most part now.  With diarrheal symptoms that are worse with fatty foods, might suggest gallbladder issues.   - Will obtain right upper quadrant ultrasound to assess gallbladder.  Depending on findings and symptoms, may consider HIDA scan in the future.   - If abdominal pain worsens or hemoglobin drops further, would also consider EGD.    # Diarrhea: Unclear etiology, but with recent transplant and hospitalization, would recommend evaluating for infectious work up.  Also some suggestion of worsening with fatty/greasy foods, possibly suggestive of gallbladder issue versus rapid transit.   - Will check C. diff and enteric SOL stool testing.   - Will obtain right upper quadrant ultrasound to look at the gallbladder.    # GERD: Appears to be mostly controlled famotidine.    # Medical Compliance: Yes    # COVID-19 Virus Review: Discussed COVID-19 virus and the potential medical risks.  Reviewed preventative health recommendations, which includes washing hands for 20 seconds, avoid touching your face, and social distancing.  Asked patient to inform the transplant center if they are exposed or diagnosed with this virus.    # Transplant History:  Etiology of Kidney Failure: Alport's syndrome  Tx: DDKT  Transplant: 2/28/2020 (Kidney), 12/7/1988 (Kidney), 3/2/2005 (Kidney)  Donor Type: Donation after Brain Death Donor Class:   Crossmatch at time of Tx: negative  DSA at time of Tx: No  Significant  changes in immunosuppression: None  CMV IgG Ab High Risk Discordance (D+/R-): Yes  EBV IgG Ab High Risk Discordance (D+/R-): No  Significant transplant-related complications: None    Transplant Office Phone Number: [phone]    Assessment and plan was discussed with the patient and he voiced his understanding and agreement.    Return visit: Return for 4 month post transplant visit.    Alvin Varner MD    Chief Complaint   Mr. Cruz is a 44 year old here for kidney transplant and immunosuppression management.     History of Present Illness    Mr. Cruz reports feeling good overall with some medical complaints.  He was last seen about a month ago.  His energy level is slowly improving, but certainly not normal yet.  He has been active and gets a little exercise with walking around.  Denies any chest pain, but some shortness of breath with exertion and just tires easy.  Patient said his leg really get tired before anything else does.  No leg swelling.  Appetite is good and his weight is up a couple of pounds.  No nausea or vomiting.  He has had some diarrhea over the last 3-4 days with watery to loose or even soft stools.  When it started he had more than 5 bowel movements overnight, but now just had two bowel movements all day yesterday.  The diarrhea was also initially greenish in color, but is now more normal tan color.  Only rare crampy abdominal pain, but that has mostly resolved.  No bloody or black, tarry stools.  No fever, sweats or chills.  He feels the diarrhea was worse with eating greasy or fatty foods.    Recent Hospitalizations:  [x] No [] Yes    New Medical Issues: [] No [x] Yes See above   Decreased energy: [] No [x] Yes Slowly improving   Chest pain or SOB with exertion:  [] No [x] Yes Some shortness of breath   Appetite change or weight change: [x] No [] Yes    Nausea, vomiting or diarrhea:  [] No [x] Yes Diarrhea for 3-4 days   Fever, sweats or chills: [x] No [] Yes    Leg swelling:  [x] No [] Yes      Home BP: 110-120/70-80s    Review of Systems   A comprehensive review of systems was obtained and negative, except as noted in the HPI or PMH.    Problem List   Patient Active Problem List   Diagnosis     Alport's syndrome     Anemia in chronic kidney disease     Secondary renal hyperparathyroidism (H)     HTN, kidney transplant related     CML (chronic myelocytic leukemia) (H)     GERD (gastroesophageal reflux disease)     Prurigo nodularis     Senile sebaceous gland hyperplasia     Immunosuppression (H)     Kidney replaced by transplant     Hyperkalemia     History of difficult venous access     Steroid-induced hyperglycemia     Aftercare following organ transplant     Vitamin D deficiency     Metabolic acidosis     Hypomagnesemia       Social History   Social History     Tobacco Use     Smoking status: Former Smoker     Packs/day: 1.00     Years: 12.00     Pack years: 12.00     Types: Cigarettes     Last attempt to quit: 2011     Years since quittin.1     Smokeless tobacco: Never Used   Substance Use Topics     Alcohol use: No     Alcohol/week: 0.0 standard drinks     Drug use: No       Allergies   Allergies   Allergen Reactions     Blood Transfusion Related (Informational Only) Other (See Comments)     Patient has a history of a clinically significant antibody against RBC antigens.  A delay in compatible RBCs may occur.     Cephalosporins Other (See Comments) and Rash     fever     Compazine [Prochlorperazine]      Gammagard [Immune Globulin] Other (See Comments)     Ed got spinal meningitis and MD stated to never get again for fear of death.       Penicillins      Vancomycin        Medications   Current Outpatient Medications   Medication Sig     amLODIPine (NORVASC) 10 MG tablet Take 1 tablet (10 mg) by mouth daily     atorvastatin (LIPITOR) 10 MG tablet Take 1 tablet (10 mg) by mouth daily     calcitRIOL (ROCALTROL) 0.25 MCG capsule Take 1 capsule (0.25 mcg) by mouth daily      calcium carbonate (TUMS) 500 MG chewable tablet Take 1 tablet (500 mg) by mouth 3 times daily as needed for heartburn Take 1 chewable tablet by mouth 3 times daily with meals.     carvedilol (COREG) 12.5 MG tablet Take 1 tablet (12.5 mg) by mouth 2 times daily (with meals)     dasatinib (SPRYCEL) 20 MG tablet CHEMO Take 40 mg by mouth daily      famotidine (PEPCID) 40 MG tablet Take 1 tablet (40 mg) by mouth daily     magnesium oxide (MAG-OX) 400 MG tablet Take 1 tablet (400 mg) by mouth daily (with lunch)     MYFORTIC (BRAND) 180 MG EC tablet Take 3 tablets (540 mg) by mouth 2 times daily     ondansetron (ZOFRAN-ODT) 4 MG ODT tab Take 1 tablet (4 mg) by mouth every 8 hours as needed for nausea or vomiting     polyethylene glycol (MIRALAX) packet Take 17 g by mouth 2 times daily Hold for loose stools     predniSONE (DELTASONE) 5 MG tablet Take 1 tablet (5 mg) by mouth daily     Probiotic Product (UP4 PROBIOTICS ADULT) CAPS Take 1 capsule by mouth daily     sodium bicarbonate 650 MG tablet Take 2 tablets (1,300 mg) by mouth 3 times daily     sulfamethoxazole-trimethoprim (BACTRIM) 400-80 MG tablet Take 1 tablet by mouth Every Mon, Wed, Fri Morning     tacrolimus (GENERIC EQUIVALENT) 0.5 MG capsule Take 1 capsule (0.5 mg) by mouth 2 times daily Hold for future dose changes     tacrolimus (GENERIC EQUIVALENT) 1 MG capsule Take 5 capsules (5 mg) by mouth 2 times daily     tamsulosin (FLOMAX) 0.4 MG capsule Take 1 capsule (0.4 mg) by mouth At Bedtime     valGANciclovir (VALCYTE) 450 MG tablet Take 1 tablet (450 mg) by mouth daily Titrate dose up to a max of 2 tabs (900 mg) by mouth daily when directed by your transplant team.     vitamin D3 (CHOLECALCIFEROL) 2000 units (50 mcg) tablet Take 1 tablet (2,000 Units) by mouth daily     No current facility-administered medications for this visit.      Medications Discontinued During This Encounter   Medication Reason     carvedilol (COREG) 12.5 MG tablet      sodium  bicarbonate 650 MG tablet        Physical Exam   Vital Signs: /81   Temp 97.7  F (36.5  C)   Wt 62 kg (136 lb 9.6 oz)   BMI 23.43 kg/m      Data     Renal Latest Ref Rng & Units 4/23/2020 4/20/2020 4/16/2020   Na 133 - 144 mmol/L - - -   Na (external) 133 - 144 mmol/L 137 135 137   K 3.4 - 5.3 mmol/L - - -   K (external) 3.4 - 5.1 mmol/L 5.2(H) 4.7 5.0   Cl 94 - 109 mmol/L - - -   Cl (external) 96 - 109 mmol/L 108 106 107   CO2 20 - 32 mmol/L - - -   CO2 (external) 21 - 33 mmol/L 17(L) 17(L) 17(L)   BUN 7 - 30 mg/dL - - -   BUN (external) 6 - 24 mg/dL 31(H) 29(H) 38(H)   Cr 0.66 - 1.25 mg/dL - - -   Cr (external) 0.6 - 1.2 mg/dL 2.2(H) 2.0(H) 2.3(H)   Glucose 70 - 99 mg/dL - - -   Glucose (external) 70 - 99 mg/dL 136(H) 123(H) 131(H)   Ca  8.5 - 10.1 mg/dL - - -   Ca (external) 8.2 - 10.0 mg/dL 9.4 9.5 9.4   Mg 1.6 - 2.3 mg/dL - - -   Mg (external) 1.7 - 2.4 mg/dL - 1.5(L) -     Bone Health Latest Ref Rng & Units 4/20/2020 4/13/2020 4/6/2020   Phos 2.5 - 4.5 mg/dL - - -   Phos (external) 2.6 - 4.7 mg/dL 3.0 2.7 2.7   PTHi 18 - 80 pg/mL - - -   Vit D Def 20 - 75 ug/L - - -     Heme Latest Ref Rng & Units 4/23/2020 4/20/2020 4/16/2020   WBC 4.0 - 11.0 10e9/L - - -   WBC (external) 4.1 - 10.9 10(3)/uL 6.6 5.8 6.7   Hgb 13.3 - 17.7 g/dL - - -   Hgb (external) 12.9 - 17.3 g/dL 7.9(L) 8.1(L) 8.8(L)   Plt 150 - 450 10e9/L - - -   Plt (external) 150 - 450 10(3)/uL 182 188 202     Liver Latest Ref Rng & Units 3/5/2020 2/28/2020 1/26/2017   AP 40 - 150 U/L - 108 59   TBili 0.2 - 1.3 mg/dL 0.6 0.6 0.4   DBili 0.0 - 0.2 mg/dL 0.1 - -   ALT 0 - 70 U/L - 48 34   AST 0 - 45 U/L - 30 25   Tot Protein 6.8 - 8.8 g/dL - 7.9 7.0   Albumin 3.4 - 5.0 g/dL - 4.0 3.7     Pancreas Latest Ref Rng & Units 2/28/2020   A1C 0 - 5.6 % 5.2     Iron studies Latest Ref Rng & Units 3/3/2020 3/8/2005   Iron 35 - 180 ug/dL 139 43   Iron sat 15 - 46 % 98(H) -   Ferritin 26 - 388 ng/mL 3,397(H) -     [facility]rology Latest Ref Rng & Units  "4/16/2020 1/26/2017 3/1/2005   CMV IgG EU/mL - - 1.4   BK Spec - - Plasma -   BK Res BKNEG copies/mL - BK Virus DNA Not Detected -   BK Log <2.7 Log copies/mL - Not Calculated   The Real-Time quantitative BK Virus assay was developed and its performance   characteristics determined by the Infect[facility] Diagnostic Laboratory at   the [facility] in [place], [place]. The   primers and probes for each analyte are Analyte Specific Reagents (ASRs)   manufactured by [employer].   ASRs are used in many laboratory tests necessary for standard medical care and   generally do not require U.S. Food and Drug Administration approval. The FDA   has determined that such clearance or approval is not necessary.   This test is used for clinical purposes. It should not be regarded as   investigational or for research. This laboratory is certified under the   Clinical Laboratory Improvement Amendments of 1988 (CLIA-88) as qualified to   perform high complexity clinical laboratory testing.   -   BK Quant Result Ext None Detected IU/mL None Detected - -   BK Quant Spec Ext - Plasma - -   EBV IgG - - - 5.35   Hep B Core NR - Nonreactive -   Hep B Surf NEG - - Positive, Patient is considered to be immune to infection with hepatitis B.(A)   HIV 1&2 NEG - - Negative        Recent Labs   Lab Test 03/13/20  0735 03/14/20  0830 03/16/20  0807   DOSTAC Not Provided Not Provided Not Provided   TACROL 6.9 9.2 10.8           Jeremiah Cruz is a 44 year old male who is being evaluated via a billable video visit.      The patient has been notified of following:     \"This video visit will be conducted via a call between you and your physician/provider. We have found that certain health care needs can be provided without the need for an in-person physical exam.  This service lets us provide the care you need with a video conversation.  If a prescription is necessary we can send it directly to your pharmacy.  If lab " "work is needed we can place an order for that and you can then stop by our lab to have the test done at a later time.    Video visits are billed at different rates depending on your insurance coverage.  Please reach out to your insurance provider with any questions.    If during the course of the call the physician/provider feels a video visit is not appropriate, you will not be charged for this service.\"    Patient has given verbal consent for Video visit? Yes    How would you like to obtain your AVS? [facility]    Patient would like the video invitation sent by: Text to cell phone: [phone]    Will anyone else be joining your video visit? No    Video-Visit Details    Type of service:  Video Visit    Video Start Time: 0929  Video End Time: 0953    Originating Location (pt. Location): Home    Distant Location (provider location):  [facility] NEPHROLOGY     Mode of Communication:  Video Conference via [employer]      Alvin Varner MD          "

## 2020-04-28 NOTE — PROGRESS NOTES
"Jeremiah Cruz is a 44 year old male who is being evaluated via a billable video visit.      The patient has been notified of following:     \"This video visit will be conducted via a call between you and your physician/provider. We have found that certain health care needs can be provided without the need for an in-person physical exam.  This service lets us provide the care you need with a video conversation.  If a prescription is necessary we can send it directly to your pharmacy.  If lab work is needed we can place an order for that and you can then stop by our lab to have the test done at a later time.    Video visits are billed at different rates depending on your insurance coverage.  Please reach out to your insurance provider with any questions.    If during the course of the call the physician/provider feels a video visit is not appropriate, you will not be charged for this service.\"    Patient has given verbal consent for Video visit? Yes    How would you like to obtain your AVS? [facility]    Patient would like the video invitation sent by: Text to cell phone: [phone]    Will anyone else be joining your video visit? No    Video-Visit Details    Type of service:  Video Visit    Video Start Time: 0929  Video End Time: 0953    Originating Location (pt. Location): Home    Distant Location (provider location):  [facility] NEPHROLOGY     Mode of Communication:  Video Conference via [employer]      Alvin Varner MD        "

## 2020-05-03 NOTE — TELEPHONE ENCOUNTER
Alvin Varner MD Huepfel, Mary K, RN               Jody,     Patient overall feels better, especially his abdominal pain, but now with some diarrhea for a few days.     He also had a decrease in his Hgb.     Please check LDH, haptoglobin, peripheral smear, CMV and parvovirus PCR.  Also HbA1c.     Please refer patient to Anemia Services.     Would check C. diff and enteric SOL testing.     Would obtain right upper quadrant ultrasound to look at gallbladder.     I think we can hold off on the EGD for now, but would do it if his hemoglobin decreased more.     I started him on magnesium oxide 400 mg daily and increased his sodium bicarbonate to 1300 mg tid.  He doesn't like the sodium bicarbonate, but refused changing to baking soda.     His BP was a bit low and I lowered his carvedilol.     He should follow up with Hematology for his CML.     Thanks.

## 2020-05-03 NOTE — TELEPHONE ENCOUNTER
Task   Please call and fax lab orders to home care requested per Dr Varner     Please call and fax lab order to [facility]  for upper right quad - reason pain

## 2020-05-04 ENCOUNTER — TELEPHONE (OUTPATIENT)
Dept: TRANSPLANT | Facility: CLINIC | Age: 45
End: 2020-05-04

## 2020-05-04 NOTE — TELEPHONE ENCOUNTER
Reviewed potential broken rib with Dr Wilkinson.   Test for COVID19.   If negative, ok to proceed with CXR.   If patient has fevers, needs to be evaluated in ED.     Left VM with patient asking for return phone call. Offered on-call RNCC phone number as well.

## 2020-05-04 NOTE — TELEPHONE ENCOUNTER
ISSUE:  CO2 19 on 4/30  Anemia, hgb 7.4    PLAN:  Confirm current dose bicarb 3 pills 3 times daily (increased on 4/28/2020)  Following with anemia services locally?

## 2020-05-04 NOTE — TELEPHONE ENCOUNTER
RNCC spoke with Katie:    Can do LDH, periph smear, CMV, parvo and A1c from blood drawn 5/4/2020.  Can't do hapto (no red tube collected).    Ed denies any diarrhea increase after last Wed / Thur.    Will be seen again on Thursday by home care. They will drop off a stool sample kit to Ed's home and ask for him to collect a liquid stool if possible. If not, no need to run sample.     RUQ US orders received by home care. RNCC advised they will not need to pursue this.   RNCC did confirm with LPN that order for US was sent to Ed's local clinic.

## 2020-05-04 NOTE — TELEPHONE ENCOUNTER
Provider Call: Home Care  Route to LPN      Reason for Call:   Enteric SOL stool  C diff  No stool taken    Please connect wit HC regarding order      Callback needed? Yes    Return Call Needed  Same as documented in contacts section  When to return call?: Same day: Route High Priority

## 2020-05-04 NOTE — LETTER
PHYSICIAN ORDERS      DATE & TIME ISSUED: May 5, 2020 12:15 PM  PATIENT NAME: Jeremiah Cruz   : 1975     [facility] MR# [if applicable]: [id number]     DIAGNOSIS: Kidney transplant  ICD-10 CODE: z94.0       Please draw with next set of routine labs (withinn one week )   LFT/ Hepatic panel       Any questions please call: [phone].    .

## 2020-05-04 NOTE — TELEPHONE ENCOUNTER
Patient Call: Transplant Illness  Coughed so hard he thinks he broke a rib      Duration of illness: just happened   Transplanted organ? kidney  Illness: Other Possible brken rib from coughing so hard

## 2020-05-05 ENCOUNTER — TELEPHONE (OUTPATIENT)
Dept: PHARMACY | Facility: CLINIC | Age: 45
End: 2020-05-05

## 2020-05-05 DIAGNOSIS — N18.4 ANEMIA OF CHRONIC RENAL FAILURE, STAGE 4 (SEVERE) (H): ICD-10-CM

## 2020-05-05 DIAGNOSIS — D63.1 ANEMIA OF CHRONIC RENAL FAILURE, STAGE 4 (SEVERE) (H): ICD-10-CM

## 2020-05-05 DIAGNOSIS — N18.4 CKD (CHRONIC KIDNEY DISEASE) STAGE 4, GFR 15-29 ML/MIN (H): Primary | ICD-10-CM

## 2020-05-05 LAB
HCT VFR BLD AUTO: 23.2 %
HEMOGLOBIN: 7.9 G/DL (ref 13.3–17.7)

## 2020-05-05 NOTE — TELEPHONE ENCOUNTER
Anemia Management Note - Enrollment  SUBJECTIVE/OBJECTIVE:    Referred by Dr. Alvin Varner on 2020  Primary Diagnosis: Anemia in Chronic Kidney Disease (N18.4, D63.1)     Secondary Diagnosis:  Chronic Kidney Disease, Stage 4 (N18.4)   Kidney Tx: , , 2020  Hgb goal range:  9-10  Epo/Darbo: Procrit 10,000 units weekly for Hgb <10. At home.   Iron regimen:  TBD  Labs : 2021  Recent DEB use, transfusion, IV iron: unknown  RX/TX plans : TBD    No history of stroke, MI and blood clots. Hx of CML in 2017    *Currently has Home Care Mon/Thur for labs.     Labs: [facility]  Ph # [phone]  Fax # [phone]    Contact:  Ok to leave message regarding Scheduling and Medical Information per consent to communicate dated 12/10/2017    OK to speak with Danaylee ann Blanton (friend) and Kaylin Carr (mother) regarding Scheduling and Medical Information per consent to communicate dated 12/10/2017    Anemia Latest Ref Rng & Units 3/10/2020 3/11/2020 3/12/2020 3/13/2020 3/14/2020 3/16/2020 2020   Hemoglobin 13.3 - 17.7 g/dL 7.1(L) 7.6(L) 7.2(L) 6.7(LL) 8.9(L) 9.0(L) 7.9(A)   TSAT 15 - 46 % - - - - - - -   Ferritin 26 - 388 ng/mL - - - - - - -       BP Readings from Last 3 Encounters:   20 120/81   20 114/58   20 114/58     Wt Readings from Last 2 Encounters:   20 62 kg (136 lb 9.6 oz)   20 61.2 kg (135 lb)     Current Outpatient Medications   Medication Sig Dispense Refill     amLODIPine (NORVASC) 10 MG tablet Take 1 tablet (10 mg) by mouth daily 30 tablet 11     atorvastatin (LIPITOR) 10 MG tablet Take 1 tablet (10 mg) by mouth daily 30 tablet 11     calcitRIOL (ROCALTROL) 0.25 MCG capsule Take 1 capsule (0.25 mcg) by mouth daily 30 capsule 11     calcium carbonate (TUMS) 500 MG chewable tablet Take 1 tablet (500 mg) by mouth 3 times daily as needed for heartburn Take 1 chewable tablet by mouth 3 times daily with meals.       carvedilol (COREG) 12.5  MG tablet Take 1 tablet (12.5 mg) by mouth 2 times daily (with meals) 120 tablet 11     dasatinib (SPRYCEL) 20 MG tablet CHEMO Take 40 mg by mouth daily        famotidine (PEPCID) 40 MG tablet Take 1 tablet (40 mg) by mouth daily 30 tablet 11     magnesium oxide (MAG-OX) 400 MG tablet Take 1 tablet (400 mg) by mouth daily (with lunch) 90 tablet 1     MYFORTIC (BRAND) 180 MG EC tablet Take 3 tablets (540 mg) by mouth 2 times daily 180 tablet 11     ondansetron (ZOFRAN-ODT) 4 MG ODT tab Take 1 tablet (4 mg) by mouth every 8 hours as needed for nausea or vomiting 5 tablet 0     polyethylene glycol (MIRALAX) packet Take 17 g by mouth 2 times daily Hold for loose stools 60 packet 0     predniSONE (DELTASONE) 5 MG tablet Take 1 tablet (5 mg) by mouth daily 30 tablet 11     Probiotic Product (UP4 PROBIOTICS ADULT) CAPS Take 1 capsule by mouth daily 30 capsule 1     sodium bicarbonate 650 MG tablet Take 2 tablets (1,300 mg) by mouth 3 times daily 120 tablet 11     sulfamethoxazole-trimethoprim (BACTRIM) 400-80 MG tablet Take 1 tablet by mouth Every Mon, Wed, Fri Morning 30 tablet 11     tacrolimus (GENERIC EQUIVALENT) 0.5 MG capsule Take 1 capsule (0.5 mg) by mouth 2 times daily Hold for future dose changes 60 capsule 11     tacrolimus (GENERIC EQUIVALENT) 1 MG capsule Take 5 capsules (5 mg) by mouth 2 times daily 300 capsule 11     tamsulosin (FLOMAX) 0.4 MG capsule Take 1 capsule (0.4 mg) by mouth At Bedtime 90 capsule 1     valGANciclovir (VALCYTE) 450 MG tablet Take 1 tablet (450 mg) by mouth daily Titrate dose up to a max of 2 tabs (900 mg) by mouth daily when directed by your transplant team. 90 tablet 3     vitamin D3 (CHOLECALCIFEROL) 2000 units (50 mcg) tablet Take 1 tablet (2,000 Units) by mouth daily 90 tablet 3     ASSESSMENT:  Hgb Not at goal/Initiation of therapy   Ferritin: Due for labs  TSat: Due for labs  Iron regimen recommended: TBD  Recommended DEB regimen: TBD  Blood Pressure: Stable.      PLAN:  1.Patient called  today for enrollment in Anemia Management Service.  2. Discussed:  anemia overview, monitoring service and goal hemoglobin range and rationale and risks of DEB blood clots, stroke and increase in blood pressure  3. Dose location: at home  4. Labs: [facility]  5. Pharmacy: TBD    Spoke with Ed, currently taking Azithromycin for sinus infection.  Just started taking his 2nd Z-Amos today. He would like to start the Procrit.  Rx sent to  [facility].  Lab orders will need to be faxed to home care agency one they are signed.           Next call date:  5/7/2020  Fax orders, check on rx.     Ana Castrejon RN   Anemia Services  [facility]  [address] [place] [id number]   [email or web address]   Office : [phone]  Fax: [phone]

## 2020-05-05 NOTE — TELEPHONE ENCOUNTER
Spoke to Jeremiah Cruz who followed up PCP    Dr Michele Local MD  Ed stated that he has  Seasonal Allergies that he has clear drainage - caused him to cough choke   Local provider RX'd  Loratadine and azithromycin for possible sinus infection     Reviewed concern for COVID-19    Denies fever, , dyspnea, body aches, chills, fatigue still able to taste /smell   Ed reviewed with PCP  -not tested     Ed reports he has his Oncology  Appointment  In May   HX of CML prior to transplant      Ed waiting for his upper right quad ultrasound avoiding fatting foods

## 2020-05-06 ENCOUNTER — TELEPHONE (OUTPATIENT)
Dept: NEPHROLOGY | Facility: CLINIC | Age: 45
End: 2020-05-06

## 2020-05-06 NOTE — TELEPHONE ENCOUNTER
Prior Authorization Approval    Authorization Effective Date: 3/1/2020  Authorization Expiration Date: 8/26/2020  Medication: epoetin justa (EPOGEN/PROCRIT) 19026 UNIT/ML injection - pa approved  Approved Dose/Quantity: UD  Reference #:     Insurance Company: [employer] - Phone [phone] Fax [phone]  Expected CoPay:       CoPay Card Available:      Foundation Assistance Needed:    Which Pharmacy is filling the prescription (Not needed for infusion/clinic administered): [facility] - [facility] [address]  Pharmacy Notified: Yes  Patient Notified: Yes

## 2020-05-06 NOTE — TELEPHONE ENCOUNTER
PA Initiation    Medication: epoetin justa (EPOGEN/PROCRIT) 19320 UNIT/ML injection - pa pending  Insurance Company: [employer] - Phone [phone] Fax [phone]  Pharmacy Filling the Rx: [facility] - [place], [place] - [address]  Filling Pharmacy Phone: [phone]  Filling Pharmacy Fax: [phone]  Start Date: 5/6/2020

## 2020-05-07 NOTE — TELEPHONE ENCOUNTER
Ed has not called the pharmacy back yet to set up his delivery of Procrit.  Will follow up next week.      Aan Castrejon RN   [facility]  [address] [address]   [email or web address]   Office : [phone]  Fax: [phone]

## 2020-05-11 ENCOUNTER — TELEPHONE (OUTPATIENT)
Dept: TRANSPLANT | Facility: CLINIC | Age: 45
End: 2020-05-11

## 2020-05-12 ENCOUNTER — TELEPHONE (OUTPATIENT)
Dept: PHARMACY | Facility: CLINIC | Age: 45
End: 2020-05-12

## 2020-05-12 NOTE — TELEPHONE ENCOUNTER
Procrit scheduled to be delivered sometime on 5/14/2020.    Ana Castrejon RN   [facility]  [address] [address]   [email or web address]   Office : [phone]  Fax: [phone]

## 2020-05-12 NOTE — TELEPHONE ENCOUNTER
Clinical Pharmacy Consult:                                                      Transplant Specific:  2 Month Post Transplant Pharmacy Call  Date of Transplant: 02/28/2020  Type of Transplant: kidney  First Transplant: no this is #3  History of rejection: yes    Immunosuppression Regimen   TAC 5mg qAM & 5mg qPM, Prednisone 5mg qAM and Myfortic 540mg qAM & 540mg qPM  Immunosuppressant Levels: 8.4 on 5/12/2020 therapeutic  Patient specific goal: 8-10  Pt adherent to lab draws: yes  Scr:   Lab Results   Component Value Date    CR 2.42 03/16/2020     Side effects: Occasional diarrhea     Prophylactic Medications  Antibacterial:  Bactrim ss 3  Times a week  Scheduled Discontinue Date: life long    Antifungal: Not needed thus far  Scheduled Discontinue Date: not on    Antiviral: CrCl 40 to 59 mL/minute: Valcyte 450 mg once daily   Scheduled Discontinue Date: 6 months    Acid Reducer: Pepcid (famotidine)  Scheduled Reviewed Date: unknown    Thrombosis Prevention: not on   Scheduled Discontinue Date: not on    Blood Pressure Management  Frequency of home Blood Pressure checks: twice weekly  Most recent home BP: 127/88  Patient Blood pressure goal: <140/90  Patient blood pressure at goal:  yes  Hospitalizations/ER visits since last assessment: 0      Med rec/DUR performed: yes  Med Rec Discrepancies: no    Ed reports that he is improving slowly, but his energy levels are still low. The cramping he was experiencing previously stopped.  He knew his meds well.  He checks his blood pressure twice weekly on Mondays and Thursdays and has been controlled. He was recently on azithromycin and still has lingering symptoms of sinus drainage and coughing.    Will follow-up in 1 month.    Yeni Espinoza RPH

## 2020-05-14 LAB
HCT VFR BLD AUTO: 24.4 %
HEMOGLOBIN: 8.1 G/DL (ref 13.3–17.7)

## 2020-05-18 ENCOUNTER — TELEPHONE (OUTPATIENT)
Dept: PHARMACY | Facility: CLINIC | Age: 45
End: 2020-05-18

## 2020-05-18 DIAGNOSIS — Z94.0 KIDNEY REPLACED BY TRANSPLANT: Primary | ICD-10-CM

## 2020-05-18 RX ORDER — TACROLIMUS 0.5 MG/1
0.5 CAPSULE ORAL 2 TIMES DAILY
Qty: 60 CAPSULE | Refills: 11 | Status: SHIPPED | OUTPATIENT
Start: 2020-05-18 | End: 2020-06-08

## 2020-05-18 RX ORDER — TACROLIMUS 1 MG/1
5 CAPSULE ORAL 2 TIMES DAILY
Qty: 300 CAPSULE | Refills: 11 | Status: SHIPPED | OUTPATIENT
Start: 2020-05-18 | End: 2020-06-08

## 2020-05-18 NOTE — TELEPHONE ENCOUNTER
ISSUE:   Tacrolimus IR level 7.2 on 5/14, goal 8-10, dose 5 mg BID.    PLAN:   Please call patient and confirm this was an accurate 12-hour trough. Verify Tacrolimus IR dose 5 mg BID. Confirm no new medications or illness. Confirm no missed doses. If accurate trough and accurate dose, increase Tacrolimus IR dose to 5.5 mg BID and repeat labs as scheduled.    Danelle Jaime RN, BSN, [facility]  Transplant Care Coordinator      OUTCOME:   Spoke with patient, they confirm accurate trough level and current dose 5 mg BID. Patient confirmed dose change to 5.5 mg BID and to repeat labs in 1 weeks. Orders sent to preferred pharmacy for dose change and lab for repeat labs. Patient voiced understanding of plan.

## 2020-05-18 NOTE — TELEPHONE ENCOUNTER
Anemia Management Note  SUBJECTIVE/OBJECTIVE:  Referred by Dr. Alvin Varner on 2020  Primary Diagnosis: Anemia in Chronic Kidney Disease (N18.4, D63.1)     Secondary Diagnosis:  Chronic Kidney Disease, Stage 4 (N18.4)   Kidney Tx: , , 2020  Hgb goal range:  9-10  Epo/Darbo: Procrit 10,000 units weekly for Hgb <10. At home.   Iron regimen:  TBD  Labs : 2021  Recent DEB use, transfusion, IV iron: unknown  RX/TX plans : TBD     No history of stroke, MI and blood clots. Hx of CML in 2017     *Currently has Home Care Mon/Thur for labs.      Labs: [facility]  Ph # [phone]  Fax # [phone]     Contact:            Ok to leave message regarding Scheduling and Medical Information per consent to communicate dated 12/10/2017                              OK to speak with Danaylee ann Blanton (friend) and Kaylin Carr (mother) regarding Scheduling and Medical Information per consent to communicate dated 12/10/2017    Anemia Latest Ref Rng & Units 3/12/2020 3/13/2020 3/14/2020 3/16/2020 2020 2020 2020   DEB Dose - - - - - - - 10,000 units   Hemoglobin 13.3 - 17.7 g/dL 7.2(L) 6.7(LL) 8.9(L) 9.0(L) 7.9(A) 8.1(A) -   TSAT 15 - 46 % - - - - - - -   Ferritin 26 - 388 ng/mL - - - - - - -     BP Readings from Last 3 Encounters:   20 120/81   20 114/58   20 114/58     Wt Readings from Last 2 Encounters:   20 62 kg (136 lb 9.6 oz)   20 61.2 kg (135 lb)       LVM for Edward. Did he get his Procrit delivered?    Ed called back. He dosed this morning.  Labs will  Be drawn every Monday and Thursday.  He will plan to dose on .           NEXT FOLLOW-UP DATE:  2020  Review labs    Ana Castrejon RN   [address] [address]   [email or web address]   Office : [phone]  Fax: [phone]

## 2020-05-21 LAB
HCT VFR BLD AUTO: 25.1 %
HEMOGLOBIN: 8.4 G/DL (ref 13.3–17.7)

## 2020-05-22 ENCOUNTER — TELEPHONE (OUTPATIENT)
Dept: PHARMACY | Facility: CLINIC | Age: 45
End: 2020-05-22

## 2020-05-22 NOTE — TELEPHONE ENCOUNTER
Follow-up with anemia management service:    LVM for Ed to dose Procrit on 5/25/20    Anemia Latest Ref Rng & Units 3/13/2020 3/14/2020 3/16/2020 5/5/2020 5/14/2020 5/18/2020 5/21/2020   DEB Dose - - - - - - 10,000 units -   Hemoglobin 13.3 - 17.7 g/dL 6.7(LL) 8.9(L) 9.0(L) 7.9(A) 8.1(A) - 8.4(A)   TSAT 15 - 46 % - - - - - - -   Ferritin 26 - 388 ng/mL - - - - - - -           Follow-up call date: 5/26/20  Document dose from 5/25/20    Ana Castrejon RN   Anemia Services  [address] [address]   [email or web address]   Office : [phone]  Fax: [phone]

## 2020-05-26 ENCOUNTER — TELEPHONE (OUTPATIENT)
Dept: PHARMACY | Facility: CLINIC | Age: 45
End: 2020-05-26

## 2020-05-26 NOTE — TELEPHONE ENCOUNTER
Anemia Management Note  SUBJECTIVE/OBJECTIVE:  Referred by Dr. Alvin Varner on 2020  Primary Diagnosis: Anemia in Chronic Kidney Disease (N18.4, D63.1)     Secondary Diagnosis:  Chronic Kidney Disease, Stage 4 (N18.4)   Kidney Tx: , , 2020  Hgb goal range:  9-10  Epo/Darbo: Procrit 10,000 units weekly for Hgb <10. At home.   Iron regimen:  TBD  Labs : 2021  Recent DEB use, transfusion, IV iron: unknown  RX/TX plans : TBD     No history of stroke, MI and blood clots. Hx of CML in 2017     *Currently has Home Care Mon/Th for labs.      Labs: [facility]  Ph # [phone]  Fax # [phone]     Contact:            Ok to leave message regarding Scheduling and Medical Information per consent to communicate dated 12/10/2017                              OK to speak with Danay Blanton (friend) and Kaylin Kolbenger (mother) regarding Scheduling and Medical Information per consent to communicate dated 12/10/2017    Anemia Latest Ref Rng & Units 3/14/2020 3/16/2020 2020 2020 2020 2020 2020   DEB Dose - - - - - 10,000 units - 10,000 units   Hemoglobin 13.3 - 17.7 g/dL 8.9(L) 9.0(L) 7.9(A) 8.1(A) - 8.4(A) -   TSAT 15 - 46 % - - - - - - -   Ferritin 26 - 388 ng/mL - - - - - - -     BP Readings from Last 3 Encounters:   20 120/81   20 114/58   20 114/58     Wt Readings from Last 2 Encounters:   20 62 kg (136 lb 9.6 oz)   20 61.2 kg (135 lb)       Call Ed on Thurs with lab results for the week    ASSESSMENT:  Hgb:Not at goal but improving - recommend dose and continue current regimen  TSat: Due for labs Ferritin: Due for labs    PLAN:  Dose with procrit and RTC for hgb then procrit if needed in 1* week(s)    Orders needed to be renewed (for next follow-up date) in LETTERS - for external labs: None    Iron labs due:  DUE    Plan discussed with:  No call made. Documented dose. Will follow up with Ed again on   Plan  provided by:  bigg    NEXT FOLLOW-UP DATE:  5/28/20    Ana Castrejon RN   [facility]  [address] [address]   [email or web address]   Office : [phone]  Fax: [phone]

## 2020-05-27 ENCOUNTER — TELEPHONE (OUTPATIENT)
Dept: TRANSPLANT | Facility: CLINIC | Age: 45
End: 2020-05-27

## 2020-05-28 LAB — HEMOGLOBIN: 9.3 G/DL (ref 13.3–17.7)

## 2020-06-01 ENCOUNTER — TELEPHONE (OUTPATIENT)
Dept: PHARMACY | Facility: CLINIC | Age: 45
End: 2020-06-01

## 2020-06-01 NOTE — TELEPHONE ENCOUNTER
Follow-up with anemia management service:    Spoke with pt, he doses his Procrit today. Labs were drawn today and will be drawn again on Thursday.     Anemia Latest Ref Rng & Units 3/16/2020 5/5/2020 5/14/2020 5/18/2020 5/21/2020 5/25/2020 5/28/2020   DEB Dose - - - - 10,000 units - 10,000 units -   Hemoglobin 13.3 - 17.7 g/dL 9.0(L) 7.9(A) 8.1(A) - 8.4(A) - 9.3(A)   TSAT 15 - 46 % - - - - - - -   Ferritin 26 - 388 ng/mL - - - - - - -           Follow-up call date: 6/4/20    Ana Castrejon RN   Anemia Services  [address] [address]   [email or web address]   Office : [phone]  Fax: [phone]

## 2020-06-04 ENCOUNTER — TELEPHONE (OUTPATIENT)
Dept: PHARMACY | Facility: CLINIC | Age: 45
End: 2020-06-04

## 2020-06-04 DIAGNOSIS — Z94.0 KIDNEY REPLACED BY TRANSPLANT: Primary | ICD-10-CM

## 2020-06-04 LAB
HCT VFR BLD AUTO: 31.1 %
HEMOGLOBIN: 10.1 G/DL (ref 13.3–17.7)

## 2020-06-04 NOTE — TELEPHONE ENCOUNTER
Follow-up with anemia management service:    Spoke with Ed, he will hold his Procrit on Monday until we gt lab results back.    Anemia Latest Ref Rng & Units 5/14/2020 5/18/2020 5/21/2020 5/25/2020 5/28/2020 6/1/2020 6/4/2020   DEB Dose - - 10,000 units - 10,000 units - 10,000 units -   Hemoglobin 13.3 - 17.7 g/dL 8.1(A) - 8.4(A) - 9.3(A) - 10.1(A)   TSAT 15 - 46 % - - - - - - -   Ferritin 26 - 388 ng/mL - - - - - - -           Follow-up call date: 6/8/20    Ana Castrejon RN   Anemia Services  [address] [address]   [email or web address]   Office : [phone]  Fax: [phone]

## 2020-06-05 ENCOUNTER — TELEPHONE (OUTPATIENT)
Dept: TRANSPLANT | Facility: CLINIC | Age: 45
End: 2020-06-05

## 2020-06-05 DIAGNOSIS — Z94.0 KIDNEY TRANSPLANTED: Primary | ICD-10-CM

## 2020-06-05 NOTE — LETTER
June 5, 2020      From: [facility]  Solid Organ Transplant Services      To Whom It May Concern:     Jeremiah Cruz underwent a {Organ:[id number]}.  As such he currently fits into a  high risk  category for COVID-19.     For patients that work outside the home, we recommend that all reasonable accommodations be made to allow working remotely or modifying work responsibilities to protect your health.     If you have any questions or concerns about the above request, please contact the Solid Organ Transplant Office at [phone].      Thank you for your partnership.    Sincerely,   [facility]   Solid Organ Transplant Services

## 2020-06-05 NOTE — LETTER
PHYSICIAN ORDERS      DATE & TIME ISSUED: 2020 11:20 AM  PATIENT NAME: Jeremiah Cruz   : 1975     [facility] MR# [if applicable]: [id number]     DIAGNOSIS:  Kidney transplant  ICD-10 CODE: Z94.0     Please obtain the following labs with next lab draw   Glucose 6 phosphate dehydrogenase (G6PD)    Any questions please call: [phone]  Please fax results to [phone].    .

## 2020-06-05 NOTE — TELEPHONE ENCOUNTER
Received a call from [facility]  They do not draw Edward anymore(they received a lab order 06/05/2020)  I called Ed, he has home care for lab draws.  I faxed order dated 06/05/2020 =(G6PD) to home care.

## 2020-06-05 NOTE — TELEPHONE ENCOUNTER
Sent to  Dr Varner for further review labs   Cr 2.5 K 5.8   Holding TMP Sulfa due to high k   Ed reports limiting potassium and Hydrating

## 2020-06-05 NOTE — TELEPHONE ENCOUNTER
Plan to obtain G6PD to see if patient can start Dapsone due to issues with hyperkalemia/Bactrim.  Lab order faxed.    Alvin Varner MD Huepfel, Jody SORIANO RN               You could do G6PD so we could look at dapsone.

## 2020-06-08 ENCOUNTER — TELEPHONE (OUTPATIENT)
Dept: PHARMACY | Facility: CLINIC | Age: 45
End: 2020-06-08

## 2020-06-08 LAB
HCT VFR BLD AUTO: 31.6 %
HEMOGLOBIN: 10.4 G/DL (ref 13.3–17.7)

## 2020-06-08 RX ORDER — TACROLIMUS 0.5 MG/1
0.5 CAPSULE ORAL 2 TIMES DAILY
Qty: 60 CAPSULE | Refills: 11 | Status: SHIPPED | OUTPATIENT
Start: 2020-06-08 | End: 2020-07-08

## 2020-06-08 RX ORDER — TACROLIMUS 1 MG/1
4 CAPSULE ORAL 2 TIMES DAILY
Qty: 240 CAPSULE | Refills: 11 | Status: SHIPPED | OUTPATIENT
Start: 2020-06-08 | End: 2020-07-08

## 2020-06-08 NOTE — TELEPHONE ENCOUNTER
Spoke to patient regarding:  Issue tacrolimus  Level 13.4   Above goal level   confirmed taking tacrolimus  5.5 mg twice per day   Confirmed 12 hour trough level   Patient verbalizes understanding to lower tacrolimus dose to 4.5 mg twice per day

## 2020-06-08 NOTE — TELEPHONE ENCOUNTER
Anemia Management Note  SUBJECTIVE/OBJECTIVE:  Referred by Dr. Alvin Varner on 2020  Primary Diagnosis: Anemia in Chronic Kidney Disease (N18.4, D63.1)     Secondary Diagnosis:  Chronic Kidney Disease, Stage 4 (N18.4)   Kidney Tx: , , 2020  Hgb goal range:  9-10  Epo/Darbo: Procrit 10,000 units weekly for Hgb <10. At home.   Iron regimen:  TBD  Labs : 2021  Recent DEB use, transfusion, IV iron: unknown  RX/TX plans : TBD     No history of stroke, MI and blood clots. Hx of CML in 2017     *Currently has Home Care Mon/Thur for labs.      Labs: [facility]  Ph # [phone]  Fax # [phone]       **ONLY call Ed if his Hgb <10 and he needs to dose.       Contact:            Ok to leave message regarding Scheduling and Medical Information per consent to communicate dated 12/10/2017                              OK to speak with Danay Blanton (friend) and Kaylin Lilly (mother) regarding Scheduling and Medical Information per consent to communicate dated 12/10/2017    Anemia Latest Ref Rng & Units 2020   DEB Dose - 10,000 units - 10,000 units - 10,000 units - -   Hemoglobin 13.3 - 17.7 g/dL - 8.4(A) - 9.3(A) - 10.1(A) 10.4(A)   TSAT 15 - 46 % - - - - - - -   Ferritin 26 - 388 ng/mL - - - - - - -     BP Readings from Last 3 Encounters:   20 120/81   20 114/58   20 114/58     Wt Readings from Last 2 Encounters:   20 62 kg (136 lb 9.6 oz)   20 61.2 kg (135 lb)           ASSESSMENT:  Hgb:Above goal - recommend hold dose  TSat: Due for labs Ferritin: Due for labs    PLAN:  Hold Procrit and RTC for hgb then procrit if needed in 1 week(s)    Orders needed to be renewed (for next follow-up date) in LETTERS - for external labs: None    Iron labs due:  Due    Plan discussed with:  Ed  Plan provided by:  bigg    NEXT FOLLOW-UP DATE:  6/15/2020    Ana Castrejon RN   Anemia Services  M  [address] [place] [address]   [email or web address]   Office : [phone]  Fax: [phone]

## 2020-06-08 NOTE — TELEPHONE ENCOUNTER
Issue tacrolimus  Level 13.4   Above goal level   Plan   Please call confirm taking tacrolimus  5.5 mg twice per day   Confirm 12 hour trough level   If the above is accurate   Lower tacrolimus  4.4 mg twice per day

## 2020-06-10 ENCOUNTER — TELEPHONE (OUTPATIENT)
Dept: TRANSPLANT | Facility: CLINIC | Age: 45
End: 2020-06-10

## 2020-06-10 NOTE — TELEPHONE ENCOUNTER
Post Kidney and Pancreas Transplant Team Conference  Date: 6/10/2020  Transplant Coordinator: Jody García     Attendees:  [x]  Dr. Varner  [x] Danuta Melo LPN     [x]  Dr. Wilkinson [x] Jody García RN [] Inna Stanley LPN   []  Dr. Victor [] Joan Barbosa, RN     [] Judy Garcia RN [x] Conner Downey, PharmD   [x] Dr. Magana [x] Danelle Jaime, AURA    [] Dr. Benítez [] Nathan Eaton RN    [x] Dr. Mcdaniels [] Elizabeth Ernst, AURA    [x] Dr. Bain [] Christi Leal RN     [] Sharee Burnham, UARA    [] Surgery Fellow [x] Cyndi Bush RN    [] Rebeca Lema NP [] Barbara Eugene RN        Verbal Plan Read Back:   Biopsy needed if creatinine = 2.6 or greater  Creatinine baseline = 2.2-2.3  Repeat txp labs this week    Routed to RN Coordinator   Danuta Melo LPN

## 2020-06-11 ENCOUNTER — TELEPHONE (OUTPATIENT)
Dept: TRANSPLANT | Facility: CLINIC | Age: 45
End: 2020-06-11

## 2020-06-11 LAB — HEMOGLOBIN: 10.6 G/DL (ref 13.3–17.7)

## 2020-06-11 NOTE — TELEPHONE ENCOUNTER
DATE:  6/11/2020   TIME OF RECEIPT FROM LAB:  10:57a  LAB TEST:  K+  LAB VALUE:  6.3  RESULTS GIVEN WITH READ-BACK TO (PROVIDER):  Jody WOO LAB VALUE REPORTED TO PROVIDER:   10:58a

## 2020-06-11 NOTE — TELEPHONE ENCOUNTER
Call K 6.3   Cr 2.4   Called Jeremiah Cruz   Reviewed with ED  the need to follow up in local [facility] due to the high K      Called spoke to [facility] with report   [phone]    Follow up with Dr Varner

## 2020-06-12 ENCOUNTER — TELEPHONE (OUTPATIENT)
Dept: TRANSPLANT | Facility: CLINIC | Age: 45
End: 2020-06-12

## 2020-06-12 DIAGNOSIS — Z94.0 KIDNEY REPLACED BY TRANSPLANT: Primary | ICD-10-CM

## 2020-06-12 RX ORDER — SODIUM POLYSTYRENE SULFONATE 4.1 MEQ/G
POWDER, FOR SUSPENSION ORAL; RECTAL
Qty: 120 G | Refills: 3 | Status: SHIPPED | OUTPATIENT
Start: 2020-06-12 | End: 2020-06-16

## 2020-06-12 RX ORDER — DAPSONE 25 MG/1
50 TABLET ORAL DAILY
Qty: 60 TABLET | Refills: 3 | Status: SHIPPED | OUTPATIENT
Start: 2020-06-12 | End: 2020-06-16

## 2020-06-12 NOTE — TELEPHONE ENCOUNTER
K 6.3    Called Jeremiah Cruz on 6/11/2020    Confirmed followed up in ER   Confirmed with Dr Varner received a phone call from   ER  Regarding follow up with high k

## 2020-06-15 ENCOUNTER — TELEPHONE (OUTPATIENT)
Dept: PHARMACY | Facility: CLINIC | Age: 45
End: 2020-06-15

## 2020-06-15 ENCOUNTER — TELEPHONE (OUTPATIENT)
Dept: TRANSPLANT | Facility: CLINIC | Age: 45
End: 2020-06-15

## 2020-06-15 NOTE — TELEPHONE ENCOUNTER
Called Ed regarding high K   Reviewed to stop Tmp sulfa single strength   And start Dapsone 50 mg once per day   Sending out kayexalate to take if k greater than 5.4     2nd Item   Follow up appointment will in [facility] clinic this month  Ed has a rice to the appointment

## 2020-06-15 NOTE — TELEPHONE ENCOUNTER
Anemia Management Note  SUBJECTIVE/OBJECTIVE:  Referred by Dr. Alvin Varner on 2020  Primary Diagnosis: Anemia in Chronic Kidney Disease (N18.4, D63.1)     Secondary Diagnosis:  Chronic Kidney Disease, Stage 4 (N18.4)   Kidney Tx: , , 2020  Hgb goal range:  9-10  Epo/Darbo: Procrit 10,000 units weekly for Hgb <10. At home.   Iron regimen:  TBD  Labs : 2021  Recent DEB use, transfusion, IV iron: unknown  RX/TX plans : TBD     No history of stroke, MI and blood clots. Hx of CML in 2017     *Currently has Home Care Mon/Thur for labs.      Labs: [facility]  Ph # [phone]  Fax # [phone]        **ONLY call Ed if his Hgb <10 and he needs to dose.         Contact:            Ok to leave message regarding Scheduling and Medical Information per consent to communicate dated 12/10/2017                              OK to speak with Danay Blanton (friend) and Kaylin Lilly (mother) regarding Scheduling and Medical Information per consent to communicate dated 12/10/2017    Anemia Latest Ref Rng & Units 2020   DEB Dose - - 10,000 units - 10,000 units - - -   Hemoglobin 13.3 - 17.7 g/dL 8.4(A) - 9.3(A) - 10.1(A) 10.4(A) 10.6(A)   TSAT 15 - 46 % - - - - - - -   Ferritin 26 - 388 ng/mL - - - - - - -     BP Readings from Last 3 Encounters:   20 120/81   20 114/58   20 114/58     Wt Readings from Last 2 Encounters:   20 62 kg (136 lb 9.6 oz)   20 61.2 kg (135 lb)           ASSESSMENT:  Hgb:Above goal - recommend hold dose  TSat: Due for labs Ferritin: Due for labs    PLAN:  Hold Procrit and RTC for hgb then procrit if needed in 1*week(s)    Orders needed to be renewed (for next follow-up date) in LETTERS - for external labs: None    Iron labs due:  Due    Plan discussed with:  No call per pt request  Plan provided by:  bigg    NEXT FOLLOW-UP DATE:  2020    Ana Castrejon RN    [address] [address]   [email or web address]   Office : [phone]  Fax: [phone]

## 2020-06-16 ENCOUNTER — TELEPHONE (OUTPATIENT)
Dept: PHARMACY | Facility: CLINIC | Age: 45
End: 2020-06-16

## 2020-06-16 DIAGNOSIS — Z94.0 KIDNEY REPLACED BY TRANSPLANT: ICD-10-CM

## 2020-06-16 RX ORDER — SODIUM POLYSTYRENE SULFONATE 4.1 MEQ/G
POWDER, FOR SUSPENSION ORAL; RECTAL
Qty: 120 G | Refills: 3 | Status: SHIPPED | OUTPATIENT
Start: 2020-06-16 | End: 2020-06-26

## 2020-06-16 RX ORDER — DAPSONE 25 MG/1
50 TABLET ORAL DAILY
Qty: 60 TABLET | Refills: 3 | Status: SHIPPED | OUTPATIENT
Start: 2020-06-16 | End: 2020-01-01

## 2020-06-16 NOTE — TELEPHONE ENCOUNTER
Clinical Pharmacy Consult:                                                      Transplant Specific:  3 Month Post Transplant Pharmacy Call  Date of Transplant: 02/28/2020  Type of Transplant: kidney  First Transplant: no this is #3  History of rejection: yes    Immunosuppression Regimen   TAC 4.5mg qAM & 4.5mg qPM, Prednisone 5mg qAM and Myfortic 540mg qAM & 540mg qPM  Immunosuppressant Levels: 8.2 on 6/11/2020 therapeutic  Patient specific goal: 8-10  Pt adherent to lab draws: yes  Scr:   Lab Results   Component Value Date    CR 2.42 03/16/2020     Side effects: High potassium     Prophylactic Medications  Antibacterial:  Dapsone 50mg daily  Scheduled Discontinue Date: life long    Antifungal: Not needed thus far  Scheduled Discontinue Date: not on    Antiviral: CrCl 40 to 59 mL/minute: Valcyte 450 mg once daily   Scheduled Discontinue Date: 6 months    Acid Reducer: Pepcid (famotidine)  Scheduled Reviewed Date: unknown    Thrombosis Prevention: not on   Scheduled Discontinue Date: not on    Blood Pressure Management  Frequency of home Blood Pressure checks: once daily, twice daily on lab draw days  Most recent home BP: 130/80  Patient Blood pressure goal: <140/90  Patient blood pressure at goal:  yes  Hospitalizations/ER visits since last assessment: 1 for high potassium      Med rec/DUR performed: yes  Med Rec Discrepancies: yes, no longer on bactrim or ondansetron    Ed reports feeling good.  His potassium is running high, so kayexalate was started today and bactrim has been held for the last 10 days or so.  He will start dapsone 50mg today, as well.  He is following a low potassium diet and has recently cut out cheese, as that was the only thing he was still eating high in potassium. He knew his meds well and uses alarms on his phone as reminders.  Blood pressure has increased since his potassium has gone up, so he has been checking it at least once per day.      Will follow-up in 3 months.    Katie Echevarria [facility]

## 2020-06-16 NOTE — TELEPHONE ENCOUNTER
Alvin Varner MD Huepfel, Mary K, RN               Would start with twice weekly Kayexalate and can decrease if good potassium levels.     Let's discuss a biopsy once his tacrolimus levels are not so high.  Wasn't that our plan?     [place]le[place]

## 2020-06-17 DIAGNOSIS — Z94.0 KIDNEY REPLACED BY TRANSPLANT: ICD-10-CM

## 2020-06-17 RX ORDER — CARVEDILOL 12.5 MG/1
12.5 TABLET ORAL 2 TIMES DAILY WITH MEALS
Qty: 120 TABLET | Refills: 11 | Status: SHIPPED | OUTPATIENT
Start: 2020-06-17 | End: 2020-07-01

## 2020-06-17 RX ORDER — CARVEDILOL 12.5 MG/1
12.5 TABLET ORAL 2 TIMES DAILY WITH MEALS
Qty: 120 TABLET | Refills: 11 | Status: SHIPPED | OUTPATIENT
Start: 2020-06-17 | End: 2020-06-17

## 2020-06-18 LAB
HCT VFR BLD AUTO: 33.6 %
HEMOGLOBIN: 11.1 G/DL (ref 13.3–17.7)

## 2020-06-19 ENCOUNTER — TELEPHONE (OUTPATIENT)
Dept: PHARMACY | Facility: CLINIC | Age: 45
End: 2020-06-19

## 2020-06-19 DIAGNOSIS — Z94.0 KIDNEY REPLACED BY TRANSPLANT: Primary | ICD-10-CM

## 2020-06-19 RX ORDER — SODIUM POLYSTYRENE SULFONATE 15 G/60ML
15 SUSPENSION ORAL; RECTAL ONCE
Qty: 240 ML | Refills: 3 | Status: SHIPPED | OUTPATIENT
Start: 2020-06-19 | End: 2020-06-29

## 2020-06-19 NOTE — TELEPHONE ENCOUNTER
Anemia Management Note  SUBJECTIVE/OBJECTIVE:  Referred by Dr. Alvin Varner on 2020  Primary Diagnosis: Anemia in Chronic Kidney Disease (N18.4, D63.1)     Secondary Diagnosis:  Chronic Kidney Disease, Stage 4 (N18.4)   Kidney Tx: , , 2020  Hgb goal range:  9-10  Epo/Darbo: Procrit 10,000 units weekly for Hgb <10. At home.   Iron regimen:  TBD  Labs : 2021  Recent DEB use, transfusion, IV iron: unknown  RX/TX plans : TBD     No history of stroke, MI and blood clots. Hx of CML in 2017     *Currently has Home Care Mon/Thur for labs.      Labs: [facility]  Ph # [phone]  Fax # [phone]        **ONLY call Ed if his Hgb <10 and he needs to dose.         Contact:            Ok to leave message regarding Scheduling and Medical Information per consent to communicate dated 12/10/2017                              OK to speak with Danay Blanton (friend) and Kaylin Lilly (mother) regarding Scheduling and Medical Information per consent to communicate dated 12/10/2017    Anemia Latest Ref Rng & Units 2020   DEB Dose - 10,000 units - 10,000 units - - - -   Hemoglobin 13.3 - 17.7 g/dL - 9.3(A) - 10.1(A) 10.4(A) 10.6(A) 11.1(A)   TSAT 15 - 46 % - - - - - - -   Ferritin 26 - 388 ng/mL - - - - - - -     BP Readings from Last 3 Encounters:   20 120/81   20 114/58   20 114/58     Wt Readings from Last 2 Encounters:   20 62 kg (136 lb 9.6 oz)   20 61.2 kg (135 lb)           ASSESSMENT:  Hgb:Above goal - recommend hold dose  TSat: Due for labs Ferritin: Due for labs    PLAN:  Hold Procrit and RTC for hgb then procrit if needed in 2 week(s)    Orders needed to be renewed (for next follow-up date) in LETTERS - for external labs: None    Iron labs due:  Due    Plan discussed with:  No call made per pt request  Plan provided by:  bigg    NEXT FOLLOW-UP DATE:  20    Ana Castrejon RN    [address] [address]   [email or web address]   Office : [phone]  Fax: [phone]

## 2020-06-21 ENCOUNTER — TELEPHONE (OUTPATIENT)
Dept: TRANSPLANT | Facility: CLINIC | Age: 45
End: 2020-06-21

## 2020-06-26 ENCOUNTER — TELEPHONE (OUTPATIENT)
Dept: TRANSPLANT | Facility: CLINIC | Age: 45
End: 2020-06-26

## 2020-06-26 DIAGNOSIS — Z94.0 KIDNEY REPLACED BY TRANSPLANT: ICD-10-CM

## 2020-06-26 RX ORDER — SODIUM POLYSTYRENE SULFONATE 4.1 MEQ/G
POWDER, FOR SUSPENSION ORAL; RECTAL
Qty: 240 G | Refills: 3 | Status: SHIPPED | OUTPATIENT
Start: 2020-06-26 | End: 2020-06-29

## 2020-06-26 NOTE — TELEPHONE ENCOUNTER
Tac 5.9 on 6/25  Tac pending.     Spoke with patient who took a dose of kayexalate this morning. Patient is following a low potassium diet. Denies chest pain, heart palpitations, or shortness of breath.   Patient to increase hydration and repeat labs on Monday morning. Verbalized understanding.

## 2020-06-29 ENCOUNTER — ALLIED HEALTH/NURSE VISIT (OUTPATIENT)
Dept: TRANSPLANT | Facility: CLINIC | Age: 45
End: 2020-06-29
Attending: INTERNAL MEDICINE
Payer: MEDICARE

## 2020-06-29 ENCOUNTER — RESULTS ONLY (OUTPATIENT)
Dept: OTHER | Facility: CLINIC | Age: 45
End: 2020-06-29

## 2020-06-29 ENCOUNTER — OFFICE VISIT (OUTPATIENT)
Dept: NEPHROLOGY | Facility: CLINIC | Age: 45
End: 2020-06-29
Attending: INTERNAL MEDICINE
Payer: MEDICARE

## 2020-06-29 ENCOUNTER — ALLIED HEALTH/NURSE VISIT (OUTPATIENT)
Dept: PHARMACY | Facility: CLINIC | Age: 45
End: 2020-06-29
Payer: MEDICAID

## 2020-06-29 VITALS
WEIGHT: 134.8 LBS | SYSTOLIC BLOOD PRESSURE: 143 MMHG | TEMPERATURE: 98.3 F | DIASTOLIC BLOOD PRESSURE: 93 MMHG | BODY MASS INDEX: 23.12 KG/M2 | OXYGEN SATURATION: 99 % | HEART RATE: 81 BPM

## 2020-06-29 DIAGNOSIS — E87.20 METABOLIC ACIDOSIS: ICD-10-CM

## 2020-06-29 DIAGNOSIS — E83.42 HYPOMAGNESEMIA: ICD-10-CM

## 2020-06-29 DIAGNOSIS — N18.30 ANEMIA IN STAGE 3 CHRONIC KIDNEY DISEASE (H): ICD-10-CM

## 2020-06-29 DIAGNOSIS — E55.9 VITAMIN D DEFICIENCY: ICD-10-CM

## 2020-06-29 DIAGNOSIS — I15.1 HTN, KIDNEY TRANSPLANT RELATED: ICD-10-CM

## 2020-06-29 DIAGNOSIS — C92.10 CML (CHRONIC MYELOCYTIC LEUKEMIA) (H): ICD-10-CM

## 2020-06-29 DIAGNOSIS — D63.1 ANEMIA IN STAGE 3 CHRONIC KIDNEY DISEASE (H): ICD-10-CM

## 2020-06-29 DIAGNOSIS — Z94.0 HTN, KIDNEY TRANSPLANT RELATED: ICD-10-CM

## 2020-06-29 DIAGNOSIS — E87.5 HYPERKALEMIA: ICD-10-CM

## 2020-06-29 DIAGNOSIS — Z48.298 AFTERCARE FOLLOWING ORGAN TRANSPLANT: ICD-10-CM

## 2020-06-29 DIAGNOSIS — Z53.9 ERRONEOUS ENCOUNTER--DISREGARD: Primary | ICD-10-CM

## 2020-06-29 DIAGNOSIS — N25.81 SECONDARY RENAL HYPERPARATHYROIDISM (H): ICD-10-CM

## 2020-06-29 DIAGNOSIS — D84.9 IMMUNOSUPPRESSION (H): ICD-10-CM

## 2020-06-29 DIAGNOSIS — Z94.0 KIDNEY REPLACED BY TRANSPLANT: Primary | ICD-10-CM

## 2020-06-29 LAB
ANION GAP SERPL CALCULATED.3IONS-SCNC: 5 MMOL/L (ref 3–14)
BUN SERPL-MCNC: 38 MG/DL (ref 7–30)
CALCIUM SERPL-MCNC: 9.9 MG/DL (ref 8.5–10.1)
CHLORIDE SERPL-SCNC: 107 MMOL/L (ref 94–109)
CO2 SERPL-SCNC: 24 MMOL/L (ref 20–32)
CREAT SERPL-MCNC: 2.69 MG/DL (ref 0.66–1.25)
GFR SERPL CREATININE-BSD FRML MDRD: 28 ML/MIN/{1.73_M2}
GLUCOSE SERPL-MCNC: 112 MG/DL (ref 70–99)
POTASSIUM SERPL-SCNC: 4.6 MMOL/L (ref 3.4–5.3)
SODIUM SERPL-SCNC: 135 MMOL/L (ref 133–144)

## 2020-06-29 PROCEDURE — 80197 ASSAY OF TACROLIMUS: CPT | Performed by: INTERNAL MEDICINE

## 2020-06-29 PROCEDURE — 83970 ASSAY OF PARATHORMONE: CPT | Performed by: INTERNAL MEDICINE

## 2020-06-29 PROCEDURE — 80048 BASIC METABOLIC PNL TOTAL CA: CPT

## 2020-06-29 PROCEDURE — 80048 BASIC METABOLIC PNL TOTAL CA: CPT | Performed by: INTERNAL MEDICINE

## 2020-06-29 PROCEDURE — 86833 HLA CLASS II HIGH DEFIN QUAL: CPT | Performed by: INTERNAL MEDICINE

## 2020-06-29 PROCEDURE — 86832 HLA CLASS I HIGH DEFIN QUAL: CPT | Performed by: INTERNAL MEDICINE

## 2020-06-29 RX ORDER — SODIUM POLYSTYRENE SULFONATE 15 G/60ML
15 SUSPENSION ORAL; RECTAL
Qty: 240 ML | Refills: 3 | Status: SHIPPED | OUTPATIENT
Start: 2020-06-29 | End: 2020-01-01

## 2020-06-29 ASSESSMENT — PAIN SCALES - GENERAL: PAINLEVEL: NO PAIN (0)

## 2020-06-29 NOTE — NURSING NOTE
"Chief Complaint   Patient presents with     RECHECK     Follow up Kidney TX     Vital signs:  Temp: 98.3  F (36.8  C)   BP: (!) 143/93 Pulse: 81     SpO2: 99 %       Weight: 61.1 kg (134 lb 12.8 oz)  Estimated body mass index is 23.12 kg/m  as calculated from the following:    Height as of 4/3/20: 1.626 m (5' 4.02\").    Weight as of this encounter: 61.1 kg (134 lb 12.8 oz).      Monik Gamino, CMA    "

## 2020-06-29 NOTE — LETTER
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 Given high alpa, will discuss switching IS to hawa. If creatinine rises again, would also repeat Tx Ultrasound to ensure no renal artery stenosis (prev U/S with some elevated velocities).               - Baseline Cr ~ 2.0-2.3              - Proteinuria: Normal (<0.2 gm/gm)              - Date DSA Last Checked: Apr/2020      Latest DSA: No DSA at time of transplant              - BK Viremia: No              - Kidney Tx Biopsy: Apr 03, 2020; Result: No diagnostic evidence of acute rejection.  Moderate interstitial fibrosis and tubular atrophy.     # Immunosuppression: Tacrolimus immediate release (goal 8-10), Mycophenolic acid (goal 1.0-3.5) and Prednisone (dose 5 mg daily)              - Changes: No, given hyperkalemia, high creatinine alpa, will look to switch to Belatacept. Will discuss at Tx meeting.      # Infection Prophylaxis:   - PJP: Dapsone  - CMV: Valcyte; Patient is CMV IgG Ab discordant (D+/R-) and will continue on Valcyte x 6 months, then check CMV PCR monthly until 12 months post transplant.     # Hypertension: Controlled;   Goal BP: < 140/90              - Changes: No, no changes to Amlodipine and Coreg.     # Elevated Blood Glucose: Glucose generally running ~                     Last HbA1c: 5.2% (2/28/20)              - Management as per primary care.     # Anemia in Chronic Renal Disease: Hgb: Stable ~11    DEB: Yes              - Iron studies: Replete, holding DEB since Hgb > 10.                         # Mineral Bone Disorder:   - Secondary renal hyperparathyroidism; PTH level: Significantly elevated (601-1200 pg/ml)        On treatment: Calcitriol  - Vitamin D; level: Low        On Supplement: Yes  - Calcium; level: Normal        On Supplement: Yes  - Phosphorus; level: Normal        On Supplement: No     # Electrolytes:   -  Potassium; level: High           On Supplement: Will increase Kayexalate to MWF, look into Patiromer for alternative K binder  - Magnesium; level: Normal         On Supplement: Yes.   - Bicarbonate; level: Normal        On Supplement: Yes     # CML: Appears stable on dasatinib. Patient is followed by Hematology.     # Diarrhea: Unclear etiology, resolved at this time. MMF was switched to Myfortic.      # GERD: Controlled famotidine, trying to avoid foods that exacerbate his symptoms.     # Medical Compliance: Yes      # Transplant History:  Etiology of Kidney Failure: Alport's syndrome  Tx: DDKT  Transplant: 2/28/2020 (Kidney), 12/7/1988 (Kidney), 3/2/2005 (Kidney)  Donor Type: Donation after Brain Death        Donor Class:   Crossmatch at time of Tx: negative  DSA at time of Tx: No  Significant changes in immunosuppression: None  CMV IgG Ab High Risk Discordance (D+/R-): Yes  EBV IgG Ab High Risk Discordance (D+/R-): No  Significant transplant-related complications: None  Transplant Office Phone Number: [phone]    Assessment and plan was discussed with the patient and he voiced his understanding and agreement.    Return visit: Return for 6 month post transplant visit.     Pt staffed with Dr. Varner.     Doyle Vaughn MD   Fellow  [phone]    Attestation:  This patient has been seen and evaluated by me, Alvin Varner MD.  I have reviewed the note and agree with plan of care as documented by the fellow.       Chief Complaint   Mr. Cruz is a 44 year old here for kidney transplant and immunosuppression management.     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 He is s/p kidney transplant x2 (1988, 2005) and now DDKT 3/5/2020. Post Tx, abd pain and diarrhea was major issues, infectious work up was negative. At this time, this issue has resolved. More recently, hyperkalemia has been the issue.   Overall, doing okay, states things are  going well. Potassium still running high. Taking Kayexalate two times weekly, last dose Thursday. States he is has been eating low potassium. Unsure why K+ is running high.     Recent Hospitalizations:  [] No [x] Yes ED visit for Hyperkalemia, no admission   New Medical Issues: [] No [x] Yes Hyperkalemia   Decreased energy: [] No [x] Yes Much improved, still not quite baseline, but almost there   Chest pain or SOB with exertion:  [x] No [] Yes Climbs stairs without issue   Appetite change or weight change: [x] No [] Yes Taking PO well without nausea   Nausea, vomiting or diarrhea:  [] No [x] Yes Rare, intermittent diarrhea, but essentially normal BMs at this time except when taking Kayexalate   Fever, sweats or chills: [x] No [] Yes    Leg swelling: [x] No [] Yes      Home BP: 120-140/70-90    Review of Systems   A comprehensive review of systems was obtained and negative, except as noted in the HPI or PMH.    Problem List   Patient Active Problem List   Diagnosis     Alport's syndrome     Anemia in chronic kidney disease     Secondary renal hyperparathyroidism (H)     HTN, kidney transplant related     CML (chronic myelocytic leukemia) (H)     GERD (gastroesophageal reflux disease)     Prurigo nodularis     Senile sebaceous gland hyperplasia     Immunosuppression (H)     Kidney replaced by transplant     Hyperkalemia     History of difficult venous access     Steroid-induced hyperglycemia     Aftercare following organ transplant     Vitamin D deficiency     Metabolic acidosis     Hypomagnesemia       Social History   Social History     Tobacco Use     Smoking status: Former Smoker     Packs/day: 1.00     Years: 12.00     Pack years: 12.00     Types: Cigarettes     Last attempt to quit: 2011     Years since quittin.3     Smokeless tobacco: Never Used   Substance Use Topics     Alcohol use: No     Alcohol/week: 0.0 standard drinks     Drug use: No       Allergies   Allergies   Allergen Reactions     Blood  Transfusion Related (Informational Only) Other (See Comments)     Patient has a history of a clinically significant antibody against RBC antigens.  A delay in compatible RBCs may occur.     Cephalosporins Other (See Comments) and Rash     fever     Compazine [Prochlorperazine]      Gammagard [Immune Globulin] Other (See Comments)     Ed got spinal meningitis and MD stated to never get again for fear of death.       Penicillins      Vancomycin        Medications   Current Outpatient Medications   Medication Sig     amLODIPine (NORVASC) 10 MG tablet Take 1 tablet (10 mg) by mouth daily     atorvastatin (LIPITOR) 10 MG tablet Take 1 tablet (10 mg) by mouth daily     calcitRIOL (ROCALTROL) 0.25 MCG capsule Take 1 capsule (0.25 mcg) by mouth daily     calcium carbonate (TUMS) 500 MG chewable tablet Take 1 tablet (500 mg) by mouth 3 times daily as needed for heartburn Take 1 chewable tablet by mouth 3 times daily with meals.     carvedilol (COREG) 12.5 MG tablet Take 1 tablet (12.5 mg) by mouth 2 times daily (with meals)     dapsone (ACZONE) 25 MG tablet Take 2 tablets (50 mg) by mouth daily     dasatinib (SPRYCEL) 20 MG tablet CHEMO Take 40 mg by mouth daily      epoetin justa (EPOGEN/PROCRIT) 85316 UNIT/ML injection Inject 1 mL (10,000 Units) Subcutaneous once a week For Hemoglobin <10.  HOLD if systolic BP >180.     famotidine (PEPCID) 40 MG tablet Take 1 tablet (40 mg) by mouth daily     magnesium oxide (MAG-OX) 400 MG tablet Take 1 tablet (400 mg) by mouth daily (with lunch)     MYFORTIC (BRAND) 180 MG EC tablet Take 3 tablets (540 mg) by mouth 2 times daily     polyethylene glycol (MIRALAX) packet Take 17 g by mouth 2 times daily Hold for loose stools     predniSONE (DELTASONE) 5 MG tablet Take 1 tablet (5 mg) by mouth daily     Probiotic Product (UP4 PROBIOTICS ADULT) CAPS Take 1 capsule by mouth daily     sodium bicarbonate 650 MG tablet Take 2 tablets (1,300 mg) by mouth 3 times daily     sodium polystyrene  (KAYEXALATE) powder Take 15 grams twice per week when k above 5.2     tacrolimus (GENERIC EQUIVALENT) 0.5 MG capsule Take 1 capsule (0.5 mg) by mouth 2 times daily Total dose = 4.5 mg BID     tacrolimus (GENERIC EQUIVALENT) 1 MG capsule Take 4 capsules (4 mg) by mouth 2 times daily Total dose = 4.5 mg BID     tamsulosin (FLOMAX) 0.4 MG capsule Take 1 capsule (0.4 mg) by mouth At Bedtime     valGANciclovir (VALCYTE) 450 MG tablet Take 1 tablet (450 mg) by mouth daily Titrate dose up to a max of 2 tabs (900 mg) by mouth daily when directed by your transplant team.     vitamin D3 (CHOLECALCIFEROL) 2000 units (50 mcg) tablet Take 1 tablet (2,000 Units) by mouth daily     No current facility-administered medications for this visit.      There are no discontinued medications.    Physical Exam   Vital Signs: BP (!) 143/93   Pulse 81   Temp 98.3  F (36.8  C)   Wt 61.1 kg (134 lb 12.8 oz)   SpO2 99%   BMI 23.12 kg/m      GENERAL APPEARANCE: alert and no distress  HENT: sclerae anicteric  LYMPHATICS: no cervical nodes  RESP: lungs clear to auscultation - no wheezes  CV: regular rhythm, normal rate  EDEMA: no LE edema bilaterally  ABDOMEN: soft, nondistended, nontender, bowel sounds normal  MS: extremities normal - no gross deformities noted  SKIN: no rash  ACCESS: RUE AVF, large, good thrill.     Data     Renal Latest Ref Rng & Units 6/25/2020 6/22/2020 6/18/2020   Na 133 - 144 mmol/L - - -   Na (external) 133 - 144 mmol/L 140 136 140   K 3.4 - 5.3 mmol/L - - -   K (external) 3.4 - 5.1 mmol/L 5.9(H) 5.7(H) 5.9(H)   Cl 94 - 109 mmol/L - - -   Cl (external) 96 - 109 mmol/L 109 105 108   CO2 20 - 32 mmol/L - - -   CO2 (external) 21 - 33 mmol/L 22 19(L) 21   BUN 7 - 30 mg/dL - - -   BUN (external) 6 - 24 mg/dL 33(H) 43(H) 34(H)   Cr 0.66 - 1.25 mg/dL - - -   Cr (external) 0.6 - 1.2 mg/dL 2.2(H) 2.4(H) 2.1(H)   Glucose 70 - 99 mg/dL - - -   Glucose (external) 70 - 99 mg/dL 109(H) 98 102(H)   Ca  8.5 - 10.1 mg/dL - - -   Ca  (external) 8.2 - 10.0 mg/dL 9.7 9.3 9.1   Mg 1.6 - 2.3 mg/dL - - -   Mg (external) 1.7 - 2.4 mg/dL - 2.3 -     Bone Health Latest Ref Rng & Units 6/22/2020 6/15/2020 6/8/2020   Phos 2.5 - 4.5 mg/dL - - -   Phos (external) 2.6 - 4.7 mg/dL 4.1 3.9 4.2   PTHi 18 - 80 pg/mL - - -   Vit D Def 20 - 75 ug/L - - -     Heme Latest Ref Rng & Units 6/25/2020 6/22/2020 6/18/2020   WBC 4.0 - 11.0 10e9/L - - -   WBC (external) 4.1 - 10.9 x10:3/uL 4.3 5.7 5.8   Hgb 13.3 - 17.7 g/dL - - 11.1(A)   Hgb (external) 12.9 - 17.3 g/dL 11.3(L) 11.2(L) 11.1(L)   Plt 150 - 450 10e9/L - - -   Plt (external) 150 - 450 x10:3/uL 152 177 199   ABSOLUTE NEUTROPHIL 1.6 - 8.3 10e9/L - - -   ABSOLUTE NEUTROPHILS (EXTERNAL) 1.9 - 8.1 x10:3/uL 3.8 5.2 5.1   ABSOLUTE LYMPHOCYTES 0.8 - 5.3 10e9/L - - -   ABSOLUTE LYMPHOCYTES (EXTERNAL) 1.0 - 3.9 x10:3/uL 0.2(L) 0.2(L) 0.3(L)   ABSOLUTE MONOCYTES 0.0 - 1.3 10e9/L - - -   ABSOLUTE MONOCYTES (EXTERNAL) 0.1 - 0.9 x10:3/uL 0.2 0.3 0.2   ABSOLUTE EOSINOPHILS 0.0 - 0.7 10e9/L - - -   ABSOLUTE EOSINOPHILS (EXTERNAL) 0.0 - 0.5 x10:3/uL 0.1 0.1 0.1   ABSOLUTE BASOPHILS 0.0 - 0.2 10e9/L - - -   ABSOLUTE BASOPHILS (EXTERNAL) 0.0 - 0.2 x10:3/uL 0.0 0.0 0.0   ABS IMMATURE GRANULOCYTES 0 - 0.4 10e9/L - - -   ABSOLUTE NUCLEATED RBC - - - -     Liver Latest Ref Rng & Units 5/7/2020 3/5/2020 2/28/2020   AP 40 - 150 U/L - - 108   AP (external) 45 - 115 IU/L 62 - -   TBili 0.2 - 1.3 mg/dL - 0.6 0.6   TBili (external) 0.4 - 1.4 mg/dL 0.3(L) - -   DBili 0.0 - 0.2 mg/dL - 0.1 -   DBili (external) 0.0 - 0.3 mg/dL 0.1 - -   ALT 0 - 70 U/L - - 48   ALT (external) 12 - 70 U/L 30 - -   AST 0 - 45 U/L - - 30   AST (external) 15 - 46 U/L 12(L) - -   Tot Protein 6.8 - 8.8 g/dL - - 7.9   Tot Protein (external) 6.2 - 8.2 g/dL 6.4 - -   Albumin 3.4 - 5.0 g/dL - - 4.0   Albumin (external) 3.3 - 5.0 g/dL 3.8 - -     Pancreas Latest Ref Rng & Units 5/4/2020 2/28/2020   A1C 0 - 5.6 % - 5.2   A1C (external) 4.0 - 6.0 % 5.7 -     Iron studies  Latest Ref Rng & Units 3/3/2020 3/8/2005   Iron 35 - 180 ug/dL 139 43   Iron sat 15 - 46 % 98(H) -   Ferritin 26 - 388 ng/mL 3,397(H) -     [facility] Txp Virology Latest Ref Rng & Units 6/22/2020 6/1/2020 5/4/2020   CMV IgG EU/mL - - -   CMV DNA Quant Ext Undetected IU/mL - - Undetected   BK Spec - - - -   BK Res BKNEG copies/mL - - -   BK Log <2.7 Log copies/mL - - -   BK Quant Result Ext None Detected None Detected None Detected None Detected   BK Quant Spec Ext - - Plasma -   EBV IgG - - - -   EBV CAPSID ANTIBODY IGG 0.0 - 0.8 AI - - -   Hep B Core NR - - -   Hep B Surf NEG - - -   HIV 1&2 NEG - - -        Recent Labs   Lab Test 03/13/20  0735 03/14/20  0830 03/16/20  0807   DOSTAC Not Provided Not Provided Not Provided   TACROL 6.9 9.2 10.8           Alvin Varner MD

## 2020-06-29 NOTE — PROGRESS NOTES
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 Given high alpa, will discuss switching IS to hawa. If creatinine rises again, would also repeat Tx Ultrasound to ensure no renal artery stenosis (prev U/S with some elevated velocities).               - Baseline Cr ~ 2.0-2.3              - Proteinuria: Normal (<0.2 gm/gm)              - Date DSA Last Checked: Apr/2020      Latest DSA: No DSA at time of transplant              - BK Viremia: No              - Kidney Tx Biopsy: Apr 03, 2020; Result: No diagnostic evidence of acute rejection.  Moderate interstitial fibrosis and tubular atrophy.     # Immunosuppression: Tacrolimus immediate release (goal 8-10), Mycophenolic acid (goal 1.0-3.5) and Prednisone (dose 5 mg daily)              - Changes: No, given hyperkalemia, high creatinine alpa, will look to switch to Belatacept. Will discuss at Tx meeting.      # Infection Prophylaxis:   - PJP: Dapsone  - CMV: Valcyte; Patient is CMV IgG Ab discordant (D+/R-) and will continue on Valcyte x 6 months, then check CMV PCR monthly until 12 months post transplant.     # Hypertension: Controlled;   Goal BP: < 140/90              - Changes: No, no changes to Amlodipine and Coreg.     # Elevated Blood Glucose: Glucose generally running ~                     Last HbA1c: 5.2% (2/28/20)              - Management as per primary care.     # Anemia in Chronic Renal Disease: Hgb: Stable ~11    DEB: Yes              - Iron studies: Replete, holding DEB since Hgb > 10.                         # Mineral Bone Disorder:   - Secondary renal hyperparathyroidism; PTH level: Significantly elevated (601-1200 pg/ml)        On treatment: Calcitriol  - Vitamin D; level: Low        On Supplement: Yes  - Calcium; level: Normal        On Supplement: Yes  - Phosphorus; level: Normal        On Supplement: No     # Electrolytes:   - Potassium; level: High           On Supplement: Will increase Kayexalate to  MWF, look into Patiromer for alternative K binder  - Magnesium; level: Normal         On Supplement: Yes.   - Bicarbonate; level: Normal        On Supplement: Yes     # CML: Appears stable on dasatinib. Patient is followed by Hematology.     # Diarrhea: Unclear etiology, resolved at this time. MMF was switched to Myfortic.      # GERD: Controlled famotidine, trying to avoid foods that exacerbate his symptoms.     # Medical Compliance: Yes      # Transplant History:  Etiology of Kidney Failure: Alport's syndrome  Tx: DDKT  Transplant: 2/28/2020 (Kidney), 12/7/1988 (Kidney), 3/2/2005 (Kidney)  Donor Type: Donation after Brain Death        Donor Class:   Crossmatch at time of Tx: negative  DSA at time of Tx: No  Significant changes in immunosuppression: None  CMV IgG Ab High Risk Discordance (D+/R-): Yes  EBV IgG Ab High Risk Discordance (D+/R-): No  Significant transplant-related complications: None  Transplant Office Phone Number: [phone]    Assessment and plan was discussed with the patient and he voiced his understanding and agreement.    Return visit: Return for 6 month post transplant visit.     Pt staffed with Dr. Varner.     Doyle Vaughn MD   Fellow  [phone]    Attestation:  This patient has been seen and evaluated by me, Alvin Varner MD.  I have reviewed the note and agree with plan of care as documented by the fellow.       Chief Complaint   Mr. Cruz is a 44 year old here for kidney transplant and immunosuppression management.     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 He is s/p kidney transplant x2 (1988, 2005) and now DDKT 3/5/2020. Post Tx, abd pain and diarrhea was major issues, infectious work up was negative. At this time, this issue has resolved. More recently, hyperkalemia has been the issue.   Overall, doing okay, states things are going well. Potassium still running high. Taking Kayexalate two times weekly,  last dose Thursday. States he is has been eating low potassium. Unsure why K+ is running high.     Recent Hospitalizations:  [] No [x] Yes ED visit for Hyperkalemia, no admission   New Medical Issues: [] No [x] Yes Hyperkalemia   Decreased energy: [] No [x] Yes Much improved, still not quite baseline, but almost there   Chest pain or SOB with exertion:  [x] No [] Yes Climbs stairs without issue   Appetite change or weight change: [x] No [] Yes Taking PO well without nausea   Nausea, vomiting or diarrhea:  [] No [x] Yes Rare, intermittent diarrhea, but essentially normal BMs at this time except when taking Kayexalate   Fever, sweats or chills: [x] No [] Yes    Leg swelling: [x] No [] Yes      Home BP: 120-140/70-90    Review of Systems   A comprehensive review of systems was obtained and negative, except as noted in the HPI or PMH.    Problem List   Patient Active Problem List   Diagnosis     Alport's syndrome     Anemia in chronic kidney disease     Secondary renal hyperparathyroidism (H)     HTN, kidney transplant related     CML (chronic myelocytic leukemia) (H)     GERD (gastroesophageal reflux disease)     Prurigo nodularis     Senile sebaceous gland hyperplasia     Immunosuppression (H)     Kidney replaced by transplant     Hyperkalemia     History of difficult venous access     Steroid-induced hyperglycemia     Aftercare following organ transplant     Vitamin D deficiency     Metabolic acidosis     Hypomagnesemia       Social History   Social History     Tobacco Use     Smoking status: Former Smoker     Packs/day: 1.00     Years: 12.00     Pack years: 12.00     Types: Cigarettes     Last attempt to quit: 2011     Years since quittin.3     Smokeless tobacco: Never Used   Substance Use Topics     Alcohol use: No     Alcohol/week: 0.0 standard drinks     Drug use: No       Allergies   Allergies   Allergen Reactions     Blood Transfusion Related (Informational Only) Other (See Comments)     Patient has a  history of a clinically significant antibody against RBC antigens.  A delay in compatible RBCs may occur.     Cephalosporins Other (See Comments) and Rash     fever     Compazine [Prochlorperazine]      Gammagard [Immune Globulin] Other (See Comments)     Ed got spinal meningitis and MD stated to never get again for fear of death.       Penicillins      Vancomycin        Medications   Current Outpatient Medications   Medication Sig     amLODIPine (NORVASC) 10 MG tablet Take 1 tablet (10 mg) by mouth daily     atorvastatin (LIPITOR) 10 MG tablet Take 1 tablet (10 mg) by mouth daily     calcitRIOL (ROCALTROL) 0.25 MCG capsule Take 1 capsule (0.25 mcg) by mouth daily     calcium carbonate (TUMS) 500 MG chewable tablet Take 1 tablet (500 mg) by mouth 3 times daily as needed for heartburn Take 1 chewable tablet by mouth 3 times daily with meals.     carvedilol (COREG) 12.5 MG tablet Take 1 tablet (12.5 mg) by mouth 2 times daily (with meals)     dapsone (ACZONE) 25 MG tablet Take 2 tablets (50 mg) by mouth daily     dasatinib (SPRYCEL) 20 MG tablet CHEMO Take 40 mg by mouth daily      epoetin justa (EPOGEN/PROCRIT) 84636 UNIT/ML injection Inject 1 mL (10,000 Units) Subcutaneous once a week For Hemoglobin <10.  HOLD if systolic BP >180.     famotidine (PEPCID) 40 MG tablet Take 1 tablet (40 mg) by mouth daily     magnesium oxide (MAG-OX) 400 MG tablet Take 1 tablet (400 mg) by mouth daily (with lunch)     MYFORTIC (BRAND) 180 MG EC tablet Take 3 tablets (540 mg) by mouth 2 times daily     polyethylene glycol (MIRALAX) packet Take 17 g by mouth 2 times daily Hold for loose stools     predniSONE (DELTASONE) 5 MG tablet Take 1 tablet (5 mg) by mouth daily     Probiotic Product (UP4 PROBIOTICS ADULT) CAPS Take 1 capsule by mouth daily     sodium bicarbonate 650 MG tablet Take 2 tablets (1,300 mg) by mouth 3 times daily     sodium polystyrene (KAYEXALATE) powder Take 15 grams twice per week when k above 5.2     tacrolimus  (GENERIC EQUIVALENT) 0.5 MG capsule Take 1 capsule (0.5 mg) by mouth 2 times daily Total dose = 4.5 mg BID     tacrolimus (GENERIC EQUIVALENT) 1 MG capsule Take 4 capsules (4 mg) by mouth 2 times daily Total dose = 4.5 mg BID     tamsulosin (FLOMAX) 0.4 MG capsule Take 1 capsule (0.4 mg) by mouth At Bedtime     valGANciclovir (VALCYTE) 450 MG tablet Take 1 tablet (450 mg) by mouth daily Titrate dose up to a max of 2 tabs (900 mg) by mouth daily when directed by your transplant team.     vitamin D3 (CHOLECALCIFEROL) 2000 units (50 mcg) tablet Take 1 tablet (2,000 Units) by mouth daily     No current facility-administered medications for this visit.      There are no discontinued medications.    Physical Exam   Vital Signs: BP (!) 143/93   Pulse 81   Temp 98.3  F (36.8  C)   Wt 61.1 kg (134 lb 12.8 oz)   SpO2 99%   BMI 23.12 kg/m      GENERAL APPEARANCE: alert and no distress  HENT: sclerae anicteric  LYMPHATICS: no cervical nodes  RESP: lungs clear to auscultation - no wheezes  CV: regular rhythm, normal rate  EDEMA: no LE edema bilaterally  ABDOMEN: soft, nondistended, nontender, bowel sounds normal  MS: extremities normal - no gross deformities noted  SKIN: no rash  ACCESS: RUE AVF, large, good thrill.     Data     Renal Latest Ref Rng & Units 6/25/2020 6/22/2020 6/18/2020   Na 133 - 144 mmol/L - - -   Na (external) 133 - 144 mmol/L 140 136 140   K 3.4 - 5.3 mmol/L - - -   K (external) 3.4 - 5.1 mmol/L 5.9(H) 5.7(H) 5.9(H)   Cl 94 - 109 mmol/L - - -   Cl (external) 96 - 109 mmol/L 109 105 108   CO2 20 - 32 mmol/L - - -   CO2 (external) 21 - 33 mmol/L 22 19(L) 21   BUN 7 - 30 mg/dL - - -   BUN (external) 6 - 24 mg/dL 33(H) 43(H) 34(H)   Cr 0.66 - 1.25 mg/dL - - -   Cr (external) 0.6 - 1.2 mg/dL 2.2(H) 2.4(H) 2.1(H)   Glucose 70 - 99 mg/dL - - -   Glucose (external) 70 - 99 mg/dL 109(H) 98 102(H)   Ca  8.5 - 10.1 mg/dL - - -   Ca (external) 8.2 - 10.0 mg/dL 9.7 9.3 9.1   Mg 1.6 - 2.3 mg/dL - - -   Mg (external)  1.7 - 2.4 mg/dL - 2.3 -     Bone Health Latest Ref Rng & Units 6/22/2020 6/15/2020 6/8/2020   Phos 2.5 - 4.5 mg/dL - - -   Phos (external) 2.6 - 4.7 mg/dL 4.1 3.9 4.2   PTHi 18 - 80 pg/mL - - -   Vit D Def 20 - 75 ug/L - - -     Heme Latest Ref Rng & Units 6/25/2020 6/22/2020 6/18/2020   WBC 4.0 - 11.0 10e9/L - - -   WBC (external) 4.1 - 10.9 x10:3/uL 4.3 5.7 5.8   Hgb 13.3 - 17.7 g/dL - - 11.1(A)   Hgb (external) 12.9 - 17.3 g/dL 11.3(L) 11.2(L) 11.1(L)   Plt 150 - 450 10e9/L - - -   Plt (external) 150 - 450 x10:3/uL 152 177 199   ABSOLUTE NEUTROPHIL 1.6 - 8.3 10e9/L - - -   ABSOLUTE NEUTROPHILS (EXTERNAL) 1.9 - 8.1 x10:3/uL 3.8 5.2 5.1   ABSOLUTE LYMPHOCYTES 0.8 - 5.3 10e9/L - - -   ABSOLUTE LYMPHOCYTES (EXTERNAL) 1.0 - 3.9 x10:3/uL 0.2(L) 0.2(L) 0.3(L)   ABSOLUTE MONOCYTES 0.0 - 1.3 10e9/L - - -   ABSOLUTE MONOCYTES (EXTERNAL) 0.1 - 0.9 x10:3/uL 0.2 0.3 0.2   ABSOLUTE EOSINOPHILS 0.0 - 0.7 10e9/L - - -   ABSOLUTE EOSINOPHILS (EXTERNAL) 0.0 - 0.5 x10:3/uL 0.1 0.1 0.1   ABSOLUTE BASOPHILS 0.0 - 0.2 10e9/L - - -   ABSOLUTE BASOPHILS (EXTERNAL) 0.0 - 0.2 x10:3/uL 0.0 0.0 0.0   ABS IMMATURE GRANULOCYTES 0 - 0.4 10e9/L - - -   ABSOLUTE NUCLEATED RBC - - - -     Liver Latest Ref Rng & Units 5/7/2020 3/5/2020 2/28/2020   AP 40 - 150 U/L - - 108   AP (external) 45 - 115 IU/L 62 - -   TBili 0.2 - 1.3 mg/dL - 0.6 0.6   TBili (external) 0.4 - 1.4 mg/dL 0.3(L) - -   DBili 0.0 - 0.2 mg/dL - 0.1 -   DBili (external) 0.0 - 0.3 mg/dL 0.1 - -   ALT 0 - 70 U/L - - 48   ALT (external) 12 - 70 U/L 30 - -   AST 0 - 45 U/L - - 30   AST (external) 15 - 46 U/L 12(L) - -   Tot Protein 6.8 - 8.8 g/dL - - 7.9   Tot Protein (external) 6.2 - 8.2 g/dL 6.4 - -   Albumin 3.4 - 5.0 g/dL - - 4.0   Albumin (external) 3.3 - 5.0 g/dL 3.8 - -     Pancreas Latest Ref Rng & Units 5/4/2020 2/28/2020   A1C 0 - 5.6 % - 5.2   A1C (external) 4.0 - 6.0 % 5.7 -     Iron studies Latest Ref Rng & Units 3/3/2020 3/8/2005   Iron 35 - 180 ug/dL 139 43   Iron sat 15  - 46 % 98(H) -   Ferritin 26 - 388 ng/mL 3,397(H) -     [facility] Txp Virology Latest Ref Rng & Units 6/22/2020 6/1/2020 5/4/2020   CMV IgG EU/mL - - -   CMV DNA Quant Ext Undetected IU/mL - - Undetected   BK Spec - - - -   BK Res BKNEG copies/mL - - -   BK Log <2.7 Log copies/mL - - -   BK Quant Result Ext None Detected None Detected None Detected None Detected   BK Quant Spec Ext - - Plasma -   EBV IgG - - - -   EBV CAPSID ANTIBODY IGG 0.0 - 0.8 AI - - -   Hep B Core NR - - -   Hep B Surf NEG - - -   HIV 1&2 NEG - - -        Recent Labs   Lab Test 03/13/20  0735 03/14/20  0830 03/16/20  0807   DOSTAC Not Provided Not Provided Not Provided   TACROL 6.9 9.2 10.8

## 2020-06-29 NOTE — PATIENT INSTRUCTIONS
1. Increase Kayexalate to 3x weekly    2. Will discuss with transplant team possibly switching your immune suppression in the future to Belatacept.

## 2020-06-29 NOTE — LETTER
6/29/2020       RE: Jeremiah Cruz  [address] [facility] [address]     Dear Colleague,    Thank you for referring your patient, Jeremiah Cruz, to the [facility] NEPHROLOGY at [facility]. Please see a copy of my visit note below.    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 Given high alpa, will discuss switching IS to hawa. If creatinine rises again, would also repeat Tx Ultrasound to ensure no renal artery stenosis (prev U/S with some elevated velocities).               - Baseline Cr ~ 2.0-2.3              - Proteinuria: Normal (<0.2 gm/gm)              - Date DSA Last Checked: Apr/2020      Latest DSA: No DSA at time of transplant              - BK Viremia: No              - Kidney Tx Biopsy: Apr 03, 2020; Result: No diagnostic evidence of acute rejection.  Moderate interstitial fibrosis and tubular atrophy.     # Immunosuppression: Tacrolimus immediate release (goal 8-10), Mycophenolic acid (goal 1.0-3.5) and Prednisone (dose 5 mg daily)              - Changes: No, given hyperkalemia, high creatinine alpa, will look to switch to Belatacept. Will discuss at Tx meeting.      # Infection Prophylaxis:   - PJP: Dapsone  - CMV: Valcyte; Patient is CMV IgG Ab discordant (D+/R-) and will continue on Valcyte x 6 months, then check CMV PCR monthly until 12 months post transplant.     # Hypertension: Controlled;   Goal BP: < 140/90              - Changes: No, no changes to Amlodipine and Coreg.     # Elevated Blood Glucose: Glucose generally running ~                     Last HbA1c: 5.2% (2/28/20)              - Management as per primary care.     # Anemia in Chronic Renal Disease: Hgb: Stable ~11    DEB: Yes              - Iron studies: Replete, holding DEB since Hgb > 10.                         # Mineral Bone Disorder:   - Secondary renal hyperparathyroidism; PTH level: Significantly elevated (601-1200 pg/ml)        On treatment:  Calcitriol  - Vitamin D; level: Low        On Supplement: Yes  - Calcium; level: Normal        On Supplement: Yes  - Phosphorus; level: Normal        On Supplement: No     # Electrolytes:   - Potassium; level: High           On Supplement: Will increase Kayexalate to MWF, look into Patiromer for alternative K binder  - Magnesium; level: Normal         On Supplement: Yes.   - Bicarbonate; level: Normal        On Supplement: Yes     # CML: Appears stable on dasatinib. Patient is followed by Hematology.     # Diarrhea: Unclear etiology, resolved at this time. MMF was switched to Myfortic.      # GERD: Controlled famotidine, trying to avoid foods that exacerbate his symptoms.     # Medical Compliance: Yes      # Transplant History:  Etiology of Kidney Failure: Alport's syndrome  Tx: DDKT  Transplant: 2/28/2020 (Kidney), 12/7/1988 (Kidney), 3/2/2005 (Kidney)  Donor Type: Donation after Brain Death        Donor Class:   Crossmatch at time of Tx: negative  DSA at time of Tx: No  Significant changes in immunosuppression: None  CMV IgG Ab High Risk Discordance (D+/R-): Yes  EBV IgG Ab High Risk Discordance (D+/R-): No  Significant transplant-related complications: None  Transplant Office Phone Number: [phone]    Assessment and plan was discussed with the patient and he voiced his understanding and agreement.    Return visit: Return for 6 month post transplant visit.     Pt staffed with Dr. Varner.     Doyle Vaughn MD   Fellow  [phone]    Attestation:  This patient has been seen and evaluated by me, Alvin Varner MD.  I have reviewed the note and agree with plan of care as documented by the fellow.       Chief Complaint   Mr. Cruz is a 44 year old here for kidney transplant and immunosuppression management.     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 He is s/p kidney transplant x2 (1988, 2005) and now DDKT 3/5/2020. Post Tx, abd pain  and diarrhea was major issues, infectious work up was negative. At this time, this issue has resolved. More recently, hyperkalemia has been the issue.   Overall, doing okay, states things are going well. Potassium still running high. Taking Kayexalate two times weekly, last dose Thursday. States he is has been eating low potassium. Unsure why K+ is running high.     Recent Hospitalizations:  [] No [x] Yes ED visit for Hyperkalemia, no admission   New Medical Issues: [] No [x] Yes Hyperkalemia   Decreased energy: [] No [x] Yes Much improved, still not quite baseline, but almost there   Chest pain or SOB with exertion:  [x] No [] Yes Climbs stairs without issue   Appetite change or weight change: [x] No [] Yes Taking PO well without nausea   Nausea, vomiting or diarrhea:  [] No [x] Yes Rare, intermittent diarrhea, but essentially normal BMs at this time except when taking Kayexalate   Fever, sweats or chills: [x] No [] Yes    Leg swelling: [x] No [] Yes      Home BP: 120-140/70-90    Review of Systems   A comprehensive review of systems was obtained and negative, except as noted in the HPI or PMH.    Problem List   Patient Active Problem List   Diagnosis     Alport's syndrome     Anemia in chronic kidney disease     Secondary renal hyperparathyroidism (H)     HTN, kidney transplant related     CML (chronic myelocytic leukemia) (H)     GERD (gastroesophageal reflux disease)     Prurigo nodularis     Senile sebaceous gland hyperplasia     Immunosuppression (H)     Kidney replaced by transplant     Hyperkalemia     History of difficult venous access     Steroid-induced hyperglycemia     Aftercare following organ transplant     Vitamin D deficiency     Metabolic acidosis     Hypomagnesemia       Social History   Social History     Tobacco Use     Smoking status: Former Smoker     Packs/day: 1.00     Years: 12.00     Pack years: 12.00     Types: Cigarettes     Last attempt to quit: 2011     Years since quittin.3      Smokeless tobacco: Never Used   Substance Use Topics     Alcohol use: No     Alcohol/week: 0.0 standard drinks     Drug use: No       Allergies   Allergies   Allergen Reactions     Blood Transfusion Related (Informational Only) Other (See Comments)     Patient has a history of a clinically significant antibody against RBC antigens.  A delay in compatible RBCs may occur.     Cephalosporins Other (See Comments) and Rash     fever     Compazine [Prochlorperazine]      Gammagard [Immune Globulin] Other (See Comments)     Ed got spinal meningitis and MD stated to never get again for fear of death.       Penicillins      Vancomycin        Medications   Current Outpatient Medications   Medication Sig     amLODIPine (NORVASC) 10 MG tablet Take 1 tablet (10 mg) by mouth daily     atorvastatin (LIPITOR) 10 MG tablet Take 1 tablet (10 mg) by mouth daily     calcitRIOL (ROCALTROL) 0.25 MCG capsule Take 1 capsule (0.25 mcg) by mouth daily     calcium carbonate (TUMS) 500 MG chewable tablet Take 1 tablet (500 mg) by mouth 3 times daily as needed for heartburn Take 1 chewable tablet by mouth 3 times daily with meals.     carvedilol (COREG) 12.5 MG tablet Take 1 tablet (12.5 mg) by mouth 2 times daily (with meals)     dapsone (ACZONE) 25 MG tablet Take 2 tablets (50 mg) by mouth daily     dasatinib (SPRYCEL) 20 MG tablet CHEMO Take 40 mg by mouth daily      epoetin justa (EPOGEN/PROCRIT) 06846 UNIT/ML injection Inject 1 mL (10,000 Units) Subcutaneous once a week For Hemoglobin <10.  HOLD if systolic BP >180.     famotidine (PEPCID) 40 MG tablet Take 1 tablet (40 mg) by mouth daily     magnesium oxide (MAG-OX) 400 MG tablet Take 1 tablet (400 mg) by mouth daily (with lunch)     MYFORTIC (BRAND) 180 MG EC tablet Take 3 tablets (540 mg) by mouth 2 times daily     polyethylene glycol (MIRALAX) packet Take 17 g by mouth 2 times daily Hold for loose stools     predniSONE (DELTASONE) 5 MG tablet Take 1 tablet (5 mg) by mouth daily      Probiotic Product (UP4 PROBIOTICS ADULT) CAPS Take 1 capsule by mouth daily     sodium bicarbonate 650 MG tablet Take 2 tablets (1,300 mg) by mouth 3 times daily     sodium polystyrene (KAYEXALATE) powder Take 15 grams twice per week when k above 5.2     tacrolimus (GENERIC EQUIVALENT) 0.5 MG capsule Take 1 capsule (0.5 mg) by mouth 2 times daily Total dose = 4.5 mg BID     tacrolimus (GENERIC EQUIVALENT) 1 MG capsule Take 4 capsules (4 mg) by mouth 2 times daily Total dose = 4.5 mg BID     tamsulosin (FLOMAX) 0.4 MG capsule Take 1 capsule (0.4 mg) by mouth At Bedtime     valGANciclovir (VALCYTE) 450 MG tablet Take 1 tablet (450 mg) by mouth daily Titrate dose up to a max of 2 tabs (900 mg) by mouth daily when directed by your transplant team.     vitamin D3 (CHOLECALCIFEROL) 2000 units (50 mcg) tablet Take 1 tablet (2,000 Units) by mouth daily     No current facility-administered medications for this visit.      There are no discontinued medications.    Physical Exam   Vital Signs: BP (!) 143/93   Pulse 81   Temp 98.3  F (36.8  C)   Wt 61.1 kg (134 lb 12.8 oz)   SpO2 99%   BMI 23.12 kg/m      GENERAL APPEARANCE: alert and no distress  HENT: sclerae anicteric  LYMPHATICS: no cervical nodes  RESP: lungs clear to auscultation - no wheezes  CV: regular rhythm, normal rate  EDEMA: no LE edema bilaterally  ABDOMEN: soft, nondistended, nontender, bowel sounds normal  MS: extremities normal - no gross deformities noted  SKIN: no rash  ACCESS: RUE AVF, large, good thrill.     Data     Renal Latest Ref Rng & Units 6/25/2020 6/22/2020 6/18/2020   Na 133 - 144 mmol/L - - -   Na (external) 133 - 144 mmol/L 140 136 140   K 3.4 - 5.3 mmol/L - - -   K (external) 3.4 - 5.1 mmol/L 5.9(H) 5.7(H) 5.9(H)   Cl 94 - 109 mmol/L - - -   Cl (external) 96 - 109 mmol/L 109 105 108   CO2 20 - 32 mmol/L - - -   CO2 (external) 21 - 33 mmol/L 22 19(L) 21   BUN 7 - 30 mg/dL - - -   BUN (external) 6 - 24 mg/dL 33(H) 43(H) 34(H)   Cr 0.66 -  1.25 mg/dL - - -   Cr (external) 0.6 - 1.2 mg/dL 2.2(H) 2.4(H) 2.1(H)   Glucose 70 - 99 mg/dL - - -   Glucose (external) 70 - 99 mg/dL 109(H) 98 102(H)   Ca  8.5 - 10.1 mg/dL - - -   Ca (external) 8.2 - 10.0 mg/dL 9.7 9.3 9.1   Mg 1.6 - 2.3 mg/dL - - -   Mg (external) 1.7 - 2.4 mg/dL - 2.3 -     Bone Health Latest Ref Rng & Units 6/22/2020 6/15/2020 6/8/2020   Phos 2.5 - 4.5 mg/dL - - -   Phos (external) 2.6 - 4.7 mg/dL 4.1 3.9 4.2   PTHi 18 - 80 pg/mL - - -   Vit D Def 20 - 75 ug/L - - -     Heme Latest Ref Rng & Units 6/25/2020 6/22/2020 6/18/2020   WBC 4.0 - 11.0 10e9/L - - -   WBC (external) 4.1 - 10.9 x10:3/uL 4.3 5.7 5.8   Hgb 13.3 - 17.7 g/dL - - 11.1(A)   Hgb (external) 12.9 - 17.3 g/dL 11.3(L) 11.2(L) 11.1(L)   Plt 150 - 450 10e9/L - - -   Plt (external) 150 - 450 x10:3/uL 152 177 199   ABSOLUTE NEUTROPHIL 1.6 - 8.3 10e9/L - - -   ABSOLUTE NEUTROPHILS (EXTERNAL) 1.9 - 8.1 x10:3/uL 3.8 5.2 5.1   ABSOLUTE LYMPHOCYTES 0.8 - 5.3 10e9/L - - -   ABSOLUTE LYMPHOCYTES (EXTERNAL) 1.0 - 3.9 x10:3/uL 0.2(L) 0.2(L) 0.3(L)   ABSOLUTE MONOCYTES 0.0 - 1.3 10e9/L - - -   ABSOLUTE MONOCYTES (EXTERNAL) 0.1 - 0.9 x10:3/uL 0.2 0.3 0.2   ABSOLUTE EOSINOPHILS 0.0 - 0.7 10e9/L - - -   ABSOLUTE EOSINOPHILS (EXTERNAL) 0.0 - 0.5 x10:3/uL 0.1 0.1 0.1   ABSOLUTE BASOPHILS 0.0 - 0.2 10e9/L - - -   ABSOLUTE BASOPHILS (EXTERNAL) 0.0 - 0.2 x10:3/uL 0.0 0.0 0.0   ABS IMMATURE GRANULOCYTES 0 - 0.4 10e9/L - - -   ABSOLUTE NUCLEATED RBC - - - -     Liver Latest Ref Rng & Units 5/7/2020 3/5/2020 2/28/2020   AP 40 - 150 U/L - - 108   AP (external) 45 - 115 IU/L 62 - -   TBili 0.2 - 1.3 mg/dL - 0.6 0.6   TBili (external) 0.4 - 1.4 mg/dL 0.3(L) - -   DBili 0.0 - 0.2 mg/dL - 0.1 -   DBili (external) 0.0 - 0.3 mg/dL 0.1 - -   ALT 0 - 70 U/L - - 48   ALT (external) 12 - 70 U/L 30 - -   AST 0 - 45 U/L - - 30   AST (external) 15 - 46 U/L 12(L) - -   Tot Protein 6.8 - 8.8 g/dL - - 7.9   Tot Protein (external) 6.2 - 8.2 g/dL 6.4 - -   Albumin 3.4 - 5.0  g/dL - - 4.0   Albumin (external) 3.3 - 5.0 g/dL 3.8 - -     Pancreas Latest Ref Rng & Units 5/4/2020 2/28/2020   A1C 0 - 5.6 % - 5.2   A1C (external) 4.0 - 6.0 % 5.7 -     Iron studies Latest Ref Rng & Units 3/3/2020 3/8/2005   Iron 35 - 180 ug/dL 139 43   Iron sat 15 - 46 % 98(H) -   Ferritin 26 - 388 ng/mL 3,397(H) -     [facility] Txp Virology Latest Ref Rng & Units 6/22/2020 6/1/2020 5/4/2020   CMV IgG EU/mL - - -   CMV DNA Quant Ext Undetected IU/mL - - Undetected   BK Spec - - - -   BK Res BKNEG copies/mL - - -   BK Log <2.7 Log copies/mL - - -   BK Quant Result Ext None Detected None Detected None Detected None Detected   BK Quant Spec Ext - - Plasma -   EBV IgG - - - -   EBV CAPSID ANTIBODY IGG 0.0 - 0.8 AI - - -   Hep B Core NR - - -   Hep B Surf NEG - - -   HIV 1&2 NEG - - -        Recent Labs   Lab Test 03/13/20  0735 03/14/20  0830 03/16/20  0807   DOSTAC Not Provided Not Provided Not Provided   TACROL 6.9 9.2 10.8             Again, thank you for allowing me to participate in the care of your patient.      Sincerely,    Early Post Transplant

## 2020-06-29 NOTE — PROGRESS NOTES
Post Transplant Patient Social Work Assessment -Outpatient    Patient Name: Jeremiah Cruz  : 1975  Age: 44 year old  MRN: [id number]  Date of transplant: 2020 (previous kidney transplants 1988, 3/2/2005)    Patient known to this writer from follow up in the kidney transplant program. Seen today to update assessment.      Presenting Information   Living Situation: Patient lives with his 21-year-old daughter Kiley in [place], [place]  Functional Status: Independent with ADL's, hearing impaired (wears hearing aids), drives self  Cultural/Language/Spiritual Considerations: English speaking male    Support System  Primary Support Person Mother Kaylin  Other support:  Children Kiley and Martín, stepfather, siblings, friends    Health Care Directive  Decision Maker: Patient  Alternate Decision Maker: Children are [facility]  Health Care Directive: Provided education and Declined completing    Mental Health/Coping:   History of Mental Health: History of depression and anxiety  History of Chemical Health: Denied  Current status: Pt denied any mental health concerns but expressed frustration around having chronic fatigue after this kidney transplant as the kidney has not been functioning as well as hoped  Coping: Patient appears to be coping well.  Services Needed/Recommended: None identified at this time.    Financial   Income: SSDI due to ESRD, pt has been on disability since   Impact of transplant on income: Discussed possibility of patient losing SSDI eligibility one year post transplant.  Insurance and medication coverage: Medicare and [facility]  Financial concerns: None identified at this time.  Resources needed: None identified at this time.      Education provided by SW: Social Work role outpatient setting and post-transplant expectations    Assessment and recommendations and plan: Pt has had two previous transplants (second one failed immediately and pt was on dialysis). Pt reported he takes his  medications as prescribed and attends appointments/labs as scheduled. Reviewed post-transplant expectations as well as psychosocial risks of transplant. Patient seemed to process information well. Pt had no questions or concerns for this writer.     Rosario Shane [facility]    Kidney/Pancreas/Auto Islet Transplant Programs

## 2020-06-29 NOTE — PROGRESS NOTES
Patient unable to review meds today. Rescheduled for tomorrow at 9 AM.     Conner Downey PharmD  [facility] Pharmacist    Phone: [phone]

## 2020-06-30 ENCOUNTER — ALLIED HEALTH/NURSE VISIT (OUTPATIENT)
Dept: PHARMACY | Facility: CLINIC | Age: 45
End: 2020-06-30
Payer: MEDICAID

## 2020-06-30 DIAGNOSIS — Z94.0 KIDNEY REPLACED BY TRANSPLANT: Primary | ICD-10-CM

## 2020-06-30 DIAGNOSIS — E21.3 HYPERPARATHYROIDISM (H): ICD-10-CM

## 2020-06-30 DIAGNOSIS — K21.9 GASTROESOPHAGEAL REFLUX DISEASE, ESOPHAGITIS PRESENCE NOT SPECIFIED: ICD-10-CM

## 2020-06-30 DIAGNOSIS — E78.5 DYSLIPIDEMIA: ICD-10-CM

## 2020-06-30 DIAGNOSIS — Z94.0 HTN, KIDNEY TRANSPLANT RELATED: ICD-10-CM

## 2020-06-30 DIAGNOSIS — I15.1 HTN, KIDNEY TRANSPLANT RELATED: ICD-10-CM

## 2020-06-30 PROCEDURE — 99207 ZZC NO CHARGE LOS: CPT | Performed by: PHARMACIST

## 2020-06-30 RX ORDER — LORATADINE 10 MG/1
10 TABLET ORAL DAILY
COMMUNITY

## 2020-06-30 NOTE — PATIENT INSTRUCTIONS
Recommendations from today's MTM visit:                                                      1. I will talk with the transplant team about your carvedilol, magnesium, and calcitriol. I will get back to you when I hear a response.    It was great to speak with you today.  I value your experience and would be very thankful for your time with providing feedback on our clinic survey. You may receive a survey via email or text message in the next few days.     Next MTM visit: 9/10 at 12:30PM    To schedule another MTM appointment, please call the clinic directly or you may call the MTM scheduling line at [phone] or toll-free at [phone].     My Clinical Pharmacist's contact information:                                                      It was a pleasure talking with you today!  Please feel free to contact me with any questions or concerns you have.      Conner Downey, PharmD  [facility] Pharmacist    Phone: [phone]

## 2020-06-30 NOTE — PROGRESS NOTES
MTM ENCOUNTER  SUBJECTIVE/OBJECTIVE:                           Jeremiah Cruz is a 44 year old male called for a follow up appt. He was referred post txp.     Patient in the hospital in the last 30 days? no    Patient consented to a telehealth visit: yes  Telemedicine Visit Details  Type of service:  Telephone visit  Start Time: 9:30AM  End Time: 9:55AM  Originating Location (pt. Location): Home  Distant Location (provider location):  [facility] MEDICATION THERAPY MANAGEMENT  Mode of Communication:  Telephone    Chief Complaint: 4 months post txp med review.    Allergies/ADRs: Reviewed in Epic  Tobacco:  reports that he quit smoking about 9 years ago. His smoking use included cigarettes. He has a 12.00 pack-year smoking history. He has never used smokeless tobacco.  Alcohol: not currently using  Caffeine: no caffeine, gets heartburn if he drinks pop.   PMH: Reviewed in Epic    Medication Adherence/Access: Patient uses pill box(es).  Patient takes medications 2 time(s) per day. 8am and 8pm  Per patient, misses medication 0 times per week.   Medication barriers: none.   The patient fills medications at [facility]: YES.  Refills: discussed    Renal Transplant:  Current immunosuppressants include Tacrolimus 4.5mg BID (0-6 months post tx, goal 8-10), Myfortic 540mg BID and Prednisone 5mg daily.  Pt reports switching to Myfrotic due to nausea and heartburn, has improved since switching to Myfortic.   Transplant date: 2/28/20 3rd txp. Others 1988, 2005.  Estimated Creatinine Clearance: 30.3 mL/min (A) (based on SCr of 2.69 mg/dL (H)).  CMV prophylaxis: Valcyte Donor (+), Recipient (-), treat 6 months post tx   PCP prophylaxis: Dapsone 50mg daily  Current supplements for electrolyte replacement: Sodium Bicarb 1950mg BID, Magnesium 400mg daily, Vitamin D 2000 units daily   Ref. Range 5/25/2020 08:30 6/1/2020 08:30 6/8/2020 08:38 6/15/2020 08:55 6/22/2020 08:40   Magnesium (External) Latest Ref Range: 1.7 - 2.4 mg/dL 2.4 2.5  (H) 2.7 (H) 2.3 2.3     Tx Coordinator: Gris Nicholas MD: Dr. Wilkinson, Using Med Card: Yes  BP: Amlodipine taking 5mg qAM.   BP Readings from Last 3 Encounters:   06/29/20 (!) 143/93   04/28/20 120/81   04/03/20 114/58   Immunizations: annual flu shot unknown; [id number]:  2017; Prevnar 13: 2017; TDaP:  2016; Shingrix: unknown    Hyperparathyroidism: Pt is taking Calcitriol 0.25mg daily   Ref. Range 3/3/2020 08:49 3/4/2020 10:18 6/29/2020 09:14   Parathyroid Hormone Intact Latest Ref Range: 18 - 80 pg/mL 668 (H) 630 (H) 58       GERD: Current medications include: Pepcid (famotidine) 40mg once daily, Tums prn. Pt c/o heartburn with spicy acidic foods.  Patient feels that current regimen is effective.    Hypertension: Current medications include Amlodipine 10mg at bedtime, Carvedilol 12.5mg BID.  Patient does self-monitor BP. Over the last week: 131-141/80-90s.  Patient reports no current medication side effects.  BP Readings from Last 3 Encounters:   06/29/20 (!) 143/93   04/28/20 120/81   04/03/20 114/58     Hyperlipidemia: Current therapy includes Atorvastatin 1mg once daily.  Pt reports no significant myalgias or other side effects.  The 10-year ASCVD risk score (Amarilis DC Jr., et al., 2013) is: 3.8%    Values used to calculate the score:      Age: 45 years      Sex: Male      Is Non- : No      Diabetic: No      Tobacco smoker: No      Systolic Blood Pressure: 143 mmHg      Is BP treated: No      HDL Cholesterol: 40 mg/dL      Total Cholesterol: 231 mg/dL    Today's Vitals: There were no vitals taken for this visit.    ASSESSMENT:                            Medication Adherence: good, no issues identified    Renal Transplant:  Magnesium has been running at 2.3 and above, recommend stopping supplementation.     Hyperparathyroidism: Can running in the high 9's, parathyroid hormone is near normal. Calcitriol therapy may not be necessary at this time. Will follow up with txp team.     GERD:  Stable.    Hypertension: BP has been running in the 130-140s/80-90s, recommend increasing Carvedilol to 25mg BID>     Hyperlipidemia:Stable.     PLAN:                            TXP team consider:  1. Increasing Carvedilol 25mg BID. BPs running in the 130-140s/80-90s  2. PTH normal, calcium levels on the high end of normal. Recommend holding calcitriol.   3 Magnesium has been over 2.3, recommend holding.    I spent 30 minutes with this patient today. I offer these suggestions for consideration by txp team. A copy of the visit note was provided to the patient's referring provider.    Will follow up in 2 months.    The patient sent via myc given a summary of these recommendations.     Conner Downey, PharmD  [facility] Pharmacist    Phone: [phone]

## 2020-07-01 ENCOUNTER — TELEPHONE (OUTPATIENT)
Dept: NEPHROLOGY | Facility: CLINIC | Age: 45
End: 2020-07-01

## 2020-07-01 RX ORDER — CARVEDILOL 25 MG/1
25 TABLET ORAL 2 TIMES DAILY WITH MEALS
Qty: 60 TABLET | Refills: 3 | Status: SHIPPED | OUTPATIENT
Start: 2020-07-01 | End: 2020-01-01

## 2020-07-01 NOTE — PROGRESS NOTES
Alvin Varner MD Griffin, Peter Alberto, [facility]; Jody García, RN               Sounds good.    Previous Messages     ----- Message -----   From: Conner Donwey [facility]   Sent: 6/30/2020  10:16 AM CDT   To: Alvin Varner MD, Jody García, RN     I saw Jeremiah for his 4 month post transplant med review. Here are my recommendations:     1. Increasing Carvedilol 25mg BID. BPs running in the 130-140s/80-90s   2. PTH normal, calcium levels on the high end of normal. Recommend holding calcitriol.   3 Magnesium has been 2.3 and higher for the past few months, recommend holding.     Thanks, let me know!     Conner Downey, PharmD   [facility] Pharmacist     Phone: [phone]

## 2020-07-02 ENCOUNTER — TELEPHONE (OUTPATIENT)
Dept: TRANSPLANT | Facility: CLINIC | Age: 45
End: 2020-07-02

## 2020-07-02 NOTE — LETTER
PHYSICIAN ORDERS      DATE & TIME ISSUED: 2020 1:35 PM  PATIENT NAME: Jeremiah Cruz   : 1975     [facility] MR# [if applicable]: [id number]     DIAGNOSIS:  kidney transplant   ICD-10 CODE: z94.0       Please draw on   The following labs      Basic Metabolic Panel (Sodium, Potassium, Chloride, Creatinine, CO2, Urea Nitrogen, glucose, Calcium)    CMV PCR QT             Any questions please call:  [phone].    .    Orders sent to [facility]

## 2020-07-06 ENCOUNTER — TELEPHONE (OUTPATIENT)
Dept: PHARMACY | Facility: CLINIC | Age: 45
End: 2020-07-06

## 2020-07-06 NOTE — TELEPHONE ENCOUNTER
Anemia Management Note  SUBJECTIVE/OBJECTIVE:  Referred by Dr. Alvin Varner on 2020  Primary Diagnosis: Anemia in Chronic Kidney Disease (N18.4, D63.1)     Secondary Diagnosis:  Chronic Kidney Disease, Stage 4 (N18.4)   Kidney Tx: , , 2020  Hgb goal range:  9-10  Epo/Darbo: Procrit 10,000 units weekly for Hgb <10. At home.   Iron regimen:  TBD  Labs : 2021  Recent DEB use, transfusion, IV iron: unknown  RX/TX plans : TBD     No history of stroke, MI and blood clots. Hx of CML in 2017     *Currently has Home Care Mon/Thur for labs.      Labs: [facility]  Ph # [phone]  Fax # [phone]        **ONLY call Ed if his Hgb <10 and he needs to dose.         Contact:            Ok to leave message regarding Scheduling and Medical Information per consent to communicate dated 12/10/2017                              OK to speak with Danay Blanotn (friend) and Kaylin Lilly (mother) regarding Scheduling and Medical Information per consent to communicate dated 12/10/2017       Anemia Latest Ref Rng & Units 2020   DEB Dose - - 10,000 units - - - - -   Hemoglobin 13.3 - 17.7 g/dL 9.3(A) - 10.1(A) 10.4(A) 10.6(A) 11.1(A) 11.2(L)   TSAT 15 - 46 % - - - - - - 59(H)   Ferritin 26 - 388 ng/mL - - - - - - 2,163(H)     BP Readings from Last 3 Encounters:   20 (!) 143/93   20 120/81   20 114/58     Wt Readings from Last 2 Encounters:   20 61.1 kg (134 lb 12.8 oz)   20 62 kg (136 lb 9.6 oz)           ASSESSMENT:  Hgb:Above goal - recommend hold dose  TSat: elevated at >50% Ferritin: Elevated (>1000ng/mL)    PLAN:  Hold Aranesp and RTC for hgb then aranesp if needed in 2-4 week(s)    Orders needed to be renewed (for next follow-up date) in LETTERS - for external labs: None    Iron labs due:  20    Plan discussed with:  No call made per pt request  Plan provided by:  bigg    NEXT FOLLOW-UP  DATE:  7/27/20    Ana Castrejon RN   [facility] Services  [address] [address]   [email or web address]   Office : [phone]  Fax: [phone]

## 2020-07-07 NOTE — TELEPHONE ENCOUNTER
Issue  Tacrolimus  Level   Confirmed taking 4.5 mg twice per day   Lowered tacrolimus  3.5 mg twice per day     Issue 2 complained of gi diarrhea over the weekend  - has resolved       Issue k 6.0 did not take kayexalate

## 2020-07-08 ENCOUNTER — TELEPHONE (OUTPATIENT)
Dept: TRANSPLANT | Facility: CLINIC | Age: 45
End: 2020-07-08

## 2020-07-08 DIAGNOSIS — Z94.0 KIDNEY REPLACED BY TRANSPLANT: ICD-10-CM

## 2020-07-08 RX ORDER — TACROLIMUS 0.5 MG/1
0.5 CAPSULE ORAL 2 TIMES DAILY
Qty: 60 CAPSULE | Refills: 11 | Status: SHIPPED | OUTPATIENT
Start: 2020-07-08 | End: 2020-07-27

## 2020-07-08 RX ORDER — TACROLIMUS 1 MG/1
3 CAPSULE ORAL 2 TIMES DAILY
Qty: 180 CAPSULE | Refills: 11 | Status: SHIPPED | OUTPATIENT
Start: 2020-07-08 | End: 2020-07-27

## 2020-07-08 NOTE — LETTER
PHYSICIAN ORDERS      DATE & TIME ISSUED: 2020 8:33 AM  PATIENT NAME: Jeremiah Cruz   : 1975     [facility] MR# [if applicable]: [id number]     DIAGNOSIS:  kidney transplant   ICD-10 CODE: z94.0       Please draw on   The following labs      Basic Metabolic Panel (Sodium, Potassium, Chloride, Creatinine, CO2, Urea Nitrogen, glucose, Calcium)    CMV PCR QT             Any questions please call:  [phone].    .    Orders sent to [facility]

## 2020-07-08 NOTE — TELEPHONE ENCOUNTER
Provider  Call: Voicemail  Date/Time: 7/7/2020  0319  Reason for call: They received orders to draw labs on 7/8/2020  They usually draw labs on Thurs 7/9/2020  Can it wait till Thurs  Call them back before 430

## 2020-07-08 NOTE — TELEPHONE ENCOUNTER
Voicemail  Date/Time: 7/8/20 at 8:12 am  Reason for call:[facility]  would like a new order faxed for July 9th to draw labs that were to be drawn today. HC will see the patient on 7/9/20 for labs please fax to [phone]

## 2020-07-15 ENCOUNTER — TELEPHONE (OUTPATIENT)
Dept: TRANSPLANT | Facility: CLINIC | Age: 45
End: 2020-07-15

## 2020-07-15 NOTE — TELEPHONE ENCOUNTER
"Epic message  To Dr Varner     Question ok to stop Valcyte   Concern for Dapsone   Question adjustment to BP medication  107 / 60 from 140 /80  you increased bp to twice per day     Item 1   Ed is concerned about his WBC and Plts dropping  Hx Chronic Myeloid Leukemia- (has followed up with his local hematologist)     When I look at the time line - it appears that Dapsone started last month  maybe slight decrease   Item 2     2nd Item CMV mismatch   Check CMV last week  neg on July 9  138 days post kidney transplant  -     3rd Item washed out   Over the 4 th of July weekend  had a \"bout\" of diarrhea with high tacrolimus  level   I have decreased the tacrolimus  level  - diarrhea  resolved continues twice per day    "

## 2020-07-15 NOTE — TELEPHONE ENCOUNTER
Patient Call: General  Route to LPN    Reason for call:    not feeling so good today feels like his outout is down  has been drinking good  Concerned re low WBC and platelets  no fever  wt up 3# since Mon     Call back needed? Yes    Return Call Needed  Same as documented in contacts section  When to return call?: Greater than one day: Route standard priority

## 2020-07-16 ENCOUNTER — TELEPHONE (OUTPATIENT)
Dept: TRANSPLANT | Facility: CLINIC | Age: 45
End: 2020-07-16

## 2020-07-16 NOTE — TELEPHONE ENCOUNTER
DATE:  7/16/2020   TIME OF RECEIPT FROM LAB:  10:10 AM  LAB TEST:  K+  LAB VALUE:  6.1  RESULTS GIVEN WITH READ-BACK TO (PROVIDER):  BRANDYN García RN  TIME LAB VALUE REPORTED TO PROVIDER:   10:21 AM

## 2020-07-17 ENCOUNTER — TELEPHONE (OUTPATIENT)
Dept: TRANSPLANT | Facility: CLINIC | Age: 45
End: 2020-07-17

## 2020-07-17 NOTE — TELEPHONE ENCOUNTER
Patient Call: Voicemail  Date/Time: 7/17/20 3817  Reason for call: Edward calls with questions about setting up US in [place].

## 2020-07-19 NOTE — TELEPHONE ENCOUNTER
Spoke to Jeremiah Cruz confirmed follow up in his local ER for K 6.1   Ed confirmed spent 4 to 5 hours in the Local ER   Confirmed taking Kayexalate     Post discussion  Ed states that his pain in his upper right quad   Reviewed should follow up with PCP   Or return to [facility] for follow up   Ed will call his local his PCP  For follow up     Upper right quad pain dicussed  2 to 3 months ago  Ed rported following up  With PCP  But the pain decreased so did not follow  up  While in the ER this week for his high K  - did not mention pain  Upper right Quad pain

## 2020-07-20 DIAGNOSIS — Z94.0 KIDNEY REPLACED BY TRANSPLANT: Primary | ICD-10-CM

## 2020-07-20 RX ORDER — HEPARIN SODIUM (PORCINE) LOCK FLUSH IV SOLN 100 UNIT/ML 100 UNIT/ML
5 SOLUTION INTRAVENOUS
Status: CANCELLED | OUTPATIENT
Start: 2020-07-20

## 2020-07-20 RX ORDER — HEPARIN SODIUM,PORCINE 10 UNIT/ML
5 VIAL (ML) INTRAVENOUS
Status: CANCELLED | OUTPATIENT
Start: 2020-07-20

## 2020-07-20 NOTE — TELEPHONE ENCOUNTER
This is all set for the patient.     Thanks!  Cassie Fox  Infusion Finance Supervisor  [facility]  [place]  [address]  [place] [address]  [email or web address]   Office: [phone]  Fax: [phone]  Employed by [employer]    I am currently working remotely and unavailable by office phone.     [email or web address]  Cc: Jody García <[email or web address]>  Subject: PHI PHI Prior Auth on Sherri    Requesting prior auth for belatacept infusion   Ed Anthony Male, 45 year old, 1975

## 2020-07-21 ENCOUNTER — TELEPHONE (OUTPATIENT)
Dept: TRANSPLANT | Facility: CLINIC | Age: 45
End: 2020-07-21

## 2020-07-21 NOTE — TELEPHONE ENCOUNTER
Infusion [facility] Phone    Fax [phone]     Spoke to Erin at infusion center - faxed to review with local infusion center in [place]

## 2020-07-21 NOTE — LETTER
"    PHYSICIAN ORDERS      DATE & TIME ISSUED: 2020 2:58 PM  PATIENT NAME: Jeremiah Cruz   : 1975     [facility] MR# [if applicable]: [id number]     DIAGNOSIS:  kidney transplant   ICD-10 CODE: z94.0     Please note Prior Auth approved at [facility] [place] -    EBV IGg  Pos        Give every belatacept  2 weeks times 5 doses  then monthly   belatacept (NULOJIX) 300 mg in sodium chloride 0.9 % 112 mL infusion  300 mg (rounded from 305.5 mg = 5 mg/kg × 61.1 kg), Intravenous, ONCE, Starting when released, For 1 dose, Administer over 30 Minutes      PAT 00 11 36 38 20 70       Any questions please call:  [phone].      .  Vital signs:  Temp: 98.3  F (36.8  C)   BP: (!) 143/93 Pulse: 81     SpO2: 99 %       Weight: 61.1 kg (134 lb 12.8 oz)  Estimated body mass index is 23.12 kg/m  as calculated from the following:    Height as of 4/3/20: 1.626 m (5' 4.02\").    Weight as of this encounter: 61.1 kg (134 lb 12.8 oz).        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 Given high alpa, will discuss switching IS to hawa. If creatinine rises again, would also repeat Tx Ultrasound to ensure no renal artery stenosis (prev U/S with some elevated velocities).               - Baseline Cr ~ 2.0-2.3              - Proteinuria: Normal (<0.2 gm/gm)              - Date DSA Last Checked:       Latest DSA: No DSA at time of transplant              - BK Viremia: No              - Kidney Tx Biopsy: 2020; Result: No diagnostic evidence of acute rejection.  Moderate interstitial fibrosis and tubular atrophy.     # Immunosuppression: Tacrolimus immediate release (goal 8-10), Mycophenolic acid (goal 1.0-3.5) and Prednisone (dose 5 mg daily)              - Changes: No, given hyperkalemia, high creatinine alpa, will " look to switch to Belatacept. Will discuss at Tx meeting.      # Infection Prophylaxis:   - PJP: Dapsone  - CMV: Valcyte; Patient is CMV IgG Ab discordant (D+/R-) and will continue on Valcyte x 6 months, then check CMV PCR monthly until 12 months post transplant.     # Hypertension: Controlled;   Goal BP: < 140/90              - Changes: No, no changes to Amlodipine and Coreg.     # Elevated Blood Glucose: Glucose generally running ~                     Last HbA1c: 5.2% (2/28/20)              - Management as per primary care.     # Anemia in Chronic Renal Disease: Hgb: Stable ~11    DEB: Yes              - Iron studies: Replete, holding DEB since Hgb > 10.                         # Mineral Bone Disorder:   - Secondary renal hyperparathyroidism; PTH level: Significantly elevated (601-1200 pg/ml)        On treatment: Calcitriol  - Vitamin D; level: Low        On Supplement: Yes  - Calcium; level: Normal        On Supplement: Yes  - Phosphorus; level: Normal        On Supplement: No     # Electrolytes:   - Potassium; level: High                               On Supplement: Will increase Kayexalate to MWF, look into Patiromer for alternative K binder  - Magnesium; level: Normal             On Supplement: Yes.              - Bicarbonate; level: Normal           On Supplement: Yes     # CML: Appears stable on dasatinib. Patient is followed by Hematology.     # Diarrhea: Unclear etiology, resolved at this time. MMF was switched to Myfortic.      # GERD: Controlled famotidine, trying to avoid foods that exacerbate his symptoms.     # Medical Compliance: Yes      # Transplant History:  Etiology of Kidney Failure: Alport's syndrome  Tx: DDKT  Transplant: 2/28/2020 (Kidney), 12/7/1988 (Kidney), 3/2/2005 (Kidney)  Donor Type: Donation after Brain Death        Donor Class:   Crossmatch at time of Tx: negative  DSA at time of Tx: No  Significant changes in immunosuppression: None  CMV IgG Ab High Risk Discordance  (D+/R-): Yes  EBV IgG Ab High Risk Discordance (D+/R-): No  Significant transplant-related complications: None  Transplant Office Phone Number: [phone]     Assessment and plan was discussed with the patient and he voiced his understanding and agreement.     Return visit: Return for 6 month post transplant visit.      Pt staffed with Dr. Varner.      Doyle Vaughn MD   Fellow  [phone]     Attestation

## 2020-07-21 NOTE — TELEPHONE ENCOUNTER
"PHYSICIAN ORDERS      DATE & TIME ISSUED: 2020 2:58 PM  PATIENT NAME: Jeremiah Cruz   : 1975     [facility] MR# [if applicable]: [id number]     DIAGNOSIS:  kidney transplant   ICD-10 CODE: z94.0     Please note [facility] -    EBV IGg  Pos        Give every belatacept  2 weeks times 5 doses  then monthly   belatacept (NULOJIX) 300 mg in sodium chloride 0.9 % 112 mL infusion  300 mg (rounded from 305.5 mg = 5 mg/kg × 61.1 kg), Intravenous, ONCE, Starting when released, For 1 dose, Administer over 30 Minutes      PAT 00 11 36 38 20 70       Any questions please call:  [phone].      .  Vital signs:  Temp: 98.3  F (36.8  C)   BP: (!) 143/93 Pulse: 81     SpO2: 99 %       Weight: 61.1 kg (134 lb 12.8 oz)  Estimated body mass index is 23.12 kg/m  as calculated from the following:    Height as of 4/3/20: 1.626 m (5' 4.02\").    Weight as of this encounter: 61.1 kg (134 lb 12.8 oz).        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 Given high alpa, will discuss switching IS to hawa. If creatinine rises again, would also repeat Tx Ultrasound to ensure no renal artery stenosis (prev U/S with some elevated velocities).               - Baseline Cr ~ 2.0-2.3              - Proteinuria: Normal (<0.2 gm/gm)              - Date DSA Last Checked:       Latest DSA: No DSA at time of transplant              - BK Viremia: No              - Kidney Tx Biopsy: 2020; Result: No diagnostic evidence of acute rejection.  Moderate interstitial fibrosis and tubular atrophy.     # Immunosuppression: Tacrolimus immediate release (goal 8-10), Mycophenolic acid (goal 1.0-3.5) and Prednisone (dose 5 mg daily)              - Changes: No, given hyperkalemia, high creatinine alpa, will look " to switch to Belatacept. Will discuss at Tx meeting.      # Infection Prophylaxis:   - PJP: Dapsone  - CMV: Valcyte; Patient is CMV IgG Ab discordant (D+/R-) and will continue on Valcyte x 6 months, then check CMV PCR monthly until 12 months post transplant.     # Hypertension: Controlled;   Goal BP: < 140/90              - Changes: No, no changes to Amlodipine and Coreg.     # Elevated Blood Glucose: Glucose generally running ~                     Last HbA1c: 5.2% (2/28/20)              - Management as per primary care.     # Anemia in Chronic Renal Disease: Hgb: Stable ~11    DEB: Yes              - Iron studies: Replete, holding DEB since Hgb > 10.                         # Mineral Bone Disorder:   - Secondary renal hyperparathyroidism; PTH level: Significantly elevated (601-1200 pg/ml)        On treatment: Calcitriol  - Vitamin D; level: Low        On Supplement: Yes  - Calcium; level: Normal        On Supplement: Yes  - Phosphorus; level: Normal        On Supplement: No     # Electrolytes:   - Potassium; level: High                               On Supplement: Will increase Kayexalate to MWF, look into Patiromer for alternative K binder  - Magnesium; level: Normal             On Supplement: Yes.              - Bicarbonate; level: Normal           On Supplement: Yes     # CML: Appears stable on dasatinib. Patient is followed by Hematology.     # Diarrhea: Unclear etiology, resolved at this time. MMF was switched to Myfortic.      # GERD: Controlled famotidine, trying to avoid foods that exacerbate his symptoms.     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     Return visit: Return for 6 month post transplant visit.      Pt staffed with Dr. Varner.      Doyle Vaughn MD   Fellow  [phone]     Attestation

## 2020-07-23 LAB — HEMOGLOBIN: 9.4 G/DL (ref 13.3–17.7)

## 2020-07-24 ENCOUNTER — TELEPHONE (OUTPATIENT)
Dept: TRANSPLANT | Facility: CLINIC | Age: 45
End: 2020-07-24

## 2020-07-24 NOTE — TELEPHONE ENCOUNTER
ISSUE:  Hyperkalemia persists.  CO2 below normal.    PLAN:  Ensure Ed is taking kayexalate MWF as prescribed.  Ensure Ed is taking sodium bicarb as directed, 1,300mg TID.  If so, INCREASE bicarb to 1,950mg TID.    OUTCOME:  RNCC left detailed information above asking Ed to increase the dose of bicarb and to call SOT back to ensure this was complete.

## 2020-07-24 NOTE — TELEPHONE ENCOUNTER
Patient Call: General  Route to LPN    Reason for call: Pt is returning phone call please connect     Call back needed? Yes    Return Call Needed  Same as documented in contacts section  When to return call?: Greater than one day: Route standard priority

## 2020-07-27 ENCOUNTER — TELEPHONE (OUTPATIENT)
Dept: PHARMACY | Facility: CLINIC | Age: 45
End: 2020-07-27

## 2020-07-27 ENCOUNTER — TELEPHONE (OUTPATIENT)
Dept: TRANSPLANT | Facility: CLINIC | Age: 45
End: 2020-07-27

## 2020-07-27 DIAGNOSIS — Z94.0 KIDNEY REPLACED BY TRANSPLANT: ICD-10-CM

## 2020-07-27 LAB — HEMOGLOBIN: 9.8 G/DL (ref 13.3–17.7)

## 2020-07-27 RX ORDER — TACROLIMUS 0.5 MG/1
0.5 CAPSULE ORAL EVERY EVENING
Qty: 30 CAPSULE | Refills: 11 | Status: SHIPPED | OUTPATIENT
Start: 2020-07-27 | End: 2020-01-01

## 2020-07-27 RX ORDER — TACROLIMUS 1 MG/1
3 CAPSULE ORAL 2 TIMES DAILY
Qty: 180 CAPSULE | Refills: 11 | Status: SHIPPED | OUTPATIENT
Start: 2020-07-27 | End: 2020-01-01

## 2020-07-27 NOTE — TELEPHONE ENCOUNTER
Anemia Management Note  SUBJECTIVE/OBJECTIVE:  Referred by Dr. Alvin Varner on 2020  Primary Diagnosis: Anemia in Chronic Kidney Disease (N18.4, D63.1)     Secondary Diagnosis:  Chronic Kidney Disease, Stage 4 (N18.4)   Kidney Tx: , , 2020  Hgb goal range:  9-10  Epo/Darbo: Procrit 10,000 units weekly for Hgb <10. At home.   Iron regimen:  TBD  Labs : 2021  Recent DEB use, transfusion, IV iron: unknown  RX/TX plans : TBD     No history of stroke, MI and blood clots. Hx of CML in 2017     *Currently has Home Care Mon/Thur for labs.      Labs: [facility]  Ph # [phone]  Fax # [phone]        **ONLY call Ed if his Hgb <10 and he needs to dose.         Contact:            Ok to leave message regarding Scheduling and Medical Information per consent to communicate dated 12/10/2017                              OK to speak with Danay Blanton (friend) and Kaylin Kolbenger (mother) regarding Scheduling and Medical Information per consent to communicate dated 12/10/2017    Anemia Latest Ref Rng & Units 2020   DEB Dose - 10,000 units - - - - - -   Hemoglobin 13.3 - 17.7 g/dL - 10.1(A) 10.4(A) 10.6(A) 11.1(A) 11.2(L) 9.4(A)   TSAT 15 - 46 % - - - - - 59(H) -   Ferritin 26 - 388 ng/mL - - - - - 2,163(H) -     BP Readings from Last 3 Encounters:   20 (!) 143/93   20 120/81   20 114/58     Wt Readings from Last 2 Encounters:   20 61.1 kg (134 lb 12.8 oz)   20 62 kg (136 lb 9.6 oz)           ASSESSMENT:  Hgb:At goal - recommend dose  TSat: elevated at >50% Ferritin: Elevated (>1000ng/mL)    PLAN:  LVM for Ed that he should dose his Procrit    Orders needed to be renewed (for next follow-up date) in LETTERS - for external labs: None      Plan discussed with:  CAM for Ed  Plan provided by:  bigg    NEXT FOLLOW-UP DATE:  20  Did Ed Call back, he should dose his Procrit    Ana  Cash CHAVARRIA   [facility] Services  [address] [address]   [email or web address]   Office : [phone]  Fax: [phone]

## 2020-07-27 NOTE — TELEPHONE ENCOUNTER
Tac 6.2 (goal 8-10)  Patient transitioning to belatacept     Spoke with patient who states he has not stared belatacept yet. Orders faxed to [facility] last week by primary RNCC. Patient has not heard about appointment.     Confirmed current dose 3 mg BID. Patient states his levels were 13 when he was on 3.5 mg BID and was reduced a week or so ago. Denies diarrhea. No new medication. Confirmed 12 hour trough.   Increase to 3 mg in AM and 3.5 mg in PM. Repeat level in 1 week. Patient verbalized understanding.

## 2020-07-28 ENCOUNTER — TELEPHONE (OUTPATIENT)
Dept: TRANSPLANT | Facility: CLINIC | Age: 45
End: 2020-07-28

## 2020-07-28 NOTE — LETTER
PHYSICIAN ORDERS      DATE & TIME ISSUED: 2020 3:12 PM  PATIENT NAME: Jeremiah Cruz     : 1975    [facility] MR# [if applicable]: [id number]     DIAGNOSIS:    kidney transplant   ICD-10 CODE: z94.0        Please note Prior Auth approved at [facility] -    EBV IGg  Pos       Give  belatacept  Every  2 weeks times 5 doses  then monthly   belatacept (NULOJIX) 300 mg in sodium chloride 0.9 % 112 mL infusion  300 mg (rounded from 305.5 mg = 5 mg/kg × 61.1 kg), Intravenous, ONCE, Starting when released, For 1 dose, Administer over 30 Minutes      PAT 00 11 36 38 20 70    Any questions please call: [phone]          .

## 2020-07-28 NOTE — LETTER
PHYSICIAN ORDERS    DATE & TIME ISSUED: 2020 2:37 PM  PATIENT NAME: Jeremiah Cruz   : 1975     [facility] MR# [if applicable]: [id number]     DATE & TIME ISSUED: 2020 2:58 PM    PATIENT NAME: Jeremiah Cruz     : 1975       [facility] MR# [if applicable]: [id number]       DIAGNOSIS:    kidney transplant     ICD-10 CODE: z94.0       Please note Prior Auth approved at [facility] -    EBV IGg  Pos       Give every belatacept  2 weeks times 5 doses  then monthly   belatacept (NULOJIX) 300 mg in sodium chloride 0.9 % 112 mL infusion  300 mg (rounded from 305.5 mg = 5 mg/kg × 61.1 kg), Intravenous, ONCE, Starting when released, For 1 dose, Administer over 30 Minutes      PAT 00 11 36 38 20 70    Any questions please call: [phone]              .

## 2020-07-30 ENCOUNTER — TELEPHONE (OUTPATIENT)
Dept: PHARMACY | Facility: CLINIC | Age: 45
End: 2020-07-30

## 2020-07-30 NOTE — TELEPHONE ENCOUNTER
Follow-up with anemia management service:    Spoke with Ed, he did give DEB on 7/27/20.  Will follow up next week.     Anemia Latest Ref Rng & Units 6/4/2020 6/8/2020 6/11/2020 6/18/2020 6/29/2020 7/23/2020 7/27/2020   DEB Dose - - - - - - - 10,000 units   Hemoglobin 13.3 - 17.7 g/dL 10.1(A) 10.4(A) 10.6(A) 11.1(A) 11.2(L) 9.4(A) 9.8(A)   TSAT 15 - 46 % - - - - 59(H) - -   Ferritin 26 - 388 ng/mL - - - - 2,163(H) - -         Follow-up call date: 8/3/20    Ana Castrejon RN   Anemia Services  [address] [address]   [email or web address]   Office : [phone]  Fax: [phone]

## 2020-07-31 NOTE — TELEPHONE ENCOUNTER
Confirm belatacept infusion starting on Aug 4 2020  [facility]     [phone]  Fax [phone]

## 2020-08-03 ENCOUNTER — TELEPHONE (OUTPATIENT)
Dept: NEPHROLOGY | Facility: CLINIC | Age: 45
End: 2020-08-03

## 2020-08-03 LAB — HEMOGLOBIN: 9.2 G/DL (ref 13.3–17.7)

## 2020-08-03 NOTE — TELEPHONE ENCOUNTER
Follow-up with anemia management service:    LVM for Ed did he have labs drawn today?    Anemia Latest Ref Rng & Units 6/4/2020 6/8/2020 6/11/2020 6/18/2020 6/29/2020 7/23/2020 7/27/2020   DEB Dose - - - - - - - 10,000 units   Hemoglobin 13.3 - 17.7 g/dL 10.1(A) 10.4(A) 10.6(A) 11.1(A) 11.2(L) 9.4(A) 9.8(A)   TSAT 15 - 46 % - - - - 59(H) - -   Ferritin 26 - 388 ng/mL - - - - 2,163(H) - -         Follow-up call date: 8/6/20  Were labs drawn?    Ana Castrejon RN   Anemia Services  [address] [address]   [email or web address]   Office : [phone]  Fax: [phone]

## 2020-08-03 NOTE — TELEPHONE ENCOUNTER
Ed called back.  He did have labs drawn.  Will follow up tomorrow AM with Hgb results.     Ana Castrejon RN   Anemia Services  [address] [address]   [email or web address]   Office : [phone]  Fax: [phone]

## 2020-08-04 ENCOUNTER — TELEPHONE (OUTPATIENT)
Dept: PHARMACY | Facility: CLINIC | Age: 45
End: 2020-08-04

## 2020-08-04 NOTE — TELEPHONE ENCOUNTER
Anemia Management Note  SUBJECTIVE/OBJECTIVE:  Referred by Dr. Alvin Varner on 2020  Primary Diagnosis: Anemia in Chronic Kidney Disease (N18.4, D63.1)     Secondary Diagnosis:  Chronic Kidney Disease, Stage 4 (N18.4)   Kidney Tx: , , 2020  Hgb goal range:  9-10  Epo/Darbo: Procrit 10,000 units weekly for Hgb <10. At home.   Iron regimen:  TBD  Labs : 2021  Recent DEB use, transfusion, IV iron: unknown  RX/TX plans : TBD     No history of stroke, MI and blood clots. Hx of CML in 2017     *Currently has Home Care Mon/Thur for labs.      Labs: [facility]  Ph # [phone]  Fax # [phone]        **ONLY call Ed if his Hgb <10 and he needs to dose.         Contact:            Ok to leave message regarding Scheduling and Medical Information per consent to communicate dated 12/10/2017                              OK to speak with Danay Blanton (friend) and Kaylin Lilly (mother) regarding Scheduling and Medical Information per consent to communicate dated 12/10/2017    Anemia Latest Ref Rng & Units 2020 2020 2020 2020 2020 8/3/2020 2020   DEB Dose - - - - - 10,000 units - 10,000 units   Hemoglobin 13.3 - 17.7 g/dL 10.6(A) 11.1(A) 11.2(L) 9.4(A) 9.8(A) 9.2(A) -   TSAT 15 - 46 % - - 59(H) - - - -   Ferritin 26 - 388 ng/mL - - 2,163(H) - - - -     BP Readings from Last 3 Encounters:   20 (!) 143/93   20 120/81   20 114/58     Wt Readings from Last 2 Encounters:   20 61.1 kg (134 lb 12.8 oz)   20 62 kg (136 lb 9.6 oz)       LVM for Ed to dose his Procrit.     ASSESSMENT:  Hgb:At goal - recommend dose  TSat: elevated at >50% Ferritin: Elevated (>1000ng/mL)    PLAN:  Dose with procrit and RTC for hgb then procrit if needed in 1* week(s)    Orders needed to be renewed (for next follow-up date) in LETTERS - for external labs: None    Iron labs due:  Due    Plan discussed with:  FRANCESM for Ed.   Plan provided by:   bigg    NEXT FOLLOW-UP DATE:  8/11/20    Ana Castrejon RN   [facility] Services  [address] [address]   [email or web address]   Office : [phone]  Fax: [phone]

## 2020-08-06 ENCOUNTER — TELEPHONE (OUTPATIENT)
Dept: TRANSPLANT | Facility: CLINIC | Age: 45
End: 2020-08-06

## 2020-08-06 NOTE — LETTER
Jeremiah Cruz  [address] [facility] [address]            August 11, 2020      Belatacept Conversion -    Weaning of tacrolimus with belatacept infusions  Confirmed current tacrolimus  3 mg twice  Per day          Confirmed belatacept 1st on Aug 4 ,2020 [facility]        2nd dose of belatacept on Aug 18 2020   After 2nd dose of belatacept  Lower tacrolimus  To  2 mg twice     3 rd dose of belatacept Sept 1 2020   After 3rd dose   tacrolimus   1 mg twice per day       4 th dose Aug 15 2020 stop tacrolimus

## 2020-08-06 NOTE — TELEPHONE ENCOUNTER
Belatacept Conversion -    Weaning of tacrolimus with belatacept infusions  Confirmed current tacrolimus  3 mg twice  Per day          Confirmed belatacept 1st on Aug 4 ,2020 [facility]  No changes  With       2nd dose of belatacept on Aug 18 [address]17   After 2nd dose of belatacept  Lower tacrolimus  To  2 mg twice     3 rd dose of belatacept Sept 1 [address]20   After 3rd dose   tacrolimus   1 mg twice per day       4 th dose Aug 15 2020 stop tacrolimus

## 2020-08-11 ENCOUNTER — TELEPHONE (OUTPATIENT)
Dept: PHARMACY | Facility: CLINIC | Age: 45
End: 2020-08-11

## 2020-08-11 NOTE — TELEPHONE ENCOUNTER
Anemia Management Note  SUBJECTIVE/OBJECTIVE:  Referred by Dr. Alvin Varner on 2020  Primary Diagnosis: Anemia in Chronic Kidney Disease (N18.4, D63.1)     Secondary Diagnosis:  Chronic Kidney Disease, Stage 4 (N18.4)   Kidney Tx: , , 2020  Hgb goal range:  9-10  Epo/Darbo: Procrit 10,000 units weekly for Hgb <10. At home.   Iron regimen:  TBD  Labs : 2021  Recent DEB use, transfusion, IV iron: unknown  RX/TX plans : TBD     No history of stroke, MI and blood clots. Hx of CML in 2017     *Currently has Home Care Mon/Thur for labs.      Labs: [facility]  Ph # [phone]  Fax # [phone]        **ONLY call Ed if his Hgb <10 and he needs to dose.         Contact:            Ok to leave message regarding Scheduling and Medical Information per consent to communicate dated 12/10/2017                              OK to speak with Danay Blanton (friend) and Kaylin Lilly (mother) regarding Scheduling and Medical Information per consent to communicate dated 12/10/2017    Anemia Latest Ref Rng & Units 2020 2020 2020 2020 2020 8/3/2020 2020   DEB Dose - - - - - 10,000 units - 10,000 units   Hemoglobin 13.3 - 17.7 g/dL 10.6(A) 11.1(A) 11.2(L) 9.4(A) 9.8(A) 9.2(A) -   TSAT 15 - 46 % - - 59(H) - - - -   Ferritin 26 - 388 ng/mL - - 2,163(H) - - - -     BP Readings from Last 3 Encounters:   20 (!) 143/93   20 120/81   20 114/58     Wt Readings from Last 2 Encounters:   20 61.1 kg (134 lb 12.8 oz)   20 62 kg (136 lb 9.6 oz)       LVM for Ed, has he been dosing his Procrit? Hgb has dropped.     ASSESSMENT:  Hgb:Not at goal/Known  TSat: elevated at >50% Ferritin: Elevated (>1000ng/mL)    PLAN:  Dose with procrit and RTC for hgb then procrit if needed in 1 week(s)    Orders needed to be renewed (for next follow-up date) in LETTERS - for external labs: None    Iron labs due:  Due    Plan discussed with:  CAM for Ed, Has he  been dosing his Procrit?  Plan provided by:  bigg    NEXT FOLLOW-UP DATE:  8/18/20    Ana Castrejon RN   [facility] Services  [address] [address]   [email or web address]   Office : [phone]  Fax: [phone]

## 2020-08-18 NOTE — TELEPHONE ENCOUNTER
Follow-up with anemia management service:    3rd message left for Ed, has he been dosing his Procrit?     Anemia Latest Ref Rng & Units 6/11/2020 6/18/2020 6/29/2020 7/23/2020 7/27/2020 8/3/2020 8/4/2020   DEB Dose - - - - - 10,000 units - 10,000 units   Hemoglobin 13.3 - 17.7 g/dL 10.6(A) 11.1(A) 11.2(L) 9.4(A) 9.8(A) 9.2(A) -   TSAT 15 - 46 % - - 59(H) - - - -   Ferritin 26 - 388 ng/mL - - 2,163(H) - - - -           Follow-up call date: 8/25/20  Did Ed call back?    Ana Castrejon RN   Anemia Services  [address] [address]   [email or web address]   Office : [phone]  Fax: [phone]

## 2020-08-19 NOTE — TELEPHONE ENCOUNTER
Anemia Management Note  SUBJECTIVE/OBJECTIVE:    Referred by Dr. Alvin Varner on 2020  Primary Diagnosis: Anemia in Chronic Kidney Disease (N18.4, D63.1)     Secondary Diagnosis:  Chronic Kidney Disease, Stage 4 (N18.4)   Kidney Tx: , , 2020  Hgb goal range:  9-10  Epo/Darbo: Procrit 10,000 units weekly for Hgb <10. At home.   Iron regimen:  TBD  Labs : 2021  Recent DEB use, transfusion, IV iron: unknown  RX/TX plans : TBD     No history of stroke, MI and blood clots. Hx of CML in 2017     *Currently has Home Care Mon/Thur for labs.      Labs: [facility]  Ph # [phone]  Fax # [phone]        **ONLY call Ed if his Hgb <10 and he needs to dose.         Contact:            Ok to leave message regarding Scheduling and Medical Information per consent to communicate dated 12/10/2017                              OK to speak with Danay Blanton (friend) and Kaylin Kolbenger (mother) regarding Scheduling and Medical Information per consent to communicate dated 12/10/2017.    Anemia Latest Ref Rng & Units 2020 2020 8/3/2020 2020 2020 2020 2020   DEB Dose - - 10,000 units - 10,000 units 10,000 units - 10,000 units   Hemoglobin 13.3 - 17.7 g/dL 9.4(A) 9.8(A) 9.2(A) - - 9.2(A) -   TSAT 15 - 46 % - - - - - - -   Ferritin 26 - 388 ng/mL - - - - - - -     BP Readings from Last 3 Encounters:   20 (!) 143/93   20 120/81   20 114/58     Wt Readings from Last 2 Encounters:   20 61.1 kg (134 lb 12.8 oz)   20 62 kg (136 lb 9.6 oz)       Ed returned message. He has been dosing every week.  He feels like the Procrit is not working, but his Hgb remains in goal range.  Will continue at current dose. No changes at this time.     ASSESSMENT:  Hgb:At goal - recommend dose  TSat: elevated at >50% Ferritin: Elevated (>1000ng/mL)    PLAN:  Dose with procrit and RTC for hgb then procrit if needed in 1 week(s)    Orders needed to be  renewed (for next follow-up date) in Letters and EPIC: None    Iron labs due:  Sept 2020    Plan discussed with:  Ed  Plan provided by:  bigg    NEXT FOLLOW-UP DATE:  8/27/20    Ana Castrejon RN   Anemia Services  [address] [address]   [email or web address]   Office : [phone]  Fax: [phone]

## 2020-08-27 NOTE — TELEPHONE ENCOUNTER
Anemia Management Note  SUBJECTIVE/OBJECTIVE:  Referred by Dr. Alvin Varner on 2020  Primary Diagnosis: Anemia in Chronic Kidney Disease (N18.4, D63.1)     Secondary Diagnosis:  Chronic Kidney Disease, Stage 4 (N18.4)   Kidney Tx: , , 2020  Hgb goal range:  9-10  Epo/Darbo: Procrit 10,000 units weekly for Hgb <10. At home.   Iron regimen:  TBD  Labs : 2021  Recent DEB use, transfusion, IV iron: unknown  RX/TX plans : TBD     No history of stroke, MI and blood clots. Hx of CML in 2017     *Currently has Home Care Mon/Thur for labs.      Labs: [facility]  Ph # [phone]  Fax # [phone]        **ONLY call Ed if his Hgb <10 and he needs to dose.         Contact:            Ok to leave message regarding Scheduling and Medical Information per consent to communicate dated 12/10/2017                              OK to speak with Danay Blanton (friend) and Kaylin Kolbenger (mother) regarding Scheduling and Medical Information per consent to communicate dated 12/10/2017.    Anemia Latest Ref Rng & Units 8/3/2020 2020 2020 2020 2020 2020 2020   DEB Dose - - 10,000 units 10,000 units - 10,000 units - 10,000 units   Hemoglobin 13.3 - 17.7 g/dL 9.2(A) - - 9.2(A) - 9.2(A) -   TSAT 15 - 46 % - - - - - - -   Ferritin 26 - 388 ng/mL - - - - - - -     BP Readings from Last 3 Encounters:   20 (!) 143/93   20 120/81   20 114/58     Wt Readings from Last 2 Encounters:   20 61.1 kg (134 lb 12.8 oz)   20 62 kg (136 lb 9.6 oz)           ASSESSMENT:  Hgb:At goal - recommend dose  TSat: elevated at >50% Ferritin: Elevated (>1000ng/mL)    PLAN:  Dose with procrit and RTC for hgb then procrit if needed in 1 week(s). He has 3 doses left.  If no movement in Hgb, will reach out to Dr. Varner, Ed would like his Procrit dose increased.     Orders needed to be renewed (for next follow-up date) in LETTERS - for external labs: None    Iron  labs due:  Sept 2020    Plan discussed with:  Ed  Plan provided by:  bigg    NEXT FOLLOW-UP DATE:  9/3/20    Ana Castrejon RN   Anemia Services  [address] [address]   [email or web address]   Office : [phone]  Fax: [phone]

## 2020-08-28 NOTE — TELEPHONE ENCOUNTER
Patient Call: General    Reason for call: Jeremiah left message for Jody. Adrianmakayla is wondering whether he needs to get labs done this morning at clinic in [facility] since he had them all done yesterday there. Please call him back ASAP and let him know. He is on his way now.     Call back needed? Yes    Return Call Needed  Same as documented in contacts section  When to return call?: Same day: Route High Priority

## 2020-08-28 NOTE — LETTER
8/28/2020       RE: Jeremiah Cruz  [address] [facility] [address]     Dear Colleague,    Thank you for referring your patient, Jeremiah Cruz, to the [facility] NEPHROLOGY at [facility]. Please see a copy of my visit note below.    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 Belatacept started July 20th 2020              - Baseline Cr ~ 2.0-2.3              - Proteinuria: Normal (<0.2 gm/gm)              - Date DSA Last Checked: June/2020      Latest DSA: No              - BK Viremia: No              - Kidney Tx Biopsy: Apr 03, 2020; Result: No diagnostic evidence of acute rejection.  Moderate interstitial fibrosis and tubular atrophy. Mesangial matrix deposition is normal     Patient has a fistula RUE, we would consider keeping this in beyond the 1 year benito given his higher creatinine. We could consider switching to weekly labs. He can have his PICC line removed today.     # Immunosuppression: Belatacept (conversion, currently 5mg/kg every 2 weeks), Tacrolimus immediate release (taper), Mycophenolic acid (540mg BID) and Prednisone (dose 5 mg daily)              - Changes: No. Tacrolimus to taper down with every hawa infusion until it falls off. Continue anti-metabolite. Get MPA level with his next lab draw     # Infection Prophylaxis:   - PJP: Dapsone 50mg daily  - CMV: Valcyte completed, patient is CMV IgG Ab discordant (D+/R-) and will check CMV PCR monthly until 12 months post transplant.     # Hypertension: Controlled;   Goal BP: < 140/90              - Changes: No, no changes to Amlodipine and Coreg.     # Elevated Blood Glucose: Glucose generally running ~                     Last HbA1c: 5.2% (2/28/20)              - Management as per primary care.      # Anemia in Chronic Renal Disease: Hgb: Stable ~9    DEB: Yes              - Iron studies: Replete, holding DEB since Hgb > 10                         # Mineral Bone  Disorder:   - Secondary renal hyperparathyroidism; PTH level: Normal (<80)    On treatment: None  - Vitamin D; level: Low        On Supplement: Yes  - Calcium; level: Normal        On Supplement: Yes  - Phosphorus; level: Normal        On Supplement: No     No longer needs calcitriol    # Electrolytes:   - Potassium; level: Normal          On Supplement: None  - Magnesium; level: Normal         On Supplement: No   - Bicarbonate; level: Normal        On Supplement: Yes     No longer needs kayexalate    # CML: Appears stable on dasatinib. Patient is followed by Hematology.     # Diarrhea: Resolved with switch to myfortic and probiotics. Fast foods cause worse diarrhea.      # GERD: Controlled famotidine, trying to avoid foods that exacerbate his symptoms.     # Medical Compliance: Yes      # Transplant History:  Etiology of Kidney Failure: Alport's syndrome  Tx: DDKT  Transplant: 2/28/2020 (Kidney), 12/7/1988 (Kidney), 3/2/2005 (Kidney)  Donor Type: Donation after Brain Death        Donor Class:   Crossmatch at time of Tx: negative  DSA at time of Tx: No  Significant changes in immunosuppression: None  CMV IgG Ab High Risk Discordance (D+/R-): Yes  EBV IgG Ab High Risk Discordance (D+/R-): No  Significant transplant-related complications: None  Transplant Office Phone Number: [phone]    Assessment and plan was discussed with the patient and he voiced his understanding and agreement.    Return visit: No follow-ups on file.     Pt staffed with Dr. Arian Kunz MD   Fellow    Physician Attestation   I, Joe Don MD, personally examined and evaluated this patient.  I discussed the patient with the resident/fellow and care team, and agree with the assessment and plan of care as documented in the note on 08/28/20 .      I personally reviewed vital signs, medications and labs.    Key findings: 45yM w/ Alport syndrome, CML, s/p kidney transplant #3 on 2/28/20, moderate IFTA on biopsy, now transitioning to  beltatacept. Has had issues with hyperK in the past. Scr stable at 1.9-2.2. Send DSA today. Remove PICC today  Joe Don MD  Date of Service (when I saw the patient): 08/28/20          Chief Complaint   Mr. Cruz is a 45 year old here for kidney transplant and immunosuppression management.     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 He is s/p kidney transplant x2 (1988, 2005) and now DDKT 3/5/2020. Post Tx, abd pain and diarrhea was major issues, infectious work up was negative. At this time, this issue has resolved. More recently, hyperkalemia has been the issue.     Patient reports an increase in weight and SOB several weeks ago and went to the ER (CXR done) and he had an enlarged heard and bilateral pleural effusions. He was told to decrease his fluid intake and salt intake, he was given lasix IV and reports the swelling and weight gain has improved. He no longer has diarrhea, and has been taking probiotics (was also switched to myfortic). He was switched to belatecept in July and his creatinine has started to trend down.     PICC line in place for blood draws given poor IV access    Recent Hospitalizations:  [] No [x] Yes ED visit for fluid overload, given alsix   New Medical Issues: [x] No [] Yes    Decreased energy: [] No [x] Yes Energy levels are low but stable   Chest pain or SOB with exertion:  [x] No [] Yes Climbs stairs without issue   Appetite change or weight change: [x] No [] Yes    Nausea, vomiting or diarrhea:  [x] No [] Yes    Fever, sweats or chills: [x] No [] Yes    Leg swelling: [x] No [] Yes      Home BP: 120-125/70-75    Review of Systems   A comprehensive review of systems was obtained and negative, except as noted in the HPI or PMH.    Problem List   Patient Active Problem List   Diagnosis     Alport's syndrome     Anemia in chronic kidney disease     Secondary renal hyperparathyroidism (H)     HTN, kidney transplant  related     CML (chronic myelocytic leukemia) (H)     GERD (gastroesophageal reflux disease)     Prurigo nodularis     Senile sebaceous gland hyperplasia     Immunosuppression (H)     Kidney replaced by transplant     Hyperkalemia     History of difficult venous access     Steroid-induced hyperglycemia     Aftercare following organ transplant     Vitamin D deficiency     Metabolic acidosis     Hypomagnesemia       Social History   Social History     Tobacco Use     Smoking status: Former Smoker     Packs/day: 1.00     Years: 12.00     Pack years: 12.00     Types: Cigarettes     Last attempt to quit: 2011     Years since quittin.5     Smokeless tobacco: Never Used   Substance Use Topics     Alcohol use: No     Alcohol/week: 0.0 standard drinks     Drug use: No       Allergies   Allergies   Allergen Reactions     Blood Transfusion Related (Informational Only) Other (See Comments)     Patient has a history of a clinically significant antibody against RBC antigens.  A delay in compatible RBCs may occur.     Cephalosporins Other (See Comments) and Rash     fever     Compazine [Prochlorperazine]      Gammagard [Immune Globulin] Other (See Comments)     Ed got spinal meningitis and MD stated to never get again for fear of death.       Penicillins      Vancomycin        Medications   Current Outpatient Medications   Medication Sig     amLODIPine (NORVASC) 10 MG tablet Take 1 tablet (10 mg) by mouth daily     atorvastatin (LIPITOR) 10 MG tablet Take 1 tablet (10 mg) by mouth daily     belatacept Aug 3 2020 belatacept conversion   [facility]  [phone]  Fax      calcium carbonate (TUMS) 500 MG chewable tablet Take 1 tablet (500 mg) by mouth 3 times daily as needed for heartburn Take 1 chewable tablet by mouth 3 times daily with meals.     carvedilol (COREG) 25 MG tablet Take 1 tablet (25 mg) by mouth 2 times daily (with meals)     dapsone (ACZONE) 25 MG tablet Take 2 tablets (50 mg) by  mouth daily     dasatinib (SPRYCEL) 20 MG tablet CHEMO Take 40 mg by mouth daily      famotidine (PEPCID) 40 MG tablet Take 1 tablet (40 mg) by mouth daily     loratadine (CLARITIN) 10 MG tablet Take 10 mg by mouth daily     MYFORTIC (BRAND) 180 MG EC tablet Take 3 tablets (540 mg) by mouth 2 times daily     predniSONE (DELTASONE) 5 MG tablet Take 1 tablet (5 mg) by mouth daily     Probiotic Product (UP4 PROBIOTICS ADULT) CAPS Take 1 capsule by mouth daily     sodium bicarbonate 650 MG tablet Take 2 tablets (1,300 mg) by mouth 3 times daily     tamsulosin (FLOMAX) 0.4 MG capsule Take 1 capsule (0.4 mg) by mouth At Bedtime     vitamin D3 (CHOLECALCIFEROL) 2000 units (50 mcg) tablet Take 1 tablet (2,000 Units) by mouth daily     epoetin justa (EPOGEN/PROCRIT) 62198 UNIT/ML injection Inject 1 mL (10,000 Units) Subcutaneous once a week For Hemoglobin <10.  HOLD if systolic BP >180. (Patient not taking: Reported on 6/30/2020)     sodium polystyrene (KAYEXALATE) 15 GM/60ML suspension Take 60 mLs (15 g) by mouth Every Mon, Wed, Fri Morning Hold if K less than 5.2 (Patient not taking: Reported on 8/28/2020)     tacrolimus (GENERIC EQUIVALENT) 0.5 MG capsule Take 1 capsule (0.5 mg) by mouth every evening Total dose = 3 mg in AM and 3.5 mg in PM (Patient not taking: Reported on 8/28/2020)     tacrolimus (GENERIC EQUIVALENT) 1 MG capsule Take 3 capsules (3 mg) by mouth 2 times daily Total dose = 3 mg in AM and 3.5 mg in PM (Patient not taking: Reported on 8/28/2020)     valGANciclovir (VALCYTE) 450 MG tablet Take 1 tablet (450 mg) by mouth daily Titrate dose up to a max of 2 tabs (900 mg) by mouth daily when directed by your transplant team.     No current facility-administered medications for this visit.      There are no discontinued medications.    Physical Exam   Vital Signs: /71   Pulse 60   Wt 64 kg (141 lb)   SpO2 98%   BMI 24.19 kg/m      GENERAL APPEARANCE: alert and no distress  HENT: sclerae  anicteric  LYMPHATICS: no cervical nodes  RESP: lungs clear to auscultation - no wheezes  CV: regular rhythm, normal rate  EDEMA: no LE edema bilaterally  ABDOMEN: soft, nondistended, nontender, bowel sounds normal  MS: extremities normal - no gross deformities noted  SKIN: no rash  ACCESS: RUE AVF, large, good thrill.     Data     Renal Latest Ref Rng & Units 8/27/2020 8/24/2020 8/20/2020   Na 133 - 144 mmol/L - - -   Na (external) 133 - 144 mmol/L 140 144 141   K 3.4 - 5.3 mmol/L - - -   K (external) 3.4 - 5.1 mmol/L 4.4 4.5 5.8(H)   Cl 94 - 109 mmol/L - - -   Cl (external) 96 - 109 mmol/L 110(H) 111(H) 110(H)   CO2 20 - 32 mmol/L - - -   CO2 (external) 21 - 33 mmol/L 22 19(L) 20(L)   BUN 7 - 30 mg/dL - - -   BUN (external) 6 - 24 gm/dL 28(H) 24 23   Cr 0.66 - 1.25 mg/dL - - -   Cr (external) 0.6 - 1.2 mg/dL 2.2(H) 2.2(H) 1.9(H)   Glucose 70 - 99 mg/dL - - -   Glucose (external) 70 - 99 mg/dL 101(H) 96 104(H)   Ca  8.5 - 10.1 mg/dL - - -   Ca (external) 8.2 - 10.0 mg/dL 9.5 9.1 9.0   Mg 1.6 - 2.3 mg/dL - - -   Mg (external) 1.7 - 2.4 mg/dL - 2.1 -     Bone Health Latest Ref Rng & Units 8/24/2020 8/17/2020 8/10/2020   Phos 2.5 - 4.5 mg/dL - - -   Phos (external) 2.6 - 4.7 mg/dL 4.0 3.8 3.9   PTHi 18 - 80 pg/mL - - -   Vit D Def 20 - 75 ug/L - - -     Heme Latest Ref Rng & Units 8/27/2020 8/24/2020 8/20/2020   WBC 4.0 - 11.0 10e9/L - - -   WBC (external) 4.1 - 10.9 x10:3/uL 4.7 4.5 4.4   Hgb 13.3 - 17.7 g/dL - 9.2(A) -   Hgb (external) 12.9 - 17.3 g/dL 9.2(L) 9.2(L) 8.8(L)   Plt 150 - 450 10e9/L - - -   Plt (external) 150 - 450 x10:3/uL 128(L) 147(L) 142(L)   ABSOLUTE NEUTROPHIL 1.6 - 8.3 10e9/L - - -   ABSOLUTE NEUTROPHILS (EXTERNAL) 1.9 - 8.1 x10:3/uL 3.7 3.6 3.5   ABSOLUTE LYMPHOCYTES 0.8 - 5.3 10e9/L - - -   ABSOLUTE LYMPHOCYTES (EXTERNAL) 1.0 - 3.9 x10:3/uL 0.3(L) 0.3(L) 0.3(L)   ABSOLUTE MONOCYTES 0.0 - 1.3 10e9/L - - -   ABSOLUTE MONOCYTES (EXTERNAL) 0.1 - 0.9 x10:3/uL 0.5 0.5 0.5   ABSOLUTE EOSINOPHILS 0.0 -  0.7 10e9/L - - -   ABSOLUTE EOSINOPHILS (EXTERNAL) 0.0 - 0.5 x10:3/uL 0.2 0.1 0.1   ABSOLUTE BASOPHILS 0.0 - 0.2 10e9/L - - -   ABSOLUTE BASOPHILS (EXTERNAL) 0.0 - 0.2 x10:3/uL 0.0 0.0 0.0   ABS IMMATURE GRANULOCYTES 0 - 0.4 10e9/L - - -   ABSOLUTE NUCLEATED RBC - - - -     Liver Latest Ref Rng & Units 5/7/2020 3/5/2020 2/28/2020   AP 40 - 150 U/L - - 108   AP (external) 45 - 115 IU/L 62 - -   TBili 0.2 - 1.3 mg/dL - 0.6 0.6   TBili (external) 0.4 - 1.4 mg/dL 0.3(L) - -   DBili 0.0 - 0.2 mg/dL - 0.1 -   DBili (external) 0.0 - 0.3 mg/dL 0.1 - -   ALT 0 - 70 U/L - - 48   ALT (external) 12 - 70 U/L 30 - -   AST 0 - 45 U/L - - 30   AST (external) 15 - 46 U/L 12(L) - -   Tot Protein 6.8 - 8.8 g/dL - - 7.9   Tot Protein (external) 6.2 - 8.2 g/dL 6.4 - -   Albumin 3.4 - 5.0 g/dL - - 4.0   Albumin (external) 3.3 - 5.0 g/dL 3.8 - -     Pancreas Latest Ref Rng & Units 5/4/2020 2/28/2020   A1C 0 - 5.6 % - 5.2   A1C (external) 4.0 - 6.0 % 5.7 -     Iron studies Latest Ref Rng & Units 6/29/2020 3/3/2020 3/8/2005   Iron 35 - 180 ug/dL 163 139 43   Iron sat 15 - 46 % 59(H) 98(H) -   Ferritin 26 - 388 ng/mL 2,163(H) 3,397(H) -     [facility] Txp Virology Latest Ref Rng & Units 8/10/2020 7/20/2020 7/9/2020   CMV IgG EU/mL - - -   CMV DNA Quant Ext Undetected IU/mL - - Undetected   BK Spec - - - -   BK Res BKNEG copies/mL - - -   BK Log <2.7 Log copies/mL - - -   BK Quant Result Ext None Detected IU/mL None Detected None Detected -   BK Quant Spec Ext - - - -   EBV IgG - - - -   EBV CAPSID ANTIBODY IGG 0.0 - 0.8 AI - - -   Hep B Core NR - - -   Hep B Surf NEG - - -   HIV 1&2 NEG - - -        Recent Labs   Lab Test 03/14/20  0830 03/16/20  0807 06/29/20  0914   DOSTAC Not Provided Not Provided Not Provided   TACROL 9.2 10.8 10.3             Again, thank you for allowing me to participate in the care of your patient.      Sincerely,    -Joe Don MD, FAUSTO  Transplant Nephrology  Pager: [phone]

## 2020-08-28 NOTE — PROGRESS NOTES
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 Belatacept started July 20th 2020              - Baseline Cr ~ 2.0-2.3              - Proteinuria: Normal (<0.2 gm/gm)              - Date DSA Last Checked: June/2020      Latest DSA: No              - BK Viremia: No              - Kidney Tx Biopsy: Apr 03, 2020; Result: No diagnostic evidence of acute rejection.  Moderate interstitial fibrosis and tubular atrophy. Mesangial matrix deposition is normal     Patient has a fistula RUE, we would consider keeping this in beyond the 1 year benito given his higher creatinine. We could consider switching to weekly labs. He can have his PICC line removed today.     # Immunosuppression: Belatacept (conversion, currently 5mg/kg every 2 weeks), Tacrolimus immediate release (taper), Mycophenolic acid (540mg BID) and Prednisone (dose 5 mg daily)              - Changes: No. Tacrolimus to taper down with every hawa infusion until it falls off. Continue anti-metabolite. Get MPA level with his next lab draw     # Infection Prophylaxis:   - PJP: Dapsone 50mg daily  - CMV: Valcyte completed, patient is CMV IgG Ab discordant (D+/R-) and will check CMV PCR monthly until 12 months post transplant.     # Hypertension: Controlled;   Goal BP: < 140/90              - Changes: No, no changes to Amlodipine and Coreg.     # Elevated Blood Glucose: Glucose generally running ~                     Last HbA1c: 5.2% (2/28/20)              - Management as per primary care.      # Anemia in Chronic Renal Disease: Hgb: Stable ~9    DEB: Yes              - Iron studies: Replete, holding DEB since Hgb > 10                         # Mineral Bone Disorder:   - Secondary renal hyperparathyroidism; PTH level: Normal (<80)    On treatment: None  - Vitamin D; level: Low        On Supplement: Yes  - Calcium; level: Normal        On Supplement: Yes  - Phosphorus; level: Normal        On Supplement: No     No longer needs  calcitriol    # Electrolytes:   - Potassium; level: Normal          On Supplement: None  - Magnesium; level: Normal         On Supplement: No   - Bicarbonate; level: Normal        On Supplement: Yes     No longer needs kayexalate    # CML: Appears stable on dasatinib. Patient is followed by Hematology.     # Diarrhea: Resolved with switch to myfortic and probiotics. Fast foods cause worse diarrhea.      # GERD: Controlled famotidine, trying to avoid foods that exacerbate his symptoms.     # Medical Compliance: Yes      # Transplant History:  Etiology of Kidney Failure: Alport's syndrome  Tx: DDKT  Transplant: 2/28/2020 (Kidney), 12/7/1988 (Kidney), 3/2/2005 (Kidney)  Donor Type: Donation after Brain Death        Donor Class:   Crossmatch at time of Tx: negative  DSA at time of Tx: No  Significant changes in immunosuppression: None  CMV IgG Ab High Risk Discordance (D+/R-): Yes  EBV IgG Ab High Risk Discordance (D+/R-): No  Significant transplant-related complications: None  Transplant Office Phone Number: [phone]    Assessment and plan was discussed with the patient and he voiced his understanding and agreement.    Return visit: No follow-ups on file.     Pt staffed with Dr. Arian Kunz MD   Fellow    Physician Attestation   I, Joe Don MD, personally examined and evaluated this patient.  I discussed the patient with the resident/fellow and care team, and agree with the assessment and plan of care as documented in the note on 08/28/20 .      I personally reviewed vital signs, medications and labs.    Key findings: 45yM w/ Alport syndrome, CML, s/p kidney transplant #3 on 2/28/20, moderate IFTA on biopsy, now transitioning to beltatacept. Has had issues with hyperK in the past. Scr stable at 1.9-2.2. Send DSA today. Remove PICC today  Joe Don MD  Date of Service (when I saw the patient): 08/28/20          Chief Complaint   Mr. Cruz is a 45 year old here for kidney transplant and  immunosuppression management.     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 He is s/p kidney transplant x2 (1988, 2005) and now DDKT 3/5/2020. Post Tx, abd pain and diarrhea was major issues, infectious work up was negative. At this time, this issue has resolved. More recently, hyperkalemia has been the issue.     Patient reports an increase in weight and SOB several weeks ago and went to the ER (CXR done) and he had an enlarged heard and bilateral pleural effusions. He was told to decrease his fluid intake and salt intake, he was given lasix IV and reports the swelling and weight gain has improved. He no longer has diarrhea, and has been taking probiotics (was also switched to myfortic). He was switched to belatecept in July and his creatinine has started to trend down.     PICC line in place for blood draws given poor IV access    Recent Hospitalizations:  [] No [x] Yes ED visit for fluid overload, given alsix   New Medical Issues: [x] No [] Yes    Decreased energy: [] No [x] Yes Energy levels are low but stable   Chest pain or SOB with exertion:  [x] No [] Yes Climbs stairs without issue   Appetite change or weight change: [x] No [] Yes    Nausea, vomiting or diarrhea:  [x] No [] Yes    Fever, sweats or chills: [x] No [] Yes    Leg swelling: [x] No [] Yes      Home BP: 120-125/70-75    Review of Systems   A comprehensive review of systems was obtained and negative, except as noted in the HPI or PMH.    Problem List   Patient Active Problem List   Diagnosis     Alport's syndrome     Anemia in chronic kidney disease     Secondary renal hyperparathyroidism (H)     HTN, kidney transplant related     CML (chronic myelocytic leukemia) (H)     GERD (gastroesophageal reflux disease)     Prurigo nodularis     Senile sebaceous gland hyperplasia     Immunosuppression (H)     Kidney replaced by transplant     Hyperkalemia     History of difficult venous access      Steroid-induced hyperglycemia     Aftercare following organ transplant     Vitamin D deficiency     Metabolic acidosis     Hypomagnesemia       Social History   Social History     Tobacco Use     Smoking status: Former Smoker     Packs/day: 1.00     Years: 12.00     Pack years: 12.00     Types: Cigarettes     Last attempt to quit: 2011     Years since quittin.5     Smokeless tobacco: Never Used   Substance Use Topics     Alcohol use: No     Alcohol/week: 0.0 standard drinks     Drug use: No       Allergies   Allergies   Allergen Reactions     Blood Transfusion Related (Informational Only) Other (See Comments)     Patient has a history of a clinically significant antibody against RBC antigens.  A delay in compatible RBCs may occur.     Cephalosporins Other (See Comments) and Rash     fever     Compazine [Prochlorperazine]      Gammagard [Immune Globulin] Other (See Comments)     Ed got spinal meningitis and MD stated to never get again for fear of death.       Penicillins      Vancomycin        Medications   Current Outpatient Medications   Medication Sig     amLODIPine (NORVASC) 10 MG tablet Take 1 tablet (10 mg) by mouth daily     atorvastatin (LIPITOR) 10 MG tablet Take 1 tablet (10 mg) by mouth daily     belatacept Aug 3 2020 belatacept conversion   [facility]    Fax      calcium carbonate (TUMS) 500 MG chewable tablet Take 1 tablet (500 mg) by mouth 3 times daily as needed for heartburn Take 1 chewable tablet by mouth 3 times daily with meals.     carvedilol (COREG) 25 MG tablet Take 1 tablet (25 mg) by mouth 2 times daily (with meals)     dapsone (ACZONE) 25 MG tablet Take 2 tablets (50 mg) by mouth daily     dasatinib (SPRYCEL) 20 MG tablet CHEMO Take 40 mg by mouth daily      famotidine (PEPCID) 40 MG tablet Take 1 tablet (40 mg) by mouth daily     loratadine (CLARITIN) 10 MG tablet Take 10 mg by mouth daily     MYFORTIC (BRAND) 180 MG EC tablet Take 3  tablets (540 mg) by mouth 2 times daily     predniSONE (DELTASONE) 5 MG tablet Take 1 tablet (5 mg) by mouth daily     Probiotic Product (UP4 PROBIOTICS ADULT) CAPS Take 1 capsule by mouth daily     sodium bicarbonate 650 MG tablet Take 2 tablets (1,300 mg) by mouth 3 times daily     tamsulosin (FLOMAX) 0.4 MG capsule Take 1 capsule (0.4 mg) by mouth At Bedtime     vitamin D3 (CHOLECALCIFEROL) 2000 units (50 mcg) tablet Take 1 tablet (2,000 Units) by mouth daily     epoetin justa (EPOGEN/PROCRIT) 52186 UNIT/ML injection Inject 1 mL (10,000 Units) Subcutaneous once a week For Hemoglobin <10.  HOLD if systolic BP >180. (Patient not taking: Reported on 6/30/2020)     sodium polystyrene (KAYEXALATE) 15 GM/60ML suspension Take 60 mLs (15 g) by mouth Every Mon, Wed, Fri Morning Hold if K less than 5.2 (Patient not taking: Reported on 8/28/2020)     tacrolimus (GENERIC EQUIVALENT) 0.5 MG capsule Take 1 capsule (0.5 mg) by mouth every evening Total dose = 3 mg in AM and 3.5 mg in PM (Patient not taking: Reported on 8/28/2020)     tacrolimus (GENERIC EQUIVALENT) 1 MG capsule Take 3 capsules (3 mg) by mouth 2 times daily Total dose = 3 mg in AM and 3.5 mg in PM (Patient not taking: Reported on 8/28/2020)     valGANciclovir (VALCYTE) 450 MG tablet Take 1 tablet (450 mg) by mouth daily Titrate dose up to a max of 2 tabs (900 mg) by mouth daily when directed by your transplant team.     No current facility-administered medications for this visit.      There are no discontinued medications.    Physical Exam   Vital Signs: /71   Pulse 60   Wt 64 kg (141 lb)   SpO2 98%   BMI 24.19 kg/m      GENERAL APPEARANCE: alert and no distress  HENT: sclerae anicteric  LYMPHATICS: no cervical nodes  RESP: lungs clear to auscultation - no wheezes  CV: regular rhythm, normal rate  EDEMA: no LE edema bilaterally  ABDOMEN: soft, nondistended, nontender, bowel sounds normal  MS: extremities normal - no gross deformities noted  SKIN: no  rash  ACCESS: RUE AVF, large, good thrill.     Data     Renal Latest Ref Rng & Units 8/27/2020 8/24/2020 8/20/2020   Na 133 - 144 mmol/L - - -   Na (external) 133 - 144 mmol/L 140 144 141   K 3.4 - 5.3 mmol/L - - -   K (external) 3.4 - 5.1 mmol/L 4.4 4.5 5.8(H)   Cl 94 - 109 mmol/L - - -   Cl (external) 96 - 109 mmol/L 110(H) 111(H) 110(H)   CO2 20 - 32 mmol/L - - -   CO2 (external) 21 - 33 mmol/L 22 19(L) 20(L)   BUN 7 - 30 mg/dL - - -   BUN (external) 6 - 24 gm/dL 28(H) 24 23   Cr 0.66 - 1.25 mg/dL - - -   Cr (external) 0.6 - 1.2 mg/dL 2.2(H) 2.2(H) 1.9(H)   Glucose 70 - 99 mg/dL - - -   Glucose (external) 70 - 99 mg/dL 101(H) 96 104(H)   Ca  8.5 - 10.1 mg/dL - - -   Ca (external) 8.2 - 10.0 mg/dL 9.5 9.1 9.0   Mg 1.6 - 2.3 mg/dL - - -   Mg (external) 1.7 - 2.4 mg/dL - 2.1 -     Bone Health Latest Ref Rng & Units 8/24/2020 8/17/2020 8/10/2020   Phos 2.5 - 4.5 mg/dL - - -   Phos (external) 2.6 - 4.7 mg/dL 4.0 3.8 3.9   PTHi 18 - 80 pg/mL - - -   Vit D Def 20 - 75 ug/L - - -     Heme Latest Ref Rng & Units 8/27/2020 8/24/2020 8/20/2020   WBC 4.0 - 11.0 10e9/L - - -   WBC (external) 4.1 - 10.9 x10:3/uL 4.7 4.5 4.4   Hgb 13.3 - 17.7 g/dL - 9.2(A) -   Hgb (external) 12.9 - 17.3 g/dL 9.2(L) 9.2(L) 8.8(L)   Plt 150 - 450 10e9/L - - -   Plt (external) 150 - 450 x10:3/uL 128(L) 147(L) 142(L)   ABSOLUTE NEUTROPHIL 1.6 - 8.3 10e9/L - - -   ABSOLUTE NEUTROPHILS (EXTERNAL) 1.9 - 8.1 x10:3/uL 3.7 3.6 3.5   ABSOLUTE LYMPHOCYTES 0.8 - 5.3 10e9/L - - -   ABSOLUTE LYMPHOCYTES (EXTERNAL) 1.0 - 3.9 x10:3/uL 0.3(L) 0.3(L) 0.3(L)   ABSOLUTE MONOCYTES 0.0 - 1.3 10e9/L - - -   ABSOLUTE MONOCYTES (EXTERNAL) 0.1 - 0.9 x10:3/uL 0.5 0.5 0.5   ABSOLUTE EOSINOPHILS 0.0 - 0.7 10e9/L - - -   ABSOLUTE EOSINOPHILS (EXTERNAL) 0.0 - 0.5 x10:3/uL 0.2 0.1 0.1   ABSOLUTE BASOPHILS 0.0 - 0.2 10e9/L - - -   ABSOLUTE BASOPHILS (EXTERNAL) 0.0 - 0.2 x10:3/uL 0.0 0.0 0.0   ABS IMMATURE GRANULOCYTES 0 - 0.4 10e9/L - - -   ABSOLUTE NUCLEATED RBC - - - -      Liver Latest Ref Rng & Units 5/7/2020 3/5/2020 2/28/2020   AP 40 - 150 U/L - - 108   AP (external) 45 - 115 IU/L 62 - -   TBili 0.2 - 1.3 mg/dL - 0.6 0.6   TBili (external) 0.4 - 1.4 mg/dL 0.3(L) - -   DBili 0.0 - 0.2 mg/dL - 0.1 -   DBili (external) 0.0 - 0.3 mg/dL 0.1 - -   ALT 0 - 70 U/L - - 48   ALT (external) 12 - 70 U/L 30 - -   AST 0 - 45 U/L - - 30   AST (external) 15 - 46 U/L 12(L) - -   Tot Protein 6.8 - 8.8 g/dL - - 7.9   Tot Protein (external) 6.2 - 8.2 g/dL 6.4 - -   Albumin 3.4 - 5.0 g/dL - - 4.0   Albumin (external) 3.3 - 5.0 g/dL 3.8 - -     Pancreas Latest Ref Rng & Units 5/4/2020 2/28/2020   A1C 0 - 5.6 % - 5.2   A1C (external) 4.0 - 6.0 % 5.7 -     Iron studies Latest Ref Rng & Units 6/29/2020 3/3/2020 3/8/2005   Iron 35 - 180 ug/dL 163 139 43   Iron sat 15 - 46 % 59(H) 98(H) -   Ferritin 26 - 388 ng/mL 2,163(H) 3,397(H) -     [facility] Txp Virology Latest Ref Rng & Units 8/10/2020 7/20/2020 7/9/2020   CMV IgG EU/mL - - -   CMV DNA Quant Ext Undetected IU/mL - - Undetected   BK Spec - - - -   BK Res BKNEG copies/mL - - -   BK Log <2.7 Log copies/mL - - -   BK Quant Result Ext None Detected IU/mL None Detected None Detected -   BK Quant Spec Ext - - - -   EBV IgG - - - -   EBV CAPSID ANTIBODY IGG 0.0 - 0.8 AI - - -   Hep B Core NR - - -   Hep B Surf NEG - - -   HIV 1&2 NEG - - -        Recent Labs   Lab Test 03/14/20  0830 03/16/20  0807 06/29/20  0914   DOSTAC Not Provided Not Provided Not Provided   TACROL 9.2 10.8 10.3

## 2020-08-28 NOTE — PROGRESS NOTES
Nursing Note  Jeremiah Cruz [facility]:   Chief Complaint   Patient presents with     Other     PICC removal     During today's [facility] appointment, orders from Dr. Kunz were completed.  Frequency: once    Progress note:  Patient identification verified by name and date of birth.    Vitals recorded in Doc Flowsheets.  Patient was provided with education regarding procedure and possible side effects.  Patient verbalized understanding.      Treatment Conditions: Patient monitored for 30 minutes after PICC removal.        Vascular access: PICC d/ant today        Discharge Plan:   Follow up plan of care with: primary care provider, and transplant coordinator.  Discharge instructions were reviewed with patient.  Patient/representative verbalized understanding of discharge instructions and all questions answered.  [facility] discharged [facility] in stable condition.    Maryan Morrison RN        /74   Pulse 79   Resp 18   SpO2 97%

## 2020-08-28 NOTE — LETTER
8/28/2020         RE: Jeremiah Cruz  [address] [facility] [address]        Dear Colleague,    Thank you for referring your patient, Jeremiah Cruz, to the [facility]. Please see a copy of my visit note below.    Nursing Note  Jeremiah Cruz [facility] for:   Chief Complaint   Patient presents with     Other     PICC removal     During [facility]'s [facility] appointment, orders from Dr. Kunz were completed.  Frequency: once    Progress note:  Patient identification verified by name and date of birth.    Vitals recorded in Doc Flowsheets.  Patient was provided with education regarding procedure and possible side effects.  Patient verbalized understanding.      Treatment Conditions: Patient monitored for 30 minutes after PICC removal.        Vascular access: PICC d/ant today        Discharge Plan:   Follow up plan of care with: primary care provider, and transplant coordinator.  Discharge instructions were reviewed with patient.  Patient/representative verbalized understanding of discharge instructions and all questions answered.  [facility] discharged [facility] in stable condition.    Maryan Morrison RN        /74   Pulse 79   Resp 18   SpO2 97%         Again, thank you for allowing me to participate in the care of your patient.        Sincerely,        [facility]

## 2020-08-31 NOTE — LETTER
OUTPATIENT LABORATORY TEST ORDER    Patient: Jeremiah Cruz    Transplant Date: 2/28/2020  YOB: 1975    Ordered By: Dr. Carol Samuel  MRN: [id number]     Issued Date/Time: 9/1/2020  7:32 AM         Expiration Date: 2/28/2021    Diagnosis: Kidney Transplant (ICD-10 Z94.0)    Aftercare following organ transplant (ICD-10 Z48.288)    Long term use of medications (ICD-10 Z79.899)      Lab results to be available on the same day drawn    Patient should release information to the [facility].     Please fax all results to [phone]    9/1/2020 - 11/30/2020)  Labs every other week    CBC with platelets and differential    Basic Metabolic Panel (Sodium, Potassium, Chloride, Creatinine, CO2, Urea Nitrogen, glucose, Calcium)    Tacrolimus/Prograf/ drug level    Mycophenolic Acid  Monthly    Phosphorus, magnesium    BK (Polyoma Virus) PCR Quantitative/Plasma    CMV PCR QT  **At month 7 only (9/2020)     DSA PRA (mailers provided by patient)     12/1/2020 - 2/28/2021  Every other week    CBC with platelets and differential    Basic Metabolic Panel (Sodium, Potassium, Chloride, Creatinine, CO2, Urea Nitrogen, glucose, Calcium)  Monthly    BK (Polyoma Virus) PCR Quantitative/Plasma    CMV PCR QT    DSA PRA (mailers provided by patient)      At 9 months post-transplant  Due: 10/28/2020    Urine protein/creatinine    At 12 months post-transplant (Fasting labs)  Due: 2/28/2021    LFTs    Hemoglobin A1c    Uric Acid    Lipid panel    Urine protein/creatinine    DSA PRA    PTH    Vitamin D      If you have any questions please call the Transplant Center at [phone] option [phone]. All lab results should be faxed to [phone]        Carol Samuel MD FACS  Assistant Professor of Surgery  Director, [facility].

## 2020-09-01 NOTE — TELEPHONE ENCOUNTER
[phone] phone [phone] fax  [facility]  Home care    Issue overdue for Donor Specific Antibodies     CMV  Mismatch     Please fax to home care and mail copy to ED      Task   Please update lab orders to weekly while  On belatacept conversion (Nov 2020 ) then every 2 weeks      CBC with platelets diff     Basic Metabolic Panel (Sodium, Potassium, Chloride, Creatinine, CO2, Urea Nitrogen, glucose, Calcium)    Mycophenolic Acid (mpa) drug level     Monthly CMV PCR QT    Monthly BK PCR QT      Donor Specific Antibodies  Monthly times 4 months then every 6 months    Plus include yearly labs urine protein as listed on routine

## 2020-09-03 NOTE — TELEPHONE ENCOUNTER
Anemia Management Note  SUBJECTIVE/OBJECTIVE:  Referred by Dr. Alvin Varner on 2020  Primary Diagnosis: Anemia in Chronic Kidney Disease (N18.4, D63.1)     Secondary Diagnosis:  Chronic Kidney Disease, Stage 4 (N18.4)   Kidney Tx: , , 2020  Hgb goal range:  9-10  Epo/Darbo: Procrit 10,000 units weekly for Hgb <10. At home.   Iron regimen:  TBD  Labs : 2021  Recent DEB use, transfusion, IV iron: unknown  RX/TX plans : TBD     No history of stroke, MI and blood clots. Hx of CML in 2017     *Currently has Home Care Mon/Thur for labs.      Labs: [facility]  Ph # [phone]  Fax # [phone]        **ONLY call Ed if his Hgb <10 and he needs to dose.         Contact:            Ok to leave message regarding Scheduling and Medical Information per consent to communicate dated 12/10/2017                              OK to speak with Danay Blanton (friend) and Kaylin Lilly (mother) regarding Scheduling and Medical Information per consent to communicate dated 12/10/2017.    Anemia Latest Ref Rng & Units 2020 2020 2020 2020 2020 2020 9/3/2020   DEB Dose - 10,000 units - 10,000 units - 10,000 units - 10,000 units   Hemoglobin 13.3 - 17.7 g/dL - 9.2(A) - 9.2(A) - 9.4(A) -   TSAT 15 - 46 % - - - - - - -   Ferritin 26 - 388 ng/mL - - - - - - -     BP Readings from Last 3 Encounters:   20 115/74   20 120/71   20 (!) 143/93     Wt Readings from Last 2 Encounters:   20 64 kg (141 lb)   20 61.1 kg (134 lb 12.8 oz)           ASSESSMENT:  Hgb:At goal - recommend dose  TSat: elevated at >50% Ferritin: Elevated (>1000ng/mL)    PLAN:  Dose with procrit and RTC for hgb then procrit if needed in 1 week(s)    Orders needed to be renewed (for next follow-up date) in Epic and Letters: None    Iron labs due:  2020    Plan discussed with:  No call today. Does every Thursday  Plan provided by:  bigg    NEXT FOLLOW-UP DATE:   9/10/2020    Ana Castrejon RN   [facility]  [address] [address]   [email or web address]   Office : [phone]  Fax: [phone]

## 2020-09-10 NOTE — TELEPHONE ENCOUNTER
Follow-up with anemia management service:    LVFREDERICK for Ed, did he have labs drawn this week?    Anemia Latest Ref Rng & Units 8/12/2020 8/17/2020 8/19/2020 8/24/2020 8/27/2020 8/31/2020 9/3/2020   DEB Dose - 10,000 units - 10,000 units - 10,000 units - 10,000 units   Hemoglobin 13.3 - 17.7 g/dL - 9.2(A) - 9.2(A) - 9.4(A) -   TSAT 15 - 46 % - - - - - - -   Ferritin 26 - 388 ng/mL - - - - - - -           Follow-up call date: 9/17/20  Did Ed call back    Ana Castrejon RN   Anemia Services  [address] [address]   [email or web address]   Office : [phone]  Fax: [phone]

## 2020-09-14 NOTE — TELEPHONE ENCOUNTER
Clinical Pharmacy Consult:                                                      Transplant Specific:  6 Month Post Transplant Pharmacy Call  Date of Transplant: 02/28/2020  Type of Transplant: kidney  First Transplant: no this is #3  History of rejection: yes    Immunosuppression Regimen   TAC 3mg qAM & 3.5mg qPM, belatacept 5mg/kg every 4 weeks,  Prednisone 5mg qAM and Myfortic 540mg qAM & 540mg qPM  Immunosuppressant Levels:6 on 8/31/2020 therapeutic  Patient specific goal: 8-10  Pt adherent to lab draws: yes  Scr:   Lab Results   Component Value Date    CR 2.42 03/16/2020     Side effects: None     Prophylactic Medications  Antibacterial:  Dapsone 50mg daily  Scheduled Discontinue Date: life long    Antifungal: Not needed thus far  Scheduled Discontinue Date: not on    Antiviral: CrCl 40 to 59 mL/minute: Valcyte 450 mg once daily   Scheduled Discontinue Date: Therapy completed    Acid Reducer: Pepcid (famotidine)  Scheduled Reviewed Date: unknown    Thrombosis Prevention: not on   Scheduled Discontinue Date: not on    Blood Pressure Management  Frequency of home Blood Pressure checks: unknown  Most recent home BP: 115/74 at infusion visit on 8/28/20  Patient Blood pressure goal: <140/90  Patient blood pressure at goal:  yes  Hospitalizations/ER visits since last assessment: 0      Med rec/DUR performed: yes  Med Rec Discrepancies: No    Unable to reach Edward.  He appears fairly compliant per refill history here at the pharmacy.  He started belatacept and is tapering tacrolimus a little with each infusion per notes from Dr. Kunz on 8/28/20.  No concerns today.  Will follow up in 6 months.    Katie Echevarria RP

## 2020-09-17 NOTE — TELEPHONE ENCOUNTER
Anemia Management Note  SUBJECTIVE/OBJECTIVE:  Referred by Dr. Alvin Varner on 2020  Primary Diagnosis: Anemia in Chronic Kidney Disease (N18.4, D63.1)     Secondary Diagnosis:  Chronic Kidney Disease, Stage 4 (N18.4)   Kidney Tx: , , 2020  Hgb goal range:  9-10  Epo/Darbo: Procrit 10,000 units weekly for Hgb <10. At home.   Iron regimen:  TBD  Labs : 2021  Recent DEB use, transfusion, IV iron: unknown  RX/TX plans : TBD     No history of stroke, MI and blood clots. Hx of CML in 2017     *Currently has Home Care Mon/Thur for labs.      Labs: [facility]  Ph # [phone]  Fax # [phone]        **ONLY call Ed if his Hgb <10 and he needs to dose.         Contact:            Ok to leave message regarding Scheduling and Medical Information per consent to communicate dated 12/10/2017                              OK to speak with Danay Blanton (friend) and Kaylin Kolbenger (mother) regarding Scheduling and Medical Information per consent to communicate dated 12/10/2017.    Anemia Latest Ref Rng & Units 2020 2020 2020 9/3/2020 9/10/2020 9/15/2020 2020   DEB Dose - - 10,000 units - 10,000 units 10,000 units - 10,000 units   Hemoglobin 13.3 - 17.7 g/dL 9.2(A) - 9.4(A) - - 9.2(A) -   TSAT 15 - 46 % - - - - - - -   Ferritin 26 - 388 ng/mL - - - - - - -     BP Readings from Last 3 Encounters:   20 115/74   20 120/71   20 (!) 143/93     Wt Readings from Last 2 Encounters:   20 64 kg (141 lb)   20 61.1 kg (134 lb 12.8 oz)       LVM for Ed, did he dose last week, he needs to dose again today.      ASSESSMENT:  Hgb:At goal - recommend dose  TSat: elevated at >50% Ferritin: Elevated (>1000ng/mL)    PLAN:  Dose with procrit and RTC for hgb then procrit if needed in 1 week(s)    Orders needed to be renewed (for next follow-up date) in LETTERS - for external labs: None    Iron labs due:  End     Plan discussed with:  CAM  for Ed  Plan provided by:  bigg    NEXT FOLLOW-UP DATE:  9/24/20    Ana Castrejon RN   [facility]  [address] [address]   [email or web address]   Office : [phone]  Fax: [phone]

## 2020-09-29 NOTE — TELEPHONE ENCOUNTER
Prior Authorization Approval    Authorization Effective Date: 7/1/2020  Authorization Expiration Date: 1/19/2021  Medication: Procrit [id number] unit- pa approved  Approved Dose/Quantity: UD  Reference #:     Insurance Company: [employer] Part D - Phone [phone] Fax [phone]  Expected CoPay:       CoPay Card Available:      Foundation Assistance Needed:    Which Pharmacy is filling the prescription (Not needed for infusion/clinic administered): [facility] - [place] [address]  Pharmacy Notified: Yes  Patient Notified: Yes

## 2020-10-01 NOTE — TELEPHONE ENCOUNTER
Follow-up with anemia management service:    Ed has not returned thelast couple of calls to Anemia Services.     MyChart message sent today.  Is he dosing his DEB weekly?    Anemia Latest Ref Rng & Units 8/24/2020 8/27/2020 8/31/2020 9/3/2020 9/10/2020 9/15/2020 9/17/2020   DEB Dose - - 10,000 units - 10,000 units 10,000 units - 10,000 units   Hemoglobin 13.3 - 17.7 g/dL 9.2(A) - 9.4(A) - - 9.2(A) -   TSAT 15 - 46 % - - - - - - -   Ferritin 26 - 388 ng/mL - - - - - - -           Follow-up call date: 10/8/2020    Ana Castrejon RN   Anemia Services  [address] [address]   [email or web address]   Office : [phone]  Fax: [phone]

## 2020-10-08 NOTE — TELEPHONE ENCOUNTER
Ed has not returned calls or replied to [employer] message regarding Procrit dosing and labs.     Ana Castrejon RN   Anemia Services  [address] [address]   [email or web address]   Office : [phone]  Fax: [phone]

## 2020-10-15 NOTE — TELEPHONE ENCOUNTER
Follow-up with anemia management service:    Ed has not returned calls to Anemia Services or responded to [employer] messages. Unsure if Ed is dosing his Procrit weekly.    Hgb is stable.     Anemia Latest Ref Rng & Units 8/27/2020 8/31/2020 9/3/2020 9/10/2020 9/15/2020 9/17/2020 10/13/2020   DEB Dose - 10,000 units - 10,000 units 10,000 units - 10,000 units -   Hemoglobin 13.3 - 17.7 g/dL - 9.4(A) - - 9.2(A) - 9.5(A)   TSAT 15 - 46 % - - - - - - -   Ferritin 26 - 388 ng/mL - - - - - - -         Follow-up call date: 10/29/2020    Ana Castrejon RN   Anemia Services  [address] [address]   [email or web address]   Office : [phone]  Fax: [phone]

## 2020-10-29 NOTE — TELEPHONE ENCOUNTER
Anemia Management Note  SUBJECTIVE/OBJECTIVE:  Referred by Dr. Alvin Varner on 2020  Primary Diagnosis: Anemia in Chronic Kidney Disease (N18.4, D63.1)     Secondary Diagnosis:  Chronic Kidney Disease, Stage 4 (N18.4)   Kidney Tx: , , 2020  Hgb goal range:  9-10  Epo/Darbo: Procrit 10,000 units weekly for Hgb <10. At home.   Iron regimen:  TBD  Labs : 2021  Recent DEB use, transfusion, IV iron: unknown  RX/TX plans : TBD     No history of stroke, MI and blood clots. Hx of CML in 2017     *Currently has Home Care Mon/Thur for labs.      Labs: [facility]  Ph # [phone]  Fax # [phone]        **ONLY call Ed if his Hgb <10 and he needs to dose.         Contact:            Ok to leave message regarding Scheduling and Medical Information per consent to communicate dated 12/10/2017                              OK to speak with Danay Blanton (friend) and Kaylin Kolbenger (mother) regarding Scheduling and Medical Information per consent to communicate dated 12/10/2017.    Anemia Latest Ref Rng & Units 10/1/2020 10/8/2020 10/13/2020 10/15/2020 10/22/2020 10/27/2020 10/29/2020   DEB Dose - 10,000 units 10,000 units - 10,000 units 10,000 units - 10,000 units   Hemoglobin 13.3 - 17.7 g/dL - - 9.5(A) - - 9.8(A) -   TSAT 15 - 46 % - - - - - - -   Ferritin 26 - 388 ng/mL - - - - - - -     BP Readings from Last 3 Encounters:   20 115/74   20 120/71   20 (!) 143/93     Wt Readings from Last 2 Encounters:   20 64 kg (141 lb)   20 61.1 kg (134 lb 12.8 oz)       Ed has not returned my calls.  I would have to assume he is dosing his Procrit weekly based on his Hgb results.     ASSESSMENT:  Hgb:At goal - recommend dose  TSat: Due for labs Ferritin: Due for labs    PLAN:  Dose with procrit and RTC for hgb then procrit if needed in 1 week(s)    Orders needed to be renewed (for next follow-up date) in EPIC: None    Iron labs due:  DUE    Plan discussed  with:  LVM for Ed  Plan provided by:  bigg    NEXT FOLLOW-UP DATE:  11/5/2020    Ana Castrejon RN   [place] [facility]  [address] [address]   [email or web address]   Office : [phone]  Fax: [phone]

## 2020-10-29 NOTE — TELEPHONE ENCOUNTER
Called  Jeremiah Cruz who reports a cough  Followed up with his local PCP   - improved with allergy medication

## 2020-10-30 NOTE — PLAN OF CARE
"/70 (BP Location: Left arm)   Pulse 90   Temp 98.2  F (36.8  C) (Oral)   Resp 16   Ht 1.626 m (5' 4\")   Wt 64.7 kg (142 lb 9.6 oz)   SpO2 100%   BMI 24.48 kg/m   AVSS on RA. Patient denies pain and nausea. Urine Output - tan w/ good UOP. Bowel Function - reports small BM earlier today. Nutrition - regular diet w/ fair appetite. Drains - Tan, catheter wipes provided to pt. Activity - up ad dick. Education - med card and lab book updated. Plan of Care - discharge to local hotel tomorrow.     " Patient requests all Lab and Radiology Results on their Discharge Instructions

## 2020-11-04 NOTE — LETTER
"11/4/2020       RE: Jeremiah Cruz  [address] [facility] [address]     Dear Colleague,    Thank you for referring your patient, Jeremiah Cruz, to the [facility] [place] at [facility]. Please see a copy of my visit note below.        Jeremiah Cruz is a 45 year old male who is being evaluated via a billable video visit.      The patient has been notified of following:     \"This video visit will be conducted via a call between you and your physician/provider. We have found that certain health care needs can be provided without the need for an in-person physical exam.  This service lets us provide the care you need with a video conversation.  If a prescription is necessary we can send it directly to your pharmacy.  If lab work is needed we can place an order for that and you can then stop by our lab to have the test done at a later time.    Video visits are billed at different rates depending on your insurance coverage.  Please reach out to your insurance provider with any questions.    If during the course of the call the physician/provider feels a video visit is not appropriate, you will not be charged for this service.\"    Patient has given verbal consent for Video visit? Yes  How would you like to obtain your AVS? MyChart    Will anyone else be joining your video visit? No        Video-Visit Details    Type of service:  Video Visit    Video Start Time: 11:19am  Video End Time: 11:30 AM    Originating Location (pt. Location): Home    Distant Location (provider location):  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 Belatacept started July 20th 2020              - Baseline Cr ~ 1.8-2.1              - Proteinuria: Normal (<0.2 gm/gm)              - Date DSA Last Checked: Sep/2020      " Latest DSA: No              - BK Viremia: No              - Kidney Tx Biopsy: Apr 03, 2020; Result: No diagnostic evidence of acute rejection.  Moderate interstitial fibrosis and tubular atrophy. Mesangial matrix deposition is normal     Patient has a fistula RUE, we would consider keeping this in beyond the 1 year benito unless worsening symptoms given his higher creatinine.    # Immunosuppression: Belatacept, Mycophenolic acid (540mg BID) and Prednisone (dose 5 mg daily)              - Changes: No     # Infection Prophylaxis:   - PJP: Dapsone 50mg daily  - CMV: Valcyte completed, patient is CMV IgG Ab discordant (D+/R-) and will check CMV PCR monthly until 12 months post transplant.     # Hypertension: Controlled;   Goal BP: < 130/80              - Changes: No. Continue amlodipine 10mg PO daily, coreg 25mg PO BID     # Elevated Blood Glucose: Glucose generally running ~                     Last HbA1c: 5.2% (2/28/20)              - Management as per primary care.      # Anemia in Chronic Renal Disease: Hgb: Stable 9-10   DEB: Yes              - Iron studies: Replete              # Mineral Bone Disorder:   - Secondary renal hyperparathyroidism; PTH level: Normal (<80)    On treatment: None  - Vitamin D; level: Low        On Supplement: Yes  - Calcium; level: Normal        On Supplement: Yes  - Phosphorus; level: Normal        On Supplement: No    # Electrolytes:   - Potassium; level: Normal          On Supplement: None  - Magnesium; level: Normal         On Supplement: No   - Bicarbonate; level: Normal        On Supplement: Yes       # CML: Appears stable on dasatinib. Patient is followed by Hematology.        # GERD: Controlled famotidine, trying to avoid foods that exacerbate his symptoms.    # Cough:   -Patient's daughter was tested for COVID this past week and was negative. Pt doesn't go out of the house and is only around her.    -Likely cough variant asthma. Recommend going to PCP to consider  albuterol     # Medical Compliance: Yes      # Transplant History:  Etiology of Kidney Failure: Alport's syndrome  Tx: DDKT  Transplant: 2/28/2020 (Kidney), 12/7/1988 (Kidney), 3/2/2005 (Kidney)  Donor Type: Donation after Brain Death        Donor Class:   Crossmatch at time of Tx: negative  DSA at time of Tx: No  Significant changes in immunosuppression: None  CMV IgG Ab High Risk Discordance (D+/R-): Yes  EBV IgG Ab High Risk Discordance (D+/R-): No  Significant transplant-related complications: None  Transplant Office Phone Number: [phone]    Assessment and plan was discussed with the patient and he voiced his understanding and agreement.    Return visit: No follow-ups on file. 3 months for 1 year follow up      Chief Complaint   Mr. Cruz is a 45 year old here for kidney transplant and immunosuppression management.     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 He is s/p kidney transplant x2 (1988, 2005) and now DDKT 3/5/2020.     The patient was changed from tac to hawa. He is doing well with this despite having to get 8 needle sticks to start an IV last time. He denies N/V/D, fever, chills. He states that he has some swelling around his eyes but denies edema elsewhere.     He denies SOB or CP.     Recent Hospitalizations:  [x] No [] Yes    New Medical Issues: [x] No [] Yes    Decreased energy: [x] No [] Yes    Chest pain or SOB with exertion:  [x] No [] Yes    Appetite change or weight change: [x] No [] Yes    Nausea, vomiting or diarrhea:  [x] No [] Yes    Fever, sweats or chills: [x] No [] Yes    Leg swelling: [x] No [] Yes      Home BP: 120s systolic    Review of Systems   A comprehensive review of systems was obtained and negative, except as noted in the HPI or PMH.    Problem List   Patient Active Problem List   Diagnosis     Alport's syndrome     Anemia in chronic kidney disease     Secondary renal hyperparathyroidism (H)     HTN,  kidney transplant related     CML (chronic myelocytic leukemia) (H)     GERD (gastroesophageal reflux disease)     Prurigo nodularis     Senile sebaceous gland hyperplasia     Immunosuppression (H)     Kidney replaced by transplant     Hyperkalemia     History of difficult venous access     Steroid-induced hyperglycemia     Aftercare following organ transplant     Vitamin D deficiency     Metabolic acidosis     Hypomagnesemia       Social History   Social History     Tobacco Use     Smoking status: Former Smoker     Packs/day: 1.00     Years: 12.00     Pack years: 12.00     Types: Cigarettes     Quit date: 2011     Years since quittin.7     Smokeless tobacco: Never Used   Substance Use Topics     Alcohol use: No     Alcohol/week: 0.0 standard drinks     Drug use: No       Allergies   Allergies   Allergen Reactions     Blood Transfusion Related (Informational Only) Other (See Comments)     Patient has a history of a clinically significant antibody against RBC antigens.  A delay in compatible RBCs may occur.     Cephalosporins Other (See Comments) and Rash     fever     Compazine [Prochlorperazine]      Gammagard [Immune Globulin] Other (See Comments)     Ed got spinal meningitis and MD stated to never get again for fear of death.       Penicillins      Vancomycin        Medications   Current Outpatient Medications   Medication Sig     amLODIPine (NORVASC) 10 MG tablet Take 1 tablet (10 mg) by mouth daily     atorvastatin (LIPITOR) 10 MG tablet Take 1 tablet (10 mg) by mouth daily     belatacept Aug 3 2020 belatacept conversion   [facility]  [phone]  Fax      calcium carbonate (TUMS) 500 MG chewable tablet Take 1 tablet (500 mg) by mouth 3 times daily as needed for heartburn Take 1 chewable tablet by mouth 3 times daily with meals.     carvedilol (COREG) 25 MG tablet Take 1 tablet (25 mg) by mouth 2 times daily (with meals)     dapsone (ACZONE) 25 MG tablet Take 2 tablets (50  mg) by mouth daily     dasatinib (SPRYCEL) 20 MG tablet CHEMO Take 40 mg by mouth daily      epoetin justa (EPOGEN/PROCRIT) 92465 UNIT/ML injection Inject 1 mL (10,000 Units) Subcutaneous once a week For Hemoglobin <10.  HOLD if systolic BP >180. (Patient not taking: Reported on 6/30/2020)     famotidine (PEPCID) 40 MG tablet Take 1 tablet (40 mg) by mouth daily     loratadine (CLARITIN) 10 MG tablet Take 10 mg by mouth daily     MYFORTIC (BRAND) 180 MG EC tablet Take 3 tablets (540 mg) by mouth 2 times daily     predniSONE (DELTASONE) 5 MG tablet Take 1 tablet (5 mg) by mouth daily     Probiotic Product (UP4 PROBIOTICS ADULT) CAPS Take 1 capsule by mouth daily     sodium bicarbonate 650 MG tablet Take 2 tablets (1,300 mg) by mouth 3 times daily     tacrolimus (GENERIC EQUIVALENT) 0.5 MG capsule Take 1 capsule (0.5 mg) by mouth every evening Total dose = 3 mg in AM and 3.5 mg in PM (Patient not taking: Reported on 8/28/2020)     tacrolimus (GENERIC EQUIVALENT) 1 MG capsule Take 3 capsules (3 mg) by mouth 2 times daily Total dose = 3 mg in AM and 3.5 mg in PM (Patient not taking: Reported on 8/28/2020)     tamsulosin (FLOMAX) 0.4 MG capsule Take 1 capsule (0.4 mg) by mouth At Bedtime     vitamin D3 (CHOLECALCIFEROL) 2000 units (50 mcg) tablet Take 1 tablet (2,000 Units) by mouth daily     No current facility-administered medications for this visit.      There are no discontinued medications.    Physical Exam   Vital Signs: There were no vitals taken for this visit.    GENERAL APPEARANCE: alert and no distress  HENT: no obvious abnormalities on appearance  RESP: breathing appears unremarkable with normal rate, no audible wheezing or cough and no apparent shortness of breath with conversation  MS: extremities normal - no gross deformities noted  SKIN: no apparent rash and normal skin tone  NEURO: speech is clear with no obvious neurological deficits  PSYCH: mentation appears normal and affect normal    Data     Renal  Latest Ref Rng & Units 10/27/2020 10/13/2020 9/29/2020   Na 133 - 144 mmol/L - - -   Na (external) 133 - 144 mmol/L 136 139 139   K 3.4 - 5.3 mmol/L - - -   K (external) 3.4 - 5.1 mmol/L 5.0 4.4 4.3   Cl 94 - 109 mmol/L - - -   Cl (external) 96 - 109 mmol/L 104 104 103   CO2 20 - 32 mmol/L - - -   CO2 (external) 21 - 33 mmol/L 22 23 22   BUN 7 - 30 mg/dL - - -   BUN (external) 6 - 24 mg/dL 23 32(H) 20   Cr 0.66 - 1.25 mg/dL - - -   Cr (external) 0.6 - 1.2 mg/dL 1.8(H) 2.1(H) 1.9(H)   Glucose 70 - 99 mg/dL - - -   Glucose (external) 70 - 99 mg/dL 113(H) 99 101(H)   Ca  8.5 - 10.1 mg/dL - - -   Ca (external) 8.2 - 10.0 mg/dL 9.2 9.4 8.9   Mg 1.6 - 2.3 mg/dL - - -   Mg (external) 1.7 - 2.4 mg/dL 2.1 - -     Bone Health Latest Ref Rng & Units 10/27/2020 9/15/2020 8/31/2020   Phos 2.5 - 4.5 mg/dL - - -   Phos (external) 2.6 - 4.7 mg/dL 3.3 3.1 4.0   PTHi 18 - 80 pg/mL - - -   Vit D Def 20 - 75 ug/L - - -     Heme Latest Ref Rng & Units 10/27/2020 10/13/2020 9/29/2020   WBC 4.0 - 11.0 10e9/L - - -   WBC (external) 4.1 - 10.9 x10:3/uL 5.2 5.6 5.3   Hgb 13.3 - 17.7 g/dL 9.8(A) 9.5(A) -   Hgb (external) 12.9 - 17.3 g/dL 9.8(L) 9.5(L) 9.3(L)   Plt 150 - 450 10e9/L - - -   Plt (external) 150 - 450 x10:3/uL 153 160 149(L)   ABSOLUTE NEUTROPHIL 1.6 - 8.3 10e9/L - - -   ABSOLUTE NEUTROPHILS (EXTERNAL) 1.9 - 8.1 x10:3/uL 4.7 4.4 4.7   ABSOLUTE LYMPHOCYTES 0.8 - 5.3 10e9/L - - -   ABSOLUTE LYMPHOCYTES (EXTERNAL) 1.0 - 3.9 x10:3/uL 0.1(L) 0.3(L) 0.1(L)   ABSOLUTE MONOCYTES 0.0 - 1.3 10e9/L - - -   ABSOLUTE MONOCYTES (EXTERNAL) 0.1 - 0.9 x10:3/uL 0.3 0.7 0.4   ABSOLUTE EOSINOPHILS 0.0 - 0.7 10e9/L - - -   ABSOLUTE EOSINOPHILS (EXTERNAL) 0.0 - 0.5 x10:3/uL 0.1 0.1 0.1   ABSOLUTE BASOPHILS 0.0 - 0.2 10e9/L - - -   ABSOLUTE BASOPHILS (EXTERNAL) 0.0 - 0.2 x10:3/uL 0.0 0.0 0.0   ABS IMMATURE GRANULOCYTES 0 - 0.4 10e9/L - - -   ABSOLUTE NUCLEATED RBC - - - -     Liver Latest Ref Rng & Units 5/7/2020 3/5/2020 2/28/2020   AP 40 - 150 U/L - -  108   AP (external) 45 - 115 IU/L 62 - -   TBili 0.2 - 1.3 mg/dL - 0.6 0.6   TBili (external) 0.4 - 1.4 mg/dL 0.3(L) - -   DBili 0.0 - 0.2 mg/dL - 0.1 -   DBili (external) 0.0 - 0.3 mg/dL 0.1 - -   ALT 0 - 70 U/L - - 48   ALT (external) 12 - 70 U/L 30 - -   AST 0 - 45 U/L - - 30   AST (external) 15 - 46 U/L 12(L) - -   Tot Protein 6.8 - 8.8 g/dL - - 7.9   Tot Protein (external) 6.2 - 8.2 g/dL 6.4 - -   Albumin 3.4 - 5.0 g/dL - - 4.0   Albumin (external) 3.3 - 5.0 g/dL 3.8 - -     Pancreas Latest Ref Rng & Units 5/4/2020 2/28/2020   A1C 0 - 5.6 % - 5.2   A1C (external) 4.0 - 6.0 % 5.7 -     Iron studies Latest Ref Rng & Units 6/29/2020 3/3/2020 3/8/2005   Iron 35 - 180 ug/dL 163 139 43   Iron sat 15 - 46 % 59(H) 98(H) -   Ferritin 26 - 388 ng/mL 2,163(H) 3,397(H) -     [facility] Txp Virology Latest Ref Rng & Units 10/27/2020 8/31/2020 8/10/2020   CMV IgG EU/mL - - -   CMV DNA Quant Ext Undetected IU/mL Undetected - -   BK Spec - - - -   BK Res BKNEG copies/mL - - -   BK Log <2.7 Log copies/mL - - -   BK Quant Result Ext None Detected IU/mL None Detected None Detected None Detected   BK Quant Spec Ext - - - -   EBV IgG - - - -   EBV CAPSID ANTIBODY IGG 0.0 - 0.8 AI - - -   Hep B Core NR - - -   Hep B Surf NEG - - -   HIV 1&2 NEG - - -        Recent Labs   Lab Test 03/14/20  0830 03/16/20  0807 06/29/20  0914   DOSTAC Not Provided Not Provided Not Provided   TACROL 9.2 10.8 10.3          Again, thank you for allowing me to participate in the care of your patient.      Sincerely,    Early Post Transplant

## 2020-11-04 NOTE — PROGRESS NOTES
"    Jeremiah Cruz is a 45 year old male who is being evaluated via a billable video visit.      The patient has been notified of following:     \"This video visit will be conducted via a call between you and your physician/provider. We have found that certain health care needs can be provided without the need for an in-person physical exam.  This service lets us provide the care you need with a video conversation.  If a prescription is necessary we can send it directly to your pharmacy.  If lab work is needed we can place an order for that and you can then stop by our lab to have the test done at a later time.    Video visits are billed at different rates depending on your insurance coverage.  Please reach out to your insurance provider with any questions.    If during the course of the call the physician/provider feels a video visit is not appropriate, you will not be charged for this service.\"    Patient has given verbal consent for Video visit? Yes  How would you like to obtain your AVS? MyChart    Will anyone else be joining your video visit? No        Video-Visit Details    Type of service:  Video Visit    Video Start Time: 11:19am  Video End Time: 11:30 AM    Originating Location (pt. Location): Home    Distant Location (provider location):  [facility] [place]     Platform used for Video Visit: Ibeth Don MD          CHRONIC TRANSPLANT NEPHROLOGY VISIT    Assessment & Plan   # DDKT: Stable at baseline. Belatacept started July 20th 2020              - Baseline Cr ~ 1.8-2.1              - Proteinuria: Normal (<0.2 gm/gm)              - Date DSA Last Checked: Sep/2020      Latest DSA: No              - BK Viremia: No              - Kidney Tx Biopsy: Apr 03, 2020; Result: No diagnostic evidence of acute rejection.  Moderate interstitial fibrosis and tubular atrophy. Mesangial matrix deposition is normal     Patient has a fistula RUE, we would consider keeping this in " beyond the 1 year benito unless worsening symptoms given his higher creatinine.    # Immunosuppression: Belatacept, Mycophenolic acid (540mg BID) and Prednisone (dose 5 mg daily)              - Changes: No     # Infection Prophylaxis:   - PJP: Dapsone 50mg daily  - CMV: Valcyte completed, patient is CMV IgG Ab discordant (D+/R-) and will check CMV PCR monthly until 12 months post transplant.     # Hypertension: Controlled;   Goal BP: < 130/80              - Changes: No. Continue amlodipine 10mg PO daily, coreg 25mg PO BID     # Elevated Blood Glucose: Glucose generally running ~                     Last HbA1c: 5.2% (2/28/20)              - Management as per primary care.      # Anemia in Chronic Renal Disease: Hgb: Stable 9-10   DEB: Yes              - Iron studies: Replete              # Mineral Bone Disorder:   - Secondary renal hyperparathyroidism; PTH level: Normal (<80)    On treatment: None  - Vitamin D; level: Low        On Supplement: Yes  - Calcium; level: Normal        On Supplement: Yes  - Phosphorus; level: Normal        On Supplement: No    # Electrolytes:   - Potassium; level: Normal          On Supplement: None  - Magnesium; level: Normal         On Supplement: No   - Bicarbonate; level: Normal        On Supplement: Yes       # CML: Appears stable on dasatinib. Patient is followed by Hematology.        # GERD: Controlled famotidine, trying to avoid foods that exacerbate his symptoms.    # Cough:   -Patient's daughter was tested for COVID this past week and was negative. Pt doesn't go out of the house and is only around her.    -Likely cough variant asthma. Recommend going to PCP to consider albuterol     # Medical Compliance: Yes      # Transplant History:  Etiology of Kidney Failure: Alport's syndrome  Tx: DDKT  Transplant: 2/28/2020 (Kidney), 12/7/1988 (Kidney), 3/2/2005 (Kidney)  Donor Type: Donation after Brain Death        Donor Class:   Crossmatch at time of Tx: negative  DSA at time of Tx:  No  Significant changes in immunosuppression: None  CMV IgG Ab High Risk Discordance (D+/R-): Yes  EBV IgG Ab High Risk Discordance (D+/R-): No  Significant transplant-related complications: None  Transplant Office Phone Number: [phone]    Assessment and plan was discussed with the patient and he voiced his understanding and agreement.    Return visit: No follow-ups on file. 3 months for 1 year follow up      Chief Complaint   Mr. Cruz is a 45 year old here for kidney transplant and immunosuppression management.     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 He is s/p kidney transplant x2 (1988, 2005) and now DDKT 3/5/2020.     The patient was changed from tac to hawa. He is doing well with this despite having to get 8 needle sticks to start an IV last time. He denies N/V/D, fever, chills. He states that he has some swelling around his eyes but denies edema elsewhere.     He denies SOB or CP.     Recent Hospitalizations:  [x] No [] Yes    New Medical Issues: [x] No [] Yes    Decreased energy: [x] No [] Yes    Chest pain or SOB with exertion:  [x] No [] Yes    Appetite change or weight change: [x] No [] Yes    Nausea, vomiting or diarrhea:  [x] No [] Yes    Fever, sweats or chills: [x] No [] Yes    Leg swelling: [x] No [] Yes      Home BP: 120s systolic    Review of Systems   A comprehensive review of systems was obtained and negative, except as noted in the HPI or PMH.    Problem List   Patient Active Problem List   Diagnosis     Alport's syndrome     Anemia in chronic kidney disease     Secondary renal hyperparathyroidism (H)     HTN, kidney transplant related     CML (chronic myelocytic leukemia) (H)     GERD (gastroesophageal reflux disease)     Prurigo nodularis     Senile sebaceous gland hyperplasia     Immunosuppression (H)     Kidney replaced by transplant     Hyperkalemia     History of difficult venous access     Steroid-induced  hyperglycemia     Aftercare following organ transplant     Vitamin D deficiency     Metabolic acidosis     Hypomagnesemia       Social History   Social History     Tobacco Use     Smoking status: Former Smoker     Packs/day: 1.00     Years: 12.00     Pack years: 12.00     Types: Cigarettes     Quit date: 2011     Years since quittin.7     Smokeless tobacco: Never Used   Substance Use Topics     Alcohol use: No     Alcohol/week: 0.0 standard drinks     Drug use: No       Allergies   Allergies   Allergen Reactions     Blood Transfusion Related (Informational Only) Other (See Comments)     Patient has a history of a clinically significant antibody against RBC antigens.  A delay in compatible RBCs may occur.     Cephalosporins Other (See Comments) and Rash     fever     Compazine [Prochlorperazine]      Gammagard [Immune Globulin] Other (See Comments)     Ed got spinal meningitis and MD stated to never get again for fear of death.       Penicillins      Vancomycin        Medications   Current Outpatient Medications   Medication Sig     amLODIPine (NORVASC) 10 MG tablet Take 1 tablet (10 mg) by mouth daily     atorvastatin (LIPITOR) 10 MG tablet Take 1 tablet (10 mg) by mouth daily     belatacept Aug 3 2020 belatacept conversion   [facility]  [phone]  Fax      calcium carbonate (TUMS) 500 MG chewable tablet Take 1 tablet (500 mg) by mouth 3 times daily as needed for heartburn Take 1 chewable tablet by mouth 3 times daily with meals.     carvedilol (COREG) 25 MG tablet Take 1 tablet (25 mg) by mouth 2 times daily (with meals)     dapsone (ACZONE) 25 MG tablet Take 2 tablets (50 mg) by mouth daily     dasatinib (SPRYCEL) 20 MG tablet CHEMO Take 40 mg by mouth daily      epoetin justa (EPOGEN/PROCRIT) 42233 UNIT/ML injection Inject 1 mL (10,000 Units) Subcutaneous once a week For Hemoglobin <10.  HOLD if systolic BP >180. (Patient not taking: Reported on 2020)     famotidine  (PEPCID) 40 MG tablet Take 1 tablet (40 mg) by mouth daily     loratadine (CLARITIN) 10 MG tablet Take 10 mg by mouth daily     MYFORTIC (BRAND) 180 MG EC tablet Take 3 tablets (540 mg) by mouth 2 times daily     predniSONE (DELTASONE) 5 MG tablet Take 1 tablet (5 mg) by mouth daily     Probiotic Product (UP4 PROBIOTICS ADULT) CAPS Take 1 capsule by mouth daily     sodium bicarbonate 650 MG tablet Take 2 tablets (1,300 mg) by mouth 3 times daily     tacrolimus (GENERIC EQUIVALENT) 0.5 MG capsule Take 1 capsule (0.5 mg) by mouth every evening Total dose = 3 mg in AM and 3.5 mg in PM (Patient not taking: Reported on 8/28/2020)     tacrolimus (GENERIC EQUIVALENT) 1 MG capsule Take 3 capsules (3 mg) by mouth 2 times daily Total dose = 3 mg in AM and 3.5 mg in PM (Patient not taking: Reported on 8/28/2020)     tamsulosin (FLOMAX) 0.4 MG capsule Take 1 capsule (0.4 mg) by mouth At Bedtime     vitamin D3 (CHOLECALCIFEROL) 2000 units (50 mcg) tablet Take 1 tablet (2,000 Units) by mouth daily     No current facility-administered medications for this visit.      There are no discontinued medications.    Physical Exam   Vital Signs: There were no vitals taken for this visit.    GENERAL APPEARANCE: alert and no distress  HENT: no obvious abnormalities on appearance  RESP: breathing appears unremarkable with normal rate, no audible wheezing or cough and no apparent shortness of breath with conversation  MS: extremities normal - no gross deformities noted  SKIN: no apparent rash and normal skin tone  NEURO: speech is clear with no obvious neurological deficits  PSYCH: mentation appears normal and affect normal    Data     Renal Latest Ref Rng & Units 10/27/2020 10/13/2020 9/29/2020   Na 133 - 144 mmol/L - - -   Na (external) 133 - 144 mmol/L 136 139 139   K 3.4 - 5.3 mmol/L - - -   K (external) 3.4 - 5.1 mmol/L 5.0 4.4 4.3   Cl 94 - 109 mmol/L - - -   Cl (external) 96 - 109 mmol/L 104 104 103   CO2 20 - 32 mmol/L - - -   CO2  (external) 21 - 33 mmol/L 22 23 22   BUN 7 - 30 mg/dL - - -   BUN (external) 6 - 24 mg/dL 23 32(H) 20   Cr 0.66 - 1.25 mg/dL - - -   Cr (external) 0.6 - 1.2 mg/dL 1.8(H) 2.1(H) 1.9(H)   Glucose 70 - 99 mg/dL - - -   Glucose (external) 70 - 99 mg/dL 113(H) 99 101(H)   Ca  8.5 - 10.1 mg/dL - - -   Ca (external) 8.2 - 10.0 mg/dL 9.2 9.4 8.9   Mg 1.6 - 2.3 mg/dL - - -   Mg (external) 1.7 - 2.4 mg/dL 2.1 - -     Bone Health Latest Ref Rng & Units 10/27/2020 9/15/2020 8/31/2020   Phos 2.5 - 4.5 mg/dL - - -   Phos (external) 2.6 - 4.7 mg/dL 3.3 3.1 4.0   PTHi 18 - 80 pg/mL - - -   Vit D Def 20 - 75 ug/L - - -     Heme Latest Ref Rng & Units 10/27/2020 10/13/2020 9/29/2020   WBC 4.0 - 11.0 10e9/L - - -   WBC (external) 4.1 - 10.9 x10:3/uL 5.2 5.6 5.3   Hgb 13.3 - 17.7 g/dL 9.8(A) 9.5(A) -   Hgb (external) 12.9 - 17.3 g/dL 9.8(L) 9.5(L) 9.3(L)   Plt 150 - 450 10e9/L - - -   Plt (external) 150 - 450 x10:3/uL 153 160 149(L)   ABSOLUTE NEUTROPHIL 1.6 - 8.3 10e9/L - - -   ABSOLUTE NEUTROPHILS (EXTERNAL) 1.9 - 8.1 x10:3/uL 4.7 4.4 4.7   ABSOLUTE LYMPHOCYTES 0.8 - 5.3 10e9/L - - -   ABSOLUTE LYMPHOCYTES (EXTERNAL) 1.0 - 3.9 x10:3/uL 0.1(L) 0.3(L) 0.1(L)   ABSOLUTE MONOCYTES 0.0 - 1.3 10e9/L - - -   ABSOLUTE MONOCYTES (EXTERNAL) 0.1 - 0.9 x10:3/uL 0.3 0.7 0.4   ABSOLUTE EOSINOPHILS 0.0 - 0.7 10e9/L - - -   ABSOLUTE EOSINOPHILS (EXTERNAL) 0.0 - 0.5 x10:3/uL 0.1 0.1 0.1   ABSOLUTE BASOPHILS 0.0 - 0.2 10e9/L - - -   ABSOLUTE BASOPHILS (EXTERNAL) 0.0 - 0.2 x10:3/uL 0.0 0.0 0.0   ABS IMMATURE GRANULOCYTES 0 - 0.4 10e9/L - - -   ABSOLUTE NUCLEATED RBC - - - -     Liver Latest Ref Rng & Units 5/7/2020 3/5/2020 2/28/2020   AP 40 - 150 U/L - - 108   AP (external) 45 - 115 IU/L 62 - -   TBili 0.2 - 1.3 mg/dL - 0.6 0.6   TBili (external) 0.4 - 1.4 mg/dL 0.3(L) - -   DBili 0.0 - 0.2 mg/dL - 0.1 -   DBili (external) 0.0 - 0.3 mg/dL 0.1 - -   ALT 0 - 70 U/L - - 48   ALT (external) 12 - 70 U/L 30 - -   AST 0 - 45 U/L - - 30   AST (external) 15 -  46 U/L 12(L) - -   Tot Protein 6.8 - 8.8 g/dL - - 7.9   Tot Protein (external) 6.2 - 8.2 g/dL 6.4 - -   Albumin 3.4 - 5.0 g/dL - - 4.0   Albumin (external) 3.3 - 5.0 g/dL 3.8 - -     Pancreas Latest Ref Rng & Units 5/4/2020 2/28/2020   A1C 0 - 5.6 % - 5.2   A1C (external) 4.0 - 6.0 % 5.7 -     Iron studies Latest Ref Rng & Units 6/29/2020 3/3/2020 3/8/2005   Iron 35 - 180 ug/dL 163 139 43   Iron sat 15 - 46 % 59(H) 98(H) -   Ferritin 26 - 388 ng/mL 2,163(H) 3,397(H) -     [facility] Txp Virology Latest Ref Rng & Units 10/27/2020 8/31/2020 8/10/2020   CMV IgG EU/mL - - -   CMV DNA Quant Ext Undetected IU/mL Undetected - -   BK Spec - - - -   BK Res BKNEG copies/mL - - -   BK Log <2.7 Log copies/mL - - -   BK Quant Result Ext None Detected IU/mL None Detected None Detected None Detected   BK Quant Spec Ext - - - -   EBV IgG - - - -   EBV CAPSID ANTIBODY IGG 0.0 - 0.8 AI - - -   Hep B Core NR - - -   Hep B Surf NEG - - -   HIV 1&2 NEG - - -        Recent Labs   Lab Test 03/14/20  0830 03/16/20  0807 06/29/20  0914   DOSTAC Not Provided Not Provided Not Provided   TACROL 9.2 10.8 10.3

## 2020-11-05 NOTE — TELEPHONE ENCOUNTER
Anemia Management Note  SUBJECTIVE/OBJECTIVE:  Referred by Dr. Alvin Varner on 2020  Primary Diagnosis: Anemia in Chronic Kidney Disease (N18.4, D63.1)     Secondary Diagnosis:  Chronic Kidney Disease, Stage 4 (N18.4)   Kidney Tx: , , 2020  Hgb goal range:  9-10  Epo/Darbo: Procrit 10,000 units weekly for Hgb <10. At home.   Iron regimen:  TBD  Labs : 2021  Recent DEB use, transfusion, IV iron: unknown  RX/TX plans : TBD     No history of stroke, MI and blood clots. Hx of CML in 2017     *Currently has Home Care Mon/Thur for labs.      Labs: [facility]  Ph # [phone]  Fax # [phone]        **ONLY call Ed if his Hgb <10 and he needs to dose.         Contact:            Ok to leave message regarding Scheduling and Medical Information per consent to communicate dated 12/10/2017                              OK to speak with Danay Blanton (friend) and Kaylin Kolbenger (mother) regarding Scheduling and Medical Information per consent to communicate dated 12/10/2017.    Anemia Latest Ref Rng & Units 10/8/2020 10/13/2020 10/15/2020 10/22/2020 10/27/2020 10/29/2020 2020   DEB Dose - 10,000 units - 10,000 units 10,000 units - 10,000 units 10,000 units   Hemoglobin 13.3 - 17.7 g/dL - 9.5(A) - - 9.8(A) - -   TSAT 15 - 46 % - - - - - - -   Ferritin 26 - 388 ng/mL - - - - - - -     BP Readings from Last 3 Encounters:   20 115/74   20 120/71   20 (!) 143/93     Wt Readings from Last 2 Encounters:   20 64 kg (141 lb)   20 61.1 kg (134 lb 12.8 oz)         Labs were not drawn this week.  Will follow up next week.     NEXT FOLLOW-UP DATE:  2020    Ana Castrejon RN   Anemia Services  [address] [address]   [email or web address]   Office : [phone]  Fax: [phone]

## 2020-11-10 NOTE — LETTER
OUTPATIENT LABORATORY TEST ORDER    Patient: Jeremiah Cruz    Transplant Date: 2/28/2020  YOB: 1975    Ordered By: Dr. Carol Samuel  MRN: [id number]     Issued Date/Time: 11/10/2020  9:40 AM         Expiration Date: 2/28/2021    Diagnosis: Kidney Transplant (ICD-10 Z94.0)    Aftercare following organ transplant (ICD-10 Z48.288)    Long term use of medications (ICD-10 Z79.899)      Lab results to be available on the same day drawn    Patient should release information to the [facility].     Please fax all results to [phone]    9/1/2020 - 11/30/2020  Labs every other week    CBC with platelets and differential    Basic Metabolic Panel (Sodium, Potassium, Chloride, Creatinine, CO2, Urea Nitrogen, glucose, Calcium)    Tacrolimus/Prograf/ drug level    Mycophenolic Acid  Monthly    Phosphorus, magnesium    BK (Polyoma Virus) PCR Quantitative/Plasma    CMV PCR QT    12/1/2020 - 2/28/2021  Monthly    CBC with platelets and differential    Basic Metabolic Panel (Sodium, Potassium, Chloride, Creatinine, CO2, Urea Nitrogen, glucose, Calcium)    BK (Polyoma Virus) PCR Quantitative/Plasma    CMV PCR QT    DSA PRA (mailers provided by patient)          At 12 months post-transplant (Fasting labs)  Due: 2/28/2021    LFTs    Hemoglobin A1c    Uric Acid    Lipid panel    Urine protein/creatinine    DSA PRA    PTH    Vitamin D      If you have any questions please call the Transplant Center at [phone] option 5. All lab results should be faxed to [phone]        Carol Samuel MD FACS  Assistant Professor of Surgery  Director, Living Kidney Donor Program.

## 2020-11-10 NOTE — TELEPHONE ENCOUNTER
Patient Call: Transplant Lab/Orders  Route to LPN  Post Transplant Days: 256  When patient is less than 60 days post-transplant, route high priority    Reason for Call: Annual lab reorder  Callback needed? Yes    Yearly order 12/1/2020 doesn't have tacro on it   [facility] [phone] (Phone)  [phone] (Fax)       Return Call Needed  Same as documented in contacts section  When to return call?: Same day: Route High Priority

## 2020-11-12 NOTE — LETTER
OUTPATIENT LABORATORY TEST ORDER    Patient: Jeremiah Cruz    Transplant Date: 2/28/2020  YOB: 1975    Ordered By: Dr. Carol Samuel  MRN: [id number]     Issued Date/Time: 11/16/2020  7:50 AM         Expiration Date: 2/28/2021    Diagnosis: Kidney Transplant (ICD-10 Z94.0)    Aftercare following organ transplant (ICD-10 Z48.288)    Long term use of medications (ICD-10 Z79.899)      Lab results to be available on the same day drawn    Patient should release information to the [facility].     Please fax all results to [phone]    9/1/2020 - 11/30/2020  Labs every other week    CBC with platelets and differential    Basic Metabolic Panel (Sodium, Potassium, Chloride, Creatinine, CO2, Urea Nitrogen, glucose, Calcium)    Mycophenolic Acid  Monthly    Phosphorus, magnesium    BK (Polyoma Virus) PCR Quantitative/Plasma    CMV PCR QT    12/1/2020 - 2/28/2021  Monthly    CBC with platelets and differential    Basic Metabolic Panel (Sodium, Potassium, Chloride, Creatinine, CO2, Urea Nitrogen, glucose, Calcium)    BK (Polyoma Virus) PCR Quantitative/Plasma    CMV PCR QT    DSA PRA (mailers provided by patient)            At 12 months post-transplant (Fasting labs)  Due: 2/28/2021    LFTs    Hemoglobin A1c    Uric Acid    Lipid panel    Urine protein/creatinine    DSA PRA    PTH    Vitamin D      If you have any questions please call the Transplant Center at [phone] option 5. All lab results should be faxed to [phone]        Carol Samuel MD FACS  Assistant Professor of Surgery  Director, Living Kidney Donor Program.

## 2020-11-12 NOTE — TELEPHONE ENCOUNTER
Anemia Management Note  SUBJECTIVE/OBJECTIVE:  Referred by Dr. Alvin Varner on 2020  Primary Diagnosis: Anemia in Chronic Kidney Disease (N18.4, D63.1)     Secondary Diagnosis:  Chronic Kidney Disease, Stage 4 (N18.4)   Kidney Tx: , , 2020  Hgb goal range:  9-10  Epo/Darbo: Procrit 10,000 units weekly for Hgb <10. At home.   Iron regimen:  TBD  Labs : 2021  Recent DEB use, transfusion, IV iron: unknown  RX/TX plans : TBD     No history of stroke, MI and blood clots. Hx of CML in 2017     *Currently has Home Care Mon/Thur for labs.      Labs: [facility]  Ph # [phone]  Fax # [phone]        **ONLY call Ed if his Hgb <10 and he needs to dose.         Contact:            Ok to leave message regarding Scheduling and Medical Information per consent to communicate dated 12/10/2017                              OK to speak with Danay Blanton (friend) and Kaylin Lilly (mother) regarding Scheduling and Medical Information per consent to communicate dated 12/10/2017.    Anemia Latest Ref Rng & Units 10/15/2020 10/22/2020 10/27/2020 10/29/2020 2020 11/10/2020 2020   DEB Dose - 10,000 units 10,000 units - 10,000 units 10,000 units - 10,000 units   Hemoglobin 13.3 - 17.7 g/dL - - 9.8(A) - - 9.4(A) -   TSAT 15 - 46 % - - - - - - -   Ferritin 26 - 388 ng/mL - - - - - - -     BP Readings from Last 3 Encounters:   20 115/74   20 120/71   20 (!) 143/93     Wt Readings from Last 2 Encounters:   20 64 kg (141 lb)   20 61.1 kg (134 lb 12.8 oz)           ASSESSMENT:  Hgb:At goal - recommend dose  TSat: Due for labs Ferritin: Due for labs    PLAN:  Dose with procrit and RTC for hgb then procrit if needed in 1 week(s)    Orders needed to be renewed (for next follow-up date) in LETTERS - for external labs: None    Iron labs due:  DUE    Plan discussed with:  No call, Ed does not return my calls.   Plan provided by:  bigg    NEXT FOLLOW-UP  DATE:  11/19/20    Ana Castrejon RN   [facility]  [address] [address]   [email or web address]   Office : [phone]  Fax: [phone]

## 2020-11-16 NOTE — TELEPHONE ENCOUNTER
Issue local lab continues to draw tacrolimus  Level   Tacrolimus has been discontinue   Currently on belatacept / Cellcept mycophenolate mofetil /prednisone     Task   Please fax updated orders to local lab   Please mail copy to Jeremiah Cruz

## 2020-11-30 NOTE — TELEPHONE ENCOUNTER
ISSUE:   Mycophenolate level 23.6 on 11/24/20 1240, goal 1.0-3.5, Myfortic dose 540 mg BID.    PLAN:   Please call patient and confirm this was an accurate 12-hour trough. Verify Myfortic dose 540 mg BID. Confirm no new medications or illness. Confirm no missed doses. If accurate trough and accurate dose, stay on the same dose Myfortic dose to 540 mg BID and repeat labs in 1-2 weeks.    OUTCOME:   RNCC left VM message asking patient to return call to discuss his Myfortic dose and mycophenolate elevated level. Did he take his Myfortic dose prior to lab draw at 12:40 pm? Or was this a 12 hour trough? Orders sent to lab for repeat level.    Elizabeth Ernst RN, BSN  Solid Organ Transplant, Post Kidney and Pancreas  Transplant Care Coordinator  [phone]

## 2020-11-30 NOTE — LETTER
PHYSICIAN ORDERS      DATE & TIME ISSUED: 2020 11:30 AM  PATIENT NAME: Jeremiah Cruz   : 1975     [facility] MR# [if applicable]: [id number]     DIAGNOSIS / ICD-10 CODES: Kidney Transplanted (Z94.0);   Long Term Use of Immunosuppressive Drug    Please draw the following in the next 1-2 weeks:  -Mycophenolic Acid Level (12 hour trough)    Any questions please call: [phone]  Fax results to:  [phone]      Carol Samuel MD FACS  Assistant Professor of Surgery  Director, Living Kidney Donor Program.

## 2020-12-02 NOTE — TELEPHONE ENCOUNTER
Anemia Management Note  SUBJECTIVE/OBJECTIVE:  Referred by Dr. Alvin Varner on 2020  Primary Diagnosis: Anemia in Chronic Kidney Disease (N18.4, D63.1)     Secondary Diagnosis:  Chronic Kidney Disease, Stage 4 (N18.4)   Kidney Tx: , , 2020  Hgb goal range:  9-10  Epo/Darbo: Procrit 10,000 units weekly for Hgb <10. At home.   Iron regimen:  TBD  Labs : 2021  Recent DEB use, transfusion, IV iron: unknown  RX/TX plans : TBD     No history of stroke, MI and blood clots. Hx of CML in 2017     *Currently has Home Care Mon/Thur for labs.      Labs: [facility]  Ph # [phone]  Fax # [phone]        **ONLY call Ed if his Hgb <10 and he needs to dose.         Contact:            Ok to leave message regarding Scheduling and Medical Information per consent to communicate dated 12/10/2017                              OK to speak with Danay Blanton (friend) and Kaylin Kolbenger (mother) regarding Scheduling and Medical Information per consent to communicate dated 12/10/2017.    Anemia Latest Ref Rng & Units 10/29/2020 2020 11/10/2020 2020 2020 2020 2020   DEB Dose - 10,000 units 10,000 units - 10,000 units 10,000 units - 10,000 units   Hemoglobin 13.3 - 17.7 g/dL - - 9.4(A) - - 9.1(A) -   TSAT 15 - 46 % - - - - - - -   Ferritin 26 - 388 ng/mL - - - - - - -     BP Readings from Last 3 Encounters:   20 115/74   20 120/71   20 (!) 143/93     Wt Readings from Last 2 Encounters:   20 64 kg (141 lb)   20 61.1 kg (134 lb 12.8 oz)         ASSESSMENT:  Hgb:At goal - recommend dose  TSat: Due for labs Ferritin: Due for labs    PLAN:  M for Edward to check in re Anemia Services and to verify that he has been dosing his Procrit weekly.       Orders needed to be renewed (for next follow-up date) in LETTERS - for external labs: None    Iron labs due:  DUE    Plan discussed with:  CAM for Edward  Plan provided by:  bigg    NEXT  FOLLOW-UP DATE:  12/17/20    Ana Castrejon RN   [facility]  [address] [address]   [email or web address]   Office : [phone]  Fax: [phone]

## 2020-12-17 NOTE — TELEPHONE ENCOUNTER
Anemia Management Note  SUBJECTIVE/OBJECTIVE:  Referred by Dr. Alvin Varner on 2020  Primary Diagnosis: Anemia in Chronic Kidney Disease (N18.4, D63.1)     Secondary Diagnosis:  Chronic Kidney Disease, Stage 4 (N18.4)   Kidney Tx: , , 2020  Hgb goal range:  9-10  Epo/Darbo: Procrit 10,000 units weekly for Hgb <10. At home.   Iron regimen:  TBD  Labs : 2021  Recent DEB use, transfusion, IV iron: unknown  RX/TX plans : TBD     No history of stroke, MI and blood clots. Hx of CML in 2017     *Currently has Home Care Mon/Thur for labs.      Labs: [facility]  Ph # [phone]  Fax # [phone]        **ONLY call Ed if his Hgb <10 and he needs to dose.         Contact:            Ok to leave message regarding Scheduling and Medical Information per consent to communicate dated 12/10/2017                              OK to speak with Danay Blanton (friend) and Kaylin Klobenger (mother) regarding Scheduling and Medical Information per consent to communicate dated 12/10/2017.    Anemia Latest Ref Rng & Units 2020 11/10/2020 2020 2020 2020 2020 2020   DEB Dose - 10,000 units - 10,000 units 10,000 units - 10,000 units -   Hemoglobin 13.3 - 17.7 g/dL - 9.4(A) - - 9.1(A) - 9.5(A)   TSAT 15 - 46 % - - - - - - -   Ferritin 26 - 388 ng/mL - - - - - - -     BP Readings from Last 3 Encounters:   20 115/74   20 120/71   20 (!) 143/93     Wt Readings from Last 2 Encounters:   20 64 kg (141 lb)   20 61.1 kg (134 lb 12.8 oz)       Ed has not returned calls to Anemia Services.  Based on Hgb results, I would have to assume he is doing his Procrit weekly.         NEXT FOLLOW-UP DATE:  20    Ana Castrejon RN   Anemia Services  [address] [place] [address]   [email or web address]   Office : [phone]  Fax: [phone]

## 2020-12-31 NOTE — TELEPHONE ENCOUNTER
Follow-up with anemia management service:    Faxed Anemia Labs to [facility] in [place], [place]   Fax # [phone]     Labs will be drawn on 1/5/2021.    Anemia Latest Ref Rng & Units 11/19/2020 11/24/2020 11/26/2020 12/3/2020 12/8/2020 12/10/2020 12/17/2020   DEB Dose - 10,000 units - 10,000 units 10,000 units - 10,000 units 10,000 units   Hemoglobin 13.3 - 17.7 g/dL - 9.1(A) - - 9.5(A) - -   TSAT 15 - 46 % - - - - - - -   Ferritin 26 - 388 ng/mL - - - - - - -           Follow-up call date: 1/6/2021    Ana Castrejon RN   Anemia Services  [address] [address]   [email or web address]   Office : [phone]  Fax: [phone]

## 2020-12-31 NOTE — TELEPHONE ENCOUNTER
Follow-up with anemia management service:    [employer] message sent to Ed to check in re Anemia Services    Anemia Latest Ref Rng & Units 11/19/2020 11/24/2020 11/26/2020 12/3/2020 12/8/2020 12/10/2020 12/17/2020   DEB Dose - 10,000 units - 10,000 units 10,000 units - 10,000 units 10,000 units   Hemoglobin 13.3 - 17.7 g/dL - 9.1(A) - - 9.5(A) - -   TSAT 15 - 46 % - - - - - - -   Ferritin 26 - 388 ng/mL - - - - - - -           Follow-up call date: 1/14/2021    Ana Castrejon RN   Anemia Services  [address] [address]   [email or web address]   Office : [phone]  Fax: [phone]

## 2020-12-31 NOTE — LETTER
PHYSICIAN ORDERS      DATE & TIME ISSUED: 2020 10:54 AM  PATIENT NAME: Jeremiah Cruz   : 1975     [employer] MR# : [id number]     ICD-10 CODE: Primary Diagnosis: Anemia in Chronic Kidney Disease (N18.4, D63.1)     Secondary Diagnosis:  Chronic Kidney Disease, Stage 4 (N18.4)     Please draw the following labs;   -Hemoglobin/Hematocrit weekly  -Ferritin every 4 weeks  -Iron/Iron Binding every 4 weeks.     Any questions please call: [phone]    Please fax results to [phone]    .

## 2021-01-01 ENCOUNTER — TELEPHONE (OUTPATIENT)
Dept: PHARMACY | Facility: CLINIC | Age: 46
End: 2021-01-01

## 2021-01-01 ENCOUNTER — APPOINTMENT (OUTPATIENT)
Dept: ULTRASOUND IMAGING | Facility: CLINIC | Age: 46
DRG: 870 | End: 2021-01-01
Attending: INTERNAL MEDICINE
Payer: MEDICARE

## 2021-01-01 ENCOUNTER — HEALTH MAINTENANCE LETTER (OUTPATIENT)
Age: 46
End: 2021-01-01

## 2021-01-01 ENCOUNTER — APPOINTMENT (OUTPATIENT)
Dept: GENERAL RADIOLOGY | Facility: CLINIC | Age: 46
DRG: 870 | End: 2021-01-01
Attending: NURSE PRACTITIONER
Payer: MEDICARE

## 2021-01-01 ENCOUNTER — TELEPHONE (OUTPATIENT)
Dept: TRANSPLANT | Facility: CLINIC | Age: 46
End: 2021-01-01

## 2021-01-01 ENCOUNTER — TELEPHONE (OUTPATIENT)
Dept: NEPHROLOGY | Facility: CLINIC | Age: 46
End: 2021-01-01

## 2021-01-01 ENCOUNTER — VIRTUAL VISIT (OUTPATIENT)
Dept: NEPHROLOGY | Facility: CLINIC | Age: 46
End: 2021-01-01
Attending: INTERNAL MEDICINE
Payer: MEDICARE

## 2021-01-01 ENCOUNTER — MYC MEDICAL ADVICE (OUTPATIENT)
Dept: TRANSPLANT | Facility: CLINIC | Age: 46
End: 2021-01-01

## 2021-01-01 ENCOUNTER — APPOINTMENT (OUTPATIENT)
Dept: CARDIOLOGY | Facility: CLINIC | Age: 46
DRG: 870 | End: 2021-01-01
Attending: INTERNAL MEDICINE
Payer: MEDICARE

## 2021-01-01 ENCOUNTER — HOSPITAL ENCOUNTER (INPATIENT)
Facility: CLINIC | Age: 46
LOS: 6 days | DRG: 870 | End: 2021-08-12
Attending: INTERNAL MEDICINE | Admitting: INTERNAL MEDICINE
Payer: MEDICARE

## 2021-01-01 ENCOUNTER — TRANSFERRED RECORDS (OUTPATIENT)
Dept: HEALTH INFORMATION MANAGEMENT | Facility: CLINIC | Age: 46
End: 2021-01-01

## 2021-01-01 ENCOUNTER — APPOINTMENT (OUTPATIENT)
Dept: GENERAL RADIOLOGY | Facility: CLINIC | Age: 46
DRG: 870 | End: 2021-01-01
Attending: INTERNAL MEDICINE
Payer: MEDICARE

## 2021-01-01 ENCOUNTER — RESULTS ONLY (OUTPATIENT)
Dept: OTHER | Facility: CLINIC | Age: 46
End: 2021-01-01

## 2021-01-01 VITALS
DIASTOLIC BLOOD PRESSURE: 73 MMHG | TEMPERATURE: 99.1 F | HEIGHT: 64 IN | SYSTOLIC BLOOD PRESSURE: 140 MMHG | RESPIRATION RATE: 28 BRPM | WEIGHT: 165.34 LBS | OXYGEN SATURATION: 63 % | BODY MASS INDEX: 28.23 KG/M2

## 2021-01-01 DIAGNOSIS — D63.1 ANEMIA OF CHRONIC RENAL FAILURE, STAGE 4 (SEVERE) (H): ICD-10-CM

## 2021-01-01 DIAGNOSIS — Z94.0 HTN, KIDNEY TRANSPLANT RELATED: ICD-10-CM

## 2021-01-01 DIAGNOSIS — Z94.0 KIDNEY REPLACED BY TRANSPLANT: Primary | ICD-10-CM

## 2021-01-01 DIAGNOSIS — Z94.0 KIDNEY REPLACED BY TRANSPLANT: ICD-10-CM

## 2021-01-01 DIAGNOSIS — E87.20 METABOLIC ACIDOSIS: ICD-10-CM

## 2021-01-01 DIAGNOSIS — N18.4 ANEMIA OF CHRONIC RENAL FAILURE, STAGE 4 (SEVERE) (H): ICD-10-CM

## 2021-01-01 DIAGNOSIS — J96.01 ACUTE RESPIRATORY FAILURE WITH HYPOXIA (H): ICD-10-CM

## 2021-01-01 DIAGNOSIS — E55.9 HYPOVITAMINOSIS D: ICD-10-CM

## 2021-01-01 DIAGNOSIS — R05.9 COUGH: ICD-10-CM

## 2021-01-01 DIAGNOSIS — N18.4 CKD (CHRONIC KIDNEY DISEASE) STAGE 4, GFR 15-29 ML/MIN (H): Primary | ICD-10-CM

## 2021-01-01 DIAGNOSIS — I15.1 HTN, KIDNEY TRANSPLANT RELATED: ICD-10-CM

## 2021-01-01 DIAGNOSIS — D84.9 IMMUNOSUPPRESSION (H): ICD-10-CM

## 2021-01-01 DIAGNOSIS — N18.4 CKD (CHRONIC KIDNEY DISEASE) STAGE 4, GFR 15-29 ML/MIN (H): ICD-10-CM

## 2021-01-01 LAB
1,3 BETA GLUCAN SER-MCNC: 113 PG/ML
1,3 BETA GLUCAN SER-MCNC: 257 PG/ML
ABO/RH(D): NORMAL
ALBUMIN SERPL-MCNC: 0.7 G/DL (ref 3.4–5)
ALBUMIN SERPL-MCNC: 1.3 G/DL (ref 3.4–5)
ALBUMIN SERPL-MCNC: 2.1 G/DL (ref 3.4–5)
ALBUMIN SERPL-MCNC: 2.6 G/DL (ref 3.4–5)
ALBUMIN SERPL-MCNC: 2.7 G/DL (ref 3.4–5)
ALBUMIN SERPL-MCNC: 2.9 G/DL (ref 3.4–5)
ALBUMIN UR-MCNC: NEGATIVE MG/DL
ALP SERPL-CCNC: 52 U/L (ref 40–150)
ALP SERPL-CCNC: 58 U/L (ref 40–150)
ALP SERPL-CCNC: 62 U/L (ref 40–150)
ALP SERPL-CCNC: 65 U/L (ref 40–150)
ALP SERPL-CCNC: 71 U/L (ref 40–150)
ALP SERPL-CCNC: 74 U/L (ref 40–150)
ALT SERPL W P-5'-P-CCNC: 15 U/L (ref 0–70)
ALT SERPL W P-5'-P-CCNC: 16 U/L (ref 0–70)
ALT SERPL W P-5'-P-CCNC: 16 U/L (ref 0–70)
ALT SERPL W P-5'-P-CCNC: 18 U/L (ref 0–70)
ALT SERPL W P-5'-P-CCNC: 19 U/L (ref 0–70)
ALT SERPL W P-5'-P-CCNC: 20 U/L (ref 0–70)
ALT SERPL-CCNC: 22 U/L (ref 12–70)
ANION GAP SERPL CALCULATED.3IONS-SCNC: 10 MMOL/L (ref 3–14)
ANION GAP SERPL CALCULATED.3IONS-SCNC: 10 MMOL/L (ref 3–14)
ANION GAP SERPL CALCULATED.3IONS-SCNC: 11 MMOL/L (ref 3–14)
ANION GAP SERPL CALCULATED.3IONS-SCNC: 12 MMOL/L (ref 3–14)
ANION GAP SERPL CALCULATED.3IONS-SCNC: 13 MMOL/L (ref 3–14)
ANION GAP SERPL CALCULATED.3IONS-SCNC: 13 MMOL/L (ref 3–14)
ANION GAP SERPL CALCULATED.3IONS-SCNC: 7 MMOL/L (ref 3–14)
ANION GAP SERPL CALCULATED.3IONS-SCNC: 9 MMOL/L (ref 3–14)
ANION GAP SERPL CALCULATED.3IONS-SCNC: 9 MMOL/L (ref 3–14)
ANTIBODY SCREEN: NEGATIVE
APPEARANCE FLD: CLEAR
APPEARANCE UR: ABNORMAL
AST SERPL W P-5'-P-CCNC: 21 U/L (ref 0–45)
AST SERPL W P-5'-P-CCNC: 21 U/L (ref 0–45)
AST SERPL W P-5'-P-CCNC: 22 U/L (ref 0–45)
AST SERPL W P-5'-P-CCNC: 26 U/L (ref 0–45)
AST SERPL W P-5'-P-CCNC: 27 U/L (ref 0–45)
AST SERPL W P-5'-P-CCNC: 27 U/L (ref 0–45)
AST SERPL-CCNC: 33 U/L (ref 15–46)
ATRIAL RATE - MUSE: 77 BPM
BACTERIA BLD CULT: NO GROWTH
BACTERIA BRONCH: NORMAL
BACTERIA SPT CULT: ABNORMAL
BASE EXCESS BLDA CALC-SCNC: -0.2 MMOL/L (ref -9–1.8)
BASE EXCESS BLDA CALC-SCNC: -0.4 MMOL/L (ref -9–1.8)
BASE EXCESS BLDA CALC-SCNC: -0.6 MMOL/L (ref -9–1.8)
BASE EXCESS BLDA CALC-SCNC: -1.9 MMOL/L (ref -9–1.8)
BASE EXCESS BLDA CALC-SCNC: -3 MMOL/L (ref -9–1.8)
BASE EXCESS BLDA CALC-SCNC: -4.8 MMOL/L (ref -9–1.8)
BASE EXCESS BLDA CALC-SCNC: -9.4 MMOL/L (ref -9–1.8)
BASE EXCESS BLDA CALC-SCNC: 0.4 MMOL/L (ref -9–1.8)
BASE EXCESS BLDA CALC-SCNC: 0.4 MMOL/L (ref -9–1.8)
BASE EXCESS BLDA CALC-SCNC: 0.5 MMOL/L (ref -9–1.8)
BASE EXCESS BLDA CALC-SCNC: 0.7 MMOL/L (ref -9–1.8)
BASE EXCESS BLDA CALC-SCNC: 0.7 MMOL/L (ref -9–1.8)
BASE EXCESS BLDA CALC-SCNC: 0.9 MMOL/L (ref -9–1.8)
BASE EXCESS BLDA CALC-SCNC: 1.1 MMOL/L (ref -9–1.8)
BASE EXCESS BLDA CALC-SCNC: 1.2 MMOL/L (ref -9–1.8)
BASE EXCESS BLDA CALC-SCNC: 1.4 MMOL/L (ref -9–1.8)
BASE EXCESS BLDA CALC-SCNC: 1.5 MMOL/L (ref -9–1.8)
BASE EXCESS BLDA CALC-SCNC: 2.1 MMOL/L (ref -9–1.8)
BASE EXCESS BLDA CALC-SCNC: 2.4 MMOL/L (ref -9–1.8)
BASE EXCESS BLDA CALC-SCNC: 2.4 MMOL/L (ref -9–1.8)
BASE EXCESS BLDA CALC-SCNC: 2.5 MMOL/L (ref -9–1.8)
BASE EXCESS BLDA CALC-SCNC: 2.6 MMOL/L (ref -9–1.8)
BASE EXCESS BLDA CALC-SCNC: 2.7 MMOL/L (ref -9–1.8)
BILIRUB DIRECT SERPL-MCNC: 0.2 MG/DL (ref 0–0.2)
BILIRUB SERPL-MCNC: 0.4 MG/DL (ref 0.2–1.3)
BILIRUB SERPL-MCNC: 0.4 MG/DL (ref 0.2–1.3)
BILIRUB SERPL-MCNC: 0.6 MG/DL (ref 0.2–1.3)
BILIRUB SERPL-MCNC: 0.6 MG/DL (ref 0.2–1.3)
BILIRUB SERPL-MCNC: 0.7 MG/DL (ref 0.2–1.3)
BILIRUB SERPL-MCNC: 0.7 MG/DL (ref 0.2–1.3)
BILIRUB UR QL STRIP: NEGATIVE
BLD PROD TYP BPU: NORMAL
BLD PROD TYP BPU: NORMAL
BLOOD COMPONENT TYPE: NORMAL
BLOOD COMPONENT TYPE: NORMAL
BUN SERPL-MCNC: 105 MG/DL (ref 7–30)
BUN SERPL-MCNC: 107 MG/DL (ref 7–30)
BUN SERPL-MCNC: 112 MG/DL (ref 7–30)
BUN SERPL-MCNC: 115 MG/DL (ref 7–30)
BUN SERPL-MCNC: 116 MG/DL (ref 7–30)
BUN SERPL-MCNC: 48 MG/DL (ref 7–30)
BUN SERPL-MCNC: 48 MG/DL (ref 7–30)
BUN SERPL-MCNC: 51 MG/DL (ref 7–30)
BUN SERPL-MCNC: 52 MG/DL (ref 7–30)
BUN SERPL-MCNC: 54 MG/DL (ref 7–30)
BUN SERPL-MCNC: 62 MG/DL (ref 7–30)
BUN SERPL-MCNC: 68 MG/DL (ref 7–30)
BUN SERPL-MCNC: 78 MG/DL (ref 7–30)
BUN SERPL-MCNC: 90 MG/DL (ref 7–30)
BUN SERPL-MCNC: 97 MG/DL (ref 7–30)
C DIFF TOX B STL QL: NEGATIVE
C PNEUM DNA SPEC QL NAA+PROBE: NOT DETECTED
CA-I BLD-MCNC: 4.5 MG/DL (ref 4.4–5.2)
CA-I BLD-MCNC: 4.9 MG/DL (ref 4.4–5.2)
CALCIUM SERPL-MCNC: 8.3 MG/DL (ref 8.5–10.1)
CALCIUM SERPL-MCNC: 8.6 MG/DL (ref 8.5–10.1)
CALCIUM SERPL-MCNC: 8.7 MG/DL (ref 8.5–10.1)
CALCIUM SERPL-MCNC: 8.8 MG/DL (ref 8.5–10.1)
CALCIUM SERPL-MCNC: 9 MG/DL (ref 8.5–10.1)
CALCIUM SERPL-MCNC: 9.1 MG/DL (ref 8.5–10.1)
CALCIUM SERPL-MCNC: 9.1 MG/DL (ref 8.5–10.1)
CALCIUM SERPL-MCNC: 9.2 MG/DL (ref 8.5–10.1)
CALCIUM SERPL-MCNC: 9.2 MG/DL (ref 8.5–10.1)
CALCIUM SERPL-MCNC: 9.3 MG/DL (ref 8.5–10.1)
CD3 CELLS # BLD: 15 CELLS/UL (ref 603–2990)
CD3 CELLS NFR BLD: 18 % (ref 49–84)
CD3+CD4+ CELLS # BLD: 7 CELLS/UL (ref 441–2156)
CD3+CD4+ CELLS NFR BLD: 9 % (ref 28–63)
CD3+CD4+ CELLS/CD3+CD8+ CLL BLD: 1.01 % (ref 1.4–2.6)
CD3+CD8+ CELLS # BLD: 7 CELLS/UL (ref 125–1312)
CD3+CD8+ CELLS NFR BLD: 9 % (ref 10–40)
CHLORIDE BLD-SCNC: 105 MMOL/L (ref 94–109)
CHLORIDE BLD-SCNC: 106 MMOL/L (ref 94–109)
CHLORIDE BLD-SCNC: 106 MMOL/L (ref 94–109)
CHLORIDE BLD-SCNC: 107 MMOL/L (ref 94–109)
CHLORIDE BLD-SCNC: 107 MMOL/L (ref 94–109)
CHLORIDE BLD-SCNC: 108 MMOL/L (ref 94–109)
CHLORIDE BLD-SCNC: 109 MMOL/L (ref 94–109)
CHLORIDE BLD-SCNC: 110 MMOL/L (ref 94–109)
CHLORIDE BLD-SCNC: 112 MMOL/L (ref 94–109)
CMV DNA SPEC NAA+PROBE-ACNC: NOT DETECTED IU/ML
CMV DNA SPEC NAA+PROBE-ACNC: NOT DETECTED IU/ML
CO2 SERPL-SCNC: 13 MMOL/L (ref 20–32)
CO2 SERPL-SCNC: 13 MMOL/L (ref 20–32)
CO2 SERPL-SCNC: 16 MMOL/L (ref 20–32)
CO2 SERPL-SCNC: 21 MMOL/L (ref 20–32)
CO2 SERPL-SCNC: 23 MMOL/L (ref 20–32)
CO2 SERPL-SCNC: 24 MMOL/L (ref 20–32)
CO2 SERPL-SCNC: 24 MMOL/L (ref 20–32)
CO2 SERPL-SCNC: 25 MMOL/L (ref 20–32)
CO2 SERPL-SCNC: 25 MMOL/L (ref 20–32)
CO2 SERPL-SCNC: 26 MMOL/L (ref 20–32)
CO2 SERPL-SCNC: 27 MMOL/L (ref 20–32)
CO2 SERPL-SCNC: 27 MMOL/L (ref 20–32)
CO2 SERPL-SCNC: 28 MMOL/L (ref 20–32)
CODING SYSTEM: NORMAL
CODING SYSTEM: NORMAL
COLOR FLD: COLORLESS
COLOR UR AUTO: ABNORMAL
CREAT SERPL-MCNC: 2.12 MG/DL (ref 0.66–1.25)
CREAT SERPL-MCNC: 2.14 MG/DL (ref 0.66–1.25)
CREAT SERPL-MCNC: 2.15 MG/DL (ref 0.66–1.25)
CREAT SERPL-MCNC: 2.16 MG/DL (ref 0.66–1.25)
CREAT SERPL-MCNC: 2.2 MG/DL (ref 0.66–1.25)
CREAT SERPL-MCNC: 2.21 MG/DL (ref 0.66–1.25)
CREAT SERPL-MCNC: 2.21 MG/DL (ref 0.66–1.25)
CREAT SERPL-MCNC: 2.23 MG/DL (ref 0.66–1.25)
CREAT SERPL-MCNC: 2.38 MG/DL (ref 0.66–1.25)
CREAT SERPL-MCNC: 2.4 MG/DL (ref 0.66–1.25)
CREAT SERPL-MCNC: 2.47 MG/DL (ref 0.66–1.25)
CREAT SERPL-MCNC: 2.57 MG/DL (ref 0.66–1.25)
CREAT SERPL-MCNC: 2.62 MG/DL (ref 0.66–1.25)
CREAT SERPL-MCNC: 2.89 MG/DL (ref 0.66–1.25)
CREAT SERPL-MCNC: 2.89 MG/DL (ref 0.66–1.25)
CREAT SERPL-MCNC: 3.05 MG/DL (ref 0.66–1.25)
CREATININE (EXTERNAL): 2.2 MG/DL (ref 0.6–1.2)
CROSSMATCH: NORMAL
CROSSMATCH: NORMAL
CRP SERPL-MCNC: 380 MG/L (ref 0–8)
CRYPTOC AG SPEC QL: NEGATIVE
CRYPTOC AG SPEC QL: NEGATIVE
DIASTOLIC BLOOD PRESSURE - MUSE: NORMAL MMHG
DONOR IDENTIFICATION: NORMAL
DSA COMMENTS: NORMAL
DSA PRESENT: NO
DSA TEST METHOD: NORMAL
EBV DNA # SPEC NAA+PROBE: NOT DETECTED COPIES/ML
ERYTHROCYTE [DISTWIDTH] IN BLOOD BY AUTOMATED COUNT: 16 % (ref 10–15)
ERYTHROCYTE [DISTWIDTH] IN BLOOD BY AUTOMATED COUNT: 16.4 % (ref 10–15)
ERYTHROCYTE [DISTWIDTH] IN BLOOD BY AUTOMATED COUNT: 16.4 % (ref 10–15)
ERYTHROCYTE [DISTWIDTH] IN BLOOD BY AUTOMATED COUNT: 16.5 % (ref 10–15)
ERYTHROCYTE [DISTWIDTH] IN BLOOD BY AUTOMATED COUNT: 16.6 % (ref 10–15)
ERYTHROCYTE [DISTWIDTH] IN BLOOD BY AUTOMATED COUNT: 16.7 % (ref 10–15)
ERYTHROCYTE [DISTWIDTH] IN BLOOD BY AUTOMATED COUNT: 16.9 % (ref 10–15)
ERYTHROCYTE [DISTWIDTH] IN BLOOD BY AUTOMATED COUNT: 17.1 % (ref 10–15)
ERYTHROCYTE [DISTWIDTH] IN BLOOD BY AUTOMATED COUNT: 17.2 % (ref 10–15)
FASTING STATUS PATIENT QL REPORTED: NO
FERRITIN SERPL-MCNC: 2356 NG/ML
FERRITIN SERPL-MCNC: 2480 NG/ML
FERRITIN SERPL-MCNC: 2813 NG/ML
FERRITIN SERPL-MCNC: 2834 NG/ML
FERRITIN SERPL-MCNC: 2884 NG/ML
FERRITIN SERPL-MCNC: 3179 NG/ML
FLUAV H1 2009 PAND RNA SPEC QL NAA+PROBE: NOT DETECTED
FLUAV H1 RNA SPEC QL NAA+PROBE: NOT DETECTED
FLUAV H3 RNA SPEC QL NAA+PROBE: NOT DETECTED
FLUAV RNA SPEC QL NAA+PROBE: NOT DETECTED
FLUBV RNA SPEC QL NAA+PROBE: NOT DETECTED
GALACTOMANNAN AG BAL QL: POSITIVE
GALACTOMANNAN AG SERPL QL IA: NEGATIVE
GALACTOMANNAN AG SPEC IA-ACNC: 0.43
GALACTOMANNAN AG SPEC IA-ACNC: 0.8
GFR ESTIMATED (EXTERNAL): 32.5 ML/MIN (ref 60–150)
GFR SERPL CREATININE-BSD FRML MDRD: 24 ML/MIN/1.73M2
GFR SERPL CREATININE-BSD FRML MDRD: 25 ML/MIN/1.73M2
GFR SERPL CREATININE-BSD FRML MDRD: 25 ML/MIN/1.73M2
GFR SERPL CREATININE-BSD FRML MDRD: 28 ML/MIN/1.73M2
GFR SERPL CREATININE-BSD FRML MDRD: 29 ML/MIN/1.73M2
GFR SERPL CREATININE-BSD FRML MDRD: 30 ML/MIN/1.73M2
GFR SERPL CREATININE-BSD FRML MDRD: 31 ML/MIN/1.73M2
GFR SERPL CREATININE-BSD FRML MDRD: 32 ML/MIN/1.73M2
GFR SERPL CREATININE-BSD FRML MDRD: 34 ML/MIN/1.73M2
GFR SERPL CREATININE-BSD FRML MDRD: 35 ML/MIN/1.73M2
GFR SERPL CREATININE-BSD FRML MDRD: 36 ML/MIN/1.73M2
GLUCOSE (EXTERNAL): 113 MG/DL (ref 70–99)
GLUCOSE BLD-MCNC: 116 MG/DL (ref 70–99)
GLUCOSE BLD-MCNC: 139 MG/DL (ref 70–99)
GLUCOSE BLD-MCNC: 140 MG/DL (ref 70–99)
GLUCOSE BLD-MCNC: 150 MG/DL (ref 70–99)
GLUCOSE BLD-MCNC: 151 MG/DL (ref 70–99)
GLUCOSE BLD-MCNC: 152 MG/DL (ref 70–99)
GLUCOSE BLD-MCNC: 166 MG/DL (ref 70–99)
GLUCOSE BLD-MCNC: 181 MG/DL (ref 70–99)
GLUCOSE BLD-MCNC: 189 MG/DL (ref 70–99)
GLUCOSE BLD-MCNC: 195 MG/DL (ref 70–99)
GLUCOSE BLD-MCNC: 205 MG/DL (ref 70–99)
GLUCOSE BLD-MCNC: 213 MG/DL (ref 70–99)
GLUCOSE BLD-MCNC: 76 MG/DL (ref 70–99)
GLUCOSE BLD-MCNC: 76 MG/DL (ref 70–99)
GLUCOSE BLD-MCNC: 92 MG/DL (ref 70–99)
GLUCOSE BLDC GLUCOMTR-MCNC: 101 MG/DL (ref 70–99)
GLUCOSE BLDC GLUCOMTR-MCNC: 112 MG/DL (ref 70–99)
GLUCOSE BLDC GLUCOMTR-MCNC: 112 MG/DL (ref 70–99)
GLUCOSE BLDC GLUCOMTR-MCNC: 115 MG/DL (ref 70–99)
GLUCOSE BLDC GLUCOMTR-MCNC: 117 MG/DL (ref 70–99)
GLUCOSE BLDC GLUCOMTR-MCNC: 118 MG/DL (ref 70–99)
GLUCOSE BLDC GLUCOMTR-MCNC: 120 MG/DL (ref 70–99)
GLUCOSE BLDC GLUCOMTR-MCNC: 124 MG/DL (ref 70–99)
GLUCOSE BLDC GLUCOMTR-MCNC: 130 MG/DL (ref 70–99)
GLUCOSE BLDC GLUCOMTR-MCNC: 130 MG/DL (ref 70–99)
GLUCOSE BLDC GLUCOMTR-MCNC: 131 MG/DL (ref 70–99)
GLUCOSE BLDC GLUCOMTR-MCNC: 133 MG/DL (ref 70–99)
GLUCOSE BLDC GLUCOMTR-MCNC: 135 MG/DL (ref 70–99)
GLUCOSE BLDC GLUCOMTR-MCNC: 135 MG/DL (ref 70–99)
GLUCOSE BLDC GLUCOMTR-MCNC: 138 MG/DL (ref 70–99)
GLUCOSE BLDC GLUCOMTR-MCNC: 139 MG/DL (ref 70–99)
GLUCOSE BLDC GLUCOMTR-MCNC: 141 MG/DL (ref 70–99)
GLUCOSE BLDC GLUCOMTR-MCNC: 145 MG/DL (ref 70–99)
GLUCOSE BLDC GLUCOMTR-MCNC: 146 MG/DL (ref 70–99)
GLUCOSE BLDC GLUCOMTR-MCNC: 148 MG/DL (ref 70–99)
GLUCOSE BLDC GLUCOMTR-MCNC: 149 MG/DL (ref 70–99)
GLUCOSE BLDC GLUCOMTR-MCNC: 152 MG/DL (ref 70–99)
GLUCOSE BLDC GLUCOMTR-MCNC: 154 MG/DL (ref 70–99)
GLUCOSE BLDC GLUCOMTR-MCNC: 156 MG/DL (ref 70–99)
GLUCOSE BLDC GLUCOMTR-MCNC: 156 MG/DL (ref 70–99)
GLUCOSE BLDC GLUCOMTR-MCNC: 157 MG/DL (ref 70–99)
GLUCOSE BLDC GLUCOMTR-MCNC: 160 MG/DL (ref 70–99)
GLUCOSE BLDC GLUCOMTR-MCNC: 162 MG/DL (ref 70–99)
GLUCOSE BLDC GLUCOMTR-MCNC: 166 MG/DL (ref 70–99)
GLUCOSE BLDC GLUCOMTR-MCNC: 167 MG/DL (ref 70–99)
GLUCOSE BLDC GLUCOMTR-MCNC: 170 MG/DL (ref 70–99)
GLUCOSE BLDC GLUCOMTR-MCNC: 174 MG/DL (ref 70–99)
GLUCOSE BLDC GLUCOMTR-MCNC: 188 MG/DL (ref 70–99)
GLUCOSE BLDC GLUCOMTR-MCNC: 190 MG/DL (ref 70–99)
GLUCOSE BLDC GLUCOMTR-MCNC: 218 MG/DL (ref 70–99)
GLUCOSE BLDC GLUCOMTR-MCNC: 79 MG/DL (ref 70–99)
GLUCOSE BLDC GLUCOMTR-MCNC: 80 MG/DL (ref 70–99)
GLUCOSE BLDC GLUCOMTR-MCNC: 99 MG/DL (ref 70–99)
GLUCOSE UR STRIP-MCNC: NEGATIVE MG/DL
GRAM STAIN RESULT: ABNORMAL
H CAPSUL AG UR QL IA: DETECTED
H CAPSUL AG UR-MCNC: > 20 NG/ML
HADV DNA SPEC QL NAA+PROBE: NOT DETECTED
HAPTOGLOB SERPL-MCNC: 309 MG/DL (ref 32–197)
HCO3 BLD-SCNC: 16 MMOL/L (ref 21–28)
HCO3 BLD-SCNC: 20 MMOL/L (ref 21–28)
HCO3 BLD-SCNC: 22 MMOL/L (ref 21–28)
HCO3 BLD-SCNC: 24 MMOL/L (ref 21–28)
HCO3 BLD-SCNC: 25 MMOL/L (ref 21–28)
HCO3 BLD-SCNC: 25 MMOL/L (ref 21–28)
HCO3 BLD-SCNC: 26 MMOL/L (ref 21–28)
HCO3 BLD-SCNC: 27 MMOL/L (ref 21–28)
HCO3 BLD-SCNC: 28 MMOL/L (ref 21–28)
HCO3 BLD-SCNC: 29 MMOL/L (ref 21–28)
HCOV PNL SPEC NAA+PROBE: NOT DETECTED
HCT VFR BLD AUTO: 21.8 % (ref 40–53)
HCT VFR BLD AUTO: 22.6 % (ref 40–53)
HCT VFR BLD AUTO: 23.7 % (ref 40–53)
HCT VFR BLD AUTO: 24.4 % (ref 40–53)
HCT VFR BLD AUTO: 25.3 % (ref 40–53)
HCT VFR BLD AUTO: 25.5 % (ref 40–53)
HCT VFR BLD AUTO: 26.2 % (ref 40–53)
HCT VFR BLD AUTO: 26.4 % (ref 40–53)
HCT VFR BLD AUTO: 26.6 % (ref 40–53)
HCT VFR BLD AUTO: 27.2 % (ref 40–53)
HCT VFR BLD AUTO: 28 % (ref 40–53)
HCT VFR BLD AUTO: 33.7 %
HEMOGLOBIN: 10.1 G/DL (ref 13.3–17.7)
HEMOGLOBIN: 10.1 G/DL (ref 13.3–17.7)
HEMOGLOBIN: 10.3 G/DL (ref 13.3–17.7)
HEMOGLOBIN: 10.4 G/DL (ref 13.3–17.7)
HEMOGLOBIN: 10.4 G/DL (ref 13.3–17.7)
HEMOGLOBIN: 10.5 G/DL (ref 13.3–17.7)
HEMOGLOBIN: 10.8 G/DL (ref 13.3–17.7)
HEMOGLOBIN: 10.8 G/DL (ref 13.3–17.7)
HEMOGLOBIN: 10.9 G/DL (ref 13.3–17.7)
HEMOGLOBIN: 11 G/DL (ref 13.3–17.7)
HEMOGLOBIN: 11.1 G/DL (ref 13.3–17.7)
HEMOGLOBIN: 11.2 G/DL (ref 13.3–17.7)
HEMOGLOBIN: 11.3 G/DL (ref 13.3–17.7)
HEMOGLOBIN: 11.4 G/DL (ref 13.3–17.7)
HEMOGLOBIN: 11.4 G/DL (ref 13.3–17.7)
HEMOGLOBIN: 9.4 G/DL (ref 13.3–17.7)
HEMOGLOBIN: 9.5 G/DL (ref 13.3–17.7)
HEMOGLOBIN: 9.8 G/DL (ref 13.3–17.7)
HEMOGLOBIN: 9.9 G/DL (ref 13.3–17.7)
HGB BLD-MCNC: 6.4 G/DL (ref 13.3–17.7)
HGB BLD-MCNC: 6.8 G/DL (ref 13.3–17.7)
HGB BLD-MCNC: 7.1 G/DL (ref 13.3–17.7)
HGB BLD-MCNC: 7.2 G/DL (ref 13.3–17.7)
HGB BLD-MCNC: 7.3 G/DL (ref 13.3–17.7)
HGB BLD-MCNC: 7.5 G/DL (ref 13.3–17.7)
HGB BLD-MCNC: 7.6 G/DL (ref 13.3–17.7)
HGB BLD-MCNC: 7.8 G/DL (ref 13.3–17.7)
HGB BLD-MCNC: 7.9 G/DL (ref 13.3–17.7)
HGB BLD-MCNC: 8.1 G/DL (ref 13.3–17.7)
HGB UR QL STRIP: ABNORMAL
HMPV RNA SPEC QL NAA+PROBE: NOT DETECTED
HPIV1 RNA SPEC QL NAA+PROBE: NOT DETECTED
HPIV2 RNA SPEC QL NAA+PROBE: NOT DETECTED
HPIV3 RNA SPEC QL NAA+PROBE: NOT DETECTED
HPIV4 RNA SPEC QL NAA+PROBE: NOT DETECTED
HYALINE CASTS: 4 /LPF
IGG SERPL-MCNC: 184 MG/DL (ref 610–1616)
INR (EXTERNAL): 1.2 (ref 0.9–1.1)
INTERFERING SUBST TEST METHOD: NORMAL
INTERFERING SUBSTANCE COMMENT: NORMAL
INTERFERING SUBSTANCE RESULT: NORMAL
INTERFERING SUBSTANCE: NORMAL
INTERPRETATION ECG - MUSE: NORMAL
ISSUE DATE AND TIME: NORMAL
ISSUE DATE AND TIME: NORMAL
ITRACONAZ SERPL-MCNC: 0.4 UG/ML (ref 0.5–5)
ITRACONAZ+HIT SERPL-MCNC: 0.9 UG/ML
KETONES UR STRIP-MCNC: NEGATIVE MG/DL
KOH PREPARATION: ABNORMAL
KOH PREPARATION: ABNORMAL
L PNEUMO1 AG UR QL IA: NEGATIVE
LACTATE SERPL-SCNC: 1.2 MMOL/L (ref 0.7–2)
LACTATE SERPL-SCNC: 2 MMOL/L (ref 0.7–2)
LACTATE SERPL-SCNC: 2.1 MMOL/L (ref 0.7–2)
LDH SERPL L TO P-CCNC: 250 U/L (ref 85–227)
LDH SERPL L TO P-CCNC: 290 U/L (ref 85–227)
LEUKOCYTE ESTERASE UR QL STRIP: NEGATIVE
LVEF ECHO: NORMAL
LYMPHOCYTES NFR FLD MANUAL: 2 %
M PNEUMO DNA SPEC QL NAA+PROBE: NOT DETECTED
MAGNESIUM SERPL-MCNC: 2 MG/DL (ref 1.6–2.3)
MAGNESIUM SERPL-MCNC: 2 MG/DL (ref 1.6–2.3)
MAGNESIUM SERPL-MCNC: 2.2 MG/DL (ref 1.6–2.3)
MAGNESIUM SERPL-MCNC: 2.3 MG/DL (ref 1.6–2.3)
MAGNESIUM SERPL-MCNC: 2.5 MG/DL (ref 1.6–2.3)
MAGNESIUM SERPL-MCNC: 2.7 MG/DL (ref 1.6–2.3)
MAGNESIUM SERPL-MCNC: 2.8 MG/DL (ref 1.6–2.3)
MAGNESIUM SERPL-MCNC: 2.8 MG/DL (ref 1.6–2.3)
MAGNESIUM SERPL-MCNC: 2.9 MG/DL (ref 1.6–2.3)
MCH RBC QN AUTO: 27.6 PG (ref 26.5–33)
MCH RBC QN AUTO: 27.7 PG (ref 26.5–33)
MCH RBC QN AUTO: 28 PG (ref 26.5–33)
MCH RBC QN AUTO: 28.1 PG (ref 26.5–33)
MCH RBC QN AUTO: 28.1 PG (ref 26.5–33)
MCH RBC QN AUTO: 28.2 PG (ref 26.5–33)
MCH RBC QN AUTO: 28.5 PG (ref 26.5–33)
MCH RBC QN AUTO: 28.5 PG (ref 26.5–33)
MCH RBC QN AUTO: 28.8 PG (ref 26.5–33)
MCHC RBC AUTO-ENTMCNC: 26.8 G/DL (ref 31.5–36.5)
MCHC RBC AUTO-ENTMCNC: 28.6 G/DL (ref 31.5–36.5)
MCHC RBC AUTO-ENTMCNC: 28.6 G/DL (ref 31.5–36.5)
MCHC RBC AUTO-ENTMCNC: 28.7 G/DL (ref 31.5–36.5)
MCHC RBC AUTO-ENTMCNC: 29 G/DL (ref 31.5–36.5)
MCHC RBC AUTO-ENTMCNC: 29.4 G/DL (ref 31.5–36.5)
MCHC RBC AUTO-ENTMCNC: 29.5 G/DL (ref 31.5–36.5)
MCHC RBC AUTO-ENTMCNC: 29.9 G/DL (ref 31.5–36.5)
MCHC RBC AUTO-ENTMCNC: 30 G/DL (ref 31.5–36.5)
MCHC RBC AUTO-ENTMCNC: 30 G/DL (ref 31.5–36.5)
MCHC RBC AUTO-ENTMCNC: 30.1 G/DL (ref 31.5–36.5)
MCV RBC AUTO: 100 FL (ref 78–100)
MCV RBC AUTO: 103 FL (ref 78–100)
MCV RBC AUTO: 93 FL (ref 78–100)
MCV RBC AUTO: 94 FL (ref 78–100)
MCV RBC AUTO: 95 FL (ref 78–100)
MCV RBC AUTO: 95 FL (ref 78–100)
MCV RBC AUTO: 98 FL (ref 78–100)
MCV RBC AUTO: 98 FL (ref 78–100)
MCV RBC AUTO: 99 FL (ref 78–100)
MONOS+MACROS NFR FLD MANUAL: 42 %
MRSA DNA SPEC QL NAA+PROBE: NEGATIVE
MYCOPHENOLATE SERPL LC/MS/MS-MCNC: 2.73 MG/L (ref 1–3.5)
MYCOPHENOLATE-G SERPL LC/MS/MS-MCNC: 126 MG/L (ref 30–95)
NEUTS BAND NFR FLD MANUAL: 56 %
NITRATE UR QL: NEGATIVE
O2/TOTAL GAS SETTING VFR VENT: 100 %
O2/TOTAL GAS SETTING VFR VENT: 50 %
O2/TOTAL GAS SETTING VFR VENT: 60 %
O2/TOTAL GAS SETTING VFR VENT: 70 %
O2/TOTAL GAS SETTING VFR VENT: 75 %
O2/TOTAL GAS SETTING VFR VENT: 80 %
O2/TOTAL GAS SETTING VFR VENT: 80 %
O2/TOTAL GAS SETTING VFR VENT: 85 %
O2/TOTAL GAS SETTING VFR VENT: 90 %
O2/TOTAL GAS SETTING VFR VENT: 95 %
OBSERVATION IMP: POSITIVE
OBSERVATION IMP: POSITIVE
OH-ITRACONAZ SERPL-MCNC: 0.5 UG/ML
ORGAN: NORMAL
OXYHGB MFR BLD: 87 % (ref 92–100)
OXYHGB MFR BLD: 88 % (ref 92–100)
OXYHGB MFR BLD: 92 % (ref 92–100)
OXYHGB MFR BLD: 93 % (ref 92–100)
P AXIS - MUSE: 42 DEGREES
P JIROVECII DNA SPEC QL NAA+PROBE: NOT DETECTED
PATH INTERP SPEC-IMP: NORMAL
PATH REPORT.COMMENTS IMP SPEC: ABNORMAL
PATH REPORT.COMMENTS IMP SPEC: ABNORMAL
PATH REPORT.COMMENTS IMP SPEC: YES
PATH REPORT.FINAL DX SPEC: ABNORMAL
PATH REPORT.GROSS SPEC: ABNORMAL
PATH REPORT.MICROSCOPIC SPEC OTHER STN: ABNORMAL
PATH REPORT.RELEVANT HX SPEC: ABNORMAL
PCO2 BLD: 33 MM HG (ref 35–45)
PCO2 BLD: 37 MM HG (ref 35–45)
PCO2 BLD: 40 MM HG (ref 35–45)
PCO2 BLD: 40 MM HG (ref 35–45)
PCO2 BLD: 41 MM HG (ref 35–45)
PCO2 BLD: 41 MM HG (ref 35–45)
PCO2 BLD: 43 MM HG (ref 35–45)
PCO2 BLD: 48 MM HG (ref 35–45)
PCO2 BLD: 49 MM HG (ref 35–45)
PCO2 BLD: 50 MM HG (ref 35–45)
PCO2 BLD: 51 MM HG (ref 35–45)
PCO2 BLD: 53 MM HG (ref 35–45)
PCO2 BLD: 53 MM HG (ref 35–45)
PCO2 BLD: 56 MM HG (ref 35–45)
PCO2 BLD: 57 MM HG (ref 35–45)
PCO2 BLD: 58 MM HG (ref 35–45)
PCO2 BLD: 59 MM HG (ref 35–45)
PH BLD: 7.28 [PH] (ref 7.35–7.45)
PH BLD: 7.29 [PH] (ref 7.35–7.45)
PH BLD: 7.29 [PH] (ref 7.35–7.45)
PH BLD: 7.3 [PH] (ref 7.35–7.45)
PH BLD: 7.32 [PH] (ref 7.35–7.45)
PH BLD: 7.34 [PH] (ref 7.35–7.45)
PH BLD: 7.34 [PH] (ref 7.35–7.45)
PH BLD: 7.35 [PH] (ref 7.35–7.45)
PH BLD: 7.36 [PH] (ref 7.35–7.45)
PH BLD: 7.37 [PH] (ref 7.35–7.45)
PH BLD: 7.37 [PH] (ref 7.35–7.45)
PH BLD: 7.4 [PH] (ref 7.35–7.45)
PH BLD: 7.42 [PH] (ref 7.35–7.45)
PH UR STRIP: 5 [PH] (ref 5–7)
PHOSPHATE SERPL-MCNC: 4.2 MG/DL (ref 2.5–4.5)
PHOSPHATE SERPL-MCNC: 4.5 MG/DL (ref 2.5–4.5)
PHOSPHATE SERPL-MCNC: 5.6 MG/DL (ref 2.5–4.5)
PHOSPHATE SERPL-MCNC: 5.9 MG/DL (ref 2.5–4.5)
PHOSPHATE SERPL-MCNC: 6.1 MG/DL (ref 2.5–4.5)
PHOSPHATE SERPL-MCNC: 6.2 MG/DL (ref 2.5–4.5)
PHOSPHATE SERPL-MCNC: 6.3 MG/DL (ref 2.5–4.5)
PHOSPHATE SERPL-MCNC: 6.8 MG/DL (ref 2.5–4.5)
PLATELET # BLD AUTO: 200 10E3/UL (ref 150–450)
PLATELET # BLD AUTO: 213 10E3/UL (ref 150–450)
PLATELET # BLD AUTO: 219 10E3/UL (ref 150–450)
PLATELET # BLD AUTO: 223 10E3/UL (ref 150–450)
PLATELET # BLD AUTO: 238 10E3/UL (ref 150–450)
PLATELET # BLD AUTO: 246 10E3/UL (ref 150–450)
PLATELET # BLD AUTO: 253 10E3/UL (ref 150–450)
PLATELET # BLD AUTO: 263 10E3/UL (ref 150–450)
PLATELET # BLD AUTO: 272 10E3/UL (ref 150–450)
PLATELET # BLD AUTO: 275 10E3/UL (ref 150–450)
PLATELET # BLD AUTO: 280 10E3/UL (ref 150–450)
PO2 BLD: 109 MM HG (ref 80–105)
PO2 BLD: 61 MM HG (ref 80–105)
PO2 BLD: 62 MM HG (ref 80–105)
PO2 BLD: 62 MM HG (ref 80–105)
PO2 BLD: 63 MM HG (ref 80–105)
PO2 BLD: 69 MM HG (ref 80–105)
PO2 BLD: 69 MM HG (ref 80–105)
PO2 BLD: 72 MM HG (ref 80–105)
PO2 BLD: 74 MM HG (ref 80–105)
PO2 BLD: 76 MM HG (ref 80–105)
PO2 BLD: 76 MM HG (ref 80–105)
PO2 BLD: 77 MM HG (ref 80–105)
PO2 BLD: 78 MM HG (ref 80–105)
PO2 BLD: 79 MM HG (ref 80–105)
PO2 BLD: 81 MM HG (ref 80–105)
PO2 BLD: 82 MM HG (ref 80–105)
PO2 BLD: 82 MM HG (ref 80–105)
PO2 BLD: 83 MM HG (ref 80–105)
PO2 BLD: 88 MM HG (ref 80–105)
PO2 BLD: 88 MM HG (ref 80–105)
PO2 BLD: 89 MM HG (ref 80–105)
PO2 BLD: 89 MM HG (ref 80–105)
PO2 BLD: 92 MM HG (ref 80–105)
POTASSIUM (EXTERNAL): 5.5 MMOL/L (ref 3.4–5.1)
POTASSIUM BLD-SCNC: 3.2 MMOL/L (ref 3.4–5.3)
POTASSIUM BLD-SCNC: 3.3 MMOL/L (ref 3.4–5.3)
POTASSIUM BLD-SCNC: 3.4 MMOL/L (ref 3.4–5.3)
POTASSIUM BLD-SCNC: 3.5 MMOL/L (ref 3.4–5.3)
POTASSIUM BLD-SCNC: 3.5 MMOL/L (ref 3.4–5.3)
POTASSIUM BLD-SCNC: 3.6 MMOL/L (ref 3.4–5.3)
POTASSIUM BLD-SCNC: 3.7 MMOL/L (ref 3.4–5.3)
POTASSIUM BLD-SCNC: 3.9 MMOL/L (ref 3.4–5.3)
POTASSIUM BLD-SCNC: 4.2 MMOL/L (ref 3.4–5.3)
POTASSIUM BLD-SCNC: 4.3 MMOL/L (ref 3.4–5.3)
POTASSIUM BLD-SCNC: 5.2 MMOL/L (ref 3.4–5.3)
POTASSIUM BLD-SCNC: 5.7 MMOL/L (ref 3.4–5.3)
POTASSIUM BLD-SCNC: 5.7 MMOL/L (ref 3.4–5.3)
PR INTERVAL - MUSE: 152 MS
PROCALCITONIN SERPL-MCNC: 9.16 NG/ML
PROCALCITONIN SERPL-MCNC: 9.31 NG/ML
PROT SERPL-MCNC: 5.4 G/DL (ref 6.8–8.8)
PROT SERPL-MCNC: 5.6 G/DL (ref 6.8–8.8)
PROT SERPL-MCNC: 5.9 G/DL (ref 6.8–8.8)
PROT SERPL-MCNC: 6.2 G/DL (ref 6.8–8.8)
QRS DURATION - MUSE: 114 MS
QT - MUSE: 374 MS
QTC - MUSE: 423 MS
R AXIS - MUSE: 64 DEGREES
RADIOLOGIST FLAGS: ABNORMAL
RBC # BLD AUTO: 2.31 10E6/UL (ref 4.4–5.9)
RBC # BLD AUTO: 2.42 10E6/UL (ref 4.4–5.9)
RBC # BLD AUTO: 2.52 10E6/UL (ref 4.4–5.9)
RBC # BLD AUTO: 2.56 10E6/UL (ref 4.4–5.9)
RBC # BLD AUTO: 2.56 10E6/UL (ref 4.4–5.9)
RBC # BLD AUTO: 2.64 10E6/UL (ref 4.4–5.9)
RBC # BLD AUTO: 2.67 10E6/UL (ref 4.4–5.9)
RBC # BLD AUTO: 2.71 10E6/UL (ref 4.4–5.9)
RBC # BLD AUTO: 2.72 10E6/UL (ref 4.4–5.9)
RBC # BLD AUTO: 2.77 10E6/UL (ref 4.4–5.9)
RBC # BLD AUTO: 2.8 10E6/UL (ref 4.4–5.9)
RBC URINE: 38 /HPF
RETICS # AUTO: 0.03 10E6/UL (ref 0.03–0.1)
RETICS/RBC NFR AUTO: 1.3 % (ref 0.5–2)
RSV RNA SPEC QL NAA+PROBE: NOT DETECTED
RSV RNA SPEC QL NAA+PROBE: NOT DETECTED
RV+EV RNA SPEC QL NAA+PROBE: NOT DETECTED
S PNEUM AG SPEC QL: NEGATIVE
SA TARGET DNA: NEGATIVE
SA1 CELL: NORMAL
SA1 CELL: NORMAL
SA1 COMMENTS: NORMAL
SA1 COMMENTS: NORMAL
SA1 HI RISK ABY: NORMAL
SA1 HI RISK ABY: NORMAL
SA1 MOD RISK ABY: NORMAL
SA1 MOD RISK ABY: NORMAL
SA1 TEST METHOD: NORMAL
SA1 TEST METHOD: NORMAL
SA2 CELL: NORMAL
SA2 CELL: NORMAL
SA2 COMMENTS: NORMAL
SA2 COMMENTS: NORMAL
SA2 HI RISK ABY UA: NORMAL
SA2 HI RISK ABY UA: NORMAL
SA2 MOD RISK ABY: NORMAL
SA2 MOD RISK ABY: NORMAL
SA2 TEST METHOD: NORMAL
SA2 TEST METHOD: NORMAL
SARS-COV-2 RNA RESP QL NAA+PROBE: NEGATIVE
SCANNED LAB RESULT: ABNORMAL
SCANNED LAB RESULT: NORMAL
SCANNED LAB RESULT: NORMAL
SODIUM SERPL-SCNC: 136 MMOL/L (ref 133–144)
SODIUM SERPL-SCNC: 136 MMOL/L (ref 133–144)
SODIUM SERPL-SCNC: 139 MMOL/L (ref 133–144)
SODIUM SERPL-SCNC: 140 MMOL/L (ref 133–144)
SODIUM SERPL-SCNC: 140 MMOL/L (ref 133–144)
SODIUM SERPL-SCNC: 142 MMOL/L (ref 133–144)
SODIUM SERPL-SCNC: 143 MMOL/L (ref 133–144)
SODIUM SERPL-SCNC: 145 MMOL/L (ref 133–144)
SODIUM SERPL-SCNC: 146 MMOL/L (ref 133–144)
SODIUM SERPL-SCNC: 146 MMOL/L (ref 133–144)
SODIUM SERPL-SCNC: 147 MMOL/L (ref 133–144)
SP GR UR STRIP: 1.01 (ref 1–1.03)
SPECIMEN EXPIRATION DATE: NORMAL
SYSTOLIC BLOOD PRESSURE - MUSE: NORMAL MMHG
T AXIS - MUSE: 53 DEGREES
T CELL COMMENT: ABNORMAL
TME LAST DOSE: ABNORMAL H
TME LAST DOSE: ABNORMAL H
TRANSITIONAL EPI: 1 /HPF
TRIGL SERPL-MCNC: 403 MG/DL
TROPONIN I SERPL-MCNC: 0.03 UG/L (ref 0–0.04)
UFH PPP CHRO-ACNC: 0.17 IU/ML
UFH PPP CHRO-ACNC: 0.19 IU/ML
UFH PPP CHRO-ACNC: 0.21 IU/ML
UFH PPP CHRO-ACNC: 0.26 IU/ML
UFH PPP CHRO-ACNC: 0.27 IU/ML
UFH PPP CHRO-ACNC: 0.3 IU/ML
UFH PPP CHRO-ACNC: 0.32 IU/ML
UFH PPP CHRO-ACNC: 0.5 IU/ML
UFH PPP CHRO-ACNC: 0.52 IU/ML
UFH PPP CHRO-ACNC: 0.56 IU/ML
UFH PPP CHRO-ACNC: 0.66 IU/ML
UFH PPP CHRO-ACNC: 0.67 IU/ML
UFH PPP CHRO-ACNC: 0.69 IU/ML
UFH PPP CHRO-ACNC: 0.76 IU/ML
UFH PPP CHRO-ACNC: 0.76 IU/ML
UFH PPP CHRO-ACNC: 0.77 IU/ML
UFH PPP CHRO-ACNC: 0.87 IU/ML
UFH PPP CHRO-ACNC: 0.88 IU/ML
UNACCEPTABLE ANTIGEN: NORMAL
UNACCEPTABLE ANTIGEN: NORMAL
UNIT ABO/RH: NORMAL
UNIT ABO/RH: NORMAL
UNIT NUMBER: NORMAL
UNIT NUMBER: NORMAL
UNIT STATUS: NORMAL
UNIT STATUS: NORMAL
UNIT TYPE ISBT: 6200
UNIT TYPE ISBT: 9500
UNOS CPRA: 100
UNOS CPRA: 100
UROBILINOGEN UR STRIP-MCNC: NORMAL MG/DL
VANCOMYCIN SERPL-MCNC: 12 MG/L
VANCOMYCIN SERPL-MCNC: <0.8 MG/L
VENTRICULAR RATE- MUSE: 77 BPM
WBC # BLD AUTO: 15.6 10E3/UL (ref 4–11)
WBC # BLD AUTO: 20.6 10E3/UL (ref 4–11)
WBC # BLD AUTO: 5.8 10E3/UL (ref 4–11)
WBC # BLD AUTO: 5.9 10E3/UL (ref 4–11)
WBC # BLD AUTO: 6 10E3/UL (ref 4–11)
WBC # BLD AUTO: 6.4 10E3/UL (ref 4–11)
WBC # BLD AUTO: 6.7 10E3/UL (ref 4–11)
WBC # BLD AUTO: 7.6 10E3/UL (ref 4–11)
WBC # BLD AUTO: 7.6 10E3/UL (ref 4–11)
WBC # BLD AUTO: 7.8 10E3/UL (ref 4–11)
WBC # BLD AUTO: 8.8 10E3/UL (ref 4–11)
WBC # FLD AUTO: 428 /UL
WBC URINE: <1 /HPF

## 2021-01-01 PROCEDURE — 250N000013 HC RX MED GY IP 250 OP 250 PS 637: Performed by: STUDENT IN AN ORGANIZED HEALTH CARE EDUCATION/TRAINING PROGRAM

## 2021-01-01 PROCEDURE — 250N000011 HC RX IP 250 OP 636: Performed by: NURSE PRACTITIONER

## 2021-01-01 PROCEDURE — 250N000013 HC RX MED GY IP 250 OP 250 PS 637: Performed by: INTERNAL MEDICINE

## 2021-01-01 PROCEDURE — 999N000157 HC STATISTIC RCP TIME EA 10 MIN

## 2021-01-01 PROCEDURE — 84100 ASSAY OF PHOSPHORUS: CPT | Performed by: STUDENT IN AN ORGANIZED HEALTH CARE EDUCATION/TRAINING PROGRAM

## 2021-01-01 PROCEDURE — 83010 ASSAY OF HAPTOGLOBIN QUANT: CPT

## 2021-01-01 PROCEDURE — 80202 ASSAY OF VANCOMYCIN: CPT | Performed by: INTERNAL MEDICINE

## 2021-01-01 PROCEDURE — 250N000009 HC RX 250: Performed by: STUDENT IN AN ORGANIZED HEALTH CARE EDUCATION/TRAINING PROGRAM

## 2021-01-01 PROCEDURE — 85520 HEPARIN ASSAY: CPT

## 2021-01-01 PROCEDURE — 999N000127 HC STATISTIC PERIPHERAL IV START W US GUIDANCE

## 2021-01-01 PROCEDURE — 250N000011 HC RX IP 250 OP 636

## 2021-01-01 PROCEDURE — 84478 ASSAY OF TRIGLYCERIDES: CPT | Performed by: INTERNAL MEDICINE

## 2021-01-01 PROCEDURE — 86359 T CELLS TOTAL COUNT: CPT | Performed by: INTERNAL MEDICINE

## 2021-01-01 PROCEDURE — 86922 COMPATIBILITY TEST ANTIGLOB: CPT | Performed by: STUDENT IN AN ORGANIZED HEALTH CARE EDUCATION/TRAINING PROGRAM

## 2021-01-01 PROCEDURE — 258N000003 HC RX IP 258 OP 636: Performed by: INTERNAL MEDICINE

## 2021-01-01 PROCEDURE — C9113 INJ PANTOPRAZOLE SODIUM, VIA: HCPCS | Performed by: STUDENT IN AN ORGANIZED HEALTH CARE EDUCATION/TRAINING PROGRAM

## 2021-01-01 PROCEDURE — 250N000009 HC RX 250: Performed by: INTERNAL MEDICINE

## 2021-01-01 PROCEDURE — 250N000011 HC RX IP 250 OP 636: Performed by: STUDENT IN AN ORGANIZED HEALTH CARE EDUCATION/TRAINING PROGRAM

## 2021-01-01 PROCEDURE — 36592 COLLECT BLOOD FROM PICC: CPT | Performed by: INTERNAL MEDICINE

## 2021-01-01 PROCEDURE — 85027 COMPLETE CBC AUTOMATED: CPT | Performed by: NURSE PRACTITIONER

## 2021-01-01 PROCEDURE — 82374 ASSAY BLOOD CARBON DIOXIDE: CPT | Performed by: NURSE PRACTITIONER

## 2021-01-01 PROCEDURE — 250N000011 HC RX IP 250 OP 636: Performed by: PHYSICIAN ASSISTANT

## 2021-01-01 PROCEDURE — 80048 BASIC METABOLIC PNL TOTAL CA: CPT | Performed by: STUDENT IN AN ORGANIZED HEALTH CARE EDUCATION/TRAINING PROGRAM

## 2021-01-01 PROCEDURE — 200N000002 HC R&B ICU UMMC

## 2021-01-01 PROCEDURE — 80053 COMPREHEN METABOLIC PANEL: CPT | Performed by: STUDENT IN AN ORGANIZED HEALTH CARE EDUCATION/TRAINING PROGRAM

## 2021-01-01 PROCEDURE — 99291 CRITICAL CARE FIRST HOUR: CPT

## 2021-01-01 PROCEDURE — 99233 SBSQ HOSP IP/OBS HIGH 50: CPT | Performed by: INTERNAL MEDICINE

## 2021-01-01 PROCEDURE — 87206 SMEAR FLUORESCENT/ACID STAI: CPT | Performed by: INTERNAL MEDICINE

## 2021-01-01 PROCEDURE — 76776 US EXAM K TRANSPL W/DOPPLER: CPT | Mod: 26 | Performed by: RADIOLOGY

## 2021-01-01 PROCEDURE — 84155 ASSAY OF PROTEIN SERUM: CPT | Performed by: STUDENT IN AN ORGANIZED HEALTH CARE EDUCATION/TRAINING PROGRAM

## 2021-01-01 PROCEDURE — 85520 HEPARIN ASSAY: CPT | Performed by: INTERNAL MEDICINE

## 2021-01-01 PROCEDURE — 87305 ASPERGILLUS AG IA: CPT | Performed by: STUDENT IN AN ORGANIZED HEALTH CARE EDUCATION/TRAINING PROGRAM

## 2021-01-01 PROCEDURE — 93306 TTE W/DOPPLER COMPLETE: CPT

## 2021-01-01 PROCEDURE — 99233 SBSQ HOSP IP/OBS HIGH 50: CPT | Mod: GC | Performed by: INTERNAL MEDICINE

## 2021-01-01 PROCEDURE — 84132 ASSAY OF SERUM POTASSIUM: CPT | Performed by: STUDENT IN AN ORGANIZED HEALTH CARE EDUCATION/TRAINING PROGRAM

## 2021-01-01 PROCEDURE — 99233 SBSQ HOSP IP/OBS HIGH 50: CPT | Performed by: STUDENT IN AN ORGANIZED HEALTH CARE EDUCATION/TRAINING PROGRAM

## 2021-01-01 PROCEDURE — 87798 DETECT AGENT NOS DNA AMP: CPT | Performed by: INTERNAL MEDICINE

## 2021-01-01 PROCEDURE — 250N000013 HC RX MED GY IP 250 OP 250 PS 637: Performed by: PHYSICIAN ASSISTANT

## 2021-01-01 PROCEDURE — 83735 ASSAY OF MAGNESIUM: CPT | Performed by: STUDENT IN AN ORGANIZED HEALTH CARE EDUCATION/TRAINING PROGRAM

## 2021-01-01 PROCEDURE — 250N000011 HC RX IP 250 OP 636: Performed by: INTERNAL MEDICINE

## 2021-01-01 PROCEDURE — 82803 BLOOD GASES ANY COMBINATION: CPT | Performed by: STUDENT IN AN ORGANIZED HEALTH CARE EDUCATION/TRAINING PROGRAM

## 2021-01-01 PROCEDURE — 88108 CYTOPATH CONCENTRATE TECH: CPT | Mod: 26 | Performed by: PATHOLOGY

## 2021-01-01 PROCEDURE — 71045 X-RAY EXAM CHEST 1 VIEW: CPT

## 2021-01-01 PROCEDURE — 80189 DRUG ASSAY ITRACONAZOLE: CPT | Performed by: INTERNAL MEDICINE

## 2021-01-01 PROCEDURE — 82040 ASSAY OF SERUM ALBUMIN: CPT | Performed by: STUDENT IN AN ORGANIZED HEALTH CARE EDUCATION/TRAINING PROGRAM

## 2021-01-01 PROCEDURE — 258N000003 HC RX IP 258 OP 636: Performed by: STUDENT IN AN ORGANIZED HEALTH CARE EDUCATION/TRAINING PROGRAM

## 2021-01-01 PROCEDURE — 999N000065 XR ABDOMEN PORT 1 VIEWS

## 2021-01-01 PROCEDURE — 0B9G8ZX DRAINAGE OF LEFT UPPER LUNG LOBE, VIA NATURAL OR ARTIFICIAL OPENING ENDOSCOPIC, DIAGNOSTIC: ICD-10-PCS | Performed by: INTERNAL MEDICINE

## 2021-01-01 PROCEDURE — 80048 BASIC METABOLIC PNL TOTAL CA: CPT | Performed by: NURSE PRACTITIONER

## 2021-01-01 PROCEDURE — 94003 VENT MGMT INPAT SUBQ DAY: CPT

## 2021-01-01 PROCEDURE — 86140 C-REACTIVE PROTEIN: CPT | Performed by: STUDENT IN AN ORGANIZED HEALTH CARE EDUCATION/TRAINING PROGRAM

## 2021-01-01 PROCEDURE — 84999 UNLISTED CHEMISTRY PROCEDURE: CPT | Performed by: INTERNAL MEDICINE

## 2021-01-01 PROCEDURE — 84145 PROCALCITONIN (PCT): CPT | Performed by: STUDENT IN AN ORGANIZED HEALTH CARE EDUCATION/TRAINING PROGRAM

## 2021-01-01 PROCEDURE — 86833 HLA CLASS II HIGH DEFIN QUAL: CPT | Performed by: TRANSPLANT SURGERY

## 2021-01-01 PROCEDURE — 89051 BODY FLUID CELL COUNT: CPT | Performed by: INTERNAL MEDICINE

## 2021-01-01 PROCEDURE — 87205 SMEAR GRAM STAIN: CPT | Performed by: STUDENT IN AN ORGANIZED HEALTH CARE EDUCATION/TRAINING PROGRAM

## 2021-01-01 PROCEDURE — 87799 DETECT AGENT NOS DNA QUANT: CPT | Performed by: INTERNAL MEDICINE

## 2021-01-01 PROCEDURE — 87081 CULTURE SCREEN ONLY: CPT | Performed by: INTERNAL MEDICINE

## 2021-01-01 PROCEDURE — 36415 COLL VENOUS BLD VENIPUNCTURE: CPT | Performed by: STUDENT IN AN ORGANIZED HEALTH CARE EDUCATION/TRAINING PROGRAM

## 2021-01-01 PROCEDURE — 85027 COMPLETE CBC AUTOMATED: CPT | Performed by: STUDENT IN AN ORGANIZED HEALTH CARE EDUCATION/TRAINING PROGRAM

## 2021-01-01 PROCEDURE — 71045 X-RAY EXAM CHEST 1 VIEW: CPT | Mod: 26 | Performed by: RADIOLOGY

## 2021-01-01 PROCEDURE — 99223 1ST HOSP IP/OBS HIGH 75: CPT | Performed by: STUDENT IN AN ORGANIZED HEALTH CARE EDUCATION/TRAINING PROGRAM

## 2021-01-01 PROCEDURE — 999N000015 HC STATISTIC ARTERIAL MONITORING DAILY

## 2021-01-01 PROCEDURE — 82803 BLOOD GASES ANY COMBINATION: CPT

## 2021-01-01 PROCEDURE — 74018 RADEX ABDOMEN 1 VIEW: CPT | Mod: 26 | Performed by: RADIOLOGY

## 2021-01-01 PROCEDURE — 87385 HISTOPLASMA CAPSUL AG IA: CPT | Performed by: INTERNAL MEDICINE

## 2021-01-01 PROCEDURE — 84484 ASSAY OF TROPONIN QUANT: CPT | Performed by: STUDENT IN AN ORGANIZED HEALTH CARE EDUCATION/TRAINING PROGRAM

## 2021-01-01 PROCEDURE — 87116 MYCOBACTERIA CULTURE: CPT | Performed by: INTERNAL MEDICINE

## 2021-01-01 PROCEDURE — 84450 TRANSFERASE (AST) (SGOT): CPT | Performed by: STUDENT IN AN ORGANIZED HEALTH CARE EDUCATION/TRAINING PROGRAM

## 2021-01-01 PROCEDURE — 87641 MR-STAPH DNA AMP PROBE: CPT | Performed by: NURSE PRACTITIONER

## 2021-01-01 PROCEDURE — 93970 EXTREMITY STUDY: CPT | Mod: 26 | Performed by: RADIOLOGY

## 2021-01-01 PROCEDURE — 83615 LACTATE (LD) (LDH) ENZYME: CPT

## 2021-01-01 PROCEDURE — 84999 UNLISTED CHEMISTRY PROCEDURE: CPT | Performed by: STUDENT IN AN ORGANIZED HEALTH CARE EDUCATION/TRAINING PROGRAM

## 2021-01-01 PROCEDURE — 84075 ASSAY ALKALINE PHOSPHATASE: CPT | Performed by: STUDENT IN AN ORGANIZED HEALTH CARE EDUCATION/TRAINING PROGRAM

## 2021-01-01 PROCEDURE — 93005 ELECTROCARDIOGRAM TRACING: CPT

## 2021-01-01 PROCEDURE — 87305 ASPERGILLUS AG IA: CPT | Performed by: INTERNAL MEDICINE

## 2021-01-01 PROCEDURE — 82784 ASSAY IGA/IGD/IGG/IGM EACH: CPT

## 2021-01-01 PROCEDURE — 87210 SMEAR WET MOUNT SALINE/INK: CPT | Performed by: INTERNAL MEDICINE

## 2021-01-01 PROCEDURE — 74018 RADEX ABDOMEN 1 VIEW: CPT | Mod: 26

## 2021-01-01 PROCEDURE — 83605 ASSAY OF LACTIC ACID: CPT

## 2021-01-01 PROCEDURE — 87581 M.PNEUMON DNA AMP PROBE: CPT | Performed by: STUDENT IN AN ORGANIZED HEALTH CARE EDUCATION/TRAINING PROGRAM

## 2021-01-01 PROCEDURE — 99223 1ST HOSP IP/OBS HIGH 75: CPT | Mod: GC | Performed by: INTERNAL MEDICINE

## 2021-01-01 PROCEDURE — 250N000013 HC RX MED GY IP 250 OP 250 PS 637: Performed by: NURSE PRACTITIONER

## 2021-01-01 PROCEDURE — 250N000012 HC RX MED GY IP 250 OP 636 PS 637: Performed by: NURSE PRACTITIONER

## 2021-01-01 PROCEDURE — 93010 ELECTROCARDIOGRAM REPORT: CPT | Performed by: INTERNAL MEDICINE

## 2021-01-01 PROCEDURE — 80180 DRUG SCRN QUAN MYCOPHENOLATE: CPT | Performed by: STUDENT IN AN ORGANIZED HEALTH CARE EDUCATION/TRAINING PROGRAM

## 2021-01-01 PROCEDURE — 87899 AGENT NOS ASSAY W/OPTIC: CPT | Performed by: STUDENT IN AN ORGANIZED HEALTH CARE EDUCATION/TRAINING PROGRAM

## 2021-01-01 PROCEDURE — 86832 HLA CLASS I HIGH DEFIN QUAL: CPT | Performed by: TRANSPLANT SURGERY

## 2021-01-01 PROCEDURE — 258N000003 HC RX IP 258 OP 636

## 2021-01-01 PROCEDURE — 44500 INTRO GASTROINTESTINAL TUBE: CPT | Performed by: DIETITIAN, REGISTERED

## 2021-01-01 PROCEDURE — 87205 SMEAR GRAM STAIN: CPT | Performed by: INTERNAL MEDICINE

## 2021-01-01 PROCEDURE — 93970 EXTREMITY STUDY: CPT

## 2021-01-01 PROCEDURE — 87103 BLOOD FUNGUS CULTURE: CPT | Performed by: STUDENT IN AN ORGANIZED HEALTH CARE EDUCATION/TRAINING PROGRAM

## 2021-01-01 PROCEDURE — G0463 HOSPITAL OUTPT CLINIC VISIT: HCPCS

## 2021-01-01 PROCEDURE — 83735 ASSAY OF MAGNESIUM: CPT | Performed by: NURSE PRACTITIONER

## 2021-01-01 PROCEDURE — 99291 CRITICAL CARE FIRST HOUR: CPT | Mod: GC

## 2021-01-01 PROCEDURE — 3E043XZ INTRODUCTION OF VASOPRESSOR INTO CENTRAL VEIN, PERCUTANEOUS APPROACH: ICD-10-PCS | Performed by: INTERNAL MEDICINE

## 2021-01-01 PROCEDURE — 82805 BLOOD GASES W/O2 SATURATION: CPT | Performed by: NURSE PRACTITIONER

## 2021-01-01 PROCEDURE — 88108 CYTOPATH CONCENTRATE TECH: CPT | Mod: TC | Performed by: INTERNAL MEDICINE

## 2021-01-01 PROCEDURE — P9016 RBC LEUKOCYTES REDUCED: HCPCS | Performed by: STUDENT IN AN ORGANIZED HEALTH CARE EDUCATION/TRAINING PROGRAM

## 2021-01-01 PROCEDURE — 36592 COLLECT BLOOD FROM PICC: CPT | Performed by: STUDENT IN AN ORGANIZED HEALTH CARE EDUCATION/TRAINING PROGRAM

## 2021-01-01 PROCEDURE — 87205 SMEAR GRAM STAIN: CPT | Performed by: NURSE PRACTITIONER

## 2021-01-01 PROCEDURE — 93306 TTE W/DOPPLER COMPLETE: CPT | Mod: 26 | Performed by: STUDENT IN AN ORGANIZED HEALTH CARE EDUCATION/TRAINING PROGRAM

## 2021-01-01 PROCEDURE — 81001 URINALYSIS AUTO W/SCOPE: CPT | Performed by: NURSE PRACTITIONER

## 2021-01-01 PROCEDURE — 87070 CULTURE OTHR SPECIMN AEROBIC: CPT | Performed by: NURSE PRACTITIONER

## 2021-01-01 PROCEDURE — 87102 FUNGUS ISOLATION CULTURE: CPT | Performed by: INTERNAL MEDICINE

## 2021-01-01 PROCEDURE — 99291 CRITICAL CARE FIRST HOUR: CPT | Mod: GC | Performed by: INTERNAL MEDICINE

## 2021-01-01 PROCEDURE — 85014 HEMATOCRIT: CPT | Performed by: STUDENT IN AN ORGANIZED HEALTH CARE EDUCATION/TRAINING PROGRAM

## 2021-01-01 PROCEDURE — 258N000003 HC RX IP 258 OP 636: Performed by: NURSE PRACTITIONER

## 2021-01-01 PROCEDURE — 85520 HEPARIN ASSAY: CPT | Performed by: STUDENT IN AN ORGANIZED HEALTH CARE EDUCATION/TRAINING PROGRAM

## 2021-01-01 PROCEDURE — 76776 US EXAM K TRANSPL W/DOPPLER: CPT

## 2021-01-01 PROCEDURE — 250N000009 HC RX 250: Performed by: PHYSICIAN ASSISTANT

## 2021-01-01 PROCEDURE — 87449 NOS EACH ORGANISM AG IA: CPT | Performed by: STUDENT IN AN ORGANIZED HEALTH CARE EDUCATION/TRAINING PROGRAM

## 2021-01-01 PROCEDURE — 84132 ASSAY OF SERUM POTASSIUM: CPT | Performed by: NURSE PRACTITIONER

## 2021-01-01 PROCEDURE — 250N000013 HC RX MED GY IP 250 OP 250 PS 637

## 2021-01-01 PROCEDURE — 85018 HEMOGLOBIN: CPT

## 2021-01-01 PROCEDURE — 83605 ASSAY OF LACTIC ACID: CPT | Performed by: STUDENT IN AN ORGANIZED HEALTH CARE EDUCATION/TRAINING PROGRAM

## 2021-01-01 PROCEDURE — 87385 HISTOPLASMA CAPSUL AG IA: CPT | Performed by: STUDENT IN AN ORGANIZED HEALTH CARE EDUCATION/TRAINING PROGRAM

## 2021-01-01 PROCEDURE — 94645 CONT INHLJ TX EACH ADDL HOUR: CPT

## 2021-01-01 PROCEDURE — 84100 ASSAY OF PHOSPHORUS: CPT | Performed by: NURSE PRACTITIONER

## 2021-01-01 PROCEDURE — 87801 DETECT AGNT MULT DNA AMPLI: CPT | Performed by: STUDENT IN AN ORGANIZED HEALTH CARE EDUCATION/TRAINING PROGRAM

## 2021-01-01 PROCEDURE — 999N000067 HC STATISTIC FAILED PERIPHERAL IV START

## 2021-01-01 PROCEDURE — 85014 HEMATOCRIT: CPT | Performed by: NURSE PRACTITIONER

## 2021-01-01 PROCEDURE — 99291 CRITICAL CARE FIRST HOUR: CPT | Mod: 24 | Performed by: INTERNAL MEDICINE

## 2021-01-01 PROCEDURE — 82330 ASSAY OF CALCIUM: CPT | Performed by: INTERNAL MEDICINE

## 2021-01-01 PROCEDURE — 36592 COLLECT BLOOD FROM PICC: CPT | Performed by: NURSE PRACTITIONER

## 2021-01-01 PROCEDURE — 88305 TISSUE EXAM BY PATHOLOGIST: CPT | Mod: TC | Performed by: INTERNAL MEDICINE

## 2021-01-01 PROCEDURE — 250N000012 HC RX MED GY IP 250 OP 636 PS 637

## 2021-01-01 PROCEDURE — 87040 BLOOD CULTURE FOR BACTERIA: CPT | Performed by: NURSE PRACTITIONER

## 2021-01-01 PROCEDURE — 31624 DX BRONCHOSCOPE/LAVAGE: CPT

## 2021-01-01 PROCEDURE — 31624 DX BRONCHOSCOPE/LAVAGE: CPT | Mod: GC | Performed by: INTERNAL MEDICINE

## 2021-01-01 PROCEDURE — 85048 AUTOMATED LEUKOCYTE COUNT: CPT | Performed by: STUDENT IN AN ORGANIZED HEALTH CARE EDUCATION/TRAINING PROGRAM

## 2021-01-01 PROCEDURE — 87070 CULTURE OTHR SPECIMN AEROBIC: CPT | Performed by: STUDENT IN AN ORGANIZED HEALTH CARE EDUCATION/TRAINING PROGRAM

## 2021-01-01 PROCEDURE — 85045 AUTOMATED RETICULOCYTE COUNT: CPT

## 2021-01-01 PROCEDURE — 87633 RESP VIRUS 12-25 TARGETS: CPT | Performed by: STUDENT IN AN ORGANIZED HEALTH CARE EDUCATION/TRAINING PROGRAM

## 2021-01-01 PROCEDURE — 250N000009 HC RX 250

## 2021-01-01 PROCEDURE — 74018 RADEX ABDOMEN 1 VIEW: CPT | Mod: 26 | Performed by: STUDENT IN AN ORGANIZED HEALTH CARE EDUCATION/TRAINING PROGRAM

## 2021-01-01 PROCEDURE — 88305 TISSUE EXAM BY PATHOLOGIST: CPT | Mod: 26 | Performed by: PATHOLOGY

## 2021-01-01 PROCEDURE — 82310 ASSAY OF CALCIUM: CPT | Performed by: STUDENT IN AN ORGANIZED HEALTH CARE EDUCATION/TRAINING PROGRAM

## 2021-01-01 PROCEDURE — 250N000009 HC RX 250: Performed by: NURSE PRACTITIONER

## 2021-01-01 PROCEDURE — 82248 BILIRUBIN DIRECT: CPT

## 2021-01-01 PROCEDURE — 99214 OFFICE O/P EST MOD 30 MIN: CPT | Mod: 95

## 2021-01-01 PROCEDURE — 82565 ASSAY OF CREATININE: CPT | Performed by: STUDENT IN AN ORGANIZED HEALTH CARE EDUCATION/TRAINING PROGRAM

## 2021-01-01 PROCEDURE — U0005 INFEC AGEN DETEC AMPLI PROBE: HCPCS | Performed by: STUDENT IN AN ORGANIZED HEALTH CARE EDUCATION/TRAINING PROGRAM

## 2021-01-01 PROCEDURE — 99221 1ST HOSP IP/OBS SF/LOW 40: CPT | Mod: GC | Performed by: SURGERY

## 2021-01-01 PROCEDURE — 87070 CULTURE OTHR SPECIMN AEROBIC: CPT | Performed by: INTERNAL MEDICINE

## 2021-01-01 PROCEDURE — 86901 BLOOD TYPING SEROLOGIC RH(D): CPT | Performed by: STUDENT IN AN ORGANIZED HEALTH CARE EDUCATION/TRAINING PROGRAM

## 2021-01-01 PROCEDURE — 36600 WITHDRAWAL OF ARTERIAL BLOOD: CPT

## 2021-01-01 PROCEDURE — P9047 ALBUMIN (HUMAN), 25%, 50ML: HCPCS

## 2021-01-01 PROCEDURE — 999N000185 HC STATISTIC TRANSPORT TIME EA 15 MIN

## 2021-01-01 PROCEDURE — 88312 SPECIAL STAINS GROUP 1: CPT | Mod: 26 | Performed by: PATHOLOGY

## 2021-01-01 PROCEDURE — 82306 VITAMIN D 25 HYDROXY: CPT | Performed by: INTERNAL MEDICINE

## 2021-01-01 PROCEDURE — 87040 BLOOD CULTURE FOR BACTERIA: CPT | Performed by: STUDENT IN AN ORGANIZED HEALTH CARE EDUCATION/TRAINING PROGRAM

## 2021-01-01 PROCEDURE — 5A1955Z RESPIRATORY VENTILATION, GREATER THAN 96 CONSECUTIVE HOURS: ICD-10-PCS | Performed by: INTERNAL MEDICINE

## 2021-01-01 PROCEDURE — 83615 LACTATE (LD) (LDH) ENZYME: CPT | Performed by: STUDENT IN AN ORGANIZED HEALTH CARE EDUCATION/TRAINING PROGRAM

## 2021-01-01 PROCEDURE — 87493 C DIFF AMPLIFIED PROBE: CPT

## 2021-01-01 RX ORDER — AMINO AC/PROTEIN HYDR/WHEY PRO 10G-100/30
1 LIQUID (ML) ORAL 3 TIMES DAILY
Status: DISCONTINUED | OUTPATIENT
Start: 2021-01-01 | End: 2021-01-01 | Stop reason: HOSPADM

## 2021-01-01 RX ORDER — ATROPINE SULFATE 10 MG/ML
1 SOLUTION/ DROPS OPHTHALMIC
Status: DISCONTINUED | OUTPATIENT
Start: 2021-01-01 | End: 2021-01-01 | Stop reason: HOSPADM

## 2021-01-01 RX ORDER — POLYETHYLENE GLYCOL 3350 17 G/17G
17 POWDER, FOR SOLUTION ORAL DAILY
Status: DISCONTINUED | OUTPATIENT
Start: 2021-01-01 | End: 2021-01-01

## 2021-01-01 RX ORDER — MIDAZOLAM HCL IN 0.9 % NACL/PF 1 MG/ML
1-8 PLASTIC BAG, INJECTION (ML) INTRAVENOUS CONTINUOUS
Status: DISCONTINUED | OUTPATIENT
Start: 2021-01-01 | End: 2021-01-01

## 2021-01-01 RX ORDER — GLYCOPYRROLATE 0.2 MG/ML
0.2 INJECTION, SOLUTION INTRAMUSCULAR; INTRAVENOUS ONCE
Status: DISCONTINUED | OUTPATIENT
Start: 2021-01-01 | End: 2021-01-01 | Stop reason: HOSPADM

## 2021-01-01 RX ORDER — MORPHINE SULFATE 10 MG/ML
5 INJECTION, SOLUTION INTRAMUSCULAR; INTRAVENOUS EVERY 10 MIN PRN
Status: DISCONTINUED | OUTPATIENT
Start: 2021-01-01 | End: 2021-01-01 | Stop reason: HOSPADM

## 2021-01-01 RX ORDER — VANCOMYCIN HYDROCHLORIDE 1 G/200ML
1000 INJECTION, SOLUTION INTRAVENOUS ONCE
Status: COMPLETED | OUTPATIENT
Start: 2021-01-01 | End: 2021-01-01

## 2021-01-01 RX ORDER — TAMSULOSIN HYDROCHLORIDE 0.4 MG/1
0.4 CAPSULE ORAL AT BEDTIME
Qty: 90 CAPSULE | Refills: 1 | Status: SHIPPED | OUTPATIENT
Start: 2021-01-01

## 2021-01-01 RX ORDER — NICOTINE POLACRILEX 4 MG
15-30 LOZENGE BUCCAL
Status: DISCONTINUED | OUTPATIENT
Start: 2021-01-01 | End: 2021-01-01 | Stop reason: HOSPADM

## 2021-01-01 RX ORDER — DEXTROSE MONOHYDRATE 25 G/50ML
25-50 INJECTION, SOLUTION INTRAVENOUS
Status: CANCELLED | OUTPATIENT
Start: 2021-01-01

## 2021-01-01 RX ORDER — ATORVASTATIN CALCIUM 10 MG/1
10 TABLET, FILM COATED ORAL DAILY
Qty: 30 TABLET | Refills: 11 | Status: SHIPPED | OUTPATIENT
Start: 2021-01-01

## 2021-01-01 RX ORDER — ONDANSETRON 4 MG/1
4 TABLET, ORALLY DISINTEGRATING ORAL EVERY 6 HOURS PRN
Status: DISCONTINUED | OUTPATIENT
Start: 2021-01-01 | End: 2021-01-01 | Stop reason: HOSPADM

## 2021-01-01 RX ORDER — AMOXICILLIN 250 MG
1 CAPSULE ORAL AT BEDTIME
Status: DISCONTINUED | OUTPATIENT
Start: 2021-01-01 | End: 2021-01-01

## 2021-01-01 RX ORDER — MIDAZOLAM HCL IN 0.9 % NACL/PF 1 MG/ML
1-8 PLASTIC BAG, INJECTION (ML) INTRAVENOUS CONTINUOUS
Status: DISCONTINUED | OUTPATIENT
Start: 2021-01-01 | End: 2021-01-01 | Stop reason: HOSPADM

## 2021-01-01 RX ORDER — NALOXONE HYDROCHLORIDE 0.4 MG/ML
0.4 INJECTION, SOLUTION INTRAMUSCULAR; INTRAVENOUS; SUBCUTANEOUS
Status: DISCONTINUED | OUTPATIENT
Start: 2021-01-01 | End: 2021-01-01 | Stop reason: HOSPADM

## 2021-01-01 RX ORDER — METOCLOPRAMIDE HYDROCHLORIDE 5 MG/ML
10 INJECTION INTRAMUSCULAR; INTRAVENOUS ONCE
Status: COMPLETED | OUTPATIENT
Start: 2021-01-01 | End: 2021-01-01

## 2021-01-01 RX ORDER — PROPOFOL 10 MG/ML
5-75 INJECTION, EMULSION INTRAVENOUS CONTINUOUS
Status: DISCONTINUED | OUTPATIENT
Start: 2021-01-01 | End: 2021-01-01

## 2021-01-01 RX ORDER — POTASSIUM CHLORIDE 1.5 G/1.58G
40 POWDER, FOR SOLUTION ORAL ONCE
Status: COMPLETED | OUTPATIENT
Start: 2021-01-01 | End: 2021-01-01

## 2021-01-01 RX ORDER — DEXTROSE MONOHYDRATE 25 G/50ML
25-50 INJECTION, SOLUTION INTRAVENOUS
Status: DISCONTINUED | OUTPATIENT
Start: 2021-01-01 | End: 2021-01-01

## 2021-01-01 RX ORDER — ITRACONAZOLE 10 MG/ML
200 SOLUTION ORAL EVERY 8 HOURS
Status: DISCONTINUED | OUTPATIENT
Start: 2021-01-01 | End: 2021-01-01 | Stop reason: HOSPADM

## 2021-01-01 RX ORDER — ALBUTEROL SULFATE 90 UG/1
2 AEROSOL, METERED RESPIRATORY (INHALATION) EVERY 6 HOURS PRN
Status: DISCONTINUED | OUTPATIENT
Start: 2021-01-01 | End: 2021-01-01 | Stop reason: HOSPADM

## 2021-01-01 RX ORDER — FENTANYL CITRATE 50 UG/ML
25-100 INJECTION, SOLUTION INTRAMUSCULAR; INTRAVENOUS
Status: DISCONTINUED | OUTPATIENT
Start: 2021-01-01 | End: 2021-01-01

## 2021-01-01 RX ORDER — DEXTROSE MONOHYDRATE 50 MG/ML
10-20 INJECTION, SOLUTION INTRAVENOUS EVERY 24 HOURS
Status: DISCONTINUED | OUTPATIENT
Start: 2021-01-01 | End: 2021-01-01 | Stop reason: HOSPADM

## 2021-01-01 RX ORDER — POTASSIUM CHLORIDE 29.8 MG/ML
20 INJECTION INTRAVENOUS ONCE
Status: COMPLETED | OUTPATIENT
Start: 2021-01-01 | End: 2021-01-01

## 2021-01-01 RX ORDER — POLYETHYLENE GLYCOL 3350 17 G/17G
17 POWDER, FOR SOLUTION ORAL DAILY PRN
Status: DISCONTINUED | OUTPATIENT
Start: 2021-01-01 | End: 2021-01-01 | Stop reason: HOSPADM

## 2021-01-01 RX ORDER — ATORVASTATIN CALCIUM 10 MG/1
10 TABLET, FILM COATED ORAL DAILY
Status: DISCONTINUED | OUTPATIENT
Start: 2021-01-01 | End: 2021-01-01 | Stop reason: HOSPADM

## 2021-01-01 RX ORDER — DIPHENHYDRAMINE HYDROCHLORIDE 50 MG/ML
25 INJECTION INTRAMUSCULAR; INTRAVENOUS EVERY 6 HOURS PRN
Status: DISCONTINUED | OUTPATIENT
Start: 2021-01-01 | End: 2021-01-01 | Stop reason: HOSPADM

## 2021-01-01 RX ORDER — MEROPENEM 1 G/1
1 INJECTION, POWDER, FOR SOLUTION INTRAVENOUS EVERY 12 HOURS
Status: DISCONTINUED | OUTPATIENT
Start: 2021-01-01 | End: 2021-01-01

## 2021-01-01 RX ORDER — DIPHENHYDRAMINE HYDROCHLORIDE 50 MG/ML
25 INJECTION INTRAMUSCULAR; INTRAVENOUS ONCE
Status: COMPLETED | OUTPATIENT
Start: 2021-01-01 | End: 2021-01-01

## 2021-01-01 RX ORDER — DIPHENHYDRAMINE HYDROCHLORIDE 50 MG/ML
25 INJECTION INTRAMUSCULAR; INTRAVENOUS EVERY 24 HOURS
Status: DISCONTINUED | OUTPATIENT
Start: 2021-01-01 | End: 2021-01-01 | Stop reason: HOSPADM

## 2021-01-01 RX ORDER — FENTANYL CITRATE 50 UG/ML
25 INJECTION, SOLUTION INTRAMUSCULAR; INTRAVENOUS
Status: DISCONTINUED | OUTPATIENT
Start: 2021-01-01 | End: 2021-01-01

## 2021-01-01 RX ORDER — MYCOPHENOLATE MOFETIL 200 MG/ML
500 POWDER, FOR SUSPENSION ORAL 2 TIMES DAILY
Status: DISCONTINUED | OUTPATIENT
Start: 2021-01-01 | End: 2021-01-01

## 2021-01-01 RX ORDER — MYCOPHENOLATE MOFETIL 200 MG/ML
360 POWDER, FOR SUSPENSION ORAL 2 TIMES DAILY
Status: DISCONTINUED | OUTPATIENT
Start: 2021-01-01 | End: 2021-01-01

## 2021-01-01 RX ORDER — MORPHINE SULFATE 10 MG/ML
5 INJECTION, SOLUTION INTRAMUSCULAR; INTRAVENOUS EVERY 30 MIN PRN
Status: DISCONTINUED | OUTPATIENT
Start: 2021-01-01 | End: 2021-01-01 | Stop reason: HOSPADM

## 2021-01-01 RX ORDER — ONDANSETRON 2 MG/ML
4 INJECTION INTRAMUSCULAR; INTRAVENOUS EVERY 6 HOURS PRN
Status: DISCONTINUED | OUTPATIENT
Start: 2021-01-01 | End: 2021-01-01 | Stop reason: HOSPADM

## 2021-01-01 RX ORDER — CHOLECALCIFEROL (VITAMIN D3) 50 MCG
1 TABLET ORAL DAILY
Qty: 90 TABLET | Refills: 3 | Status: SHIPPED | OUTPATIENT
Start: 2021-01-01

## 2021-01-01 RX ORDER — PREDNISONE 5 MG/1
5 TABLET ORAL DAILY
Status: DISCONTINUED | OUTPATIENT
Start: 2021-01-01 | End: 2021-01-01

## 2021-01-01 RX ORDER — BUMETANIDE 0.25 MG/ML
2 INJECTION INTRAMUSCULAR; INTRAVENOUS EVERY 12 HOURS
Status: DISCONTINUED | OUTPATIENT
Start: 2021-01-01 | End: 2021-01-01

## 2021-01-01 RX ORDER — FENTANYL CITRATE 50 UG/ML
50-100 INJECTION, SOLUTION INTRAMUSCULAR; INTRAVENOUS
Status: DISCONTINUED | OUTPATIENT
Start: 2021-01-01 | End: 2021-01-01 | Stop reason: HOSPADM

## 2021-01-01 RX ORDER — MYCOPHENOLATE MOFETIL 200 MG/ML
250 POWDER, FOR SUSPENSION ORAL 2 TIMES DAILY
Status: DISCONTINUED | OUTPATIENT
Start: 2021-01-01 | End: 2021-01-01

## 2021-01-01 RX ORDER — MYCOPHENOLIC ACID 180 MG/1
540 TABLET, DELAYED RELEASE ORAL 2 TIMES DAILY
Qty: 180 TABLET | Refills: 11 | Status: SHIPPED | OUTPATIENT
Start: 2021-01-01

## 2021-01-01 RX ORDER — AMOXICILLIN 250 MG
1 CAPSULE ORAL 2 TIMES DAILY PRN
Status: DISCONTINUED | OUTPATIENT
Start: 2021-01-01 | End: 2021-01-01 | Stop reason: HOSPADM

## 2021-01-01 RX ORDER — NALOXONE HYDROCHLORIDE 0.4 MG/ML
0.2 INJECTION, SOLUTION INTRAMUSCULAR; INTRAVENOUS; SUBCUTANEOUS
Status: DISCONTINUED | OUTPATIENT
Start: 2021-01-01 | End: 2021-01-01 | Stop reason: HOSPADM

## 2021-01-01 RX ORDER — DEXTROSE MONOHYDRATE 100 MG/ML
INJECTION, SOLUTION INTRAVENOUS CONTINUOUS PRN
Status: DISCONTINUED | OUTPATIENT
Start: 2021-01-01 | End: 2021-01-01 | Stop reason: HOSPADM

## 2021-01-01 RX ORDER — NOREPINEPHRINE BITARTRATE 0.06 MG/ML
.01-.6 INJECTION, SOLUTION INTRAVENOUS CONTINUOUS
Status: DISCONTINUED | OUTPATIENT
Start: 2021-01-01 | End: 2021-01-01 | Stop reason: HOSPADM

## 2021-01-01 RX ORDER — PREDNISONE 5 MG/1
5 TABLET ORAL DAILY
Qty: 30 TABLET | Refills: 11 | Status: SHIPPED | OUTPATIENT
Start: 2021-01-01

## 2021-01-01 RX ORDER — FUROSEMIDE 10 MG/ML
40 INJECTION INTRAMUSCULAR; INTRAVENOUS ONCE
Status: COMPLETED | OUTPATIENT
Start: 2021-01-01 | End: 2021-01-01

## 2021-01-01 RX ORDER — ALBUMIN (HUMAN) 12.5 G/50ML
50 SOLUTION INTRAVENOUS
Status: COMPLETED | OUTPATIENT
Start: 2021-01-01 | End: 2021-01-01

## 2021-01-01 RX ORDER — ERYTHROPOIETIN 10000 [IU]/ML
INJECTION, SOLUTION INTRAVENOUS; SUBCUTANEOUS
Qty: 4 ML | Refills: 11 | Status: SHIPPED | OUTPATIENT
Start: 2021-01-01

## 2021-01-01 RX ORDER — ACETAMINOPHEN 325 MG/1
650 TABLET ORAL EVERY 24 HOURS
Status: DISCONTINUED | OUTPATIENT
Start: 2021-01-01 | End: 2021-01-01

## 2021-01-01 RX ORDER — FENTANYL CITRATE 50 UG/ML
50 INJECTION, SOLUTION INTRAMUSCULAR; INTRAVENOUS ONCE
Status: COMPLETED | OUTPATIENT
Start: 2021-01-01 | End: 2021-01-01

## 2021-01-01 RX ORDER — ACETAMINOPHEN 325 MG/1
650 TABLET ORAL EVERY 24 HOURS
Status: DISCONTINUED | OUTPATIENT
Start: 2021-01-01 | End: 2021-01-01 | Stop reason: HOSPADM

## 2021-01-01 RX ORDER — NICOTINE POLACRILEX 4 MG
15-30 LOZENGE BUCCAL
Status: CANCELLED | OUTPATIENT
Start: 2021-01-01

## 2021-01-01 RX ORDER — SODIUM BICARBONATE 650 MG/1
1950 TABLET ORAL 2 TIMES DAILY
Qty: 120 TABLET | Refills: 11 | COMMUNITY
Start: 2021-01-01

## 2021-01-01 RX ORDER — ITRACONAZOLE 10 MG/ML
200 SOLUTION ORAL 2 TIMES DAILY
Status: DISCONTINUED | OUTPATIENT
Start: 2021-08-17 | End: 2021-01-01 | Stop reason: HOSPADM

## 2021-01-01 RX ORDER — ACETAMINOPHEN 325 MG/10.15ML
650 LIQUID ORAL EVERY 4 HOURS PRN
Status: DISCONTINUED | OUTPATIENT
Start: 2021-01-01 | End: 2021-01-01

## 2021-01-01 RX ORDER — FUROSEMIDE 10 MG/ML
40 INJECTION INTRAMUSCULAR; INTRAVENOUS EVERY 12 HOURS
Status: DISCONTINUED | OUTPATIENT
Start: 2021-01-01 | End: 2021-01-01

## 2021-01-01 RX ORDER — HEPARIN SODIUM 10000 [USP'U]/100ML
0-5000 INJECTION, SOLUTION INTRAVENOUS CONTINUOUS
Status: DISCONTINUED | OUTPATIENT
Start: 2021-01-01 | End: 2021-01-01 | Stop reason: HOSPADM

## 2021-01-01 RX ORDER — ACETAMINOPHEN 325 MG/1
650 TABLET ORAL EVERY 4 HOURS PRN
Status: DISCONTINUED | OUTPATIENT
Start: 2021-01-01 | End: 2021-01-01

## 2021-01-01 RX ORDER — FUROSEMIDE 10 MG/ML
40 INJECTION INTRAMUSCULAR; INTRAVENOUS EVERY 6 HOURS
Status: DISCONTINUED | OUTPATIENT
Start: 2021-01-01 | End: 2021-01-01 | Stop reason: HOSPADM

## 2021-01-01 RX ORDER — FUROSEMIDE 10 MG/ML
40 INJECTION INTRAMUSCULAR; INTRAVENOUS EVERY 8 HOURS
Status: DISCONTINUED | OUTPATIENT
Start: 2021-01-01 | End: 2021-01-01

## 2021-01-01 RX ORDER — DIPHENHYDRAMINE HCL 25 MG
25 CAPSULE ORAL EVERY 6 HOURS PRN
Status: DISCONTINUED | OUTPATIENT
Start: 2021-01-01 | End: 2021-01-01 | Stop reason: HOSPADM

## 2021-01-01 RX ORDER — BUMETANIDE 0.25 MG/ML
4 INJECTION INTRAMUSCULAR; INTRAVENOUS ONCE
Status: COMPLETED | OUTPATIENT
Start: 2021-01-01 | End: 2021-01-01

## 2021-01-01 RX ORDER — POTASSIUM CHLORIDE 1.5 G/1.58G
20 POWDER, FOR SOLUTION ORAL ONCE
Status: COMPLETED | OUTPATIENT
Start: 2021-01-01 | End: 2021-01-01

## 2021-01-01 RX ORDER — METOCLOPRAMIDE HYDROCHLORIDE 5 MG/ML
10 INJECTION INTRAMUSCULAR; INTRAVENOUS 2 TIMES DAILY
Status: DISCONTINUED | OUTPATIENT
Start: 2021-01-01 | End: 2021-01-01 | Stop reason: HOSPADM

## 2021-01-01 RX ORDER — NOREPINEPHRINE BITARTRATE 0.06 MG/ML
.01-.6 INJECTION, SOLUTION INTRAVENOUS CONTINUOUS
Status: DISCONTINUED | OUTPATIENT
Start: 2021-01-01 | End: 2021-01-01

## 2021-01-01 RX ORDER — FAMOTIDINE 40 MG/1
40 TABLET, FILM COATED ORAL DAILY
Qty: 30 TABLET | Refills: 11 | Status: SHIPPED | OUTPATIENT
Start: 2021-01-01

## 2021-01-01 RX ORDER — NICOTINE POLACRILEX 4 MG
15-30 LOZENGE BUCCAL
Status: DISCONTINUED | OUTPATIENT
Start: 2021-01-01 | End: 2021-01-01

## 2021-01-01 RX ORDER — AMOXICILLIN 250 MG
2 CAPSULE ORAL 2 TIMES DAILY
Status: DISCONTINUED | OUTPATIENT
Start: 2021-01-01 | End: 2021-01-01

## 2021-01-01 RX ORDER — DEXTROSE MONOHYDRATE 25 G/50ML
25-50 INJECTION, SOLUTION INTRAVENOUS
Status: DISCONTINUED | OUTPATIENT
Start: 2021-01-01 | End: 2021-01-01 | Stop reason: HOSPADM

## 2021-01-01 RX ORDER — PANTOPRAZOLE SODIUM 40 MG/1
40 TABLET, DELAYED RELEASE ORAL
Status: DISCONTINUED | OUTPATIENT
Start: 2021-01-01 | End: 2021-01-01

## 2021-01-01 RX ORDER — ACETAMINOPHEN 325 MG/10.15ML
650 LIQUID ORAL EVERY 4 HOURS PRN
Status: DISCONTINUED | OUTPATIENT
Start: 2021-01-01 | End: 2021-01-01 | Stop reason: HOSPADM

## 2021-01-01 RX ORDER — LIDOCAINE HYDROCHLORIDE 20 MG/ML
5 SOLUTION OROPHARYNGEAL ONCE
Status: DISCONTINUED | OUTPATIENT
Start: 2021-01-01 | End: 2021-01-01 | Stop reason: CLARIF

## 2021-01-01 RX ORDER — MEROPENEM 1 G/1
1 INJECTION, POWDER, FOR SOLUTION INTRAVENOUS EVERY 12 HOURS
Status: DISCONTINUED | OUTPATIENT
Start: 2021-01-01 | End: 2021-01-01 | Stop reason: HOSPADM

## 2021-01-01 RX ORDER — LORATADINE 10 MG/1
10 TABLET ORAL DAILY
Status: DISCONTINUED | OUTPATIENT
Start: 2021-01-01 | End: 2021-01-01 | Stop reason: HOSPADM

## 2021-01-01 RX ORDER — AMLODIPINE BESYLATE 10 MG/1
10 TABLET ORAL DAILY
Qty: 30 TABLET | Refills: 11 | Status: SHIPPED | OUTPATIENT
Start: 2021-01-01

## 2021-01-01 RX ORDER — VECURONIUM BROMIDE 1 MG/ML
8 INJECTION, POWDER, LYOPHILIZED, FOR SOLUTION INTRAVENOUS ONCE
Status: COMPLETED | OUTPATIENT
Start: 2021-01-01 | End: 2021-01-01

## 2021-01-01 RX ORDER — FUROSEMIDE 10 MG/ML
10 INJECTION INTRAMUSCULAR; INTRAVENOUS ONCE
Status: CANCELLED | OUTPATIENT
Start: 2021-01-01 | End: 2021-01-01

## 2021-01-01 RX ORDER — POTASSIUM CHLORIDE 1.5 G/1.58G
20 POWDER, FOR SOLUTION ORAL 2 TIMES DAILY
Status: DISCONTINUED | OUTPATIENT
Start: 2021-01-01 | End: 2021-01-01 | Stop reason: HOSPADM

## 2021-01-01 RX ORDER — PROPOFOL 10 MG/ML
INJECTION, EMULSION INTRAVENOUS
Status: COMPLETED
Start: 2021-01-01 | End: 2021-01-01

## 2021-01-01 RX ORDER — DIPHENHYDRAMINE HCL 25 MG
25 CAPSULE ORAL EVERY 24 HOURS
Status: DISCONTINUED | OUTPATIENT
Start: 2021-01-01 | End: 2021-01-01 | Stop reason: HOSPADM

## 2021-01-01 RX ADMIN — ITRACONAZOLE 200 MG: 10 SOLUTION ORAL at 05:28

## 2021-01-01 RX ADMIN — Medication 1 PACKET: at 07:51

## 2021-01-01 RX ADMIN — Medication 150 MCG/HR: at 22:55

## 2021-01-01 RX ADMIN — DEXTROSE MONOHYDRATE 20 ML: 50 INJECTION, SOLUTION INTRAVENOUS at 03:51

## 2021-01-01 RX ADMIN — LORATADINE 10 MG: 10 TABLET ORAL at 08:02

## 2021-01-01 RX ADMIN — ALBUMIN HUMAN 50 G: 0.25 SOLUTION INTRAVENOUS at 08:28

## 2021-01-01 RX ADMIN — Medication 1 PACKET: at 08:25

## 2021-01-01 RX ADMIN — Medication 3 MG/HR: at 17:09

## 2021-01-01 RX ADMIN — FENTANYL CITRATE 100 MCG: 50 INJECTION, SOLUTION INTRAMUSCULAR; INTRAVENOUS at 18:12

## 2021-01-01 RX ADMIN — VECURONIUM BROMIDE 0.8 MCG/KG/MIN: 1 INJECTION, POWDER, LYOPHILIZED, FOR SOLUTION INTRAVENOUS at 06:00

## 2021-01-01 RX ADMIN — INSULIN ASPART 1 UNITS: 100 INJECTION, SOLUTION INTRAVENOUS; SUBCUTANEOUS at 16:52

## 2021-01-01 RX ADMIN — FENTANYL CITRATE 50 MCG: 50 INJECTION, SOLUTION INTRAMUSCULAR; INTRAVENOUS at 02:00

## 2021-01-01 RX ADMIN — Medication 50 MCG/HR: at 10:02

## 2021-01-01 RX ADMIN — POTASSIUM CHLORIDE 20 MEQ: 29.8 INJECTION, SOLUTION INTRAVENOUS at 09:22

## 2021-01-01 RX ADMIN — AMPHOTERICIN B 350 MG: 50 INJECTION, POWDER, LYOPHILIZED, FOR SOLUTION INTRAVENOUS at 03:52

## 2021-01-01 RX ADMIN — HEPARIN SODIUM 2100 UNITS/HR: 10000 INJECTION, SOLUTION INTRAVENOUS at 04:25

## 2021-01-01 RX ADMIN — INSULIN ASPART 1 UNITS: 100 INJECTION, SOLUTION INTRAVENOUS; SUBCUTANEOUS at 12:57

## 2021-01-01 RX ADMIN — FENTANYL CITRATE 100 MCG: 50 INJECTION, SOLUTION INTRAMUSCULAR; INTRAVENOUS at 01:30

## 2021-01-01 RX ADMIN — SODIUM CHLORIDE 250 ML: 9 INJECTION, SOLUTION INTRAVENOUS at 01:27

## 2021-01-01 RX ADMIN — ATROPINE SULFATE 1 DROP: 10 SOLUTION/ DROPS OPHTHALMIC at 16:30

## 2021-01-01 RX ADMIN — Medication 0.03 MCG/KG/MIN: at 08:23

## 2021-01-01 RX ADMIN — FENTANYL CITRATE 100 MCG: 50 INJECTION, SOLUTION INTRAMUSCULAR; INTRAVENOUS at 03:47

## 2021-01-01 RX ADMIN — DIPHENHYDRAMINE HYDROCHLORIDE 25 MG: 50 INJECTION, SOLUTION INTRAMUSCULAR; INTRAVENOUS at 01:30

## 2021-01-01 RX ADMIN — FUROSEMIDE 40 MG: 10 INJECTION, SOLUTION INTRAVENOUS at 22:10

## 2021-01-01 RX ADMIN — PROPOFOL 45 MCG/KG/MIN: 10 INJECTION, EMULSION INTRAVENOUS at 21:38

## 2021-01-01 RX ADMIN — DAPSONE 50 MG: 100 TABLET ORAL at 08:37

## 2021-01-01 RX ADMIN — POTASSIUM CHLORIDE 20 MEQ: 29.8 INJECTION, SOLUTION INTRAVENOUS at 22:10

## 2021-01-01 RX ADMIN — ALBUMIN HUMAN 50 G: 0.25 SOLUTION INTRAVENOUS at 01:53

## 2021-01-01 RX ADMIN — Medication 1 PACKET: at 14:03

## 2021-01-01 RX ADMIN — INSULIN ASPART 1 UNITS: 100 INJECTION, SOLUTION INTRAVENOUS; SUBCUTANEOUS at 13:00

## 2021-01-01 RX ADMIN — FENTANYL CITRATE 50 MCG: 50 INJECTION, SOLUTION INTRAMUSCULAR; INTRAVENOUS at 07:47

## 2021-01-01 RX ADMIN — Medication 50 MCG/HR: at 05:23

## 2021-01-01 RX ADMIN — FUROSEMIDE 40 MG: 10 INJECTION, SOLUTION INTRAVENOUS at 00:09

## 2021-01-01 RX ADMIN — DEXTROSE MONOHYDRATE 20 ML: 50 INJECTION, SOLUTION INTRAVENOUS at 06:15

## 2021-01-01 RX ADMIN — Medication: at 02:18

## 2021-01-01 RX ADMIN — SODIUM BICARBONATE: 84 INJECTION, SOLUTION INTRAVENOUS at 08:15

## 2021-01-01 RX ADMIN — HYDROCORTISONE SODIUM SUCCINATE 100 MG: 100 INJECTION, POWDER, FOR SOLUTION INTRAMUSCULAR; INTRAVENOUS at 00:48

## 2021-01-01 RX ADMIN — METOCLOPRAMIDE HYDROCHLORIDE 10 MG: 5 INJECTION INTRAMUSCULAR; INTRAVENOUS at 10:32

## 2021-01-01 RX ADMIN — Medication 1 PACKET: at 14:07

## 2021-01-01 RX ADMIN — Medication 15 ML: at 12:59

## 2021-01-01 RX ADMIN — ATORVASTATIN CALCIUM 10 MG: 10 TABLET, FILM COATED ORAL at 08:25

## 2021-01-01 RX ADMIN — HYDROCORTISONE SODIUM SUCCINATE 50 MG: 100 INJECTION, POWDER, FOR SOLUTION INTRAMUSCULAR; INTRAVENOUS at 00:31

## 2021-01-01 RX ADMIN — INSULIN ASPART 1 UNITS: 100 INJECTION, SOLUTION INTRAVENOUS; SUBCUTANEOUS at 04:02

## 2021-01-01 RX ADMIN — INSULIN ASPART 1 UNITS: 100 INJECTION, SOLUTION INTRAVENOUS; SUBCUTANEOUS at 11:48

## 2021-01-01 RX ADMIN — ORAL VEHICLES - SUSP 5 MG: SUSPENSION at 12:41

## 2021-01-01 RX ADMIN — EPINEPHRINE 0.03 MCG/KG/MIN: 1 INJECTION PARENTERAL at 17:09

## 2021-01-01 RX ADMIN — PANTOPRAZOLE SODIUM 40 MG: 40 INJECTION, POWDER, FOR SOLUTION INTRAVENOUS at 16:20

## 2021-01-01 RX ADMIN — Medication 200 MCG/HR: at 15:22

## 2021-01-01 RX ADMIN — HYDROCORTISONE SODIUM SUCCINATE 100 MG: 100 INJECTION, POWDER, FOR SOLUTION INTRAMUSCULAR; INTRAVENOUS at 08:24

## 2021-01-01 RX ADMIN — MEROPENEM 1 G: 1 INJECTION, POWDER, FOR SOLUTION INTRAVENOUS at 08:23

## 2021-01-01 RX ADMIN — HEPARIN SODIUM 1600 UNITS/HR: 10000 INJECTION, SOLUTION INTRAVENOUS at 08:01

## 2021-01-01 RX ADMIN — Medication 40 MG: at 08:37

## 2021-01-01 RX ADMIN — AMPHOTERICIN B 350 MG: 50 INJECTION, POWDER, LYOPHILIZED, FOR SOLUTION INTRAVENOUS at 01:51

## 2021-01-01 RX ADMIN — PROPOFOL 45 MCG/KG/MIN: 10 INJECTION, EMULSION INTRAVENOUS at 13:39

## 2021-01-01 RX ADMIN — ITRACONAZOLE 200 MG: 10 SOLUTION ORAL at 06:40

## 2021-01-01 RX ADMIN — Medication 5 MG/HR: at 15:11

## 2021-01-01 RX ADMIN — PROPOFOL 45 MCG/KG/MIN: 10 INJECTION, EMULSION INTRAVENOUS at 08:11

## 2021-01-01 RX ADMIN — FENTANYL CITRATE 50 MCG: 50 INJECTION, SOLUTION INTRAMUSCULAR; INTRAVENOUS at 22:00

## 2021-01-01 RX ADMIN — MEROPENEM 1 G: 1 INJECTION, POWDER, FOR SOLUTION INTRAVENOUS at 20:53

## 2021-01-01 RX ADMIN — FENTANYL CITRATE 100 MCG: 50 INJECTION, SOLUTION INTRAMUSCULAR; INTRAVENOUS at 09:49

## 2021-01-01 RX ADMIN — FENTANYL CITRATE 50 MCG: 50 INJECTION, SOLUTION INTRAMUSCULAR; INTRAVENOUS at 07:39

## 2021-01-01 RX ADMIN — MEROPENEM 1 G: 1 INJECTION, POWDER, FOR SOLUTION INTRAVENOUS at 17:56

## 2021-01-01 RX ADMIN — FENTANYL CITRATE 100 MCG: 50 INJECTION, SOLUTION INTRAMUSCULAR; INTRAVENOUS at 09:15

## 2021-01-01 RX ADMIN — ATORVASTATIN CALCIUM 10 MG: 10 TABLET, FILM COATED ORAL at 07:46

## 2021-01-01 RX ADMIN — INSULIN ASPART 1 UNITS: 100 INJECTION, SOLUTION INTRAVENOUS; SUBCUTANEOUS at 09:15

## 2021-01-01 RX ADMIN — Medication 1 PACKET: at 20:03

## 2021-01-01 RX ADMIN — Medication: at 09:30

## 2021-01-01 RX ADMIN — Medication 1 PACKET: at 13:38

## 2021-01-01 RX ADMIN — Medication: at 02:00

## 2021-01-01 RX ADMIN — HEPARIN SODIUM 1650 UNITS/HR: 10000 INJECTION, SOLUTION INTRAVENOUS at 04:03

## 2021-01-01 RX ADMIN — FENTANYL CITRATE 25 MCG: 50 INJECTION, SOLUTION INTRAMUSCULAR; INTRAVENOUS at 10:45

## 2021-01-01 RX ADMIN — BUMETANIDE 4 MG: 0.25 INJECTION, SOLUTION INTRAMUSCULAR; INTRAVENOUS at 10:32

## 2021-01-01 RX ADMIN — MEROPENEM 1 G: 1 INJECTION, POWDER, FOR SOLUTION INTRAVENOUS at 07:45

## 2021-01-01 RX ADMIN — MEROPENEM 1 G: 1 INJECTION, POWDER, FOR SOLUTION INTRAVENOUS at 05:08

## 2021-01-01 RX ADMIN — FENTANYL CITRATE 50 MCG: 50 INJECTION, SOLUTION INTRAMUSCULAR; INTRAVENOUS at 11:31

## 2021-01-01 RX ADMIN — DOCUSATE SODIUM 50 MG AND SENNOSIDES 8.6 MG 2 TABLET: 8.6; 5 TABLET, FILM COATED ORAL at 21:06

## 2021-01-01 RX ADMIN — ATORVASTATIN CALCIUM 10 MG: 10 TABLET, FILM COATED ORAL at 08:02

## 2021-01-01 RX ADMIN — HEPARIN SODIUM 1600 UNITS/HR: 10000 INJECTION, SOLUTION INTRAVENOUS at 16:13

## 2021-01-01 RX ADMIN — HYDROCORTISONE SODIUM SUCCINATE 50 MG: 100 INJECTION, POWDER, FOR SOLUTION INTRAMUSCULAR; INTRAVENOUS at 08:20

## 2021-01-01 RX ADMIN — METOCLOPRAMIDE HYDROCHLORIDE 10 MG: 5 INJECTION INTRAMUSCULAR; INTRAVENOUS at 13:31

## 2021-01-01 RX ADMIN — PROPOFOL 45 MCG/KG/MIN: 10 INJECTION, EMULSION INTRAVENOUS at 23:09

## 2021-01-01 RX ADMIN — PROPOFOL 20 MCG/KG/MIN: 10 INJECTION, EMULSION INTRAVENOUS at 08:57

## 2021-01-01 RX ADMIN — MYCOPHENOLATE MOFETIL 500 MG: 200 POWDER, FOR SUSPENSION ORAL at 20:19

## 2021-01-01 RX ADMIN — FENTANYL CITRATE 100 MCG: 50 INJECTION, SOLUTION INTRAMUSCULAR; INTRAVENOUS at 15:50

## 2021-01-01 RX ADMIN — FENTANYL CITRATE 50 MCG: 50 INJECTION, SOLUTION INTRAMUSCULAR; INTRAVENOUS at 03:10

## 2021-01-01 RX ADMIN — FENTANYL CITRATE 50 MCG: 50 INJECTION, SOLUTION INTRAMUSCULAR; INTRAVENOUS at 19:35

## 2021-01-01 RX ADMIN — FENTANYL CITRATE 50 MCG: 50 INJECTION, SOLUTION INTRAMUSCULAR; INTRAVENOUS at 23:04

## 2021-01-01 RX ADMIN — DIPHENHYDRAMINE HYDROCHLORIDE 25 MG: 50 INJECTION, SOLUTION INTRAMUSCULAR; INTRAVENOUS at 02:47

## 2021-01-01 RX ADMIN — FENTANYL CITRATE 100 MCG: 50 INJECTION, SOLUTION INTRAMUSCULAR; INTRAVENOUS at 11:24

## 2021-01-01 RX ADMIN — Medication 20 NG/KG/MIN: at 02:11

## 2021-01-01 RX ADMIN — Medication 20 NG/KG/MIN: at 12:34

## 2021-01-01 RX ADMIN — SODIUM CHLORIDE, POTASSIUM CHLORIDE, SODIUM LACTATE AND CALCIUM CHLORIDE 1000 ML: 600; 310; 30; 20 INJECTION, SOLUTION INTRAVENOUS at 18:06

## 2021-01-01 RX ADMIN — SODIUM CHLORIDE 500 ML: 9 INJECTION, SOLUTION INTRAVENOUS at 02:46

## 2021-01-01 RX ADMIN — INSULIN ASPART 1 UNITS: 100 INJECTION, SOLUTION INTRAVENOUS; SUBCUTANEOUS at 15:23

## 2021-01-01 RX ADMIN — DAPSONE 50 MG: 100 TABLET ORAL at 08:01

## 2021-01-01 RX ADMIN — SODIUM CHLORIDE 500 ML: 9 INJECTION, SOLUTION INTRAVENOUS at 01:50

## 2021-01-01 RX ADMIN — Medication 1 PACKET: at 08:11

## 2021-01-01 RX ADMIN — FENTANYL CITRATE 100 MCG: 50 INJECTION, SOLUTION INTRAMUSCULAR; INTRAVENOUS at 08:03

## 2021-01-01 RX ADMIN — DIPHENHYDRAMINE HYDROCHLORIDE 25 MG: 25 CAPSULE ORAL at 01:32

## 2021-01-01 RX ADMIN — INSULIN ASPART 1 UNITS: 100 INJECTION, SOLUTION INTRAVENOUS; SUBCUTANEOUS at 09:21

## 2021-01-01 RX ADMIN — ALBUMIN HUMAN 50 G: 0.25 SOLUTION INTRAVENOUS at 13:28

## 2021-01-01 RX ADMIN — ITRACONAZOLE 200 MG: 10 SOLUTION ORAL at 21:11

## 2021-01-01 RX ADMIN — ATORVASTATIN CALCIUM 10 MG: 10 TABLET, FILM COATED ORAL at 07:57

## 2021-01-01 RX ADMIN — Medication 20 NG/KG/MIN: at 05:25

## 2021-01-01 RX ADMIN — FUROSEMIDE 40 MG: 10 INJECTION, SOLUTION INTRAVENOUS at 11:38

## 2021-01-01 RX ADMIN — DIPHENHYDRAMINE HYDROCHLORIDE 25 MG: 50 INJECTION, SOLUTION INTRAMUSCULAR; INTRAVENOUS at 01:08

## 2021-01-01 RX ADMIN — PROPOFOL 45 MCG/KG/MIN: 10 INJECTION, EMULSION INTRAVENOUS at 02:38

## 2021-01-01 RX ADMIN — Medication 15 ML: at 08:01

## 2021-01-01 RX ADMIN — HYDROCORTISONE SODIUM SUCCINATE 50 MG: 100 INJECTION, POWDER, FOR SOLUTION INTRAMUSCULAR; INTRAVENOUS at 08:02

## 2021-01-01 RX ADMIN — INSULIN ASPART 1 UNITS: 100 INJECTION, SOLUTION INTRAVENOUS; SUBCUTANEOUS at 20:17

## 2021-01-01 RX ADMIN — Medication 20 NG/KG/MIN: at 15:21

## 2021-01-01 RX ADMIN — Medication 40 MG: at 08:04

## 2021-01-01 RX ADMIN — Medication 1 PACKET: at 20:18

## 2021-01-01 RX ADMIN — SODIUM CHLORIDE, POTASSIUM CHLORIDE, SODIUM LACTATE AND CALCIUM CHLORIDE 1000 ML: 600; 310; 30; 20 INJECTION, SOLUTION INTRAVENOUS at 08:29

## 2021-01-01 RX ADMIN — INSULIN ASPART 1 UNITS: 100 INJECTION, SOLUTION INTRAVENOUS; SUBCUTANEOUS at 20:54

## 2021-01-01 RX ADMIN — INSULIN ASPART 1 UNITS: 100 INJECTION, SOLUTION INTRAVENOUS; SUBCUTANEOUS at 13:27

## 2021-01-01 RX ADMIN — PROPOFOL 45 MCG/KG/MIN: 10 INJECTION, EMULSION INTRAVENOUS at 08:56

## 2021-01-01 RX ADMIN — VASOPRESSIN 2.4 UNITS/HR: 20 INJECTION INTRAVENOUS at 23:02

## 2021-01-01 RX ADMIN — FENTANYL CITRATE 50 MCG: 50 INJECTION, SOLUTION INTRAMUSCULAR; INTRAVENOUS at 18:30

## 2021-01-01 RX ADMIN — PROPOFOL 25 MCG/KG/MIN: 10 INJECTION, EMULSION INTRAVENOUS at 15:11

## 2021-01-01 RX ADMIN — FENTANYL CITRATE 100 MCG: 50 INJECTION, SOLUTION INTRAMUSCULAR; INTRAVENOUS at 16:30

## 2021-01-01 RX ADMIN — MICAFUNGIN SODIUM 100 MG: 50 INJECTION, POWDER, LYOPHILIZED, FOR SOLUTION INTRAVENOUS at 18:35

## 2021-01-01 RX ADMIN — Medication 4 MG/HR: at 18:53

## 2021-01-01 RX ADMIN — PANTOPRAZOLE SODIUM 40 MG: 40 INJECTION, POWDER, FOR SOLUTION INTRAVENOUS at 08:24

## 2021-01-01 RX ADMIN — Medication 150 MCG/HR: at 03:08

## 2021-01-01 RX ADMIN — PROPOFOL 30 MCG/KG/MIN: 10 INJECTION, EMULSION INTRAVENOUS at 15:20

## 2021-01-01 RX ADMIN — FENTANYL CITRATE 50 MCG: 50 INJECTION, SOLUTION INTRAMUSCULAR; INTRAVENOUS at 14:14

## 2021-01-01 RX ADMIN — ATORVASTATIN CALCIUM 10 MG: 10 TABLET, FILM COATED ORAL at 08:30

## 2021-01-01 RX ADMIN — ITRACONAZOLE 200 MG: 10 SOLUTION ORAL at 14:01

## 2021-01-01 RX ADMIN — MEROPENEM 1 G: 1 INJECTION, POWDER, FOR SOLUTION INTRAVENOUS at 16:58

## 2021-01-01 RX ADMIN — DAPSONE 50 MG: 100 TABLET ORAL at 08:04

## 2021-01-01 RX ADMIN — HYDROCORTISONE SODIUM SUCCINATE 50 MG: 100 INJECTION, POWDER, FOR SOLUTION INTRAMUSCULAR; INTRAVENOUS at 23:58

## 2021-01-01 RX ADMIN — AMPHOTERICIN B 350 MG: 50 INJECTION, POWDER, LYOPHILIZED, FOR SOLUTION INTRAVENOUS at 02:24

## 2021-01-01 RX ADMIN — FENTANYL CITRATE 50 MCG: 50 INJECTION, SOLUTION INTRAMUSCULAR; INTRAVENOUS at 00:31

## 2021-01-01 RX ADMIN — Medication 40 MG: at 08:11

## 2021-01-01 RX ADMIN — Medication 1 PACKET: at 20:20

## 2021-01-01 RX ADMIN — POTASSIUM CHLORIDE 20 MEQ: 1.5 POWDER, FOR SOLUTION ORAL at 09:48

## 2021-01-01 RX ADMIN — SODIUM CHLORIDE 250 ML: 9 INJECTION, SOLUTION INTRAVENOUS at 01:30

## 2021-01-01 RX ADMIN — ITRACONAZOLE 200 MG: 10 SOLUTION ORAL at 22:03

## 2021-01-01 RX ADMIN — VASOPRESSIN 2.4 UNITS/HR: 20 INJECTION INTRAVENOUS at 16:33

## 2021-01-01 RX ADMIN — HYDROCORTISONE SODIUM SUCCINATE 50 MG: 100 INJECTION, POWDER, FOR SOLUTION INTRAMUSCULAR; INTRAVENOUS at 08:04

## 2021-01-01 RX ADMIN — MYCOPHENOLATE MOFETIL 500 MG: 200 POWDER, FOR SUSPENSION ORAL at 07:51

## 2021-01-01 RX ADMIN — HYDROCORTISONE SODIUM SUCCINATE 25 MG: 100 INJECTION, POWDER, FOR SOLUTION INTRAMUSCULAR; INTRAVENOUS at 23:27

## 2021-01-01 RX ADMIN — BUMETANIDE 2 MG: 0.25 INJECTION, SOLUTION INTRAMUSCULAR; INTRAVENOUS at 15:05

## 2021-01-01 RX ADMIN — Medication 200 MCG/HR: at 04:05

## 2021-01-01 RX ADMIN — Medication 1 PACKET: at 08:05

## 2021-01-01 RX ADMIN — MYCOPHENOLATE MOFETIL 250 MG: 200 POWDER, FOR SUSPENSION ORAL at 08:04

## 2021-01-01 RX ADMIN — Medication 15 ML: at 07:58

## 2021-01-01 RX ADMIN — FENTANYL CITRATE 25 MCG: 50 INJECTION, SOLUTION INTRAMUSCULAR; INTRAVENOUS at 15:51

## 2021-01-01 RX ADMIN — MEROPENEM 1 G: 1 INJECTION, POWDER, FOR SOLUTION INTRAVENOUS at 20:07

## 2021-01-01 RX ADMIN — ITRACONAZOLE 200 MG: 10 SOLUTION ORAL at 14:51

## 2021-01-01 RX ADMIN — FUROSEMIDE 40 MG: 10 INJECTION, SOLUTION INTRAVENOUS at 08:31

## 2021-01-01 RX ADMIN — LORATADINE 10 MG: 10 TABLET ORAL at 11:44

## 2021-01-01 RX ADMIN — SODIUM CHLORIDE 250 ML: 9 INJECTION, SOLUTION INTRAVENOUS at 01:08

## 2021-01-01 RX ADMIN — DOCUSATE SODIUM 50 MG AND SENNOSIDES 8.6 MG 2 TABLET: 8.6; 5 TABLET, FILM COATED ORAL at 08:25

## 2021-01-01 RX ADMIN — MEROPENEM 1 G: 1 INJECTION, POWDER, FOR SOLUTION INTRAVENOUS at 17:16

## 2021-01-01 RX ADMIN — POTASSIUM CHLORIDE 40 MEQ: 1.5 POWDER, FOR SOLUTION ORAL at 17:56

## 2021-01-01 RX ADMIN — Medication 1 PACKET: at 13:25

## 2021-01-01 RX ADMIN — LORATADINE 10 MG: 10 TABLET ORAL at 07:58

## 2021-01-01 RX ADMIN — Medication 15 ML: at 08:30

## 2021-01-01 RX ADMIN — HEPARIN SODIUM 1200 UNITS/HR: 10000 INJECTION, SOLUTION INTRAVENOUS at 21:04

## 2021-01-01 RX ADMIN — HEPARIN SODIUM 2100 UNITS/HR: 10000 INJECTION, SOLUTION INTRAVENOUS at 16:21

## 2021-01-01 RX ADMIN — LORATADINE 10 MG: 10 TABLET ORAL at 08:31

## 2021-01-01 RX ADMIN — VANCOMYCIN HYDROCHLORIDE 1500 MG: 100 INJECTION, POWDER, LYOPHILIZED, FOR SOLUTION INTRAVENOUS at 21:45

## 2021-01-01 RX ADMIN — DEXTROSE MONOHYDRATE 20 ML: 50 INJECTION, SOLUTION INTRAVENOUS at 02:41

## 2021-01-01 RX ADMIN — MIDAZOLAM 2 MG: 1 INJECTION INTRAMUSCULAR; INTRAVENOUS at 16:35

## 2021-01-01 RX ADMIN — HEPARIN SODIUM 1600 UNITS/HR: 10000 INJECTION, SOLUTION INTRAVENOUS at 01:25

## 2021-01-01 RX ADMIN — DAPSONE 50 MG: 100 TABLET ORAL at 07:51

## 2021-01-01 RX ADMIN — PROPOFOL 45 MCG/KG/MIN: 10 INJECTION, EMULSION INTRAVENOUS at 22:14

## 2021-01-01 RX ADMIN — Medication 1 PACKET: at 08:37

## 2021-01-01 RX ADMIN — ALBUMIN HUMAN 50 G: 0.25 SOLUTION INTRAVENOUS at 19:51

## 2021-01-01 RX ADMIN — DAPSONE 50 MG: 100 TABLET ORAL at 08:24

## 2021-01-01 RX ADMIN — INSULIN ASPART 1 UNITS: 100 INJECTION, SOLUTION INTRAVENOUS; SUBCUTANEOUS at 12:32

## 2021-01-01 RX ADMIN — FENTANYL CITRATE 100 MCG: 50 INJECTION, SOLUTION INTRAMUSCULAR; INTRAVENOUS at 06:35

## 2021-01-01 RX ADMIN — ITRACONAZOLE 200 MG: 10 SOLUTION ORAL at 15:11

## 2021-01-01 RX ADMIN — INSULIN ASPART 2 UNITS: 100 INJECTION, SOLUTION INTRAVENOUS; SUBCUTANEOUS at 04:36

## 2021-01-01 RX ADMIN — HEPARIN SODIUM 2000 UNITS/HR: 10000 INJECTION, SOLUTION INTRAVENOUS at 17:05

## 2021-01-01 RX ADMIN — Medication: at 18:54

## 2021-01-01 RX ADMIN — HEPARIN SODIUM 1350 UNITS/HR: 10000 INJECTION, SOLUTION INTRAVENOUS at 11:26

## 2021-01-01 RX ADMIN — Medication 15 ML: at 07:46

## 2021-01-01 RX ADMIN — PANTOPRAZOLE SODIUM 40 MG: 40 INJECTION, POWDER, FOR SOLUTION INTRAVENOUS at 07:46

## 2021-01-01 RX ADMIN — DIPHENHYDRAMINE HYDROCHLORIDE 25 MG: 50 INJECTION, SOLUTION INTRAMUSCULAR; INTRAVENOUS at 01:51

## 2021-01-01 RX ADMIN — DIPHENHYDRAMINE HYDROCHLORIDE 25 MG: 50 INJECTION INTRAMUSCULAR; INTRAVENOUS at 21:24

## 2021-01-01 RX ADMIN — MEROPENEM 1 G: 1 INJECTION, POWDER, FOR SOLUTION INTRAVENOUS at 05:34

## 2021-01-01 RX ADMIN — HYDROCORTISONE SODIUM SUCCINATE 50 MG: 100 INJECTION, POWDER, FOR SOLUTION INTRAMUSCULAR; INTRAVENOUS at 15:11

## 2021-01-01 RX ADMIN — AMPHOTERICIN B 350 MG: 50 INJECTION, POWDER, LYOPHILIZED, FOR SOLUTION INTRAVENOUS at 02:52

## 2021-01-01 RX ADMIN — Medication 1 PACKET: at 21:11

## 2021-01-01 RX ADMIN — FENTANYL CITRATE 100 MCG: 50 INJECTION, SOLUTION INTRAMUSCULAR; INTRAVENOUS at 05:22

## 2021-01-01 RX ADMIN — DIPHENHYDRAMINE HYDROCHLORIDE 25 MG: 50 INJECTION, SOLUTION INTRAMUSCULAR; INTRAVENOUS at 23:07

## 2021-01-01 RX ADMIN — DEXTROSE MONOHYDRATE 20 ML: 50 INJECTION, SOLUTION INTRAVENOUS at 01:45

## 2021-01-01 RX ADMIN — DAPSONE 50 MG: 100 TABLET ORAL at 17:15

## 2021-01-01 RX ADMIN — MEROPENEM 1 G: 1 INJECTION, POWDER, FOR SOLUTION INTRAVENOUS at 08:22

## 2021-01-01 RX ADMIN — LORATADINE 10 MG: 10 TABLET ORAL at 08:25

## 2021-01-01 RX ADMIN — DEXTROSE MONOHYDRATE 20 ML: 50 INJECTION, SOLUTION INTRAVENOUS at 02:15

## 2021-01-01 RX ADMIN — Medication 0.05 MCG/KG/MIN: at 15:13

## 2021-01-01 RX ADMIN — FENTANYL CITRATE 50 MCG: 50 INJECTION, SOLUTION INTRAMUSCULAR; INTRAVENOUS at 12:32

## 2021-01-01 RX ADMIN — HYDROCORTISONE SODIUM SUCCINATE 25 MG: 100 INJECTION, POWDER, FOR SOLUTION INTRAMUSCULAR; INTRAVENOUS at 16:34

## 2021-01-01 RX ADMIN — MYCOPHENOLATE MOFETIL 500 MG: 200 POWDER, FOR SUSPENSION ORAL at 21:08

## 2021-01-01 RX ADMIN — FENTANYL CITRATE 50 MCG: 50 INJECTION, SOLUTION INTRAMUSCULAR; INTRAVENOUS at 16:34

## 2021-01-01 RX ADMIN — ITRACONAZOLE 200 MG: 10 SOLUTION ORAL at 06:00

## 2021-01-01 RX ADMIN — Medication 1 PACKET: at 20:54

## 2021-01-01 RX ADMIN — ACETAMINOPHEN 650 MG: 325 TABLET, FILM COATED ORAL at 01:32

## 2021-01-01 RX ADMIN — SODIUM CHLORIDE, POTASSIUM CHLORIDE, SODIUM LACTATE AND CALCIUM CHLORIDE 1000 ML: 600; 310; 30; 20 INJECTION, SOLUTION INTRAVENOUS at 16:07

## 2021-01-01 RX ADMIN — FUROSEMIDE 40 MG: 10 INJECTION, SOLUTION INTRAVENOUS at 06:39

## 2021-01-01 RX ADMIN — ACETAMINOPHEN 650 MG: 325 TABLET, FILM COATED ORAL at 01:30

## 2021-01-01 RX ADMIN — HYDROCORTISONE SODIUM SUCCINATE 100 MG: 100 INJECTION, POWDER, FOR SOLUTION INTRAMUSCULAR; INTRAVENOUS at 07:46

## 2021-01-01 RX ADMIN — PROPOFOL 15 MCG/KG/MIN: 10 INJECTION, EMULSION INTRAVENOUS at 09:38

## 2021-01-01 RX ADMIN — BUMETANIDE 1 MG/HR: 0.25 INJECTION, SOLUTION INTRAMUSCULAR; INTRAVENOUS at 10:48

## 2021-01-01 RX ADMIN — MEROPENEM 1 G: 1 INJECTION, POWDER, FOR SOLUTION INTRAVENOUS at 20:40

## 2021-01-01 RX ADMIN — VANCOMYCIN HYDROCHLORIDE 1000 MG: 1 INJECTION, SOLUTION INTRAVENOUS at 23:41

## 2021-01-01 RX ADMIN — FENTANYL CITRATE 100 MCG: 50 INJECTION, SOLUTION INTRAMUSCULAR; INTRAVENOUS at 16:00

## 2021-01-01 RX ADMIN — PROPOFOL 50 MCG/KG/MIN: 10 INJECTION, EMULSION INTRAVENOUS at 02:19

## 2021-01-01 RX ADMIN — MYCOPHENOLATE MOFETIL 500 MG: 200 POWDER, FOR SUSPENSION ORAL at 08:32

## 2021-01-01 RX ADMIN — FENTANYL CITRATE 50 MCG: 50 INJECTION, SOLUTION INTRAMUSCULAR; INTRAVENOUS at 12:03

## 2021-01-01 RX ADMIN — HYDROCORTISONE SODIUM SUCCINATE 100 MG: 100 INJECTION, POWDER, FOR SOLUTION INTRAMUSCULAR; INTRAVENOUS at 16:33

## 2021-01-01 RX ADMIN — DEXTROSE MONOHYDRATE 20 ML: 50 INJECTION, SOLUTION INTRAVENOUS at 04:35

## 2021-01-01 RX ADMIN — INSULIN ASPART 1 UNITS: 100 INJECTION, SOLUTION INTRAVENOUS; SUBCUTANEOUS at 20:49

## 2021-01-01 RX ADMIN — AMPHOTERICIN B 350 MG: 50 INJECTION, POWDER, LYOPHILIZED, FOR SOLUTION INTRAVENOUS at 02:33

## 2021-01-01 RX ADMIN — VECURONIUM BROMIDE 0.8 MCG/KG/MIN: 1 INJECTION, POWDER, LYOPHILIZED, FOR SOLUTION INTRAVENOUS at 18:46

## 2021-01-01 RX ADMIN — HEPARIN SODIUM 1800 UNITS/HR: 10000 INJECTION, SOLUTION INTRAVENOUS at 09:52

## 2021-01-01 RX ADMIN — LORATADINE 10 MG: 10 TABLET ORAL at 07:46

## 2021-01-01 RX ADMIN — HYDROCORTISONE SODIUM SUCCINATE 50 MG: 100 INJECTION, POWDER, FOR SOLUTION INTRAMUSCULAR; INTRAVENOUS at 00:07

## 2021-01-01 RX ADMIN — ACETAMINOPHEN 650 MG: 325 TABLET, FILM COATED ORAL at 01:46

## 2021-01-01 RX ADMIN — DEXTROSE MONOHYDRATE 20 ML: 50 INJECTION, SOLUTION INTRAVENOUS at 04:36

## 2021-01-01 RX ADMIN — HYDROCORTISONE SODIUM SUCCINATE 50 MG: 100 INJECTION, POWDER, FOR SOLUTION INTRAMUSCULAR; INTRAVENOUS at 15:30

## 2021-01-01 RX ADMIN — Medication 150 MCG/HR: at 12:32

## 2021-01-01 RX ADMIN — POTASSIUM CHLORIDE 20 MEQ: 1.5 POWDER, FOR SOLUTION ORAL at 10:33

## 2021-01-01 RX ADMIN — Medication 40 MG: at 09:12

## 2021-01-01 RX ADMIN — DAPSONE 50 MG: 100 TABLET ORAL at 08:32

## 2021-01-01 RX ADMIN — DOCUSATE SODIUM 50 MG AND SENNOSIDES 8.6 MG 2 TABLET: 8.6; 5 TABLET, FILM COATED ORAL at 12:44

## 2021-01-01 RX ADMIN — ATORVASTATIN CALCIUM 10 MG: 10 TABLET, FILM COATED ORAL at 11:44

## 2021-01-01 RX ADMIN — PROPOFOL 45 MCG/KG/MIN: 10 INJECTION, EMULSION INTRAVENOUS at 13:19

## 2021-01-01 RX ADMIN — ATORVASTATIN CALCIUM 10 MG: 10 TABLET, FILM COATED ORAL at 08:04

## 2021-01-01 RX ADMIN — AMPHOTERICIN B 350 MG: 50 INJECTION, POWDER, LYOPHILIZED, FOR SOLUTION INTRAVENOUS at 02:00

## 2021-01-01 RX ADMIN — HYDROCORTISONE SODIUM SUCCINATE 100 MG: 100 INJECTION, POWDER, FOR SOLUTION INTRAMUSCULAR; INTRAVENOUS at 00:03

## 2021-01-01 RX ADMIN — FUROSEMIDE 40 MG: 10 INJECTION, SOLUTION INTRAVENOUS at 16:02

## 2021-01-01 RX ADMIN — BUMETANIDE 2 MG: 0.25 INJECTION, SOLUTION INTRAMUSCULAR; INTRAVENOUS at 01:51

## 2021-01-01 RX ADMIN — FENTANYL CITRATE 50 MCG: 50 INJECTION, SOLUTION INTRAMUSCULAR; INTRAVENOUS at 20:00

## 2021-01-01 RX ADMIN — INSULIN ASPART 1 UNITS: 100 INJECTION, SOLUTION INTRAVENOUS; SUBCUTANEOUS at 04:40

## 2021-01-01 RX ADMIN — VASOPRESSIN 2.4 UNITS/HR: 20 INJECTION INTRAVENOUS at 10:48

## 2021-01-01 RX ADMIN — Medication 200 MCG/HR: at 15:26

## 2021-01-01 RX ADMIN — FENTANYL CITRATE 50 MCG: 50 INJECTION, SOLUTION INTRAMUSCULAR; INTRAVENOUS at 04:00

## 2021-01-01 RX ADMIN — INSULIN ASPART 1 UNITS: 100 INJECTION, SOLUTION INTRAVENOUS; SUBCUTANEOUS at 20:22

## 2021-01-01 RX ADMIN — LORATADINE 10 MG: 10 TABLET ORAL at 08:04

## 2021-01-01 RX ADMIN — VECURONIUM BROMIDE 8 MG: 1 INJECTION, POWDER, LYOPHILIZED, FOR SOLUTION INTRAVENOUS at 12:50

## 2021-01-01 RX ADMIN — PREDNISONE 5 MG: 5 TABLET ORAL at 13:58

## 2021-01-01 RX ADMIN — ACETAMINOPHEN 650 MG: 325 TABLET, FILM COATED ORAL at 01:51

## 2021-01-01 RX ADMIN — ACETAMINOPHEN 650 MG: 325 TABLET, FILM COATED ORAL at 02:47

## 2021-01-01 RX ADMIN — Medication 2 MG/HR: at 17:06

## 2021-01-01 RX ADMIN — HYDROCORTISONE SODIUM SUCCINATE 25 MG: 100 INJECTION, POWDER, FOR SOLUTION INTRAMUSCULAR; INTRAVENOUS at 07:57

## 2021-01-01 RX ADMIN — PROPOFOL 45 MCG/KG/MIN: 10 INJECTION, EMULSION INTRAVENOUS at 03:55

## 2021-01-01 RX ADMIN — PROPOFOL 45 MCG/KG/MIN: 10 INJECTION, EMULSION INTRAVENOUS at 17:16

## 2021-01-01 RX ADMIN — HYDROCORTISONE SODIUM SUCCINATE 100 MG: 100 INJECTION, POWDER, FOR SOLUTION INTRAMUSCULAR; INTRAVENOUS at 16:10

## 2021-01-01 RX ADMIN — Medication 15 ML: at 08:04

## 2021-01-01 RX ADMIN — ACETAMINOPHEN 650 MG: 325 SOLUTION ORAL at 17:04

## 2021-01-01 RX ADMIN — POTASSIUM CHLORIDE 40 MEQ: 1.5 POWDER, FOR SOLUTION ORAL at 18:45

## 2021-01-01 RX ADMIN — MYCOPHENOLATE MOFETIL 500 MG: 200 POWDER, FOR SUSPENSION ORAL at 21:11

## 2021-01-01 RX ADMIN — MYCOPHENOLATE MOFETIL 250 MG: 200 POWDER, FOR SUSPENSION ORAL at 20:02

## 2021-01-01 RX ADMIN — DEXTROSE MONOHYDRATE 20 ML: 50 INJECTION, SOLUTION INTRAVENOUS at 04:17

## 2021-01-01 RX ADMIN — DEXTROSE MONOHYDRATE 20 ML: 50 INJECTION, SOLUTION INTRAVENOUS at 05:45

## 2021-01-01 RX ADMIN — Medication 15 ML: at 08:24

## 2021-01-01 RX ADMIN — Medication 1 PACKET: at 08:33

## 2021-01-01 RX ADMIN — Medication 8 MG/HR: at 04:17

## 2021-01-01 RX ADMIN — PROPOFOL 45 MCG/KG/MIN: 10 INJECTION, EMULSION INTRAVENOUS at 17:06

## 2021-01-01 RX ADMIN — FUROSEMIDE 40 MG: 10 INJECTION, SOLUTION INTRAVENOUS at 10:40

## 2021-01-01 RX ADMIN — POLYETHYLENE GLYCOL 3350 17 G: 17 POWDER, FOR SOLUTION ORAL at 12:44

## 2021-01-01 RX ADMIN — FENTANYL CITRATE 50 MCG: 50 INJECTION, SOLUTION INTRAMUSCULAR; INTRAVENOUS at 04:27

## 2021-01-01 RX ADMIN — MYCOPHENOLATE MOFETIL 500 MG: 200 POWDER, FOR SUSPENSION ORAL at 08:26

## 2021-01-01 RX ADMIN — FENTANYL CITRATE 100 MCG: 50 INJECTION, SOLUTION INTRAMUSCULAR; INTRAVENOUS at 08:22

## 2021-01-01 RX ADMIN — Medication 50 MCG/HR: at 22:58

## 2021-01-01 RX ADMIN — Medication 1 PACKET: at 13:00

## 2021-01-01 RX ADMIN — NOREPINEPHRINE BITARTRATE 0.03 MCG/KG/MIN: 1 INJECTION, SOLUTION, CONCENTRATE INTRAVENOUS at 09:00

## 2021-01-01 RX ADMIN — Medication 1 PACKET: at 21:06

## 2021-01-01 RX ADMIN — Medication 20 NG/KG/MIN: at 23:39

## 2021-01-01 RX ADMIN — ITRACONAZOLE 200 MG: 10 SOLUTION ORAL at 23:06

## 2021-01-01 RX ADMIN — Medication 3 MG/HR: at 16:14

## 2021-01-01 RX ADMIN — Medication 8 MG/HR: at 15:29

## 2021-01-01 RX ADMIN — SODIUM BICARBONATE: 84 INJECTION, SOLUTION INTRAVENOUS at 18:11

## 2021-01-01 RX ADMIN — DEXTROSE MONOHYDRATE 20 ML: 50 INJECTION, SOLUTION INTRAVENOUS at 02:03

## 2021-01-01 RX ADMIN — SODIUM BICARBONATE: 84 INJECTION, SOLUTION INTRAVENOUS at 01:03

## 2021-01-01 RX ADMIN — HYDROCORTISONE SODIUM SUCCINATE 50 MG: 100 INJECTION, POWDER, FOR SOLUTION INTRAMUSCULAR; INTRAVENOUS at 15:36

## 2021-01-01 RX ADMIN — PROPOFOL 45 MCG/KG/MIN: 10 INJECTION, EMULSION INTRAVENOUS at 06:49

## 2021-01-01 RX ADMIN — SODIUM BICARBONATE 150 MEQ: 84 INJECTION INTRAVENOUS at 17:18

## 2021-01-01 RX ADMIN — SODIUM CHLORIDE 500 ML: 9 INJECTION, SOLUTION INTRAVENOUS at 01:56

## 2021-01-01 RX ADMIN — PROPOFOL 45 MCG/KG/MIN: 10 INJECTION, EMULSION INTRAVENOUS at 09:52

## 2021-01-01 ASSESSMENT — ACTIVITIES OF DAILY LIVING (ADL)
ADLS_ACUITY_SCORE: 19
ADLS_ACUITY_SCORE: 19
ADLS_ACUITY_SCORE: 17
ADLS_ACUITY_SCORE: 17
ADLS_ACUITY_SCORE: 19
ADLS_ACUITY_SCORE: 17
ADLS_ACUITY_SCORE: 19
ADLS_ACUITY_SCORE: 17
ADLS_ACUITY_SCORE: 18
ADLS_ACUITY_SCORE: 19
ADLS_ACUITY_SCORE: 17
ADLS_ACUITY_SCORE: 18
ADLS_ACUITY_SCORE: 17
ADLS_ACUITY_SCORE: 19
ADLS_ACUITY_SCORE: 17
ADLS_ACUITY_SCORE: 19
ADLS_ACUITY_SCORE: 17
ADLS_ACUITY_SCORE: 19
ADLS_ACUITY_SCORE: 17
ADLS_ACUITY_SCORE: 19
ADLS_ACUITY_SCORE: 19
ADLS_ACUITY_SCORE: 17
ADLS_ACUITY_SCORE: 19
ADLS_ACUITY_SCORE: 19
ADLS_ACUITY_SCORE: 17
ADLS_ACUITY_SCORE: 17
ADLS_ACUITY_SCORE: 19
ADLS_ACUITY_SCORE: 17

## 2021-01-01 ASSESSMENT — MIFFLIN-ST. JEOR
SCORE: 1522
SCORE: 1529
SCORE: 1527
SCORE: 1514
SCORE: 1546

## 2021-01-01 ASSESSMENT — PAIN SCALES - GENERAL: PAINLEVEL: NO PAIN (0)

## 2021-01-06 NOTE — TELEPHONE ENCOUNTER
Anemia Management Note  SUBJECTIVE/OBJECTIVE:  Referred by Dr. Alvin aVrner on 2020  Primary Diagnosis: Anemia in Chronic Kidney Disease (N18.4, D63.1)     Secondary Diagnosis:  Chronic Kidney Disease, Stage 4 (N18.4)   Kidney Tx: , , 2020  Hgb goal range:  9-10  Epo/Darbo: Procrit 10,000 units weekly for Hgb <10. At home.   Iron regimen:  TBD  Labs : 2021  Recent DEB use, transfusion, IV iron: unknown  RX/TX plans : TBD     No history of stroke, MI and blood clots. Hx of CML in 2017     *Currently has Home Care Mon/Thur for labs.      Labs: [facility]  Ph # [phone]  Fax # [phone]        **ONLY call Ed if his Hgb <10 and he needs to dose.         Contact:            Ok to leave message regarding Scheduling and Medical Information per consent to communicate dated 12/10/2017                              OK to speak with Danay Blanton (friend) and Kaylin Kolbenger (mother) regarding Scheduling and Medical Information per consent to communicate dated 12/10/2017.    Anemia Latest Ref Rng & Units 2020 2020 12/3/2020 2020 12/10/2020 2020 2021   DEB Dose - - 10,000 units 10,000 units - 10,000 units 10,000 units -   Hemoglobin 13.3 - 17.7 g/dL 9.1(A) - - 9.5(A) - - 10.5(A)   TSAT 15 - 46 % - - - - - - -   Ferritin ng/mL - - - - - - 2,356     BP Readings from Last 3 Encounters:   20 115/74   20 120/71   20 (!) 143/93     Wt Readings from Last 2 Encounters:   20 64 kg (141 lb)   20 61.1 kg (134 lb 12.8 oz)           ASSESSMENT:  Hgb:Above goal - recommend hold dose  TSat: Due for labs Ferritin: Elevated (>1000ng/mL)    PLAN:  Hold Procrit and RTC for hgb then procrit if needed in 1-2 week(s)    Orders needed to be renewed (for next follow-up date) in LETTERS - for external labs: None    Iron labs due:  12 weeks    Plan discussed with:  [email or web address] for Ed  Plan provided by:  bigg    NEXT FOLLOW-UP DATE:  21  Were labs  drawn?     Ana Castrejon RN   [address] [address]   [email or web address]   Office : [phone]  Fax: [phone]

## 2021-01-09 NOTE — TELEPHONE ENCOUNTER
Due for Donor Specific Antibodies  - one year      Sent this letter /my chart message to Ed        Ed           You are one year post kidney transplant  on 2/28/2021   At your next lab appointment- please have the lab draw Donor Specific Antibodies     This  will require dsa/hla kit  ( 2  redtubes )      IF you need a kit  please call the transplant office [phone] or return this message       Be Well   Jody   Transplant  Coordinator

## 2021-01-18 NOTE — LETTER
OUTPATIENT LABORATORY TEST ORDER            **Please begin using these lab orders beginning 3/1/2021**    Patient Name: Jeremiah Cruz  Transplant Date: 2/28/2020   YOB: 1975  Issue Date & Time: 1/19/2021  7:26 AM  [facility] MR: [id number] Exp. Date (1 year after date issued)      Diagnoses: Kidney Transplant (ICD-10  Z94.0)   Long term use of medications (ICD-10  Z79.899)     Lab results to be available on the same day drawn.   Patient should release information to the [facility], [facility].  Please fax to the Transplant Center at [phone].    Monthly   ?Hemogram and Platelet  ?Basic Metabolic Panel (Sodium, Potassium, Chloride, CO2, Creatinine, Urea Nitrogen,     Glucose,   Calcium)         ?BK (Polyoma Virus) PCR Quantitative - Plasma                   Every 6 Months                  ?Urine for protein/creatinine    Yearly:   ?PRA/DSA level (mailers provided by the patient)     If you have any questions, please call The Transplant Center at [phone] or [phone].    Please fax labs to [phone]    Joe Don MD

## 2021-01-18 NOTE — TELEPHONE ENCOUNTER
Provider Call: General  Route to LPN    Reason for call: Call from Lab  Autumn Mcdaniels lab orders  Stopped after 2/28/2021- Needs orders after 2/28/2021    Call back needed? Yes    Return Call Needed  Same as documented in contacts section  When to return call?: Greater than one day: Route standard priority

## 2021-01-28 NOTE — TELEPHONE ENCOUNTER
Call from his sister stated that Ed cough has become worse in the month of [place]     He can not talk because he coughs   No fever       Reviewed follow up in ER

## 2021-01-28 NOTE — TELEPHONE ENCOUNTER
Anemia Management Note  SUBJECTIVE/OBJECTIVE:  Referred by Dr. Alvin Varner on 2020  Primary Diagnosis: Anemia in Chronic Kidney Disease (N18.4, D63.1)     Secondary Diagnosis:  Chronic Kidney Disease, Stage 4 (N18.4)   Kidney Tx: , , 2020  Hgb goal range:  9-10  Epo/Darbo: Procrit 10,000 units weekly for Hgb <10. At home.   Iron regimen:  TBD  Labs : 2021  Recent DEB use, transfusion, IV iron: unknown  RX/TX plans : TBD     No history of stroke, MI and blood clots. Hx of CML in 2017     *Currently has Home Care Mon/Thur for labs.      Labs: [facility]  Ph # [phone]  Fax # [phone]        **ONLY call Ed if his Hgb <10 and he needs to dose.         Contact:            Ok to leave message regarding Scheduling and Medical Information per consent to communicate dated 12/10/2017                              OK to speak with Danay Blanton (friend) and Kaylin Lilly (mother) regarding Scheduling and Medical Information per consent to communicate dated 12/10/2017    Anemia Latest Ref Rng & Units 12/10/2020 2020 2020 1/3/2021 2021 2021 2021   DEB Dose - 10,000 units 10,000 units 10,000 units 10,000 units - - -   Hemoglobin 13.3 - 17.7 g/dL - - - - 10.5(A) 10.4(A) 10.3(A)   TSAT 15 - 46 % - - - - - - -   Ferritin ng/mL - - - - 2,356 - 2,813     BP Readings from Last 3 Encounters:   20 115/74   20 120/71   20 (!) 143/93     Wt Readings from Last 2 Encounters:   20 64 kg (141 lb)   20 61.1 kg (134 lb 12.8 oz)           ASSESSMENT:  Hgb:Above goal - recommend hold dose  TSat: Due for labs Ferritin: Elevated (>1000ng/mL)    PLAN:  Hold Procrit and RTC for hgb then procrit if needed in 2* week(s)    Orders needed to be renewed (for next follow-up date) in LETTERS - for external labs: None    Iron labs due:  TSAT is due    Plan discussed with:  No call per pt request  Plan provided by:  bigg    NEXT FOLLOW-UP DATE:   2/11/21    Ana Castrejon RN   [facility]  [address] [address]   [email or web address]   Office : [phone]  Fax: [phone]

## 2021-01-29 NOTE — TELEPHONE ENCOUNTER
I called Niraj    States that Ed has horrible cough   almost passed out last evening      Attempted to call  Ed times 2 encouraged him to follow up  In the ER in [place] or [facility] ER       Reviewed with Dr Varner

## 2021-02-11 NOTE — PROGRESS NOTES
Jeremiah is a 45 year old who is being evaluated via a billable video visit.      How would you like to obtain your AVS? MyChart  If the video visit is dropped, the invitation should be resent by: Send to e-mail at: [email or web address]  Will anyone else be joining your video visit? No    Video Start Time:  1.29 pm  Video-Visit Details    Type of service:  Video Visit    Video End Time:1.49  pm     Originating Location (pt. Location): Home    Distant Location (provider location):  [facility] [place]     Platform used for Video Visit: Compumatrix      ACUTE TRANSPLANT NEPHROLOGY VISIT    Assessment & Plan         # DDKT:  stable ~ 1.8 ( Jan 5,2021) . No recent labs                  - Baseline Cr ~ 1.8-2.1              - Proteinuria:0.23 g/gCr ( 2/9/21)               - Date DSA Last Checked: Sept/2020 Due for repeat     Latest DSA: No              - BK Viremia: No ( Jan/2021)              - Kidney Tx Biopsy: Apr 03, 2020; Result: No diagnostic evidence of acute rejection.  Moderate interstitial fibrosis and tubular atrophy. EM showed ~25% foot process effacement   Tx US 3/20: dilatation of the renal pelvis with a ureteral stent , elevated velocity at the renal artery anastomotic site  On flomax     # Immunosuppression: Belatacept  - next due on 2/23/21, Myfortic (goal 1.0-3.5) and Prednisone (dose 5 mg daily)  Due to high creatinine alpa and hyperkalemia Tacrolimus was switched to belatacept in August 2020.              - Changes: No   -Continue with intensive monitoring of immunosuppression for efficacy and toxicity       # Infection Prophylaxis:   - PJP: Dapsone. Will check CD4 to see if he needs to continue  - CMV: Valcyte; Patient is CMV IgG Ab discordant (D+/R-). Completed  Valcyte for 6 months.  CMV PCR monthly until 12 months post transplant. CMV Negative ( Jan/2021)      # Hypertension: Controlled;   Goal BP: < 130/80.  lbs              - Changes: No. Continue Amlodipine 10 mg  daily and Coreg 25 mg BID    # Elevated Blood Glucose: Glucose generally running ~                     Last HbA1c: 5.2% (2/28/20)              - Management as per primary care.     # Anemia in Chronic Renal Disease: Hgb: Stable ~11    DEB: Yes, intermittently. Following with anemia management services               - Iron studies: Replete, holding DEB since Hgb > 10.                         # Mineral Bone Disorder:   - Secondary renal hyperparathyroidism; PTH level: 84( Feb/2020) On Treatment- No   - Vitamin D; level:Normal  On Supplement: Yes  - Calcium; level: Normal        On Supplement: Yes  - Phosphorus; level: Normal        On Supplement: No     # Electrolytes:   - Potassium; level: Normal                           On Supplement: No  - Magnesium; level: Normal             On Supplement: Yes.              - Bicarbonate; level: Normal           On Supplement: Yes 1950 mg BID      # CML: Appears stable on Dasatinib. Patient is followed by Hematology.        # GERD: Controlled  On famotidine, trying to avoid foods that exacerbate his symptoms.    # Skin care : Counseled about increased risk of skin cancer while on IS and advised to see dermatology yearly  For skin check. Also has a cyst in left cheek - he will need to see a dermatologist to remove it. Biopsy was done per patient and it was negative    # # COVID-19 Virus Review: Discussed COVID-19 virus and the potential medical risks.  Reviewed preventative health recommendations, which includes washing hands for at least  20 seconds, avoid touching your face, and social distancing.  Asked patient to inform the transplant center if they are exposed or diagnosed with this virus.    # Access: SHERITA ADAM - Has had aneurysms, earlier followed with surgery ( 2/12/20)      # Cough:   -Pt was seen by his oncologist and his cough was thought to be 2/2 dasatinib. He was given lasix 20mg PO daily and dasatinib was held with improvement. Dasatinib restarted recently with  close monitoring by oncology    # Medical Compliance: Yes      # Transplant History:  Etiology of Kidney Failure: Alport's syndrome  Tx: DDKT  Transplant: 2/28/2020 (Kidney),  3/2/2005 (Kidney),12/7/1988 (Kidney)  Donor Type: Donation after Brain Death        Donor Class:   Crossmatch at time of Tx: negative  DSA at time of Tx: No  Significant changes in immunosuppression: None  CMV IgG Ab High Risk Discordance (D+/R-): Yes  EBV IgG Ab High Risk Discordance (D+/R-): No  Significant transplant-related complications: None  Transplant Office Phone Number: [phone]     Assessment and plan was discussed with the patient and he voiced his understanding and agreement.     Return visit: Return in about 6 months (around 8/11/2021).    Patient seen and discussed with Dr Arian Pizarro MD, FACP  Nephrology Fellow   [facility]   Pager [phone]    Physician Attestation   I, Joe Don MD, personally examined and evaluated this patient.  I discussed the patient with the resident/fellow and care team, and agree with the assessment and plan of care as documented in the note on 02/11/21 .      I personally reviewed vital signs, medications, labs and imaging.    Joe Don MD  Date of Service (when I saw the patient): 02/11/21          Chief Complaint   Mr. Cruz is a 45 year old here for kidney transplant and immunosuppression management.     History of Present Illness     Has had significant cough . Also had weight gain and had SOB.Saw his oncologist on 1/27, who advised to temporarily stop Dasatinib and started diuretic - Lasix 20 MG. His weight got better , SOB and cough improved. Lasix was stopped the next week . Waited for another week  And his symptoms got almost resolved. He has now sergio restarted on Dasatinib.  Very minimal cough due to sinus drainage   No CP/SOB/dizziness/fever/chills/Abd pain/Nausea/Vomiting/Diarrhea/Constipation/confusion/focal weakness. No voiding difficulty . No  hematuria. No skin rash .    Home BP: 120/70s. Weight  - 137 lbs . his -137     Problem List   Patient Active Problem List   Diagnosis     Alport's syndrome     Anemia in chronic kidney disease     Secondary renal hyperparathyroidism (H)     HTN, kidney transplant related     CML (chronic myelocytic leukemia) (H)     GERD (gastroesophageal reflux disease)     Prurigo nodularis     Senile sebaceous gland hyperplasia     Immunosuppression (H)     Kidney replaced by transplant     Hyperkalemia     History of difficult venous access     Steroid-induced hyperglycemia     Aftercare following organ transplant     Vitamin D deficiency     Metabolic acidosis     Hypomagnesemia       Allergies   Allergies   Allergen Reactions     Blood Transfusion Related (Informational Only) Other (See Comments)     Patient has a history of a clinically significant antibody against RBC antigens.  A delay in compatible RBCs may occur.     Cephalosporins Other (See Comments) and Rash     fever     Compazine [Prochlorperazine]      Gammagard [Immune Globulin] Other (See Comments)     Ed got spinal meningitis and MD stated to never get again for fear of death.       Penicillins      Vancomycin        Medications   Current Outpatient Medications   Medication Sig     amLODIPine (NORVASC) 10 MG tablet Take 1 tablet (10 mg) by mouth daily     atorvastatin (LIPITOR) 10 MG tablet Take 1 tablet (10 mg) by mouth daily     belatacept Aug 3 2020 belatacept conversion   [facility]  [phone]  Fax      carvedilol (COREG) 25 MG tablet Take 1 tablet (25 mg) by mouth 2 times daily (with meals)     cetirizine (ZYRTEC) 10 MG tablet Take 10 mg by mouth daily     dapsone (ACZONE) 25 MG tablet Take 2 tablets (50 mg) by mouth daily     dasatinib (SPRYCEL) 20 MG tablet CHEMO Take 40 mg by mouth daily      epoetin justa (EPOGEN/PROCRIT) 26614 UNIT/ML injection Inject 1 mL (10,000 Units) Subcutaneous once a week For Hemoglobin <10.   HOLD if systolic BP >180.     famotidine (PEPCID) 40 MG tablet Take 1 tablet (40 mg) by mouth daily     loratadine (CLARITIN) 10 MG tablet Take 10 mg by mouth daily     MYFORTIC (BRAND) 180 MG EC tablet Take 3 tablets (540 mg) by mouth 2 times daily     predniSONE (DELTASONE) 5 MG tablet Take 1 tablet (5 mg) by mouth daily     sodium bicarbonate 650 MG tablet Take 3 tablets (1,950 mg) by mouth 3 times daily     tamsulosin (FLOMAX) 0.4 MG capsule Take 1 capsule (0.4 mg) by mouth At Bedtime     vitamin D3 (CHOLECALCIFEROL) 2000 units (50 mcg) tablet Take 1 tablet (2,000 Units) by mouth daily     Probiotic Product (UP4 PROBIOTICS ADULT) CAPS Take 1 capsule by mouth daily (Patient not taking: Reported on 11/4/2020)     No current facility-administered medications for this visit.      There are no discontinued medications.    Physical Exam   Vital Signs: There were no vitals taken for this visit.  GENERAL APPEARANCE: alert and no distress  HENT: no obvious abnormalities on appearance  RESP: breathing appears unremarkable with normal rate, no audible wheezing or cough and no apparent shortness of breath with conversation  MS: extremities normal - no gross deformities noted  SKIN: no apparent rash and normal skin tone  NEURO: speech is clear with no obvious neurological deficits  PSYCH: mentation appears normal and affect normal    Data     Renal Latest Ref Rng & Units 2/9/2021 1/5/2021 12/8/2020   Na 133 - 144 mmol/L - - -   Na (external) 133 - 144 mmol/L - 139 140   K 3.4 - 5.3 mmol/L - - -   K (external) 3.4 - 5.1 mmol/L - 4.3 4.5   Cl 94 - 109 mmol/L - - -   Cl (external) 96 - 109 mmol/L - 105 106   CO2 20 - 32 mmol/L - - -   CO2 (external) 21 - 33 mmol/L - 25 23   BUN 7 - 30 mg/dL - - -   BUN (external) 6 - 24 mg/dL - 20 26(H)   Cr 0.66 - 1.25 mg/dL - - -   Cr (external) 0.6 - 1.2 mg/dL - 1.8(H) 1.9(H)   Glucose 70 - 99 mg/dL - - -   Glucose (external) 70 - 99 mg/dL - 114(H) 113(H)   Ca  8.5 - 10.1 mg/dL - - -   Ca  (external) 8.2 - 10.0 mg/dL 9.5 9.0 9.2   Mg 1.6 - 2.3 mg/dL - - -   Mg (external) 1.7 - 2.4 mg/dL - - -     Bone Health Latest Ref Rng & Units 2/9/2021 11/24/2020 10/27/2020   Phos 2.5 - 4.5 mg/dL - - -   Phos (external) 2.6 - 4.7 mg/dL - 4.2 3.3   PTHi 18 - 80 pg/mL - - -   PTHi (external) 18 - 85 pg/mL 84 - -   Vit D Def 20 - 75 ug/L - - -   Vit D Def (external) 30 - 50 ng/mL 53(H) - -     Heme Latest Ref Rng & Units 2/9/2021 2/2/2021 1/26/2021   WBC 4.0 - 11.0 10e9/L - - -   WBC (external) 4.1 - 10.9 x10:3/uL - - -   Hgb 13.3 - 17.7 g/dL 11.4(A) - 10.3(A)   Hgb (external) 12.9 - 17.3 g/dL 11.4(L) 11.1(L) 10.3(L)   Plt 150 - 450 10e9/L - - -   Plt (external) 150 - 450 x10:3/uL - - -   ABSOLUTE NEUTROPHIL 1.6 - 8.3 10e9/L - - -   ABSOLUTE NEUTROPHILS (EXTERNAL) 1.9 - 8.1 x10:3/uL - - -   ABSOLUTE LYMPHOCYTES 0.8 - 5.3 10e9/L - - -   ABSOLUTE LYMPHOCYTES (EXTERNAL) 1.0 - 3.9 x10:3/uL - - -   ABSOLUTE MONOCYTES 0.0 - 1.3 10e9/L - - -   ABSOLUTE MONOCYTES (EXTERNAL) 0.1 - 0.9 x10:3/uL - - -   ABSOLUTE EOSINOPHILS 0.0 - 0.7 10e9/L - - -   ABSOLUTE EOSINOPHILS (EXTERNAL) 0.0 - 0.5 x10:3/uL - - -   ABSOLUTE BASOPHILS 0.0 - 0.2 10e9/L - - -   ABSOLUTE BASOPHILS (EXTERNAL) 0.0 - 0.2 x10:3/uL - - -   ABS IMMATURE GRANULOCYTES 0 - 0.4 10e9/L - - -   ABSOLUTE NUCLEATED RBC - - - -     Liver Latest Ref Rng & Units 2/9/2021 5/7/2020 3/5/2020   AP 40 - 150 U/L - - -   AP (external) 45 - 115 IU/L 73 62 -   TBili 0.2 - 1.3 mg/dL - - 0.6   TBili (external) 0.4 - 1.4 mg/dL 0.4 0.3(L) -   DBili 0.0 - 0.2 mg/dL - - 0.1   DBili (external) 0.0 - 0.3 mg/dL 0.1 0.1 -   ALT 0 - 70 U/L - - -   ALT (external) 12 - 70 U/L 32 30 -   AST 0 - 45 U/L - - -   AST (external) 15 - 46 U/L 21 12(L) -   Tot Protein 6.8 - 8.8 g/dL - - -   Tot Protein (external) 6.2 - 8.2 g/dL 7.4 6.4 -   Albumin 3.4 - 5.0 g/dL - - -   Albumin (external) 3.3 - 5.0 g/dL 4.0 3.8 -     Pancreas Latest Ref Rng & Units 2/9/2021 5/4/2020 2/28/2020   A1C 0 - 5.6 % - - 5.2    A1C (external) 4.0 - 6.0 % 4.6 5.7 -     Iron studies Latest Ref Rng & Units 1/26/2021 1/5/2021 6/29/2020   Iron 35 - 180 ug/dL - - 163   Iron sat 15 - 46 % - - 59(H)   Ferritin ng/mL 2,813 2,356 2,163(H)   Ferritin (external) 26 - 388 ng/mL 2,813(H) 2,356(H) -     [facility] Txp Virology Latest Ref Rng & Units 1/5/2021 12/8/2020 11/24/2020   CMV IgG EU/mL - - -   CMV DNA Quant Ext Undetected IU/mL Undetected - -   LOG IU/ML OF CMVQNT (EXTERNAL) log IU/mL Undetected - -   BK Spec - - - -   BK Res BKNEG copies/mL - - -   BK Log <2.7 Log copies/mL - - -   BK Quant Result Ext None Detected IU/mL None Detected None Detected None Detected   BK Quant Spec Ext - - Plasma Plasma   EBV IgG - - - -   EBV CAPSID ANTIBODY IGG 0.0 - 0.8 AI - - -   Hep B Core NR - - -   Hep B Surf NEG - - -   HIV 1&2 NEG - - -        Recent Labs   Lab Test 03/14/20  0830 03/16/20  0807 06/29/20  0914   DOSTAC Not Provided Not Provided Not Provided   TACROL 9.2 10.8 10.3

## 2021-02-11 NOTE — TELEPHONE ENCOUNTER
Anemia Management Note  SUBJECTIVE/OBJECTIVE:  Referred by Dr. Alvin Varner on 2020  Primary Diagnosis: Anemia in Chronic Kidney Disease (N18.4, D63.1)     Secondary Diagnosis:  Chronic Kidney Disease, Stage 4 (N18.4)   Kidney Tx: , , 2020  Hgb goal range:  9-10  Epo/Darbo: Procrit 10,000 units weekly for Hgb <10. At home.   Iron regimen:  TBD  Labs : 2021  Recent DEB use, transfusion, IV iron: unknown  RX/TX plans : TBD     No history of stroke, MI and blood clots. Hx of CML in 2017     *Currently has Home Care Mon/Thur for labs.      Labs: [facility]  Ph # [phone]  Fax # [phone]        **ONLY call Ed if his Hgb <10 and he needs to dose.         Contact:            Ok to leave message regarding Scheduling and Medical Information per consent to communicate dated 12/10/2017                              OK to speak with Danay Blanton (friend) and Kaylin Lilly (mother) regarding Scheduling and Medical Information per consent to communicate dated 12/10/2017    Anemia Latest Ref Rng & Units 2020 2020 1/3/2021 2021 2021 2021 2021   DEB Dose - 10,000 units 10,000 units 10,000 units - - - -   Hemoglobin 13.3 - 17.7 g/dL - - - 10.5(A) 10.4(A) 10.3(A) 11.4(A)   TSAT 15 - 46 % - - - - - - -   Ferritin ng/mL - - - 2,356 - 2,813 -     BP Readings from Last 3 Encounters:   20 115/74   20 120/71   20 (!) 143/93     Wt Readings from Last 2 Encounters:   20 64 kg (141 lb)   20 61.1 kg (134 lb 12.8 oz)           ASSESSMENT:  Hgb:Above goal - recommend hold dose  TSat: Due for labs Ferritin: Elevated (>1000ng/mL)    PLAN:  Hold Procrit and RTC for hgb then procrit if needed in 2* week(s)    Orders needed to be renewed (for next follow-up date) in LETTERS - for external labs: None    Iron labs due:  2021    Plan discussed with:  No call per pt request  Plan provided by:  bigg    NEXT FOLLOW-UP DATE:   2/25/21    Ana Castrejon RN   [facility]  [address] [address]   [email or web address]   Office : [phone]  Fax: [phone]

## 2021-02-11 NOTE — LETTER
2/11/2021       RE: Jeremiah Cruz  [address] [facility] [address]     Dear Colleague,    Thank you for referring your patient, Jeremiah Cruz, to the [facility] [place] at [facility]. Please see a copy of my visit note below.    Jeremiah is a 45 year old who is being evaluated via a billable video visit.      How would you like to obtain your AVS? MyChart  If the video visit is dropped, the invitation should be resent by: Send to e-mail at: [email or web address]  Will anyone else be joining your video visit? No    Video Start Time:  1.29 pm  Video-Visit Details    Type of service:  Video Visit    Video End Time:1.49  pm     Originating Location (pt. Location): Home    Distant Location (provider location):  [facility] [place]     Platform used for Video Visit: CYBRA      ACUTE TRANSPLANT NEPHROLOGY VISIT    Assessment & Plan         # DDKT:  stable ~ 1.8 ( Jan 5,2021) . No recent labs                  - Baseline Cr ~ 1.8-2.1              - Proteinuria:0.23 g/gCr ( 2/9/21)               - Date DSA Last Checked: Sept/2020 Due for repeat     Latest DSA: No              - BK Viremia: No ( Jan/2021)              - Kidney Tx Biopsy: Apr 03, 2020; Result: No diagnostic evidence of acute rejection.  Moderate interstitial fibrosis and tubular atrophy. EM showed ~25% foot process effacement   Tx US 3/20: dilatation of the renal pelvis with a ureteral stent , elevated velocity at the renal artery anastomotic site  On flomax     # Immunosuppression: Belatacept  - next due on 2/23/21, Myfortic (goal 1.0-3.5) and Prednisone (dose 5 mg daily)  Due to high creatinine alpa and hyperkalemia Tacrolimus was switched to belatacept in August 2020.              - Changes: No   -Continue with intensive monitoring of immunosuppression for efficacy and toxicity       # Infection Prophylaxis:   - PJP: Dapsone. Will check CD4 to see if  he needs to continue  - CMV: Valcyte; Patient is CMV IgG Ab discordant (D+/R-). Completed  Valcyte for 6 months.  CMV PCR monthly until 12 months post transplant. CMV Negative ( Jan/2021)      # Hypertension: Controlled;   Goal BP: < 130/80.  lbs              - Changes: No. Continue Amlodipine 10 mg daily and Coreg 25 mg BID    # Elevated Blood Glucose: Glucose generally running ~                     Last HbA1c: 5.2% (2/28/20)              - Management as per primary care.     # Anemia in Chronic Renal Disease: Hgb: Stable ~11    DEB: Yes, intermittently. Following with anemia management services               - Iron studies: Replete, holding DEB since Hgb > 10.                         # Mineral Bone Disorder:   - Secondary renal hyperparathyroidism; PTH level: 84( Feb/2020) On Treatment- No   - Vitamin D; level:Normal  On Supplement: Yes  - Calcium; level: Normal        On Supplement: Yes  - Phosphorus; level: Normal        On Supplement: No     # Electrolytes:   - Potassium; level: Normal                           On Supplement: No  - Magnesium; level: Normal             On Supplement: Yes.              - Bicarbonate; level: Normal           On Supplement: Yes 1950 mg BID      # CML: Appears stable on Dasatinib. Patient is followed by Hematology.        # GERD: Controlled  On famotidine, trying to avoid foods that exacerbate his symptoms.    # Skin care : Counseled about increased risk of skin cancer while on IS and advised to see dermatology yearly  For skin check. Also has a cyst in left cheek - he will need to see a dermatologist to remove it. Biopsy was done per patient and it was negative    # # COVID-19 Virus Review: Discussed COVID-19 virus and the potential medical risks.  Reviewed preventative health recommendations, which includes washing hands for at least  20 seconds, avoid touching your face, and social distancing.  Asked patient to inform the transplant center if they are exposed or  diagnosed with this virus.    # Access: SHERITA AVF - Has had aneurysms, earlier followed with surgery ( 2/12/20)      # Cough:   -Pt was seen by his oncologist and his cough was thought to be 2/2 dasatinib. He was given lasix 20mg PO daily and dasatinib was held with improvement. Dasatinib restarted recently with close monitoring by oncology    # Medical Compliance: Yes      # Transplant History:  Etiology of Kidney Failure: Alport's syndrome  Tx: DDKT  Transplant: 2/28/2020 (Kidney),  3/2/2005 (Kidney),12/7/1988 (Kidney)  Donor Type: Donation after Brain Death        Donor Class:   Crossmatch at time of Tx: negative  DSA at time of Tx: No  Significant changes in immunosuppression: None  CMV IgG Ab High Risk Discordance (D+/R-): Yes  EBV IgG Ab High Risk Discordance (D+/R-): No  Significant transplant-related complications: None  Transplant Office Phone Number: [phone]     Assessment and plan was discussed with the patient and he voiced his understanding and agreement.     Return visit: Return in about 6 months (around 8/11/2021).    Patient seen and discussed with Dr Arian Pizarro MD, FACP  Nephrology Fellow   [facility]   Pager [phone]    Physician Attestation   I, Joe Don MD, personally examined and evaluated this patient.  I discussed the patient with the resident/fellow and care team, and agree with the assessment and plan of care as documented in the note on 02/11/21 .      I personally reviewed vital signs, medications, labs and imaging.    Joe Don MD  Date of Service (when I saw the patient): 02/11/21          Chief Complaint   Mr. Cruz is a 45 year old here for kidney transplant and immunosuppression management.     History of Present Illness     Has had significant cough . Also had weight gain and had SOB.Saw his oncologist on 1/27, who advised to temporarily stop Dasatinib and started diuretic - Lasix 20 MG. His weight got better , SOB and cough improved. Lasix was  stopped the next week . Waited for another week  And his symptoms got almost resolved. He has now sergio restarted on Dasatinib.  Very minimal cough due to sinus drainage   No CP/SOB/dizziness/fever/chills/Abd pain/Nausea/Vomiting/Diarrhea/Constipation/confusion/focal weakness. No voiding difficulty . No hematuria. No skin rash .    Home BP: 120/70s. Weight  - 137 lbs . his -137     Problem List   Patient Active Problem List   Diagnosis     Alport's syndrome     Anemia in chronic kidney disease     Secondary renal hyperparathyroidism (H)     HTN, kidney transplant related     CML (chronic myelocytic leukemia) (H)     GERD (gastroesophageal reflux disease)     Prurigo nodularis     Senile sebaceous gland hyperplasia     Immunosuppression (H)     Kidney replaced by transplant     Hyperkalemia     History of difficult venous access     Steroid-induced hyperglycemia     Aftercare following organ transplant     Vitamin D deficiency     Metabolic acidosis     Hypomagnesemia       Allergies   Allergies   Allergen Reactions     Blood Transfusion Related (Informational Only) Other (See Comments)     Patient has a history of a clinically significant antibody against RBC antigens.  A delay in compatible RBCs may occur.     Cephalosporins Other (See Comments) and Rash     fever     Compazine [Prochlorperazine]      Gammagard [Immune Globulin] Other (See Comments)     Ed got spinal meningitis and MD stated to never get again for fear of death.       Penicillins      Vancomycin        Medications   Current Outpatient Medications   Medication Sig     amLODIPine (NORVASC) 10 MG tablet Take 1 tablet (10 mg) by mouth daily     atorvastatin (LIPITOR) 10 MG tablet Take 1 tablet (10 mg) by mouth daily     belatacept Aug 3 2020 belatacept conversion   [facility]  [phone]  Fax      carvedilol (COREG) 25 MG tablet Take 1 tablet (25 mg) by mouth 2 times daily (with meals)     cetirizine (ZYRTEC) 10 MG  tablet Take 10 mg by mouth daily     dapsone (ACZONE) 25 MG tablet Take 2 tablets (50 mg) by mouth daily     dasatinib (SPRYCEL) 20 MG tablet CHEMO Take 40 mg by mouth daily      epoetin justa (EPOGEN/PROCRIT) 42078 UNIT/ML injection Inject 1 mL (10,000 Units) Subcutaneous once a week For Hemoglobin <10.  HOLD if systolic BP >180.     famotidine (PEPCID) 40 MG tablet Take 1 tablet (40 mg) by mouth daily     loratadine (CLARITIN) 10 MG tablet Take 10 mg by mouth daily     MYFORTIC (BRAND) 180 MG EC tablet Take 3 tablets (540 mg) by mouth 2 times daily     predniSONE (DELTASONE) 5 MG tablet Take 1 tablet (5 mg) by mouth daily     sodium bicarbonate 650 MG tablet Take 3 tablets (1,950 mg) by mouth 3 times daily     tamsulosin (FLOMAX) 0.4 MG capsule Take 1 capsule (0.4 mg) by mouth At Bedtime     vitamin D3 (CHOLECALCIFEROL) 2000 units (50 mcg) tablet Take 1 tablet (2,000 Units) by mouth daily     Probiotic Product (UP4 PROBIOTICS ADULT) CAPS Take 1 capsule by mouth daily (Patient not taking: Reported on 11/4/2020)     No current facility-administered medications for this visit.      There are no discontinued medications.    Physical Exam   Vital Signs: There were no vitals taken for this visit.  GENERAL APPEARANCE: alert and no distress  HENT: no obvious abnormalities on appearance  RESP: breathing appears unremarkable with normal rate, no audible wheezing or cough and no apparent shortness of breath with conversation  MS: extremities normal - no gross deformities noted  SKIN: no apparent rash and normal skin tone  NEURO: speech is clear with no obvious neurological deficits  PSYCH: mentation appears normal and affect normal    Data     Renal Latest Ref Rng & Units 2/9/2021 1/5/2021 12/8/2020   Na 133 - 144 mmol/L - - -   Na (external) 133 - 144 mmol/L - 139 140   K 3.4 - 5.3 mmol/L - - -   K (external) 3.4 - 5.1 mmol/L - 4.3 4.5   Cl 94 - 109 mmol/L - - -   Cl (external) 96 - 109 mmol/L - 105 106   CO2 20 - 32 mmol/L  - - -   CO2 (external) 21 - 33 mmol/L - 25 23   BUN 7 - 30 mg/dL - - -   BUN (external) 6 - 24 mg/dL - 20 26(H)   Cr 0.66 - 1.25 mg/dL - - -   Cr (external) 0.6 - 1.2 mg/dL - 1.8(H) 1.9(H)   Glucose 70 - 99 mg/dL - - -   Glucose (external) 70 - 99 mg/dL - 114(H) 113(H)   Ca  8.5 - 10.1 mg/dL - - -   Ca (external) 8.2 - 10.0 mg/dL 9.5 9.0 9.2   Mg 1.6 - 2.3 mg/dL - - -   Mg (external) 1.7 - 2.4 mg/dL - - -     Bone Health Latest Ref Rng & Units 2/9/2021 11/24/2020 10/27/2020   Phos 2.5 - 4.5 mg/dL - - -   Phos (external) 2.6 - 4.7 mg/dL - 4.2 3.3   PTHi 18 - 80 pg/mL - - -   PTHi (external) 18 - 85 pg/mL 84 - -   Vit D Def 20 - 75 ug/L - - -   Vit D Def (external) 30 - 50 ng/mL 53(H) - -     Heme Latest Ref Rng & Units 2/9/2021 2/2/2021 1/26/2021   WBC 4.0 - 11.0 10e9/L - - -   WBC (external) 4.1 - 10.9 x10:3/uL - - -   Hgb 13.3 - 17.7 g/dL 11.4(A) - 10.3(A)   Hgb (external) 12.9 - 17.3 g/dL 11.4(L) 11.1(L) 10.3(L)   Plt 150 - 450 10e9/L - - -   Plt (external) 150 - 450 x10:3/uL - - -   ABSOLUTE NEUTROPHIL 1.6 - 8.3 10e9/L - - -   ABSOLUTE NEUTROPHILS (EXTERNAL) 1.9 - 8.1 x10:3/uL - - -   ABSOLUTE LYMPHOCYTES 0.8 - 5.3 10e9/L - - -   ABSOLUTE LYMPHOCYTES (EXTERNAL) 1.0 - 3.9 x10:3/uL - - -   ABSOLUTE MONOCYTES 0.0 - 1.3 10e9/L - - -   ABSOLUTE MONOCYTES (EXTERNAL) 0.1 - 0.9 x10:3/uL - - -   ABSOLUTE EOSINOPHILS 0.0 - 0.7 10e9/L - - -   ABSOLUTE EOSINOPHILS (EXTERNAL) 0.0 - 0.5 x10:3/uL - - -   ABSOLUTE BASOPHILS 0.0 - 0.2 10e9/L - - -   ABSOLUTE BASOPHILS (EXTERNAL) 0.0 - 0.2 x10:3/uL - - -   ABS IMMATURE GRANULOCYTES 0 - 0.4 10e9/L - - -   ABSOLUTE NUCLEATED RBC - - - -     Liver Latest Ref Rng & Units 2/9/2021 5/7/2020 3/5/2020   AP 40 - 150 U/L - - -   AP (external) 45 - 115 IU/L 73 62 -   TBili 0.2 - 1.3 mg/dL - - 0.6   TBili (external) 0.4 - 1.4 mg/dL 0.4 0.3(L) -   DBili 0.0 - 0.2 mg/dL - - 0.1   DBili (external) 0.0 - 0.3 mg/dL 0.1 0.1 -   ALT 0 - 70 U/L - - -   ALT (external) 12 - 70 U/L 32 30 -   AST 0 - 45  U/L - - -   AST (external) 15 - 46 U/L 21 12(L) -   Tot Protein 6.8 - 8.8 g/dL - - -   Tot Protein (external) 6.2 - 8.2 g/dL 7.4 6.4 -   Albumin 3.4 - 5.0 g/dL - - -   Albumin (external) 3.3 - 5.0 g/dL 4.0 3.8 -     Pancreas Latest Ref Rng & Units 2/9/2021 5/4/2020 2/28/2020   A1C 0 - 5.6 % - - 5.2   A1C (external) 4.0 - 6.0 % 4.6 5.7 -     Iron studies Latest Ref Rng & Units 1/26/2021 1/5/2021 6/29/2020   Iron 35 - 180 ug/dL - - 163   Iron sat 15 - 46 % - - 59(H)   Ferritin ng/mL 2,813 2,356 2,163(H)   Ferritin (external) 26 - 388 ng/mL 2,813(H) 2,356(H) -     [facility] Latest Ref Rng & Units 1/5/2021 12/8/2020 11/24/2020   CMV IgG EU/mL - - -   CMV DNA Quant Ext Undetected IU/mL Undetected - -   LOG IU/ML OF CMVQNT (EXTERNAL) log IU/mL Undetected - -   BK Spec - - - -   BK Res BKNEG copies/mL - - -   BK Log <2.7 Log copies/mL - - -   BK Quant Result Ext None Detected IU/mL None Detected None Detected None Detected   BK Quant Spec Ext - - Plasma Plasma   EBV IgG - - - -   EBV CAPSID ANTIBODY IGG 0.0 - 0.8 AI - - -   Hep B Core NR - - -   Hep B Surf NEG - - -   HIV 1&2 NEG - - -        Recent Labs   Lab Test 03/14/20  0830 03/16/20  0807 06/29/20  0914   DOSTAC Not Provided Not Provided Not Provided   TACROL 9.2 10.8 10.3          Again, thank you for allowing me to participate in the care of your patient.      Sincerely,    Kidney/Pancreas Recipient

## 2021-02-11 NOTE — PROGRESS NOTES
Jeremiah is a 45 year old who is being evaluated via a billable video visit.      How would you like to obtain your AVS? Mail a copy  If the video visit is dropped, the invitation should be resent by: Send to e-mail at: [email or web address]  Will anyone else be joining your video visit? No  {If patient encounters technical issues they should call [phone]}    Video-Visit Details    Type of service:  Video Visit  Video started 1.29 pm  Video ended:  1.49  pm     Originating Location (pt. Location): Home    Distant Location (provider location):  [facility] [place]     Platform used for Video Visit: [employer]

## 2021-02-12 NOTE — TELEPHONE ENCOUNTER
lvm for the patient to call us back tp schedule a 6 month f/u appointment with the Kidney transplant team

## 2021-02-15 NOTE — TELEPHONE ENCOUNTER
One year post kidney transplant  On 2/28/20               Task   - please  fax updated lab orders and send copy to Ed  Please obtain 1 year labs with next lab draw         Overdue for Donor Specific Antibodies  - send order and kit        Please confirm current lab

## 2021-02-15 NOTE — LETTER
OUTPATIENT LABORATORY TEST ORDER             Patient Name: Jeremiah Cruz  Transplant Date: 2/28/2020   YOB: 1975                                   Issue Date & Time:2/15/2021  7:31 AM  [facility] MR: [id number] Exp. Date (1 year after date issued)      Diagnoses: Kidney Transplant (ICD-10  Z94.0)   Long term use of medications (ICD-10  Z79.899)     Lab results to be available on the same day drawn.   Patient should release information to the [facility].  Please fax to the Transplant Center at [phone].    Monthly   ?Hemogram and Platelet  ?Basic Metabolic Panel (Sodium, Potassium, Chloride, CO2, Creatinine, Urea Nitrogen,     Glucose,   Calcium)        ?BK (Polyoma Virus) PCR Quantitative - Plasma                   Every 6 Months                  ?Urine for protein/creatinine    Yearly:  Due: NOW   ?PRA/DSA level (mailers provided by the patient)     If you have any questions, please call The Transplant Center at [phone] or [phone].    Please fax labs to [phone]    Joe Don MD

## 2021-02-15 NOTE — LETTER
OUTPATIENT LABORATORY TEST ORDER             Patient Name: Jeremiah Cruz  Transplant Date: 2/28/2020   YOB: 1975                                   Issue Date & Time:2/15/2021  7:31 AM  [facility] MR: [id number] Exp. Date (1 year after date issued)      Diagnoses: Kidney Transplant (ICD-10  Z94.0)   Long term use of medications (ICD-10  Z79.899)     Lab results to be available on the same day drawn.   Patient should release information to the [facility].  Please fax to the Transplant Center at [phone].    Monthly   ?Hemogram and Platelet  ?Basic Metabolic Panel (Sodium, Potassium, Chloride, CO2, Creatinine, Urea Nitrogen,     Glucose,   Calcium)        ?BK (Polyoma Virus) PCR Quantitative - Plasma                   Every 6 Months                  ?Urine for protein/creatinine    Yearly:  Due: NOW   ?PRA/DSA level (mailers provided by the patient)     If you have any questions, please call The Transplant Center at [phone] or [phone].    Please fax labs to [phone]    Joe Don MD

## 2021-02-23 NOTE — LETTER
OUTPATIENT LABORATORY TEST ORDER             Patient Name: Jeremiah Cruz  Transplant Date: 2/28/2020   YOB: 1975                                   Issue Date & Time:2/24/2021  9:24 AM  [facility] MR: [id number] Exp. Date (1 year after date issued)      Diagnoses: Kidney Transplant (ICD-10  Z94.0)   Long term use of medications (ICD-10  Z79.899)     Lab results to be available on the same day drawn.   Patient should release information to the [facility].  Please fax to the Transplant Center at [phone].    Monthly   ?Hemogram and Platelet  ?Basic Metabolic Panel (Sodium, Potassium, Chloride, CO2, Creatinine, Urea Nitrogen,     Glucose,   Calcium)        ?BK (Polyoma Virus) PCR Quantitative - Plasma                   Every 6 Months                  ?Urine for protein/creatinine    Yearly       ?PRA/DSA level (mailers provided by the patient)     If you have any questions, please call The Transplant Center at [phone] or [phone].    Please fax labs to [phone]    Joe Don MD

## 2021-02-23 NOTE — TELEPHONE ENCOUNTER
Call from Mracos from lab  She faxed over pts lab results  Their orders said to call them into Dr Mcdaniels

## 2021-02-24 NOTE — TELEPHONE ENCOUNTER
Current standing lab orders with Dr. Don's name faxed to patients local lab and sent copy to patient via [employer].

## 2021-02-25 NOTE — TELEPHONE ENCOUNTER
Anemia Management Note  SUBJECTIVE/OBJECTIVE:  Referred by Dr. Alvin Varner on 2020  Primary Diagnosis: Anemia in Chronic Kidney Disease (N18.4, D63.1)     Secondary Diagnosis:  Chronic Kidney Disease, Stage 4 (N18.4)   Kidney Tx: , , 2020  Hgb goal range:  9-10  Epo/Darbo: Procrit 10,000 units weekly for Hgb <10. At home.   Iron regimen:  TBD  Labs : 2021  Recent DEB use, transfusion, IV iron: unknown  RX/TX plans : TBD     No history of stroke, MI and blood clots. Hx of CML in 2017     *Currently has Home Care Mon/Thur for labs.      Labs: [facility]  Ph # [phone]  Fax # [phone]        **ONLY call Ed if his Hgb <10 and he needs to dose.         Contact:            Ok to leave message regarding Scheduling and Medical Information per consent to communicate dated 12/10/2017                              OK to speak with Danay Blanton (friend) and Kaylin Lilly (mother) regarding Scheduling and Medical Information per consent to communicate dated 12/10/2017    Anemia Latest Ref Rng & Units 2020 1/3/2021 2021 2021 2021 2021 2021   DEB Dose - 10,000 units 10,000 units - - - - -   Hemoglobin 13.3 - 17.7 g/dL - - 10.5(A) 10.4(A) 10.3(A) 11.4(A) 9.4(A)   TSAT 15 - 46 % - - - - - - -   Ferritin ng/mL - - 2,356 - 2,813 - 2,480     BP Readings from Last 3 Encounters:   20 115/74   20 120/71   20 (!) 143/93     Wt Readings from Last 2 Encounters:   20 64 kg (141 lb)   20 61.1 kg (134 lb 12.8 oz)           ASSESSMENT:  Hgb:At goal - recommend dose  TSat: Due for labs Ferritin: Elevated (>1000ng/mL)    PLAN:  Dose with procrit, LVM for Ed.     Orders needed to be renewed (for next follow-up date) in LETTERS - for external labs: None    Iron labs due:  TSA is due.  Ferritin is elvated    Plan discussed with:  CAM for Ed.   Plan provided by:  bigg    NEXT FOLLOW-UP DATE:  3/4/21    Ana Castrejon RN   Anemia  [address] [address]   [email or web address]   Office : [phone]  Fax: [phone]

## 2021-03-04 NOTE — TELEPHONE ENCOUNTER
Anemia Management Note  SUBJECTIVE/OBJECTIVE:  Referred by Dr. Alvin Varner on 2020  Primary Diagnosis: Anemia in Chronic Kidney Disease (N18.4, D63.1)     Secondary Diagnosis:  Chronic Kidney Disease, Stage 4 (N18.4)   Kidney Tx: , , 2020  Hgb goal range:  9-10  Epo/Darbo: Procrit 10,000 units weekly for Hgb <10. At home.   Iron regimen:  TBD  Labs : 2021  Recent DEB use, transfusion, IV iron: unknown  RX/TX plans : TBD     No history of stroke, MI and blood clots. Hx of CML in 2017     *Currently has Home Care Mon/Thur for labs.      Labs: [facility]  Ph # [phone]  Fax # [phone]        **ONLY call Ed if his Hgb <10 and he needs to dose.         Contact:            Ok to leave message regarding Scheduling and Medical Information per consent to communicate dated 12/10/2017                              OK to speak with Danay Blanton (friend) and Kaylin Kolbenger (mother) regarding Scheduling and Medical Information per consent to communicate dated 12/10/2017    Anemia Latest Ref Rng & Units 2021 2021 2021 2021 2021 2021 3/4/2021   DEB Dose - - - - - - 10,000 units 10,000 units   Hemoglobin 13.3 - 17.7 g/dL 10.5(A) 10.4(A) 10.3(A) 11.4(A) 9.4(A) - -   TSAT 15 - 46 % - - - - - - -   Ferritin ng/mL 2,356 - 2,813 - 2,480 - -     BP Readings from Last 3 Encounters:   20 115/74   20 120/71   20 (!) 143/93     Wt Readings from Last 2 Encounters:   20 64 kg (141 lb)   20 61.1 kg (134 lb 12.8 oz)           ASSESSMENT:  Hgb:At goal - recommend dose  TSat: Due for labs Ferritin: Elevated (>1000ng/mL)    PLAN:  Dose with procrit weekly.  Ed has not returned Anemia Services calls.  LVMs to dose weekly when Hgb <10.     Orders needed to be renewed (for next follow-up date) in LETTERS - for external labs: None    Iron labs due:  TSAT is due.  Ferritin >1000    Plan discussed with:  CAM for Ed.   Plan provided by:   bigg    NEXT FOLLOW-UP DATE:  3/11/21    Ana Castrejon RN   Anemia Services  [address] [address]   [email or web address]   Office : [phone]  Fax: [phone]

## 2021-03-11 NOTE — TELEPHONE ENCOUNTER
Anemia Management Note  SUBJECTIVE/OBJECTIVE:  Referred by Dr. Alvin Varner on 2020  Primary Diagnosis: Anemia in Chronic Kidney Disease (N18.4, D63.1)     Secondary Diagnosis:  Chronic Kidney Disease, Stage 4 (N18.4)   Kidney Tx: , , 2020  Hgb goal range:  9-10  Epo/Darbo: Procrit 10,000 units weekly for Hgb <10. At home.   Iron regimen:  TBD  Labs : 2021  Recent DEB use, transfusion, IV iron: unknown  RX/TX plans : TBD     No history of stroke, MI and blood clots. Hx of CML in 2017     *Currently has Home Care Mon/Thur for labs.      Labs: [facility]  Ph # [phone]  Fax # [phone]        **ONLY call Ed if his Hgb <10 and he needs to dose.         Contact:            Ok to leave message regarding Scheduling and Medical Information per consent to communicate dated 12/10/2017                              OK to speak with Danay Blanton (friend) and Kaylin Kolbenger (mother) regarding Scheduling and Medical Information per consent to communicate dated 12/10/2017    Anemia Latest Ref Rng & Units 2021 2021 2021 2021 3/4/2021 3/9/2021 3/11/2021   DEB Dose - - - - 10,000 units 10,000 units - 10,000 units   Hemoglobin 13.3 - 17.7 g/dL 10.3(A) 11.4(A) 9.4(A) - - 9.9(A) -   TSAT 15 - 46 % - - - - - - -   Ferritin ng/mL 2,813 - 2,480 - - - -     BP Readings from Last 3 Encounters:   20 115/74   20 120/71   20 (!) 143/93     Wt Readings from Last 2 Encounters:   20 64 kg (141 lb)   20 61.1 kg (134 lb 12.8 oz)       Ed has not returned Anemia Services Calls.     ASSESSMENT:  Hgb:At goal - recommend dose  TSat: Due for labs Ferritin: Elevated (>1000ng/mL)    PLAN:  Dose with procrit and RTC for hgb then procrit if needed in 1 week(s)    Orders needed to be renewed (for next follow-up date) in LETTERS - for external labs: None    Iron labs due:  TSAT is due.  Ferritin is eleveated    Plan discussed with:  No call this week, Ed  has not returned Anemia Calls.  Plan provided by:  bigg    NEXT FOLLOW-UP DATE:  3/18/21    Ana Castrejon RN   Anemia Services  [address] [address]   [email or web address]   Office : [phone]  Fax: [phone]

## 2021-03-12 NOTE — TELEPHONE ENCOUNTER
Clinical Pharmacy Consult:                                                      Transplant Specific:  12 Month Post Transplant Pharmacy Call  Date of Transplant: 02/28/2020  Type of Transplant: kidney  First Transplant: no this is #3  History of rejection: yes    Immunosuppression Regimen    belatacept 5mg/kg every 4 weeks,  Prednisone 5mg qAM and Myfortic 540mg qAM & 540mg qPM  Immunosuppressant Levels: tacro dc'd   Patient specific goal: n/a  Pt adherent to lab draws: yes  Scr:   Lab Results   Component Value Date    CR 2.42 03/16/2020     Side effects: None     Prophylactic Medications  Antibacterial:  Dapsone 50mg daily  Scheduled Discontinue Date: life long    Antifungal: Not needed thus far  Scheduled Discontinue Date: not on    Antiviral: CrCl 40 to 59 mL/minute: Valcyte 450 mg once daily   Scheduled Discontinue Date: Therapy completed    Acid Reducer: Pepcid (famotidine)  Scheduled Reviewed Date: unknown    Thrombosis Prevention: not on   Scheduled Discontinue Date: not on    Blood Pressure Management  Frequency of home Blood Pressure checks: once a day  Most recent home BP:  Low 100's/ 70's  Patient Blood pressure goal: <140/90  Patient blood pressure at goal:  yes  Hospitalizations/ER visits since last assessment: 0      Med rec/DUR performed: yes  Med Rec Discrepancies: No    Medication adherence flowsheet 3/12/2021   Patient medication administration: Responsible for own medications   Patient estimated adherence level: %   Pharmacist assessment of adherence: Good   Patient reported doses missed per week: 0-1   Patient reported doses missed comments: -   Pharmacy MPR: -   Facilitators to medication adherence  Cell phone;Medication dosing chart;Pill box;Schedule/routine   Patient reported barriers to medication adherence  -   Adherence intervention(s): -      Medication access flowsheet 3/12/2021   Number of pharmacies used: 2   Pharmacy: [facility];Other   Other pharmacy: [facility]    Enrolled in [facility]? Yes   Patient reported barriers to accessing medications: -   Medication access interventions: -      Ed reports that it has been a struggle but things are moving in right direction.Reports no side effects.  He had some respiratory problems but they cleared up when he stopped Sprycel.  His hemoglobin has improved as well.    Shady Mott [facility]  Specialty Pharmacist [phone]

## 2021-03-18 NOTE — TELEPHONE ENCOUNTER
Anemia Management Note  SUBJECTIVE/OBJECTIVE:  Referred by Dr. Alvin Varner on 2020  Primary Diagnosis: Anemia in Chronic Kidney Disease (N18.4, D63.1)     Secondary Diagnosis:  Chronic Kidney Disease, Stage 4 (N18.4)   Kidney Tx: , , 2020  Hgb goal range:  9-10  Epo/Darbo: Procrit 10,000 units weekly for Hgb <10. At home.   Iron regimen:  TBD  Labs : 2021  Recent DEB use, transfusion, IV iron: unknown  RX/TX plans : TBD     No history of stroke, MI and blood clots. Hx of CML in 2017     *Currently has Home Care Mon/Thur for labs.      Labs: [facility]  Ph # [phone]  Fax # [phone]        **ONLY call Ed if his Hgb <10 and he needs to dose.         Contact:            Ok to leave message regarding Scheduling and Medical Information per consent to communicate dated 12/10/2017                              OK to speak with Danay Blanton (friend) and Kaylin Lilly (mother) regarding Scheduling and Medical Information per consent to communicate dated 12/10/2017    Anemia Latest Ref Rng & Units 2021 2021 2021 3/4/2021 3/9/2021 3/11/2021 3/16/2021   DEB Dose - - - 10,000 units 10,000 units - 10,000 units -   Hemoglobin 13.3 - 17.7 g/dL 11.4(A) 9.4(A) - - 9.9(A) - 10.8(A)   TSAT 15 - 46 % - - - - - - -   Ferritin ng/mL - 2,480 - - - - -     BP Readings from Last 3 Encounters:   20 115/74   20 120/71   20 (!) 143/93     Wt Readings from Last 2 Encounters:   20 64 kg (141 lb)   20 61.1 kg (134 lb 12.8 oz)           ASSESSMENT:  Hgb:Above goal - recommend hold dose  TSat: Due for labs Ferritin: Elevated (>1000ng/mL)    PLAN:  Hold Procrit and RTC for hgb then procrit if needed in 1 week(s)    Orders needed to be renewed (for next follow-up date) in LETTERS - for external labs: None    Iron labs due:  May 2021    Plan discussed with:  No call per pt request  Plan provided by:  bigg    NEXT FOLLOW-UP DATE:  3/25/21    Ana  Cash CHAVARRIA   [facility] Services  [address] [address]   [email or web address]   Office : [phone]  Fax: [phone]

## 2021-03-25 NOTE — TELEPHONE ENCOUNTER
Anemia Management Note  SUBJECTIVE/OBJECTIVE:  Referred by Dr. Alvin Varner on 2020  Primary Diagnosis: Anemia in Chronic Kidney Disease (N18.4, D63.1)     Secondary Diagnosis:  Chronic Kidney Disease, Stage 4 (N18.4)   Kidney Tx: , , 2020  Hgb goal range:  9-10  Epo/Darbo: Procrit 10,000 units weekly for Hgb <10. At home.   Iron regimen:  TBD  Labs : 2021  Recent DEB use, transfusion, IV iron: unknown  RX/TX plans : TBD     No history of stroke, MI and blood clots. Hx of CML in 2017     *Currently has Home Care Mon/Thur for labs.      Labs: [facility]  Ph # [phone]  Fax # [phone]        **ONLY call Ed if his Hgb <10 and he needs to dose.         Contact:            Ok to leave message regarding Scheduling and Medical Information per consent to communicate dated 12/10/2017                              OK to speak with Danay Blanton (friend) and Kaylin Lilly (mother) regarding Scheduling and Medical Information per consent to communicate dated 12/10/2017    Anemia Latest Ref Rng & Units 2021 2021 3/4/2021 3/9/2021 3/11/2021 3/16/2021 3/23/2021   DEB Dose - - 10,000 units 10,000 units - 10,000 units - -   Hemoglobin 13.3 - 17.7 g/dL 9.4(A) - - 9.9(A) - 10.8(A) 11.1(A)   TSAT 15 - 46 % - - - - - - -   Ferritin ng/mL 2,480 - - - - - 3,179     BP Readings from Last 3 Encounters:   20 115/74   20 120/71   20 (!) 143/93     Wt Readings from Last 2 Encounters:   20 141 lb (64 kg)   20 134 lb 12.8 oz (61.1 kg)           ASSESSMENT:  Hgb:Above goal - recommend hold dose  TSat: Due for labs Ferritin: Elevated (>1000ng/mL)    PLAN:  RTC for Hgb  in 1-2 week(s)    Orders needed to be renewed (for next follow-up date) in LETTERS - for external labs: None    Iron labs due:  TSAT is Due. Ferritin is elevated.     Plan discussed with:  No call per pt request  Plan provided by:  bigg    NEXT FOLLOW-UP DATE:  21    Ana Castrejon  RN   [facility] Services  [address] [address]   [email or web address]   Office : [phone]  Fax: [phone]

## 2021-03-31 NOTE — TELEPHONE ENCOUNTER
Anemia Management Note  SUBJECTIVE/OBJECTIVE:  Referred by Dr. Alvin Varner on 2020  Primary Diagnosis: Anemia in Chronic Kidney Disease (N18.4, D63.1)     Secondary Diagnosis:  Chronic Kidney Disease, Stage 4 (N18.4)   Kidney Tx: , , 2020  Hgb goal range:  9-10  Epo/Darbo: Procrit 10,000 units weekly for Hgb <10. At home.   Iron regimen:  TBD  Labs : 2021  Recent DEB use, transfusion, IV iron: unknown  RX/TX plans : TBD     No history of stroke, MI and blood clots. Hx of CML in 2017     *Currently has Home Care Mon/Thur for labs.      Labs: [facility]  Ph # [phone]  Fax # [phone]        **ONLY call Ed if his Hgb <10 and he needs to dose.         Contact:            Ok to leave message regarding Scheduling and Medical Information per consent to communicate dated 12/10/2017                              OK to speak with Danay Blanton (friend) and Kaylin Lilly (mother) regarding Scheduling and Medical Information per consent to communicate dated 12/10/2017    Anemia Latest Ref Rng & Units 2021 3/4/2021 3/9/2021 3/11/2021 3/16/2021 3/23/2021 3/30/2021   DEB Dose - 10,000 units 10,000 units - 10,000 units - - -   Hemoglobin 13.3 - 17.7 g/dL - - 9.9(A) - 10.8(A) 11.1(A) 10.8(A)   TSAT 15 - 46 % - - - - - - -   Ferritin ng/mL - - - - - 3,179 -     BP Readings from Last 3 Encounters:   20 115/74   20 120/71   20 (!) 143/93     Wt Readings from Last 2 Encounters:   20 141 lb (64 kg)   20 134 lb 12.8 oz (61.1 kg)           ASSESSMENT:  Hgb:Above goal - recommend hold dose  TSat: Due for labs Ferritin: Elevated (>1000ng/mL)    PLAN:  Hold Procrit and RTC for hgb then procrit if needed in 1 week(s)    Orders needed to be renewed (for next follow-up date) in LETTERS - for external labs: None    Iron labs due:  12 weeks    Plan discussed with:  NO call per pt request  Plan provided by:  bigg    NEXT FOLLOW-UP DATE:  21    Ana  Cash CHAVARRIA   [facility] Services  [address] [address]   [email or web address]   Office : [phone]  Fax: [phone]

## 2021-04-08 NOTE — TELEPHONE ENCOUNTER
Anemia Management Note  SUBJECTIVE/OBJECTIVE:  Referred by Dr. Alvin Varner on 2020  Primary Diagnosis: Anemia in Chronic Kidney Disease (N18.4, D63.1)     Secondary Diagnosis:  Chronic Kidney Disease, Stage 4 (N18.4)   Kidney Tx: , , 2020  Hgb goal range:  9-10  Epo/Darbo: Procrit 10,000 units weekly for Hgb <10. At home.   Iron regimen:  TBD  Labs : 2021  Recent DEB use, transfusion, IV iron: unknown  RX/TX plans : TBD     No history of stroke, MI and blood clots. Hx of CML in 2017     *Currently has Home Care Mon/Thur for labs.      Labs: [facility]  Ph # [phone]  Fax # [phone]        **ONLY call Ed if his Hgb <10 and he needs to dose.         Contact:            Ok to leave message regarding Scheduling and Medical Information per consent to communicate dated 12/10/2017                              OK to speak with Danay Blanton (friend) and Kaylin Lilly (mother) regarding Scheduling and Medical Information per consent to communicate dated 12/10/2017    Anemia Latest Ref Rng & Units 3/9/2021 3/11/2021 3/16/2021 3/23/2021 3/30/2021 2021 2021   DEB Dose - - 10,000 units - - - - 10,000 units   Hemoglobin 13.3 - 17.7 g/dL 9.9(A) - 10.8(A) 11.1(A) 10.8(A) 9.8(A) -   TSAT 15 - 46 % - - - - - - -   Ferritin ng/mL - - - 3,179 - - -     BP Readings from Last 3 Encounters:   20 115/74   20 120/71   20 (!) 143/93     Wt Readings from Last 2 Encounters:   20 141 lb (64 kg)   20 134 lb 12.8 oz (61.1 kg)           ASSESSMENT:  Hgb:At goal - recommend dose  TSat: Due for labs Ferritin: Elevated (>1000ng/mL)    PLAN:  Dose with procrit and RTC for hgb then procrit if needed in 1 week(s)    Orders needed to be renewed (for next follow-up date) in LETTERS - for external labs: None    Iron labs due:  4 weeks    Plan discussed with:  CAM for Ed.   Plan provided by:  bigg    NEXT FOLLOW-UP DATE:  4/15/21    Ana Castrejon RN   Anemia  [address] [address]   [email or web address]   Office : [phone]  Fax: [phone]

## 2021-04-15 NOTE — TELEPHONE ENCOUNTER
Anemia Management Note  SUBJECTIVE/OBJECTIVE:  Referred by Dr. Alvin Varner on 2020  Primary Diagnosis: Anemia in Chronic Kidney Disease (N18.4, D63.1)     Secondary Diagnosis:  Chronic Kidney Disease, Stage 4 (N18.4)   Kidney Tx: , , 2020  Hgb goal range:  9-10  Epo/Darbo: Procrit 10,000 units weekly for Hgb <10. At home.   Iron regimen:  TBD  Labs : 2021  Recent DEB use, transfusion, IV iron: unknown  RX/TX plans : TBD     No history of stroke, MI and blood clots. Hx of CML in 2017     *Currently has Home Care Mon/Thur for labs.      Labs: [facility]  Ph # [phone]  Fax # [phone]        **ONLY call Ed if his Hgb <10 and he needs to dose.         Contact:            Ok to leave message regarding Scheduling and Medical Information per consent to communicate dated 12/10/2017                              OK to speak with Danay Blanton (friend) and Kaylin Lilly (mother) regarding Scheduling and Medical Information per consent to communicate dated 12/10/2017    Anemia Latest Ref Rng & Units 3/16/2021 3/23/2021 3/30/2021 2021 2021 2021 4/15/2021   DEB Dose - - - - - 10,000 units - 10,000 units   Hemoglobin 13.3 - 17.7 g/dL 10.8(A) 11.1(A) 10.8(A) 9.8(A) - 9.5(A) -   TSAT 15 - 46 % - - - - - - -   Ferritin ng/mL - 3,179 - - - - -     BP Readings from Last 3 Encounters:   20 115/74   20 120/71   20 (!) 143/93     Wt Readings from Last 2 Encounters:   20 141 lb (64 kg)   20 134 lb 12.8 oz (61.1 kg)         ASSESSMENT:  Hgb:At goal - recommend dose  TSat: Due for labs Ferritin: Elevated (>1000ng/mL)    PLAN:  Dose with procrit and RTC for hgb then procrit if needed in 1 week(s)    Orders needed to be renewed (for next follow-up date) in LETTERS - for external labs: None    Iron labs due:  TSAT is due    Plan discussed with:  CAM Daniels  Plan provided by:  bigg    NEXT FOLLOW-UP DATE:  21    Ana Castrejon RN    [address] [address]   [email or web address]   Office : [phone]  Fax: [phone]

## 2021-04-20 NOTE — TELEPHONE ENCOUNTER
Prior Authorization Approval    Authorization Effective Date: 1/20/2021  Authorization Expiration Date: 8/10/2021  Medication: procrit- pa approved  Approved Dose/Quantity: UD  Reference #:     Insurance Company: [employer] [phone] Fax [phone]  Expected CoPay:       CoPay Card Available:      Foundation Assistance Needed:    Which Pharmacy is filling the prescription (Not needed for infusion/clinic administered): [facility] - [place], [place]  [facility] [address]  Pharmacy Notified: Yes  Patient Notified: No

## 2021-04-20 NOTE — TELEPHONE ENCOUNTER
PA Initiation    Medication: procrit- pa pending  Insurance Company: [employer] - Phone [phone] Fax [phone]  Pharmacy Filling the Rx: [facility] - [place], [place] - [facility] [address]  Filling Pharmacy Phone: [phone]  Filling Pharmacy Fax: [phone]  Start Date: 4/20/2021

## 2021-04-21 NOTE — TELEPHONE ENCOUNTER
Anemia Management Note  SUBJECTIVE/OBJECTIVE:  Referred by Dr. Alvin Varner on 2020  Primary Diagnosis: Anemia in Chronic Kidney Disease (N18.4, D63.1)     Secondary Diagnosis:  Chronic Kidney Disease, Stage 4 (N18.4)   Kidney Tx: , , 2020  Hgb goal range:  9-10  Epo/Darbo: Procrit 10,000 units weekly for Hgb <10. At home.   Iron regimen:  TBD  Labs : 2021  Recent DEB use, transfusion, IV iron: unknown  RX/TX plans : TBD     No history of stroke, MI and blood clots. Hx of CML in 2017     *Currently has Home Care Mon/Thur for labs.      Labs: [facility]  Ph # [phone]  Fax # [phone]        **ONLY call Ed if his Hgb <10 and he needs to dose.         Contact:            Ok to leave message regarding Scheduling and Medical Information per consent to communicate dated 12/10/2017                              OK to speak with Danay Blanton (friend) and Kaylin Lilly (mother) regarding Scheduling and Medical Information per consent to communicate dated 12/10/2017    Anemia Latest Ref Rng & Units 3/23/2021 3/30/2021 2021 2021 2021 4/15/2021 2021   DEB Dose - - - - 10,000 units - 10,000 units -   Hemoglobin 13.3 - 17.7 g/dL 11.1(A) 10.8(A) 9.8(A) - 9.5(A) - 10.4(A)   TSAT 15 - 46 % - - - - - - -   Ferritin ng/mL 3,179 - - - - - 2,834     BP Readings from Last 3 Encounters:   20 115/74   20 120/71   20 (!) 143/93     Wt Readings from Last 2 Encounters:   20 141 lb (64 kg)   20 134 lb 12.8 oz (61.1 kg)           ASSESSMENT:  Hgb:Above goal - recommend hold dose  TSat: Due for labs Ferritin: Elevated (>1000ng/mL)    PLAN:  Hold Procrit and RTC for hgb then procrit if needed in 1-2* week(s)    Orders needed to be renewed (for next follow-up date) in Letters: None    Iron labs due:  TSAT is due. Ferritin is elevated.     Plan discussed with:  No call per pt request  Plan provided by:  ibgg    NEXT FOLLOW-UP DATE:   4/29/21    Ana Castrejon RN   [facility] Services  [address] [address]   [email or web address]   Office : [phone]  Fax: [phone]

## 2021-04-21 NOTE — PROCEDURES
Cruz, Edmakayla SANTANA  MRN: [id number]    Completed placement of 4 Serbian 32.5 cm single lumen, valved, peripherally inserted central catheter (PICC) via LEFT Medial Brachial vein. Tip lying in the left subclavian vein. Venography demonstrates tortous central venous anatomy.  Okay to use immediately. Dx: poor venous access. Ashley.  BAHMAN    
Yes

## 2021-04-29 NOTE — TELEPHONE ENCOUNTER
Anemia Management Note  SUBJECTIVE/OBJECTIVE:  Referred by Dr. Alvin Varner on 2020  Primary Diagnosis: Anemia in Chronic Kidney Disease (N18.4, D63.1)     Secondary Diagnosis:  Chronic Kidney Disease, Stage 4 (N18.4)   Kidney Tx: , , 2020  Hgb goal range:  9-10  Epo/Darbo: Procrit 10,000 units weekly for Hgb <10. At home.   Iron regimen:  TBD  Labs : 2021  Recent DEB use, transfusion, IV iron: unknown  RX/TX plans : TBD     No history of stroke, MI and blood clots. Hx of CML in 2017     *Currently has Home Care Mon/Thur for labs.      Labs: [facility]  Ph # [phone]  Fax # [phone]        **ONLY call Ed if his Hgb <10 and he needs to dose.         Contact:            Ok to leave message regarding Scheduling and Medical Information per consent to communicate dated 12/10/2017                              OK to speak with Danay Blanton (friend) and Kaylin Lilly (mother) regarding Scheduling and Medical Information per consent to communicate dated 12/10/2017       Anemia Latest Ref Rng & Units 3/30/2021 2021 2021 2021 4/15/2021 2021 2021   DEB Dose - - - 10,000 units - 10,000 units - -   Hemoglobin 13.3 - 17.7 g/dL 10.8(A) 9.8(A) - 9.5(A) - 10.4(A) 10.9(A)   TSAT 15 - 46 % - - - - - - -   Ferritin ng/mL - - - - - 2,834 -     BP Readings from Last 3 Encounters:   20 115/74   20 120/71   20 (!) 143/93     Wt Readings from Last 2 Encounters:   20 141 lb (64 kg)   20 134 lb 12.8 oz (61.1 kg)           ASSESSMENT:  Hgb:Above goal - recommend hold dose  TSat: Due for labs Ferritin: Elevated (>1000ng/mL)    PLAN:  Hold Procrit and RTC for hgb then procrit if needed in 1 week(s)    Orders needed to be renewed (for next follow-up date) in LETTERS - for external labs: None    Iron labs due:  2021    Plan discussed with:  No call per pt request  Plan provided by:  bigg    NEXT FOLLOW-UP DATE:  21    Ana  Cash CHAVARRIA   [facility] Services  [address] [address]   [email or web address]   Office : [phone]  Fax: [phone]

## 2021-05-06 NOTE — TELEPHONE ENCOUNTER
Anemia Management Note  SUBJECTIVE/OBJECTIVE:  Referred by Dr. Alvin Varner on 2020  Primary Diagnosis: Anemia in Chronic Kidney Disease (N18.4, D63.1)     Secondary Diagnosis:  Chronic Kidney Disease, Stage 4 (N18.4)   Kidney Tx: , , 2020  Hgb goal range:  9-10  Epo/Darbo: Procrit 10,000 units weekly for Hgb <10. At home.   Iron regimen:  TBD  Labs : 2021  Recent DEB use, transfusion, IV iron: unknown  RX/TX plans : TBD     No history of stroke, MI and blood clots. Hx of CML in 2017     *Currently has Home Care Mon/Thur for labs.      Labs: [facility]  Ph # [phone]  Fax # [phone]        **ONLY call Ed if his Hgb <10 and he needs to dose.         Contact:            Ok to leave message regarding Scheduling and Medical Information per consent to communicate dated 12/10/2017                              OK to speak with Danay Blanton (friend) and Kaylin Lilly (mother) regarding Scheduling and Medical Information per consent to communicate dated 12/10/2017    Anemia Latest Ref Rng & Units 2021 2021 2021 4/15/2021 2021 2021 2021   DEB Dose - - 10,000 units - 10,000 units - - -   Hemoglobin 13.3 - 17.7 g/dL 9.8(A) - 9.5(A) - 10.4(A) 10.9(A) 11.3(A)   TSAT 15 - 46 % - - - - - - -   Ferritin ng/mL - - - - 2,834 - -     BP Readings from Last 3 Encounters:   20 115/74   20 120/71   20 (!) 143/93     Wt Readings from Last 2 Encounters:   20 141 lb (64 kg)   20 134 lb 12.8 oz (61.1 kg)           ASSESSMENT:  Hgb:Above goal - recommend hold dose  TSat: Due for labs Ferritin: Elevated (>1000ng/mL)    PLAN:  Hold Procrit and RTC for hgb then procrit if needed in 1 week(s)    Orders needed to be renewed (for next follow-up date) in LETTERS - for external labs: None    Iron labs due:  2021    Plan discussed with:  No call, Lab review  Plan provided by:  bigg    NEXT FOLLOW-UP DATE:  21    Ana Castrejon  RN   [facility] Services  [address] [address]   [email or web address]   Office : [phone]  Fax: [phone]

## 2021-05-13 NOTE — TELEPHONE ENCOUNTER
Anemia Management Note  SUBJECTIVE/OBJECTIVE:  Referred by Dr. Alvin Varner on 2020  Primary Diagnosis: Anemia in Chronic Kidney Disease (N18.4, D63.1)     Secondary Diagnosis:  Chronic Kidney Disease, Stage 4 (N18.4)   Kidney Tx: , , 2020  Hgb goal range:  9-10  Epo/Darbo: Procrit 10,000 units weekly for Hgb <10. At home.   Iron regimen:  TBD  Labs : 2021  Recent DEB use, transfusion, IV iron: unknown  RX/TX plans : TBD     No history of stroke, MI and blood clots. Hx of CML in 2017     *Currently has Home Care Mon/Thur for labs.      Labs: [facility]  Ph # [phone]  Fax # [phone]        **ONLY call Ed if his Hgb <10 and he needs to dose.         Contact:            Ok to leave message regarding Scheduling and Medical Information per consent to communicate dated 12/10/2017                              OK to speak with Danay Blanton (friend) and Kaylin Lilly (mother) regarding Scheduling and Medical Information per consent to communicate dated 12/10/2017    Anemia Latest Ref Rng & Units 2021 2021 4/15/2021 2021 2021 2021 2021   DEB Dose - 10,000 units - 10,000 units - - - -   Hemoglobin 13.3 - 17.7 g/dL - 9.5(A) - 10.4(A) 10.9(A) 11.3(A) 11.2(A)   TSAT 15 - 46 % - - - - - - -   Ferritin ng/mL - - - 2,834 - - -     BP Readings from Last 3 Encounters:   20 115/74   20 120/71   20 (!) 143/93     Wt Readings from Last 2 Encounters:   20 141 lb (64 kg)   20 134 lb 12.8 oz (61.1 kg)           ASSESSMENT:  Hgb:Above goal - recommend hold dose  TSat: Due for labs Ferritin: Elevated (>1000ng/mL)    PLAN:  Hold Procrit and RTC for hgb then procrit if needed in 1 week(s)    Orders needed to be renewed (for next follow-up date) in Epic and Letters: None    Iron labs due:  TSAT is due. Ferritin is elevated.     Plan discussed with:  No call per pt request  Plan provided by:  bigg    NEXT FOLLOW-UP DATE:   5/20/21    Ana Castrejon RN   [facility]  [address] [address]   [email or web address]   Office : [phone]  Fax: [phone]

## 2021-05-20 NOTE — TELEPHONE ENCOUNTER
LVM for Ed, ok to have Hgb drawn every 2 weeks if he is feeling ok.     Ana Castrejon RN   Anemia Services  [address] [address]   [email or web address]   Office : [phone]  Fax: [phone]

## 2021-05-20 NOTE — TELEPHONE ENCOUNTER
Anemia Management Note  SUBJECTIVE/OBJECTIVE:  Referred by Dr. Alvin Varner on 2020  Primary Diagnosis: Anemia in Chronic Kidney Disease (N18.4, D63.1)     Secondary Diagnosis:  Chronic Kidney Disease, Stage 4 (N18.4)   Kidney Tx: , , 2020  Hgb goal range:  9-10  Epo/Darbo: Procrit 10,000 units weekly for Hgb <10. At home.   Iron regimen:  TBD  Labs : 2021  Recent DEB use, transfusion, IV iron: unknown  RX/TX plans : TBD     No history of stroke, MI and blood clots. Hx of CML in 2017     *Currently has Home Care Mon/Thur for labs.      Labs: [facility]  Ph # [phone]  Fax # [phone]        **ONLY call Ed if his Hgb <10 and he needs to dose.         Contact:            Ok to leave message regarding Scheduling and Medical Information per consent to communicate dated 12/10/2017                              OK to speak with Danay Blanton (friend) and Kaylin Lilly (mother) regarding Scheduling and Medical Information per consent to communicate dated 12/10/2017       Anemia Latest Ref Rng & Units 2021 4/15/2021 2021 2021 2021 2021 2021   DEB Dose - - 10,000 units - - - - -   Hemoglobin 13.3 - 17.7 g/dL 9.5(A) - 10.4(A) 10.9(A) 11.3(A) 11.2(A) 11.0(A)   TSAT 15 - 46 % - - - - - - -   Ferritin ng/mL - - 2,834 - - - 2,884     BP Readings from Last 3 Encounters:   20 115/74   20 120/71   20 (!) 143/93     Wt Readings from Last 2 Encounters:   20 141 lb (64 kg)   20 134 lb 12.8 oz (61.1 kg)           ASSESSMENT:  Hgb:Above goal - recommend hold dose  TSat: Due for labs Ferritin: Elevated (>1000ng/mL)    PLAN:  Hold Procrit and RTC for hgb then procrit if needed in 2 week(s)    Orders needed to be renewed (for next follow-up date) in LETTERS - for external labs: None    Iron labs due:  4 weeks    Plan discussed with:  Call Ed later today, decrease Hgb frequency  Plan provided by:  bigg    NEXT FOLLOW-UP DATE:   5/20/21    Ana Castrejon RN   [facility]  [address] [address]   [email or web address]   Office : [phone]  Fax: [phone]

## 2021-06-03 NOTE — TELEPHONE ENCOUNTER
Anemia Management Note  SUBJECTIVE/OBJECTIVE:  Referred by Dr. Alvin Varner on 2020  Primary Diagnosis: Anemia in Chronic Kidney Disease (N18.4, D63.1)     Secondary Diagnosis:  Chronic Kidney Disease, Stage 4 (N18.4)   Kidney Tx: , , 2020  Hgb goal range:  9-10  Epo/Darbo: Procrit 10,000 units weekly for Hgb <10. At home.   Iron regimen:  TBD  Labs : 2021  Recent DEB use, transfusion, IV iron: unknown  RX/TX plans : TBD     No history of stroke, MI and blood clots. Hx of CML in 2017     *Currently has Home Care Mon/Thur for labs.      Labs: [facility]  Ph # [phone]  Fax # [phone]        **ONLY call Ed if his Hgb <10 and he needs to dose.         Contact:            Ok to leave message regarding Scheduling and Medical Information per consent to communicate dated 12/10/2017                              OK to speak with Danay Blanton (friend) and Kaylin Lilly (mother) regarding Scheduling and Medical Information per consent to communicate dated 12/10/2017    Anemia Latest Ref Rng & Units 4/15/2021 2021 2021 2021 2021 2021 2021   DEB Dose - 10,000 units - - - - - -   Hemoglobin 13.3 - 17.7 g/dL - 10.4(A) 10.9(A) 11.3(A) 11.2(A) 11.0(A) 11.4(A)   TSAT 15 - 46 % - - - - - - -   Ferritin ng/mL - 2,834 - - - 2,884 -     BP Readings from Last 3 Encounters:   20 115/74   20 120/71   20 (!) 143/93     Wt Readings from Last 2 Encounters:   20 141 lb (64 kg)   20 134 lb 12.8 oz (61.1 kg)           ASSESSMENT:  Hgb:at goal - continue to monitor  TSat: Due for labs Ferritin: Elevated (>1000ng/mL)    PLAN:  Hold Procrit and RTC for hgb then procrit if needed in 1-2 week(s)    Orders needed to be renewed (for next follow-up date) in EPIC: None    Iron labs due:  4 weeks    Plan discussed with:  No call per pt request  Plan provided by:  bigg    NEXT FOLLOW-UP DATE:  21    Ana Castrejon RN   Anemia  [address] [address]   [email or web address]   Office : [phone]  Fax: [phone]

## 2021-06-03 NOTE — BRIEF OP NOTE
[facility] [place]    Brief Operative Note    Pre-operative diagnosis: End-stage renal disease (ESRD) (H) [N18.6]  Post-operative diagnosis Same as pre-operative diagnosis    Procedure: Procedure(s):  TRANSPLANT, KIDNEY, RECIPIENT,  DONOR, with ureteral stent placement  Surgeon: Surgeon(s) and Role:     * Carol Samuel MD - Primary     * Herman Vickers MD - Fellow - Assisting  Anesthesia: General   Estimated blood loss: 150mL  Drains: Colin-Laboy  Specimens: * No specimens in log *  Findings:   Left kidney, single vessels. Placed on right external iliac vein (proximal) and common iliac artery. Native artery minimally diseased. Scant urine production immediately in OR. Bladder very small and thin-walled. URETERAL STENT PLACED.  Complications: None.  Implants:   Implant Name Type Inv. Item Serial No.  Lot No. LRB No. Used   STENT URETERAL DBL PIGTAIL INLAY [id number] Stent STENT URETERAL DBL PIGTAIL INLAY [id number]  [employer] [id number] N/A 1            Opt out

## 2021-06-16 NOTE — TELEPHONE ENCOUNTER
Anemia Management Note  SUBJECTIVE/OBJECTIVE:  Referred by Dr. Alivn Varner on 2020  Primary Diagnosis: Anemia in Chronic Kidney Disease (N18.4, D63.1)     Secondary Diagnosis:  Chronic Kidney Disease, Stage 4 (N18.4)   Kidney Tx: , , 2020  Hgb goal range:  9-10  Epo/Darbo: Procrit 10,000 units weekly for Hgb <10. At home.   Iron regimen:  TBD  Labs : 2021  Recent DEB use, transfusion, IV iron: unknown  RX/TX plans : TBD     No history of stroke, MI and blood clots. Hx of CML in 2017     *Currently has Home Care Mon/Thur for labs.      Labs: [facility]  Ph # [phone]  Fax # [phone]        **ONLY call Ed if his Hgb <10 and he needs to dose.         Contact:            Ok to leave message regarding Scheduling and Medical Information per consent to communicate dated 12/10/2017                              OK to speak with Danay Blanton (friend) and Kaylin Kolbenger (mother) regarding Scheduling and Medical Information per consent to communicate dated 12/10/2017    Anemia Latest Ref Rng & Units 2021 2021 2021 2021 2021 2021 6/15/2021   DEB Dose - - - - - - - -   Hemoglobin 13.3 - 17.7 g/dL 10.4(A) 10.9(A) 11.3(A) 11.2(A) 11.0(A) 11.4(A) 10.1(A)   TSAT 15 - 46 % - - - - - - -   Ferritin ng/mL 2,834 - - - 2,884 - -     BP Readings from Last 3 Encounters:   20 115/74   20 120/71   20 (!) 143/93     Wt Readings from Last 2 Encounters:   20 141 lb (64 kg)   20 134 lb 12.8 oz (61.1 kg)           ASSESSMENT:  Hgb:Above goal - recommend hold dose  TSat: Due for labs Ferritin: Elevated (>1000ng/mL)    PLAN:  Hold Procrit and RTC for hgb then procrit if needed in 1 week(s)    Orders needed to be renewed (for next follow-up date) in Letters: None    Iron labs due:  TSAT is due.  Ferrtin is elevated.     Plan discussed with:  No call per pt request  Plan provided by:  bigg    NEXT FOLLOW-UP DATE:  21    Ana  Cash CHAVARRIA   [facility]  [address] [address]   [email or web address]   Office : [phone]  Fax: [phone]

## 2021-06-30 NOTE — TELEPHONE ENCOUNTER
Anemia Management Note  SUBJECTIVE/OBJECTIVE:  Referred by Dr. Alvin Varner on 2020  Primary Diagnosis: Anemia in Chronic Kidney Disease (N18.4, D63.1)     Secondary Diagnosis:  Chronic Kidney Disease, Stage 4 (N18.4)   Kidney Tx: , , 2020  Hgb goal range:  9-10  Epo/Darbo: Procrit 10,000 units weekly for Hgb <10. At home.   Iron regimen:  TBD  Labs : 2022  Recent DEB use, transfusion, IV iron: unknown  RX/TX plans : 2022     No history of stroke, MI and blood clots. Hx of CML in 2017     *Currently has Home Care Mon/Thur for labs.      Labs: [facility]  Ph # [phone]  Fax # [phone]        **ONLY call Ed if his Hgb <10 and he needs to dose.         Contact:            Ok to leave message regarding Scheduling and Medical Information per consent to communicate dated 12/10/2017                              OK to speak with Danay Blanton (friend) and Kaylin Lilly (mother) regarding Scheduling and Medical Information per consent to communicate dated 12/10/2017    Anemia Latest Ref Rng & Units 2021 2021 2021 2021 2021 6/15/2021 2021   DEB Dose - - - - - - - -   Hemoglobin 13.3 - 17.7 g/dL 10.9(A) 11.3(A) 11.2(A) 11.0(A) 11.4(A) 10.1(A) 10.1(A)   TSAT 15 - 46 % - - - - - - -   Ferritin ng/mL - - - 2,884 - - -     BP Readings from Last 3 Encounters:   20 115/74   20 120/71   20 (!) 143/93     Wt Readings from Last 2 Encounters:   20 141 lb (64 kg)   20 134 lb 12.8 oz (61.1 kg)           ASSESSMENT:  Hgb:Above goal - recommend hold dose  TSat: Due for labs Ferritin: Elevated (>1000ng/mL)    PLAN:  Hold Procrit and RTC for hgb then procrit if needed in 2 week(s)    Orders needed to be renewed (for next follow-up date) in Letters: None    Iron labs due:  12 weeks    Plan discussed with:  No call per pt request  Plan provided by:  bigg    NEXT FOLLOW-UP DATE:  21    Ana Castrejon RN   Anemia  [address] [address]   [email or web address]   Office : [phone]  Fax: [phone]

## 2021-07-14 NOTE — TELEPHONE ENCOUNTER
Anemia Management Note  SUBJECTIVE/OBJECTIVE:  Referred by Dr. Alvin Varner on 2020  Primary Diagnosis: Anemia in Chronic Kidney Disease (N18.4, D63.1)     Secondary Diagnosis:  Chronic Kidney Disease, Stage 4 (N18.4)   Kidney Tx: , , 2020  Hgb goal range:  9-10  Epo/Darbo: Procrit 10,000 units weekly for Hgb <10. At home.   Iron regimen:  TBD  Labs : 2022  Recent DEB use, transfusion, IV iron: unknown  RX/TX plans : 2022     No history of stroke, MI and blood clots. Hx of CML in 2017     *Currently has Home Care Mon/Thur for labs.      Labs: [facility]  Ph # [phone]  Fax # [phone]        **ONLY call Ed if his Hgb <10 and he needs to dose.         Contact:            Ok to leave message regarding Scheduling and Medical Information per consent to communicate dated 12/10/2017                              OK to speak with Danay Blanton (friend) and Kaylin Carr (mother) regarding Scheduling and Medical Information per consent to communicate dated 12/10/2017    Anemia Latest Ref Rng & Units 2021 2021 2021 2021 2021 6/15/2021 2021   DEB Dose - - - - - - - -   Hemoglobin 13.3 - 17.7 g/dL 10.9(A) 11.3(A) 11.2(A) 11.0(A) 11.4(A) 10.1(A) 10.1(A)   TSAT 15 - 46 % - - - - - - -   Ferritin ng/mL - - - 2,884 - - -     BP Readings from Last 3 Encounters:   20 115/74   20 120/71   20 (!) 143/93     Wt Readings from Last 2 Encounters:   20 141 lb (64 kg)   20 134 lb 12.8 oz (61.1 kg)       Hgb dropped to 9.6 on 21. LVM for Ed that he needs to dose his Procrit.             NEXT FOLLOW-UP DATE:  21    Ana Castrejon RN   [facility]  [address] [address]   [email or web address]   Office : [phone]  Fax: [phone]

## 2021-07-14 NOTE — TELEPHONE ENCOUNTER
Patient Call: Voicemail  Date/Time: 7/13/21 @ 10:26am  Reason for call: Erin left a message that she would like a call back regarding Belatacept orders. She needs updated orders for the infusion. She is also wanting to talk about the PA.

## 2021-07-14 NOTE — LETTER
PHYSICIAN ORDERS      DATE & TIME ISSUED: 2021 10:19 AM  PATIENT NAME: Jeremiah Cruz     : 1975    [facility] MR# [if applicable]: [id number]     DIAGNOSIS:    kidney transplant   ICD-10 CODE: z94.0           Give  belatacept once every 30 days (+/- 3 days)   belatacept (NULOJIX) 300 mg in sodium chloride 0.9 % 112 mL infusion  300 mg (rounded from 305.5 mg = 5 mg/kg × 61.1 kg), Intravenous, Administer over 30 Minutes      PAT 00 11 36 38 20 70    Any questions please call: [phone]          .

## 2021-07-14 NOTE — LETTER
OUTPATIENT LABORATORY TEST ORDER             Patient Name: Jeremiah Cruz  Transplant Date: 2/28/2020   YOB: 1975                                   Issue Date & Time:7/19/2021  [facility] MR: [id number] Exp. Date (1 year after date issued)      Diagnoses: Kidney Transplant (ICD-10  Z94.0)   Long term use of medications (ICD-10  Z79.899)     Lab results to be available on the same day drawn.   Patient should release information to the [facility] Transplant Center.  Please fax to the Transplant Center at [phone].    Monthly   ?Hemogram and Platelet  ?Basic Metabolic Panel (Sodium, Potassium, Chloride, CO2, Creatinine, Urea Nitrogen,     Glucose,   Calcium)        ?BK (Polyoma Virus) PCR Quantitative - Plasma                   Every 6 Months                  ?Urine for protein/creatinine    Yearly       ?PRA/DSA level (mailers provided by the patient)     If you have any questions, please call The Transplant Center at [phone] or [phone].    Please fax labs to [phone]  .

## 2021-07-15 NOTE — TELEPHONE ENCOUNTER
Ed returned call.  He dosed on 7/14/21. Will follow up in 1 week.     Ana Castrejon RN   [facility]  [address] [address]   [email or web address]   Office : [phone]  Fax: [phone]

## 2021-07-19 NOTE — TELEPHONE ENCOUNTER
Returned phone call to [facility], spoke with Vanessa. Belatacept orders  end of July and they will need new infusion and lab orders as well as PA. Last infusion on , next one scheduled for 8/10.     Infusion and lab orders faxed to #[phone].

## 2021-07-20 NOTE — TELEPHONE ENCOUNTER
PA Initiation    Medication: belatacept   Insurance Company: [employer] Part D - Phone [phone] Fax [phone]  Pharmacy Filling the Rx:    Filling Pharmacy Phone:    Filling Pharmacy Fax:    Start Date: 7/20/2021    Central Prior Authorization Team   Phone: [phone]      Faxed form and chart notes to [employer] fax# [phone]

## 2021-07-20 NOTE — TELEPHONE ENCOUNTER
Prior Authorization Approval    Authorization Effective Date: 4/21/2021  Authorization Expiration Date: 7/20/2022  Medication: belatacept   Approved Dose/Quantity:   Reference #:     Insurance Company: [employer] Part D - Phone [phone] Fax [phone]  Which Pharmacy is filling the prescription (Not needed for infusion/clinic administered):    Pharmacy Notified: Yes- sent approval letter to infusion center fax# [phone]

## 2021-07-28 NOTE — TELEPHONE ENCOUNTER
Anemia Management Note  SUBJECTIVE/OBJECTIVE:  Referred by Dr. Alvin Varner on 2020  Primary Diagnosis: Anemia in Chronic Kidney Disease (N18.4, D63.1)     Secondary Diagnosis:  Chronic Kidney Disease, Stage 4 (N18.4)   Kidney Tx: , , 2020  Hgb goal range:  9-10  Epo/Darbo: Procrit 10,000 units weekly for Hgb <10. At home.   Iron regimen:  TBD  Labs : 2022  Recent DEB use, transfusion, IV iron: unknown  RX/TX plans : 2022     No history of stroke, MI and blood clots. Hx of CML in 2017        Labs: [facility]  Ph # [phone]  Fax # [phone]        **ONLY call Ed if his Hgb <10 and he needs to dose.         Contact:            Ok to leave message regarding Scheduling and Medical Information per consent to communicate dated 12/10/2017                              OK to speak with Danay Blanton (friend) and Kaylin Lilly (mother) regarding Scheduling and Medical Information per consent to communicate dated 12/10/2017    Anemia 2021 3/4/2021 3/11/2021 2021 4/15/2021 2021 2021   DEB Dose 10,000 units 10,000 units 10,000 units 10,000 units 10,000 units 10,000 units 10,000 units     BP Readings from Last 3 Encounters:   20 115/74   20 120/71   20 (!) 143/93     Wt Readings from Last 2 Encounters:   20 141 lb (64 kg)   20 134 lb 12.8 oz (61.1 kg)           ASSESSMENT:  Hgb:At goal - recommend dose   TSat: Due for labs Ferritin: Elevated (>1000ng/mL)    PLAN:  Hgb today was 9.7, Dose with procrit and RTC for hgb then procrit if needed in 1-2 week(s)    Orders needed to be renewed (for next follow-up date) in LETTERS - for external labs: None        Plan discussed with:  LVM for Ed  Plan provided by:  bigg    NEXT FOLLOW-UP DATE:  21    Ana Castrejon RN   Anemia Services  [address] [address]   [email or web address]   Office : [phone]  Fax: [phone]

## 2021-08-06 PROBLEM — J96.90 RESPIRATORY FAILURE (H): Status: ACTIVE | Noted: 2021-01-01

## 2021-08-06 NOTE — PROGRESS NOTES
"Bronchoscopy Risk Assessment Guidelines      A. Patient symptoms to consider when assessing pulmonary TB risk are:    I. Cough greater than 3 weeks; and fever, hemoptysis, pleuritic chest    pain, weight loss greater than 10 lbs, night sweats, fatigue, infiltrates on    upper lobes or superior segments of lower lobes, cavitation on chest    x-ray.   B. Patient risk factors to consider when assessing pulmonary TB risk are:    I. Exposure to known TB case, foreign-born persons (within 5 years of    arrival to [place]), residence in a crowded setting (correctional facility,     long-term care center, etc.), persons with HIV or immunosuppression.    Patients with symptoms and risk factors should generally be considered \"suspect risk\" and bronchoscopies should be performed in airborne precautions.    This patient has NO KNOWN RISK of Tuberculosis (proceed with bronchoscopy)    Specimens sent: yes  Complications: None  Scope used: #[id number] Adult  Attending Physician: Dr. Magdy Mendoza, RT on 8/6/2021 at 4:41 PM  "

## 2021-08-06 NOTE — PROGRESS NOTES
Update: Recommended IVF bolus and isotonic bicarb drip 150 meq at 150 ml/hr for total 1L monitor UOP, keep strict I/O's , repeat BMP in few hours. Consent for dialysis obtained from pt's mother Kaylin Crockett. Pt's nurse witnessed the mother's consent. Consent  placed in his chart. His is on 2 pressors. He will need short term dialysis catheter if dialysis is needed/hyperkalemia doesn't improve.     Josee Viveros MD on 8/6/2021 at 4:40 PM      Discussed with Dr. Viveros and agree with the plan    Joe Don MD, FAUSTO  Transplant Nephrology  Pager: [phone]

## 2021-08-06 NOTE — PROGRESS NOTES
"CLINICAL NUTRITION SERVICES - ASSESSMENT NOTE     Nutrition Prescription    Malnutrition Status:    Unable to determine due to unable to complete all parameters at this time - COVID precautions (suspected COVID, test pending)    Recommendations already ordered by Registered Dietitian (RD):  1.Ordered BMP, Mg++, Phos labs for baseline     2. Via OGT, rec:   Osmolite 1.5 Jd @ goal of  50ml/hr  (1200ml/day) + 1 pkts ProSource TID (3 pkts total) will provide: 1920 kcals (29 kcal/kg), 108 g PRO (1.6 g pro/kg), 914 ml free H20, 244 g CHO, and 0 g fiber daily.  - Initiate @ 10 mL/hr and advance by 10 mL q8hr as tolerated  - Do not start or advance unless K+ >/= 3.4 and phos >1.9   - 30 mL q4hr fluid flushes for tube patency. Additional fluids and/or adjustments per MD.    - Multivitamin/mineral (15 mL/day via FT) to help ensure micronutrient needs being met with suspected hypermetabolic demands and potential interruptions to TF infusions.  - Gastric access: HOB >30 degrees    Future/Additional Recommendations:  Monitor tolerance and lytes with advancement to goal TF rate.     REASON FOR ASSESSMENT  Jeremiah Cruz is a/an 46 year old male assessed by the dietitian for Provider Order - Registered Dietitian to Assess and Order TF per Medical Nutrition Therapy Protocol    NUTRITION HISTORY  Unable to visit today d/t COVID-19 precautions, intubated, sedated. Seen by colleagues in January and February in 2020 for post-kidney transplant diet education and ways to improve PO with poor appetite/bloating/nausea around the time of his transplant. No food allergies.    CURRENT NUTRITION ORDERS  Diet: NPO    LABS  Labs reviewed - no BMP this admit    MEDICATIONS  Medications reviewed  -Norepi gtt    ANTHROPOMETRICS  Height:   Ht Readings from Last 1 Encounters:   04/03/20 1.626 m (5' 4.02\")   Most Recent Weight: 66.4 kg (146 lb 6.2 oz)    IBW: 59.1 kg (112% IBW)  BMI: 25.11 kg/m2; Overweight BMI 25-29.9  Weight History: Wt similar " to wt a year ago - no recent wt hx in Care Everywhere either.   Wt Readings from Last 20 Encounters:   08/06/21 66.4 kg (146 lb 6.2 oz)   08/28/20 64 kg (141 lb)   06/29/20 61.1 kg (134 lb 12.8 oz)   04/28/20 62 kg (136 lb 9.6 oz)   04/03/20 61.2 kg (135 lb)   04/03/20 61.2 kg (134 lb 14.7 oz)   03/31/20 62.1 kg (137 lb)   03/17/20 65.1 kg (143 lb 9.6 oz)   03/16/20 65.1 kg (143 lb 8 oz)   03/14/20 66.9 kg (147 lb 6.4 oz)   03/13/20 67.3 kg (148 lb 6.4 oz)   03/12/20 65.8 kg (145 lb)   03/09/20 64.7 kg (142 lb 9.6 oz)   03/19/19 70.9 kg (156 lb 4.8 oz)   03/18/19 70.4 kg (155 lb 1.6 oz)   03/14/17 68.1 kg (150 lb 1.6 oz)   02/02/17 68.5 kg (151 lb 1.6 oz)   01/26/17 68.3 kg (150 lb 8 oz)   02/24/15 72.1 kg (158 lb 14.4 oz)   02/24/15 71.8 kg (158 lb 6.4 oz)     Dosing Weight: 66 kg (actual, based on admit wt of 66.4 kg on 8/6)    ASSESSED NUTRITION NEEDS  Estimated Energy Needs: 0465-6921+ kcals/day (25 - 30+ kcals/kg)  Justification: Maintenance to increased needs if COVID+  Estimated Protein Needs: 79-99+ grams protein/day (1.2 - 1.5+ grams of pro/kg)  Justification: Hypercatabolism with critical illness  Estimated Fluid Needs: 1 mL/kcal  Justification: Maintenance or Per provider pending fluid status    PHYSICAL FINDINGS  See malnutrition section below.   -Intubated  -OGT (AXR)    MALNUTRITION  % Intake: Unable to assess  % Weight Loss: None noted per limited wt hx  Subcutaneous Fat Loss: Unable to assess - COVID precautions (suspected COVID, test pending)  Muscle Loss: Unable to assess - COVID precautions (suspected COVID, test pending)  Fluid Accumulation/Edema: Does not meet criteria (1+ trace L hand edema per RN flowsheets)  Malnutrition Diagnosis: Unable to determine due to unable to complete all parameters at this time - COVID precautions (suspected COVID, test pending)    NUTRITION DIAGNOSIS  Inadequate protein-energy intake related to NPO status in setting of intubation as evidenced by pt currently  meeting 0% minimum assessed needs (not accounting for kcal from lipid/dextrose-containing medications).    INTERVENTIONS  Implementation  -Nutrition Education: Not appropriate at this time due to patient condition   -Collaboration with other providers  -Enteral Nutrition - Initiate  -Feeding tube flush  -Multivitamin/mineral supplement therapy     Goals  Total avg nutritional intake to meet a minimum of 25 kcal/kg and 1.2 g PRO/kg daily (per dosing wt 66 kg).     Monitoring/Evaluation  Progress toward goals will be monitored and evaluated per protocol.    Bina Solitario RD, LD  Pager: [phone]

## 2021-08-06 NOTE — H&P
MEDICAL ICU H&P  08/06/2021    Date of Hospital Admission: 8/5/2021  Date of ICU Admission: 8/5/2021  Reason for Critical Care Admission: Acute hypoxemic respiratory failiure  Date of Service (when I saw the patient): 08/06/2021    ASSESSMENT: Jeremiah Cruz is a 46 year old male with PMH significant for Alport's Syndrome complicated by ESRD s/p kidney transplant (02/2020,2005, 1988), thrombocytopenia, anemia of chronic disease, secondary renal hyperparathyroidism, HTN, CML (in remission since 2016) and GERD who presented to OSH with 2 week history of dry cough and dyspnea and developed acute hypoxemic respiratory failure likely 2/2 to sepsis requiring intubation and transfer to [facility] MICU. Requiring three pressors at this time to maintain MAPs.     PLAN:  Neuro:  # Pain and sedation  Patient is on Versed and fentanyl for sedation. In setting of patient's hypotension, switched propofol to versed.   - Propofol discontinued. Begin Versed.   - Fentanyl, wean as tolerated  - RASS goal -2 to -3    Pulmonary:  # Acute Hypoxemic Respiratory Failure  # ARDS  # Pneumonia  Presented to OSH afebrile and tachypneic with 2 week history of dry cough and dyspnea. He was hypoxemic at OSH requiring intubation. CXR at OSH significant for bilateral white out and CT scan significant for similar findings. C/f pneumonia in setting of immunocompromised patient. Have to consider bacterial, fungal, viral and atypicals. Further differential includes: ARDS, CHF, dasatinib-induced pneumonitis, alveolar rupture, silicosis, sarcoidosis and beryllium. Covid is negative.   - See ID section   - Continue vancomycin   - Continue meropenem  - Begin micafungin   - Continue PTA dapsone     Ventilation Mode: CMV/AC  (Continuous Mandatory Ventilation/ Assist Control)  FiO2 (%): 50 %  Rate Set (breaths/minute): 14 breaths/min  Tidal Volume Set (mL): 500 mL  PEEP (cm H2O): 10 cmH2O  Oxygen Concentration (%): 80 %  Resp:  20      Cardiovascular:  #Hypotension  #Shock    #H/O HTN  Patient requiring Levophed and vasopressin to maintain MAPs. Patient's hypotension is c/f shock 2/2 sepsis versus hypovolemia versus cardiogenic. Patient did appear hypovolemic on POCUS. Lactate elevated at 2.1. Concerned since patient is on chronic steroids at home for immunosuppression that adrenal insufficiency may be contributing to his hypotension. Will begin high dose stress corticosteroids.      -Levophed gtt as needed   -Vaso gtt as needed  -Epi gtt as needed    -Hydrocortisone 100 mg Q8H   -Fluids as needed   -Hold PTA amlodipine   -Hold PTA carvedilol     #H/O CHF  Concerned that patient's bilateral white out on CXR may be due to CHF, however, no bilateral LE edema or hepatomegaly appreciated on exam. Will obtain Echo to assess cardiac function.   - Echo TTE    GI/Nutrition:  # GERD  -Begin prophylactic Protonix     #Nutrition  -Tube feeds per OG tube   -Goal rate of 50 ml/hr     Renal/Fluids/Electrolytes:  # Alport's Syndrome  # ESRD s/p renal transplant (02/2020, 2005, 1988)   Patient's latest renal transplant in 02/2020. Previous renal transplants in 1988 which failed and 2005 which was nonfunctional.   - Consulted renal transplant team. Appreciate ongoing recs.  - Hydrocortisone 100 mg Q8H   - Reduce PTA mycophenolate acid to 360 mg BID or oral suspension 500 mg BID  - Belatacept 5 mg/kg Q4 weeks (next dose scheduled on 8/10/21)   - 3 amps bicarb followed by Isotonic bicarb drip 150 meq at 150 ml/hr  - Renal U/S     # Acute Kidney Injury  Creatinine is elevated at 2.89. Baseline creatinine appears 1.8-2.1 on chart review. Likely etiologies include prerenal azotemia versus ATN in setting of possible shock. Will begin bolus of LR. Per renal transplant team, no acute indication for renal replacement therapy at this time.  -Fluid replacement, 2 L LR    -Repeat CMP    # Hyperphosphatemia   # Hyperkalemia   Likely 2/2 to patient's VASQUEZ. EKG shows no  concerning findings such as peaked T waves or Torsades. Will give fluids and see if electrolytes normalize.    -Repeat CMP     Endocrine:  #H/O Hyperparathyroidism 2/2 ESRD  Not an active problem     ID:  #Cough   # Dyspnea   # Atypical pneumonia   Presented to OSH afebrile and tachypneic with 2 week history of dry cough and dyspnea. He was hypoxemic at OSH requiring intubation. CXR at OSH significant for bilateral white out and CT scan significant for similar findings. C/f pneumonia in setting of immunocompromised patient. Have to consider bacterial, fungal, viral such as COVID, CMV and atypicals such as pneumocystis histoplasmosis, blastomyces, coccidioides and legionella as potential etiologies. Further differential includes: CHF, dasatinib-induced pneumonitis, alveolar rupture, silicosis, sarcoidosis and beryllium. Covid is negative.   - Continue vancomycin   - Continue meropenem  - Begin micafungin   - Continue PTA dapsone   - Bronchoscopy  - Cultures pending   - CMV pending   - Procalcitonin and CBC pending   - LDH pending     Hematology:    # H/O Anemia in Chronic Renal Disease   # H/O Thrombocytopenia  Patient has history of anemia in chronic renal disease. His latest hemoglobin is 7.5. Platelets are WNL.Will continue to monitor.    -Repeat CBC   -PTA Procrit injection for Hgb < 10    # H/O CML  In remission since 2016. PTA dasatinib. Not an active problem.   -Hold dasatinib for now as this can cause pneumonitis     Musculoskeletal:  No active problems     Skin:  No active problems    General Cares/Prophylaxis:    DVT Prophylaxis: Heparin SQ  GI Prophylaxis: PPI  Restraints: None needed   Family Communication: Patient's mother and daughter, Dorina.   Code Status: Full code      Lines/tubes/drains:  -OG   -Stein   -Femoral A-line   -CVC femoral   -Peripheral IV     Disposition:  - Medical ICU     Patient seen and findings/plan discussed with medical ICU staff, Dr. Curran.    Mindi Mason, MS4      Resident/Fellow Attestation   I, Annette Rodriguez, was present with the medical/DARRON student who participated in the service and in the documentation of the note.  I have verified the history and personally performed the physical exam and medical decision making.  I agree with the assessment and plan of care as documented in the note and edited where appropriate.    Annette Rodriguez, DO  PGY2  Date of Service (when I saw the patient): 08/06/21      Attending note:  I was present with the medical/DARRON student who participated in the service and in the documentation of the note.  I have verified the history and personally performed the physical exam and medical decision making.  I agree with the assessment and plan of care as documented in the note and edited where appropriate. Patient seen, examined and discussed with the Resident physicians.  All data reviewed including vital signs, medications, laboratory studies and imaging.  Agree with the assessment and plan as outlined in the above note. The patient remains critically ill with acute respiratory failure, shock, acute kidney injury, immunocompromised.  I personally adjusted the ventilator to treat the acute respiratory failure and titrated the vasopressors to manage the septic shock.  Past renal transplant, immunocompromised, presented with acute respiratory failure with dense left sided infiltrate concerning for typical or atypical infection, IIP not excluded nor alveolar hemorrhage.  On broad spectrum antibiotics; will proceed with bronchoscopy to assist with diagnosis.  Needs ongoing fluid resuscitation, stress dose steroids.     Total Critical Care time excluding time for procedures and teaching was 40 minutes.     Joann Curran MD  [phone]  -----------------------------------------------------------------------    HISTORY PRESENTING ILLNESS:    Patient presented to OS complaining of 2 weeks of cough and dyspnea. He was treated for sinus infection in early  July with azithromycin and reported improvement in his symptoms then, but had recurrence of respiratory symptoms starting July 21. He was hypoxic on presentation at OSH ED with sats in 82-85% on room air. He initially required 4L NC which escalated to 6L face mask and was electively intubated. CXR showed bilateral white out. D-dimer was elevated but CT chest to rule out PE was not done due to concern about renal function. Blood cultures and legionella collected at OSH. He was given 1 dose of levofloxacin and then switched to vancomycin and meropenem. Patient does have allergies to penicillins and cephalosporins. He is a previous smoker.       REVIEW OF SYSTEMS: Complete ROS unremarkable except for those noted in the HPI.       PAST MEDICAL HISTORY:   Past Medical History:   Diagnosis Date     Alport's syndrome      Anemia in chronic kidney disease      CML (chronic myelocytic leukemia) (H) 2015    In remission since 2016. Managed at [facility].     Dialysis care      End stage kidney disease (H)      Former tobacco use      GERD (gastroesophageal reflux disease)      Hypertension      Kidney transplant rejection 1999     Kidney transplanted 2005    Primary graft non-function d/t acute AMR     Kidney transplanted 1988    Failed in 1999     Kidney transplanted 02/28/2020    Induction with thymoglobulin 6.2mg/kg.     S/p nephrectomy 12/7/11     Secondary renal hyperparathyroidism (H)      Thrombocytopenia (H)      Tobacco dependence      SURGICAL HISTORY:  Past Surgical History:   Procedure Laterality Date     APPENDECTOMY       CREATE FISTULA ARTERIOVENOUS UPPER EXTREMITY       HERNIA REPAIR, INCISIONAL       INSERT CATHETER PERITONEAL DIALYSIS       IR PICC PLACEMENT > 5 YRS OF AGE  3/5/2020     IR RENAL BIOPSY RIGHT  4/3/2020     NEPHRECTOMY Bilateral     native     NEPHRECTOMY      transplant     NEPHRECTOMY  2005    transplant     PARATHYROIDECTOMY       REMOVE CATHETER PERITONEAL       svc  angioplasty       TRANSPLANT KIDNEY RECIPIENT  DONOR N/A 2020    Procedure: TRANSPLANT, KIDNEY, RECIPIENT,  DONOR, with ureteral stent placement;  Surgeon: Carol Samuel MD;  Location:  OR     SOCIAL HISTORY:  Social History     Socioeconomic History     Marital status: Single     Spouse name: Not on file     Number of children: Not on file     Years of education: Not on file     Highest education level: Not on file   Occupational History     Not on file   Tobacco Use     Smoking status: Former Smoker     Packs/day: 1.00     Years: 12.00     Pack years: 12.00     Types: Cigarettes     Quit date: 2011     Years since quitting: 10.4     Smokeless tobacco: Never Used   Substance and Sexual Activity     Alcohol use: No     Alcohol/week: 0.0 standard drinks     Drug use: No     Sexual activity: Not on file   Other Topics Concern     Parent/sibling w/ CABG, MI or angioplasty before 65F 55M? Not Asked   Social History Narrative     Not on file     Social Determinants of Health     Financial Resource Strain:      Difficulty of Paying Living Expenses:    Food Insecurity:      Worried About Running Out of Food in the Last Year:      Ran Out of Food in the Last Year:    Transportation Needs:      Lack of Transportation (Medical):      Lack of Transportation (Non-Medical):    Physical Activity:      Days of Exercise per Week:      Minutes of Exercise per Session:    Stress:      Feeling of Stress :    Social Connections:      Frequency of Communication with Friends and Family:      Frequency of Social Gatherings with Friends and Family:      Attends Restoration Services:      Active Member of Clubs or Organizations:      Attends Club or Organization Meetings:      Marital Status:    Intimate Partner Violence:      Fear of Current or Ex-Partner:      Emotionally Abused:      Physically Abused:      Sexually Abused:      FAMILY HISTORY:   Family History   Problem Relation Age of Onset     Bone  Cancer Brother      Melanoma No family hx of      Skin Cancer No family hx of      ALLERGIES:   Allergies   Allergen Reactions     Blood Transfusion Related (Informational Only) Other (See Comments)     Patient has a history of a clinically significant antibody against RBC antigens.  A delay in compatible RBCs may occur.     Cephalosporins Other (See Comments) and Rash     fever     Compazine [Prochlorperazine]      Gammagard [Immune Globulin] Other (See Comments)     Ed got spinal meningitis and MD stated to never get again for fear of death.       Penicillins      Vancomycin      Per care everywhere patient has Red Man's syndrome     MEDICATIONS:  No current facility-administered medications on file prior to encounter.  amLODIPine (NORVASC) 10 MG tablet, Take 1 tablet (10 mg) by mouth daily  atorvastatin (LIPITOR) 10 MG tablet, Take 1 tablet (10 mg) by mouth daily  belatacept, Aug 3 2020 belatacept conversion   [facility]  [phone]  Fax   carvedilol (COREG) 25 MG tablet, Take 1 tablet (25 mg) by mouth 2 times daily (with meals)  cetirizine (ZYRTEC) 10 MG tablet, Take 10 mg by mouth daily  dapsone (ACZONE) 25 MG tablet, Take 2 tablets (50 mg) by mouth daily  dasatinib (SPRYCEL) 20 MG tablet CHEMO, Take 40 mg by mouth daily   famotidine (PEPCID) 40 MG tablet, Take 1 tablet (40 mg) by mouth daily  loratadine (CLARITIN) 10 MG tablet, Take 10 mg by mouth daily  MYFORTIC (BRAND) 180 MG EC tablet, Take 3 tablets (540 mg) by mouth 2 times daily  predniSONE (DELTASONE) 5 MG tablet, Take 1 tablet (5 mg) by mouth daily  PROCRIT 59575 UNIT/ML injection, INJECT 1ML UNDER THE SKIN ONCE A WEEK FOR HEMOGLOBIN <10. HOLD IF SYSTOLIC FOR BLOOD PRESSURE >180  sodium bicarbonate 650 MG tablet, Take 3 tablets (1,950 mg) by mouth 2 times daily  tamsulosin (FLOMAX) 0.4 MG capsule, Take 1 capsule (0.4 mg) by mouth At Bedtime  vitamin D3 (CHOLECALCIFEROL) 50 mcg (2000 units) tablet, Take 1 tablet (50 mcg) by  mouth daily        PHYSICAL EXAMINATION:  Temp:  [99.1  F (37.3  C)-99.9  F (37.7  C)] 99.6  F (37.6  C)  Pulse:  [73-78] 76  Resp:  [21-26] 21  BP: (100-146)/(46-66) 100/46  FiO2 (%):  [50 %-100 %] 100 %  SpO2:  [92 %-98 %] 92 %     General: Sedated, no acute distress. Mechanically ventilated. OG tube.   HEENT: Pupils are symmetric and reactive to light. No conjunctival injection.  Neuro: sedated, RASS -2 to -3.   Pulm/Resp: Coarse breath sounds bilaterally. Rhonchi appreciated bilaterally.   CV: RRR, no murmurs, rubs or gallops.   Abdomen: Soft, non-distended, non-tender on palpation.  : Stein catheter in place, urine yellow and clear  Incisions/Skin: Vertical surgical scars on abdomen.     LABS: Reviewed.   Arterial Blood Gases   Recent Labs   Lab 08/06/21  1037   PH 7.30*   PCO2 33*   PO2 109*   HCO3 16*     Complete Blood Count   Recent Labs   Lab 08/06/21  1354   WBC 6.7   HGB 7.5*        Basic Metabolic Panel  Recent Labs   Lab 08/06/21  1706 08/06/21  1354 08/06/21  1314    136  136  --    POTASSIUM 5.2 5.7*  5.7*  --    CHLORIDE 112* 112*  112*  --    CO2 16* 13*  13*  --    BUN 52* 48*  48*  --    CR 3.05* 2.89*  2.89*  --    GLC 92  99 76  76 79     Liver Function Tests  Recent Labs   Lab 08/06/21  1706 08/06/21  1354   AST 21 26   ALT 16 19   ALKPHOS 71 74   BILITOTAL 0.4 0.4   ALBUMIN 1.3* 0.7*     Coagulation Profile  No lab results found in last 7 days.    IMAGING:  Recent Results (from the past 24 hour(s))   XR Abdomen Port 1 View    Narrative    Exam: XR ABDOMEN PORT 1 VIEWS, 8/6/2021 10:10 AM    Indication: og placement    Comparison: Abdominal radiograph 3/8/2020.    Findings:   Orogastric tube tip and side-port project over the expected location  of the stomach. Nonobstructive bowel loops in the visualized upper  abdomen. Diffuse bilateral hazy interstitial opacities in the  visualized lungs.      Impression    Impression:   1. Orogastric tube in appropriate position, side  port projected over  body of stomach and tip near pylorus.  2. Diffuse bilateral hazy interstitial opacities, consistent with  infection/pulmonary edema, new from 3/8/2020.    I have personally reviewed the examination and initial interpretation  and I agree with the findings.    WYATT LOVELL MD         SYSTEM ID:  [id number]   XR Chest Port 1 View    Narrative    EXAM: XR CHEST PORT 1 VIEW  8/6/2021 10:10 AM      HISTORY: interval assessment of ETT, respiratory failure    COMPARISON: X-ray: 2/28/2020    FINDINGS: Single view of the chest. The ET tube projects with mid  thoracic trachea. Gastric tube projects below the diaphragm and out of  the field of view. No pneumothorax. No significant pleural effusion.  Cardiac silhouette is within normal limits. Bilateral hazy nodular  opacities, left greater than right. Visualized upper abdomen is  unremarkable. No aggressive osseous lesions.      Impression    IMPRESSION:  1. Bilateral diffuse nodular opacities, left greater than is right.,  possibly a combination of edema and/or infection, vs neoplasia.  2. Tip of the ET tube reduction of the mid thoracic trachea.    I have personally reviewed the examination and initial interpretation  and I agree with the findings.    CESILIA MURILLO MD         SYSTEM ID:  [id number]   US Renal Transplant    Narrative    EXAMINATION: US RENAL TRANSPLANT,  8/6/2021 10:43 AM     COMPARISON: Ultrasound 3/13/2020.    HISTORY: VASQUEZ on transplanted kidney    TECHNIQUE:  Grey-scale, color Doppler and spectral flow analysis.    FINDINGS:  The transplant kidney is located right lower quadrant, and measures  10.2 cm. Parenchyma is of normal thickness and echogenicity. No focal  lesions. No hydronephrosis. No perinephric fluid collection.    Decompressed urinary bladder with Stein catheter in place.    Renal artery flow:   98 cm/sec peak systolic at hilum.  113 peak systolic at anastomosis.  Arcuate artery resistive indices (upper to lower): 0.87,  0.86, 0.86    Renal Vein Flow:  10 cm/sat hilum.   19 cm/s at anastomosis.    Iliac artery flow:  195 cm/s peak systolic above anastomosis.  152 cm/s peak systolic below anastomosis.    Iliac vein flow:  Patent above and below the anastomosis.      Impression    IMPRESSION:   1. Normal grayscale imaging of the transplant kidney.  2. Improved renal arterial velocity at the anastomosis at 113 cm/s,  previously 194 cm/s on ultrasound dated 2/13/2020. However, the  arcuate artery resistive indices have increased.     I have personally reviewed the examination and initial interpretation  and I agree with the findings.    PAUL RUIZ MD         SYSTEM ID:  [id number]   US Upper Extremity Venous Duplex Bilat   Result Value    Radiologist flags DVT (Urgent)    Narrative    EXAMINATION: DOPPLER VENOUS ULTRASOUND OF BILATERAL UPPER EXTREMTIES,  8/6/2021 3:35 PM     COMPARISON: None.    HISTORY: Unable to advance wire for IJ CVA, suspect clot, has b/l AVF    TECHNIQUE:  Gray-scale evaluation with compression, spectral flow, and  color Doppler assessment of the deep venous system of both upper  extremities.    FINDINGS:  Right upper extremity: Normal blood flow and waveforms are  demonstrated in the internal jugular, innominate, subclavian, and  axillary veins. There is normal compressibility of the brachial,  basilic and cephalic veins. Patent arteriovenous fistula in the mid  right forearm.    Left upper extremity: There is occlusive thrombus in one of the 2  paired brachial veins at the antecubital fossa. Occlusive thrombus in  the left cephalic vein extending from mid upper arm to the wrist. Left  basilic vein was not visualized. Nonfunctioning fistula in left upper  arm without flow.      Impression    IMPRESSION:  1.  Occlusive thrombus in 1 of the 2 paired left brachial veins at the  antecubital fossa.  2.  Occlusive thrombus in the left cephalic vein extending from mid  upper arm to the wrist.  3.  Occlusion of  the nonfunctioning fistula in left upper arm.  4.  No evidence of deep venous thrombosis in right upper extremity.  Patent arterial venous fistula in right forearm.    [Urgent Result: DVT]    Finding was identified on 8/6/2021 3:36 PM.     Dr. Rodriguez was contacted by Dr. Guerrero at 8/6/2021 3:44 PM and  verbalized understanding of the urgent finding.      I have personally reviewed the examination and initial interpretation  and I agree with the findings.    PAUL RUIZ MD         SYSTEM ID:  [id number]

## 2021-08-06 NOTE — PHARMACY-VANCOMYCIN DOSING SERVICE
Pharmacy Vancomycin Initial Note  Date of Service 2021  Patient's  1975  46 year old, male    Indication: Sepsis    Current estimated CrCl = VASQUEZ    Creatinine for last 3 days  2021:  1:54 PM Creatinine 2.89 mg/dL;  1:54 PM Creatinine 2.89 mg/dL;  5:06 PM Creatinine 3.05 mg/dL    Recent Vancomycin Level(s) for last 3 days  2021:  5:06 PM Vancomycin <0.8 mg/L      Vancomycin IV Administrations (past 72 hours)      No vancomycin orders with administrations in past 72 hours.                Nephrotoxins and other renal medications (From now, onward)    Start     Dose/Rate Route Frequency Ordered Stop    21  vancomycin 1500 mg in 0.9% NaCl 250 ml intermittent infusion 1,500 mg      25 mg/kg × 66.4 kg (Dosing Weight)  over 180 Minutes Intravenous ONCE 21 1831      21 1600  vasopressin 0.2 units/mL in NS (PITRESSIN) standard conc infusion      2.4 Units/hr  12 mL/hr  Intravenous CONTINUOUS 21 1559      21 1530  norepinephrine (LEVOPHED) 16 mg in  mL infusion MAX CONC CENTRAL LINE      0.01-0.6 mcg/kg/min × 66.4 kg (Dosing Weight)  0.6-37.4 mL/hr  Intravenous CONTINUOUS 21 1511      21 0908  vancomycin place reyes - receiving intermittent dosing      1 each Intravenous SEE ADMIN INSTRUCTIONS 21 0908            Contrast Orders - past 72 hours (72h ago, onward)    None                Plan:  1. Start vancomycin  1500 mg IV x1, intermittent dosing.   2. Vancomycin monitoring method: Trough (Method 2 = manual dose calculation)  3. Vancomycin therapeutic monitoring goal: 15-20 mg/L  4. Pharmacy will check vancomycin levels as appropriate in 1-3 Days.    5. Serum creatinine levels will be ordered daily for the first week of therapy and at least twice weekly for subsequent weeks.      Cassie Romna, [facility]

## 2021-08-06 NOTE — PROCEDURES
PROCEDURE:  CVC Placement  INTENSIVIST: POOL  ANESTHESIA: GETA and 1% lidocaine  EBL: 8 cc   FINDINGS: Adequately placed CVC, confirmed by ultrasound  COMPLICATIONS: None apparent   SPECIMEN: none   OPERATIVE INDICATIONS: Jeremiah Cruz is a 46 year old male with PMH significant for Alport's Syndrome complicated by ESRD s/p kidney transplant (02/2020,2005, 1988), thrombocytopenia, anemia of chronic disease, secondary renal hyperparathyroidism, HTN, CML (in remission since 2016) and GERD who presented to OSH with 2 week history of dry cough and dyspnea and developed acute hypoxemic respiratory failure requiring intubation and transfer to [facility] MICU.   Needs central access for vasopressors and frequent lab draws.   OPERATIVE PROCEDURE:   Informed consent was obtained prior to the procedure.   The patient's anatomical landmarks were reviewed prior to the procedure with ultrasound guidance assistance.  A timeout procedure was performed. The patient was positioned in supine position with the right femoralvein  prepped in.  The patient was prepped and draped in the usual sterile fashion. The surgical site was numbed with 1% lidocaine. The femoral vein was well visualized on ultrasound. Ultrasound was used throughout the entire procedure for real time guidance. The femoral vein was accessed with the access needle. A wire was passed through the needle. The introducer needle was removed over the wire. Ultrasound guidance was used to confirm femoral access and not another named vessel. An 11 blade was used to open the skin by the wire. The dilator was used to open the subcutaneous tissues. The CVC line was inserted over the wire.The CVC line was sutured to the skin.  A sterile dressing was placed over the CVC line.   Dr. Carreon was present for all parts of this case.   TIFFANY Schneider  [facility] Intensivist Service

## 2021-08-06 NOTE — PROGRESS NOTES
[facility]  Transfer Triage Note    Date of call: 08/05/21  Time of call: 9:41 PM    Is pandemic COVID-19 a concern? YES: Swab pending   1.  Travel history/exposure history (select all that apply): other   2.  Vitals: /83, HR 86, afebrile, RR 24  3.  On Oxygen? (select one option):  NC L/minute 6  4.  History of lung disease or active smoking (or other risk factors for death/respiratory      failure?: No  5.  Type of bed requested (select one option): IMC  6.  Other Isolation needed (select all that apply): Contact and Droplet  7.  Has the patient been added to the shared patient list 'COVID'?  No      Reason for transfer: Further diagnostic work up, management, and consultation for specialized care   Diagnosis: Acute Hypoxic Respiratory Failure in setting of renal transplant     Outside Records: Not available  Additional records requested to be faxed to [phone].    Stability of Patient: Patient is at risk for instability, however patient requires urgent transfer and does not meet ICU criteria   ICU: No    Expected Time of Arrival for Transfer: 0-8 hours    Arrival Location:  [place] [address] Phone: [phone]    Recommendations for Management and Stabilization: Given    Additional Comments: 47 yo male with history of renal transplant 18 months ago presents to [facility] ED with 2 weeks of dry cough and dyspnea. He was treated for sinus infection in early July with azithromycin and reported improvement in his symptoms then, but had recurrence of respiratory symptoms starting July 21. On presentation, Sats 82-85% on RA, initially on 4L NC, required escalation to 6L face mask. Remainder of VS /83, HR 86, afebrile, RR 24. CXR (not available for review yet) with bilateral while out per ED doctor's report. D-dimer elevated, but CT chest to r/o PE not done due to concern about renal function (Cr. 2.2 which is  above his baseline). CT chest without contrast pending. Blood cultures and legionella collected at OSH, given levofloxacin due to allergy to PCN and cephalosporins. ABG 7.45/28/78, WBC count normal.   COVID swab pending; of note patient has NOT received the COVID-19 vaccine.     Plan:   -Will need private room while COVID test is pending, likely best to admit to private room with droplet precautions even if initial COVID negative as may require respiratory viral testing as well   -Will place on triage list and notify [place] [facility] when private [facility] bed become available; patient will wait in ER for now   -Due to somewhat tenuous respiratory status, [place] ER physician to notify us if escalating oxygen requirement to BIPAP or intubation as will then need an ICU bed     *Update 8/5 at 11:22pm   -Now with escalating oxygen requirements, On BIPAP 10/5 40% FIO2 with ongoing tachypnea. ER doctor in [place] will electively intubate and plan for transfer to ICU bed.   -CT scan without contrast showed bilateral atypical pneumonia, similar to CXR.     Sherine Zheng, DO

## 2021-08-06 NOTE — PLAN OF CARE
Admitted/transferred from:[place], [place], ED via EMS at 0830  Reason for admission/transfer: Intubated, sedated, HD unstable on peripheral levophed, oliguria, hyperkalemia potassium 5.7 and increased creatinine 3.5. Two peripherals; left wrist and right foot and Bilateral AVF's; however left AVF is a skin graft per Renal  Patient status upon admission/transfer: Critically ill  Interventions: Right femoral CVC & arterial line placed, renal transplant ultrasound, BUE U/S/ revealed left nonocclusive DVTs, OG placed and verified tube feeds initiated, trending BG, ABG and bronchoscopy done at   cultuures sent.  Plan:   2 RN skin assessment: completed by MELANIA Mckee & Archana RAMÍREZ   Result of skin assessment and interventions/actions:skin intact no open areas abrasions on lips and blanchable redness bilateral elbows  Height, weight, drug calc weight: Done  Patient belongings (see Flowsheet - Adult Profile for details):  two bags of belongings  MDRO education (if applicable):  done

## 2021-08-06 NOTE — PROCEDURES
Bronchoscopy    PROCEDURE:   Bronchoscopy with bronchoalveolar lavage    INDICATION:  Acute hypoxemic respiratory failure, immunocompromised host    PROCEDURE :   Ethan Carreon M.D.    SUPERVISOR:  Dr. Curran    CONSENT:  Obtained and in the paper chart    UNIVERSAL PROTOCOL: Patient Identification was verified, time out was performed. Imaging data reviewed. Barrier precaution done: Hands washed, mask, gloves, gown, and eye protection all used.     MEDICATIONS: propofol gtt, versed 2 mg IV, fentanyl gtt + 50 mcg push    PROCEDURE: Patient monitored during entire procedure. 3 mL of lidocaine instilled via ET tube with minimal cough.  Disposable flexible bronchoscope was inserted through patients ET tube.  The ET tube was in adequate position.  The lawrence was sharp.      An airway inspection was done of the bronchial tree and notable for diffuse edematous airways with erythematous mucosa; mostly thin secretions.  The bronchoscope was then advanced to the MANDI and placed into wedge.  A BAL was performed where 120 mL of saline were instilled in serial 30 mL aliquots.  A total of 30 mL were collected.  Serial aliquots were not progressively bloody, cellular return.    Dr. Curran was present for the procedure.    COMPLICATIONS: None    Ethan Carreon M.D.  Pulmonary and Critical Care Medicine Fellow  8/6/2021      Attending note:  Bedside bronchoscopy for diffuse infiltrates of unclear etiology.  I was present and assisted during the entire viewing period from scope insertion to scope removal.  BAL of MANDI performed.  No complications.    Joann Curran MD  [phone]

## 2021-08-06 NOTE — PROCEDURES
ARTERIAL LINE INSERTION PROCEDURE NOTE  (NON-OR)    Procedure Date:  8/6/2021   Performing Physician:  Fern Stanton CNP    Pre-Procedure Diagnosis:     SEPTIC SHOCK and RESPIRATORY FAILURE  Post-Procedure Diagnosis:  Same as Pre-Procedure Diagnosis    Procedure:  Arterial line insertion  Right Femoral  Indications:  HYPOTENSION and RESPIRATORY FAILURE      Estimated Blood Loss: Minimal   Complications: none      Procedure Details:   The risks, benefits, complications, treatment options, and expected outcomes were discussed with the patient. The risks and potential complications of their problem and purposed procedure include but are not limited to infection, bleeding, pain,  the need for additional procedures, and nerve and vessel injury. The patient/alternate (see above) concurred with the proposed plan, giving informed consent.  The site of the procedure was properly noted/marked. The patient was identified as Jeremiah Cruz with Date of Birth 1975 and the procedure verified as Arterial Line Insertion.  A Time Out was held and the above information confirmed.        An Tr test was not  done before the procedure.  T  In sterile fashion, the line site was prepped with Chlorhexidine.  Strict sterile conditions were maintained,  Cap, mask, and sterile gloves were worn by all participants.  3 ml of   Lidocaine 1% without epinephrine anesthetic were infiltrated into the skin.  The arterial line was placed in the Right Femoral artery percutaneously, the femoral artery was visualized via ultrasound and cannulated  without  difficulty.  The linewas  sutured in place  and an occlusive sterile dressing was applied.  The total number of needle stick attempts was 1.      Condition: stable    Fern Stanton CNP, 8/6/2021, 2:40 PM  [facility] Pulmonary and Critical Care Service

## 2021-08-06 NOTE — CONSULTS
[facility]  Transplant Nephrology Consult  Date of Admission:  8/6/2021  Today's Date: 08/06/2021  Requesting physician: Ana Mott MD    Recommendations:  - recommend IVF hydration with D5W +sodium bicarb 150 meq at 125 ml/hr x1L to improve acidosis and hyperkalemia.  -  pressors support if needed to maintain MAP > 65 mmHg.   - Will reduce MPA dose to 360 mg BID, ok to use MMF oral suspension 500 mg BID in the meantime.  - continue prednisone 5 mg daily or IV equivalent (appears he is receiving stress dose IV steroids).  - check CMV and EBV DNA. (ordered)  -obtain T cell subset (ordered), resume dapsone 50mg daily suspension (ordered)  - recommend sending work for atypical pneumonia (fungitell, histoplasmosis Ag, blastomyces Ag, coccidioides Ag and legionella urine Ag), and bronchoscopy with BAL cultures and stains and CMV.  - recommend empiric antibiotics coverage given the severity of his presentation.     Assessment & Plan   # DDKT: VASQUEZ, pre-renal azotemia vs ATN in the settings of shock possible septic shock. Recommend IVF hydration, pressors support if needed to maintain MAP > 65 mmHg.    - Baseline Creatinine: ~ 1.8-2.1   - Proteinuria: Minimal (0.2-0.5 grams)   - Date DSA Last Checked: Feb/2021      Latest DSA: No   - BK Viremia: No   - Kidney Tx Biopsy: Apr 03, 2020; Result: No diagnostic evidence of acute rejection.  Moderate interstitial fibrosis and tubular atrophy. EM showed ~25% foot process effacement    -No acute indication for RRT currently but may develop one in the coming day as he is oligoanuric.    -He has functional RUE AVF. Will attempt to call family to consent for RRT. If he does require RRT will trial iHD possibly with pressors via RUE prior to line placement for CRRT    # Immunosuppression: Belatacept (dose 5 mg/kg every four weeks), Mycophenolic acid (dose 540 mg every 12 hours) and Prednisone (dose 5 mg daily) last belatacept dose in  July, next dose scheduled on Aug 10, 2021   - Changes: Yes - change MPA to MMF suspension and reduce dose to 500mg q12h due to infection    # Infection Prophylaxis:   - PJP: Dapsone   - CMV: Valcyte;  (D+/R-). completed  Valcyte for 6 months. CMV Negative (2/2021), will check CMV given his respiratory issues.    # Hypertension: low BP requiring pressors;  Goal BP: < 130/80   - Volume status: Euvolemic     - Changes: No    # Anemia in Chronic Renal Disease: Hgb: Stable      DEB: No   - Iron studies: Not checked recently    # Mineral Bone Disorder:   - Secondary renal hyperparathyroidism; PTH level: Normal (18-80 pg/ml)        On treatment: None  - Vitamin D; level: Normal        On supplement: No  - Calcium; level: Normal        On supplement: No    # Electrolytes:   - Potassium; level: High        On supplement: No  - Magnesium; level: Not checked recently        On supplement: No  - Bicarbonate; level: Low        On supplement: No start bicarb drip    # CML: Appears stable on Dasatinib. Patient is followed by Hematology.    # SOB with cough: 2 week history of dry cough and dyspnea. CXR at  OSH significant for bilateral white out. CT scan without contrast at OSH showed bilateral atypical pneumonia similar to CXR. COVID-19 PCR is negative.   - recommend empiric antibiotics coverage given the severity of his presentation.    - recommend sending work for atypical pneumonia (fungitell, histoplasmosis Ag, blastomyces Ag, coccidioides Ag and legionella urine Ag), and bronchoscopy with BAL cultures and stains (GMS for PJP) and CMV.   - recommend reducing MPA dose to 360 mg BID, ok to use MMF oral suspension 500 mg BID in the meantime.   -Send CMV and EBV PCR quant (ordered)    # Shock likely septic: recommend septic work up (blood culture, UA, UC and atypical PNA work up as per recommendation.    # Transplant History:  Etiology of Kidney Failure: Alport's syndrome  Tx: DDKT  Transplant: 2/28/2020 (Kidney), 12/7/1988  (Kidney), 3/2/2005 (Kidney)  Crossmatch at time of Tx: negative  Significant changes in immunosuppression: None  Significant transplant-related complications: None    Recommendations were communicated to the primary team verbally.    Seen and discussed with Dr. Arian Viveros MD  Pager: [phone]    Physician Attestation   I, Joe Don MD, personally examined and evaluated this patient.  I discussed the patient with the resident/fellow and care team, and agree with the assessment and plan of care as documented in the note on 08/06/21 .      I personally reviewed vital signs, medications, labs and imaging.    Joe Don MD  Date of Service (when I saw the patient): 08/06/21          REASON FOR CONSULT   IS management    History of Present Illness   Jeremiah Cruz is a 46 year old male with PMH of Alport's syndrome s/p kidney transplant x3, last one was DDKT in 2/2020 who presented with 2 week history of dry cough and dyspnea. Patient is intubated. History was obtained from chart review. He was hypoxic on presentation with sats in 82-85% on room air. He initially required 4L NC which escalated to 6L face mask and was intubated. CXR showed bilateral white out. D-dimer was elevated but CT chest to rule out PE was not done due to concern about renal function.CXR at  OSH significant for bilateral white out. CT scan without contrast at OSH showed bilateral atypical pneumonia similar to CXR.  I was unable to obtain any history from the patient as he was intubated and sedated.     Review of Systems    Review of systems not obtained due to patient factors - intubation and sedation    Past Medical History    I have reviewed this patient's medical history and updated it with pertinent information if needed.   Past Medical History:   Diagnosis Date     Alport's syndrome      Anemia in chronic kidney disease      CML (chronic myelocytic leukemia) (H) 2015    In remission since 2016. Managed at [facility] [place].     Dialysis care      End stage kidney disease (H)      Former tobacco use      GERD (gastroesophageal reflux disease)      Hypertension      Kidney transplant rejection      Kidney transplanted     Primary graft non-function d/t acute AMR     Kidney transplanted     Failed in      Kidney transplanted 2020    Induction with thymoglobulin 6.2mg/kg.     S/p nephrectomy 12     Secondary renal hyperparathyroidism (H)      Thrombocytopenia (H)      Tobacco dependence        Past Surgical History   I have reviewed this patient's surgical history and updated it with pertinent information if needed.  Past Surgical History:   Procedure Laterality Date     APPENDECTOMY       CREATE FISTULA ARTERIOVENOUS UPPER EXTREMITY       HERNIA REPAIR, INCISIONAL       INSERT CATHETER PERITONEAL DIALYSIS       IR PICC PLACEMENT > 5 YRS OF AGE  3/5/2020     IR RENAL BIOPSY RIGHT  4/3/2020     NEPHRECTOMY Bilateral     native     NEPHRECTOMY      transplant     NEPHRECTOMY      transplant     PARATHYROIDECTOMY       REMOVE CATHETER PERITONEAL       svc angioplasty       TRANSPLANT KIDNEY RECIPIENT  DONOR N/A 2020    Procedure: TRANSPLANT, KIDNEY, RECIPIENT,  DONOR, with ureteral stent placement;  Surgeon: Carol Samuel MD;  Location: [facility] OR       Family History   I have reviewed this patient's family history and updated it with pertinent information if needed.   Family History   Problem Relation Age of Onset     Bone Cancer Brother      Melanoma No family hx of      Skin Cancer No family hx of          Social History   I have reviewed this patient's social history and updated it with pertinent information if needed. Jeremiah BRITTANIE Cruz  reports that he quit smoking about 10 years ago. His smoking use included cigarettes. He has a 12.00 pack-year smoking history. He has never used smokeless tobacco. He reports that he does not drink alcohol and does not use  drugs.    Allergies   Allergies   Allergen Reactions     Blood Transfusion Related (Informational Only) Other (See Comments)     Patient has a history of a clinically significant antibody against RBC antigens.  A delay in compatible RBCs may occur.     Cephalosporins Other (See Comments) and Rash     fever     Compazine [Prochlorperazine]      Gammagard [Immune Globulin] Other (See Comments)     Ed got spinal meningitis and MD stated to never get again for fear of death.       Penicillins      Vancomycin      Prior to Admission Medications     atorvastatin  10 mg Oral Daily     loratadine  10 mg Oral Daily     multivitamins w/minerals  15 mL Per Feeding Tube Daily     mycophenolate  500 mg Oral BID     predniSONE  5 mg Oral Daily     protein modular  1 packet Per Feeding Tube TID     vancomycin place reyes - receiving intermittent dosing  1 each Intravenous See Admin Instructions       dextrose       fentaNYL 50 mcg/hr (21 1300)     norepinephrine Stopped (21 1000)     propofol (DIPRIVAN) infusion 30 mcg/kg/min (21 1300)       Physical Exam   Temp  Av.5  F (37.5  C)  Min: 99.1  F (37.3  C)  Max: 99.9  F (37.7  C)      Pulse  Av.9  Min: 73  Max: 77 Resp  Av.7  Min: 21  Max: 26  FiO2 (%)  Av %  Min: 50 %  Max: 80 %  SpO2  Av.1 %  Min: 93 %  Max: 98 %     /59   Pulse 77   Temp 99.9  F (37.7  C) (Axillary)   Resp 21   Wt 66.4 kg (146 lb 6.2 oz)   SpO2 93%   BMI 25.11 kg/m     Date 21 07 - 21 0659   Shift 8613-2295 5618-6816 6668-8793 24 Hour Total   INTAKE   I.V. 49.17   49.17   NG/   106   Enteral 10   10   Shift Total(mL/kg) 165.17(2.49)   165.17(2.49)   OUTPUT   Urine 50   50   Shift Total(mL/kg) 50(0.75)   50(0.75)   Weight (kg) 66.4 66.4 66.4 66.4      Admit Weight: 66.4 kg (146 lb 6.2 oz)     GENERAL APPEARANCE: intubated.  HENT: ET tube in place.  LYMPHATICS: no cervical or supraclavicular nodes  RESP: intubated, diffuse rhonchi  CV:  regular rhythm, normal rate, no rub, no murmur  EDEMA: no LE edema bilaterally  ABDOMEN: soft, nondistended, nontender, bowel sounds normal  MS: extremities normal - no gross deformities noted, no evidence of inflammation in joints, no muscle tenderness  SKIN: no rash  Neuro: no focal deficits, sedated  Psych: unable to assess as intubated  DIALYSIS ACCESS:  RUE AV fistula + thrill, nonfunctioning LUE AVF    Data   CMP  Recent Labs   Lab 08/06/21  1147 08/06/21  0839   GLC 80 101*     CBCNo lab results found in last 7 days.  INRNo lab results found in last 7 days.  ABG  Recent Labs   Lab 08/06/21  1037   PH 7.30*   PCO2 33*   PO2 109*   HCO3 16*   O2PER 80      Urine Studies  Recent Labs   Lab Test 03/16/20  0940 03/12/20  1125   COLOR Yellow Straw   APPEARANCE Clear Clear   URINEGLC 150* >499*   URINEBILI Negative Negative   URINEKETONE Negative Negative   SG 1.014 1.009   UBLD Small* Small*   URINEPH 6.0 7.0   PROTEIN 30* Negative   NITRITE Negative Negative   LEUKEST Small* Trace*   RBCU 13* 3*   WBCU 18* 7*     Recent Labs   Lab Test 06/29/20  0850   UTPG 0.17     PTH  Recent Labs   Lab Test 06/29/20  0914 03/04/20  1018 03/03/20  0849 03/03/20  0729   PTHI 58 630* 668* Canceled, Test credited     Iron Studies  Recent Labs   Lab Test 05/18/21  0000 04/20/21  0000 03/23/21  0000 02/23/21  0000 01/26/21  0000 01/05/21  0000 06/29/20  0914 03/03/20  0729   IRON  --   --   --   --   --   --  163 139   FEB  --   --   --   --   --   --  277 142*   IRONSAT  --   --   --   --   --   --  59* 98*   YOLIS 2,884 2,834 3,179 2,480 2,813 2,356 2,163* 3,397*       IMAGING:  All imaging studies reviewed by me.

## 2021-08-07 NOTE — CONSULTS
[facility]  Transplant Infectious Disease Consult Note - New Patient     Patient:  Jeremiah Cruz, Date of birth 1975, Medical record number [id number]  Date of Visit:  08/07/2021  Consult requested by Dr. Ana Mott for evaluation of Hypoxic respiratory failure          Assessment and Recommendations:   Recommendations:  1. 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 Please monitor renal function and electrolytes while on Ambisome  3. Follow up previously sent serology testing as follows:  - Serum Aspergillus galactomannan and beta-d-glucan  - Serum Cryptococcus antigen  - Serum and Urine Histoplasma/Blastomyces antigens  4. Await BAL cultures/results including - cytology, bacterial/fungal/AFB cultures, Nocardia PCR and culture, PJP PCR, Aspergillus galactomannan, CMV, BAL Histo antigen, Legionella culture  5. Follow up serum CMV and EBV DNA PCR  6. Continue Vancomycin for now, pharmacy to assist in dosing. However, if blood cultures remain negative x 24 more hours, with clinical stability, anticipate stopping after tomorrow  7. Continue Meropenem for now. However, if no further culture data obtain, anticipate de-escalating in coming days  8. Continue Dapsone for PJP ppx    Thank you very much for this consultation. Transplant Infectious Disease will continue to follow with you.    Assessment:  47 y/o gentleman, PMHx Alport syndrome c/b ESRD s/p DDKT (x3, 1988, 2005 and now again in 2/2020) (CMV +/-, EBV unknown/+), thrombocytopenia, anemia of chronic disease, secondary renal hyperparathyroidism, HTN, CML (in remission since 2016) and GERD who presents as a transfer from [facility] for hypoxic respiratory failure    #Hypoxic  "Respiratory Failure:  #Circulatory Shock:  #KOH Prep with broad based budding yeast, morphology consistent with possible Blastomyces dermatiditis:  Patient presented to OSH ER with complaints of two weeks of worsening cough and progressive SOB. He was noted to be hypoxic on arrival to 80s on room air, however worsened throughout the evening to a point where he was electively intubated prior to transfer. On arrival at [facility], he was in circulatory shock requiring 3 pressors. However with supportive care, he had been weaned off pressors and has shown improvement in ventilation. CXR at [facility] showed \"Bilateral diffuse nodular opacities, left greater than is right, possibly a combination of edema and/or infection\". CT at OSH also showed bilateral opacities, however is not available for interpretation. Diffuse nodular opacities on imaging in a patient that presents in a subacute/progressive manner raises concerns for atypical infections including endemic mycoses, invasive mold, mycobacterial infections among others. Appreciate pulmonary team obtaining BAL - so far COVID testing and RVP negative. However, KOH prep from BAL showing broad based budding yeast, morphology consistent with Blastomyces. This fits with clinical presentation and radiographic appearance. Have sent additional serology for workup. Would recommend switching to Amphotericin B given severe pulmonary disease in immunocompromised patient prior to transition to azole. Radiographic appearance not consistent with typical bacterial pneumonia and with likely culprit seen on KOH prep, would favor de-escalating meropenem in coming days,especially if bacterial cultures remain negative    #GPC in blood culture from OSH:  OSH obtained blood cultures at time of presentation. 1/2 cultures with GPCs - now identified as Staph hominis (CoNS). With only 1/2 positive culture, likely skin contaminant. Other blood Cx from 8/5, as well as blood culture from  from 8/6 remain " negative. Patient presented in circulatory shock but has been weaned off pressors within 24 hours of arrival. Continue Vancomycin for now, however anticipate being able to discontinue within 24 hours if cultures continue to remain negative    #Renal Transplant recipient:  History of Alport syndrome c/b ESRD s/p DDKT (x3, 1988, 2005 and now again in 2/2020) (CMV +/-, EBV unknown/+)    Other Infectious Disease issues include:  - QTc interval: 423 msec 8/6/21  - Bacterial prophylaxis: On Vanc/Meropenem  - Pneumocystis prophylaxis: Dapsone  - Viral serostatus & prophylaxis: CMV +/-, EBV unknown/+, completed Valcyte ppx, please monitor CMV levels  - Fungal prophylaxis: On Ambisome  - Immunization status: Has not received COVID vaccine  - Gamma globulin status: Unknown  - Isolation status: Good hand hygiene, standard isolation.    TERESA Waller  Staff Physician, Infectious Diseases  Pager [phone]        History of Infectious Disease Illness:     47 y/o gentleman, PMHx Alport syndrome c/b ESRD s/p DDKT (x3, 1988, 2005 and now again in 2/2020) (CMV +/-, EBV unknown/+), thrombocytopenia, anemia of chronic disease, secondary renal hyperparathyroidism, HTN, CML (in remission since 2016) and GERD who presents as a transfer from [facility] for hypoxic respiratory failure    Patient intubated and sedated and no family members at bedside. History obtained from discussion with primary team and chart review. Patient originally presented to [place] ER with 2 weeks of dry cough and worsening dyspnea. He was reported treated for a sinus infection in early July with Azithromycin which had led to an improvement in his symptoms. However, starting July 21, his symptoms worsened    On presentation to the ER, he was noted to be hypoxic, initially 82-85% on room air. Was placed on O2 by nasal cannula, initially on 4L then escalated to 6L. CXR obtained at OSH ER showed bilateral infiltrates. Was not able to obtain CT PE  "given concern about renal function. He had blood cultures collected at OSH, and was given Levofloxacin (given reported allergy to Penicillins and cephalosporins). However, while at the ER, his respiratory function worsened and he was first on BIPAP and then electively intubated prior to transfer. A CT scan at the OSH showed bilateral opacities consistent with \"atypical pneumonia\"    At the time of transfer here, he clinically worsened, was in circulatory shock requiring 3 pressors. Was started on Vancomycin, Meropenem and Micafungin. A CXR at [facility] showed \"Bilateral diffuse nodular opacities, left greater than is right, possibly a combination of edema and/or infection\". Given clinical condition, he underwent bronchoscopy with BAL on 8/6. A KOH smear performed on the bronchoscopy showed 3+ Broad based budding yeast, morphology consistent with possible Blastomyces dermatiditis    He was started on Ambisome this morning and ID consulted. Micafungin was stopped  By the time he was seen, he had been weaned off all pressors. His vent settings had also slightly improved - down to PEEP 8/50% FiO2 from PEEP 10/80% FiO2 on arrival      Transplants:  2/28/2020 (Kidney), 12/7/1988 (Kidney), 3/2/2005 (Kidney), Postoperative day:  526.  Coordinator Jody García    Review of Systems:  Unable to obtain as intubated and sedated    Past Medical History:   Diagnosis Date     Alport's syndrome      Anemia in chronic kidney disease      CML (chronic myelocytic leukemia) (H) 2015    In remission since 2016. Managed at [facility].     Dialysis care      End stage kidney disease (H)      Former tobacco use      GERD (gastroesophageal reflux disease)      Hypertension      Kidney transplant rejection 1999     Kidney transplanted 2005    Primary graft non-function d/t acute AMR     Kidney transplanted 1988    Failed in 1999     Kidney transplanted 02/28/2020    Induction with thymoglobulin 6.2mg/kg.     S/p nephrectomy " 11     Secondary renal hyperparathyroidism (H)      Thrombocytopenia (H)      Tobacco dependence        Past Surgical History:   Procedure Laterality Date     APPENDECTOMY       CREATE FISTULA ARTERIOVENOUS UPPER EXTREMITY       HERNIA REPAIR, INCISIONAL       INSERT CATHETER PERITONEAL DIALYSIS       IR PICC PLACEMENT > 5 YRS OF AGE  3/5/2020     IR RENAL BIOPSY RIGHT  4/3/2020     NEPHRECTOMY Bilateral     native     NEPHRECTOMY      transplant     NEPHRECTOMY  2005    transplant     PARATHYROIDECTOMY       REMOVE CATHETER PERITONEAL       svc angioplasty       TRANSPLANT KIDNEY RECIPIENT  DONOR N/A 2020    Procedure: TRANSPLANT, KIDNEY, RECIPIENT,  DONOR, with ureteral stent placement;  Surgeon: Carol Samuel MD;  Location:  OR       Family History   Problem Relation Age of Onset     Bone Cancer Brother      Melanoma No family hx of      Skin Cancer No family hx of        Social History     Social History Narrative     Not on file     Social History     Tobacco Use     Smoking status: Former Smoker     Packs/day: 1.00     Years: 12.00     Pack years: 12.00     Types: Cigarettes     Quit date: 2011     Years since quitting: 10.4     Smokeless tobacco: Never Used   Substance Use Topics     Alcohol use: No     Alcohol/week: 0.0 standard drinks     Drug use: No       Immunization History   Administered Date(s) Administered     HepB 1998, 1999, 1999     HepB-Adult 1999, 1999     Pneumo Conj 13-V (2010&after) 2017     Pneumococcal 23 valent 2017, 03/15/2017     TDAP Vaccine (Boostrix) 2016       Patient Active Problem List   Diagnosis     Alport's syndrome     Anemia in chronic kidney disease     Secondary renal hyperparathyroidism (H)     HTN, kidney transplant related     CML (chronic myelocytic leukemia) (H)     GERD (gastroesophageal reflux disease)     Prurigo nodularis     Senile sebaceous gland hyperplasia      Immunosuppression (H)     Kidney replaced by transplant     Hyperkalemia     History of difficult venous access     Steroid-induced hyperglycemia     Aftercare following organ transplant     Vitamin D deficiency     Metabolic acidosis     Hypomagnesemia     Respiratory failure (H)            Current Medications & Allergies:       sodium chloride 0.9%  500 mL Intravenous Q24H     acetaminophen  650 mg Oral or Feeding Tube Q24H     dextrose 5% water  10-20 mL Intravenous Q24H    And     amphotericin B liposome (AMBISOME) intermittent infusion  5 mg/kg (Dosing Weight) Intravenous Q24H    And     dextrose 5% water  10-20 mL Intravenous Q24H     atorvastatin  10 mg Oral Daily     dapsone  50 mg Oral or Feeding Tube Daily     diphenhydrAMINE  25 mg Oral Q24H    Or     diphenhydrAMINE  25 mg Intravenous Q24H     hydrocortisone sodium succinate PF  100 mg Intravenous Q8H     insulin aspart  1-6 Units Subcutaneous Q4H     loratadine  10 mg Oral Daily     meropenem  1 g Intravenous Q12H     multivitamins w/minerals  15 mL Per Feeding Tube Daily     mycophenolate  500 mg Oral BID     pantoprazole (PROTONIX) IV  40 mg Intravenous Daily with breakfast     polyethylene glycol  17 g Oral Daily     protein modular  1 packet Per Feeding Tube TID     senna-docusate  2 tablet Oral BID     vancomycin place ryees - receiving intermittent dosing  1 each Intravenous See Admin Instructions       Infusions/Drips:      dextrose       fentaNYL 50 mcg/hr (08/07/21 1200)     heparin 1,350 Units/hr (08/07/21 1200)     midazolam 3 mg/hr (08/07/21 1256)     norepinephrine Stopped (08/07/21 0930)       Allergies   Allergen Reactions     Blood Transfusion Related (Informational Only) Other (See Comments)     Patient has a history of a clinically significant antibody against RBC antigens.  A delay in compatible RBCs may occur.     Cephalosporins Other (See Comments) and Rash     fever     Compazine [Prochlorperazine]      Gammagard [Immune Globulin]  Other (See Comments)     Ed got spinal meningitis and MD stated to never get again for fear of death.       Vancomycin      Per care everywhere patient has Red Man's syndrome     Penicillins Hives and Rash     Per OSH records            Physical Exam:     Patient Vitals for the past 24 hrs:   BP Temp Temp src Pulse Resp SpO2 Height Weight   08/07/21 1300 -- -- -- 67 -- 92 % -- --   08/07/21 1230 -- -- -- 68 -- 92 % -- --   08/07/21 1223 -- -- -- 67 -- 92 % -- --   08/07/21 1200 -- 97.6  F (36.4  C) Axillary 77 18 92 % -- --   08/07/21 1130 -- -- -- 69 -- 91 % -- --   08/07/21 1112 -- 97.8  F (36.6  C) Axillary 70 -- 93 % -- --   08/07/21 1100 -- -- -- 70 -- 93 % -- --   08/07/21 1000 -- -- -- -- 21 -- -- --   08/07/21 0930 -- -- -- 70 -- 96 % -- --   08/07/21 0916 -- 97.7  F (36.5  C) -- 70 -- 96 % -- --   08/07/21 0906 -- 97.5  F (36.4  C) Axillary 70 -- 95 % -- --   08/07/21 0900 -- -- -- 69 -- 94 % -- --   08/07/21 0830 -- -- -- 68 -- 93 % -- --   08/07/21 0800 -- 98.3  F (36.8  C) Axillary 68 18 94 % -- --   08/07/21 0730 -- -- -- 68 -- 95 % -- --   08/07/21 0700 -- -- -- 69 22 96 % -- --   08/07/21 0645 -- -- -- 71 -- 96 % -- --   08/07/21 0630 -- -- -- 70 -- 96 % -- --   08/07/21 0615 -- -- -- 71 -- 95 % -- --   08/07/21 0600 -- -- -- 72 20 91 % -- --   08/07/21 0545 -- -- -- 71 -- 90 % -- --   08/07/21 0530 -- -- -- 70 -- 94 % -- --   08/07/21 0515 -- -- -- 70 -- 94 % -- --   08/07/21 0500 -- -- -- 70 21 94 % -- --   08/07/21 0445 -- -- -- 71 -- 94 % -- --   08/07/21 0430 -- -- -- 71 -- 94 % -- --   08/07/21 0415 -- -- -- 72 -- 93 % -- --   08/07/21 0400 -- 98.2  F (36.8  C) Axillary 72 19 93 % -- 71.8 kg (158 lb 4.6 oz)   08/07/21 0345 -- -- -- 71 -- 93 % -- --   08/07/21 0330 -- -- -- 72 -- 93 % -- --   08/07/21 0315 -- -- -- 73 -- 94 % -- --   08/07/21 0300 -- -- -- 72 18 94 % -- --   08/07/21 0245 -- -- -- 73 -- 94 % -- --   08/07/21 0230 -- -- -- 73 -- 95 % -- --   08/07/21 0215 -- -- -- 73 -- 95 % -- --  "  08/07/21 0200 99/56 -- -- 80 21 90 % -- --   08/07/21 0145 -- -- -- 73 -- 95 % -- --   08/07/21 0130 -- -- -- 75 -- 95 % -- --   08/07/21 0115 -- -- -- 74 -- 95 % -- --   08/07/21 0100 -- -- -- 75 19 95 % -- --   08/07/21 0045 -- -- -- 75 -- 95 % -- --   08/07/21 0030 -- -- -- 75 -- 95 % -- --   08/07/21 0015 -- -- -- 74 -- 95 % -- --   08/07/21 0000 -- 98.3  F (36.8  C) Axillary 74 18 95 % -- --   08/06/21 2345 -- -- -- 73 -- 99 % -- --   08/06/21 2339 -- -- -- 74 -- 96 % -- --   08/06/21 2330 -- -- -- 73 -- 96 % -- --   08/06/21 2315 -- -- -- 78 -- 99 % -- --   08/06/21 2300 -- -- -- 75 20 96 % -- --   08/06/21 2245 -- -- -- 81 -- 92 % -- --   08/06/21 2230 -- -- -- 75 -- 96 % -- --   08/06/21 2215 -- -- -- 75 -- 96 % -- --   08/06/21 2200 -- -- -- 75 21 96 % -- --   08/06/21 2145 -- -- -- 75 -- 96 % -- --   08/06/21 2130 -- -- -- 75 -- 96 % -- --   08/06/21 2115 -- -- -- 75 -- 96 % -- --   08/06/21 2100 -- -- -- 76 24 95 % -- --   08/06/21 2045 -- -- -- 75 -- 94 % -- --   08/06/21 2030 -- -- -- 75 -- 95 % -- --   08/06/21 2015 -- -- -- 75 -- 95 % -- --   08/06/21 2000 -- 98.3  F (36.8  C) Axillary 73 20 100 % -- --   08/06/21 1945 -- -- -- 75 -- 95 % -- --   08/06/21 1930 -- -- -- 76 -- 95 % -- --   08/06/21 1915 -- -- -- 75 -- 96 % -- --   08/06/21 1900 -- -- -- 78 18 95 % 1.626 m (5' 4\") --   08/06/21 1830 -- -- -- 80 -- 92 % -- --   08/06/21 1800 -- -- -- 79 22 94 % -- --   08/06/21 1730 -- -- -- 83 -- 99 % -- --   08/06/21 1700 -- -- -- 76 21 93 % -- --   08/06/21 1600 -- 99.6  F (37.6  C) Axillary -- -- -- -- --     Ranges for vital signs:  Temp:  [97.5  F (36.4  C)-99.6  F (37.6  C)] 97.6  F (36.4  C)  Pulse:  [67-83] 67  Resp:  [18-24] 18  BP: (99)/(56) 99/56  MAP:  [63 mmHg-95 mmHg] 67 mmHg  Arterial Line BP: ()/(38-66) 98/48  FiO2 (%):  [40 %-100 %] 50 %  SpO2:  [90 %-100 %] 92 %  Vitals:    08/06/21 0830 08/07/21 0400   Weight: 66.4 kg (146 lb 6.2 oz) 71.8 kg (158 lb 4.6 oz)       Physical " Examination:  GENERAL:  well-developed, well-nourished, intubated and sedated in ICU bed  HEAD:  Head is normocephalic, atraumatic   EYES:  Eyes have anicteric sclerae without conjunctival injection   ENT:  Oropharynx is moist without exudates or ulcers. ETT +  NECK:  No cervical lymphadenopathy  LUNGS:  Mechanically ventilated, coarse breath sounds  CARDIOVASCULAR:  Regular rate and rhythm with no murmurs, gallops or rubs.  ABDOMEN:  Normal bowel sounds, soft, nontender. No appreciable hepatosplenomegaly.  SKIN:  No acute rashes.  Line in place without any surrounding erythema or exudate.  NEUROLOGIC:  Intubated and sedated, unable to assess         Laboratory Data:     Absolute CD4, Harris T Cells   Date Value Ref Range Status   08/07/2021 7 (L) 441-2,156 cells/uL Final       Inflammatory Markers    Recent Labs   Lab Test 08/06/21  1354   .0*     Metabolic Studies       Recent Labs   Lab Test 08/07/21  1350 08/07/21  0330 08/06/21  1706 08/06/21  1706 08/06/21  1354 02/28/20  1308 02/28/20  0341    140  --  139 136  136   < > 137   POTASSIUM 3.9 4.3  --  5.2 5.7*  5.7*   < > 5.0   CHLORIDE 105 107  --  112* 112*  112*   < > 102   CO2 26 21  --  16* 13*  13*   < > 22   ANIONGAP 11 12  --  11 11  11   < > 13   BUN 54* 51*  --  52* 48*  48*   < > 65*   CR 2.15* 2.38*  --  3.05* 2.89*  2.89*   < > 11.90*   GFRESTIMATED 36* 32*  --  24* 25*  25*   < > 5*   * 213*   < > 92  99 76  76   < > 92   A1C  --   --   --   --   --   --  5.2   ANTWAN 8.6 8.3*  --  9.0 9.2  9.2   < > 7.7*   PHOS  --  5.9*  --  6.3* 6.8*   < >  --    MAG 2.3 2.2  --  2.0 2.0   < >  --    LACT  --   --   --  1.2 2.1*  --   --    PCAL  --   --   --   --  9.31*  9.16*  --   --     < > = values in this interval not displayed.       Hepatic Studies    Recent Labs   Lab Test 08/07/21  0330 08/06/21  1706 02/23/21  1149 05/07/20  0910 05/04/20  0855 03/05/20  0530   BILITOTAL 0.7 0.4  --    < >  --  0.6   31194  --   --  0.3*   --   --   --    DBIL  --   --   --   --   --  0.1   ALKPHOS 58 71  --    < >  --   --    40388  --   --  76  --   --   --    PROTTOTAL 6.2* 5.4*  --    < >  --   --    83143  --   --  6.9  --   --   --    ALBUMIN 2.7* 1.3*  --    < >  --   --    00839  --   --  3.9  --   --   --    AST 27 21  --    < >  --   --    39712  --   --  19  --   --   --    ALT 16 16  --    < >  --   --    98134  --   --  29  --   --   --    LDH  --  290*  --   --   --  269*   66233  --   --   --   --  199  --     < > = values in this interval not displayed.       Pancreatitis testing    Recent Labs   Lab Test 02/28/20  0341   TRIG 176*       Gout Labs    No lab results found.    Hematology Studies      Recent Labs   Lab Test 08/07/21  1350 08/07/21  0330 08/06/21 2011 08/06/21  1706 08/06/21  1354 05/05/20  0000 03/16/20  0807 03/13/20  0735   WBC 6.4 6.0 7.6 7.8 6.7  --  9.6 12.1*   ANEU  --   --   --   --   --   --  8.4* 10.7*   ALYM  --   --   --   --   --   --  0.2* 0.3*   TAHIR  --   --   --   --   --   --  0.6 0.6   AEOS  --   --   --   --   --   --  0.2 0.4   HGB 7.1* 6.4* 7.2* 7.8* 7.5*   < > 9.0* 6.7*   HCT 23.7* 21.8* 24.4* 27.2* 28.0*   < > 27.6* 20.3*    246 272 280 263   < > 309 357    < > = values in this interval not displayed.       Clotting Studies    Recent Labs   Lab Test 04/03/20  0730 03/05/20  1327 02/28/20  0341 01/26/17  0647   INR 1.21* 1.18* 1.11 0.99   PTT  --  30 30 29       Iron Testing    Recent Labs   Lab Test 08/07/21  1350 05/18/21  0000 01/05/21  0000 06/29/20  0914 03/05/20  1327 03/04/20  1018 03/03/20  0729   IRON  --   --   --  163  --   --  139   FEB  --   --   --  277  --   --  142*   IRONSAT  --   --   --  59*  --   --  98*   YOLIS  --  2,884  --  2,163*  --   --  3,397*   MCV 94  --    < >  --  91  --  91   FOLIC  --   --   --   --  23.8  --   --    B12  --   --   --   --  1,407*  --   --    HAPT  --   --   --   --  184  --   --    RETP  --   --   --   --  0.4*   < >  --    RETICABSCT  --   --    --   --  10.9*   < >  --     < > = values in this interval not displayed.       Arterial Blood Gas Testing    Recent Labs   Lab Test 08/07/21  0632 08/07/21  0332 08/06/21  1758 08/06/21  1037 02/28/20  1417   PH 7.40 7.34* 7.35 7.30* 7.36   PCO2 40 41 37 33* 35   PO2 83 78* 76* 109* 222*   HCO3 24 22 20* 16* 19*   O2PER 50 50 50 80 53.0        Urine Studies     Recent Labs   Lab Test 03/16/20  0940 03/12/20  1125   URINEPH 6.0 7.0   NITRITE Negative Negative   LEUKEST Small* Trace*   WBCU 18* 7*       Medication levels    Recent Labs   Lab Test 08/06/21  1706 06/29/20  0914   VANCOMYCIN <0.8  --    TACROL  --  10.3       Body fluid stats    Recent Labs   Lab Test 08/06/21  1655   FCOL Colorless   FAPR Clear   FWBC 428   FNEU 56   FLYM 2   FMONO 42       Microbiology:  Fungal testing  No lab results found.    Invalid input(s): LORI FUNGL  No lab results found.    Invalid input(s): LORI FUNGL  No results found for: AM3, [id number], [id number], [id number], ASPI, FGTL, ASPGAI, ASPAG, ASPGAA, ASPERGILLUSA, HISFUN, COFUNG    Fungal testing  No lab results found.    Invalid input(s): LORI FUNGL    Last Culture results with specimen source  Culture   Date Value Ref Range Status   08/06/2021 No growth, less than 1 day  Preliminary   08/06/2021 No growth after 1 day  Preliminary   08/06/2021 Culture in progress  Preliminary   08/06/2021 3+ Normal pat to date  Preliminary     Culture Micro   Date Value Ref Range Status   03/16/2020 No growth  Final   03/13/2020 Canceled, Test credited  Specimen not received    Final   03/24/2005 No growth  Final   03/24/2005   Final    Canceled, Test credited Test canceled by [facility]   03/11/2005 No growth  Final   03/09/2005   Final    Single colony Coagulase negative Staphylococcus Susceptibility testing not     Comment:      routinely done   03/09/2005 No growth  Final   03/09/2005 No growth  Final   03/09/2005 No growth  Final    Specimen Description   Date Value Ref Range  Status   03/16/2020 Unspecified Urine  Final   03/13/2020 Unknown  Final   03/24/2005 Blood Right Arm  Final   03/24/2005 Blood  Final   03/11/2005 Blood Arterial blood DIALYSIS  Final   03/09/2005 Catheter tip  CENTRAL LINE  Final   03/09/2005 Arm  Final   03/09/2005 White port  Final   03/09/2005 Unspecified Urine  Final        Last check of C difficile  No results found for: CDBPCT    Syphilis Testing    Treponema pallidum Antibody   Date Value Ref Range Status   01/26/2017 Negative NEG Final       Quantiferon testing   Recent Labs   Lab Test 03/16/20  0807 03/13/20  0735 01/26/17  0647   TBRSLT  --   --  Negative   TBAGN  --   --  0.00   LYMPH 2.5 2.6 27.8       Virology:  Coronavirus-19 testing    Recent Labs   Lab Test 08/06/21  0944   SCV2R Negative       Respiratory virus (non-coronavirus-19) testing    Recent Labs   Lab Test 08/06/21  1804   IFLUA Not Detected   FLUAH1 Not Detected   OG2026 Not Detected   FLUAH3 Not Detected   IFLUB Not Detected   PIV1 Not Detected   PIV2 Not Detected   PIV3 Not Detected   PIV4 Not Detected   RSVA Not Detected   RSVB Not Detected   HMPV Not Detected   ADENOV Not Detected   LUJAN Not Detected         BK Virus PCR Quant Result (External)   Date Value Ref Range Status   07/26/2021 None Detected None Detected IU/mL Final   06/22/2021 None Detected None Detected IU/mL Final   05/18/2021 None Detected None Detected IU/mL Final   04/20/2021 None Detected None Detected IU/mL Final   02/23/2021 None Detected None Detected IU/ml Final   01/05/2021 None Detected None Detected IU/mL Final   12/08/2020 None Detected None Detected Final   11/24/2020 None Detected None Detected Final   10/27/2020 None Detected None Detected IU/mL Final   08/31/2020 None Detected None Detected Final   08/10/2020 None Detected None Detected IU/mL Final   07/20/2020 None Detected None Detected IU/mL Final   06/22/2020 None Detected None Detected Final   06/01/2020 None Detected None Detected IU/mL Final    05/04/2020 None Detected None Detected IU/mL Final   04/27/2020 None Detected None Detected IU/mL Final   04/16/2020 None Detected None Detected IU/mL Final     BK Virus Result   Date Value Ref Range Status   01/26/2017 BK Virus DNA Not Detected BKNEG copies/mL Final       Hepatitis B Testing     Recent Labs   Lab Test 02/28/20  0341 01/26/17  0647   AUSAB  --  29.01*   HBCAB  --  Nonreactive   HEPBANG Nonreactive Nonreactive   HBCM Nonreactive  --         Hepatitis C Antibody   Date Value Ref Range Status   02/28/2020 Nonreactive NR^Nonreactive Final     Comment:     Assay performance characteristics have not been established for newborns,   infants, and children     01/26/2017  NR Final    Nonreactive   Assay performance characteristics have not been established for newborns,   infants, and children         CMV Antibody IgG   Date Value Ref Range Status   02/28/2020 <0.2 0.0 - 0.8 AI Final     Comment:     Negative  Antibody index (AI) values reflect qualitative changes in antibody   concentration that cannot be directly associated with clinical condition or   disease state.     01/26/2017  0.0 - 0.8 AI Final    <0.2  Negative   Antibody index (AI) values reflect qualitative changes in antibody   concentration that cannot be directly associated with clinical condition or   disease state.       CMV Antibody IgM   Date Value Ref Range Status   02/28/2020 <0.2 0.0 - 0.8 AI Final     Comment:     Negative  Results from any one IgM assay should not be used as a sole determinant of a   current or recent infection. Because an IgM test can yield false positive   results and low-level IgM antibody may persist for more than 12 months post   infection, reliance on a single test result could be misleading.   Acute infection is best diagnosed by demonstrating the conversion of IgG from   negative to positive. If an acute infection is suspected, consider obtaining a   new specimen and submit for both IgG and IgM testing in  two or more weeks.  Antibody index (AI) values reflect qualitative changes in antibody   concentration that cannot be directly associated with clinical condition or   disease state.       CMV IgG Antibody   Date Value Ref Range Status   03/01/2005 1.4 EU/mL Final     Comment:     Negative for anti-CMV IgG     Varicella Zoster Virus Antibody IgG   Date Value Ref Range Status   01/26/2017  0.0 - 0.8 AI Final    <0.2  Negative, suggests no immunologic exposure.   Antibody index (AI) values reflect qualitative changes in antibody   concentration that cannot be directly associated with clinical condition or   disease state.       EBV Capsid Antibody IgG   Date Value Ref Range Status   02/28/2020 >8.0 (H) 0.0 - 0.8 AI Final     Comment:     Positive, suggests recent or past exposure  Antibody index (AI) values reflect qualitative changes in antibody   concentration that cannot be directly associated with clinical condition or   disease state.     01/26/2017 7.5 (H) 0.0 - 0.8 AI Final     Comment:     Positive, suggests recent or past exposure   Antibody index (AI) values reflect qualitative changes in antibody   concentration that cannot be directly associated with clinical condition or   disease state.       EBV IgG Antibody Interpretation   Date Value Ref Range Status   03/01/2005 Positive, suggests immunologic exposure.  Final   02/26/2005 Positive, suggests immunologic exposure.  Final     EBV Capsid Antibody IgM   Date Value Ref Range Status   02/28/2020 <0.2 0.0 - 0.8 AI Final     Comment:     No detectable antibody.  Antibody index (AI) values reflect qualitative changes in antibody   concentration that cannot be directly associated with clinical condition or   disease state.         Imaging:  Recent Results (from the past 48 hour(s))   XR Abdomen Port 1 View    Narrative    Exam: XR ABDOMEN PORT 1 VIEWS, 8/6/2021 10:10 AM    Indication: og placement    Comparison: Abdominal radiograph 3/8/2020.    Findings:    Orogastric tube tip and side-port project over the expected location  of the stomach. Nonobstructive bowel loops in the visualized upper  abdomen. Diffuse bilateral hazy interstitial opacities in the  visualized lungs.      Impression    Impression:   1. Orogastric tube in appropriate position, side port projected over  body of stomach and tip near pylorus.  2. Diffuse bilateral hazy interstitial opacities, consistent with  infection/pulmonary edema, new from 3/8/2020.    I have personally reviewed the examination and initial interpretation  and I agree with the findings.    WYATT LOVELL MD         SYSTEM ID:  [id number]   XR Chest Port 1 View    Narrative    EXAM: XR CHEST PORT 1 VIEW  8/6/2021 10:10 AM      HISTORY: interval assessment of ETT, respiratory failure    COMPARISON: X-ray: 2/28/2020    FINDINGS: Single view of the chest. The ET tube projects with mid  thoracic trachea. Gastric tube projects below the diaphragm and out of  the field of view. No pneumothorax. No significant pleural effusion.  Cardiac silhouette is within normal limits. Bilateral hazy nodular  opacities, left greater than right. Visualized upper abdomen is  unremarkable. No aggressive osseous lesions.      Impression    IMPRESSION:  1. Bilateral diffuse nodular opacities, left greater than is right.,  possibly a combination of edema and/or infection, vs neoplasia.  2. Tip of the ET tube reduction of the mid thoracic trachea.    I have personally reviewed the examination and initial interpretation  and I agree with the findings.    CESILIA MURILLO MD         SYSTEM ID:  [id number]   US Renal Transplant    Narrative    EXAMINATION: US RENAL TRANSPLANT,  8/6/2021 10:43 AM     COMPARISON: Ultrasound 3/13/2020.    HISTORY: VASQUEZ on transplanted kidney    TECHNIQUE:  Grey-scale, color Doppler and spectral flow analysis.    FINDINGS:  The transplant kidney is located right lower quadrant, and measures  10.2 cm. Parenchyma is of normal thickness  and echogenicity. No focal  lesions. No hydronephrosis. No perinephric fluid collection.    Decompressed urinary bladder with Stein catheter in place.    Renal artery flow:   98 cm/sec peak systolic at hilum.  113 peak systolic at anastomosis.  Arcuate artery resistive indices (upper to lower): 0.87, 0.86, 0.86    Renal Vein Flow:  10 cm/sat hilum.   19 cm/s at anastomosis.    Iliac artery flow:  195 cm/s peak systolic above anastomosis.  152 cm/s peak systolic below anastomosis.    Iliac vein flow:  Patent above and below the anastomosis.      Impression    IMPRESSION:   1. Normal grayscale imaging of the transplant kidney.  2. Improved renal arterial velocity at the anastomosis at 113 cm/s,  previously 194 cm/s on ultrasound dated 2/13/2020. However, the  arcuate artery resistive indices have increased.     I have personally reviewed the examination and initial interpretation  and I agree with the findings.    PAUL RUIZ MD         SYSTEM ID:  [id number]   US Upper Extremity Venous Duplex Bilat   Result Value    Radiologist flags DVT (Urgent)    Narrative    EXAMINATION: DOPPLER VENOUS ULTRASOUND OF BILATERAL UPPER EXTREMTIES,  8/6/2021 3:35 PM     COMPARISON: None.    HISTORY: Unable to advance wire for IJ CVA, suspect clot, has b/l AVF    TECHNIQUE:  Gray-scale evaluation with compression, spectral flow, and  color Doppler assessment of the deep venous system of both upper  extremities.    FINDINGS:  Right upper extremity: Normal blood flow and waveforms are  demonstrated in the internal jugular, innominate, subclavian, and  axillary veins. There is normal compressibility of the brachial,  basilic and cephalic veins. Patent arteriovenous fistula in the mid  right forearm.    Left upper extremity: There is occlusive thrombus in one of the 2  paired brachial veins at the antecubital fossa. Occlusive thrombus in  the left cephalic vein extending from mid upper arm to the wrist. Left  basilic vein was not  visualized. Nonfunctioning fistula in left upper  arm without flow.      Impression    IMPRESSION:  1.  Occlusive thrombus in 1 of the 2 paired left brachial veins at the  antecubital fossa.  2.  Occlusive thrombus in the left cephalic vein extending from mid  upper arm to the wrist.  3.  Occlusion of the nonfunctioning fistula in left upper arm.  4.  No evidence of deep venous thrombosis in right upper extremity.  Patent arterial venous fistula in right forearm.    [Urgent Result: DVT]    Finding was identified on 2021 3:36 PM.     Dr. Rodriguez was contacted by Dr. Guerrero at 2021 3:44 PM and  verbalized understanding of the urgent finding.      I have personally reviewed the examination and initial interpretation  and I agree with the findings.    PAUL RUIZ MD         SYSTEM ID:  [id number]   Echo Complete   Result Value    LVEF  60-65%    Narrative    [id number]  [id number]  [id number]  [id number]^ALEXIS^LENA     [facility],[place]  Echocardiography Laboratory  [address] [place], [place] [address]     Name: JONNY KINCAID  MRN: [id number]  : 1975  Study Date: 2021 08:07 AM  Age: 46 yrs  Gender: Male  Patient Location[place] [facility] [place]  Reason For Study: Respiratory Failure  Ordering Physician: LENA RODRIGUEZ  Performed By: Fabby Fernandez RDCS     BSA: 1.8 m2  Height: 64 in  Weight: 158 lb  HR: 71  BP: 99/56 mmHg  ______________________________________________________________________________  Procedure  Complete Portable Echo Adult. Echocardiogram with two-dimensional, color and  spectral Doppler performed.  ______________________________________________________________________________  Interpretation Summary  Left ventricular size, wall motion and function are normal. The ejection  fraction is 60-65%.  The right ventricle is normal size. Global right ventricular function is  normal.  Mild aortic valve stenosis.  Pulmonary artery systolic pressure cannot be  assessed.  The inferior vena cava was normal in size with preserved respiratory  variability.  No pericardial effusion is present.  ______________________________________________________________________________  Left Ventricle  Left ventricular size, wall motion and function are normal. The ejection  fraction is 60-65%. Grade II or moderate diastolic dysfunction.     Right Ventricle  The right ventricle is normal size. Global right ventricular function is  normal.     Atria  The atria cannot be assessed.     Mitral Valve  Mild mitral annular calcification is present. Trace mitral insufficiency is  present.     Aortic Valve  Mild aortic valve stenosis. The valve leaflets are not well visualized.     Tricuspid Valve  The tricuspid valve is normal. Mild tricuspid insufficiency is present.  Pulmonary artery systolic pressure cannot be assessed.     Pulmonic Valve  The pulmonic valve is normal.     Vessels  The aorta root is normal. The thoracic aorta is normal. The pulmonary artery  cannot be assessed. The inferior vena cava was normal in size with preserved  respiratory variability.     Pericardium  No pericardial effusion is present.     Compared to Previous Study  This study was compared with the study from 2017 . The left ventricular  function has remained the same.  ______________________________________________________________________________  MMode/2D Measurements & Calculations     IVSd: 1.0 cm  LVIDd: 4.9 cm  LVIDs: 3.1 cm  LVPWd: 0.79 cm  FS: 37.7 %  LV mass(C)d: 157.1 grams  LV mass(C)dI: 88.8 grams/m2  Ao root diam: 2.9 cm  asc Aorta Diam: 3.1 cm  LVOT diam: 2.1 cm  LVOT area: 3.6 cm2  LA Volume (BP): 55.7 ml  LA Volume Index (BP): 31.5 ml/m2  RWT: 0.32     Doppler Measurements & Calculations  MV E max beverly: 131.0 cm/sec  MV A max beverly: 138.0 cm/sec  MV E/A: 0.95  MV dec slope: 448.0 cm/sec2  MV dec time: 0.29 sec  Ao V2 max: 300.0 cm/sec  Ao max P.0 mmHg  Ao V2 mean: 216.0 cm/sec  Ao mean P.0  mmHg  Ao V2 VTI: 68.5 cm  MARLEY(I,D): 2.3 cm2  MARLEY(V,D): 2.2 cm2  LV V1 max P.5 mmHg  LV V1 max: 184.0 cm/sec  LV V1 VTI: 44.5 cm  SV(LVOT): 158.3 ml  SI(LVOT): 89.4 ml/m2  AV Sidney Ratio (DI): 0.61  MARLEY Index (cm2/m2): 1.3  E/E' av.3  Lateral E/e': 19.4  Medial E/e': 19.1     ______________________________________________________________________________  Report approved by: MD Kwaku Valdes 2021 11:49 AM         XR Chest Port 1 View    Narrative    Portable chest    INDICATION: Patient critically ill, intubated, with blasts on  bronchoalveolar lavage    COMPARISON: 2021    Findings: Heart size appears normal. Diffuse patchy opacities appear  increased, especially in the right lung. Endotracheal tube tip is  approximately 4.1 cm above the lawrence. NG/OG tube progresses beyond  the inferior margin of the image. Heart size appears normal.      Impression    IMPRESSION: Increasing ARDS, likely related to infection. Differential  considerations of edema or pulmonary hemorrhage.    RAMA CUNNINGHAM MD         SYSTEM ID:  [id number]

## 2021-08-07 NOTE — PLAN OF CARE
ICU End of Shift Summary. See flowsheets for vital signs and detailed assessment.    Changes this shift: heparin gtt initiated for LUE DVT. Axa this AM therapeutic, recheck at 0900. Microbiology notified RN regarding fungal smear and gram stain positive for budding yeast, consistent with blasotmyces. MD notified. Amphotericin ordered and given with premeds. Pt tolerated ampho infusion with no immediate complications. Pt remains on 50% fiO2, PEEP 10. ABGs stable. UO 30-60 ml/hr. Pt I & O + 6.3 liters since admission yesterday AM. Hgb 6.4 this AM. Blood consent obtained by MD from pt's mother, Kaylin. One unit pRBCs ordered. Blood bank notified RN that pt may have some antibodies and unit of blood may be delayed. Epi and vaso weaned off, pt remains on norepi, currently at 0.1 for MAP goal 65. Pt RASS -3, not opening eyes or following commands but withdraws and grimaces with cares. TF currently infusing at 20, not advanced to 30 this AM d/t higher residuals of 300 ml. Lytes stable.    Plan:  Recheck Axa at 0900. Possible CRRT? Continue to wean pressors and vent as able.     Pt's sister, Vivian, stating their mother, Kaylin not mentally capable making decisions re pt's care. Vivian states pt has two adult children who also are not able to make decisions for pt. Involve social work for [facility] contact/consent.

## 2021-08-07 NOTE — PROGRESS NOTES
MEDICAL ICU PROGRESS NOTE  08/07/2021      Date of Service (when I saw the patient): 08/07/2021    ASSESSMENT: Jeremiah Cruz is a 46 year old male with PMH significant for Alport's Syndrome complicated by ESRD s/p kidney transplant (02/2020,2005, 1988), thrombocytopenia, anemia of chronic disease, secondary renal hyperparathyroidism, HTN, CML (in remission since 2016) and GERD who presented to OSH with 2 week history of dry cough and dyspnea and developed acute hypoxemic respiratory failure likely 2/2 to sepsis requiring intubation. Found to have budding yeast c/f blastomycosis on BAL and started on amphotericin B. Was requiring 3 pressors initially, but off all pressors at this time.      CHANGES and MAJOR THINGS TODAY:   Patient is off pressors. Discontinued micafungin and started on amphotericin B for possible blastomyces.      -1U PRBC in AM for Hb 6.4, repeat Hb 7.1 (likely dilutional)  -Transplant ID consulted  -Daily CXR   -Discontinue propofol  -Wean midazolam to RASS -2 to -3  -Hold Belatecept for 2 more weeks (was due 8/10)  -PEEP 10-->8   -Discontinued bicarb gtt   -Started on bowel regimen  -Stated on medium sliding scale insulin   -Social work consulted for POA   -MRSA nares   -Consider discontinuing vanc tomorrow  -Continue vancomycin   -Continue meropenem   -Discontinued Micafungin O/N  -Started amphotericin B O/N  -BMP BID while on amphotericin B  -Check MMF level tomorrow  -Stress dose steroid taper tomorrow  -Heparin gtt for L UE DVTs    PLAN:    Neuro:  # Pain and sedation  Patient is currently on Versed and fentanyl for sedation.   - Propofol discontinued  - Versed, wean as tolerated    - Fentanyl, wean as tolerated   - RASS goal -2 to -3    Pulmonary:  # Acute Hypoxemic Respiratory Failure  # ARDS  # Pneumonia  Presented to OSH afebrile and tachypneic with 2 week history of dry cough and dyspnea. He was hypoxemic at OSH requiring intubation. CXR at OSH significant for bilateral white out  and CT scan significant for similar findings c/f pneumonia in setting of immunocompromised patient. KOH prep showed budding yeasts concerning for blastomyces and blood culture showed gram positive cocci c/f bacteremia. Further differential includes: ARDS, CHF, dasatinib-induced pneumonitis, alveolar rupture, silicosis, sarcoidosis and beryllium. Covid is negative. Overall respiratory status is improving. Will trial PST possibly later this PM and consider extubation in coming days.    - See ID section   - Continue vancomycin   - Continue meropenem   - Discontinued micafungin   - Started on amphotericin B   - Continue PTA dapsone   - PEEP 10-->8   - Daily CXR      Ventilation Mode: CMV/AC  (Continuous Mandatory Ventilation/ Assist Control)  FiO2 (%): 50 %  Rate Set (breaths/minute): 18 breaths/min  Tidal Volume Set (mL): 500 mL  PEEP (cm H2O): 8 cmH2O  Oxygen Concentration (%): 50 %  Resp: 18      Cardiovascular:  #Hypotension, improving   #Shock    #H/O HTN  Patient previously required three pressors yesterday (Norepi, vaso, epi) and is now off all pressors at this time. Patient's hypotension is c/f shock 2/2 sepsis versus hypovolemia versus cardiogenic. Patient did appear hypovolemic on POCUS. Lactate elevated at 2.1. Concerned since patient is on chronic steroids at home for immunosuppression that adrenal insufficiency may be contributing to his hypotension. Will continue high dose stress corticosteroids for at least today and then consider tapering tomorrow.       -Vaso off   -Epi off   -Levophed prn for MAP goal >65  -Continue Hydrocortisone 100 mg Q8H for today. Consider taper starting tomorrow.   -Hold PTA amlodipine   -Hold PTA carvedilol     #H/O CHF  #Intermittent Bradycardia  Concerned that patient's bilateral white out on CXR may be due to CHF, however, no bilateral LE edema or hepatomegaly appreciated on exam. Will obtain Echo to assess cardiac function. Patient has been intermittently bradycardic unknown  as to why. Differential includes adrenal insufficiency (patient is on PTA chronic steroids) hypokalemia, hypocalcemia and hypothyroidism although electrolytes are WNL. Can consider TSH if patient develops persistent bradycardia.   - Echo TTE  - Consider TSH     GI/Nutrition:  # GERD  -prophylactic Protonix      #Nutrition  -Tube feeds per OG tube   -Goal rate of 50 ml/hr once residuals minimized     #Constipation   -Begin scheduled bowel regimen of Senna and Miralax     Renal/Fluids/Electrolytes:  # Alport's Syndrome  # ESRD s/p renal transplant (02/2020, 2005, 1988)   Patient's latest renal transplant in 02/2020. Previous renal transplants in 1988 which failed and 2005 which was nonfunctional.   - Consulted renal transplant team. Appreciate ongoing recs.  - Hydrocortisone 100 mg Q8H. Begin taper tomorrow.    - Mycophenolate acid to 360 mg BID or oral suspension 500 mg BID  - Held Belatacept 5 mg/kg Q4 weeks (next dose scheduled on 8/10/21)   - Discontinued Isotonic bicarb drip 150 meq at 150 ml/hr  - Check MMF level tomorrow     # Acute Kidney Injury, improving  Creatinine elevated at 2.89 on admission. Administered 2L LR yesterday and Cr today is 2.38. Baseline creatinine appears 1.8-2.1 on chart review. Likely etiologies include prerenal azotemia versus ATN in setting of possible shock. Per renal transplant team, no acute indication for renal replacement therapy at this time.  -Fluids as needed    -Repeat CMP     # Hyperphosphatemia   # Hyperkalemia, resolved   # Hypoalbuminemia   Likely 2/2 to patient's VASQUEZ. EKG shows no concerning findings such as peaked T waves or Torsades. 2L of LR given and K+ corrected. Patient also given Albumin 50g 25% Q6H x4 doses and is improving.   -BMP BID      Endocrine:  # Hyperglycemia   Patient's sugars have been elevated to the 200s likely 2/2 stress response versus high dose stress corticosteroids.   -Begin medium resistance sliding scale     #H/O Hyperparathyroidism 2/2  ESRD  Not an active problem     ID:  # Cough   # Dyspnea   # Pulmonary blastomycoses dermatididis   # Gram positive coagulase negative cocci bacteremia (1/2 blood cx)  Presented to OSH afebrile and tachypneic with 2 week history of dry cough and dyspnea. He was hypoxemic at OSH requiring intubation. CXR at OSH significant for bilateral white out and CT scan significant for similar findings. KOH prep and gram stain from BAL showed budding yeast c/f blastomyces. Patient was started on amphotericin B overnight and demonstrated a rapid improvement which is somewhat unexpected for blasto. Blood cultures also show gram positive coagulase negative cocci in 1/2 blood cultures, likely contaminate, will watch him overnight and await MRSA nares before discontinuing vancomycin. In the setting of his immunosuppression on the differential is bacterial, fungal, viral etiologies. CMV is negative, COVID negative, respiratory virus panel negative, strep pneumo and legionell negative. Remaining on the differential are pneumocystis histoplasmosis, coccidioides. Further differential included: CHF, dasatinib-induced pneumonitis, diffuse alveolar hemorrhage, silicosis, sarcoidosis and beryllium.   - ID consulted, recs appreciated   - MRSA nares   - Continue vancomycin, anticipate de-escalating tomorrow if BCx negative for 24H  - Continue meropenem, anticipate de-escalating in coming days if no further cx data  - Discontinued micafungin  - Started on amphotericin B    - Continue PTA dapsone for PJP ppx  - Serum Aspergillus galactomannan and beta-d-glucan pending  - Serum Cryptococcus antigen pending  - Serum and Urine Histoplasma/Blastomyces antigens pending  - BAL cultures/results pending including - cytology, bacterial/fungal/AFB cultures, Nocardia PCR and culture, PJP PCR, Aspergillus galactomannan, CMV, BAL Histo antigen, Legionella culture  - EBV DNA PCR pending    Hematology:    # H/O Anemia in Chronic Renal Disease   # H/O  Thrombocytopenia  Patient has history of anemia in chronic renal disease. His latest hemoglobin is 6.4. He was transfused 1 unit. Platelets are WNL. Will continue to monitor. Drop in Hb today likely dilutional due to IVF resuscitation.  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 Likely 2/2 coagulopathy in the setting of sepsis.  -Heparin gtt     # H/O CML  In remission since 2016. PTA dasatinib. Not an active problem.   -Held dasatinib      Musculoskeletal:  No active problems     Skin:  No active problems     General Cares/Prophylaxis:    DVT Prophylaxis: Heparin gtt for DVTs in UE  GI Prophylaxis: PPI  Restraints: None needed  Family Communication: Social work consulted.   Code Status: Full code     Lines/tubes/drains:  -OG   -Stein   -R Femoral A-line   -CVC R femoral   -Peripheral IV  -ETT    Disposition:  - Medical ICU     Patient seen and findings/plan discussed with medical ICU staff, Dr. Curran.    Mindi Mason, MS4     Resident/Fellow Attestation   I, Annette Rodriguez, was present with the medical/DARRON student who participated in the service and in the documentation of the note.  I have verified the history and personally performed the physical exam and medical decision making.  I agree with the assessment and plan of care as documented in the note and edited where appropriate.  Annette Rodriguez, DO  PGY2  Date of Service (when I saw the patient): 08/07/21      Attending note:  I was present with the medical/DARRON student who participated in the service and in the documentation of the note.  I have verified the history and personally performed the physical exam and medical decision making.  I agree with the assessment and plan of care as documented in the note and edited where appropriate.  Patient seen, examined and discussed with the Resident physicians.  All data reviewed including vital signs, medications, laboratory studies and imaging.  Agree with the assessment and plan as outlined in the above note. The patient remains critically ill with acute respiratory failure, shock, acute kidney injury, immunocompromised.  I personally adjusted the ventilator to treat the acute respiratory failure and titrated the vasopressors to manage the septic shock.  Past renal transplant, immunocompromised, presented with acute respiratory failure with dense left sided infiltrate concerning for typical or atypical infection, IIP not excluded nor alveolar hemorrhage.  Wide budding yeast on gram stain- consistent with Blastomycosis- started on Amphotericin.  Improved hemodynamics with fluid resuscitation, stress dose steroids- weaning down vasopressors.  Weaning ventilator support- will try to decrease PEEP and assess.  Nephrology following.     Total Critical Care time excluding time for procedures and teaching was 40 minutes.     Joann Curran MD  [phone]    ====================================  INTERVAL HISTORY:   Patient is off all pressors at this time and maintaining MAPs. ZAID prep concerning for blastomyces. Started on amphotericin B with rapid improvement in blood pressure and vent settings overnight.     OBJECTIVE:   1. VITAL SIGNS:   Temp:  [97.5  F (36.4  C)-99.6  F (37.6  C)] 97.8  F (36.6  C)  Pulse:  [68-83] 70  Resp:  [18-24] 18  BP: ()/(46-66) 99/56  MAP:  [63 mmHg-94 mmHg] 69 mmHg  Arterial Line BP: ()/(38-64) 103/47  FiO2 (%):  [40 %-100 %] 50 %  SpO2:  [90 %-100 %] 93 %  Ventilation Mode: CMV/AC  (Continuous Mandatory Ventilation/ Assist Control)  FiO2 (%): 50 %  Rate Set (breaths/minute): 18 breaths/min  Tidal Volume Set (mL): 500 mL  PEEP (cm H2O): 10 cmH2O  Oxygen Concentration (%): 50 %  Resp: 18    2. INTAKE/ OUTPUT:   I/O last 3 completed shifts:  In: 7053.23 [I.V.:9417.23;  NG/GT:316; IV Piggyback:2500]  Out: 713 [Urine:713]    3. PHYSICAL EXAMINATION:  General: Sedated, no acute distress. Mechanically ventilated. OG tube.   HEENT: Pupils are symmetric and reactive to light. No conjunctival injection.  Neuro: sedated, RASS -2 to -3.   Pulm/Resp: Coarse breath sounds (L more than R).   CV: RRR, no murmurs, rubs or gallops.   Abdomen: Soft, non-distended, non-tender on palpation.  : Stein catheter in place, urine yellow and clear  Incisions/Skin: Vertical surgical scars on abdomen.     4. LABS:   Arterial Blood Gases   Recent Labs   Lab 08/07/21  0632 08/07/21  0332 08/06/21  1758 08/06/21  1037   PH 7.40 7.34* 7.35 7.30*   PCO2 40 41 37 33*   PO2 83 78* 76* 109*   HCO3 24 22 20* 16*     Complete Blood Count   Recent Labs   Lab 08/07/21 0330 08/06/21 2011 08/06/21  1706 08/06/21  1354   WBC 6.0 7.6 7.8 6.7   HGB 6.4* 7.2* 7.8* 7.5*    272 280 263     Basic Metabolic Panel  Recent Labs   Lab 08/07/21  0943 08/07/21  0338 08/07/21 0330 08/06/21  1706 08/06/21  1354 08/06/21  1314   NA  --   --  140 139 136  136  --    POTASSIUM  --   --  4.3 5.2 5.7*  5.7*  --    CHLORIDE  --   --  107 112* 112*  112*  --    CO2  --   --  21 16* 13*  13*  --    BUN  --   --  51* 52* 48*  48*  --    CR  --   --  2.38* 3.05* 2.89*  2.89*  --    * 188* 213* 92  99 76  76   < >    < > = values in this interval not displayed.     Liver Function Tests  Recent Labs   Lab 08/07/21  0330 08/06/21 1706 08/06/21  1354   AST 27 21 26   ALT 16 16 19   ALKPHOS 58 71 74   BILITOTAL 0.7 0.4 0.4   ALBUMIN 2.7* 1.3* 0.7*     Coagulation Profile  No lab results found in last 7 days.    5. RADIOLOGY:   Recent Results (from the past 24 hour(s))   US Upper Extremity Venous Duplex Bilat   Result Value    Radiologist flags DVT (Urgent)    Narrative    EXAMINATION: DOPPLER VENOUS ULTRASOUND OF BILATERAL UPPER EXTREMTIES,  8/6/2021 3:35 PM     COMPARISON: None.    HISTORY: Unable to advance wire for IJ  CVA, suspect clot, has b/l AVF    TECHNIQUE:  Gray-scale evaluation with compression, spectral flow, and  color Doppler assessment of the deep venous system of both upper  extremities.    FINDINGS:  Right upper extremity: Normal blood flow and waveforms are  demonstrated in the internal jugular, innominate, subclavian, and  axillary veins. There is normal compressibility of the brachial,  basilic and cephalic veins. Patent arteriovenous fistula in the mid  right forearm.    Left upper extremity: There is occlusive thrombus in one of the 2  paired brachial veins at the antecubital fossa. Occlusive thrombus in  the left cephalic vein extending from mid upper arm to the wrist. Left  basilic vein was not visualized. Nonfunctioning fistula in left upper  arm without flow.      Impression    IMPRESSION:  1.  Occlusive thrombus in 1 of the 2 paired left brachial veins at the  antecubital fossa.  2.  Occlusive thrombus in the left cephalic vein extending from mid  upper arm to the wrist.  3.  Occlusion of the nonfunctioning fistula in left upper arm.  4.  No evidence of deep venous thrombosis in right upper extremity.  Patent arterial venous fistula in right forearm.    [Urgent Result: DVT]    Finding was identified on 2021 3:36 PM.     Dr. Rodriguez was contacted by Dr. Guerrero at 2021 3:44 PM and  verbalized understanding of the urgent finding.      I have personally reviewed the examination and initial interpretation  and I agree with the findings.    PAUL RUIZ MD         SYSTEM ID:  [id number]   Echo Complete   Result Value    LVEF  60-65%    Narrative    [id number]  [id number]  [id number]  [id number]^ALEXIS^[facility]  Echocardiography Laboratory  [address] [place] [id number]     Name: JONNY KINCAID  MRN: [id number]  : 1975  Study Date: 2021 08:07 AM  Age: 46 yrs  Gender: Male  Patient Location: [facility] [place]  Reason For Study: Respiratory  Failure  Ordering Physician: LENA ESPINOZA  Performed By: Fabby Fernandez RDCS     BSA: 1.8 m2  Height: 64 in  Weight: 158 lb  HR: 71  BP: 99/56 mmHg  ______________________________________________________________________________  Procedure  Complete Portable Echo Adult. Echocardiogram with two-dimensional, color and  spectral Doppler performed.  ______________________________________________________________________________  Interpretation Summary  Left ventricular size, wall motion and function are normal. The ejection  fraction is 60-65%.  The right ventricle is normal size. Global right ventricular function is  normal.  Mild aortic valve stenosis.  Pulmonary artery systolic pressure cannot be assessed.  The inferior vena cava was normal in size with preserved respiratory  variability.  No pericardial effusion is present.  ______________________________________________________________________________  Left Ventricle  Left ventricular size, wall motion and function are normal. The ejection  fraction is 60-65%. Grade II or moderate diastolic dysfunction.     Right Ventricle  The right ventricle is normal size. Global right ventricular function is  normal.     Atria  The atria cannot be assessed.     Mitral Valve  Mild mitral annular calcification is present. Trace mitral insufficiency is  present.     Aortic Valve  Mild aortic valve stenosis. The valve leaflets are not well visualized.     Tricuspid Valve  The tricuspid valve is normal. Mild tricuspid insufficiency is present.  Pulmonary artery systolic pressure cannot be assessed.     Pulmonic Valve  The pulmonic valve is normal.     Vessels  The aorta root is normal. The thoracic aorta is normal. The pulmonary artery  cannot be assessed. The inferior vena cava was normal in size with preserved  respiratory variability.     Pericardium  No pericardial effusion is present.     Compared to Previous Study  This study was compared with the study from  2017 . The left ventricular  function has remained the same.  ______________________________________________________________________________  MMode/2D Measurements & Calculations     IVSd: 1.0 cm  LVIDd: 4.9 cm  LVIDs: 3.1 cm  LVPWd: 0.79 cm  FS: 37.7 %  LV mass(C)d: 157.1 grams  LV mass(C)dI: 88.8 grams/m2  Ao root diam: 2.9 cm  asc Aorta Diam: 3.1 cm  LVOT diam: 2.1 cm  LVOT area: 3.6 cm2  LA Volume (BP): 55.7 ml  LA Volume Index (BP): 31.5 ml/m2  RWT: 0.32     Doppler Measurements & Calculations  MV E max sidney: 131.0 cm/sec  MV A max sidney: 138.0 cm/sec  MV E/A: 0.95  MV dec slope: 448.0 cm/sec2  MV dec time: 0.29 sec  Ao V2 max: 300.0 cm/sec  Ao max P.0 mmHg  Ao V2 mean: 216.0 cm/sec  Ao mean P.0 mmHg  Ao V2 VTI: 68.5 cm  MARLEY(I,D): 2.3 cm2  MARLEY(V,D): 2.2 cm2  LV V1 max P.5 mmHg  LV V1 max: 184.0 cm/sec  LV V1 VTI: 44.5 cm  SV(LVOT): 158.3 ml  SI(LVOT): 89.4 ml/m2  AV Sidney Ratio (DI): 0.61  MARLEY Index (cm2/m2): 1.3  E/E' av.3  Lateral E/e': 19.4  Medial E/e': 19.1     ______________________________________________________________________________  Report approved by: MD Kwaku Valdes 2021 11:49 AM         XR Chest Port 1 View    Narrative    Portable chest    INDICATION: Patient critically ill, intubated, with blasts on  bronchoalveolar lavage    COMPARISON: 2021    Findings: Heart size appears normal. Diffuse patchy opacities appear  increased, especially in the right lung. Endotracheal tube tip is  approximately 4.1 cm above the lawrence. NG/OG tube progresses beyond  the inferior margin of the image. Heart size appears normal.      Impression    IMPRESSION: Increasing ARDS, likely related to infection. Differential  considerations of edema or pulmonary hemorrhage.    RAMA CUNNINGHAM MD         SYSTEM ID:  [id number]

## 2021-08-07 NOTE — PLAN OF CARE
ICU End of Shift Summary. See flowsheets for vital signs and detailed assessment.    Changes this shift: HD stable levophed titrated off at 0930, PEEP decreased from 10 to 8 with  FIO2 50%, ABG obtained post changes PO2 76, however SPO2 90-92%. Tried to weaned versed down from 3 to 2 mg/hr but was unsuccessful 2/2 increased coughing episodes.  Moderated amounts of creamy ETT secretion and larger amt's of clear-thick oral secretions w/a good cough.  Miralax and senna givenm, pt is passing flatus, but no BM thus far and still having >200 ml of TF's residuals. Tube feeds remains at 20 ml.  Heparin Xa subtherapeutic all shift, currently heparin gtt at 1500 units/hr. Hemoglobin 6.4 g/dl this am one unit of Prbc's given and hemoglobin recheck was 7.1. Pt has no obvious signs of bleeding.    Plan:  Frequent suctioning w/ repositioning, prn's available for agitation. Monitor uop and electrolytes closely and levophed available to maintain MAP>65.    Heparin Xa due at 0030. Continue w/goals of care.  Problem: Adult Inpatient Plan of Care  Goal: Readiness for Transition of Care  Outcome: Improving     Problem: Risk for Delirium  Goal: Improved Attention and Thought Clarity  Outcome: Improving

## 2021-08-07 NOTE — PROGRESS NOTES
[facility]   Transplant Nephrology Progress Note  Date of Admission:  8/6/2021  Today's Date: 08/07/2021    Recommendations:  - consider transplant ID (I wonder if we can start itraconazole to limit the duration of ambisome if possible)   - ok to stop IV bicarb   - will check MPA level  - repeat BMP later today     Assessment & Plan   # DDKT: VASQUEZ, pre-renal azotemia vs ATN in the settings of shock possible septic shock. Recommend IVF hydration, pressors support if needed to maintain MAP > 65 mmHg.               - Baseline Creatinine: ~ 1.8-2.1              - Proteinuria: Minimal (0.2-0.5 grams)              - Date DSA Last Checked: Feb/2021      Latest DSA: No              - BK Viremia: No              - Kidney Tx Biopsy: Apr 03, 2020; Result: No diagnostic evidence of acute rejection.  Moderate interstitial fibrosis and tubular atrophy. EM showed ~25% foot process effacement               -No acute indication for RRT currently but may develop one in the coming day as he is oligoanuric.               -He has functional RUE AVF. Will attempt to call family to consent for RRT. If he does require RRT will trial iHD possibly with pressors via RUE prior to line placement for CRRT     # Immunosuppression: Belatacept (dose 5 mg/kg every four weeks), Mycophenolic acid (dose 540 mg every 12 hours) and Prednisone (dose 5 mg daily) last belatacept dose in July, next dose scheduled on Aug 10, 2021              - Changes: Yes - change MPA to MMF suspension and reduce dose to 500mg q12h due to infection     # Infection Prophylaxis:   - PJP: Dapsone              - CMV: Valcyte;  (D+/R-). completed  Valcyte for 6 months. CMV Negative (2/2021), will check CMV given his respiratory issues.     # Hypertension: low BP requiring pressors;  Goal BP: < 130/80              - Volume status: Euvolemic                              - Changes: No     # Anemia in Chronic Renal Disease: Hgb: Stable       DEB: No              - Iron studies: Not checked recently     # Mineral Bone Disorder:   - Secondary renal hyperparathyroidism; PTH level: Normal (18-80 pg/ml)        On treatment: None  - Vitamin D; level: Normal        On supplement: No  - Calcium; level: Normal        On supplement: No     # Electrolytes:   - Potassium; level: High        On supplement: No  - Magnesium; level: Not checked recently        On supplement: No  - Bicarbonate; level: Low        On supplement: No start bicarb drip     # CML: Appears stable on Dasatinib. Patient is followed by Hematology.     # SOB with cough: Concern for blastomycosis on Ambisome.    2 week history of dry cough and dyspnea. CXR at  OSH significant for bilateral white out. CT scan without contrast at OSH showed bilateral atypical pneumonia similar to CXR. COVID-19 PCR is negative.              - recommend empiric antibiotics coverage given the severity of his presentation.               - recommend sending work for atypical pneumonia (fungitell, histoplasmosis Ag, blastomyces Ag, coccidioides Ag and legionella urine Ag), and bronchoscopy with BAL cultures and stains (GMS for PJP) and CMV.              - recommend reducing MPA dose to 360 mg BID, ok to use MMF oral suspension 500 mg BID in the meantime.              -Send CMV and EBV PCR quant (ordered)     # Shock likely septic: in the setting of blastomycosis      # Transplant History:  Etiology of Kidney Failure: Alport's syndrome  Tx: DDKT  Transplant: 2/28/2020 (Kidney), 12/7/1988 (Kidney), 3/2/2005 (Kidney)  Crossmatch at time of Tx: negative  Significant changes in immunosuppression: None  Significant transplant-related complications: None     Recommendations were communicated to the primary team verbally.      Nehemiah Wilkinson MD   Pager: [phone]    Interval History   Overall remains intubated and sedated. His pressor requirements are decreasing as well as his vent parameters.     Review of Systems   4 point  "ROS was obtained and negative except as noted in the Interval History.    MEDICATIONS:    sodium chloride 0.9%  500 mL Intravenous Q24H     acetaminophen  650 mg Oral or Feeding Tube Q24H     dextrose 5% water  10-20 mL Intravenous Q24H    And     amphotericin B liposome (AMBISOME) intermittent infusion  5 mg/kg (Dosing Weight) Intravenous Q24H    And     dextrose 5% water  10-20 mL Intravenous Q24H     atorvastatin  10 mg Oral Daily     dapsone  50 mg Oral or Feeding Tube Daily     diphenhydrAMINE  25 mg Oral Q24H    Or     diphenhydrAMINE  25 mg Intravenous Q24H     hydrocortisone sodium succinate PF  100 mg Intravenous Q8H     insulin aspart  1-6 Units Subcutaneous Q4H     loratadine  10 mg Oral Daily     meropenem  1 g Intravenous Q12H     multivitamins w/minerals  15 mL Per Feeding Tube Daily     mycophenolate  500 mg Oral BID     pantoprazole (PROTONIX) IV  40 mg Intravenous Daily with breakfast     polyethylene glycol  17 g Oral Daily     protein modular  1 packet Per Feeding Tube TID     senna-docusate  2 tablet Oral BID     vancomycin place reyes - receiving intermittent dosing  1 each Intravenous See Admin Instructions       dextrose       fentaNYL 50 mcg/hr (21 1200)     heparin 1,350 Units/hr (21 1200)     midazolam 3 mg/hr (21 1256)     norepinephrine Stopped (21 0930)       Physical Exam   Temp  Av.4  F (36.9  C)  Min: 97.5  F (36.4  C)  Max: 99.9  F (37.7  C)  Arterial Line BP  Min: 98/48  Max: 174/61  Arterial Line MAP (mmHg)  Av.7 mmHg  Min: 63 mmHg  Max: 95 mmHg      Pulse  Av.7  Min: 67  Max: 83 Resp  Av  Min: 18  Max: 26  FiO2 (%)  Av %  Min: 40 %  Max: 100 %  SpO2  Av.6 %  Min: 90 %  Max: 100 %     BP 99/56 (BP Location: Left arm)   Pulse 67   Temp 97.6  F (36.4  C) (Axillary)   Resp 18   Ht 1.626 m (5' 4\")   Wt 71.8 kg (158 lb 4.6 oz)   SpO2 92%   BMI 27.17 kg/m     Date 21 07 - 21 0659   Shift 3083-5773 3780-8961 " 8377-6821 24 Hour Total   INTAKE   I.V. 913.18   913.18   NG/   353   Colloid 400   400   Enteral 160   160   Blood Components 300   300   Shift Total(mL/kg) 2126.18(29.61)   2126.18(29.61)   OUTPUT   Urine 350   350   Shift Total(mL/kg) 350(4.87)   350(4.87)   Weight (kg) 71.8 71.8 71.8 71.8      Admit Weight: 66.4 kg (146 lb 6.2 oz)     GENERAL APPEARANCE: intubated and sedated.   HENT: mouth without ulcers or lesions  LYMPHATICS: no cervical or supraclavicular nodes  RESP: lungs clear to auscultation - no rales, rhonchi or wheezes  CV: regular rhythm, normal rate, no rub, no murmur  EDEMA: no LE edema bilaterally  ABDOMEN: soft, nondistended, nontender, bowel sounds normal  MS: extremities normal - no gross deformities noted, no evidence of inflammation in joints, no muscle tenderness  SKIN: no rash    Data   All labs reviewed by me.  CMP  Recent Labs   Lab 08/07/21  1350 08/07/21  1300 08/07/21  0943 08/07/21  0338 08/07/21  0330 08/06/21  1706 08/06/21  1354 08/06/21  1314     --   --   --  140 139 136  136  --    POTASSIUM 3.9  --   --   --  4.3 5.2 5.7*  5.7*  --    CHLORIDE 105  --   --   --  107 112* 112*  112*  --    CO2 26  --   --   --  21 16* 13*  13*  --    ANIONGAP 11  --   --   --  12 11 11  11  --    * 174* 218* 188* 213* 92  99 76  76   < >   BUN 54*  --   --   --  51* 52* 48*  48*  --    CR 2.15*  --   --   --  2.38* 3.05* 2.89*  2.89*  --    GFRESTIMATED 36*  --   --   --  32* 24* 25*  25*  --    ANTWAN 8.6  --   --   --  8.3* 9.0 9.2  9.2  --    MAG 2.3  --   --   --  2.2 2.0 2.0  --    PHOS  --   --   --   --  5.9* 6.3* 6.8*  --    PROTTOTAL  --   --   --   --  6.2* 5.4* 5.9*  --    ALBUMIN  --   --   --   --  2.7* 1.3* 0.7*  --    BILITOTAL  --   --   --   --  0.7 0.4 0.4  --    ALKPHOS  --   --   --   --  58 71 74  --    AST  --   --   --   --  27 21 26  --    ALT  --   --   --   --  16 16 19  --     < > = values in this interval not displayed.     CBC  Recent Labs    Lab 08/07/21  1350 08/07/21  0330 08/06/21 2011 08/06/21  1706   HGB 7.1* 6.4* 7.2* 7.8*   WBC 6.4 6.0 7.6 7.8   RBC 2.52* 2.31* 2.56* 2.77*   HCT 23.7* 21.8* 24.4* 27.2*   MCV 94 94 95 98   MCH 28.2 27.7 28.1 28.2   MCHC 30.0* 29.4* 29.5* 28.7*   RDW 16.0* 16.4* 16.5* 16.5*    246 272 280     INRNo lab results found in last 7 days.  ABG  Recent Labs   Lab 08/07/21  0632 08/07/21  0332 08/06/21  1758 08/06/21  1037   PH 7.40 7.34* 7.35 7.30*   PCO2 40 41 37 33*   PO2 83 78* 76* 109*   HCO3 24 22 20* 16*   O2PER 50 50 50 80      Urine Studies  Recent Labs   Lab Test 03/16/20  0940 03/12/20  1125   COLOR Yellow Straw   APPEARANCE Clear Clear   URINEGLC 150* >499*   URINEBILI Negative Negative   URINEKETONE Negative Negative   SG 1.014 1.009   UBLD Small* Small*   URINEPH 6.0 7.0   PROTEIN 30* Negative   NITRITE Negative Negative   LEUKEST Small* Trace*   RBCU 13* 3*   WBCU 18* 7*     Recent Labs   Lab Test 06/29/20  0850   UTPG 0.17     PTH  Recent Labs   Lab Test 06/29/20  0914 03/04/20  1018 03/03/20  0849 03/03/20  0729   PTHI 58 630* 668* Canceled, Test credited     Iron Studies  Recent Labs   Lab Test 05/18/21  0000 04/20/21  0000 03/23/21  0000 02/23/21  0000 01/26/21  0000 01/05/21  0000 06/29/20  0914 03/03/20  0729   IRON  --   --   --   --   --   --  163 139   FEB  --   --   --   --   --   --  277 142*   IRONSAT  --   --   --   --   --   --  59* 98*   YOLIS 2,884 2,834 3,179 2,480 2,813 2,356 2,163* 3,397*       IMAGING:  All imaging studies reviewed by me.

## 2021-08-08 NOTE — PLAN OF CARE
ICU End of Shift Summary. See flowsheets for vital signs and detailed assessment.    Changes this shift: On mechanical ventilation PEEP of 8, FIO2 70%  with increase in coughing episodes w/repositioning and nursing cares requiring prn boluses. Moderate to large amounts of thick creamy ETT secretions and clear thick oral secretions requiring frequent suctioning every 1-2 hours. Adequate BM's w/minimal residuals TF's increased per order. Furosamide 40 mg IVP given w/a good response 975 ml over 4 hour; monitoring electrolytes as well.  One unit of Prbc's given for 6.8 hemoglobin and recheck was 8.1g/dl. Pt's daughter Kiley updated x 2.,    Plan:  Trending hemoglobin, frequent pulmonary hygiene and continue w/goals of care.      Problem: Risk for Delirium  Goal: Optimal Coping  Outcome: Declining     Problem: Adult Inpatient Plan of Care  Goal: Readiness for Transition of Care  Outcome: Declining

## 2021-08-08 NOTE — PHARMACY-VANCOMYCIN DOSING SERVICE
Pharmacy Vancomycin Note  Date of Service 2021  Patient's  1975   46 year old, male    Indication: Community Acquired Pneumonia and Sepsis  Day of Therapy: 2  Current vancomycin regimen:  Intermittent dosing  Current vancomycin monitoring method: Trough (Method 2 = manual dose calculation)  Current vancomycin therapeutic monitoring goal: 15-20 mg/L    Current estimated CrCl = Estimated Creatinine Clearance: 39.4 mL/min (A) (based on SCr of 2.15 mg/dL (H)).    Creatinine for last 3 days  2021:  1:54 PM Creatinine 2.89 mg/dL;  1:54 PM Creatinine 2.89 mg/dL;  5:06 PM Creatinine 3.05 mg/dL  2021:  3:30 AM Creatinine 2.38 mg/dL;  1:50 PM Creatinine 2.15 mg/dL    Recent Vancomycin Levels (past 3 days)  2021:  5:06 PM Vancomycin <0.8 mg/L  2021:  9:06 PM Vancomycin 12.0 mg/L    Vancomycin IV Administrations (past 72 hours)                   vancomycin 1500 mg in 0.9% NaCl 250 ml intermittent infusion 1,500 mg (mg) 1,500 mg New Bag 21 2145                Nephrotoxins and other renal medications (From now, onward)    Start     Dose/Rate Route Frequency Ordered Stop    21 2330  vancomycin (VANCOCIN) 1000 mg in dextrose 5% 200 mL PREMIX      1,000 mg  100 mL/hr over 2 Hours Intravenous ONCE 21 2226      21 0200  amphotericin B LIPOSOME (AMBISOME) 350 mg in D5W 337.5 mL intermittent infusion      5 mg/kg × 66.4 kg (Dosing Weight)  168.8 mL/hr over 120 Minutes Intravenous EVERY 24 HOURS 21 0034      21 1530  norepinephrine (LEVOPHED) 16 mg in  mL infusion MAX CONC CENTRAL LINE      0.01-0.6 mcg/kg/min × 66.4 kg (Dosing Weight)  0.6-37.4 mL/hr  Intravenous CONTINUOUS 21 1511      21 0908  vancomycin place reyes - receiving intermittent dosing      1 each Intravenous SEE ADMIN INSTRUCTIONS 21 0908               Contrast Orders - past 72 hours (72h ago, onward)    None          Interpretation of levels and current regimen:  Vancomycin  level is reflective of subtherapeutic level    Has serum creatinine changed greater than 50% in last 72 hours: No    Urine output:  diminished urine output    Renal Function: VASQUEZ that appears to be slightly improving    Plan:  1. IV vancomycin 1000mg once and continue intermittent dosing  2. Vancomycin monitoring method: Trough (Method 2 = manual dose calculation)  3. Vancomycin therapeutic monitoring goal: 15-20 mg/L  4. Pharmacy will check vancomycin levels as appropriate in 1-3 Days.  5. Serum creatinine levels will be ordered daily for the first week of therapy and at least twice weekly for subsequent weeks.    Kwame Louis RPH

## 2021-08-08 NOTE — PROGRESS NOTES
[facility]   Transplant Nephrology Progress Note  Date of Admission:  8/6/2021  Today's Date: 08/08/2021    Recommendations:  - Follow up with tx ID on starting itraconazole to limit the duration of ambisome if possible)   - ok to stop IV bicarb   - will check MPA level  - start lasix 40 mg IV bid as tolerated     Assessment & Plan   # DDKT: VASQUEZ, pre-renal azotemia vs ATN in the settings of shock possible septic shock. Recommend IVF hydration, pressors support if needed to maintain MAP > 65 mmHg.               - Baseline Creatinine: ~ 1.8-2.1              - Proteinuria: Minimal (0.2-0.5 grams)              - Date DSA Last Checked: Feb/2021      Latest DSA: No              - BK Viremia: No              - Kidney Tx Biopsy: Apr 03, 2020; Result: No diagnostic evidence of acute rejection.  Moderate interstitial fibrosis and tubular atrophy. EM showed ~25% foot process effacement               -No acute indication for RRT currently        # Immunosuppression: Belatacept (dose 5 mg/kg every four weeks), Mycophenolic acid (dose 540 mg every 12 hours) and Prednisone (dose 5 mg daily) last belatacept dose reportedly given on July 13th, next dose scheduled on Aug 10, 2021 (will need to verify the timing)              - Changes: reduced MMF await level, ok to hold hawa for 1-2 weeks. Currently on stress dose steroid per ICU      # Infection Prophylaxis:   - PJP: Dapsone              - CMV: Valcyte;  (D+/R-). completed  Valcyte for 6 months. CMV Negative (2/2021), will check CMV given his respiratory issues.     # Hypertension: low BP requiring pressors;  Goal BP: < 130/80              - Volume status: Euvolemic                              - Changes: No     # Anemia in Chronic Renal Disease: Hgb: Stable      DEB: No              - Iron studies: Not checked recently     # Mineral Bone Disorder:   - Secondary renal hyperparathyroidism; PTH level: Normal (18-80 pg/ml)        On  treatment: None  - Vitamin D; level: Normal        On supplement: No  - Calcium; level: Normal        On supplement: No     # Electrolytes:   - Potassium; level: High        On supplement: No  - Magnesium; level: Not checked recently        On supplement: No  - Bicarbonate; level: Low        On supplement: No start bicarb drip     # CML: Appears stable on Dasatinib. Patient is followed by Hematology.     # SOB with cough: Concern for blastomycosis on Ambisome.      # Shock likely septic: in the setting of blastomycosis      # Transplant History:  Etiology of Kidney Failure: Alport's syndrome  Tx: DDKT  Transplant: 2/28/2020 (Kidney), 12/7/1988 (Kidney), 3/2/2005 (Kidney)  Crossmatch at time of Tx: negative  Significant changes in immunosuppression: None  Significant transplant-related complications: None     Recommendations were communicated to the primary team verbally.      Nehemiah Wilkinson MD   Pager: [phone]    Interval History   Overall remains intubated and sedated. Off pressors Cr is stable or slightly improved. Volume overloaded will attempt lasix.    Review of Systems   4 point ROS was obtained and negative except as noted in the Interval History.    MEDICATIONS:    sodium chloride 0.9%  500 mL Intravenous Q24H     acetaminophen  650 mg Oral or Feeding Tube Q24H     dextrose 5% water  10-20 mL Intravenous Q24H    And     amphotericin B liposome (AMBISOME) intermittent infusion  5 mg/kg (Dosing Weight) Intravenous Q24H    And     dextrose 5% water  10-20 mL Intravenous Q24H     atorvastatin  10 mg Oral Daily     dapsone  50 mg Oral or Feeding Tube Daily     diphenhydrAMINE  25 mg Oral Q24H    Or     diphenhydrAMINE  25 mg Intravenous Q24H     furosemide  40 mg Intravenous Q12H     hydrocortisone sodium succinate PF  50 mg Intravenous Q8H     insulin aspart  1-6 Units Subcutaneous Q4H     loratadine  10 mg Oral Daily     meropenem  1 g Intravenous Q12H     multivitamins w/minerals  15 mL Per Feeding Tube  "Daily     mycophenolate  500 mg Oral BID     pantoprazole (PROTONIX) IV  40 mg Intravenous Daily with breakfast     polyethylene glycol  17 g Oral Daily     protein modular  1 packet Per Feeding Tube TID     senna-docusate  2 tablet Oral BID       dextrose       fentaNYL 50 mcg/hr (21 1400)     heparin 1,800 Units/hr (21 1400)     midazolam 4 mg/hr (21 1400)     norepinephrine Stopped (21 0930)       Physical Exam   Temp  Av.4  F (36.9  C)  Min: 97.5  F (36.4  C)  Max: 99.9  F (37.7  C)  Arterial Line BP  Min: 98/48  Max: 174/61  Arterial Line MAP (mmHg)  Av.7 mmHg  Min: 63 mmHg  Max: 95 mmHg      Pulse  Av.7  Min: 67  Max: 83 Resp  Av  Min: 18  Max: 26  FiO2 (%)  Av %  Min: 40 %  Max: 100 %  SpO2  Av.6 %  Min: 90 %  Max: 100 %     BP 99/56 (BP Location: Left arm)   Pulse 69   Temp 98.4  F (36.9  C) (Axillary)   Resp 21   Ht 1.626 m (5' 4\")   Wt 72.6 kg (160 lb 0.9 oz)   SpO2 93%   BMI 27.47 kg/m     Date 21 0700 - 21 0659   Shift 9651-6566 4205-8396 2019-8447 24 Hour Total   INTAKE   I.V. 913.18   913.18   NG/   353   Colloid 400   400   Enteral 160   160   Blood Components 300   300   Shift Total(mL/kg) 2126.18(29.61)   2126.18(29.61)   OUTPUT   Urine 350   350   Shift Total(mL/kg) 350(4.87)   350(4.87)   Weight (kg) 71.8 71.8 71.8 71.8      Admit Weight: 66.4 kg (146 lb 6.2 oz)     GENERAL APPEARANCE: intubated and sedated.   HENT: mouth without ulcers or lesions  LYMPHATICS: no cervical or supraclavicular nodes  RESP: lungs clear to auscultation - no rales, rhonchi or wheezes  CV: regular rhythm, normal rate, no rub, no murmur  EDEMA: + LE edema bilaterally overall  ABDOMEN: soft, nondistended, nontender, bowel sounds normal  MS: extremities normal - no gross deformities noted, no evidence of inflammation in joints, no muscle tenderness  SKIN: no rash    Data   All labs reviewed by me.  CMP  Recent Labs   Lab 21  1147 " 08/08/21  0837 08/08/21 0336 08/08/21 0333 08/07/21  1350 08/07/21 0330 08/06/21  1706 08/06/21  1706 08/06/21  1354   NA  --   --   --  140 142 140  --  139 136  136   POTASSIUM  --   --   --  3.6  3.6 3.9 4.3  --  5.2 5.7*  5.7*   CHLORIDE  --   --   --  106 105 107  --  112* 112*  112*   CO2  --   --   --  24 26 21  --  16* 13*  13*   ANIONGAP  --   --   --  10 11 12  --  11 11  11   * 141* 170* 166* 189* 213*   < > 92  99 76  76   BUN  --   --   --  62* 54* 51*  --  52* 48*  48*   CR  --   --   --  2.21*  2.21* 2.15* 2.38*  --  3.05* 2.89*  2.89*   GFRESTIMATED  --   --   --  35*  35* 36* 32*  --  24* 25*  25*   ANTWAN  --   --   --  8.8 8.6 8.3*  --  9.0 9.2  9.2   MAG  --   --   --  2.5* 2.3 2.2  --  2.0 2.0   PHOS  --   --   --  4.5  --  5.9*  --  6.3* 6.8*   PROTTOTAL  --   --   --  5.9*  --  6.2*  --  5.4* 5.9*   ALBUMIN  --   --   --  2.9*  --  2.7*  --  1.3* 0.7*   BILITOTAL  --   --   --  0.6  --  0.7  --  0.4 0.4   ALKPHOS  --   --   --  52  --  58  --  71 74   AST  --   --   --  21  --  27  --  21 26   ALT  --   --   --  15  --  16  --  16 19    < > = values in this interval not displayed.     CBC  Recent Labs   Lab 08/08/21  1126 08/08/21 0333 08/07/21 1350 08/07/21 0330 08/06/21  2011   HGB 8.1* 6.8* 7.1* 6.4* 7.2*   WBC  --  5.8 6.4 6.0 7.6   RBC  --  2.42* 2.52* 2.31* 2.56*   HCT  --  22.6* 23.7* 21.8* 24.4*   MCV  --  93 94 94 95   MCH  --  28.1 28.2 27.7 28.1   MCHC  --  30.1* 30.0* 29.4* 29.5*   RDW  --  16.7* 16.0* 16.4* 16.5*   PLT  --  200 238 246 272     INRNo lab results found in last 7 days.  ABG  Recent Labs   Lab 08/08/21  0425 08/07/21  1645 08/07/21  0632 08/07/21  0332   PH 7.40 7.42 7.40 7.34*   PCO2 41 40 40 41   PO2 82 76* 83 78*   HCO3 25 26 24 22   O2PER 60 50 50 50      Urine Studies  Recent Labs   Lab Test 03/16/20  0940 03/12/20  1125   COLOR Yellow Straw   APPEARANCE Clear Clear   URINEGLC 150* >499*   URINEBILI Negative Negative   URINEKETONE Negative  Negative   SG 1.014 1.009   UBLD Small* Small*   URINEPH 6.0 7.0   PROTEIN 30* Negative   NITRITE Negative Negative   LEUKEST Small* Trace*   RBCU 13* 3*   WBCU 18* 7*     Recent Labs   Lab Test 06/29/20  0850   UTPG 0.17     PTH  Recent Labs   Lab Test 06/29/20  0914 03/04/20  1018 03/03/20  0849 03/03/20  0729   PTHI 58 630* 668* Canceled, Test credited     Iron Studies  Recent Labs   Lab Test 05/18/21  0000 04/20/21  0000 03/23/21  0000 02/23/21  0000 01/26/21  0000 01/05/21  0000 06/29/20  0914 03/03/20  0729   IRON  --   --   --   --   --   --  163 139   FEB  --   --   --   --   --   --  277 142*   IRONSAT  --   --   --   --   --   --  59* 98*   YOLIS 2,884 2,834 3,179 2,480 2,813 2,356 2,163* 3,397*       IMAGING:  All imaging studies reviewed by me.

## 2021-08-08 NOTE — PROGRESS NOTES
[facility]  Transplant Infectious Disease Progress Note     Patient:  Jeremiah Cruz, Date of birth 1975, Medical record number [id number]  Date of Visit:  08/08/2021         Assessment and Recommendations:   Recommendations:  1. 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 Please monitor renal function and electrolytes while on Ambisome  3. With a view to transition to azole in the coming days, with clinical stability, start loading with Itraconazole in the next 24-48 hours. Dosing: Itraconazole solution 200mg PO q8h x 3 days, followed by 200mg q12h PO. Will check levels in 7 days after starting  4. Follow up previously sent serology testing as follows:  - Serum Aspergillus galactomannan and beta-d-glucan  - Serum Cryptococcus antigen  - Serum and Urine Histoplasma/Blastomyces antigens  5. Await BAL cultures/results including - cytology, fungal/AFB cultures, Nocardia PCR and culture, PJP PCR, Aspergillus galactomannan, CMV, BAL Histo antigen, Legionella culture  6. Follow up serum EBV DNA PCR. 8/6 serum CMV PCR negative  7. Please stop Vancomycin  8. With no resistant organisms identified on culture, recommend de-escalating to Cefepime if tolerated to complete an empiric 5-7 day course  9. Continue Dapsone for PJP ppx     Thank you very much for this consultation. Transplant Infectious Disease will continue to follow with you.     Assessment:  45 y/o gentleman, PMHx Alport syndrome c/b ESRD s/p DDKT (x3, 1988, 2005 and now again in 2/2020) (CMV +/-, EBV unknown/+), thrombocytopenia, anemia of chronic disease, secondary renal hyperparathyroidism, HTN, CML (in remission since 2016) and GERD who presents as a transfer from [facility] for  "hypoxic respiratory failure     #Hypoxic Respiratory Failure:  #Circulatory Shock:  #KOH Prep with broad based budding yeast, morphology consistent with possible Blastomyces dermatiditis:  Patient presented to OSH ER with complaints of two weeks of worsening cough and progressive SOB. He was noted to be hypoxic on arrival to 80s on room air, however worsened throughout the evening to a point where he was electively intubated prior to transfer. On arrival at [facility], he was in circulatory shock requiring 3 pressors. However with supportive care, he had been weaned off pressors and has shown improvement in ventilation. CXR at [facility] showed \"Bilateral diffuse nodular opacities, left greater than is right, possibly a combination of edema and/or infection\". CT at OSH also showed bilateral opacities, however is not available for interpretation. Diffuse nodular opacities on imaging in a patient that presents in a subacute/progressive manner raises concerns for atypical infections including endemic mycoses, invasive mold, mycobacterial infections among others. Appreciate pulmonary team obtaining BAL - so far COVID testing and RVP negative. However, KOH prep from BAL showing broad based budding yeast, morphology consistent with Blastomyces. This fits with clinical presentation and radiographic appearance. Have sent additional serology for workup. Would recommend switching to Amphotericin B given severe pulmonary disease in immunocompromised patient prior to transition to azole. Radiographic appearance not consistent with typical bacterial pneumonia. BAL cultures have remained negative. With no resistant organisms identified, consider de-escalating Meropenem in coming days     #GPC in blood culture from OSH:  OSH obtained blood cultures at time of presentation. 1/2 cultures with GPCs - now identified as Staph hominis (CoNS). With only 1/2 positive culture, likely skin contaminant. Other blood Cx from 8/5, as well as blood culture " from  from 8/6 remain negative. Patient presented in circulatory shock but has been weaned off pressors within 24 hours of arrival. With persistent negative cultures and hemodynamic stability, recommend stopping Vancomycin    #Renal Transplant recipient:  History of Alport syndrome c/b ESRD s/p DDKT (x3, 1988, 2005 and now again in 2/2020) (CMV +/-, EBV unknown/+)     Other Infectious Disease issues include:  - QTc interval: 423 msec 8/6/21  - Bacterial prophylaxis: On Vanc/Meropenem  - Pneumocystis prophylaxis: Dapsone  - Viral serostatus & prophylaxis: CMV +/-, EBV unknown/+, completed Valcyte ppx, please monitor CMV levels  - Fungal prophylaxis: On Ambisome  - Immunization status: Has not received COVID vaccine  - Gamma globulin status: Unknown  - Isolation status: Good hand hygiene, standard isolation.      TERESA Waller  Staff Physician, Infectious Diseases  Pager [phone]         Interval History:   Since patient was last seen by ID, he has remained afebrile and hemodynamically stable, off pressors  Remains intubated, FiO2 increased today to 70%, PEEP remains at 8    Review of labs - Creatinine slightly improved to 2.14, electrolytes stable. LFTs wnl. WBC 5.8  Urine Strep, legionella antigens negative, 8/7 serum CrAg negative  8/6 BAL studies -  3+ Broad based budding yeast, morphology consistent with possible Blastomyces dermatiditis on KOH smear, remainder of studies remain negative. Bronch Cx negative to date. Additional serology pending  OSH blood Cx reviewed yesterday, 1/2 with CoNS. Blood Cx from 8/6 at [facility] remain negative      Transplants:  2/28/2020 (Kidney), 12/7/1988 (Kidney), 3/2/2005 (Kidney), Postoperative day:  527.  Coordinator Jody García    Review of Systems:  Unable to obtain as patient intubated and sedated       History of presenting illness:   45 y/o gentleman, PMHx Alport syndrome c/b ESRD s/p DDKT (x3, 1988, 2005 and now again in 2/2020) (CMV +/-, EBV unknown/+),  "thrombocytopenia, anemia of chronic disease, secondary renal hyperparathyroidism, HTN, CML (in remission since 2016) and GERD who presents as a transfer from [facility] for hypoxic respiratory failure     Patient intubated and sedated and no family members at bedside. History obtained from discussion with primary team and chart review. Patient originally presented to [facility] ER with 2 weeks of dry cough and worsening dyspnea. He was reported treated for a sinus infection in early July with Azithromycin which had led to an improvement in his symptoms. However, starting July 21, his symptoms worsened     On presentation to the ER, he was noted to be hypoxic, initially 82-85% on room air. Was placed on O2 by nasal cannula, initially on 4L then escalated to 6L. CXR obtained at OSH ER showed bilateral infiltrates. Was not able to obtain CT PE given concern about renal function. He had blood cultures collected at OSH, and was given Levofloxacin (given reported allergy to Penicillins and cephalosporins). However, while at the ER, his respiratory function worsened and he was first on BIPAP and then electively intubated prior to transfer. A CT scan at the OSH showed bilateral opacities consistent with \"atypical pneumonia\"     At the time of transfer here, he clinically worsened, was in circulatory shock requiring 3 pressors. Was started on Vancomycin, Meropenem and Micafungin. A CXR at [facility] showed \"Bilateral diffuse nodular opacities, left greater than is right, possibly a combination of edema and/or infection\". Given clinical condition, he underwent bronchoscopy with BAL on 8/6. A KOH smear performed on the bronchoscopy showed 3+ Broad based budding yeast, morphology consistent with possible Blastomyces dermatiditis     He was started on Ambisome this morning and ID consulted. Micafungin was stopped  By the time he was seen, he had been weaned off all pressors. His vent settings had also slightly improved - down to PEEP " 8/50% FiO2 from PEEP 10/80% FiO2 on arrival           Current Medications & Allergies:       sodium chloride 0.9%  500 mL Intravenous Q24H     acetaminophen  650 mg Oral or Feeding Tube Q24H     dextrose 5% water  10-20 mL Intravenous Q24H    And     amphotericin B liposome (AMBISOME) intermittent infusion  5 mg/kg (Dosing Weight) Intravenous Q24H    And     dextrose 5% water  10-20 mL Intravenous Q24H     atorvastatin  10 mg Oral Daily     dapsone  50 mg Oral or Feeding Tube Daily     diphenhydrAMINE  25 mg Oral Q24H    Or     diphenhydrAMINE  25 mg Intravenous Q24H     furosemide  40 mg Intravenous Q12H     hydrocortisone sodium succinate PF  50 mg Intravenous Q8H     insulin aspart  1-6 Units Subcutaneous Q4H     loratadine  10 mg Oral Daily     meropenem  1 g Intravenous Q12H     multivitamins w/minerals  15 mL Per Feeding Tube Daily     mycophenolate  500 mg Oral BID     pantoprazole (PROTONIX) IV  40 mg Intravenous Daily with breakfast     polyethylene glycol  17 g Oral Daily     protein modular  1 packet Per Feeding Tube TID     senna-docusate  2 tablet Oral BID       Infusions/Drips:    dextrose       fentaNYL 50 mcg/hr (08/08/21 1500)     heparin 1,800 Units/hr (08/08/21 1500)     midazolam 4 mg/hr (08/08/21 1500)     norepinephrine Stopped (08/07/21 0930)       Allergies   Allergen Reactions     Blood Transfusion Related (Informational Only) Other (See Comments)     Patient has a history of a clinically significant antibody against RBC antigens.  A delay in compatible RBCs may occur.     Cephalosporins Other (See Comments) and Rash     fever     Compazine [Prochlorperazine]      Gammagard [Immune Globulin] Other (See Comments)     Ed got spinal meningitis and MD stated to never get again for fear of death.       Vancomycin      Per care everywhere patient has Red Man's syndrome     Penicillins Hives and Rash     Per OSH records            Physical Exam:   Vitals were reviewed.  All vitals stable.  Patient  Vitals for the past 24 hrs:   Temp Temp src Pulse Resp SpO2 Weight   08/08/21 1530 -- -- 74 -- 94 % --   08/08/21 1500 -- -- 69 20 94 % --   08/08/21 1430 -- -- 69 -- 94 % --   08/08/21 1400 -- -- 69 21 93 % --   08/08/21 1330 -- -- 68 -- 93 % --   08/08/21 1300 -- -- 71 22 93 % --   08/08/21 1230 -- -- 77 -- 94 % --   08/08/21 1200 98.4  F (36.9  C) Axillary 66 18 93 % --   08/08/21 1130 -- -- 65 -- 94 % --   08/08/21 1100 -- -- 67 -- 94 % --   08/08/21 1030 -- -- 66 -- 93 % --   08/08/21 1000 -- -- 64 23 92 % --   08/08/21 0930 -- -- 68 -- 92 % --   08/08/21 0900 -- -- 68 -- 99 % --   08/08/21 0850 97.9  F (36.6  C) Axillary 74 -- 99 % --   08/08/21 0830 -- -- 70 -- 98 % --   08/08/21 0800 98.7  F (37.1  C) Axillary 68 20 94 % --   08/08/21 0730 -- -- 68 -- 94 % --   08/08/21 0715 -- -- 69 -- 94 % --   08/08/21 0700 -- -- 67 -- 94 % --   08/08/21 0647 98  F (36.7  C) Axillary 73 -- 92 % --   08/08/21 0645 -- -- 75 -- 94 % --   08/08/21 0635 98.3  F (36.8  C) Oral 67 -- 92 % --   08/08/21 0630 -- -- 70 -- 91 % --   08/08/21 0615 -- -- 71 -- 92 % --   08/08/21 0600 -- -- 68 -- 92 % --   08/08/21 0545 -- -- 70 -- 92 % --   08/08/21 0530 -- -- 74 -- 97 % --   08/08/21 0515 -- -- 67 -- 94 % --   08/08/21 0500 -- -- 68 -- 94 % --   08/08/21 0445 -- -- 71 -- 93 % --   08/08/21 0430 -- -- 70 -- 93 % --   08/08/21 0415 -- -- 75 -- 93 % --   08/08/21 0400 98.4  F (36.9  C) Oral 72 -- 93 % 72.6 kg (160 lb 0.9 oz)   08/08/21 0345 -- -- 70 -- (!) 88 % --   08/08/21 0330 -- -- 68 -- 97 % --   08/08/21 0315 -- -- 68 -- 97 % --   08/08/21 0300 -- -- 68 -- 97 % --   08/08/21 0245 -- -- 72 -- 97 % --   08/08/21 0230 -- -- 71 -- 97 % --   08/08/21 0215 -- -- 75 -- 97 % --   08/08/21 0200 -- -- 77 -- 92 % --   08/08/21 0145 -- -- 73 -- 91 % --   08/08/21 0130 -- -- -- -- 96 % --   08/08/21 0115 -- -- 75 -- 90 % --   08/08/21 0100 -- -- 73 -- 90 % --   08/08/21 0045 -- -- 73 -- 93 % --   08/08/21 0030 -- -- 73 -- 97 % --   08/08/21  0015 -- -- 73 -- 90 % --   08/08/21 0000 98.8  F (37.1  C) Axillary 73 -- 90 % --   08/07/21 2345 -- -- 73 -- (!) 89 % --   08/07/21 2330 -- -- 74 -- 91 % --   08/07/21 2315 -- -- 76 -- 90 % --   08/07/21 2300 -- -- 78 -- 90 % --   08/07/21 2245 -- -- 81 -- -- --   08/07/21 2230 -- -- 71 -- 92 % --   08/07/21 2215 -- -- 73 -- 92 % --   08/07/21 2200 -- -- 73 -- 92 % --   08/07/21 2145 -- -- 72 -- 92 % --   08/07/21 2130 -- -- 74 -- 90 % --   08/07/21 2115 -- -- 76 -- 91 % --   08/07/21 2100 -- -- 74 -- 92 % --   08/07/21 2045 -- -- 75 -- 92 % --   08/07/21 2030 -- -- 71 -- 92 % --   08/07/21 2015 -- -- 71 -- 92 % --   08/07/21 2000 97.9  F (36.6  C) Axillary 71 22 92 % --   08/07/21 1945 -- -- 71 -- 92 % --   08/07/21 1930 -- -- 75 -- 91 % --   08/07/21 1915 -- -- 71 -- 91 % --   08/07/21 1900 -- -- 70 -- 90 % --   08/07/21 1830 -- -- 70 -- 91 % --   08/07/21 1800 -- -- 70 24 94 % --   08/07/21 1730 -- -- 71 -- 91 % --   08/07/21 1700 -- -- 72 -- 96 % --   08/07/21 1630 -- -- 72 -- 91 % --   08/07/21 1600 98.7  F (37.1  C) Axillary 72 23 90 % --     Ranges for vital signs:  Temp:  [97.9  F (36.6  C)-98.8  F (37.1  C)] 98.4  F (36.9  C)  Pulse:  [64-81] 74  Resp:  [18-24] 20  MAP:  [64 mmHg-89 mmHg] 81 mmHg  Arterial Line BP: ()/(21-61) 115/58  FiO2 (%):  [50 %-70 %] 70 %  SpO2:  [88 %-99 %] 94 %  Vitals:    08/06/21 0830 08/07/21 0400 08/08/21 0400   Weight: 66.4 kg (146 lb 6.2 oz) 71.8 kg (158 lb 4.6 oz) 72.6 kg (160 lb 0.9 oz)       Physical Examination:  GENERAL:  well-developed, well-nourished, intubated and sedated in ICU bed  HEAD:  Head is normocephalic, atraumatic   EYES:  Eyes have anicteric sclerae without conjunctival injection   ENT:  Oropharynx is moist without exudates or ulcers. ETT +  NECK:  No cervical lymphadenopathy  LUNGS:  Mechanically ventilated, coarse breath sounds  CARDIOVASCULAR:  Regular rate and rhythm with no murmurs, gallops or rubs.  ABDOMEN:  Normal bowel sounds, soft,  nontender. No appreciable hepatosplenomegaly.  SKIN:  No acute rashes.  Line in place without any surrounding erythema or exudate.  NEUROLOGIC:  Intubated and sedated, unable to assess         Laboratory Data:     Absolute CD4, [place] T Cells   Date Value Ref Range Status   08/07/2021 7 (L) 441-2,156 cells/uL Final       Inflammatory Markers    Recent Labs   Lab Test 08/06/21  1354   .0*       Metabolic Studies       Recent Labs   Lab Test 08/08/21  1523 08/08/21  1402 08/08/21  0333 08/07/21  0330 08/06/21  1706 08/06/21  1354 02/28/20  1308 02/28/20  0341   NA  --  142 140  --  139 136  136   < > 137   POTASSIUM  --  3.6 3.6  3.6  --  5.2 5.7*  5.7*   < > 5.0   CHLORIDE  --  108 106  --  112* 112*  112*   < > 102   CO2  --  27 24  --  16* 13*  13*   < > 22   ANIONGAP  --  7 10  --  11 11  11   < > 13   BUN  --  68* 62*  --  52* 48*  48*   < > 65*   CR  --  2.14* 2.21*  2.21*  --  3.05* 2.89*  2.89*   < > 11.90*   GFRESTIMATED  --  36* 35*  35*  --  24* 25*  25*   < > 5*   * 152* 166*   < > 92  99 76  76   < > 92   A1C  --   --   --   --   --   --   --  5.2   ANTWAN  --  9.0 8.8  --  9.0 9.2  9.2   < > 7.7*   PHOS  --   --  4.5  --  6.3* 6.8*   < >  --    MAG  --   --  2.5*  --  2.0 2.0   < >  --    LACT  --   --   --   --  1.2 2.1*  --   --    PCAL  --   --   --   --   --  9.31*  9.16*  --   --     < > = values in this interval not displayed.       Hepatic Studies    Recent Labs   Lab Test 08/08/21  0958 08/08/21  0333 08/07/21  0330 08/06/21 1706   BILITOTAL  --  0.6 0.7 0.4   DBIL  --  0.2  --   --    ALKPHOS  --  52 58 71   PROTTOTAL  --  5.9* 6.2* 5.4*   ALBUMIN  --  2.9* 2.7* 1.3*   AST  --  21 27 21   ALT  --  15 16 16   *  --   --  290*       Hematology Studies      Recent Labs   Lab Test 08/08/21  1126 08/08/21  0333 08/07/21  1350 08/07/21  0330 08/06/21 2011 08/06/21  1706 08/06/21  1354 05/05/20  0000 03/16/20  0807 03/13/20  0735   WBC  --  5.8 6.4 6.0 7.6 7.8 6.7  --   9.6 12.1*   ANEU  --   --   --   --   --   --   --   --  8.4* 10.7*   ALYM  --   --   --   --   --   --   --   --  0.2* 0.3*   TAHIR  --   --   --   --   --   --   --   --  0.6 0.6   AEOS  --   --   --   --   --   --   --   --  0.2 0.4   HGB 8.1* 6.8* 7.1* 6.4* 7.2* 7.8* 7.5*   < > 9.0* 6.7*   HCT  --  22.6* 23.7* 21.8* 24.4* 27.2* 28.0*   < > 27.6* 20.3*   PLT  --  200 238 246 272 280 263   < > 309 357    < > = values in this interval not displayed.       Clotting Studies    Recent Labs   Lab Test 04/03/20  0730 03/05/20  1327 02/28/20  0341 01/26/17  0647   INR 1.21* 1.18* 1.11 0.99   PTT  --  30 30 29       Iron Testing    Recent Labs   Lab Test 08/08/21  0333 05/18/21  0000 01/05/21  0000 06/29/20  0914 03/05/20  1327 03/04/20  1018 03/03/20  0729   IRON  --   --   --  163  --   --  139   FEB  --   --   --  277  --   --  142*   IRONSAT  --   --   --  59*  --   --  98*   YOLIS  --  2,884  --  2,163*  --   --  3,397*   MCV 93  --    < >  --  91  --  91   FOLIC  --   --   --   --  23.8  --   --    B12  --   --   --   --  1,407*  --   --    HAPT  --   --   --   --  184  --   --    RETP 1.3  --   --   --  0.4*   < >  --    RETICABSCT 0.030  --   --   --  10.9*   < >  --     < > = values in this interval not displayed.       Arterial Blood Gas Testing    Recent Labs   Lab Test 08/08/21  0425 08/07/21  1645 08/07/21  0632 08/07/21  0332 08/06/21  1758   PH 7.40 7.42 7.40 7.34* 7.35   PCO2 41 40 40 41 37   PO2 82 76* 83 78* 76*   HCO3 25 26 24 22 20*   O2PER 60 50 50 50 50        Urine Studies     Recent Labs   Lab Test 03/16/20  0940 03/12/20  1125   URINEPH 6.0 7.0   NITRITE Negative Negative   LEUKEST Small* Trace*   WBCU 18* 7*       Medication levels    Recent Labs   Lab Test 08/07/21  2106 12/08/20  1408 09/15/20  1400 06/29/20  0914   VANCOMYCIN 12.0  --    < >  --    TACROL  --   --   --  10.3   24923  --  8.2*  --   --    52111  --  97  --   --     < > = values in this interval not displayed.       Body fluid  stats    Recent Labs   Lab Test 08/06/21  1655   FCOL Colorless   FAPR Clear   FWBC 428   FNEU 56   FLYM 2   FMONO 42       Microbiology:    Last Culture results with specimen source  Culture   Date Value Ref Range Status   08/06/2021 1+ Normal pat  Final   08/06/2021 No growth after 1 day  Preliminary   08/06/2021 Culture negative, monitoring continues  Preliminary   08/06/2021 No growth after 2 days  Preliminary   08/06/2021 3+ Normal pat  Final     Culture Micro   Date Value Ref Range Status   03/16/2020 No growth  Final   03/13/2020 Canceled, Test credited  Specimen not received    Final   03/24/2005 No growth  Final   03/24/2005   Final    Canceled, Test credited Test canceled by PCU/Clinic   03/11/2005 No growth  Final   03/09/2005   Final    Single colony Coagulase negative Staphylococcus Susceptibility testing not     Comment:      routinely done   03/09/2005 No growth  Final   03/09/2005 No growth  Final   03/09/2005 No growth  Final    Specimen Description   Date Value Ref Range Status   03/16/2020 Unspecified Urine  Final   03/13/2020 Unknown  Final   03/24/2005 Blood Right Arm  Final   03/24/2005 Blood  Final   03/11/2005 Blood Arterial blood DIALYSIS  Final   03/09/2005 Catheter tip  CENTRAL LINE  Final   03/09/2005 Arm  Final   03/09/2005 White port  Final   03/09/2005 Unspecified Urine  Final        Last check of C difficile  No results found for: CDBPCT    Infection Studies to assess Diarrhea No lab results found.    Virology:  Coronavirus-19 testing    Recent Labs   Lab Test 08/06/21  0944   SCV2R Negative       Respiratory virus (non-coronavirus-19) testing    Recent Labs   Lab Test 08/06/21  1804   IFLUA Not Detected   FLUAH1 Not Detected   OH5254 Not Detected   FLUAH3 Not Detected   IFLUB Not Detected   PIV1 Not Detected   PIV2 Not Detected   PIV3 Not Detected   PIV4 Not Detected   RSVA Not Detected   RSVB Not Detected   HMPV Not Detected   ADENOV Not Detected   LUJAN Not Detected         BK  Virus PCR Quant Result (External)   Date Value Ref Range Status   2021 None Detected None Detected IU/mL Final   2021 None Detected None Detected IU/mL Final   2021 None Detected None Detected IU/mL Final   2021 None Detected None Detected IU/mL Final   2021 None Detected None Detected IU/ml Final   2021 None Detected None Detected IU/mL Final   2020 None Detected None Detected Final   2020 None Detected None Detected Final   10/27/2020 None Detected None Detected IU/mL Final   2020 None Detected None Detected Final   08/10/2020 None Detected None Detected IU/mL Final   2020 None Detected None Detected IU/mL Final   2020 None Detected None Detected Final   2020 None Detected None Detected IU/mL Final   2020 None Detected None Detected IU/mL Final   2020 None Detected None Detected IU/mL Final   2020 None Detected None Detected IU/mL Final     BK Virus Result   Date Value Ref Range Status   2017 BK Virus DNA Not Detected BKNEG copies/mL Final       Imaging:  Recent Results (from the past 48 hour(s))   Echo Complete   Result Value    LVEF  60-65%    Narrative    [id number]  [id number]  [id number]  [id number]^ALEXIS^LENA     [facility]  Echocardiography Laboratory  [address] [place] [id number]     Name: JONNY KINCAID  MRN: [id number]  : 1975  Study Date: 2021 08:07 AM  Age: 46 yrs  Gender: Male  [place] [facility] [place]  Reason For Study: Respiratory Failure  Ordering Physician: LENA ESPINOZA  Performed By: Fabby Fernandez RDCS     BSA: 1.8 m2  Height: 64 in  Weight: 158 lb  HR: 71  BP: 99/56 mmHg  ______________________________________________________________________________  Procedure  Complete Portable Echo Adult. Echocardiogram with two-dimensional, color and  spectral Doppler  performed.  ______________________________________________________________________________  Interpretation Summary  Left ventricular size, wall motion and function are normal. The ejection  fraction is 60-65%.  The right ventricle is normal size. Global right ventricular function is  normal.  Mild aortic valve stenosis.  Pulmonary artery systolic pressure cannot be assessed.  The inferior vena cava was normal in size with preserved respiratory  variability.  No pericardial effusion is present.  ______________________________________________________________________________  Left Ventricle  Left ventricular size, wall motion and function are normal. The ejection  fraction is 60-65%. Grade II or moderate diastolic dysfunction.     Right Ventricle  The right ventricle is normal size. Global right ventricular function is  normal.     Atria  The atria cannot be assessed.     Mitral Valve  Mild mitral annular calcification is present. Trace mitral insufficiency is  present.     Aortic Valve  Mild aortic valve stenosis. The valve leaflets are not well visualized.     Tricuspid Valve  The tricuspid valve is normal. Mild tricuspid insufficiency is present.  Pulmonary artery systolic pressure cannot be assessed.     Pulmonic Valve  The pulmonic valve is normal.     Vessels  The aorta root is normal. The thoracic aorta is normal. The pulmonary artery  cannot be assessed. The inferior vena cava was normal in size with preserved  respiratory variability.     Pericardium  No pericardial effusion is present.     Compared to Previous Study  This study was compared with the study from 1/26/2017 . The left ventricular  function has remained the same.  ______________________________________________________________________________  MMode/2D Measurements & Calculations     IVSd: 1.0 cm  LVIDd: 4.9 cm  LVIDs: 3.1 cm  LVPWd: 0.79 cm  FS: 37.7 %  LV mass(C)d: 157.1 grams  LV mass(C)dI: 88.8 grams/m2  Ao root diam: 2.9 cm  asc Aorta Diam:  3.1 cm  LVOT diam: 2.1 cm  LVOT area: 3.6 cm2  LA Volume (BP): 55.7 ml  LA Volume Index (BP): 31.5 ml/m2  RWT: 0.32     Doppler Measurements & Calculations  MV E max sidney: 131.0 cm/sec  MV A max sidney: 138.0 cm/sec  MV E/A: 0.95  MV dec slope: 448.0 cm/sec2  MV dec time: 0.29 sec  Ao V2 max: 300.0 cm/sec  Ao max P.0 mmHg  Ao V2 mean: 216.0 cm/sec  Ao mean P.0 mmHg  Ao V2 VTI: 68.5 cm  MARLEY(I,D): 2.3 cm2  MARLEY(V,D): 2.2 cm2  LV V1 max P.5 mmHg  LV V1 max: 184.0 cm/sec  LV V1 VTI: 44.5 cm  SV(LVOT): 158.3 ml  SI(LVOT): 89.4 ml/m2  AV Sidney Ratio (DI): 0.61  MARLEY Index (cm2/m2): 1.3  E/E' av.3  Lateral E/e': 19.4  Medial E/e': 19.1     ______________________________________________________________________________  Report approved by: MD Kwaku Valdes 2021 11:49 AM         XR Chest Port 1 View    Narrative    Portable chest    INDICATION: Patient critically ill, intubated, with blasts on  bronchoalveolar lavage    COMPARISON: 2021    Findings: Heart size appears normal. Diffuse patchy opacities appear  increased, especially in the right lung. Endotracheal tube tip is  approximately 4.1 cm above the lawrence. NG/OG tube progresses beyond  the inferior margin of the image. Heart size appears normal.      Impression    IMPRESSION: Increasing ARDS, likely related to infection. Differential  considerations of edema or pulmonary hemorrhage.    RAMA CUNNINGHAM MD         SYSTEM ID:  [id number]   XR Chest Port 1 View    Narrative    EXAM: XR CHEST PORT 1 VIEW  2021 6:56 AM     HISTORY:  pt critically ill, intubated, with blasto on BAL       COMPARISON:  Chest x-ray 2021    FINDINGS: AP radiograph of the chest. Endotracheal tube projecting  over the midthoracic trachea. Enteric tube with side-port overlying  the stomach and tip inferior to the field of view.    Stable cardiomediastinal silhouette. Unchanged upper lobe predominant  mixed interstitial and patchy airspace  opacities. Small bilateral  pleural effusions. No pneumothorax. Visualized upper abdomen is  unremarkable. Bones are stable.      Impression    IMPRESSION:  1. Unchanged mixed interstitial and patchy airspace opacities  concerning for ARDS likely due to infection given the patient's  history.  2. Endotracheal tube projects over the midthoracic trachea.    I have personally reviewed the examination and initial interpretation  and I agree with the findings.    RAMA CUNNINGHAM MD         SYSTEM ID:  [id number]

## 2021-08-08 NOTE — PROGRESS NOTES
MEDICAL ICU PROGRESS NOTE  08/08/2021      Date of Service (when I saw the patient): 08/08/2021    ASSESSMENT: Jeremiah Cruz is a 46 year old male with PMH significant for Alport's Syndrome complicated by ESRD s/p kidney transplant (02/2020,2005, 1988), thrombocytopenia, anemia of CKD, secondary renal hyperparathyroidism, HTN, CML (on in remission since 2016) and GERD who presented to OSH with 2 week history of dry cough and dyspnea and developed acute hypoxemic respiratory failure likely 2/2 to sepsis requiring intubation. Found to have budding yeast c/f blastomycosis on BAL and started on amphotericin B. Was requiring 3 pressors initially, but off all pressors at this time.      CHANGES and MAJOR THINGS TODAY:   Patient is off pressors. Discontinued micafungin and started on amphotericin B for possible blastomyces.      -1U PRBC for hb 6.8 (dilutional?), repeat hb 8.1  -OSH growing GCP coagulase negative 1/2 cx, contaminant  -MRSA nares negative  -discontinued vanc  -continue amphotericin B and meropenem  -diuresing w/ Lasix 40 BID per renal  -hydrocortisone taper from 100mg Q8H to 50mg Q8H  -hemolysis labs: LDH, haptoglobin, LFTs  -reticulocyte index  -IgG levels  -pushed CT from OSH  -mother, daughter, and ex-wife/girlfriend (POA) updated (SW consult to assist family)  -chart dig tomorrow for when procrit is due  -consider onc consult if suspicious for respiratory failure 2/2 to Dasatinib  -transition to Itraconazole in next 24-48 hours per ID recs    PLAN:    Neuro:  # Pain and sedation  Patient is currently on Versed and fentanyl for sedation.   - Propofol discontinued  - Versed, wean as tolerated    - Fentanyl, wean as tolerated   - RASS goal -2 to -3    Pulmonary:  # Acute Hypoxemic Respiratory Failure  # ARDS  # Pneumonia, possibly due to Blastomycoses  Presented to OSH afebrile and tachypneic with 2 week history of dry cough and dyspnea. He was hypoxemic at OSH requiring intubation. CXR at OSH  significant for bilateral white out and CT scan significant for similar findings c/f pneumonia in setting of immunosuppressed patient. KOH prep and gram stain from BAL on 8/6 showed budding yeasts concerning for blastomyces. OSH 1/2 blood cx growing gram positive coagulase negative cocci, but likely a contaminant and not the etiology of his PNA. Further differential includes: CHF exacerbation, dasatinib-induced pneumonitis (has a hx of fluid overload due to this therapy 2 months ago per the daughter), diffuse alveolar hemorrhage, silicosis, sarcoidosis and beryllium. Covid is negative. Overall respiratory status is improving and on minimal vent settings. 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  - Treat infection as discussed in ID section below   - Continue amphotericin B   - Continue PTA dapsone for PJP ppx  - Daily CXR      Ventilation Mode: CMV/AC  (Continuous Mandatory Ventilation/ Assist Control)  FiO2 (%): 70 %  Rate Set (breaths/minute): 18 breaths/min  Tidal Volume Set (mL): 500 mL  PEEP (cm H2O): 8 cmH2O  Oxygen Concentration (%): 70 %  Resp: 24      Cardiovascular:  #Shock, likely 2/2 sepsis,  resolved    #H/O HTN  Patient previously required three pressors yesterday (Norepi, vaso, epi) and is now off all pressors at this time. Patient's hypotension is c/f shock 2/2 sepsis versus hypovolemia versus cardiogenic. Patient did appear hypovolemic on POCUS. Lactate elevated at 2.1 on admission. Concerned since patient is on chronic steroids at home for immunosuppression that adrenal insufficiency may be contributing to his hypotension. Started stress dose steroids.  -Levophed prn for MAP goal >65  -hydrocortisone taper from 100mg Q8H to 50mg Q8H  -Hold PTA amlodipine   -Hold PTA carvedilol     #H/O CHF  #Intermittent Bradycardia  #Concern for volume overload  Concerned that patient's bilateral white out on CXR may  be due to CHF, however, no bilateral LE edema or hepatomegaly appreciated on exam. Will obtain Echo to assess cardiac function. Patient has been intermittently bradycardic unknown as to why. Differential includes adrenal insufficiency (patient is on PTA chronic steroids) hypokalemia, hypocalcemia and hypothyroidism although electrolytes are WNL. Can consider TSH if patient develops persistent bradycardia. Echo TTE, EF 60-65%.  - lasix 40mg BID  - BMP BID, K >4, Mg >2     GI/Nutrition:  # GERD  -prophylactic Protonix      #Nutrition  -Tube feeds per OG tube   -Goal rate of 50 ml/hr     #Constipation   -scheduled bowel regimen of Senna and Miralax     Renal/Fluids/Electrolytes:  # Alport's Syndrome  # ESRD s/p renal transplant (02/2020, 2005, 1988)   Patient's latest renal transplant in 02/2020. Previous renal transplants in 1988 which failed and 2005 which was nonfunctional.   - Consulted renal transplant team. Appreciate ongoing recs.  - Hydrocortisone taper from 100mg Q8H to 50mg Q8H   - Mycophenolate acid to 360 mg BID or oral suspension 500 mg BID  - Held Belatacept 5 mg/kg Q4 weeks (next dose scheduled on 8/10/21), OK to hold for 1-2 weeks per renal   - s/p Isotonic bicarb drip 150 meq at 150 ml/hr  - MMF level pending     # Acute Kidney Injury, improving  Creatinine elevated at 2.89 on admission. Administered 2L LR yesterday and Cr today is 2.38. Baseline creatinine appears 1.8-2.1 on chart review. Likely etiologies include prerenal azotemia versus ATN in setting of possible shock. Per renal transplant team, no acute indication for renal replacement therapy at this time.  -Fluids as needed   -lasix 40mg BID   -BMP BID, K >4, Mg >2 while diuresing and on Ambisome  -Repeat CMP     # Hyperphosphatemia   # Hyperkalemia, resolved   # Hypoalbuminemia   Likely 2/2 to patient's VASQUEZ. EKG shows no concerning findings such as peaked T waves or Torsades. 2L of LR given and K+ corrected. Patient also given Albumin 50g 25%  Q6H x4 doses and is improving.   -BMP BID      Endocrine:  # Hyperglycemia   Patient's sugars have been elevated to the 200s likely 2/2 stress response versus high dose stress corticosteroids.   - medium resistance sliding scale     #H/O Hyperparathyroidism 2/2 ESRD  Not an active problem     ID:  # Cough   # Dyspnea   # Pulmonary blastomycoses dermatididis   # Gram positive coagulase negative cocci bacteremia (1/2 blood cx)  Presented to OSH afebrile and tachypneic with 2 week history of dry cough and dyspnea. He was hypoxemic at OSH requiring intubation. CXR at OSH significant for bilateral white out and CT scan significant for similar findings. KOH prep and gram stain from BAL showed budding yeast c/f blastomyces. Patient was started on amphotericin B overnight and demonstrated a rapid improvement which is somewhat unexpected for blasto. Blood cultures also show gram positive coagulase negative cocci in 1/2 blood cultures, likely contaminate, MRSA nares negative. In the setting of his immunosuppression on the differential is bacterial, fungal, viral etiologies. CMV is negative, COVID negative, respiratory virus panel negative, strep pneumo and legionell negative, crypto negative. Remaining on the differential are pneumocystis histoplasmosis, coccidioides. Further differential included: CHF, dasatinib-induced pneumonitis, diffuse alveolar hemorrhage, silicosis, sarcoidosis and beryllium.   - ID consulted, recs appreciated   - MRSA nares negative  - Discontinue vancomycin  - Continue meropenem, anticipate de-escalating to cephalosporin in coming days if no further cx data  - Discontinued micafungin  - Started on amphotericin B    - Continue PTA dapsone for PJP ppx  - Continue Valcyte for CMV ppx  - Serum Aspergillus galactomannan and beta-d-glucan pending  - Serum and Urine Histoplasma/Blastomyces antigens pending  - BAL cultures/results pending including - cytology, bacterial/fungal/AFB cultures, Nocardia PCR and  culture, PJP PCR, Aspergillus galactomannan, CMV, BAL Histo antigen, Legionella culture  - EBV DNA PCR pending    Hematology:    # Anemia  # H/O Anemia in Chronic Renal Disease   # H/O Thrombocytopenia  Patient has history of anemia in chronic renal disease. His latest hemoglobin is 6.4. He was transfused 1 unit. Platelets are WNL. Will continue to monitor. Drops in Hb today likely dilutional due to IVF resuscitation as all cell lines downtrend and he is receiving a lot of fluids with the bicarb drip. Low suspicion for bleeding. R femoral site with no signs of hemorrhage, no flank ecchymosis. LDH elevated, but AST normal so hemolysis unlikely. Reticulocyte index difficult to interpret in setting of blood transfusions. Is on weekly PTA Procrit injection for Hgb < 10.  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 Likely 2/2 coagulopathy in the setting of sepsis.  -Heparin gtt     # H/O CML, in remission  In remission since 2016. PTA dasatinib. Not an active problem that we suspect, but daughter does report that patient had an episode of fluid overload due to this medication, but thinks that he restarted this after he recovered.   -Held dasatinib   -Consider onc consult     Musculoskeletal:  No active problems     Skin:  No active problems     General Cares/Prophylaxis:    DVT Prophylaxis: Heparin gtt for DVTs in UE  GI Prophylaxis: PPI  Restraints: None needed  Family Communication: Social work consulted. Mother (Kaylin) and daughter (Kiley) updated by phone. Daughter states that ex-girlfriend (Danay Carr) is POA, but patient and Danay are no longer on speaking terms. Daughter also requested that patient's sister (Joann) not be updated, reasons unclear  to this writer. Spoke with POA (Danay Kolbenger), was updated, has not been in contact with patient and was surprised that she was still POA. Writer informed all of these people that SW will be involved to help sort through POA matters.   Social: Daughter and mother states that the patient was working with remodeling and repairing homes, but quit due to the amount of dust and the dyspnea about 1-2 months ago.  Daughter said he was unable to get out of bed for the past 2 weeks. Does not do much outdoor activities. Daughter, whom he lives with, states there is a mold issue in their bathroom at home.   Code Status: Full code     Lines/tubes/drains:  -OG   -Stein   -R Femoral A-line   -CVC R femoral   -Peripheral IV  -ETT    Disposition:  - Medical ICU     Patient seen and findings/plan discussed with medical ICU staff, Dr. Curran.    Annette Rodriguez,   [facility]  PGY2, Internal Medicine  See Keerthi for pager      Attending note: Patient seen, examined and discussed with the Resident physicians.  All data reviewed including vital signs, medications, laboratory studies and imaging.  Agree with the assessment and plan as outlined in the above note. The patient remains critically ill with acute respiratory failure, shock, acute kidney injury, immunocompromised.  I personally adjusted the ventilator to treat the acute respiratory failure and titrated the vasopressors to manage the septic shock.  Past renal transplant, immunocompromised, presented with acute respiratory failure with dense left sided infiltrate concerning for typical or atypical infection, IIP not excluded nor alveolar hemorrhage.  Wide budding yeast on gram stain- consistent with Blastomycosis- started on Amphotericin, awaiting cultures.  Off vasopressors.  Weaning ventilator support- will try to further decrease PEEP and assess, hopefully can do vent weaning soon.  Nephrology following, will try to start diuresis.     Total Critical Care  time excluding time for procedures and teaching was 40 minutes.     Joann Curran MD  [phone]    ====================================  INTERVAL HISTORY:   NAEO. Nursing notes reviewed. Patient is off all pressors at this time and maintaining MAPs, minimal vent settings, kidney function improving. Received 1 U PRBC yesterday, getting another today. Rapid improvement since initiation of amphotericin B. Spoke with family and POA and gained more collateral information as noted above.     OBJECTIVE:   1. VITAL SIGNS:   Temp:  [97.5  F (36.4  C)-98.8  F (37.1  C)] 98.4  F (36.9  C)  Pulse:  [67-81] 77  Resp:  [18-24] 22  MAP:  [64 mmHg-95 mmHg] 81 mmHg  Arterial Line BP: ()/(45-66) 116/59  FiO2 (%):  [40 %-50 %] 50 %  SpO2:  [88 %-97 %] 95 %  Ventilation Mode: CMV/AC  (Continuous Mandatory Ventilation/ Assist Control)  FiO2 (%): 50 %  Rate Set (breaths/minute): 18 breaths/min  Tidal Volume Set (mL): 500 mL  PEEP (cm H2O): 8 cmH2O  Oxygen Concentration (%): 50 %  Resp: 22    2. INTAKE/ OUTPUT:   I/O last 3 completed shifts:  In: 5871.51 [I.V.:3318.51; NG/GT:673; IV Piggyback:500]  Out: 1150 [Urine:1150]    3. PHYSICAL EXAMINATION:  General: Sedated, no acute distress. Mechanically ventilated. OG tube.   HEENT: Pupils are symmetric and reactive to light. No conjunctival injection.  Neuro: sedated, RASS -2 to -3.   Pulm/Resp: Coarse breath sounds b/l.   CV: RRR, no murmurs, rubs or gallops.   Abdomen: Soft, non-distended, non-tender on palpation.  : Stein catheter in place, urine yellow and clear  Incisions/Skin: Vertical surgical scars on abdomen. No hematoma at R femoral CVC or bruising on flanks.      4. LABS:   Arterial Blood Gases   Recent Labs   Lab 08/08/21  0425 08/07/21  1645 08/07/21  0632 08/07/21  0332   PH 7.40 7.42 7.40 7.34*   PCO2 41 40 40 41   PO2 82 76* 83 78*   HCO3 25 26 24 22     Complete Blood Count   Recent Labs   Lab 08/08/21  0333 08/07/21  1350 08/07/21  0330 08/06/21 2011   WBC 5.8 6.4 6.0  7.6   HGB 6.8* 7.1* 6.4* 7.2*    238 246 272     Basic Metabolic Panel  Recent Labs   Lab 21  0336 21  0333 21  0014 218 21  1350 21  0330 21  1706 21  1706   NA  --  140  --   --  142 140  --  139   POTASSIUM  --  3.6  3.6  --   --  3.9 4.3  --  5.2   CHLORIDE  --  106  --   --  105 107  --  112*   CO2  --  24  --   --  26 21  --  16*   BUN  --  62*  --   --  54* 51*  --  52*   CR  --  2.21*  2.21*  --   --  2.15* 2.38*  --  3.05*   * 166* 131* 157* 189* 213*   < > 92  99    < > = values in this interval not displayed.     Liver Function Tests  Recent Labs   Lab 21  03321  1706 21  1354   AST 27 21 26   ALT 16 16 19   ALKPHOS 58 71 74   BILITOTAL 0.7 0.4 0.4   ALBUMIN 2.7* 1.3* 0.7*     Coagulation Profile  No lab results found in last 7 days.    5. RADIOLOGY:   Recent Results (from the past 24 hour(s))   Echo Complete   Result Value    LVEF  60-65%    Narrative    [id number]  [id number]  [id number]  [id number]^ALEXIS^LENA     [facility],[place]  Echocardiography Laboratory  [address] [place] [id number]     Name: JONNY KINCAID  MRN: [id number]  : 1975  Study Date: 2021 08:07 AM  Age: 46 yrs  Gender: Male  Patient Location: [facility] [place]  Reason For Study: Respiratory Failure  Ordering Physician: LENA ESPINOZA  Performed By: Fabby Fernandez RDCS     BSA: 1.8 m2  Height: 64 in  Weight: 158 lb  HR: 71  BP: 99/56 mmHg  ______________________________________________________________________________  Procedure  Complete Portable Echo Adult. Echocardiogram with two-dimensional, color and  spectral Doppler performed.  ______________________________________________________________________________  Interpretation Summary  Left ventricular size, wall motion and function are normal. The ejection  fraction is 60-65%.  The right ventricle is normal size. Global right ventricular  function is  normal.  Mild aortic valve stenosis.  Pulmonary artery systolic pressure cannot be assessed.  The inferior vena cava was normal in size with preserved respiratory  variability.  No pericardial effusion is present.  ______________________________________________________________________________  Left Ventricle  Left ventricular size, wall motion and function are normal. The ejection  fraction is 60-65%. Grade II or moderate diastolic dysfunction.     Right Ventricle  The right ventricle is normal size. Global right ventricular function is  normal.     Atria  The atria cannot be assessed.     Mitral Valve  Mild mitral annular calcification is present. Trace mitral insufficiency is  present.     Aortic Valve  Mild aortic valve stenosis. The valve leaflets are not well visualized.     Tricuspid Valve  The tricuspid valve is normal. Mild tricuspid insufficiency is present.  Pulmonary artery systolic pressure cannot be assessed.     Pulmonic Valve  The pulmonic valve is normal.     Vessels  The aorta root is normal. The thoracic aorta is normal. The pulmonary artery  cannot be assessed. The inferior vena cava was normal in size with preserved  respiratory variability.     Pericardium  No pericardial effusion is present.     Compared to Previous Study  This study was compared with the study from 1/26/2017 . The left ventricular  function has remained the same.  ______________________________________________________________________________  MMode/2D Measurements & Calculations     IVSd: 1.0 cm  LVIDd: 4.9 cm  LVIDs: 3.1 cm  LVPWd: 0.79 cm  FS: 37.7 %  LV mass(C)d: 157.1 grams  LV mass(C)dI: 88.8 grams/m2  Ao root diam: 2.9 cm  asc Aorta Diam: 3.1 cm  LVOT diam: 2.1 cm  LVOT area: 3.6 cm2  LA Volume (BP): 55.7 ml  LA Volume Index (BP): 31.5 ml/m2  RWT: 0.32     Doppler Measurements & Calculations  MV E max beverly: 131.0 cm/sec  MV A max beverly: 138.0 cm/sec  MV E/A: 0.95  MV dec slope: 448.0 cm/sec2  MV dec time: 0.29  sec  Ao V2 max: 300.0 cm/sec  Ao max P.0 mmHg  Ao V2 mean: 216.0 cm/sec  Ao mean P.0 mmHg  Ao V2 VTI: 68.5 cm  MARLEY(I,D): 2.3 cm2  MARLEY(V,D): 2.2 cm2  LV V1 max P.5 mmHg  LV V1 max: 184.0 cm/sec  LV V1 VTI: 44.5 cm  SV(LVOT): 158.3 ml  SI(LVOT): 89.4 ml/m2  AV Sidney Ratio (DI): 0.61  MARLEY Index (cm2/m2): 1.3  E/E' av.3  Lateral E/e': 19.4  Medial E/e': 19.1     ______________________________________________________________________________  Report approved by: MD Kwaku Valdes 2021 11:49 AM         XR Chest Port 1 View    Narrative    Portable chest    INDICATION: Patient critically ill, intubated, with blasts on  bronchoalveolar lavage    COMPARISON: 2021    Findings: Heart size appears normal. Diffuse patchy opacities appear  increased, especially in the right lung. Endotracheal tube tip is  approximately 4.1 cm above the lawrence. NG/OG tube progresses beyond  the inferior margin of the image. Heart size appears normal.      Impression    IMPRESSION: Increasing ARDS, likely related to infection. Differential  considerations of edema or pulmonary hemorrhage.    RAMA CUNNINGHAM MD         SYSTEM ID:  [id number]

## 2021-08-08 NOTE — PLAN OF CARE
ICU End of Shift Summary. See flowsheets for vital signs and detailed assessment.    Changes this shift:   Versed continued at 3. Continues on Fentanyl @50, Continues on Heparin gtt, this am Anti-Xa therapeutic, FIO2 up to 60%, ABG improved. maintained MAP>65. Hemoglobin 6.8 this am one unit of Prbc's started. Now having loose stools. Urine output trending down.    Plan:  Continue with plan of care.

## 2021-08-09 NOTE — PLAN OF CARE
Major Shift Events: Peep increased from 8 to 12 throughout the day, requiring 100% FiO2 most of the day with PaO2 only reaching 69. Proned at 1518, notable improvements on gas following proning.     Pt initially agitated at beginning of shift, now RASS -3/-4. Propofol started with hopes of weaning off or reducing versed gtt. Currently propofol at 45mcg/kg/min. Versed at 5mg/hr. 2mg boluses of versed given x 3, moderately effective. Fentanyl gtt also increased to 150mcg/hr with upper limit of 200mcg/hr. 100mcg boluses fentanyl given x 3, effective. SR 68-80s frequent PVC and PACs. BP has reduced some with increased sedation (from 120/70s-100/50-60s). No BP meds required to maintain MAP >65. Vent settings changed in the AM. Current settings CMV 85% FiO2/350/24/12. PIP 28-32. Lungs very coarse/diminished. Attempted NJ placed at bedside by dietician, at pylorus, but still an NG. Consulted MD and TF restarted at 10cc/hr (goal 50cc/hr) with 30cc FWF q4h d/t not being post-pyloric. Liquid brown BMs x 5 throughout morning so decision made to insert rectal tube. 100cc out so far the rest of the shift. Stein. Duresing with 40mg IV lasix qid, decent response (500-600cc over a few hours), pt still fluid up. Otherwise, UOP ~50cc/hr. LUE several DVTs, swollen with +4 edema. R arm with +2 edema. New deep cut in left cheek under ETT faceplate/stickers, WOCN consult placed and MD assessed at bedside. Mepilex placed between skin and ETAD. Otherwise sacrum intact and no other skin concerns.     Plan: Continue to keep pt proned overnight. Keep sedated, but wean as tolerated. Social work consulted today--daughter is POA.     For vital signs and complete assessments, please see documentation flowsheets.

## 2021-08-09 NOTE — CONSULTS
Care Management Follow Up    Length of Stay (days): 3    Expected Discharge Date: TBD   Concerns to be Addressed: NOK decision maker  Patient plan of care discussed at interdisciplinary rounds: Yes    Additional Information:  SW consult acknowledged. Pt's legal NOK decision makers are his adult children. Pt does not have HCD or other documentation on file designating other decision makers. RU updated bedside RN and unit charge RN.     LON Mai, [facility]  ICU   [facility]  Ph: [phone]  [phone]

## 2021-08-09 NOTE — PLAN OF CARE
ICU End of Shift Summary. See flowsheets for vital signs and detailed assessment.    Changes this shift: Pt requiring more PRN sedation throughout the night. Pt becomes agitated, coughing fits, and O2 desaturations requiring fiO2 increase to 85% from 70. Pt with copious oral secretions and moderate-large ETT secretions. At times, pt able to follow commands and nodding head to yes/no questions. Heparin gtt adjusted and Axa drawn per protocol. Pt with loose BMs x3. Worsening BUE edema. Pt positive 10L since admission. Lasix with fair response.     Plan:  Next heparin Axa recheck at 12N. SW consult to assist in determining POA with complicated family dynamics. Increase in sedation gtts? Continue plan of care.

## 2021-08-09 NOTE — PROGRESS NOTES
[facility]  Transplant Infectious Disease Progress Note     Patient:  Jeremiah Cruz, Date of birth 1975, Medical record number [id number]  Date of Visit:  08/09/2021         Assessment and Recommendations:   Recommendations:  1. In this patient with severe pulmonary Blastomycosis, recommend that he be maintained on Ambisome 5mg/kg q24h IV for the first 1-2 weeks until clinical improvement, at which time he can be transitioned to an azole (Itraconazole preferably, alternatively Voriconazole can be used)  2. Please monitor renal function and electrolytes while on Ambisome  3. With a view to transition to azole in the coming days (only when clinically improved), start loading with Itraconazole in the next 24-48 hours. Dosing: Itraconazole solution 200mg PO q8h x 3 days, followed by 200mg q12h PO. Will check levels in 7 days after starting  4. Follow up previously sent serology testing as follows:  - Serum Aspergillus galactomannan   - Serum and Urine Histoplasma/Blastomyces antigens  5. Await BAL cultures/results including - cytology, AFB cultures, Nocardia PCR and culture, PJP PCR, BAL Histo antigen, Legionella culture. BAL fungal culture with Blastomyces and Aspergillus Galactomannan positive at 0.80  6. Follow up serum EBV DNA PCR. 8/6 serum CMV PCR negative  7. Please stop Vancomycin  8. With negative BAL and sputum bacterial cultures and alternative diagnosis identified, can stop Meropenem and monitor off the same  9. Continue Dapsone for PJP ppx     Transplant Infectious Disease will continue to follow with you.     Assessment:  45 y/o gentleman, PMHx Alport syndrome c/b ESRD s/p DDKT (x3, 1988, 2005 and now again in 2/2020) (CMV +/-, EBV unknown/+), thrombocytopenia, anemia of chronic disease, secondary renal hyperparathyroidism, HTN, CML (in remission since 2016) and GERD who presents as a transfer from [facility] for hypoxic respiratory failure     #Hypoxic Respiratory  "Failure:  #Circulatory Shock:  #KOH Prep with broad based budding yeast, morphology consistent with possible Blastomyces dermatiditis:  #Severe Pulmonary Blastomycosis:  Patient presented to OSH ER with complaints of two weeks of worsening cough and progressive SOB. He was noted to be hypoxic on arrival to 80s on room air, however worsened throughout the evening to a point where he was electively intubated prior to transfer. On arrival at [facility], he was in circulatory shock requiring 3 pressors. However with supportive care, he had been weaned off pressors. Although initial improvement in ventilation, respiratory status has once again declined and he is now on FiO2 100% with PEEP 12.   CXR at [facility] showed \"Bilateral diffuse nodular opacities, left greater than is right, possibly a combination of edema and/or infection\". CT at OSH also showed bilateral opacities, however is not available for interpretation. Diffuse nodular opacities on imaging in a patient that presents in a subacute/progressive manner raises concerns for atypical infections including endemic mycoses, invasive mold, mycobacterial infections among others. Appreciate pulmonary team obtaining BAL - so far COVID testing and RVP negative. However, KOH prep from BAL showing broad based budding yeast, morphology consistent with Blastomyces. This fits with clinical presentation and radiographic appearance. Now fungal cultures from BAL also growing Blastomyces. Although BAL aspergillus galactomannan weakly positive (0.8), this is a test that can have cross-reactivity with Blastomyces infection. Have sent additional serology for workup. Would recommend continuing Amphotericin B given severe pulmonary disease in immunocompromised patient prior to transition to azole.   Lastly, radiographic appearance not consistent with typical bacterial pneumonia. BAL cultures have remained negative. With no organisms identified, and alternative pulmonary diagnosis, consider " stopping Meropenem/antibacterial coverage and monitoring off the same     #GPC in blood culture from OSH:  OSH obtained blood cultures at time of presentation. 1/2 cultures with GPCs - now identified as Staph hominis (CoNS). With only 1/2 positive culture, likely skin contaminant. Other blood Cx from 8/5, as well as blood culture from [facility] from 8/6 remain negative. Patient presented in circulatory shock but has been weaned off pressors within 24 hours of arrival. With persistent negative cultures and hemodynamic stability, recommend stopping Vancomycin    #Renal Transplant recipient:  History of Alport syndrome c/b ESRD s/p DDKT (x3, 1988, 2005 and now again in 2/2020) (CMV +/-, EBV unknown/+)     Other Infectious Disease issues include:  - QTc interval: 423 msec 8/6/21  - Bacterial prophylaxis: On Meropenem  - Pneumocystis prophylaxis: Dapsone  - Viral serostatus & prophylaxis: CMV +/-, EBV unknown/+, completed Valcyte ppx, please monitor CMV levels  - Fungal prophylaxis: On Ambisome  - Immunization status: Has not received COVID vaccine  - Gamma globulin status: Unknown  - Isolation status: Good hand hygiene, standard isolation.      TERESA Waller  Staff Physician, Infectious Diseases  Pager [phone]         Interval History:   Patient seen and examined today  He remains critically ill in the ICU. Since he was last seen 8/8/21, his respiratory status has worsened, continued hypoxia, leading to increase in PEEP to 12 and FiO2 100%. He has some ongoing secretions  Has still not required pressors again    Review of labs - Creatinine stable at 2.12, LFTs within normal limits, WBC count 7.6  8/7 serum CrAg negative  8/6 BAL studies -  3+ Broad based budding yeast, morphology consistent with possible Blastomyces dermatiditis on KOH smear.  BAL aspergillus galactomannan positive at 0.80  BDG positive at 113  Fungal cultures from BAL now growing Blastomyces  Remainder of studies remain negative. Bronch AFB Cx  "negative to date. Additional serologies pending  Blood Cx from 8/6 at [facility] remain negative      Transplants:  2/28/2020 (Kidney), 12/7/1988 (Kidney), 3/2/2005 (Kidney), Postoperative day:  528.  Coordinator Jody García    Review of Systems:  Unable to obtain as patient intubated and sedated       History of presenting illness:   45 y/o gentleman, PMHx Alport syndrome c/b ESRD s/p DDKT (x3, 1988, 2005 and now again in 2/2020) (CMV +/-, EBV unknown/+), thrombocytopenia, anemia of chronic disease, secondary renal hyperparathyroidism, HTN, CML (in remission since 2016) and GERD who presents as a transfer from [place] [facility] for hypoxic respiratory failure     Patient intubated and sedated and no family members at bedside. History obtained from discussion with primary team and chart review. Patient originally presented to [place] ER with 2 weeks of dry cough and worsening dyspnea. He was reported treated for a sinus infection in early July with Azithromycin which had led to an improvement in his symptoms. However, starting July 21, his symptoms worsened     On presentation to the ER, he was noted to be hypoxic, initially 82-85% on room air. Was placed on O2 by nasal cannula, initially on 4L then escalated to 6L. CXR obtained at OSH ER showed bilateral infiltrates. Was not able to obtain CT PE given concern about renal function. He had blood cultures collected at OSH, and was given Levofloxacin (given reported allergy to Penicillins and cephalosporins). However, while at the ER, his respiratory function worsened and he was first on BIPAP and then electively intubated prior to transfer. A CT scan at the OSH showed bilateral opacities consistent with \"atypical pneumonia\"     At the time of transfer here, he clinically worsened, was in circulatory shock requiring 3 pressors. Was started on Vancomycin, Meropenem and Micafungin. A CXR at [facility] showed \"Bilateral diffuse nodular opacities, left greater than is right, possibly " "a combination of edema and/or infection\". Given clinical condition, he underwent bronchoscopy with BAL on 8/6. A KOH smear performed on the bronchoscopy showed 3+ Broad based budding yeast, morphology consistent with possible Blastomyces dermatiditis     He was started on Ambisome this morning and ID consulted. Micafungin was stopped  By the time he was seen, he had been weaned off all pressors. His vent settings had also slightly improved - down to PEEP 8/50% FiO2 from PEEP 10/80% FiO2 on arrival           Current Medications & Allergies:       [START ON 8/10/2021] sodium chloride 0.9%  250 mL Intravenous Q24H     acetaminophen  650 mg Oral or Feeding Tube Q24H     dextrose 5% water  10-20 mL Intravenous Q24H    And     amphotericin B liposome (AMBISOME) intermittent infusion  5 mg/kg (Dosing Weight) Intravenous Q24H    And     dextrose 5% water  10-20 mL Intravenous Q24H     atorvastatin  10 mg Oral Daily     dapsone  50 mg Oral or Feeding Tube Daily     diphenhydrAMINE  25 mg Oral Q24H    Or     diphenhydrAMINE  25 mg Intravenous Q24H     furosemide  40 mg Intravenous Q8H     hydrocortisone sodium succinate PF  50 mg Intravenous Q8H     insulin aspart  1-6 Units Subcutaneous Q4H     itraconazole  200 mg Oral Q8H    Followed by     [START ON 8/17/2021] itraconazole  200 mg Oral BID     lidocaine  5 mL Topical Once     loratadine  10 mg Oral Daily     multivitamins w/minerals  15 mL Per Feeding Tube Daily     mycophenolate  250 mg Oral BID     pantoprazole  40 mg Oral QAM AC     polyethylene glycol  17 g Oral Daily     protein modular  1 packet Per Feeding Tube TID     senna-docusate  2 tablet Oral BID       Infusions/Drips:    dextrose       fentaNYL 150 mcg/hr (08/09/21 1400)     heparin 2,000 Units/hr (08/09/21 1400)     midazolam 5 mg/hr (08/09/21 1400)     propofol (DIPRIVAN) infusion 25 mcg/kg/min (08/09/21 1511)       Allergies   Allergen Reactions     Blood Transfusion Related (Informational Only) Other " (See Comments)     Patient has a history of a clinically significant antibody against RBC antigens.  A delay in compatible RBCs may occur.     Cephalosporins Other (See Comments) and Rash     fever     Compazine [Prochlorperazine]      Gammagard [Immune Globulin] Other (See Comments)     Ed got spinal meningitis and MD stated to never get again for fear of death.       Vancomycin      Per care everywhere patient has Red Man's syndrome     Penicillins Hives and Rash     Per OSH records            Physical Exam:   Vitals were reviewed.  All vitals stable.  Patient Vitals for the past 24 hrs:   BP Temp Temp src Pulse Resp SpO2 Weight   08/09/21 1300 -- -- -- 68 -- 91 % --   08/09/21 1200 -- 98.9  F (37.2  C) Axillary 71 26 90 % --   08/09/21 1100 (!) 103/28 -- -- 70 25 (!) 89 % --   08/09/21 1000 -- -- -- 71 27 (!) 89 % --   08/09/21 0900 -- -- -- 73 24 92 % --   08/09/21 0830 -- -- -- 71 -- (!) 89 % --   08/09/21 0800 -- 99.1  F (37.3  C) -- 80 25 93 % --   08/09/21 0700 -- -- -- 74 20 91 % --   08/09/21 0600 -- -- -- 75 25 95 % --   08/09/21 0500 -- -- -- 74 20 92 % --   08/09/21 0400 -- 99.2  F (37.3  C) Axillary 74 20 93 % --   08/09/21 0300 -- -- -- 68 20 93 % --   08/09/21 0230 -- -- -- 70 -- 92 % --   08/09/21 0200 -- -- -- 71 20 92 % --   08/09/21 0130 -- -- -- 74 -- 92 % --   08/09/21 0104 -- -- -- 75 -- 93 % --   08/09/21 0100 -- -- -- 83 20 93 % --   08/09/21 0030 -- -- -- 77 -- 95 % --   08/09/21 0000 -- 99.4  F (37.4  C) Axillary 80 20 94 % 73.3 kg (161 lb 9.6 oz)   08/08/21 2330 -- -- -- 77 -- 93 % --   08/08/21 2300 -- -- -- 79 19 95 % --   08/08/21 2230 -- -- -- 78 -- 94 % --   08/08/21 2200 -- -- -- 80 21 94 % --   08/08/21 2130 -- -- -- 75 -- 93 % --   08/08/21 2100 -- -- -- 73 21 92 % --   08/08/21 2030 -- -- -- 73 -- 93 % --   08/08/21 2000 -- 98.8  F (37.1  C) Axillary 75 22 95 % --   08/08/21 1930 -- -- -- 73 -- 94 % --   08/08/21 1900 -- -- -- 71 18 95 % --   08/08/21 1830 -- -- -- 69 -- 93 % --    08/08/21 1800 -- -- -- 77 24 93 % --   08/08/21 1730 -- -- -- 72 -- 93 % --   08/08/21 1700 -- -- -- 69 -- 91 % --   08/08/21 1630 -- -- -- 67 -- 91 % --   08/08/21 1600 -- 97.7  F (36.5  C) Axillary 66 20 90 % --   08/08/21 1530 -- -- -- 74 -- 94 % --     Ranges for vital signs:  Temp:  [97.7  F (36.5  C)-99.4  F (37.4  C)] 98.9  F (37.2  C)  Pulse:  [66-83] 68  Resp:  [18-27] 26  BP: (103)/(28) 103/28  MAP:  [68 mmHg-108 mmHg] 69 mmHg  Arterial Line BP: (106-196)/(48-76) 109/50  FiO2 (%):  [70 %-100 %] 100 %  SpO2:  [89 %-95 %] 91 %  Vitals:    08/07/21 0400 08/08/21 0400 08/09/21 0000   Weight: 71.8 kg (158 lb 4.6 oz) 72.6 kg (160 lb 0.9 oz) 73.3 kg (161 lb 9.6 oz)       Physical Examination:  GENERAL:  well-developed, well-nourished, intubated and sedated in ICU bed  HEAD:  Head is normocephalic, atraumatic   EYES:  Eyes have anicteric sclerae without conjunctival injection   ENT:  Oropharynx is moist without exudates or ulcers. ETT +  NECK:  No cervical lymphadenopathy  LUNGS:  Mechanically ventilated, coarse breath sounds, decreased at bases  CARDIOVASCULAR:  Regular rate and rhythm with no murmurs, gallops or rubs.  ABDOMEN:  Normal bowel sounds, soft, nontender. No appreciable hepatosplenomegaly.  SKIN:  No acute rashes.  Line in place without any surrounding erythema or exudate.  NEUROLOGIC:  Intubated and sedated, unable to assess         Laboratory Data:     Absolute CD4, [place] T Cells   Date Value Ref Range Status   08/07/2021 7 (L) 441-2,156 cells/uL Final       Inflammatory Markers    Recent Labs   Lab Test 08/06/21  1354   .0*       Metabolic Studies       Recent Labs   Lab Test 08/09/21  1230 08/09/21  0353 08/08/21  1402 08/06/21  2016 08/06/21  1706 08/06/21  1354 02/28/20  1308 02/28/20  0341   NA  --  143 142   < > 139 136  136   < > 137   POTASSIUM  --  3.5 3.6   < > 5.2 5.7*  5.7*   < > 5.0   CHLORIDE  --  110* 108   < > 112* 112*  112*   < > 102   CO2  --  24 27   < > 16* 13*   13*   < > 22   ANIONGAP  --  9 7   < > 11 11  11   < > 13   BUN  --  78* 68*   < > 52* 48*  48*   < > 65*   CR  --  2.12* 2.14*   < > 3.05* 2.89*  2.89*   < > 11.90*   GFRESTIMATED  --  36* 36*   < > 24* 25*  25*   < > 5*   * 205* 152*   < > 92  99 76  76   < > 92   A1C  --   --   --   --   --   --   --  5.2   ANTWAN  --  9.1 9.0   < > 9.0 9.2  9.2   < > 7.7*   PHOS  --  4.2  --    < > 6.3* 6.8*   < >  --    MAG  --  2.7* 2.7*   < > 2.0 2.0   < >  --    LACT  --   --   --   --  1.2 2.1*  --   --    PCAL  --   --   --   --   --  9.31*  9.16*  --   --    FGTL  --   --   --   --  113  --   --   --     < > = values in this interval not displayed.       Hepatic Studies    Recent Labs   Lab Test 08/09/21 0353 08/08/21  0958 08/08/21 0333 08/07/21 0330 08/06/21  1706   BILITOTAL 0.6  --  0.6 0.7 0.4   DBIL  --   --  0.2  --   --    ALKPHOS 62  --  52 58 71   PROTTOTAL 5.9*  --  5.9* 6.2* 5.4*   ALBUMIN 2.6*  --  2.9* 2.7* 1.3*   AST 27  --  21 27 21   ALT 20  --  15 16 16   LDH  --  250*  --   --  290*       Hematology Studies      Recent Labs   Lab Test 08/09/21 0353 08/08/21 2208 08/08/21 0333 08/07/21  1350 08/07/21  0330 08/06/21 2011 05/05/20  0000 03/16/20  0807 03/13/20  0735   WBC 7.6 5.9 5.8 6.4 6.0 7.6   < > 9.6 12.1*   ANEU  --   --   --   --   --   --   --  8.4* 10.7*   ALYM  --   --   --   --   --   --   --  0.2* 0.3*   TAHIR  --   --   --   --   --   --   --  0.6 0.6   AEOS  --   --   --   --   --   --   --  0.2 0.4   HGB 7.6* 7.9* 6.8* 7.1* 6.4* 7.2*   < > 9.0* 6.7*   HCT 25.3* 26.4* 22.6* 23.7* 21.8* 24.4*   < > 27.6* 20.3*    223 200 238 246 272   < > 309 357    < > = values in this interval not displayed.       Clotting Studies    Recent Labs   Lab Test 04/03/20  0730 03/05/20  1327 02/28/20  0341 01/26/17  0647   INR 1.21* 1.18* 1.11 0.99   PTT  --  30 30 29       Iron Testing    Recent Labs   Lab Test 08/09/21  0353 08/08/21  0333 08/07/21  2106 05/18/21  0000 01/05/21  0000  06/29/20  0914 03/05/20  1327 03/04/20  1018 03/03/20  0729   IRON  --   --   --   --   --  163  --   --  139   FEB  --   --   --   --   --  277  --   --  142*   IRONSAT  --   --   --   --   --  59*  --   --  98*   YOLIS  --   --   --  2,884  --  2,163*  --   --  3,397*   MCV 95 93  --   --    < >  --  91  --  91   FOLIC  --   --   --   --   --   --  23.8  --   --    B12  --   --   --   --   --   --  1,407*  --   --    HAPT  --   --  309*  --   --   --  184  --   --    RETP  --  1.3  --   --   --   --  0.4*   < >  --    RETICABSCT  --  0.030  --   --   --   --  10.9*   < >  --     < > = values in this interval not displayed.       Arterial Blood Gas Testing    Recent Labs   Lab Test 08/09/21  1230 08/09/21  1020 08/09/21  0354 08/08/21  0425 08/07/21  1645   PH 7.36 7.37 7.40 7.40 7.42   PCO2 51* 48* 43 41 40   PO2 69* 63* 72* 82 76*   HCO3 29* 28 27 25 26   O2PER 95 90 70 60 50        Urine Studies     Recent Labs   Lab Test 03/16/20  0940 03/12/20  1125   URINEPH 6.0 7.0   NITRITE Negative Negative   LEUKEST Small* Trace*   WBCU 18* 7*       Medication levels    Recent Labs   Lab Test 08/07/21  2106 12/08/20  1408 09/15/20  1400 06/29/20  0914   VANCOMYCIN 12.0  --    < >  --    TACROL  --   --   --  10.3   06031  --  8.2*  --   --    69150  --  97  --   --     < > = values in this interval not displayed.       Body fluid stats    Recent Labs   Lab Test 08/06/21  1655   FCOL Colorless   FAPR Clear   FWBC 428   FNEU 56   FLYM 2   FMONO 42       Microbiology:    Last Culture results with specimen source  Culture   Date Value Ref Range Status   08/06/2021 1+ Normal pat  Final   08/06/2021 Blastomyces dermatitidis (A)  Preliminary     Comment:     Preliminary result, confirmation pending.   08/06/2021 Culture negative, monitoring continues  Preliminary   08/06/2021 No growth after 2 days  Preliminary   08/06/2021 3+ Normal pat  Final     Culture Micro   Date Value Ref Range Status   03/16/2020 No growth  Final    03/13/2020 Canceled, Test credited  Specimen not received    Final   03/24/2005 No growth  Final   03/24/2005   Final    Canceled, Test credited Test canceled by PCU/Clinic   03/11/2005 No growth  Final   03/09/2005   Final    Single colony Coagulase negative Staphylococcus Susceptibility testing not     Comment:      routinely done   03/09/2005 No growth  Final   03/09/2005 No growth  Final   03/09/2005 No growth  Final    Specimen Description   Date Value Ref Range Status   03/16/2020 Unspecified Urine  Final   03/13/2020 Unknown  Final   03/24/2005 Blood Right Arm  Final   03/24/2005 Blood  Final   03/11/2005 Blood Arterial blood DIALYSIS  Final   03/09/2005 Catheter tip  CENTRAL LINE  Final   03/09/2005 Arm  Final   03/09/2005 White port  Final   03/09/2005 Unspecified Urine  Final        Last check of C difficile  No results found for: CDBPCT    Infection Studies to assess Diarrhea No lab results found.    Virology:  Coronavirus-19 testing    Recent Labs   Lab Test 08/06/21  0944   SCV2R Negative       Respiratory virus (non-coronavirus-19) testing    Recent Labs   Lab Test 08/06/21  1804   IFLUA Not Detected   FLUAH1 Not Detected   DP5843 Not Detected   FLUAH3 Not Detected   IFLUB Not Detected   PIV1 Not Detected   PIV2 Not Detected   PIV3 Not Detected   PIV4 Not Detected   RSVA Not Detected   RSVB Not Detected   HMPV Not Detected   ADENOV Not Detected   LUJAN Not Detected         BK Virus PCR Quant Result (External)   Date Value Ref Range Status   07/26/2021 None Detected None Detected IU/mL Final   06/22/2021 None Detected None Detected IU/mL Final   05/18/2021 None Detected None Detected IU/mL Final   04/20/2021 None Detected None Detected IU/mL Final   02/23/2021 None Detected None Detected IU/ml Final   01/05/2021 None Detected None Detected IU/mL Final   12/08/2020 None Detected None Detected Final   11/24/2020 None Detected None Detected Final   10/27/2020 None Detected None Detected IU/mL Final    08/31/2020 None Detected None Detected Final   08/10/2020 None Detected None Detected IU/mL Final   07/20/2020 None Detected None Detected IU/mL Final   06/22/2020 None Detected None Detected Final   06/01/2020 None Detected None Detected IU/mL Final   05/04/2020 None Detected None Detected IU/mL Final   04/27/2020 None Detected None Detected IU/mL Final   04/16/2020 None Detected None Detected IU/mL Final     BK Virus Result   Date Value Ref Range Status   01/26/2017 BK Virus DNA Not Detected BKNEG copies/mL Final       Imaging:  Recent Results (from the past 48 hour(s))   XR Chest Port 1 View    Narrative    EXAM: XR CHEST PORT 1 VIEW  8/8/2021 6:56 AM     HISTORY:  pt critically ill, intubated, with blasto on BAL       COMPARISON:  Chest x-ray 8/7/2021    FINDINGS: AP radiograph of the chest. Endotracheal tube projecting  over the midthoracic trachea. Enteric tube with side-port overlying  the stomach and tip inferior to the field of view.    Stable cardiomediastinal silhouette. Unchanged upper lobe predominant  mixed interstitial and patchy airspace opacities. Small bilateral  pleural effusions. No pneumothorax. Visualized upper abdomen is  unremarkable. Bones are stable.      Impression    IMPRESSION:  1. Unchanged mixed interstitial and patchy airspace opacities  concerning for ARDS likely due to infection given the patient's  history.  2. Endotracheal tube projects over the midthoracic trachea.    I have personally reviewed the examination and initial interpretation  and I agree with the findings.    RAMA CUNNINGHAM MD         SYSTEM ID:  [id number]   XR Chest Port 1 View    Narrative    EXAM: XR CHEST PORT 1 VIEW  8/9/2021 6:58 AM     HISTORY:  pt critically ill, intubated, with blasto on BAL       COMPARISON:  8/8/2021    FINDINGS:   Portable semiupright view of the chest. Endotracheal tube tip projects  over the upper thoracic trachea. Enteric tube course below the  diaphragm and out of the field of  view. Trachea is midline. Stable  cardiac silhouette. Diffuse mixed opacities throughout the lungs,  grossly unchanged. No significant pleural effusion. No pneumothorax  appreciated.      Impression    IMPRESSION:   1. Grossly unchanged diffuse mixed opacities throughout the lungs.  2. Stable support devices.    I have personally reviewed the examination and initial interpretation  and I agree with the findings.    LEEANN MAY MD         SYSTEM ID:  [id number]

## 2021-08-09 NOTE — PROGRESS NOTES
[facility]   Transplant Nephrology Progress Note  Date of Admission:  8/6/2021  Today's Date: 08/09/2021    Recommendations:  - reduce MMF further to 250 mg BID given worsening respiratory status, (ordered)  - use lasix as needed to optimize his volume status with current lung infection.    Assessment & Plan   # DDKT: VASQUEZ, stable creatinine, likely pre-renal azotemia vs ATN in the settings of shock possible septic shock.               - Baseline Creatinine: ~ 1.8-2.1              - Proteinuria: Minimal (0.2-0.5 grams)              - Date DSA Last Checked: Feb/2021      Latest DSA: No              - BK Viremia: No              - Kidney Tx Biopsy: Apr 03, 2020; Result: No diagnostic evidence of acute rejection.  Moderate interstitial fibrosis and tubular atrophy. EM showed ~25% foot process effacement               -No acute indication for RRT currently      # Immunosuppression: Belatacept (dose 5 mg/kg every four weeks), Mycophenolic acid (dose 540 mg every 12 hours) and Prednisone (dose 5 mg daily) last belatacept dose reportedly given on July 13th, next dose scheduled on Aug 10, 2021 (will need to verify the timing)              - Changes: reduced MMF further to 250 mg BID given worsening respiratory status, ok to hold hawa for 1-2 weeks. Currently on stress dose steroid per ICU      # Infection Prophylaxis:   - PJP: Dapsone              - CMV: Valcyte;  (D+/R-). completed  Valcyte for 6 months. CMV Negative (2/2021), will check CMV given his respiratory issues.     # Hypertension: controlled  Goal BP: < 130/80              - Volume status: Euvolemic                              - Changes: No     # Anemia in Chronic Renal Disease: Hgb: Stable      DEB: No              - Iron studies: Not checked recently     # Mineral Bone Disorder:   - Secondary renal hyperparathyroidism; PTH level: Normal (18-80 pg/ml)        On treatment: None  - Vitamin D; level: Normal        On  supplement: No  - Calcium; level: Normal        On supplement: No     # Electrolytes:   - Potassium; level: Normal        On supplement: No  - Magnesium; level: Not checked recently        On supplement: No  - Bicarbonate; level: Normal      On supplement: No     # CML: Appears stable on Dasatinib. Patient is followed by Hematology.     # Acute hypoxic respiratory failure due to presumed blastomycosis based on fungitell ZAID BAL prep + broad budding yeast, on Ambisome per ID, planned for 2 weeks till clinical improvement then possible switch to itraconazole or voriconazole .    - reduced MMF further to 250 mg BID given worsening respiratory status, ok to hold hawa for 1-2 weeks     # Septic shock: resolved , off pressors.      # Transplant History:  Etiology of Kidney Failure: Alport's syndrome  Tx: DDKT  Transplant: 2/28/2020 (Kidney), 12/7/1988 (Kidney), 3/2/2005 (Kidney)  Crossmatch at time of Tx: negative  Significant changes in immunosuppression: None  Significant transplant-related complications: None     Recommendations were communicated to the primary team verbally.    Josee Viveros MD   Pager: [phone]    Attestation:  This patient has been seen and evaluated by me, Alvin Varner MD.  I have reviewed the note and agree with plan of care as documented by the fellow.       Interval History   Overall remains intubated and sedated. Worsening O2 requirement , higher FiO2 since yesterday Off pressors Cr is stable or slightly improved. UOP is over 2 L.    Review of Systems   4 point ROS was obtained and negative except as noted in the Interval History.    MEDICATIONS:    [START ON 8/10/2021] sodium chloride 0.9%  250 mL Intravenous Q24H     acetaminophen  650 mg Oral or Feeding Tube Q24H     dextrose 5% water  10-20 mL Intravenous Q24H    And     amphotericin B liposome (AMBISOME) intermittent infusion  5 mg/kg (Dosing Weight) Intravenous Q24H    And     dextrose 5% water  10-20 mL Intravenous Q24H      "atorvastatin  10 mg Oral Daily     dapsone  50 mg Oral or Feeding Tube Daily     diphenhydrAMINE  25 mg Oral Q24H    Or     diphenhydrAMINE  25 mg Intravenous Q24H     furosemide  40 mg Intravenous Q8H     hydrocortisone sodium succinate PF  50 mg Intravenous Q8H     insulin aspart  1-6 Units Subcutaneous Q4H     itraconazole  200 mg Oral Q8H    Followed by     [START ON 2021] itraconazole  200 mg Oral BID     lidocaine  5 mL Topical Once     loratadine  10 mg Oral Daily     multivitamins w/minerals  15 mL Per Feeding Tube Daily     mycophenolate  250 mg Oral BID     pantoprazole  40 mg Oral QAM AC     polyethylene glycol  17 g Oral Daily     protein modular  1 packet Per Feeding Tube TID     senna-docusate  2 tablet Oral BID       dextrose       fentaNYL 150 mcg/hr (21 1400)     heparin 2,000 Units/hr (21 1400)     midazolam 5 mg/hr (21 1400)     propofol (DIPRIVAN) infusion 25 mcg/kg/min (21 1400)       Physical Exam   Temp  Av.4  F (36.9  C)  Min: 97.5  F (36.4  C)  Max: 99.9  F (37.7  C)  Arterial Line BP  Min: 98/48  Max: 174/61  Arterial Line MAP (mmHg)  Av.7 mmHg  Min: 63 mmHg  Max: 95 mmHg      Pulse  Av.7  Min: 67  Max: 83 Resp  Av  Min: 18  Max: 26  FiO2 (%)  Av %  Min: 40 %  Max: 100 %  SpO2  Av.6 %  Min: 90 %  Max: 100 %     BP (!) 103/28 (BP Location: Left leg)   Pulse 68   Temp 98.9  F (37.2  C) (Axillary)   Resp 26   Ht 1.626 m (5' 4\")   Wt 73.3 kg (161 lb 9.6 oz)   SpO2 91%   BMI 27.74 kg/m     Date 21 07 - 21 0659   Shift 3340-2945 8664-6988 5578-5692 24 Hour Total   INTAKE   I.V. 913.18   913.18   NG/   353   Colloid 400   400   Enteral 160   160   Blood Components 300   300   Shift Total(mL/kg) 2126.18(29.61)   2126.18(29.61)   OUTPUT   Urine 350   350   Shift Total(mL/kg) 350(4.87)   350(4.87)   Weight (kg) 71.8 71.8 71.8 71.8      Admit Weight: 66.4 kg (146 lb 6.2 oz)     GENERAL APPEARANCE: intubated and " sedated.   HENT: mouth without ulcers or lesions  LYMPHATICS: no cervical or supraclavicular nodes  RESP: lungs clear to auscultation - no rales, rhonchi or wheezes  CV: regular rhythm, normal rate, no rub, no murmur  EDEMA: + LE edema bilaterally overall  ABDOMEN: soft, nondistended, nontender, bowel sounds normal  MS: extremities normal - no gross deformities noted, no evidence of inflammation in joints, no muscle tenderness  SKIN: no rash    Data   All labs reviewed by me.  CMP  Recent Labs   Lab 08/09/21  1230 08/09/21  0919 08/09/21  0357 08/09/21  0353 08/08/21  1402 08/08/21  0333 08/07/21  1350 08/07/21  0330 08/06/21  1706   NA  --   --   --  143 142 140 142 140 139   POTASSIUM  --   --   --  3.5 3.6 3.6  3.6 3.9 4.3 5.2   CHLORIDE  --   --   --  110* 108 106 105 107 112*   CO2  --   --   --  24 27 24 26 21 16*   ANIONGAP  --   --   --  9 7 10 11 12 11   * 146* 190* 205* 152* 166* 189* 213* 92  99   BUN  --   --   --  78* 68* 62* 54* 51* 52*   CR  --   --   --  2.12* 2.14* 2.21*  2.21* 2.15* 2.38* 3.05*   GFRESTIMATED  --   --   --  36* 36* 35*  35* 36* 32* 24*   ANTWAN  --   --   --  9.1 9.0 8.8 8.6 8.3* 9.0   MAG  --   --   --  2.7* 2.7* 2.5* 2.3 2.2 2.0   PHOS  --   --   --  4.2  --  4.5  --  5.9* 6.3*   PROTTOTAL  --   --   --  5.9*  --  5.9*  --  6.2* 5.4*   ALBUMIN  --   --   --  2.6*  --  2.9*  --  2.7* 1.3*   BILITOTAL  --   --   --  0.6  --  0.6  --  0.7 0.4   ALKPHOS  --   --   --  62  --  52  --  58 71   AST  --   --   --  27  --  21  --  27 21   ALT  --   --   --  20  --  15  --  16 16     CBC  Recent Labs   Lab 08/09/21  0353 08/08/21  2208 08/08/21  1126 08/08/21  0333 08/07/21  1350   HGB 7.6* 7.9* 8.1* 6.8* 7.1*   WBC 7.6 5.9  --  5.8 6.4   RBC 2.67* 2.80*  --  2.42* 2.52*   HCT 25.3* 26.4*  --  22.6* 23.7*   MCV 95 94  --  93 94   MCH 28.5 28.2  --  28.1 28.2   MCHC 30.0* 29.9*  --  30.1* 30.0*   RDW 16.6* 16.4*  --  16.7* 16.0*    223  --  200 238     INRNo lab results found  in last 7 days.  ABG  Recent Labs   Lab 08/09/21  1230 08/09/21  1020 08/09/21  0354 08/08/21  0425   PH 7.36 7.37 7.40 7.40   PCO2 51* 48* 43 41   PO2 69* 63* 72* 82   HCO3 29* 28 27 25   O2PER 95 90 70 60      Urine Studies  Recent Labs   Lab Test 03/16/20  0940 03/12/20  1125   COLOR Yellow Straw   APPEARANCE Clear Clear   URINEGLC 150* >499*   URINEBILI Negative Negative   URINEKETONE Negative Negative   SG 1.014 1.009   UBLD Small* Small*   URINEPH 6.0 7.0   PROTEIN 30* Negative   NITRITE Negative Negative   LEUKEST Small* Trace*   RBCU 13* 3*   WBCU 18* 7*     Recent Labs   Lab Test 06/29/20  0850   UTPG 0.17     PTH  Recent Labs   Lab Test 06/29/20  0914 03/04/20  1018 03/03/20  0849 03/03/20  0729   PTHI 58 630* 668* Canceled, Test credited     Iron Studies  Recent Labs   Lab Test 05/18/21  0000 04/20/21  0000 03/23/21  0000 02/23/21  0000 01/26/21  0000 01/05/21  0000 06/29/20  0914 03/03/20  0729   IRON  --   --   --   --   --   --  163 139   FEB  --   --   --   --   --   --  277 142*   IRONSAT  --   --   --   --   --   --  59* 98*   YOLIS 2,884 2,834 3,179 2,480 2,813 2,356 2,163* 3,397*       IMAGING:  All imaging studies reviewed by me.

## 2021-08-09 NOTE — PROCEDURES
Small Bowel Feeding Tube Placement Assessment  Reason for Feeding Tube Placement: plan to prone pt  Cortrak Start Time: 12:55  Cortrak End Time: 14:10  Medicine Delivered During Procedure: on sedation gtt, titrated by RN. Also gave 1 dose of IV Reglan to help with peristalsis  Placement Successful: FT tip left at pylorus per Cortrak image. AXR pending.    Procedure Complications: FT would not advance through pylorus despite numerous attempts (air bolus, water flushes, Reglan, abdominal palpation, stylet manipulation, etc).  Final Placement Arvin at exit of nare: 100 cm  Face to Face time with patient: 75 min    Bridle Placement:   Reason for bridle placement: plan to prone   Medicine delivered during procedure: lubricating jelly    Procedure: Successful  Location of top of clip on FT: @ 100 cm marker   Condition of nose/skin at time of bridle placement: Unremarkable   Face to Face time with patient: 2 minutes.    Demi Shane RD, LD  ([facility] dietitian, [id number] (Mon-Fri))

## 2021-08-09 NOTE — PROGRESS NOTES
Critical Care Services Progress Note:  I personally examined and evaluated the patient today. I formulated today s plan with the house staff team or resident(s) and agree with the findings and plan in the associated note (see separately attested resident note).   I have evaluated all laboratory values and imaging studies from the past 24 hours.  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  Was in shock, but improved after intubation, steroids and improved acidosis.      Overnight events/pertinent findings today:  No acute events.  Occasional discomfort on the ventilator.   Data    Vent settings with Vt 500 and PEEP 8. Pplat 30.    ABG 7.4/43/72 on FIO2 72  CXR with ET tube 6 cm from lawrence    BUN 78, Cr 2.12    Assessment/plan:    ARDS: likely from Blasto.  Also with GPC on sputum, waiting speciation  - increase PEEP and decrease tidal volume to improve driving pressure  - Prone ventilation overnight  - Continue current abx. ID following. Could potentially de-escalate to cephalosporin and monitor for reaction if ID okay with this.   - increase lasix dosing and aim for net negative  - RASS  -4   - Advance ET tube 3 cm    Shock: improved. Now off pressors.  Keep steroids same today.  Likely decrease by half tomorrow    Place post-pyloric feeding tube today    Rest per resident note.    I spent a total of 45 minutes (excluding procedure time) personally providing and directing critical care services at the bedside and on the critical care unit for this patient.     Cristopher Ward MD

## 2021-08-09 NOTE — PROGRESS NOTES
MEDICAL ICU PROGRESS NOTE  08/09/2021      Date of Service (when I saw the patient): 08/09/2021    ASSESSMENT: Jeremiah Cruz is a 46 year old male with PMH significant for Alport's Syndrome complicated by ESRD s/p kidney transplant (02/2020,2005, 1988), thrombocytopenia, anemia of CKD, secondary renal hyperparathyroidism, HTN, CML (on in remission since 2016) and GERD who presented to OSH with 2 week history of dry cough and dyspnea and developed acute hypoxemic respiratory failure likely 2/2 to sepsis requiring intubation. Found to have budding yeast c/f blastomycosis on BAL and started on amphotericin B. Was requiring 3 pressors initially, but off all pressors at this time.      CHANGES and MAJOR THINGS TODAY:   -Continue amphotericin B  -Continue hydrocortisone 60 mg Q8H (taper tomorrow)   -Increase Lasix to 40 mg Q8H  -Started propofol   -Fentanyl increased   -Advanced ET tube 3 cm   -Proned at 3pm   -Decreased tidal volume and increased PEEP and RR  -RASS goal of -4  -Nutrition consulted for [place] post pyloric   -Discontinued daily CXRs   -Rectal tube placed   -Social work consulted   -Discontinued meropenem   -Loading dose of Itraconazole     PLAN:    Neuro:  # Pain and sedation  Patient is currently on versed, propofol and fentanyl for sedation.    - Propofol started   - Versed, wean as tolerated as prefer propofol over midazolam  - Fentanyl, wean as tolerated   - RASS goal -4    Pulmonary:  # Acute Hypoxemic Respiratory Failure, ARDS  # Pneumonia, possibly due to Blastomycoses  Presented to OSH afebrile and tachypneic with 2 week history of dry cough and dyspnea. He was hypoxemic at OSH requiring intubation. CXR at OSH significant for bilateral white out and CT scan significant for similar findings c/f pneumonia in setting of immunosuppressed patient. KOH prep and gram stain from BAL on 8/6 showed budding yeasts concerning for blastomyces. OSH 1/2 blood cx growing gram positive coagulase negative cocci,  but likely a contaminant and not the etiology of his PNA. Further differential includes: CHF exacerbation, dasatinib-induced pneumonitis (has a hx of fluid overload due to this therapy 2 months ago per the daughter), diffuse alveolar hemorrhage, silicosis, sarcoidosis and beryllium. Covid is negative. Overall respiratory status today has worsened. Will prone patient starting at 3 pm today. 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  - Treat infection as discussed in ID section below   - Continue amphotericin B   - Continue PTA dapsone for PJP ppx  - Discontinued daily CXRs  - Goal to keep plateau pressure < 30 and driving pressure < 15   - Prone today for P:F ratio < 150 starting at 3 pm   - Modify ventilator to achieve lung-protective ventilation AC 24/350/+12, wean FiO2 as able  - Consider NMB and inhaled epoprostenol if oxygenation continues to worsen despite prone positioning    Cardiovascular:  # Shock, likely 2/2 sepsis,  resolved    # H/O HTN  Patient previously required three pressors on admission (norepi, vaso, epi) and is now off all pressors at this time. Patient's hypotension was c/f shock 2/2 sepsis versus hypovolemia versus cardiogenic. Patient did appear hypovolemic on POCUS. Lactate elevated at 2.1 on admission. Concerned since patient is on chronic steroids at home for immunosuppression that adrenal insufficiency may be contributing to his hypotension. Started stress dose steroids.  - Levophed prn for MAP goal >65  - Continued hydrocortisone of 50mg Q8H (taper tomorrow)  - Hold PTA amlodipine   - Hold PTA carvedilol     #H/O CHF  # Intermittent Bradycardia  # Concern for volume overload  Concerned that patient's bilateral white out on CXR may be due to CHF, however, no bilateral LE edema or hepatomegaly appreciated on exam. Echo TTE showed EF 60-65%. Patient has been intermittently bradycardic unknown as to why.  Differential includes adrenal insufficiency (patient is on PTA chronic steroids) hypokalemia, hypocalcemia and hypothyroidism although electrolytes are WNL. Can consider TSH if patient develops persistent bradycardia.    - Lasix increased to 40 mg Q8H  - BMP BID, K >4, Mg >2     GI/Nutrition:  # GERD  - Prophylactic Protonix      # Nutrition  - Tube feeds per OG tube   - Goal rate of 50 ml/hr   - Nutrition consulted to place [place] post pyloric      # Constipation   - Scheduled bowel regimen of Senna and Miralax     Renal/Fluids/Electrolytes:  # Alport's Syndrome  # ESRD s/p renal transplant (02/2020, 2005, 1988)   Patient's latest renal transplant in 02/2020. Previous renal transplants in 1988 which failed and 2005 which was nonfunctional.   - Consulted renal transplant team. Appreciate ongoing recs.  - Continue Hydrocortisone 50mg Q8H (taper tomorrow)   - Mycophenolate acid to 360 mg BID or oral suspension 500 mg BID  - Held Belatacept 5 mg/kg Q4 weeks (next dose scheduled on 8/10/21), OK to hold for 1-2 weeks per renal   - s/p Isotonic bicarb drip 150 meq at 150 ml/hr  - MMF level pending     # Acute Kidney Injury, improving  Creatinine elevated at 2.89 on admission. Creatinine is 2.12 today which is down-trending. Baseline creatinine appears 1.8-2.1 on chart review. Likely etiologies include prerenal azotemia versus ATN in setting of possible shock. Per renal transplant team, no acute indication for renal replacement therapy at this time.  - Increase furosemide to 40mg Q8H    - BMP BID, K >4, Mg >2 while diuresing and on Ambisome     # Hyperphosphatemia   # Hyperkalemia, resolved   # Hypoalbuminemia   Likely 2/2 to patient's VASQUEZ. EKG shows no concerning findings such as peaked T waves or Torsades. 2L of LR given and K+ corrected. Patient also given Albumin 50g 25% Q6H x4 doses and is improving.   - Replace potassium 20 mEq today  - BMP BID      Endocrine:  # Hyperglycemia   Patient's sugars have been elevated to the  200s likely 2/2 stress response versus high dose stress corticosteroids.   - Medium resistance sliding scale     # H/O Hyperparathyroidism 2/2 ESRD  Not an active problem     ID:  # Cough   # Dyspnea   # Pulmonary blastomycoses dermatididis   # Gram positive coagulase negative cocci bacteremia (1/2 blood cx)  Presented to OSH afebrile and tachypneic with 2 week history of dry cough and dyspnea. He was hypoxemic at OSH requiring intubation. CXR at OSH significant for bilateral white out and CT scan significant for similar findings. KOH prep and gram stain from BAL showed budding yeast c/f blastomyces. Patient was started on amphotericin B overnight and demonstrated a rapid improvement which is somewhat unexpected for blasto. Blood cultures from OSH also show gram positive coagulase negative cocci in 1/2 blood cultures, likely contaminate, MRSA nares negative. Additionally, respiratory culture here shows normal pat. In the setting of his immunosuppression on the differential is bacterial, fungal, viral etiologies. CMV is negative, COVID negative, respiratory virus panel negative, strep pneumo and legionell negative, crypto negative. Remaining on the differential are pneumocystis histoplasmosis, coccidioides. Further differential included: CHF, dasatinib-induced pneumonitis, diffuse alveolar hemorrhage, silicosis, sarcoidosis and beryllium.   - ID consulted, recs appreciated   - MRSA nares negative  - Discontinued meropenem, observe off antibiotics given low suspicion for bacterial pneumonia  - Continue amphotericin B    - Loading dose of Itraconazole ordered 200 mg q8 hours x3 days followed by 200 mg BID  - Continue PTA dapsone for PJP ppx  - s/p Valcyte for CMV ppx  - Serum Aspergillus galactomannan and beta-d-glucan are increased most likely due to cross-reactivity with Blastomyces   - Serum and Urine Histoplasma/Blastomyces antigens pending    Hematology:    # Acute anemia of critical illness on anemia in chronic  renal disease   # H/O thrombocytopenia  Patient has history of anemia in chronic renal disease. Low suspicion for bleeding. R femoral site with no signs of hemorrhage, no flank ecchymosis. LDH elevated, but AST normal so hemolysis unlikely. Reticulocyte index difficult to interpret in setting of blood transfusions. Is on weekly PTA Procrit injection for Hgb < 10.  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 Likely 2/2 coagulopathy in the setting of sepsis.  - Heparin gtt     # H/O CML, in remission  In remission since 2016. PTA dasatinib. Not an active problem that we suspect, but daughter does report that patient had an episode of fluid overload due to this medication, but thinks that he restarted this after he recovered.   - Held dasatinib   - Per oncology, okay to hold dasatinib while in hospital      Musculoskeletal:  No active problems     Skin:  #Lesions   Patient has bilateral gashes on cheeks where face mask was placed.   - Continue to monitor, consider WOC    General Cares/Prophylaxis:    DVT Prophylaxis: Heparin gtt for DVTs in UE  GI Prophylaxis: PPI  Restraints: None needed  Family Communication: Social work consulted. Daughter (Kiley) is person of contact for daily updates, updated by phone   Code Status: Full code     Lines/tubes/drains:  -OG   -Stein   -R Femoral A-line   -CVC R femoral   -Peripheral IV  -ETT    Disposition:  - Medical ICU     Patient seen and findings/plan discussed with medical ICU staff, Dr. Ward.    Mindi Mason, MS4     Patient seen, examined, and discussed with medical student and Dr. Ward. Agree with note as edited above.   LEANDRA Peters    ====================================  INTERVAL HISTORY:   NAEO. Nursing notes reviewed - intermittently agitated with coughing fits and desaturations. Overall  respiratory status has worsened today. Increased PEEP and decreased tidal volume on vent today. Discontinued meropenem. Kidney function improving. Patient will be proned around 3 pm today.     OBJECTIVE:   1. VITAL SIGNS:   Temp:  [97.7  F (36.5  C)-99.4  F (37.4  C)] 99.1  F (37.3  C)  Pulse:  [66-83] 70  Resp:  [18-25] 25  BP: (103)/(28) 103/28  MAP:  [71 mmHg-108 mmHg] 81 mmHg  Arterial Line BP: (107-196)/(51-76) 120/61  FiO2 (%):  [70 %-85 %] 85 %  SpO2:  [89 %-95 %] 89 %  Ventilation Mode: CMV/AC  (Continuous Mandatory Ventilation/ Assist Control)  FiO2 (%): 85 %  Rate Set (breaths/minute): 24 breaths/min  Tidal Volume Set (mL): 350 mL  PEEP (cm H2O): 12 cmH2O  Oxygen Concentration (%): 95 %  Resp: 25    2. INTAKE/ OUTPUT:   I/O last 3 completed shifts:  In: 3432.43 [I.V.:1390.43; NG/GT:422; IV Piggyback:500]  Out: 2810 [Urine:2810]    3. PHYSICAL EXAMINATION:  General: Sedated, no acute distress. Mechanically ventilated. OG tube.   HEENT: Pupils are symmetric and reactive to light. No conjunctival injection.  Neuro: sedated, RASS -4.   Pulm/Resp: Coarse breath sounds b/l.   CV: RRR, no murmurs, rubs or gallops. Pedal pulses 2+ without LE edema  Abdomen: Soft, non-distended, non-tender on palpation.  : Stein catheter in place, urine yellow and clear  Incisions/Skin: Vertical surgical scars on abdomen. No hematoma at R femoral CVC or bruising on flanks. Bilateral gashes on both cheeks where face mask was placed, initially both gashes were bleeding but now have stopped.     4. LABS:  Arterial Blood Gases   Recent Labs   Lab 08/09/21  1230 08/09/21  1020 08/09/21  0354 08/08/21  0425   PH 7.36 7.37 7.40 7.40   PCO2 51* 48* 43 41   PO2 69* 63* 72* 82   HCO3 29* 28 27 25     Complete Blood Count   Recent Labs   Lab 08/09/21  0353 08/08/21  2208 08/08/21  1126 08/08/21  0333 08/07/21  1350   WBC 7.6 5.9  --  5.8 6.4   HGB 7.6* 7.9* 8.1* 6.8* 7.1*    223  --  200 238     Basic Metabolic Panel  Recent Labs    Lab 08/09/21  1230 08/09/21  0919 08/09/21  0357 08/09/21  0353 08/08/21  1402 08/08/21  0333 08/07/21  1350   NA  --   --   --  143 142 140 142   POTASSIUM  --   --   --  3.5 3.6 3.6  3.6 3.9   CHLORIDE  --   --   --  110* 108 106 105   CO2  --   --   --  24 27 24 26   BUN  --   --   --  78* 68* 62* 54*   CR  --   --   --  2.12* 2.14* 2.21*  2.21* 2.15*   * 146* 190* 205* 152* 166* 189*     Liver Function Tests  Recent Labs   Lab 08/09/21  0353 08/08/21  0333 08/07/21  0330 08/06/21  1706   AST 27 21 27 21   ALT 20 15 16 16   ALKPHOS 62 52 58 71   BILITOTAL 0.6 0.6 0.7 0.4   ALBUMIN 2.6* 2.9* 2.7* 1.3*     5. RADIOLOGY:   Recent Results (from the past 24 hour(s))   XR Chest Port 1 View    Narrative    EXAM: XR CHEST PORT 1 VIEW  8/9/2021 6:58 AM     HISTORY:  pt critically ill, intubated, with blasto on BAL       COMPARISON:  8/8/2021    FINDINGS:   Portable semiupright view of the chest. Endotracheal tube tip projects  over the upper thoracic trachea. Enteric tube course below the  diaphragm and out of the field of view. Trachea is midline. Stable  cardiac silhouette. Diffuse mixed opacities throughout the lungs,  grossly unchanged. No significant pleural effusion. No pneumothorax  appreciated.      Impression    IMPRESSION:   1. Grossly unchanged diffuse mixed opacities throughout the lungs.  2. Stable support devices.    I have personally reviewed the examination and initial interpretation  and I agree with the findings.    LEEANN MAY MD         SYSTEM ID:  [id number]

## 2021-08-10 NOTE — PROGRESS NOTES
[facility]  Transplant Infectious Disease Progress Note     Patient:  Jeremiah Cruz, Date of birth 1975, Medical record number [id number]  Date of Visit:  08/10/2021         Assessment and Recommendations:   Recommendations:  1. In this patient with severe pulmonary Blastomycosis, recommend that he be maintained on Ambisome 5mg/kg q24h IV for the first 1-2 weeks until clinical improvement, at which time he can be transitioned to an azole (Itraconazole preferably, alternatively Voriconazole can be used)  2. Please monitor renal function and electrolytes while on Ambisome  3. With a view to transition to azole in the coming days (only when clinically improved), have started loading with Itraconazole in the next 24-48 hours. Dosing: Itraconazole solution 200mg PO q8h x 3 days, followed by 200mg q12h PO. Will check levels in 7 days after starting (check on 8/17)  4. Follow up previously sent serology testing:  - BAL and serum Histoplasma antigen, urine Blastomyces antigen. Urine Histoplasma antigen strongly positive >20, but has cross-reactivity with Blasto  5. Await BAL cultures/results including - cytology, AFB cultures, Nocardia PCR and culture, BAL Histo antigen, Legionella culture. BAL fungal culture with Blastomyces and Aspergillus Galactomannan positive at 0.80  6. In light of febrile episode, please repeat blood, urine and sputum cultures. If continued fever, recommend restarting Meropenem  7. If hemodynamic instability/need for pressors again, restart Vancomycin  8. Continue Dapsone for PJP ppx     Transplant Infectious Disease will continue to follow with you.     Assessment:  45 y/o gentleman, PMHx Alport syndrome c/b ESRD s/p DDKT (x3, 1988, 2005 and now again in 2/2020) (CMV +/-, EBV unknown/+), thrombocytopenia, anemia of chronic disease, secondary renal hyperparathyroidism, HTN, CML (in remission since 2016) and GERD who presents as a transfer from [facility] for  "hypoxic respiratory failure     #Hypoxic Respiratory Failure:  #Circulatory Shock:  #KOH Prep with broad based budding yeast, morphology consistent with possible Blastomyces dermatiditis:  #Severe Pulmonary Blastomycosis:  Patient presented to OSH ER with complaints of two weeks of worsening cough and progressive SOB. He was noted to be hypoxic on arrival to 80s on room air, however worsened throughout the evening to a point where he was electively intubated prior to transfer. On arrival at [facility], he was in circulatory shock requiring 3 pressors. However with supportive care, he had been weaned off pressors. Although initial improvement in ventilation, respiratory status has once again declined and he is now on FiO2 85% and PEEP 14, slight improvement after proning  CXR at [facility] showed \"Bilateral diffuse nodular opacities, left greater than is right, possibly a combination of edema and/or infection\". CT at OSH also showed bilateral opacities, however is not available for interpretation. Diffuse nodular opacities on imaging in a patient that presents in a subacute/progressive manner raises concerns for atypical infections including endemic mycoses, invasive mold, mycobacterial infections among others. Appreciate pulmonary team obtaining BAL - so far COVID testing and RVP negative. However, KOH prep from BAL showing broad based budding yeast, morphology consistent with Blastomyces. This fits with clinical presentation and radiographic appearance. Now fungal cultures from BAL also growing Blastomyces. Although BAL aspergillus galactomannan weakly positive (0.8), this is a test that can have cross-reactivity with Blastomyces infection. Today, urine Histoplasma antigen was strongly positive at >20 (however this has cross reactivity to Blastomyces and therefore expect to have it be positive). Have sent additional serology for workup. Would recommend continuing Amphotericin B given severe pulmonary disease in " immunocompromised patient prior to transition to azole.   Lastly, radiographic appearance not consistent with typical bacterial pneumonia. BAL cultures have remained negative.     #Fever:  Patient with low grade fever/Tmax 99.9F on arrival. He was in circulatory shock and on pressors. After being on empiric antibiotics, he clinically improved initially, defervesced and was weaned off pressors. Over the last 48 hours, has been noted to have worsening respiratory status again, now on FIO2 85%, PEEP 14 and briefly requiring proning. Today, noted to be febrile to 101F. He is currently being treated for severe pulmonary Blastomycosis with bacterial cultures being negative to date. However fever might indicate ongoing infection with Blastomyces vs superimposed bacterial infection/VAP. Recommend repeating cultures and reinitiating antibiotic coverage based on clinical status and repeat fevers      #GPC in blood culture from OSH:  OSH obtained blood cultures at time of presentation. 1/2 cultures with GPCs - now identified as Staph hominis (CoNS). With only 1/2 positive culture, likely skin contaminant. Other blood Cx from 8/5, as well as blood culture from  from 8/6 remain negative. Patient presented in circulatory shock but has been weaned off pressors within 24 hours of arrival. With persistent negative cultures and hemodynamic stability, recommend stopping Vancomycin    #Renal Transplant recipient:  History of Alport syndrome c/b ESRD s/p DDKT (x3, 1988, 2005 and now again in 2/2020) (CMV +/-, EBV unknown/+)     Other Infectious Disease issues include:  - QTc interval: 423 msec 8/6/21  - Bacterial prophylaxis: None  - Pneumocystis prophylaxis: Dapsone  - Viral serostatus & prophylaxis: CMV +/-, EBV unknown/+, completed Valcyte ppx, please monitor CMV levels  - Fungal prophylaxis: On Ambisome, Itraconazole  - Immunization status: Has not received COVID vaccine  - Gamma globulin status: Unknown  - Isolation status: Good  hand hygiene, standard isolation.      TERESA Waller  Staff Physician, Infectious Diseases  Pager [phone]         Interval History:   Patient seen and examined today  He remains critically ill in the ICU. After he was seen yesterday, he was taken off Meropenem and has remained off the same since. He was also proned which improved his ventilation to a point where FiO2 could be weaned down to 85%. He was subsequently supined this morning  Has remained off pressors. However, this afternoon, was noted to be febrile to 101F    Review of labs - Creatinine stable at 2.16, LFTs within normal limits, WBC count 8.8, Platelet 219  8/7 serum CrAg negative  8/6 BAL studies -  3+ Broad based budding yeast, morphology consistent with possible Blastomyces dermatiditis on KOH smear.  BAL aspergillus galactomannan positive at 0.80, serum Aspergillus GM 0.43 (negative)  BDG positive at 113 from 8/6 and 257 from 8/7  Fungal cultures from BAL now growing Blastomyces  Urine Histoplasma antigen >20.0  Remainder of studies remain negative including BAL PJP and CMV. Bronch AFB Cx negative to date. Additional serologies pending  Blood Cx from 8/6 [facility] remain negative      Transplants:  2/28/2020 (Kidney), 12/7/1988 (Kidney), 3/2/2005 (Kidney), Postoperative day:  529.  Coordinator Jody García    Review of Systems:  Unable to obtain as patient intubated and sedated       History of presenting illness:   47 y/o gentleman, PMHx Alport syndrome c/b ESRD s/p DDKT (x3, 1988, 2005 and now again in 2/2020) (CMV +/-, EBV unknown/+), thrombocytopenia, anemia of chronic disease, secondary renal hyperparathyroidism, HTN, CML (in remission since 2016) and GERD who presents as a transfer from [facility] for hypoxic respiratory failure     Patient intubated and sedated and no family members at bedside. History obtained from discussion with primary team and chart review. Patient originally presented to [facility] with 2 weeks of dry  "cough and worsening dyspnea. He was reported treated for a sinus infection in early July with Azithromycin which had led to an improvement in his symptoms. However, starting July 21, his symptoms worsened     On presentation to the ER, he was noted to be hypoxic, initially 82-85% on room air. Was placed on O2 by nasal cannula, initially on 4L then escalated to 6L. CXR obtained at OSH ER showed bilateral infiltrates. Was not able to obtain CT PE given concern about renal function. He had blood cultures collected at OSH, and was given Levofloxacin (given reported allergy to Penicillins and cephalosporins). However, while at the ER, his respiratory function worsened and he was first on BIPAP and then electively intubated prior to transfer. A CT scan at the OSH showed bilateral opacities consistent with \"atypical pneumonia\"     At the time of transfer here, he clinically worsened, was in circulatory shock requiring 3 pressors. Was started on Vancomycin, Meropenem and Micafungin. A CXR at [facility] showed \"Bilateral diffuse nodular opacities, left greater than is right, possibly a combination of edema and/or infection\". Given clinical condition, he underwent bronchoscopy with BAL on 8/6. A KOH smear performed on the bronchoscopy showed 3+ Broad based budding yeast, morphology consistent with possible Blastomyces dermatiditis     He was started on Ambisome this morning and ID consulted. Micafungin was stopped  By the time he was seen, he had been weaned off all pressors. His vent settings had also slightly improved - down to PEEP 8/50% FiO2 from PEEP 10/80% FiO2 on arrival           Current Medications & Allergies:       sodium chloride 0.9%  250 mL Intravenous Q24H     acetaminophen  650 mg Oral or Feeding Tube Q24H     dextrose 5% water  10-20 mL Intravenous Q24H    And     amphotericin B liposome (AMBISOME) intermittent infusion  5 mg/kg (Dosing Weight) Intravenous Q24H    And     dextrose 5% water  10-20 mL Intravenous " Q24H     atorvastatin  10 mg Oral Daily     dapsone  50 mg Oral or Feeding Tube Daily     diphenhydrAMINE  25 mg Oral Q24H    Or     diphenhydrAMINE  25 mg Intravenous Q24H     [Held by provider] furosemide  40 mg Intravenous Q6H     hydrocortisone sodium succinate PF  25 mg Intravenous Q8H     insulin aspart  1-6 Units Subcutaneous Q4H     itraconazole  200 mg Oral Q8H    Followed by     [START ON 8/17/2021] itraconazole  200 mg Oral BID     lidocaine  5 mL Topical Once     loratadine  10 mg Oral Daily     multivitamins w/minerals  15 mL Per Feeding Tube Daily     pantoprazole  40 mg Oral QAM AC     protein modular  1 packet Per Feeding Tube TID       Infusions/Drips:    dextrose       epoprostenol (VELETRI) 20 mcg/mL in sterile water inhalation solution 20 ng/kg/min (08/10/21 1234)     fentaNYL 150 mcg/hr (08/10/21 1500)     heparin 1,700 Units/hr (08/10/21 1500)     midazolam 2 mg/hr (08/10/21 1500)     propofol (DIPRIVAN) infusion 45 mcg/kg/min (08/10/21 1500)       Allergies   Allergen Reactions     Blood Transfusion Related (Informational Only) Other (See Comments)     Patient has a history of a clinically significant antibody against RBC antigens.  A delay in compatible RBCs may occur.     Cephalosporins Other (See Comments) and Rash     fever     Compazine [Prochlorperazine]      Gammagard [Immune Globulin] Other (See Comments)     Ed got spinal meningitis and MD stated to never get again for fear of death.       Vancomycin      Per care everywhere patient has Red Man's syndrome     Penicillins Hives and Rash     Per OSH records            Physical Exam:   Vitals were reviewed.  All vitals stable.  Patient Vitals for the past 24 hrs:   BP Temp Temp src Pulse Resp SpO2 Weight   08/10/21 1500 -- -- -- 70 24 90 % --   08/10/21 1400 -- -- -- 68 24 95 % --   08/10/21 1300 -- -- -- 75 24 93 % --   08/10/21 1200 -- (!) 101  F (38.3  C) Oral 67 24 93 % --   08/10/21 1100 -- -- -- 65 24 90 % --   08/10/21 1000 -- --  -- 65 24 91 % --   08/10/21 0925 -- -- -- 75 -- 95 % --   08/10/21 0900 -- -- -- 67 -- 95 % --   08/10/21 0800 105/52 99.7  F (37.6  C) -- 62 24 95 % --   08/10/21 0730 -- -- -- 65 -- 93 % --   08/10/21 0715 -- -- -- 61 -- 93 % --   08/10/21 0700 -- -- -- 68 24 93 % --   08/10/21 0645 -- -- -- 64 -- 94 % --   08/10/21 0632 -- -- -- -- -- 95 % --   08/10/21 0630 -- -- -- 64 -- 94 % --   08/10/21 0615 -- -- -- 65 -- (!) 89 % --   08/10/21 0600 -- -- -- 66 24 94 % --   08/10/21 0545 -- -- -- 66 -- 94 % --   08/10/21 0530 -- -- -- 62 -- 94 % --   08/10/21 0515 -- -- -- 64 -- 93 % --   08/10/21 0500 -- -- -- 62 24 93 % --   08/10/21 0445 -- -- -- 64 -- 94 % --   08/10/21 0441 -- -- -- -- -- 94 % --   08/10/21 0430 -- -- -- 62 -- 92 % --   08/10/21 0415 -- -- -- 61 -- 91 % --   08/10/21 0400 -- 97.9  F (36.6  C) Axillary 67 24 96 % 73.1 kg (161 lb 2.5 oz)   08/10/21 0349 -- -- -- -- -- 93 % --   08/10/21 0345 -- -- -- 66 -- 94 % --   08/10/21 0330 -- -- -- 61 -- 93 % --   08/10/21 0315 -- -- -- 65 -- 91 % --   08/10/21 0300 -- -- -- 62 24 92 % --   08/10/21 0245 -- -- -- 64 -- 91 % --   08/10/21 0230 -- -- -- 69 -- 91 % --   08/10/21 0215 -- -- -- 67 -- 90 % --   08/10/21 0200 -- -- -- 69 24 95 % --   08/10/21 0145 -- -- -- 70 -- 94 % --   08/10/21 0130 -- -- -- 68 -- 94 % --   08/10/21 0115 -- -- -- 67 -- 94 % --   08/10/21 0100 -- -- -- 68 24 94 % --   08/10/21 0045 -- -- -- 68 -- 94 % --   08/10/21 0035 -- -- -- -- -- 94 % --   08/10/21 0030 -- -- -- 66 -- 93 % --   08/10/21 0022 -- -- -- 71 -- 90 % --   08/10/21 0015 -- -- -- 67 -- 90 % --   08/10/21 0000 -- 98.5  F (36.9  C) Axillary 66 24 92 % --   08/09/21 2345 -- -- -- 65 -- 91 % --   08/09/21 2330 -- -- -- 66 -- 91 % --   08/09/21 2326 -- -- -- -- -- 94 % --   08/09/21 2315 -- -- -- 64 -- 94 % --   08/09/21 2300 -- -- -- 66 24 94 % --   08/09/21 2245 -- -- -- 67 -- 94 % --   08/09/21 2230 -- -- -- 68 -- 94 % --   08/09/21 2215 -- -- -- 63 -- 94 % --   08/09/21  2200 -- -- -- 68 24 94 % --   08/09/21 2145 -- -- -- 68 -- 93 % --   08/09/21 2130 -- -- -- 68 -- 93 % --   08/09/21 2115 -- -- -- 67 -- 93 % --   08/09/21 2112 -- -- -- -- -- 94 % --   08/09/21 2100 -- -- -- 70 24 94 % --   08/09/21 2045 -- -- -- 71 -- 95 % --   08/09/21 2035 -- -- -- -- -- (!) 87 % --   08/09/21 2033 -- -- -- -- -- (!) 89 % --   08/09/21 2030 -- -- -- 66 -- 91 % --   08/09/21 2021 -- -- -- 67 -- 90 % --   08/09/21 2015 -- -- -- 68 -- 91 % --   08/09/21 2000 -- 98.5  F (36.9  C) Axillary 67 24 91 % --   08/09/21 1945 -- -- -- 71 -- 92 % --   08/09/21 1930 -- -- -- 67 -- 91 % --   08/09/21 1915 -- -- -- 70 -- 91 % --   08/09/21 1900 -- -- -- 69 24 92 % --   08/09/21 1833 -- -- -- 69 -- 90 % --   08/09/21 1800 -- -- -- 72 24 94 % --   08/09/21 1700 -- -- -- 69 24 95 % --   08/09/21 1600 -- 100  F (37.8  C) Axillary 76 24 97 % --     Ranges for vital signs:  Temp:  [97.9  F (36.6  C)-101  F (38.3  C)] 101  F (38.3  C)  Pulse:  [61-76] 70  Resp:  [24] 24  BP: (105)/(52) 105/52  MAP:  [66 mmHg-80 mmHg] 69 mmHg  Arterial Line BP: (103-123)/(35-59) 111/47  FiO2 (%):  [80 %-100 %] 85 %  SpO2:  [87 %-97 %] 90 %  Vitals:    08/08/21 0400 08/09/21 0000 08/10/21 0400   Weight: 72.6 kg (160 lb 0.9 oz) 73.3 kg (161 lb 9.6 oz) 73.1 kg (161 lb 2.5 oz)       Physical Examination:  GENERAL:  well-developed, well-nourished, intubated and sedated in ICU bed  HEAD:  Head is normocephalic, atraumatic   EYES:  Eyes have anicteric sclerae without conjunctival injection   ENT:  Oropharynx is moist without exudates or ulcers. ETT +  NECK:  No cervical lymphadenopathy  LUNGS:  Mechanically ventilated, coarse breath sounds, decreased at bases  CARDIOVASCULAR:  Regular rate and rhythm with no murmurs, gallops or rubs.  ABDOMEN:  Normal bowel sounds, soft, nontender. No appreciable hepatosplenomegaly.  SKIN:  No acute rashes.  Line in place without any surrounding erythema or exudate.  NEUROLOGIC:  Intubated and sedated, unable  to assess         Laboratory Data:     Absolute CD4, [place] T Cells   Date Value Ref Range Status   08/07/2021 7 (L) 441-2,156 cells/uL Final       Inflammatory Markers    Recent Labs   Lab Test 08/06/21  1354   .0*       Metabolic Studies       Recent Labs   Lab Test 08/10/21  1146 08/10/21  0324 08/09/21  1650 08/09/21  0353 08/07/21  1650 08/07/21  1351 08/06/21 2016 08/06/21  1706 08/06/21  1354 08/06/21  1354 02/28/20  1308 02/28/20  0341   NA  --  147*  --  143   < >  --    < > 139  --  136  136   < > 137   POTASSIUM  --  3.7 3.7 3.5   < >  --    < > 5.2  --  5.7*  5.7*   < > 5.0   CHLORIDE  --  109  --  110*   < >  --    < > 112*  --  112*  112*   < > 102   CO2  --  25  --  24   < >  --    < > 16*  --  13*  13*   < > 22   ANIONGAP  --  13  --  9   < >  --    < > 11  --  11  11   < > 13   BUN  --  90*  --  78*   < >  --    < > 52*  --  48*  48*   < > 65*   CR  --  2.16*  --  2.12*   < >  --    < > 3.05*  --  2.89*  2.89*   < > 11.90*   GFRESTIMATED  --  36*  --  36*   < >  --    < > 24*  --  25*  25*   < > 5*   * 150*  --  205*   < >  --    < > 92  99  --  76  76   < > 92   A1C  --   --   --   --   --   --   --   --   --   --   --  5.2   ANTWAN  --  9.3  --  9.1   < >  --    < > 9.0  --  9.2  9.2   < > 7.7*   PHOS  --  5.6*  --  4.2   < >  --    < > 6.3*  --  6.8*   < >  --    MAG  --  2.8* 2.9* 2.7*   < >  --    < > 2.0  --  2.0   < >  --    LACT  --   --   --   --   --   --   --  1.2  --  2.1*  --   --    PCAL  --   --   --   --   --   --   --   --   --  9.31*  9.16*  --   --    FGTL  --   --   --   --   --  257  --  113   < >  --   --   --     < > = values in this interval not displayed.       Hepatic Studies    Recent Labs   Lab Test 08/09/21  0353 08/08/21  0958 08/08/21  0333 08/07/21  0330 08/06/21  1706   BILITOTAL 0.6  --  0.6 0.7 0.4   DBIL  --   --  0.2  --   --    ALKPHOS 62  --  52 58 71   PROTTOTAL 5.9*  --  5.9* 6.2* 5.4*   ALBUMIN 2.6*  --  2.9* 2.7* 1.3*   AST 27  --   21 27 21   ALT 20  --  15 16 16   LDH  --  250*  --   --  290*       Hematology Studies      Recent Labs   Lab Test 08/10/21  0324 08/09/21  0353 08/08/21  2208 08/08/21  0333 08/07/21  1350 08/07/21  0330 05/05/20  0000 03/16/20  0807 03/13/20  0735   WBC 8.8 7.6 5.9 5.8 6.4 6.0   < > 9.6 12.1*   ANEU  --   --   --   --   --   --   --  8.4* 10.7*   ALYM  --   --   --   --   --   --   --  0.2* 0.3*   TAHIR  --   --   --   --   --   --   --  0.6 0.6   AEOS  --   --   --   --   --   --   --  0.2 0.4   HGB 7.6* 7.6* 7.9* 6.8* 7.1* 6.4*   < > 9.0* 6.7*   HCT 26.2* 25.3* 26.4* 22.6* 23.7* 21.8*   < > 27.6* 20.3*    213 223 200 238 246   < > 309 357    < > = values in this interval not displayed.       Clotting Studies    Recent Labs   Lab Test 04/03/20  0730 03/05/20  1327 02/28/20  0341 01/26/17  0647   INR 1.21* 1.18* 1.11 0.99   PTT  --  30 30 29       Iron Testing    Recent Labs   Lab Test 08/10/21  0324 08/08/21  0333 08/07/21  2106 05/18/21  0000 01/05/21  0000 06/29/20  0914 03/05/20  1327 03/04/20  1018 03/03/20  0729   IRON  --   --   --   --   --  163  --   --  139   FEB  --   --   --   --   --  277  --   --  142*   IRONSAT  --   --   --   --   --  59*  --   --  98*   YOLIS  --   --   --  2,884  --  2,163*  --   --  3,397*   MCV 99 93  --   --    < >  --  91  --  91   FOLIC  --   --   --   --   --   --  23.8  --   --    B12  --   --   --   --   --   --  1,407*  --   --    HAPT  --   --  309*  --   --   --  184  --   --    RETP  --  1.3  --   --   --   --  0.4*   < >  --    RETICABSCT  --  0.030  --   --   --   --  10.9*   < >  --     < > = values in this interval not displayed.       Arterial Blood Gas Testing    Recent Labs   Lab Test 08/10/21  1344 08/10/21  0529 08/10/21  0126 08/09/21  2042 08/09/21  1650   PH 7.35 7.36 7.37 7.36 7.35   PCO2 51* 49* 48* 51* 51*   PO2 79* 77* 88 82 92   HCO3 29* 27 28 29* 28   O2PER 85 80 90 100 100        Urine Studies     Recent Labs   Lab Test 08/10/21  1301  03/16/20  0940 03/12/20  1125   URINEPH 5.0 6.0 7.0   NITRITE Negative Negative Negative   LEUKEST Negative Small* Trace*   WBCU <1 18* 7*       Medication levels    Recent Labs   Lab Test 08/08/21  0333 08/07/21  2106 12/08/20  1408 09/15/20  1400 06/29/20  0914   VANCOMYCIN  --  12.0  --    < >  --    TACROL  --   --   --   --  10.3   MPACID 2.73  --   --   --   --    67317  --   --  8.2*  --   --    MPAG 126.0*  --   --   --   --    34430  --   --  97  --   --     < > = values in this interval not displayed.       Body fluid stats    Recent Labs   Lab Test 08/06/21  1655   FCOL Colorless   FAPR Clear   FWBC 428   FNEU 56   FLYM 2   FMONO 42       Microbiology:    Last Culture results with specimen source  Culture   Date Value Ref Range Status   08/06/2021 1+ Normal pat  Final   08/06/2021 Blastomyces dermatitidis (A)  Preliminary     Comment:     Preliminary result, confirmation pending.   08/06/2021 Culture negative, monitoring continues  Preliminary   08/06/2021 No growth after 4 days  Preliminary   08/06/2021 3+ Normal pat  Final     Culture Micro   Date Value Ref Range Status   03/16/2020 No growth  Final   03/13/2020 Canceled, Test credited  Specimen not received    Final   03/24/2005 No growth  Final   03/24/2005   Final    Canceled, Test credited Test canceled by PCU/Clinic   03/11/2005 No growth  Final   03/09/2005   Final    Single colony Coagulase negative Staphylococcus Susceptibility testing not     Comment:      routinely done   03/09/2005 No growth  Final   03/09/2005 No growth  Final   03/09/2005 No growth  Final    Specimen Description   Date Value Ref Range Status   03/16/2020 Unspecified Urine  Final   03/13/2020 Unknown  Final   03/24/2005 Blood Right Arm  Final   03/24/2005 Blood  Final   03/11/2005 Blood Arterial blood DIALYSIS  Final   03/09/2005 Catheter tip  CENTRAL LINE  Final   03/09/2005 Arm  Final   03/09/2005 White port  Final   03/09/2005 Unspecified Urine  Final        Last  check of C difficile  No results found for: CDBPCT    Infection Studies to assess Diarrhea No lab results found.    Virology:  Coronavirus-19 testing    Recent Labs   Lab Test 08/06/21  0944   SCV2R Negative       Respiratory virus (non-coronavirus-19) testing    Recent Labs   Lab Test 08/06/21  1804   IFLUA Not Detected   FLUAH1 Not Detected   MB0542 Not Detected   FLUAH3 Not Detected   IFLUB Not Detected   PIV1 Not Detected   PIV2 Not Detected   PIV3 Not Detected   PIV4 Not Detected   RSVA Not Detected   RSVB Not Detected   HMPV Not Detected   ADENOV Not Detected   LUJAN Not Detected         BK Virus PCR Quant Result (External)   Date Value Ref Range Status   07/26/2021 None Detected None Detected IU/mL Final   06/22/2021 None Detected None Detected IU/mL Final   05/18/2021 None Detected None Detected IU/mL Final   04/20/2021 None Detected None Detected IU/mL Final   02/23/2021 None Detected None Detected IU/ml Final   01/05/2021 None Detected None Detected IU/mL Final   12/08/2020 None Detected None Detected Final   11/24/2020 None Detected None Detected Final   10/27/2020 None Detected None Detected IU/mL Final   08/31/2020 None Detected None Detected Final   08/10/2020 None Detected None Detected IU/mL Final   07/20/2020 None Detected None Detected IU/mL Final   06/22/2020 None Detected None Detected Final   06/01/2020 None Detected None Detected IU/mL Final   05/04/2020 None Detected None Detected IU/mL Final   04/27/2020 None Detected None Detected IU/mL Final   04/16/2020 None Detected None Detected IU/mL Final     BK Virus Result   Date Value Ref Range Status   01/26/2017 BK Virus DNA Not Detected BKNEG copies/mL Final       Imaging:  Recent Results (from the past 48 hour(s))   XR Chest Port 1 View    Narrative    EXAM: XR CHEST PORT 1 VIEW  8/9/2021 6:58 AM     HISTORY:  pt critically ill, intubated, with blasto on BAL       COMPARISON:  8/8/2021    FINDINGS:   Portable semiupright view of the chest.  Endotracheal tube tip projects  over the upper thoracic trachea. Enteric tube course below the  diaphragm and out of the field of view. Trachea is midline. Stable  cardiac silhouette. Diffuse mixed opacities throughout the lungs,  grossly unchanged. No significant pleural effusion. No pneumothorax  appreciated.      Impression    IMPRESSION:   1. Grossly unchanged diffuse mixed opacities throughout the lungs.  2. Stable support devices.    I have personally reviewed the examination and initial interpretation  and I agree with the findings.    LEEANN MAY MD         SYSTEM ID:  [id number]   XR Abdomen Port 1 View    Narrative    EXAMINATION:  XR ABDOMEN PORT 1 VIEWS 8/9/2021 2:47 PM     COMPARISON: Abdominal x-ray, 8/6/2021..    HISTORY: post-pyloric tube placement.    FINDINGS: Frontal view of the abdomen. A feeding tube coiled in the  stomach with the tip projecting over the expected position of the  pylorus. The sidehole and the tip of gastric tube project over the  greater curvature of the stomach. Right femoral venous catheter and  surgical clips are visible in the right lower quadrant/hemipelvis.  Small to moderate amount of gas in the stomach may be related to  feeding tube placement. No abnormally dilated loops of bowel. No  pneumatosis or portal venous gas. Diffuse mixed opacity at lung bases,  unchanged compared to same day chest x-ray.      Impression    IMPRESSION: Feeding tube coiled in the stomach with the tip projecting  over the expected position of the pylorus, most likely prepyloric.    I have personally reviewed the examination and initial interpretation  and I agree with the findings.    RAH BLACKWELL MD         SYSTEM ID:  [id number]   XR Abdomen Port 1 View    Narrative    Exam: XR ABDOMEN PORT 1 VIEWS, 8/10/2021 1:56 PM    Indication: Post-pyloric tube placement    Comparison: 8/9/2021, 8/6/2021    Findings:   Portable supine AP view of the lower chest and upper abdomen was  obtained. The  gastric tube tip and sidehole project over the distal  stomach. The feeding tube tip projects over the central upper abdomen.  Nonobstructive bowel gas pattern in the visualized upper abdomen. No  pneumatosis or portal venous gas. Diffuse pulmonary opacities are  grossly stable.      Impression    Impression:   The feeding tube tip projects over the upper central abdomen, possibly  within the distal stomach or third portion of the duodenum. Consider  lateral view for improved localization.    TESFAYE LECHUGA MD         SYSTEM ID:  [id number]

## 2021-08-10 NOTE — PROGRESS NOTES
MEDICAL ICU PROGRESS NOTE  08/10/2021      Date of Service (when I saw the patient): 08/10/2021    ASSESSMENT: Jeremiah Cruz is a 46 year old male with PMH significant for Alport's Syndrome complicated by ESRD s/p kidney transplant (02/2020,2005, 1988), thrombocytopenia, anemia of CKD, secondary renal hyperparathyroidism, HTN, CML (on in remission since 2016) and GERD who developed acute hypoxemic respiratory failure likely 2/2 sepsis requiring intubation. Found to have blastomyces on BAL (on Amphotericin B). Was requiring 3 pressors initially, but off all pressors at this time.       CHANGES and MAJOR THINGS TODAY:   -Patient spiked a fever to 101 F this afternoon --> recultured (blood, sputum, urine)  -Continue amphotericin B  -Decrease hydrocortisone to 25 Q8H   -Add metolazone 5 mg and then hold Lasix 40 mg Q6H until see patient's response, goal net even   -Supined at 09:30, return prone at 1600   -Increased PEEP to 14  -Started on Flolan 20 ng/kg/hr overnight  -RASS goal of -4  -Small bore tube advanced to post-pyloric position   -Increase water to 30 ml/hr   -Mycophenolate discontinued     PLAN:    Neuro:  # Pain and sedation  Patient is currently on versed, propofol and fentanyl for sedation.    - Propofol, wean as tolerated   - Versed, wean as tolerated as prefer propofol over midazolam  - Fentanyl, wean as tolerated   - RASS goal -4    Pulmonary:  # Acute Hypoxemic Respiratory Failure, ARDS  # Pneumonia, possibly due to Blastomycoses  Presented to OSH afebrile and tachypneic with 2 week history of dry cough and dyspnea. He was hypoxemic at OSH requiring intubation. CXR at OSH significant for bilateral white out and CT scan significant for similar findings c/f pneumonia in setting of immunosuppressed patient. KOH prep and culture from BAL showed budding yeasts concerning for blastomyces. OSH 1/2 blood cx growing gram positive coagulase negative cocci, but likely a contaminant and not the etiology of his  PNA. Further differential includes: CHF exacerbation, dasatinib-induced pneumonitis (has a hx of fluid overload due to this therapy 2 months ago per the daughter), diffuse alveolar hemorrhage, silicosis, sarcoidosis and beryllium. Covid is negative. 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  - Treat infection as discussed in ID section below   - Prone today for P:F ratio < 150 again at 3 pm   - Lung-protective ventilation AC 24/350/+14, wean FiO2 as able, goal Pplat <30 and driving pressure <15  - Continue inhaled epoprostenol 20 ng/kg/hr (8/9/21-**)  - Will hold off on NMB for now as patient is synchronous with vent on current sedation    Cardiovascular:  # Shock, likely 2/2 sepsis,  resolved    # H/O HTN  Patient previously required three pressors on admission (norepi, vaso, epi) and is now off all pressors at this time. Patient's hypotension was c/f shock 2/2 sepsis versus hypovolemia versus cardiogenic. Patient did appear hypovolemic on POCUS. Lactate elevated at 2.1 on admission. Concerned since patient is on chronic steroids at home for immunosuppression that adrenal insufficiency may be contributing to his hypotension.   - Levophed prn for MAP goal >65  - Decrease hydrocortisone to 25 mg Q8H  - Hold PTA amlodipine   - Hold PTA carvedilol     #H/O CHF  # Intermittent Bradycardia  # Concern for volume overload  Concerned that patient's bilateral white out on CXR may be due to CHF, however, no bilateral LE edema or hepatomegaly appreciated on exam. Echo TTE showed EF 60-65%. Patient has been intermittently bradycardic unknown as to why. Differential includes adrenal insufficiency (patient is on PTA chronic steroids) hypokalemia, hypocalcemia and hypothyroidism although electrolytes are WNL. Can consider TSH if patient develops persistent bradycardia. Patient is still significantly net positive since admission. He was net  positive even with increased Lasix frequency so will trial Metolazone.   - Metolazone 5 mg x1 followed by 40 mg furosemide IV x1; holding additional furosemide   - Goal net even   - BMP BID, K >4, Mg >2     GI/Nutrition:  # GERD  - Prophylactic Protonix      # Nutrition  - Nutrition able to advance tube post pyloric today   - Osmolite 1.5 at rate of 10 ml/hr     # Diarrhea, improved  - Rectal tube placed 8/9/21  - Holding prn senna and miralax    Renal/Fluids/Electrolytes:  # Alport's Syndrome  # ESRD s/p renal transplant (02/2020, 2005, 1988)   Patient's latest renal transplant in 02/2020. Previous renal transplants in 1988 which failed and 2005 which was nonfunctional.   - Consulted renal transplant team. Appreciate ongoing recs.  - Holding PTA 5 mg prednisone while on stress hydrocortisone  - Discontinue Mycophenolate acid in setting of worsened respiratory status   - Held Belatacept 5 mg/kg Q4 weeks (next dose scheduled on 8/10/21), OK to hold for 1-2 weeks per renal   - MMF level pending     # Acute Kidney Injury, improving  Creatinine elevated at 2.89 on admission. Creatinine is 2.16 today which is down-trending. Baseline creatinine appears 1.8-2.1 on chart review. Likely etiologies include prerenal azotemia versus ATN in setting of possible shock. Per renal transplant team, no acute indication for renal replacement therapy at this time.  - Diuresis plan as noted above   - BMP at 1600 today, K >4, Mg >2 while diuresing and on Ambisome     # Hyperphosphatemia   # Hyperkalemia, resolved   # Hypoalbuminemia     # Hypernatremia  In the setting of diuresis.  - Add metolazone x1 to diuresis plan today  - Increase enteral free water to 30 mL qhour  - Recheck sodium at 1600    Endocrine:  # Hyperglycemia   Patient's sugars have been elevated to the 200s likely 2/2 stress response versus high dose stress corticosteroids.   - Medium resistance sliding scale     # H/O Hyperparathyroidism 2/2 ESRD  Not an active problem      ID:  # Cough   # Dyspnea   # Fever   # Pulmonary blastomycoses dermatididis   # Gram positive coagulase negative cocci bacteremia (1/2 blood cx)  Presented to OSH afebrile and tachypneic with 2 week history of dry cough and dyspnea. He was hypoxemic at OSH requiring intubation. CXR at OSH significant for bilateral white out and CT scan significant for similar findings. KOH prep and culture from BAL showed budding yeast c/f blastomyces. Patient was started on amphotericin B overnight and demonstrated a rapid improvement which is somewhat unexpected for blasto. Blood cultures from OSH also show gram positive coagulase negative cocci in 1/2 blood cultures, likely contaminate, MRSA nares negative. Additionally, respiratory culture here shows normal pat.   Patient spiked a fever to 101 F on 8/10/21. This could be due to the blastomyces but cannot rule out hospital acquired versus ventilator associated pneumonia. Will obtain cultures and UA. Of note, we did discontinue patient's meropenem 8/9/21 as we had low suspicion for bacterial pneumonia at that time. Did discuss with ID and if patient spikes another fever, then we should restart meropenem.     - ID consulted, recs appreciated   - MRSA nares negative  - Restart meropenem if patient spikes another fever.   - Continue amphotericin B    - Loading dose of Itraconazole ordered 200 mg q8 hours x3 days (8/9-8/11) followed by 200 mg BID  - Continue PTA dapsone for PJP ppx  - s/p Valcyte for CMV ppx    Hematology:    # Acute anemia of critical illness on anemia in chronic renal disease   # H/O thrombocytopenia  Patient has history of anemia in chronic renal disease. Low suspicion for bleeding. R femoral site with no signs of hemorrhage, no flank ecchymosis. LDH elevated, but AST normal so hemolysis unlikely. Reticulocyte index difficult to interpret in setting of blood transfusions. Is on weekly PTA Procrit injection for Hgb < 10.  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 Likely 2/2 coagulopathy in the setting of sepsis.  - Heparin gtt     # H/O CML, in remission  In remission since 2016. PTA dasatinib. Not an active problem that we suspect, but daughter does report that patient had an episode of fluid overload due to this medication, but thinks that he restarted this after he recovered.   - Held dasatinib   - Per oncology, okay to hold dasatinib while in hospital      Musculoskeletal:  No active problems     Skin:  #Lesions   Wound care consulted for gash on patient's cheek. Per wound care this is not a new wound.   - Continue to monitor    General Cares/Prophylaxis:    DVT Prophylaxis: Heparin gtt for DVTs in UE  GI Prophylaxis: PPI  Restraints: None needed  Family Communication: Social work consulted. Daughter (Kiley) is person of contact for daily updates, updated by phone   Code Status: Full code     Lines/tubes/drains:  -OG   -Stein   -R Femoral A-line   -CVC R femoral   -Peripheral IV  -ETT    Disposition:  - Medical ICU     Patient seen and findings/plan discussed with medical ICU staff, Dr. Ward.    Mindi Mason, MS4     Patient seen, examined, and discussed with the medical student and Dr. Ward. Agree with plan as edited above.   Mindi Leal, ESHAP    ====================================  INTERVAL HISTORY:   NAEO. Nursing notes reviewed. Patient proned overnight and tolerated well. Increased PEEP on vent this AM. Patient will be proned at 3 pm today. Spiked fever to 101 F. Cultures and UA ordered.     OBJECTIVE:   1. VITAL SIGNS:   Temp:  [97.9  F (36.6  C)-100  F (37.8  C)] 97.9  F (36.6  C)  Pulse:  [61-80] 65  Resp:  [24-27] 24  BP: (103)/(28) 103/28  MAP:  [68 mmHg-83 mmHg] 74 mmHg  Arterial Line BP: (103-124)/(35-65) 112/52  FiO2 (%):  [80 %-100 %] 85 %  SpO2:  [87 %-97 %] 93 %  Ventilation Mode:  CMV/AC  (Continuous Mandatory Ventilation/ Assist Control)  FiO2 (%): 85 %  Rate Set (breaths/minute): 24 breaths/min  Tidal Volume Set (mL): 350 mL  PEEP (cm H2O): 12 cmH2O  Oxygen Concentration (%): 95 %  Resp: 24    2. INTAKE/ OUTPUT:   I/O last 3 completed shifts:  In: 3289.13 [I.V.:1589.83; NG/GT:954.3; IV Piggyback:250]  Out: 3090 [Urine:2590; Stool:500]    3. PHYSICAL EXAMINATION:  General: Sedated, no acute distress. Mechanically ventilated. OG tube.   HEENT: Pupils are symmetric and reactive to light. No conjunctival injection.  Neuro: sedated, RASS -4.   Pulm/Resp: Coarse breath sounds b/l.   CV: RRR, no murmurs, rubs or gallops. Pedal pulses 2+ without LE edema  Abdomen: Soft, non-distended, non-tender on palpation.  : Stein catheter in place, urine yellow and clear  Incisions/Skin: Vertical surgical scars on abdomen. No hematoma at R femoral CVC or bruising on flanks. Bilateral gashes on both cheeks where face mask was placed, initially both gashes were bleeding but now have stopped.     4. LABS:  Arterial Blood Gases   Recent Labs   Lab 08/10/21  0529 08/10/21  0126 08/09/21  2042 08/09/21  1650   PH 7.36 7.37 7.36 7.35   PCO2 49* 48* 51* 51*   PO2 77* 88 82 92   HCO3 27 28 29* 28     Complete Blood Count   Recent Labs   Lab 08/10/21  0324 08/09/21  0353 08/08/21  2208 08/08/21  1126 08/08/21  0333   WBC 8.8 7.6 5.9  --  5.8   HGB 7.6* 7.6* 7.9* 8.1* 6.8*    213 223  --  200     Basic Metabolic Panel  Recent Labs   Lab 08/10/21  0405 08/10/21  0324 08/09/21  2358 08/09/21  2009 08/09/21  1650 08/09/21  0353 08/08/21  1402 08/08/21  0333   NA  --  147*  --   --   --  143 142 140   POTASSIUM  --  3.7  --   --  3.7 3.5 3.6 3.6  3.6   CHLORIDE  --  109  --   --   --  110* 108 106   CO2  --  25  --   --   --  24 27 24   BUN  --  90*  --   --   --  78* 68* 62*   CR  --  2.16*  --   --   --  2.12* 2.14* 2.21*  2.21*   * 150* 117* 118*  --  205* 152* 166*     Liver Function Tests  Recent Labs    Lab 08/09/21  0353 08/08/21  0333 08/07/21  0330 08/06/21  1706   AST 27 21 27 21   ALT 20 15 16 16   ALKPHOS 62 52 58 71   BILITOTAL 0.6 0.6 0.7 0.4   ALBUMIN 2.6* 2.9* 2.7* 1.3*     5. RADIOLOGY:   Recent Results (from the past 24 hour(s))   XR Abdomen Port 1 View    Narrative    EXAMINATION:  XR ABDOMEN PORT 1 VIEWS 8/9/2021 2:47 PM     COMPARISON: Abdominal x-ray, 8/6/2021..    HISTORY: post-pyloric tube placement.    FINDINGS: Frontal view of the abdomen. A feeding tube coiled in the  stomach with the tip projecting over the expected position of the  pylorus. The sidehole and the tip of gastric tube project over the  greater curvature of the stomach. Right femoral venous catheter and  surgical clips are visible in the right lower quadrant/hemipelvis.  Small to moderate amount of gas in the stomach may be related to  feeding tube placement. No abnormally dilated loops of bowel. No  pneumatosis or portal venous gas. Diffuse mixed opacity at lung bases,  unchanged compared to same day chest x-ray.      Impression    IMPRESSION: Feeding tube coiled in the stomach with the tip projecting  over the expected position of the pylorus, most likely prepyloric.    I have personally reviewed the examination and initial interpretation  and I agree with the findings.    RAH BLACKWELL MD         SYSTEM ID:  [id number]

## 2021-08-10 NOTE — PLAN OF CARE
ICU End of Shift Summary. See flowsheets for vital signs and detailed assessment.    Changes this shift: Febrile, T high 101. Sedated, requiring occasional boluses of Fentanyl/Versed. PERRL. Sinus rhythm with frequent/bigeminal PACs. MAP goal >65, no pressors required currently. 2+ generalized edema, 3+ in right hand/arm. Lungs clear/diminished throughout. FiO2 85%, PEEP increased to 14. FS Flolan. Supine @ 0930, proned @ 1600. Minimal ETT secretions. Stein in place, UO adequate, Lasix x1 and metolazone. Rectal tube in place, minimal stool output this shift, bowel regimen held. Feeding tube advanced to post-pyloric per RD, TF restarted @ 50mls/hr (goal). FW 30mls/hr for Na: 147.  Heparin drip decreased per protocol, recheck @ 1800. K 3.2.     Plan: Pan cultured. Continue to assess for s/s pain and treat as appropriate. Continue frequent pulmonary toilet, plan to continue supine/prone schedule and vent weaning per MD orders. Continue frequent turns/skin cares. Continue to monitor strict I/O. Continue to monitor and adjust Heparin drip per protocol. Potassium replaced, recheck in a.m.    Patient's daughter Kiley updated on plan of care-patient is now password protected, please see sticky note for further information.  Will continue to monitor and assess.      Problem: Adult Inpatient Plan of Care  Goal: Plan of Care Review  Outcome: No Change  Flowsheets (Taken 8/10/2021 1814)  Plan of Care Reviewed With: patient  Progress: no change  Goal: Patient-Specific Goal (Individualized)  Outcome: No Change  Goal: Absence of Hospital-Acquired Illness or Injury  Outcome: No Change  Intervention: Identify and Manage Fall Risk  Recent Flowsheet Documentation  Taken 8/10/2021 1600 by Archana Wheeler RN  Safety Promotion/Fall Prevention:   activity supervised   assistive device/personal items within reach   fall prevention program maintained   increased rounding and observation   increase visualization of patient   lighting  adjusted   patient and family education   room door open   room organization consistent   safety round/check completed   supervised activity   treat reversible contributory factors   treat underlying cause  Taken 8/10/2021 1200 by Archana Wheeler RN  Safety Promotion/Fall Prevention:   activity supervised   assistive device/personal items within [facility]   fall prevention program maintained   increased rounding and observation   increase visualization of patient   lighting adjusted   patient and family education   room door open   room organization consistent   safety round/check completed   supervised activity   treat reversible contributory factors   treat underlying cause  Taken 8/10/2021 0800 by Archana Wheeler RN  Safety Promotion/Fall Prevention:   activity supervised   assistive device/personal items within [facility]   fall prevention program maintained   increased rounding and observation   increase visualization of patient   lighting adjusted   patient and family education   room door open   room organization consistent   safety round/check completed   supervised activity   treat reversible contributory factors   treat underlying cause  Intervention: Prevent Skin Injury  Recent Flowsheet Documentation  Taken 8/10/2021 1800 by Archana Wheeler RN  Body Position:   prone   turned   head repositioned, left  Taken 8/10/2021 1600 by Archana Wheeler RN  Body Position:   prone   turned   left   head repositioned, right  Taken 8/10/2021 1400 by Archana Wheeler RN  Body Position:   turned   left  Taken 8/10/2021 1200 by Archana Wheeler RN  Body Position:   turned   supine, head elevated  Taken 8/10/2021 1000 by Archana Wheeler RN  Body Position:   turned   right  Taken 8/10/2021 0925 by Archana Wheeler RN  Body Position: supine, head elevated  Taken 8/10/2021 0800 by Archana Wheeler RN  Body Position:   turned   head repositioned, right   left   prone  Intervention: Prevent and Manage VTE (Venous Thromboembolism)  Risk  Recent Flowsheet Documentation  Taken 8/10/2021 1600 by Archana Wheeler RN  VTE Prevention/Management:   pneumatic compression device   anticoagulant therapy maintained   bleeding precautions maintained   bleeding risk assessed   bleeding risk factor(s) identified, physician notified   PROM (passive range of motion) performed  Taken 8/10/2021 1200 by Archana Wheeler RN  VTE Prevention/Management:   pneumatic compression device   anticoagulant therapy maintained   bleeding precautions maintained   bleeding risk assessed   bleeding risk factor(s) identified, physician notified   PROM (passive range of motion) performed  Taken 8/10/2021 0800 by Archana Wheeler RN  VTE Prevention/Management:   pneumatic compression device   anticoagulant therapy maintained   bleeding precautions maintained   bleeding risk assessed   bleeding risk factor(s) identified, physician notified   PROM (passive range of motion) performed  Intervention: Prevent Infection  Recent Flowsheet Documentation  Taken 8/10/2021 1600 by Archana Wheeler RN  Infection Prevention:   environmental surveillance performed   equipment surfaces disinfected   hand hygiene promoted   personal protective equipment utilized   rest/sleep promoted   single patient room provided  Taken 8/10/2021 1200 by Archana Wheeler RN  Infection Prevention:   environmental surveillance performed   equipment surfaces disinfected   hand hygiene promoted   personal protective equipment utilized   rest/sleep promoted   single patient room provided  Taken 8/10/2021 0800 by Archana Wheeler RN  Infection Prevention:   environmental surveillance performed   equipment surfaces disinfected   hand hygiene promoted   personal protective equipment utilized   rest/sleep promoted   single patient room provided  Goal: Optimal Comfort and Wellbeing  Outcome: No Change  Goal: Readiness for Transition of Care  Outcome: No Change     Problem: Risk for Delirium  Goal: Optimal Coping  Outcome: No  Change  Intervention: Optimize Psychosocial Adjustment to Delirium  Recent Flowsheet Documentation  Taken 8/10/2021 1600 by Archana Wheeler RN  Supportive Measures: relaxation techniques promoted  Taken 8/10/2021 1200 by Archana Wheeler RN  Supportive Measures: relaxation techniques promoted  Taken 8/10/2021 0800 by Archana Wheeler RN  Supportive Measures: relaxation techniques promoted  Goal: Improved Behavioral Control  Outcome: No Change  Intervention: Prevent and Manage Agitation  Recent Flowsheet Documentation  Taken 8/10/2021 1600 by Archana Wheeler RN  Environment Familiarity/Consistency: daily routine followed  Taken 8/10/2021 1200 by Archana Wheeler RN  Environment Familiarity/Consistency: daily routine followed  Taken 8/10/2021 0800 by Archana Wheeler RN  Environment Familiarity/Consistency: daily routine followed  Goal: Improved Attention and Thought Clarity  Outcome: No Change  Intervention: Maximize Cognitive Function  Recent Flowsheet Documentation  Taken 8/10/2021 1600 by Archana Wheeler RN  Sensory Stimulation Regulation: care clustered  Reorientation Measures:   calendar in view   clock in view   familiar social contact encouraged  Taken 8/10/2021 1200 by Archana Wheeler RN  Sensory Stimulation Regulation: care clustered  Reorientation Measures:   calendar in view   clock in view   familiar social contact encouraged  Taken 8/10/2021 0800 by Archana Wheeler RN  Sensory Stimulation Regulation: care clustered  Reorientation Measures:   calendar in view   clock in view   familiar social contact encouraged  Goal: Improved Sleep  Outcome: No Change  Intervention: Promote Sleep  Recent Flowsheet Documentation  Taken 8/10/2021 1600 by Archana Wheeler RN  Sleep/Rest Enhancement:   awakenings minimized   regular sleep/rest pattern promoted   relaxation techniques promoted  Taken 8/10/2021 1200 by Archana Wheeler RN  Sleep/Rest Enhancement:   awakenings minimized   regular sleep/rest pattern promoted    relaxation techniques promoted  Taken 8/10/2021 0800 by Archana Wheeler RN  Sleep/Rest Enhancement:   awakenings minimized   regular sleep/rest pattern promoted   relaxation techniques promoted     Problem: Fluid Imbalance (Pneumonia)  Goal: Fluid Balance  Outcome: No Change     Problem: Respiratory Compromise (Pneumonia)  Goal: Effective Oxygenation and Ventilation  Outcome: No Change  Intervention: Optimize Oxygenation and Ventilation  Recent Flowsheet Documentation  Taken 8/10/2021 1800 by Archana Wheeler RN  Head of Bed (HOB) Positioning: HOB at 15 degrees  Taken 8/10/2021 1600 by Archana Wheeler RN  Airway/Ventilation Management:   airway patency maintained   calming measures promoted   humidification applied   pulmonary hygiene promoted  Head of Bed (HOB) Positioning: HOB at 15 degrees  Taken 8/10/2021 1400 by Archana Wheeler RN  Head of Bed (HOB) Positioning: HOB at 30 degrees  Taken 8/10/2021 1200 by Archana Wheeler RN  Airway/Ventilation Management:   airway patency maintained   calming measures promoted   humidification applied   pulmonary hygiene promoted  Head of Bed (HOB) Positioning: HOB at 15 degrees  Taken 8/10/2021 0800 by Archana Wheeler RN  Airway/Ventilation Management:   airway patency maintained   calming measures promoted   humidification applied   pulmonary hygiene promoted  Head of Bed (HOB) Positioning: HOB at 15 degrees     Problem: ARDS (Acute Respiratory Distress Syndrome)  Goal: Effective Oxygenation  Outcome: No Change  Intervention: Optimize Oxygenation, Ventilation and Perfusion  Recent Flowsheet Documentation  Taken 8/10/2021 1600 by Archana Wheeler RN  Lung Protection Measures:   fluid excess minimized   lung compliance monitored   optimal PEEP applied   plateau/inspiratory pressure monitored   ventilator synchrony promoted  Airway/Ventilation Management:   airway patency maintained   calming measures promoted   humidification applied   pulmonary hygiene promoted  Taken 8/10/2021  1200 by Archana Wheeler, RN  Lung Protection Measures:   fluid excess minimized   lung compliance monitored   optimal PEEP applied   plateau/inspiratory pressure monitored   ventilator synchrony promoted  Airway/Ventilation Management:   airway patency maintained   calming measures promoted   humidification applied   pulmonary hygiene promoted  Taken 8/10/2021 0800 by Archana Wheeler, RN  Lung Protection Measures:   fluid excess minimized   lung compliance monitored   optimal PEEP applied   plateau/inspiratory pressure monitored   ventilator synchrony promoted  Airway/Ventilation Management:   airway patency maintained   calming measures promoted   humidification applied   pulmonary hygiene promoted

## 2021-08-10 NOTE — PROGRESS NOTES
"Care Management Follow Up    Length of Stay (days): 4    Expected Discharge Date: 2021     Concerns to be Addressed: family psychosocial concerns      Patient plan of care discussed at interdisciplinary rounds: Yes    Additional Information:  RU received request to call pt's sister Joann  to address family concerns. SW spoke to Pat via phone who has concerns about pt's mother (reportedly has dementia, has had confusion about pt's condition) and pt's daughter Kiley. Pt's sister reported that pt's daughter Kiley is \"not emotionally available\" to be dealing with pt's current situation. RU explained the NOK policy and encouraged further conversations with Kiley.     Pt has 5 adult siblings in addition to his mother and two children (son is currently incarcerated). RU will attempt to contact Kiley for support. RU will continue to follow.     ADDENDUM 16:00 - RU returned call to pt's daughter Dorina. Dorina reports concerns of pt's sister (Pat) getting information as pt does not want sister involved in his care (per Kiley). Reiterated the NOK policy and that Kiley will be the primary contact. Kiley reports that she and pt's father should be the only people contacted and given information. Kiley is concerned that pt's ex-girlfriend will get information as she is an emergency contact.     Pt's daughter (Kiley) is legal NOK as her brother is unable to be reached (incarceration). Kiley consents to pt's step-father being contacted if she is not able to be reached (Bran: [phone]).     LON Mai, LGSW  ICU   [facility]  Ph: [phone]  [phone]        "

## 2021-08-10 NOTE — PROGRESS NOTES
Critical Care Services Progress Note:  I personally examined and evaluated the patient today. I formulated today s plan with the house staff team or resident(s) and agree with the findings and plan in the associated note (see separately attested resident note).   I have evaluated all laboratory values and imaging studies from the past 24 hours.  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  Was in shock, but improved after intubation, steroids and improved acidosis.      Overnight events/pertinent findings today:  Prone overnight.  PEEP increased to 14 this morning     Data  ABG 7.36/49/77    BUN 90, Cr 2.16    KUB with NG tube in stomach    Vt 350, PEEP 14.  Pplat 30. No dyssynchrony on vent.     Assessment/plan:    ARDS: Blastomycosis pneumonia. Ventilator currently at a good setting with appropriate driving pressure and minimal dysynchrony  - Prone ventilation overnight again  - flolan  - amphotericin  - RASS  -4   - I = O, will try thiazide diuretic today to help with free water    Hypernatremia -   - free water 30 ml/hr     Shock: improved. Now off pressors.  Decrease hydrocortisone to 25 q 8 today    Rest per resident note.    I spent a total of 40 minutes (excluding procedure time) personally providing and directing critical care services at the bedside and on the critical care unit for this patient.     Cristopher Ward MD

## 2021-08-10 NOTE — PROGRESS NOTES
CLINICAL NUTRITION SERVICES - BRIEF NOTE      Reason for RD note: Following up on FT position and ability to restart back at goal TF rate.    Interventions:  1. Advanced FT - convincingly post-pyloric on Cortrak tracing, distal stomach vs 3rd portion of duodenum on AXR <- given Cortrak tracing, it is post-pyloric. Spoke with MICU, agree with writer and post-pyloric position, ok to use.    2. Updated EN support orders:   -Route: NDT  -Osmolite 1.5 Jd @ 50 ml/hr + 1 pkts ProSource TID (3 pkts total) will provide: 1920 kcals (29 kcal/kg), 108 g PRO (1.6 g/kg), 914 mL H2O, 244 g CHO, and 0 g fiber daily.     3. RN called about TF order - OK to restart at goal rate (pt had reached goal yesterday @ 0900 and received goal rate until 1500). Lytes are acceptable (K+ 3.7, WNL; Phos 5.6, H; Mg++ 2.8, WNL).    Future/Additional Recommendations:  Monitor tolerance to goal TF rate.    Nutrition will continue to follow per protocol.    Bina Solitario, PIETER, LD  Pager: [phone]

## 2021-08-10 NOTE — TELEPHONE ENCOUNTER
Follow-up with anemia management service:    Admitted 8/6/21 with Pneumonia.  Currently in the ICU.     Anemia Latest Ref Rng & Units 8/7/2021 8/7/2021 8/8/2021 8/8/2021 8/8/2021 8/9/2021 8/10/2021   DEB Dose - - - - - - - -   Hemoglobin 13.3 - 17.7 g/dL 6.4(LL) 7.1(L) 6.8(LL) 8.1(L) 7.9(L) 7.6(L) 7.6(L)         Follow-up call date: 8/25/21    Ana Castrejon RN   Anemia Services  [address] [address]   [email or web address]   Office : [phone]  Fax: [phone]

## 2021-08-10 NOTE — CONSULTS
Focus- BL cheeks  D/I/A: Received consult for possible injury to L) cheek s/p ETAD removal. Assessed the area and noted 1.3x1x0.2 (indentation, not a open wound) large defected area, suspect chronic skin condition.        P- May continue with Mepilex foam dressing for protection. No further visit planned, WOC will sign off.

## 2021-08-10 NOTE — PROGRESS NOTES
[facility]   Transplant Nephrology Progress Note  Date of Admission:  8/6/2021  Today's Date: 08/10/2021    Recommendations:  - discontinue MMF given worsening respiratory status, start prednisone 10 mg daily when he is off IV/stress dose steroid.   - use lasix as needed to optimize his volume status with current lung infection.    Assessment & Plan   # DDKT: VASQUEZ, stable creatinine, likely pre-renal azotemia vs ATN in the settings of shock possible septic shock.               - Baseline Creatinine: ~ 1.8-2.1              - Proteinuria: Minimal (0.2-0.5 grams)              - Date DSA Last Checked: Feb/2021      Latest DSA: No              - BK Viremia: No              - Kidney Tx Biopsy: Apr 03, 2020; Result: No diagnostic evidence of acute rejection.  Moderate interstitial fibrosis and tubular atrophy. EM showed ~25% foot process effacement               -No acute indication for RRT currently      # Immunosuppression: Belatacept (dose 5 mg/kg every four weeks), Mycophenolic acid (dose 540 mg every 12 hours) and Prednisone (dose 5 mg daily) last belatacept dose reportedly given on July 13th, next dose scheduled on Aug 10, 2021 (will need to verify the timing)              - Changes: - discontinue MMF given worsening respiratory status, start prednisone 10 mg daily when he is off IV/stress dose steroid.      # Infection Prophylaxis:   - PJP: Dapsone              - CMV: Valcyte;  (D+/R-). completed  Valcyte for 6 months. CMV Negative (2/2021), will check CMV given his respiratory issues.     # Hypertension: controlled  Goal BP: < 130/80              - Volume status: Euvolemic                              - Changes: No     # Anemia in Chronic Renal Disease: Hgb: Stable      DEB: No              - Iron studies: Not checked recently     # Mineral Bone Disorder:   - Secondary renal hyperparathyroidism; PTH level: Normal (18-80 pg/ml)        On treatment: None  - Vitamin D;  level: Normal        On supplement: No  - Calcium; level: Normal        On supplement: No     # Electrolytes:   - Potassium; level: Normal        On supplement: No  - Magnesium; level: Not checked recently        On supplement: No  - Bicarbonate; level: Normal      On supplement: No     # CML: Appears stable on Dasatinib. Patient is followed by Hematology.     # Blastomycosis PNA : BAL fungal culture + for blastomyces  + fungitell KOH BAL prep + broad budding yeast, on Ambisome per ID, planned for 2 weeks till clinical improvement then possible switch to itraconazole or voriconazole .    - discontinue MMF given worsening respiratory status, start prednisone 10 mg daily when he is off IV/stress dose steroid.      # Septic shock: resolved , off pressors.      # Transplant History:  Etiology of Kidney Failure: Alport's syndrome  Tx: DDKT  Transplant: 2/28/2020 (Kidney), 12/7/1988 (Kidney), 3/2/2005 (Kidney)  Crossmatch at time of Tx: negative  Significant changes in immunosuppression: None  Significant transplant-related complications: None     Recommendations were communicated to the primary team verbally.    Josee Viveros MD   Pager: [phone]    Attestation:  This patient has been seen and evaluated by me, Alvin Varner MD.  I have reviewed the note and agree with plan of care as documented by the fellow.       Interval History   Overall remains intubated and sedated. O2 requirements remains high, prone positioned overnight. Stable Cr with good UOP response to diuretics.     Review of Systems   4 point ROS was obtained and negative except as noted in the Interval History.    MEDICATIONS:    sodium chloride 0.9%  250 mL Intravenous Q24H     acetaminophen  650 mg Oral or Feeding Tube Q24H     dextrose 5% water  10-20 mL Intravenous Q24H    And     amphotericin B liposome (AMBISOME) intermittent infusion  5 mg/kg (Dosing Weight) Intravenous Q24H    And     dextrose 5% water  10-20 mL Intravenous Q24H      "atorvastatin  10 mg Oral Daily     dapsone  50 mg Oral or Feeding Tube Daily     diphenhydrAMINE  25 mg Oral Q24H    Or     diphenhydrAMINE  25 mg Intravenous Q24H     [Held by provider] furosemide  40 mg Intravenous Q6H     hydrocortisone sodium succinate PF  25 mg Intravenous Q8H     insulin aspart  1-6 Units Subcutaneous Q4H     itraconazole  200 mg Oral Q8H    Followed by     [START ON 2021] itraconazole  200 mg Oral BID     lidocaine  5 mL Topical Once     loratadine  10 mg Oral Daily     multivitamins w/minerals  15 mL Per Feeding Tube Daily     pantoprazole  40 mg Oral QAM AC     protein modular  1 packet Per Feeding Tube TID       dextrose       epoprostenol (VELETRI) 20 mcg/mL in sterile water inhalation solution 20 ng/kg/min (08/10/21 1234)     fentaNYL 150 mcg/hr (08/10/21 1400)     heparin 1,700 Units/hr (08/10/21 1400)     midazolam 2 mg/hr (08/10/21 1400)     propofol (DIPRIVAN) infusion 45 mcg/kg/min (08/10/21 1400)       Physical Exam   Temp  Av.4  F (36.9  C)  Min: 97.5  F (36.4  C)  Max: 99.9  F (37.7  C)  Arterial Line BP  Min: 98/48  Max: 174/61  Arterial Line MAP (mmHg)  Av.7 mmHg  Min: 63 mmHg  Max: 95 mmHg      Pulse  Av.7  Min: 67  Max: 83 Resp  Av  Min: 18  Max: 26  FiO2 (%)  Av %  Min: 40 %  Max: 100 %  SpO2  Av.6 %  Min: 90 %  Max: 100 %     /52   Pulse 68   Temp (!) 101  F (38.3  C) (Oral)   Resp 24   Ht 1.626 m (5' 4\")   Wt 73.1 kg (161 lb 2.5 oz)   SpO2 95%   BMI 27.66 kg/m     Date 21 07 - 21 0659   Shift 4816-9716 5332-8323 3269-4926 24 Hour Total   INTAKE   I.V. 913.18   913.18   NG/   353   Colloid 400   400   Enteral 160   160   Blood Components 300   300   Shift Total(mL/kg) 2126.18(29.61)   2126.18(29.61)   OUTPUT   Urine 350   350   Shift Total(mL/kg) 350(4.87)   350(4.87)   Weight (kg) 71.8 71.8 71.8 71.8      Admit Weight: 66.4 kg (146 lb 6.2 oz)     GENERAL APPEARANCE: intubated and sedated.   HENT: mouth " without ulcers or lesions  LYMPHATICS: no cervical or supraclavicular nodes  RESP: lungs clear to auscultation - no rales, rhonchi or wheezes  CV: regular rhythm, normal rate, no rub, no murmur  EDEMA: + LE edema bilaterally overall  ABDOMEN: soft, nondistended, nontender, bowel sounds normal  MS: extremities normal - no gross deformities noted, no evidence of inflammation in joints, no muscle tenderness  SKIN: no rash    Data   All labs reviewed by me.  CMP  Recent Labs   Lab 08/10/21  1146 08/10/21  0820 08/10/21  0405 08/10/21  0324 08/09/21  1650 08/09/21  0353 08/08/21  1402 08/08/21  0333 08/07/21  0338 08/07/21  0330 08/06/21  1706   NA  --   --   --  147*  --  143 142 140   < > 140 139   POTASSIUM  --   --   --  3.7 3.7 3.5 3.6 3.6  3.6   < > 4.3 5.2   CHLORIDE  --   --   --  109  --  110* 108 106   < > 107 112*   CO2  --   --   --  25  --  24 27 24   < > 21 16*   ANIONGAP  --   --   --  13  --  9 7 10   < > 12 11   * 112* 135* 150*  --  205* 152* 166*   < > 213* 92  99   BUN  --   --   --  90*  --  78* 68* 62*   < > 51* 52*   CR  --   --   --  2.16*  --  2.12* 2.14* 2.21*  2.21*   < > 2.38* 3.05*   GFRESTIMATED  --   --   --  36*  --  36* 36* 35*  35*   < > 32* 24*   ANTWAN  --   --   --  9.3  --  9.1 9.0 8.8   < > 8.3* 9.0   MAG  --   --   --  2.8* 2.9* 2.7* 2.7* 2.5*   < > 2.2 2.0   PHOS  --   --   --  5.6*  --  4.2  --  4.5  --  5.9* 6.3*   PROTTOTAL  --   --   --   --   --  5.9*  --  5.9*  --  6.2* 5.4*   ALBUMIN  --   --   --   --   --  2.6*  --  2.9*  --  2.7* 1.3*   BILITOTAL  --   --   --   --   --  0.6  --  0.6  --  0.7 0.4   ALKPHOS  --   --   --   --   --  62  --  52  --  58 71   AST  --   --   --   --   --  27  --  21  --  27 21   ALT  --   --   --   --   --  20  --  15  --  16 16    < > = values in this interval not displayed.     CBC  Recent Labs   Lab 08/10/21  0324 08/09/21  0353 08/08/21  2208 08/08/21  1126 08/08/21  0333   HGB 7.6* 7.6* 7.9* 8.1* 6.8*   WBC 8.8 7.6 5.9  --  5.8    RBC 2.64* 2.67* 2.80*  --  2.42*   HCT 26.2* 25.3* 26.4*  --  22.6*   MCV 99 95 94  --  93   MCH 28.8 28.5 28.2  --  28.1   MCHC 29.0* 30.0* 29.9*  --  30.1*   RDW 16.9* 16.6* 16.4*  --  16.7*    213 223  --  200     INRNo lab results found in last 7 days.  ABG  Recent Labs   Lab 08/10/21  1344 08/10/21  0529 08/10/21  0126 08/09/21  2042   PH 7.35 7.36 7.37 7.36   PCO2 51* 49* 48* 51*   PO2 79* 77* 88 82   HCO3 29* 27 28 29*   O2PER 85 80 90 100      Urine Studies  Recent Labs   Lab Test 08/10/21  1301 03/16/20  0940 03/12/20  1125   COLOR Light Yellow Yellow Straw   APPEARANCE Slightly Cloudy* Clear Clear   URINEGLC Negative 150* >499*   URINEBILI Negative Negative Negative   URINEKETONE Negative Negative Negative   SG 1.012 1.014 1.009   UBLD Moderate* Small* Small*   URINEPH 5.0 6.0 7.0   PROTEIN Negative 30* Negative   NITRITE Negative Negative Negative   LEUKEST Negative Small* Trace*   RBCU 38* 13* 3*   WBCU <1 18* 7*     Recent Labs   Lab Test 06/29/20  0850   UTPG 0.17     PTH  Recent Labs   Lab Test 06/29/20  0914 03/04/20  1018 03/03/20  0849 03/03/20  0729   PTHI 58 630* 668* Canceled, Test credited     Iron Studies  Recent Labs   Lab Test 05/18/21  0000 04/20/21  0000 03/23/21  0000 02/23/21  0000 01/26/21  0000 01/05/21  0000 06/29/20  0914 03/03/20  0729   IRON  --   --   --   --   --   --  163 139   FEB  --   --   --   --   --   --  277 142*   IRONSAT  --   --   --   --   --   --  59* 98*   YOLIS 2,884 2,834 3,179 2,480 2,813 2,356 2,163* 3,397*       IMAGING:  All imaging studies reviewed by me.

## 2021-08-10 NOTE — PROCEDURES
Small Bowel Feeding Tube Reposition  Reason for Feeding Tube Reposition: Post-pyloric enteral access desired for proning.   Cortrak Start Time: 1215  Cortrak End Time: 1225  Medicine Delivered During Procedure: None - pt on heavy sedation  Reposition Successful: Yes, presumed post-pyloric pending AXR confirmation. Called Radiology to cancel Rads repo.  Procedure Complications: Coiling of FT (removed large coil), running out of tube length. FT initially advanced post-pyloric then spontaneously retracted in gastric position. Able to readvance to post-pyloric.   Final Placement Arvin at exit of nare: 98 cm  Face to Face time with patient: 15 minutes with set-up.     Bina Solitario RD, LD  Pager: [phone]

## 2021-08-10 NOTE — PLAN OF CARE
ICU End of Shift Summary. See flowsheets for vital signs and detailed assessment.    Changes this shift: RASS -4. Versed decreased from 5 to 2. Propofol @ 45. Fentanyl @ 150. PRN dose x1 for CPOT of 3. Afebrile. SR with frequent to bigeminal PAC's. Hemodynamically stable. Heparin @ 1800; recheck ordered for 1145. Oxygen saturations dropping to 86% at HS; conditional ABG drawn (7.35/51/82/29 on 100%). MD notified and ordered flolan. Flolan @ 20. Morning AB.36/49/77/27 on 80%. FiO2 increased to 85% and MD notified. PIP's 28-32 overnight. TF's @ 10. FWF increased to 60 mL/Q4hrs for Na+ of 147. No output from rectal tube since 0000. UOP ~81 mL/hr; net positive 403.37 mL's since 0000. MD changed IV Lasix (40 mg) order from Q8hrs to Q6hrs. K+ 3.7. Mg+ 2.8. Phos 5.6.     Plan: Wean vent settings as tolerated. Monitor labs.

## 2021-08-11 NOTE — PROGRESS NOTES
Critical Care Services Progress Note:  I personally examined and evaluated the patient today. I formulated today s plan with the house staff team or resident(s) and agree with the findings and plan in the associated note (see separately attested resident note).   I have evaluated all laboratory values and imaging studies from the past 24 hours.  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  Intubated on 8/6.      Overnight events/pertinent findings today:  Worsening urine output, gas exchange and lung compliance overnight.  BUN climbed and was give 1 L of crystalloid this morning.  Levophed restarted.  BP is quite sensitive to his body position.     Data  ABG 7.36/49/77    BUN 90-->107, Cr 2.16-->2.20    KUB with NG tube in stomach    Vt 350, PEEP 14. FIO2 90%. Pplat 33. On flolan 20.     Assessment/plan:    ARDS: Blastomycosis pneumonia. Worsening gas exchange and mechanics.    - supine during the day, prone at night  - flolan  - amphotericin  - RASS  -4   - I = O  - VV-ECMO consult given worsening clinical status.    - start neuromuscular blockade.     Hypernatremia -   - free water 30 ml/hr     Shock: back on levophed.  MAP goal 65.  Could be due to worsening lung disease.  Potential that he was intravascularly dry with high PEEP causing obstructive physiology.  He received 1 L of crystalloid    Rest per resident note.    I spent a total of 50 minutes (excluding procedure time) personally providing and directing critical care services at the bedside and on the critical care unit for this patient.     Cristopher Ward MD

## 2021-08-11 NOTE — PROGRESS NOTES
MEDICAL ICU PROGRESS NOTE  08/11/2021      Date of Service (when I saw the patient): 08/11/2021    ASSESSMENT: Jeremiah Cruz is a 46 year old male with PMH significant for Alport's Syndrome complicated by ESRD s/p kidney transplant (02/2020,2005, 1988), thrombocytopenia, anemia of CKD, secondary renal hyperparathyroidism, HTN, CML (on in remission since 2016) and GERD who developed acute hypoxemic respiratory failure likely 2/2 sepsis requiring intubation. Found to have blastomyces on BAL (on Amphotericin B) and now requiring Levophed to maintain MAPs.     CHANGES and MAJOR THINGS TODAY:   -Continue amphotericin B  -Continue meropenem   -Increase hydrocortisone to 50 mg Q8H   -Begin Bumex 2 mg BID per renal transplant   -Prone for P:F ratio < 150   -Continue Flolan 20 ng/kg/hr   -CXR and KUB   -C diff PCR ordered   -Goal of SaO2 > 89%   -Bolus of vecuronium 8 mg given   -ECMO consult   -Cryptococcal antigen ordered  -Fungal blood culture ordered   -Cytology from BAL showed possible cryptococcus, however, per ID, this is more likely to be blastomycosis   -NJ tube is now pre-pyloric. Nutrition unable to reposition. Will begin trophic feeds at 10 ml/hr.  -Increase free water flushes to 250 ml Q4H     PLAN:    Neuro:  # Pain and sedation  Patient is currently on versed, propofol and fentanyl for sedation.    - Propofol, wean as tolerated   - Versed, wean as tolerated as prefer propofol over midazolam  - Fentanyl, wean as tolerated   - RASS goal -4    Pulmonary:  # Acute Hypoxemic Respiratory Failure, ARDS  # Pneumonia, due to Blastomycoses and now possibly Cryptococcus   Presented to OSH afebrile and tachypneic with 2 week history of dry cough and dyspnea. He was hypoxemic at OSH requiring intubation. CXR at OSH significant for bilateral white out and CT scan significant for similar findings c/f pneumonia in setting of immunosuppressed patient. KOH prep and culture from BAL confirmed blastomyces. OSH 1/2 blood cx  growing gram positive coagulase negative cocci, but likely a contaminant and not the etiology of his PNA. Now BAL cytology shows teardop budding c/f Cryptococcus, however, per discussion with ID, this is more likely to be blastomycosis. Will order Crytopoccal antigen and fungal blood cultures. Overall, patient's respiratory status is worsening and he has required higher vent settings. Further differential includes: CHF exacerbation, dasatinib-induced pneumonitis (has a hx of fluid overload due to this therapy 2 months ago per the daughter), diffuse alveolar hemorrhage, silicosis, sarcoidosis and beryllium. Covid is negative. 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  - Treat infection as discussed in ID section below   - Prone today for P:F ratio < 150   - Lung-protective ventilation AC 24/350/+14, wean FiO2 as able, goal Pplat <30 and driving pressure <15  - Continue inhaled epoprostenol 20 ng/kg/hr (8/9/21-**)  - Bolus of vecuronium 8 mg given to see if there's improvement in respiratory status   - ECMO consult to see if patient is candidate     Cardiovascular:  # Shock, likely 2/2 sepsis,  resolved    # H/O HTN  Patient previously required three pressors on admission (norepi, vaso, epi). On 8/11 his MAPs are in the 50s. He was bolused 1L of LR and is requiring Levophed to maintain MAPs >65. Patient's hypotension is c/f shock 2/2 sepsis versus hypovolemia versus cardiogenic although POCUS on 8/11 showed a plump IVC and adequate cardiac function. Concerned since patient is on chronic steroids at home for immunosuppression that adrenal insufficiency may be contributing to his hypotension.   - Levophed prn for MAP goal >65  - Increase hydrocortisone to 50 mg Q8H  - Hold PTA amlodipine   - Hold PTA carvedilol     #H/O CHF  # Intermittent Bradycardia  # Concern for volume overload  Concerned that patient's bilateral white out on  CXR may be due to CHF, however, no bilateral LE edema or hepatomegaly appreciated on exam. Echo TTE showed EF 60-65%. Patient has been intermittently bradycardic unknown as to why. Differential includes adrenal insufficiency (patient is on PTA chronic steroids) hypokalemia, hypocalcemia and hypothyroidism although electrolytes are WNL. Can consider TSH if patient develops persistent bradycardia. Per report, patient is significantly net positive since admission. On exam, patient appears euvolemic, however, in setting of worsening respiratory status, there is concerned that patient may be fluid overloaded.   -Begin bumex 2 mg BID per renal transplant   -BMP BID, K>4, Mg>2     GI/Nutrition:  # GERD  - Prophylactic Protonix      # Nutrition  - Xray shows that feeding tube is not post pyloric. Nutrition could not reposition tube.    - Osmolite 1.5 to 10 ml/hr  - Will reach out to radiology tomorrow regarding NJ     # Diarrhea  Patient continues to have watery stool output. With patient spiking a fever yesterday and his antibiotic use, will order C. Diff.   - Rectal tube placed 8/9/21  - Holding prn senna and miralax  - C. Diff PCR     Renal/Fluids/Electrolytes:  # Alport's Syndrome  # ESRD s/p renal transplant (02/2020, 2005, 1988)   Patient's latest renal transplant in 02/2020. Previous renal transplants in 1988 which failed and 2005 which was nonfunctional.   - Consulted renal transplant team. Appreciate ongoing recs.  - Holding PTA 5 mg prednisone while on stress hydrocortisone. Recommend increasing his home dose to 10 mg prednisone once he is off stress dose steroids.   - Discontinued Mycophenolate acid in setting of worsened respiratory status   - Held Belatacept 5 mg/kg Q4 weeks (next dose scheduled on 8/10/21), OK to hold for 1-2 weeks per renal   - MMF level pending     # Acute Kidney Injury, improving  Creatinine elevated at 2.89 on admission. Creatinine has been down-trending. Baseline creatinine appears  1.8-2.1 on chart review. Likely etiologies include prerenal azotemia versus ATN in setting of possible shock. Per renal transplant team, no acute indication for renal replacement therapy at this time.  - Hold   - K >4, Mg >2 while on Ambisome     # Hypernatremia, improving   Patient had Na+ of 147 which is now down-trending.   - Increase free water flushes at 250 mL Q4H     # Hyperphosphatemia    # Hyperkalemia, resolved   # Hypoalbuminemia     Endocrine:  # Hyperglycemia, improving    Patient's sugars have been elevated to the 200s likely 2/2 stress response versus high dose stress corticosteroids.   - Medium resistance sliding scale     # H/O Hyperparathyroidism 2/2 ESRD  Not an active problem     ID:  # Cough   # Dyspnea   # Fever   # Pulmonary blastomycoses dermatididis   # Gram positive coagulase negative cocci bacteremia (1/2 blood cx)  Presented to OSH afebrile and tachypneic with 2 week history of dry cough and dyspnea. He was hypoxemic at OSH requiring intubation. CXR at OSH significant for bilateral white out and CT scan significant for similar findings. KOH prep and culture from BAL showed budding yeast c/f blastomyces. Patient was started on amphotericin B. Blood cultures from OSH also show gram positive coagulase negative cocci in 1/2 blood cultures, likely contaminate, MRSA nares negative. Additionally, respiratory culture here shows normal pat. Patient spiked a fever to 101 F on 8/10. This could be due to the blastomyces but cannot rule out hospital acquired versus ventilator associated pneumonia. Patient was restarted on the meropenem. Now BAL cytology shows teardop budding c/f Cryptococcus, however, per discussion with ID, this is more likely to be blastomycosis. Will order Crytopoccal antigen and fungal blood cultures though to rule out crytpococcus.     - ID consulted, recs appreciated   - Cryptococcal antigen   - Fungal cultures   - Continue meropenem  - Continue amphotericin B    - Continue  loading dose of Itraconazole ordered 200 mg q8 hours x3 days (8/9-8/11) followed by 200 mg BID  - Continue PTA dapsone for PJP ppx  - s/p Valcyte for CMV ppx    Hematology:    # Acute anemia of critical illness on anemia in chronic renal disease   # H/O thrombocytopenia  Patient has history of anemia in chronic renal disease. Low suspicion for bleeding. R femoral site with no signs of hemorrhage, no flank ecchymosis. LDH elevated, but AST normal so hemolysis unlikely. Reticulocyte index difficult to interpret in setting of blood transfusions. Is on weekly PTA Procrit injection for Hgb < 10.  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 Likely 2/2 coagulopathy in the setting of sepsis.  - Heparin gtt     # H/O CML, in remission  In remission since 2016. PTA dasatinib. Not an active problem that we suspect, but daughter does report that patient had an episode of fluid overload due to this medication, but thinks that he restarted this after he recovered.   - Held dasatinib   - Per oncology, okay to hold dasatinib while in hospital      Musculoskeletal:  No active problems     Skin:  #Lesions   Wound care consulted for gash on patient's left cheek. Per wound care this is not a new wound.   - Continue to monitor    General Cares/Prophylaxis:    DVT Prophylaxis: Heparin gtt for DVTs in UE  GI Prophylaxis: PPI  Restraints: None needed  Family Communication: Social work consulted. Daughter (Kiley) is person of contact for daily updates, updated by phone   Code Status: Full code     Lines/tubes/drains:  -Stein   -R Femoral A-line   -CVC R femoral   -Peripheral IV  -ETT  -NJ post pyloric   -Rectal tube     Disposition:  - Medical ICU     Patient seen and findings/plan discussed with medical ICU staff, Dr. Ward.    Mindi Mason, MS4     Resident/Fellow Attestation    I, Luis Mason, was present with the medical/DARRON student who participated in the service and in the documentation of the note.  I have verified the history and personally performed the physical exam and medical decision making.  I agree with the assessment and plan of care as documented in the note.      Luis Mason MD  Internal Medicine-Pediatrics PGY-3   [facility]        ====================================  INTERVAL HISTORY:   NAEO. Nursing notes reviewed. Afebrile. Patient's respiratory status has worsened today. He will be proned for P:F ratio < 150. Cryptococcal antigen, fungal cultures and C.diff ordered. Vecuronium given to try and improve synchrony with vent. ECMO consult placed.     OBJECTIVE:   1. VITAL SIGNS:   Temp:  [98.2  F (36.8  C)-101  F (38.3  C)] 98.2  F (36.8  C)  Pulse:  [61-80] 70  Resp:  [24-26] 26  BP: (105)/(52) 105/52  MAP:  [62 mmHg-82 mmHg] 65 mmHg  Arterial Line BP: (100-123)/(35-61) 103/45  FiO2 (%):  [75 %-85 %] 75 %  SpO2:  [90 %-98 %] 97 %  Ventilation Mode: CMV/AC  (Continuous Mandatory Ventilation/ Assist Control)  FiO2 (%): 75 %  Rate Set (breaths/minute): 26 breaths/min  Tidal Volume Set (mL): 350 mL  PEEP (cm H2O): 14 cmH2O  Oxygen Concentration (%): (S) 75 % (found on rounds)  Resp: 26    2. INTAKE/ OUTPUT:   I/O last 3 completed shifts:  In: 3725.13 [I.V.:1700.13; NG/GT:990; IV Piggyback:250]  Out: 2355 [Urine:1955; Stool:400]    3. PHYSICAL EXAMINATION:  General: Sedated, no acute distress. Mechanically ventilated. Post pyloric tube.   HEENT: Pupils are symmetric and reactive to light. No conjunctival injection.  Neuro: sedated, RASS -4.   Pulm/Resp: Coarse breath sounds b/l.   CV: RRR, no murmurs, rubs or gallops. Pedal pulses 2+ without LE edema  Abdomen: Soft, non-distended, non-tender on palpation.  : Stein catheter in place, urine yellow and clear  Incisions/Skin: Vertical surgical scars on abdomen. No hematoma at R femoral CVC or bruising  on flanks. Old gash on left cheek where face mask was placed, initially gash was bleeding but has stopped     4. LABS:  Arterial Blood Gases   Recent Labs   Lab 08/11/21  0424 08/10/21  2105 08/10/21  1344 08/10/21  0529   PH 7.32* 7.32* 7.35 7.36   PCO2 51* 53* 51* 49*   PO2 89 81 79* 77*   HCO3 26 27 29* 27     Complete Blood Count   Recent Labs   Lab 08/11/21  0425 08/10/21  0324 08/09/21  0353 08/08/21  2208   WBC 15.6* 8.8 7.6 5.9   HGB 7.3* 7.6* 7.6* 7.9*    219 213 223     Basic Metabolic Panel  Recent Labs   Lab 08/11/21  0435 08/11/21  0425 08/10/21  2325 08/10/21  1953 08/10/21  1650 08/10/21  0324 08/09/21  1650 08/09/21  0353   NA  --  146*  --   --  146* 147*  --  143   POTASSIUM  --  3.7  --   --  3.2* 3.7 3.7 3.5   CHLORIDE  --  109  --   --  109 109  --  110*   CO2  --  25  --   --  28 25  --  24   BUN  --  105*  --   --  97* 90*  --  78*   CR  --  2.20*  --   --  2.23* 2.16*  --  2.12*   * 195* 115* 160* 116* 150*  --  205*     Liver Function Tests  Recent Labs   Lab 08/09/21  0353 08/08/21  0333 08/07/21  0330 08/06/21  1706   AST 27 21 27 21   ALT 20 15 16 16   ALKPHOS 62 52 58 71   BILITOTAL 0.6 0.6 0.7 0.4   ALBUMIN 2.6* 2.9* 2.7* 1.3*     5. RADIOLOGY:   Recent Results (from the past 24 hour(s))   XR Abdomen Port 1 View    Narrative    Exam: XR ABDOMEN PORT 1 VIEWS, 8/10/2021 1:56 PM    Indication: Post-pyloric tube placement    Comparison: 8/9/2021, 8/6/2021    Findings:   Portable supine AP view of the lower chest and upper abdomen was  obtained. The gastric tube tip and sidehole project over the distal  stomach. The feeding tube tip projects over the central upper abdomen.  Nonobstructive bowel gas pattern in the visualized upper abdomen. No  pneumatosis or portal venous gas. Diffuse pulmonary opacities are  grossly stable.      Impression    Impression:   The feeding tube tip projects over the upper central abdomen, possibly  within the distal stomach or third portion of the  duodenum. Consider  lateral view for improved localization.    TESFAYE LECHUGA MD         SYSTEM ID:  [id number]

## 2021-08-11 NOTE — PROGRESS NOTES
[facility]   Transplant Nephrology Progress Note  Date of Admission:  8/6/2021  Today's Date: 08/11/2021    Recommendations:  - switch lasix to Bumex 2 mg BID, recommend to avoid metolazone or thiazide diuretics given his hypercalcemia. (corrected for albumin).  - check 1,25 vitamin D given mild hypercalcemia in the settings of fungal infection.  - start free water flushes (250 ml q 4-6 hr) for hypernatremia.  - start prednisone 10 mg daily when he is off IV/stress dose steroid.       Assessment & Plan   # DDKT: VASQUEZ, stable creatinine, likely pre-renal azotemia vs ATN in the settings of shock possible septic shock.               - Baseline Creatinine: ~ 1.8-2.1              - Proteinuria: Minimal (0.2-0.5 grams)              - Date DSA Last Checked: Feb/2021      Latest DSA: No              - BK Viremia: No              - Kidney Tx Biopsy: Apr 03, 2020; Result: No diagnostic evidence of acute rejection.  Moderate interstitial fibrosis and tubular atrophy. EM showed ~25% foot process effacement               -No acute indication for RRT currently      # Immunosuppression: Belatacept (dose 5 mg/kg every four weeks), Mycophenolic acid (dose 540 mg every 12 hours) and Prednisone (dose 5 mg daily) last belatacept dose reportedly given on July 13th, next dose scheduled on Aug 10, 2021 (will need to verify the timing). MPA discontinue with worsening respiratory status with fungal infection.              - Changes: No     # Infection Prophylaxis:   - PJP: Dapsone              - CMV: Valcyte;  (D+/R-). completed  Valcyte for 6 months. CMV Negative (2/2021), will check CMV given his respiratory issues.     # Hypertension: controlled  Goal BP: < 130/80              - Volume status: Euvolemic                              - Changes: No     # Anemia in Chronic Renal Disease: Hgb: Stable      DEB: No              - Iron studies: Not checked recently     # Mineral Bone Disorder:   -  Secondary renal hyperparathyroidism; PTH level: Normal (18-80 pg/ml)        On treatment: None  - Vitamin D; level: Normal        On supplement: No  - Calcium; level: High corrected for albumin        On supplement: No, recommend to discontinue metolazone     # Electrolytes:   - Potassium; level: Normal        On supplement: No  - Magnesium; level: Not checked recently        On supplement: No  - Bicarbonate; level: Normal      On supplement: No     # CML: Appears stable on Dasatinib. Patient is followed by Hematology.     # Blastomycosis PNA : BAL fungal culture + for blastomyces  + fungitell KOH BAL prep + broad budding yeast, on Ambisome per ID, planned for 2 weeks till clinical improvement then possible switch to itraconazole or voriconazole .     # Septic shock: resolved , was off pressors, currently requiring minimal dose levophed.      # Transplant History:  Etiology of Kidney Failure: Alport's syndrome  Tx: DDKT  Transplant: 2/28/2020 (Kidney), 12/7/1988 (Kidney), 3/2/2005 (Kidney)  Crossmatch at time of Tx: negative  Significant changes in immunosuppression: None  Significant transplant-related complications: None     Recommendations were communicated to the primary team verbally.    Josee Viveros MD   Pager: [phone]    Attestation:  This patient has been seen and evaluated by me, Alvin Varner MD.  I have reviewed the note and agree with plan of care as documented by the fellow.       Interval History   Overall remains intubated and sedated. O2 requirements remains high, higher PEEP. Stable Cr, UOP started to decline.     Review of Systems   4 point ROS was obtained and negative except as noted in the Interval History.    MEDICATIONS:    sodium chloride 0.9%  250 mL Intravenous Q24H     acetaminophen  650 mg Oral or Feeding Tube Q24H     dextrose 5% water  10-20 mL Intravenous Q24H    And     amphotericin B liposome (AMBISOME) intermittent infusion  5 mg/kg (Dosing Weight) Intravenous Q24H     "And     dextrose 5% water  10-20 mL Intravenous Q24H     artificial tears   Both Eyes At Bedtime     atorvastatin  10 mg Oral Daily     dapsone  50 mg Oral or Feeding Tube Daily     diphenhydrAMINE  25 mg Oral Q24H    Or     diphenhydrAMINE  25 mg Intravenous Q24H     [Held by provider] furosemide  40 mg Intravenous Q6H     hydrocortisone sodium succinate PF  50 mg Intravenous Q8H     insulin aspart  1-6 Units Subcutaneous Q4H     itraconazole  200 mg Oral Q8H    Followed by     [START ON 2021] itraconazole  200 mg Oral BID     lidocaine  5 mL Topical Once     loratadine  10 mg Oral Daily     meropenem  1 g Intravenous Q12H     multivitamins w/minerals  15 mL Per Feeding Tube Daily     pantoprazole  40 mg Oral QAM AC     protein modular  1 packet Per Feeding Tube TID       dextrose       epoprostenol (VELETRI) 20 mcg/mL in sterile water inhalation solution 20 ng/kg/min (21 0408)     fentaNYL 200 mcg/hr (21 1225)     heparin 1,600 Units/hr (21 1104)     midazolam 8 mg/hr (21 1225)     norepinephrine 0.05 mcg/kg/min (21 1120)     propofol (DIPRIVAN) infusion 45 mcg/kg/min (21 1104)       Physical Exam   Temp  Av.4  F (36.9  C)  Min: 97.5  F (36.4  C)  Max: 99.9  F (37.7  C)  Arterial Line BP  Min: 98/48  Max: 174/61  Arterial Line MAP (mmHg)  Av.7 mmHg  Min: 63 mmHg  Max: 95 mmHg      Pulse  Av.7  Min: 67  Max: 83 Resp  Av  Min: 18  Max: 26  FiO2 (%)  Av %  Min: 40 %  Max: 100 %  SpO2  Av.6 %  Min: 90 %  Max: 100 %     BP (!) 140/73 (BP Location: Left leg)   Pulse 75   Temp 98.1  F (36.7  C) (Oral)   Resp 26   Ht 1.626 m (5' 4\")   Wt 75 kg (165 lb 5.5 oz)   SpO2 92%   BMI 28.38 kg/m     Date 21 07 - 21 0659   Shift 8303-6174 1960-5589 7518-7391 24 Hour Total   INTAKE   I.V. 913.18   913.18   NG/   353   Colloid 400   400   Enteral 160   160   Blood Components 300   300   Shift Total(mL/kg) 2126.18(29.61)   2126.18(29.61) "   OUTPUT   Urine 350   350   Shift Total(mL/kg) 350(4.87)   350(4.87)   Weight (kg) 71.8 71.8 71.8 71.8      Admit Weight: 66.4 kg (146 lb 6.2 oz)     GENERAL APPEARANCE: intubated and sedated.   HENT: mouth without ulcers or lesions  LYMPHATICS: no cervical or supraclavicular nodes  RESP: lungs clear to auscultation - no rales, rhonchi or wheezes  CV: regular rhythm, normal rate, no rub, no murmur  EDEMA: + LE edema bilaterally overall  ABDOMEN: soft, nondistended, nontender, bowel sounds normal  MS: extremities normal - no gross deformities noted, no evidence of inflammation in joints, no muscle tenderness  SKIN: no rash    Data   All labs reviewed by me.  CMP  Recent Labs   Lab 08/11/21  1124 08/11/21  0837 08/11/21  0435 08/11/21  0425 08/10/21  1650 08/10/21  0324 08/09/21  1650 08/09/21  0353 08/08/21  0336 08/08/21  0333 08/07/21  0338 08/07/21  0330 08/06/21  1706   NA  --   --   --  146* 146* 147*  --  143   < > 140   < > 140 139   POTASSIUM  --   --   --  3.7 3.2* 3.7 3.7 3.5   < > 3.6  3.6   < > 4.3 5.2   CHLORIDE  --   --   --  109 109 109  --  110*   < > 106   < > 107 112*   CO2  --   --   --  25 28 25  --  24   < > 24   < > 21 16*   ANIONGAP  --   --   --  12 9 13  --  9   < > 10   < > 12 11   * 124* 162* 195* 116* 150*  --  205*   < > 166*   < > 213* 92  99   BUN  --   --   --  105* 97* 90*  --  78*   < > 62*   < > 51* 52*   CR  --   --   --  2.20* 2.23* 2.16*  --  2.12*   < > 2.21*  2.21*   < > 2.38* 3.05*   GFRESTIMATED  --   --   --  35* 34* 36*  --  36*   < > 35*  35*   < > 32* 24*   ANTWAN  --   --   --  9.0 9.1 9.3  --  9.1   < > 8.8   < > 8.3* 9.0   MAG  --   --   --  2.8*  --  2.8* 2.9* 2.7*   < > 2.5*   < > 2.2 2.0   PHOS  --   --   --  6.1*  --  5.6*  --  4.2  --  4.5  --  5.9* 6.3*   PROTTOTAL  --   --   --   --   --   --   --  5.9*  --  5.9*  --  6.2* 5.4*   ALBUMIN  --   --   --   --   --   --   --  2.6*  --  2.9*  --  2.7* 1.3*   BILITOTAL  --   --   --   --   --   --   --  0.6   --  0.6  --  0.7 0.4   ALKPHOS  --   --   --   --   --   --   --  62  --  52  --  58 71   AST  --   --   --   --   --   --   --  27  --  21  --  27 21   ALT  --   --   --   --   --   --   --  20  --  15  --  16 16    < > = values in this interval not displayed.     CBC  Recent Labs   Lab 08/11/21  0425 08/10/21  0324 08/09/21  0353 08/08/21  2208   HGB 7.3* 7.6* 7.6* 7.9*   WBC 15.6* 8.8 7.6 5.9   RBC 2.56* 2.64* 2.67* 2.80*   HCT 25.5* 26.2* 25.3* 26.4*    99 95 94   MCH 28.5 28.8 28.5 28.2   MCHC 28.6* 29.0* 30.0* 29.9*   RDW 17.1* 16.9* 16.6* 16.4*    219 213 223     INRNo lab results found in last 7 days.  ABG  Recent Labs   Lab 08/11/21  1119 08/11/21  0424 08/10/21  2105 08/10/21  1344   PH 7.30* 7.32* 7.32* 7.35   PCO2 56* 51* 53* 51*   PO2 62* 89 81 79*   HCO3 27 26 27 29*   O2PER 95 75 85 85      Urine Studies  Recent Labs   Lab Test 08/10/21  1301 03/16/20  0940 03/12/20  1125   COLOR Light Yellow Yellow Straw   APPEARANCE Slightly Cloudy* Clear Clear   URINEGLC Negative 150* >499*   URINEBILI Negative Negative Negative   URINEKETONE Negative Negative Negative   SG 1.012 1.014 1.009   UBLD Moderate* Small* Small*   URINEPH 5.0 6.0 7.0   PROTEIN Negative 30* Negative   NITRITE Negative Negative Negative   LEUKEST Negative Small* Trace*   RBCU 38* 13* 3*   WBCU <1 18* 7*     Recent Labs   Lab Test 06/29/20  0850   UTPG 0.17     PTH  Recent Labs   Lab Test 06/29/20  0914 03/04/20  1018 03/03/20  0849 03/03/20  0729   PTHI 58 630* 668* Canceled, Test credited     Iron Studies  Recent Labs   Lab Test 05/18/21  0000 04/20/21  0000 03/23/21  0000 02/23/21  0000 01/26/21  0000 01/05/21  0000 06/29/20  0914 03/03/20  0729   IRON  --   --   --   --   --   --  163 139   FEB  --   --   --   --   --   --  277 142*   IRONSAT  --   --   --   --   --   --  59* 98*   YOLIS 2,884 2,834 3,179 2,480 2,813 2,356 2,163* 3,397*       IMAGING:  All imaging studies reviewed by me.

## 2021-08-11 NOTE — CONSULTS
ECMO Consult Note  Staff: Dr. 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     Given he has baseline organ failure with ESRD, requires chronic immunosuppression, has an active cancer (CML in remission) and a severe infection he is overall a poor candidate for VV ECMO, and we would not recommend pursuing cannulation and placement on an ECMO circuit.    Agree with aggressive treatment for underlying fungal infection and ventilatory support including ARDS salvage adjuncts already implemented (paralysis, proning etc.)       Discussed with Dr. 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     Subjective hx unable to be obtained as patient is intubated, sedated and paralyzed.    Per nursing patients events throughout the day have been increasing o2 requirement.    ?PMH:  Past Medical History:   Diagnosis Date     Alport's syndrome      Anemia in chronic kidney disease      CML (chronic myelocytic leukemia) (H) 2015    In remission since 2016. Managed at [facility].     Dialysis care      End stage kidney disease (H)      Former tobacco use      GERD (gastroesophageal reflux disease)      Hypertension       Kidney transplant rejection      Kidney transplanted     Primary graft non-function d/t acute AMR     Kidney transplanted 1988    Failed in      Kidney transplanted 2020    Induction with thymoglobulin 6.2mg/kg.     S/p nephrectomy 11     Secondary renal hyperparathyroidism (H)      Thrombocytopenia (H)      Tobacco dependence         PSHx:  Past Surgical History:   Procedure Laterality Date     APPENDECTOMY       CREATE FISTULA ARTERIOVENOUS UPPER EXTREMITY       HERNIA REPAIR, INCISIONAL       INSERT CATHETER PERITONEAL DIALYSIS       IR PICC PLACEMENT > 5 YRS OF AGE  3/5/2020     IR RENAL BIOPSY RIGHT  4/3/2020     NEPHRECTOMY Bilateral     native     NEPHRECTOMY      transplant     NEPHRECTOMY      transplant     PARATHYROIDECTOMY       REMOVE CATHETER PERITONEAL       svc angioplasty       TRANSPLANT KIDNEY RECIPIENT  DONOR N/A 2020    Procedure: TRANSPLANT, KIDNEY, RECIPIENT,  DONOR, with ureteral stent placement;  Surgeon: Carol Samuel MD;  Location:  OR        Medications:  No current facility-administered medications on file prior to encounter.  amLODIPine (NORVASC) 10 MG tablet, Take 1 tablet (10 mg) by mouth daily  atorvastatin (LIPITOR) 10 MG tablet, Take 1 tablet (10 mg) by mouth daily  belatacept, Aug 3 2020 belatacept conversion   [facility]  [phone]  Fax   carvedilol (COREG) 25 MG tablet, Take 1 tablet (25 mg) by mouth 2 times daily (with meals)  cetirizine (ZYRTEC) 10 MG tablet, Take 10 mg by mouth daily  dapsone (ACZONE) 25 MG tablet, Take 2 tablets (50 mg) by mouth daily  dasatinib (SPRYCEL) 20 MG tablet CHEMO, Take 40 mg by mouth daily   famotidine (PEPCID) 40 MG tablet, Take 1 tablet (40 mg) by mouth daily  loratadine (CLARITIN) 10 MG tablet, Take 10 mg by mouth daily  MYFORTIC (BRAND) 180 MG EC tablet, Take 3 tablets (540 mg) by mouth 2 times daily  predniSONE (DELTASONE) 5 MG tablet, Take 1 tablet (5  mg) by mouth daily  PROCRIT 15854 UNIT/ML injection, INJECT 1ML UNDER THE SKIN ONCE A WEEK FOR HEMOGLOBIN <10. HOLD IF SYSTOLIC FOR BLOOD PRESSURE >180  sodium bicarbonate 650 MG tablet, Take 3 tablets (1,950 mg) by mouth 2 times daily  tamsulosin (FLOMAX) 0.4 MG capsule, Take 1 capsule (0.4 mg) by mouth At Bedtime  vitamin D3 (CHOLECALCIFEROL) 50 mcg (2000 units) tablet, Take 1 tablet (50 mcg) by mouth daily      Allergies:   Blood transfusion related (informational only), Cephalosporins, Compazine [prochlorperazine], Gammagard [immune globulin], Vancomycin, and Penicillins     SocHx:  Social History     Socioeconomic History     Marital status: Single     Spouse name: Not on file     Number of children: Not on file     Years of education: Not on file     Highest education level: Not on file   Occupational History     Not on file   Tobacco Use     Smoking status: Former Smoker     Packs/day: 1.00     Years: 12.00     Pack years: 12.00     Types: Cigarettes     Quit date: 2/24/2011     Years since quitting: 10.4     Smokeless tobacco: Never Used   Substance and Sexual Activity     Alcohol use: No     Alcohol/week: 0.0 standard drinks     Drug use: No     Sexual activity: Not on file   Other Topics Concern     Parent/sibling w/ CABG, MI or angioplasty before 65F 55M? Not Asked   Social History Narrative     Not on file     Social Determinants of Health     Financial Resource Strain:      Difficulty of Paying Living Expenses:    Food Insecurity:      Worried About Running Out of Food in the Last Year:      Ran Out of Food in the Last Year:    Transportation Needs:      Lack of Transportation (Medical):      Lack of Transportation (Non-Medical):    Physical Activity:      Days of Exercise per Week:      Minutes of Exercise per Session:    Stress:      Feeling of Stress :    Social Connections:      Frequency of Communication with Friends and Family:      Frequency of Social Gatherings with Friends and Family:       "Attends Zoroastrianism Services:      Active Member of Clubs or Organizations:      Attends Club or Organization Meetings:      Marital Status:    Intimate Partner Violence:      Fear of Current or Ex-Partner:      Emotionally Abused:      Physically Abused:      Sexually Abused:      FamHx:  Family History   Problem Relation Age of Onset     Bone Cancer Brother      Melanoma No family hx of      Skin Cancer No family hx of       Review of Systems:  ROS: unable to be obtained 2/2 mental status  Physical Examination   BP (!) 140/73 (BP Location: Left leg)   Pulse 74   Temp 98.1  F (36.7  C) (Oral)   Resp 24   Ht 1.626 m (5' 4\")   Wt 75 kg (165 lb 5.5 oz)   SpO2 90%   BMI 28.38 kg/m    General: intubated, sedated, paralyzed  Pulm: Mechanically ventilated, saturating 94-95%, diminished breath sound throughout  CV: NSR on tele  Abdomen: soft, non-tender, no guarding.   Musculoskel/Extremities: 2+ tibial edema, no erythema or tenderness   Skin: no rashes, no diaphoresis and skin color normal     Labs: Reviewed  Last Arterial Blood Gas:  pH Arterial   Date Value Ref Range Status   08/11/2021 7.28 (L) 7.35 - 7.45 Final   02/28/2020 7.36 7.35 - 7.45 pH Final     pCO2 Arterial   Date Value Ref Range Status   08/11/2021 57 (H) 35 - 45 mm Hg Final   02/28/2020 35 35 - 45 mm Hg Final     pO2 Arterial   Date Value Ref Range Status   08/11/2021 74 (L) 80 - 105 mm Hg Final   02/28/2020 222 (H) 80 - 105 mm Hg Final     Bicarbonate Arterial   Date Value Ref Range Status   08/11/2021 27 21 - 28 mmol/L Final   02/28/2020 19 (L) 21 - 28 mmol/L Final     Base Excess/Deficit (+/-)   Date Value Ref Range Status   08/11/2021 -0.2 -9.0 - 1.8 mmol/L Final       Imaging: Reviewed      "

## 2021-08-11 NOTE — PROCEDURES
Small Bowel Feeding Tube Reposition  Reason for Feeding Tube Reposition: FT spontaneously retracted to gastric position.  Cortrak Start Time: 1415  Cortrak End Time: 1430  Medicine Delivered During Procedure: None  Reposition Successful: No - presumed gastric position pending AXR confimation  Procedure Complications: Repeated coiling of FT and inability to pass presumed pylorus (FT would coil back into stomach when getting close to pylorus).  Final Placement Arvin at exit of nare: 95 cm  Face to Face time with patient: 20 minutes total    Bina Solitario RD, LD  Pager: [phone]

## 2021-08-11 NOTE — PROGRESS NOTES
[facility]  Transplant Infectious Disease Progress Note     Patient:  Jeremiah Cruz, Date of birth 1975, Medical record number [id number]  Date of Visit:  08/11/2021         Assessment and Recommendations:   Recommendations:  1. In this patient with severe pulmonary Blastomycosis, recommend that he be maintained on Ambisome 5mg/kg q24h IV for at least the first 1-2 weeks until clinical improvement, at which time he can be transitioned to an azole (Itraconazole preferably, alternatively Voriconazole can be used)  2. Please monitor renal function and electrolytes while on Ambisome  3. With a view to transition to azole in the coming days (only when clinically improved), have started loading with Itraconazole in the next 24-48 hours. Dosing: Itraconazole solution 200mg PO q8h x 3 days, followed by 200mg q12h PO. Will check levels in 7 days after starting (check on 8/17)  4. Follow up previously sent serology testing:  - BAL Histoplasma antigen, urine Blastomyces antigen.   - Urine and serum Histoplasma antigen strongly positive >20, but has cross-reactivity with Blasto  5. Await BAL cultures/results including - AFB cultures, Nocardia PCR and culture, BAL Histo antigen, Legionella culture. BAL fungal culture with Blastomyces and Aspergillus Galactomannan positive at 0.80  6. BAL cytology with fungal elements with morphology resembling Cryptococcus, however lower suspicion (explanation below). Regardless, please repeat serum CrAg and send fungal blood culture  7. Have sent for BAL Cryptococcus PCR testing to [facility]  8. In light of febrile episode, await results of repeat blood, urine and sputum cultures. Continue Meropenem  9. Continue Dapsone for PJP ppx     Transplant Infectious Disease will continue to follow with you.     Assessment:  47 y/o gentleman, PMHx Alport syndrome c/b ESRD s/p DDKT (x3, 1988, 2005 and now again in 2/2020) (CMV +/-, EBV unknown/+),  "thrombocytopenia, anemia of chronic disease, secondary renal hyperparathyroidism, HTN, CML (in remission since 2016) and GERD who presents as a transfer from [facility] ER for hypoxic respiratory failure     #Hypoxic Respiratory Failure:  #Circulatory Shock:  #KOH Prep with broad based budding yeast, morphology consistent with possible Blastomyces dermatiditis:  #Severe Pulmonary Blastomycosis:  Patient presented to OSH ER with complaints of two weeks of worsening cough and progressive SOB. He was noted to be hypoxic on arrival to 80s on room air, however worsened throughout the evening to a point where he was electively intubated prior to transfer. Although initial improvement in ventilation, respiratory status has once again declined and he is now on FiO2 95% and PEEP 14, which had slight improvement after proning  CXR at [facility] showed \"Bilateral diffuse nodular opacities, left greater than is right, possibly a combination of edema and/or infection\". CT at OSH also showed bilateral opacities, however is not available for interpretation. Diffuse nodular opacities on imaging in a patient that presents in a subacute/progressive manner raises concerns for atypical infections including endemic mycoses. KOH prep from BAL showing broad based budding yeast, morphology consistent with Blastomyces. This fits with clinical presentation and radiographic appearance. Now fungal cultures from BAL also growing Blastomyces. Although BAL aspergillus galactomannan weakly positive (0.8), this is a test that can have cross-reactivity with Blastomyces infection. Furthermore, serum Histo antigen >upper limit of normal and urine Histoplasma antigen was strongly positive at >20 (however this has cross reactivity to Blastomyces and therefore expect to have it be positive). Have sent additional serology for workup. Would recommend continuing Amphotericin B given severe pulmonary disease in immunocompromised patient prior to transition to " "azole.   Lastly, radiographic appearance not consistent with typical bacterial pneumonia. BAL cultures have remained negative.     #Fever:  Patient with low grade fever/Tmax 99.9F on arrival. He was in circulatory shock and on pressors. After being on empiric antibiotics, he clinically improved initially, defervesced and was weaned off pressors. Over the last 48 hours, has been noted to have worsening respiratory status again, now on FIO2 85%, PEEP 14 and briefly requiring proning. Now continues to be febrile. He is currently being treated for severe pulmonary Blastomycosis with bacterial cultures being negative to date. However fever might indicate ongoing infection with Blastomyces vs superimposed bacterial infection/VAP. Recommend repeating cultures and reinitiating antibiotic coverage based on clinical status and repeat fevers     #BAL Cytology with fungal elements resembling Cryptococcus:  Patient underwent BAL on 8/6, cultures with Blastomyces. Cytology report mentioned \"GMS special stain is performed and fungal organisms are present, suggestive of Cryptococcus\". Patient is extremely immunocompromised and could have more than one infectious process, however his serum Crypto antigen was negative on 8/7. Discussed case with Pathologist, who mentioned seeing fungal elements and that diagnosis was microscopic and not on cytology. With a negative serum Crypto antigen, likelihood of pulmonary Crypto very low, especially for someone with severe pulmonary disease. Will repeat serum antigen testing and send for BAL PCR. No indication to add empiric Flucytosine     #GPC in blood culture from OSH:  OSH obtained blood cultures at time of presentation. 1/2 cultures with GPCs - now identified as Staph hominis (CoNS). With only 1/2 positive culture, likely skin contaminant. Other blood Cx from 8/5, as well as blood culture from  from 8/6 remain negative. Patient presented in circulatory shock but has been weaned off " pressors within 24 hours of arrival. With persistent negative cultures and hemodynamic stability, recommend stopping Vancomycin    #Renal Transplant recipient:  History of Alport syndrome c/b ESRD s/p DDKT (x3, 1988, 2005 and now again in 2/2020) (CMV +/-, EBV unknown/+)     Other Infectious Disease issues include:  - QTc interval: 423 msec 8/6/21  - Bacterial prophylaxis: On Meropenem treatment  - Pneumocystis prophylaxis: Dapsone  - Viral serostatus & prophylaxis: CMV +/-, EBV unknown/+, completed Valcyte ppx, please monitor CMV levels  - Fungal prophylaxis: On Ambisome, Itraconazole  - Immunization status: Has not received COVID vaccine  - Gamma globulin status: Unknown  - Isolation status: Good hand hygiene, standard isolation.      TERESA Waller  Staff Physician, Infectious Diseases  Pager [phone]         Interval History:   Seen and examined. Patient remains critically ill. Overnight, proned again with slight improvement in FiO2 requirements to 75%, but this morning after being supinated, his O2 requirements have continued to trend up. At time of exam, he was on PEEP 14, FiO2 100%. Being evaluated for possible ECMO cannulation  Paralytic was added to aid ventilation, He was also noted to be back requiring pressors - on NE at 0.11  Yesterday afternoon, he was noted to be febrile and was started back on Meropenem. Febrile again today    Review of labs - Creatinine stable at 2.2, WBC count up to 15.6, Platelet 253  8/7 serum CrAg negative  8/6 BAL studies -  3+ Broad based budding yeast, morphology consistent with possible Blastomyces dermatiditis on KOH smear.  BAL aspergillus galactomannan positive at 0.80, serum Aspergillus GM 0.43 (negative)  BDG positive at 113 from 8/6 and 257 from 8/7  Fungal cultures from BAL now growing Blastomyces  Urine Histoplasma antigen >20.0. Serum Histoplasma antigen above the limit of quantification  Remainder of studies remain negative including BAL PJP and CMV.  "Bronch AFB Cx negative to date. Additional serologies pending  Blood Cx from 8/6 at [facility] remain negative    Cytology from bronch mentioned possibility of fungal elements that had the appearance of Cryptococcus      Transplants:  2/28/2020 (Kidney), 12/7/1988 (Kidney), 3/2/2005 (Kidney), Postoperative day:  530.  Coordinator Jody García    Review of Systems:  Unable to obtain as patient intubated and sedated       History of presenting illness:   45 y/o gentleman, PMHx Alport syndrome c/b ESRD s/p DDKT (x3, 1988, 2005 and now again in 2/2020) (CMV +/-, EBV unknown/+), thrombocytopenia, anemia of chronic disease, secondary renal hyperparathyroidism, HTN, CML (in remission since 2016) and GERD who presents as a transfer from [place] ER for hypoxic respiratory failure     Patient intubated and sedated and no family members at bedside. History obtained from discussion with primary team and chart review. Patient originally presented to [place] ER with 2 weeks of dry cough and worsening dyspnea. He was reported treated for a sinus infection in early July with Azithromycin which had led to an improvement in his symptoms. However, starting July 21, his symptoms worsened     On presentation to the ER, he was noted to be hypoxic, initially 82-85% on room air. Was placed on O2 by nasal cannula, initially on 4L then escalated to 6L. CXR obtained at OSH ER showed bilateral infiltrates. Was not able to obtain CT PE given concern about renal function. He had blood cultures collected at OSH, and was given Levofloxacin (given reported allergy to Penicillins and cephalosporins). However, while at the ER, his respiratory function worsened and he was first on BIPAP and then electively intubated prior to transfer. A CT scan at the OSH showed bilateral opacities consistent with \"atypical pneumonia\"     At the time of transfer here, he clinically worsened, was in circulatory shock requiring 3 pressors. Was started on Vancomycin, " "Meropenem and Micafungin. A CXR at [facility] showed \"Bilateral diffuse nodular opacities, left greater than is right, possibly a combination of edema and/or infection\". Given clinical condition, he underwent bronchoscopy with BAL on 8/6. A KOH smear performed on the bronchoscopy showed 3+ Broad based budding yeast, morphology consistent with possible Blastomyces dermatiditis     He was started on Ambisome this morning and ID consulted. Micafungin was stopped  By the time he was seen, he had been weaned off all pressors. His vent settings had also slightly improved - down to PEEP 8/50% FiO2 from PEEP 10/80% FiO2 on arrival           Current Medications & Allergies:       sodium chloride 0.9%  250 mL Intravenous Q24H     acetaminophen  650 mg Oral or Feeding Tube Q24H     dextrose 5% water  10-20 mL Intravenous Q24H    And     amphotericin B liposome (AMBISOME) intermittent infusion  5 mg/kg (Dosing Weight) Intravenous Q24H    And     dextrose 5% water  10-20 mL Intravenous Q24H     artificial tears   Both Eyes At Bedtime     atorvastatin  10 mg Oral Daily     bumetanide  2 mg Intravenous Q12H     dapsone  50 mg Oral or Feeding Tube Daily     diphenhydrAMINE  25 mg Oral Q24H    Or     diphenhydrAMINE  25 mg Intravenous Q24H     [Held by provider] furosemide  40 mg Intravenous Q6H     hydrocortisone sodium succinate PF  50 mg Intravenous Q8H     insulin aspart  1-6 Units Subcutaneous Q4H     itraconazole  200 mg Oral Q8H    Followed by     [START ON 8/17/2021] itraconazole  200 mg Oral BID     lidocaine  5 mL Topical Once     loratadine  10 mg Oral Daily     meropenem  1 g Intravenous Q12H     multivitamins w/minerals  15 mL Per Feeding Tube Daily     pantoprazole  40 mg Oral QAM AC     protein modular  1 packet Per Feeding Tube TID       Infusions/Drips:    dextrose       epoprostenol (VELETRI) 20 mcg/mL in sterile water inhalation solution 20 ng/kg/min (08/11/21 1521)     fentaNYL 200 mcg/hr (08/11/21 1700)     heparin " 1,600 Units/hr (08/11/21 1700)     midazolam 8 mg/hr (08/11/21 1700)     norepinephrine 0.11 mcg/kg/min (08/11/21 1700)     propofol (DIPRIVAN) infusion 45 mcg/kg/min (08/11/21 1700)       Allergies   Allergen Reactions     Blood Transfusion Related (Informational Only) Other (See Comments)     Patient has a history of a clinically significant antibody against RBC antigens.  A delay in compatible RBCs may occur.     Cephalosporins Other (See Comments) and Rash     fever     Compazine [Prochlorperazine]      Gammagard [Immune Globulin] Other (See Comments)     Ed got spinal meningitis and MD stated to never get again for fear of death.       Vancomycin      Per care everywhere patient has Red Man's syndrome     Penicillins Hives and Rash     Per OSH records            Physical Exam:   Vitals were reviewed.  All vitals stable.  Patient Vitals for the past 24 hrs:   BP Temp Temp src Pulse Resp SpO2 Weight   08/11/21 1700 -- -- -- 79 24 92 % --   08/11/21 1645 -- -- -- 77 -- 92 % --   08/11/21 1630 -- -- -- 81 -- 93 % --   08/11/21 1615 -- -- -- 80 -- 92 % --   08/11/21 1600 -- (!) 102.5  F (39.2  C) Oral 79 24 91 % --   08/11/21 1545 -- -- -- 79 -- 91 % --   08/11/21 1530 -- -- -- 78 -- 91 % --   08/11/21 1515 -- -- -- 78 -- 91 % --   08/11/21 1500 -- -- -- 79 24 (!) 88 % --   08/11/21 1445 -- -- -- 75 -- 90 % --   08/11/21 1430 -- -- -- 74 -- 90 % --   08/11/21 1415 -- -- -- 75 -- 91 % --   08/11/21 1400 -- -- -- 77 24 92 % --   08/11/21 1345 -- -- -- 73 -- 93 % --   08/11/21 1330 -- -- -- 74 -- 95 % --   08/11/21 1315 -- -- -- 74 -- 93 % --   08/11/21 1300 -- -- -- 75 24 92 % --   08/11/21 1245 -- -- -- 75 -- 90 % --   08/11/21 1240 -- -- -- 73 -- (!) 89 % --   08/11/21 1230 -- -- -- 73 -- 91 % --   08/11/21 1225 -- -- -- 73 -- 90 % --   08/11/21 1215 -- -- -- 73 -- 93 % --   08/11/21 1200 -- 98.1  F (36.7  C) Oral 73 22 92 % --   08/11/21 1145 -- -- -- 73 -- 93 % --   08/11/21 1130 -- -- -- 73 -- 92 % --   08/11/21  1120 -- -- -- 71 -- 91 % --   08/11/21 1115 -- -- -- 68 -- 93 % --   08/11/21 1110 -- -- -- 71 -- 94 % --   08/11/21 1104 -- -- -- 69 -- 94 % --   08/11/21 1100 -- -- -- 73 22 93 % --   08/11/21 1058 -- -- -- 73 -- 92 % --   08/11/21 1045 -- -- -- 71 -- 93 % --   08/11/21 1030 -- -- -- 70 -- 91 % --   08/11/21 1025 -- -- -- 70 -- (!) 89 % --   08/11/21 1015 -- -- -- 71 -- 95 % --   08/11/21 1000 -- -- -- 70 22 93 % --   08/11/21 0945 -- -- -- 70 -- 93 % --   08/11/21 0940 -- -- -- 70 -- 90 % --   08/11/21 0932 -- -- -- 71 -- 95 % --   08/11/21 0930 -- -- -- 71 -- 90 % --   08/11/21 0915 -- -- -- 71 -- 95 % --   08/11/21 0900 -- -- -- 72 26 93 % --   08/11/21 0854 -- -- -- 71 -- 94 % --   08/11/21 0853 -- -- -- 68 -- 94 % --   08/11/21 0852 -- -- -- 70 -- 95 % --   08/11/21 0851 -- -- -- 70 -- 95 % --   08/11/21 0850 -- -- -- 73 -- 92 % --   08/11/21 0849 -- -- -- 70 -- (!) 89 % --   08/11/21 0845 -- -- -- 70 -- 94 % --   08/11/21 0832 -- -- -- 68 -- 95 % --   08/11/21 0830 -- -- -- 67 -- 94 % --   08/11/21 0815 (!) 140/73 -- -- 67 -- 94 % --   08/11/21 0800 -- 98.8  F (37.1  C) Oral 70 26 95 % --   08/11/21 0745 -- -- -- 71 -- 98 % --   08/11/21 0730 -- -- -- 73 -- 96 % --   08/11/21 0715 -- -- -- 71 -- 97 % --   08/11/21 0700 -- -- -- 70 26 97 % --   08/11/21 0645 -- -- -- 68 -- 96 % --   08/11/21 0630 -- -- -- 66 -- 97 % --   08/11/21 0615 -- -- -- 67 26 97 % --   08/11/21 0600 -- -- -- 66 26 96 % --   08/11/21 0545 -- -- -- 64 -- 96 % --   08/11/21 0530 -- -- -- 64 -- 96 % --   08/11/21 0515 -- -- -- 65 -- 98 % --   08/11/21 0500 -- -- -- 63 26 95 % --   08/11/21 0445 -- -- -- 63 -- 95 % --   08/11/21 0430 -- -- -- 67 -- 96 % --   08/11/21 0415 -- -- -- 65 -- 95 % --   08/11/21 0412 -- -- -- -- -- 92 % --   08/11/21 0400 -- 98.2  F (36.8  C) Oral 69 26 97 % 75 kg (165 lb 5.5 oz)   08/11/21 0345 -- -- -- 68 -- 97 % --   08/11/21 0330 -- -- -- 68 -- -- --   08/11/21 0315 -- -- -- 67 -- -- --   08/11/21 0300 -- --  -- 67 26 97 % --   08/11/21 0245 -- -- -- 67 -- 97 % --   08/11/21 0230 -- -- -- 69 -- 97 % --   08/11/21 0215 -- -- -- 68 -- 98 % --   08/11/21 0200 -- -- -- 66 26 96 % --   08/11/21 0145 -- -- -- 70 -- 96 % --   08/11/21 0130 -- -- -- 70 -- -- --   08/11/21 0115 -- -- -- 69 -- -- --   08/11/21 0100 -- -- -- 68 24 96 % --   08/11/21 0045 -- -- -- 70 -- 96 % --   08/11/21 0030 -- -- -- 71 -- 95 % --   08/11/21 0015 -- -- -- 70 -- 95 % --   08/11/21 0000 -- 98.9  F (37.2  C) Axillary 71 24 95 % --   08/10/21 2345 -- -- -- 74 -- 97 % --   08/10/21 2330 -- -- -- 73 -- 97 % --   08/10/21 2315 -- -- -- 71 -- 96 % --   08/10/21 2300 -- -- -- 71 24 96 % --   08/10/21 2245 -- -- -- 71 -- 95 % --   08/10/21 2230 -- -- -- 70 -- 95 % --   08/10/21 2215 -- -- -- 70 -- 94 % --   08/10/21 2200 -- -- -- 68 24 95 % --   08/10/21 2145 -- -- -- 68 -- 96 % --   08/10/21 2130 -- -- -- 68 -- 96 % --   08/10/21 2115 -- -- -- 66 -- 95 % --   08/10/21 2100 -- -- -- 67 24 96 % --   08/10/21 2045 -- -- -- 68 -- 96 % --   08/10/21 2030 -- -- -- 66 -- 95 % --   08/10/21 2015 -- -- -- 69 -- 94 % --   08/10/21 2000 -- 99.3  F (37.4  C) Axillary 67 24 98 % --   08/10/21 1945 -- -- -- 71 -- 95 % --   08/10/21 1930 -- -- -- 72 -- -- --   08/10/21 1915 -- -- -- 70 -- -- --   08/10/21 1900 -- -- -- 72 -- 92 % --   08/10/21 1845 -- -- -- 72 -- -- --   08/10/21 1830 -- -- -- 75 -- -- --   08/10/21 1815 -- -- -- 72 -- -- --   08/10/21 1800 -- -- -- 72 24 93 % --   08/10/21 1745 -- -- -- 70 -- -- --   08/10/21 1730 -- -- -- 70 -- -- --   08/10/21 1715 -- -- -- 68 -- -- --     Ranges for vital signs:  Temp:  [98.1  F (36.7  C)-102.5  F (39.2  C)] 102.5  F (39.2  C)  Pulse:  [63-81] 79  Resp:  [22-26] 24  BP: (140)/(73) 140/73  MAP:  [59 mmHg-82 mmHg] 69 mmHg  Arterial Line BP: ()/(36-61) 114/45  FiO2 (%):  [75 %-100 %] 95 %  SpO2:  [88 %-98 %] 92 %  Vitals:    08/09/21 0000 08/10/21 0400 08/11/21 0400   Weight: 73.3 kg (161 lb 9.6 oz) 73.1 kg (161  lb 2.5 oz) 75 kg (165 lb 5.5 oz)       Physical Examination:  GENERAL:  well-developed, ill appearing, intubated and sedated in ICU bed  HEAD:  Head is normocephalic, atraumatic   EYES:  Eyes have anicteric sclerae without conjunctival injection   ENT:  Oropharynx is moist without exudates or ulcers. ETT +  NECK:  No cervical lymphadenopathy  LUNGS:  Mechanically ventilated, coarse breath sounds, decreased throughout  CARDIOVASCULAR:  Regular rate and rhythm with no murmurs, gallops or rubs.  ABDOMEN:  Normal bowel sounds, soft, nontender. No appreciable hepatosplenomegaly.  SKIN:  No acute rashes.  Line in place without any surrounding erythema or exudate.  NEUROLOGIC:  Intubated and sedated, unable to assess         Laboratory Data:     Absolute CD4, Lillian T Cells   Date Value Ref Range Status   08/07/2021 7 (L) 441-2,156 cells/uL Final       Inflammatory Markers    Recent Labs   Lab Test 08/06/21  1354   .0*       Metabolic Studies       Recent Labs   Lab Test 08/11/21  1659 08/11/21  0425 08/10/21  1650 08/07/21  1650 08/07/21  1351 08/06/21 2016 08/06/21  1706 08/06/21  1354 08/06/21  1354 02/28/20  1308 02/28/20  0341   NA  --  146* 146*   < >  --    < > 139  --  136  136   < > 137   POTASSIUM  --  3.7 3.2*   < >  --    < > 5.2  --  5.7*  5.7*   < > 5.0   CHLORIDE  --  109 109   < >  --    < > 112*  --  112*  112*   < > 102   CO2  --  25 28   < >  --    < > 16*  --  13*  13*   < > 22   ANIONGAP  --  12 9   < >  --    < > 11  --  11  11   < > 13   BUN  --  105* 97*   < >  --    < > 52*  --  48*  48*   < > 65*   CR  --  2.20* 2.23*   < >  --    < > 3.05*  --  2.89*  2.89*   < > 11.90*   GFRESTIMATED  --  35* 34*   < >  --    < > 24*  --  25*  25*   < > 5*   * 195* 116*   < >  --    < > 92  99  --  76  76   < > 92   A1C  --   --   --   --   --   --   --   --   --   --  5.2   ANTWAN  --  9.0 9.1   < >  --    < > 9.0  --  9.2  9.2   < > 7.7*   PHOS  --  6.1*  --    < >  --    < > 6.3*  --   6.8*   < >  --    MAG  --  2.8*  --    < >  --    < > 2.0  --  2.0   < >  --    LACT  --   --   --   --   --   --  1.2  --  2.1*  --   --    PCAL  --   --   --   --   --   --   --   --  9.31*  9.16*  --   --    FGTL  --   --   --   --  257  --  113   < >  --   --   --     < > = values in this interval not displayed.       Hepatic Studies    Recent Labs   Lab Test 08/09/21  0353 08/08/21  0958 08/08/21  0333 08/07/21  0330 08/06/21  1706   BILITOTAL 0.6  --  0.6 0.7 0.4   DBIL  --   --  0.2  --   --    ALKPHOS 62  --  52 58 71   PROTTOTAL 5.9*  --  5.9* 6.2* 5.4*   ALBUMIN 2.6*  --  2.9* 2.7* 1.3*   AST 27  --  21 27 21   ALT 20  --  15 16 16   LDH  --  250*  --   --  290*       Hematology Studies      Recent Labs   Lab Test 08/11/21  0425 08/10/21  0324 08/09/21  0353 08/08/21  2208 08/08/21  0333 08/07/21  1350 05/05/20  0000 03/16/20  0807 03/13/20  0735   WBC 15.6* 8.8 7.6 5.9 5.8 6.4   < > 9.6 12.1*   ANEU  --   --   --   --   --   --   --  8.4* 10.7*   ALYM  --   --   --   --   --   --   --  0.2* 0.3*   TAHIR  --   --   --   --   --   --   --  0.6 0.6   AEOS  --   --   --   --   --   --   --  0.2 0.4   HGB 7.3* 7.6* 7.6* 7.9* 6.8* 7.1*   < > 9.0* 6.7*   HCT 25.5* 26.2* 25.3* 26.4* 22.6* 23.7*   < > 27.6* 20.3*    219 213 223 200 238   < > 309 357    < > = values in this interval not displayed.       Clotting Studies    Recent Labs   Lab Test 04/03/20  0730 03/05/20  1327 02/28/20  0341 01/26/17  0647   INR 1.21* 1.18* 1.11 0.99   PTT  --  30 30 29       Iron Testing    Recent Labs   Lab Test 08/11/21  0425 08/08/21  0333 08/07/21  2106 05/18/21  0000 01/05/21  0000 06/29/20  0914 03/05/20  1327 03/04/20  1018 03/03/20  0729   IRON  --   --   --   --   --  163  --   --  139   FEB  --   --   --   --   --  277  --   --  142*   IRONSAT  --   --   --   --   --  59*  --   --  98*   YOLIS  --   --   --  2,884  --  2,163*  --   --  3,397*    93  --   --    < >  --  91  --  91   FOLIC  --   --   --   --   --    --  23.8  --   --    B12  --   --   --   --   --   --  1,407*  --   --    HAPT  --   --  309*  --   --   --  184  --   --    RETP  --  1.3  --   --   --   --  0.4*   < >  --    RETICABSCT  --  0.030  --   --   --   --  10.9*   < >  --     < > = values in this interval not displayed.       Arterial Blood Gas Testing    Recent Labs   Lab Test 08/11/21  1350 08/11/21  1119 08/11/21  0424 08/10/21  2105 08/10/21  1344   PH 7.28* 7.30* 7.32* 7.32* 7.35   PCO2 57* 56* 51* 53* 51*   PO2 74* 62* 89 81 79*   HCO3 27 27 26 27 29*   O2PER 100 95 75 85 85        Urine Studies     Recent Labs   Lab Test 08/10/21  1301 03/16/20  0940 03/12/20  1125   URINEPH 5.0 6.0 7.0   NITRITE Negative Negative Negative   LEUKEST Negative Small* Trace*   WBCU <1 18* 7*       Medication levels    Recent Labs   Lab Test 08/08/21  0333 08/07/21  2106 12/08/20  1408 09/15/20  1400 06/29/20  0914   VANCOMYCIN  --  12.0  --    < >  --    TACROL  --   --   --   --  10.3   MPACID 2.73  --   --   --   --    57821  --   --  8.2*  --   --    MPAG 126.0*  --   --   --   --    20791  --   --  97  --   --     < > = values in this interval not displayed.       Body fluid stats    Recent Labs   Lab Test 08/06/21  1655   FCOL Colorless   FAPR Clear   FWBC 428   FNEU 56   FLYM 2   FMONO 42       Microbiology:    Last Culture results with specimen source  Culture   Date Value Ref Range Status   08/10/2021 No growth after 1 day  Preliminary   08/10/2021 No growth after 1 day  Preliminary   08/10/2021 No growth, less than 1 day  Preliminary   08/06/2021 1+ Normal pat  Final   08/06/2021 Blastomyces dermatitidis (A)  Preliminary     Comment:     Identified by DNA Probe  Testing performed by the [facility], [address] [place] [address]     08/06/2021 Culture negative, monitoring continues  Preliminary   08/06/2021 No Growth  Final   08/06/2021 3+ Normal pat  Final     Culture Micro   Date Value Ref Range Status   03/16/2020 No  growth  Final   03/13/2020 Canceled, Test credited  Specimen not received    Final   03/24/2005 No growth  Final   03/24/2005   Final    Canceled, Test credited Test canceled by PCU/Clinic   03/11/2005 No growth  Final   03/09/2005   Final    Single colony Coagulase negative Staphylococcus Susceptibility testing not     Comment:      routinely done   03/09/2005 No growth  Final   03/09/2005 No growth  Final   03/09/2005 No growth  Final    Specimen Description   Date Value Ref Range Status   03/16/2020 Unspecified Urine  Final   03/13/2020 Unknown  Final   03/24/2005 Blood Right Arm  Final   03/24/2005 Blood  Final   03/11/2005 Blood Arterial blood DIALYSIS  Final   03/09/2005 Catheter tip  CENTRAL LINE  Final   03/09/2005 Arm  Final   03/09/2005 White port  Final   03/09/2005 Unspecified Urine  Final        Last check of C difficile  C Difficile Toxin B by PCR   Date Value Ref Range Status   08/11/2021 Negative Negative Final     Comment:     A negative result does not exclude actual disease due to C. difficile and may be due to improper collection, handling and storage of the specimen or the number of organisms in the specimen is below the detection limit of the assay.       Infection Studies to assess Diarrhea No lab results found.    Virology:  Coronavirus-19 testing    Recent Labs   Lab Test 08/06/21  0944   SCV2R Negative       Respiratory virus (non-coronavirus-19) testing    Recent Labs   Lab Test 08/06/21  1804   IFLUA Not Detected   FLUAH1 Not Detected   HA3136 Not Detected   FLUAH3 Not Detected   IFLUB Not Detected   PIV1 Not Detected   PIV2 Not Detected   PIV3 Not Detected   PIV4 Not Detected   RSVA Not Detected   RSVB Not Detected   HMPV Not Detected   ADENOV Not Detected   LUJAN Not Detected         BK Virus PCR Quant Result (External)   Date Value Ref Range Status   07/26/2021 None Detected None Detected IU/mL Final   06/22/2021 None Detected None Detected IU/mL Final   05/18/2021 None Detected None  Detected IU/mL Final   04/20/2021 None Detected None Detected IU/mL Final   02/23/2021 None Detected None Detected IU/ml Final   01/05/2021 None Detected None Detected IU/mL Final   12/08/2020 None Detected None Detected Final   11/24/2020 None Detected None Detected Final   10/27/2020 None Detected None Detected IU/mL Final   08/31/2020 None Detected None Detected Final   08/10/2020 None Detected None Detected IU/mL Final   07/20/2020 None Detected None Detected IU/mL Final   06/22/2020 None Detected None Detected Final   06/01/2020 None Detected None Detected IU/mL Final   05/04/2020 None Detected None Detected IU/mL Final   04/27/2020 None Detected None Detected IU/mL Final   04/16/2020 None Detected None Detected IU/mL Final     BK Virus Result   Date Value Ref Range Status   01/26/2017 BK Virus DNA Not Detected BKNEG copies/mL Final       Imaging:  Recent Results (from the past 48 hour(s))   XR Abdomen Port 1 View    Narrative    Exam: XR ABDOMEN PORT 1 VIEWS, 8/10/2021 1:56 PM    Indication: Post-pyloric tube placement    Comparison: 8/9/2021, 8/6/2021    Findings:   Portable supine AP view of the lower chest and upper abdomen was  obtained. The gastric tube tip and sidehole project over the distal  stomach. The feeding tube tip projects over the central upper abdomen.  Nonobstructive bowel gas pattern in the visualized upper abdomen. No  pneumatosis or portal venous gas. Diffuse pulmonary opacities are  grossly stable.      Impression    Impression:   The feeding tube tip projects over the upper central abdomen, possibly  within the distal stomach or third portion of the duodenum. Consider  lateral view for improved localization.    TESFAYE LECHUGA MD         SYSTEM ID:  [id number]   XR Abdomen Port 1 View    Narrative    EXAMINATION:  XR ABDOMEN PORT 1 VIEWS 8/11/2021 11:37 AM     COMPARISON: Abdominal x-ray, 8/10/2021.    HISTORY: confirm post pyloric placement.    FINDINGS: Frontal view of the abdomen  demonstrated a paucity of bowel  gas. The feeding tube coiled in the stomach with the tip projecting  over the expected position of the pylorus. The sidehole and the tip of  the gastric tube project over the body of the stomach. Femoral  approach vascular catheters with tips projecting over the external  iliac and common iliac vessel location. Seminal vesicle calcification.  Multiple surgical clips are also visible in the lower abdomen/pelvis.  Vascular calcification No abnormally dilated loops of bowel.  No  pneumatosis or portal venous gas. Bilateral diffuse opacities at lung  bases, unchanged compared to same day chest x-ray.       Impression    IMPRESSION:    1. The feeding tube is coiled in the stomach with the tip projecting  over the expected position of distal stomach, consistent with  prepyloric position.   2. Enteric tube tip and sidehole projects over the stomach.    I have personally reviewed the examination and initial interpretation  and I agree with the findings.    SAMIA ACEVES MD         SYSTEM ID:  [id number]   XR Chest Port 1 View    Narrative    Portable chest    INDICATION: Compare to previous    COMPARISON: 8/19/2021     FINDINGS: Size appears normal. Diffuse organizing opacifications are  noted in the upper lobes predominantly but also in the mid to lower  lungs. Costophrenic angles are reasonably sharp. NG tube and feeding  tube both progressed beyond the inferior margin of the image. Apparent  endotracheal tube is somewhat obscured by the other overlying tubing  there is no obvious mainstem bronchus intubation.      Impression    IMPRESSION: Diffuse possible organizing pneumonia versus edema. Cannot  exclude ARDS.    RAMA CUNNINGHAM MD         SYSTEM ID:  [id number]   XR Abdomen Port 1 View    Narrative    XR ABDOMEN PORT 1 VIEWS  8/11/2021 3:12 PM      HISTORY: post-pyloric tube placement    COMPARISON: 8/11/2021, 8/10/2021, 8/9/2021    FINDINGS: AP view of the abdomen. Feeding tube tip  remains coiled over  the expected location of the distal stomach. Gastric tube sidehole and  tip projecting over the stomach. Right lower extremity CVCs are  stable. Nonobstructive bowel gas pattern. Surgical clips project over  the pelvis. Interstitial opacities in the lung bases.      Impression    IMPRESSION: Feeding tube remains coiled over the expected location of  the distal stomach.    I have personally reviewed the examination and initial interpretation  and I agree with the findings.    ORESTES LONGORIA,          SYSTEM ID:  [id number]

## 2021-08-11 NOTE — PROGRESS NOTES
CLINICAL NUTRITION SERVICES - BRIEF NOTE      Reason for RD note: Provider request for FT repo.    New Findings/Chart Review:  -FT retrograded to gastric position per AXR this AM.   -Pt still requiring proning for management of ARDS.   - (H, uptrended), K+ 3.7 (WNL), Phos 6.1 (H, uptrended)    Interventions:  -FT repo by writer unsuccessful  -Discussion with MICU - ok for trophic feeds while prone. Writer recommended reverse Trendelenburg of at least 10 degrees while proned. Writer recommended Rads repo next time since Cortrak failed x3 attempts.  -Discussed with Pharmacy resident the itraconazole dosing. Updated TF order to mention holding TF 30 minutes before and 1.5 hours after itraconazole admin.   -Updated EN support for feeds while proned: Osmolite 1.5 @ 10 mL/hr via NGT. Maintaining 1 protein packet TID.     Future/Additional Recommendations:  1. Release FT order to Radiology for repositioning once pt is supine.     2. Once supine and/or post-pyloric access obtained, rec:  Osmolite 1.5 Jd @ goal of  70ml/hr  (1260 ml/day) x 18 hours = 1890 kcals (29 kcal/kg), 79 g PRO (1.2 g pro/kg), 915 ml free H20, 257 g CHO, and 0 g fiber daily.  --If BUN downtrends <100, recommend 1 pkt ProSource TID to provide 2010 kcal (30 kcal/kg) and 112 g protein (1.7 g pro/kg).    3. Monitor need for renal formula if K+ becomes elevated.    Nutrition will continue to follow per protocol.    Bina Solitario, RD, LD  Pager: [phone]

## 2021-08-11 NOTE — PLAN OF CARE
Major Shift Events: Tolerated in prone position during the night . Sedated with versed 2 mg/hr,propofol 45mcg and fentanyl 150mcg  . Needed prn boluses of fentanyl and versed with turning .   RASS -4 . Coughs with turning . Continued to have bilateral nose bleeding .   Hemodynamically stable. soft MAP, able to maintain map > 65 . Not in any pressors . Afebrile. Tele SR 60-70 with PACs . Tolerated vent setting 75%/350/26/14 . Tolerated TF 2@ goal 50cc/hr with free water 30cc/hr via ND . TF Held 30 mins before and 1.5hrs after Itraconazole. Rectal tube in place had total 250cc output during the shift. UOP 30cc-45cc/hr . Hep gtt @1600unit/hr . Hep 10a therapeutic level . 2nd recheck will be at 1300.   Plan: continue to monitor and report any concerns.   For vital signs and complete assessments, please see documentation flowsheets.

## 2021-08-11 NOTE — PLAN OF CARE
ICU End of Shift Summary. See flowsheets for vital signs and detailed assessment.      Neuro: Sedated, RASS -4, pupils 2mm bilateral sluggish.  Cardiac: Sinus 70-80s occasional PAC. MAP goal above 65.murmer auscultated,Patient requiring levo.   Resp: %, RR, 24, , PEEP 16. O2 goal above 89%. Full strength flolan. vec gtt started at 1900. LS: in/ex wheezing, bilateral rhonchi, coarse. Tolerated supination between 5282-2545  GI: Rectal tube in place, OG, NG in place. increasing residuals, notified team  : Stein in place. Bumex given.  IV: CVC R femoral triple lumen, hep, norepi, fent, versed, vecuronium. R femoral A line.

## 2021-08-12 NOTE — PROGRESS NOTES
Respiratory Care Note     Patient was extubated to room air for comfort care.    Respiratory Care will continue to follow and monitor this patient.  Ibrahima Christianson, RT, LRT, RRT, RRT-NPS

## 2021-08-12 NOTE — PROGRESS NOTES
Care Management Follow Up    Length of Stay (days): 6     Concerns to be Addressed: Family support      Patient plan of care discussed at interdisciplinary rounds: Yes    Additional Information:  SW met with pt's daughter (Kiley) and Kiley's mother outside pt's room. Kiley had questions about next steps relating to  homes and what they needed to do. SW provided information on choosing a  home and potential burial assistance through the [facility]. SW encouraged Kiley to engage her support system to assist her. SW provided validation to Kiley and Kiley's mom.     Marie Baker, LON, LGSW  ICU   [facility]  Ph: [phone]  [phone]

## 2021-08-12 NOTE — PLAN OF CARE
ICU End of Shift Summary. See flowsheets for vital signs and detailed assessment.    Changes this shift: Sedated and paralyzed, RASS -5. Sinus rhythm with frequent ectopy noted to have wide QRS complexes. Vent settings now RR 28, , PEEP 16, FiO2 85% with full strength flolan. Minimal to no output from rectal tube. Levo weaned to 0.04, vecuronium maintained at 0.8 - train of four at 0 twitches out of 4 pulses. Daughter came to visit after receiving call from team today at 1700. Daughter plans to return tomorrow morning with mom or aunt to discuss comfort cares and code status.     Plan: Continue plan of care until MICU team talks with family about possible transition to comfort cares.

## 2021-08-12 NOTE — PLAN OF CARE
ICU End of Shift Summary. See flowsheets for vital signs and detailed assessment.    Changes this shift:   Patient requiring increased ventilator settings in am. Unable to supinate due to respiratory status. New runs of complete heart block, notified team. Patient requiring increased blood pressure support in am. Patient code status change to DNR. Patient transitioned to comfort cares at 1615. Patient extubated at 1630. Time of death at 1645. All lines removed from patient and postmortem cares completed. Contacted Organ/tissue donation within an hour of time of death. Patient belongings sent with family. [place] [facility] [place]

## 2021-08-12 NOTE — PROGRESS NOTES
[facility]  Transplant Infectious Disease Progress Note     Patient:  Jeremiah Cruz, Date of birth 1975, Medical record number [id number]  Date of Visit:  08/12/2021         Assessment and Recommendations:   Recommendations:  1. In this patient with severe pulmonary Blastomycosis, recommend that he be maintained on Ambisome 5mg/kg q24h IV for at least the first 1-2 weeks until clinical improvement, at which time he can be transitioned to an azole (Itraconazole preferably, alternatively Voriconazole can be used)  2. Please monitor renal function and electrolytes while on Ambisome  3. With a view to transition to azole in the coming days (only when clinically improved), have started loading with Itraconazole in the next 24-48 hours. Dosing: Itraconazole solution 200mg PO q8h x 3 days, followed by 200mg q12h PO. Will check levels in 7 days after starting (check on 8/17)  4. Follow up previously sent serology testing:  - BAL Histoplasma antigen > upper limit of normal  - Urine and serum Histoplasma antigen strongly positive >20, but has cross-reactivity with Blasto  5. Await BAL cultures/results including - AFB cultures, Nocardia PCR and culture, Legionella culture.   BAL fungal culture with Blastomyces and Aspergillus Galactomannan positive at 0.80  6. BAL cytology with fungal elements with morphology resembling Cryptococcus, however lower suspicion (explanation below).  7. Await results of fungal blood culture and BAL Cryptococcus PCR testing from [facility]. No indication to add Fluctyosine  8. In light of febrile episode, await results of repeat blood, urine and sputum cultures. Continue Meropenem  9. Continue Dapsone for PJP ppx     Transplant Infectious Disease will continue to follow with you.      Assessment:  45 y/o gentleman, PMHx Alport syndrome c/b ESRD s/p DDKT (x3, 1988, 2005 and now again in 2/2020) (CMV +/-, EBV unknown/+), thrombocytopenia, anemia of  chronic disease, secondary renal hyperparathyroidism, HTN, CML (in remission since 2016) and GERD who presents as a transfer from [facility] for hypoxic respiratory failure     #Hypoxic Respiratory Failure:  #Circulatory Shock:  #KOH Prep with broad based budding yeast, morphology consistent with possible Blastomyces dermatiditis:  #Severe Pulmonary Blastomycosis:  Patient presented to OSH ER with complaints of two weeks of worsening cough and progressive SOB. He was noted to be hypoxic on arrival to 80s on room air, intubated prior to transfer. Although initial improvement in ventilation, respiratory status has once again declined and he is now on FiO2 95% and PEEP 16 despite proning  CXR and CT at OSH also showed bilateral nodular opacities, however is not available for interpretation. Diffuse nodular opacities on imaging in a patient that presents in a subacute/progressive manner raises concerns for atypical infections including endemic mycoses. KOH prep from BAL showing broad based budding yeast, morphology consistent with Blastomyces. This fits with clinical presentation and radiographic appearance. Now fungal cultures from BAL also growing Blastomyces. Although BAL aspergillus galactomannan weakly positive (0.8), this is a test that can have cross-reactivity with Blastomyces infection. Furthermore, serum Histo antigen >upper limit of normal and urine Histoplasma antigen was strongly positive at >20 (however this has cross reactivity to Blastomyces and therefore expect to have it be positive). Have sent additional serology for workup. Would recommend continuing Amphotericin B given severe pulmonary disease in immunocompromised patient prior to transition to azole.   Lastly, radiographic appearance not consistent with typical bacterial pneumonia. BAL cultures have remained negative.     #Fever:  Over the last 48 hours, has been noted to have worsening respiratory status again, now on FIO2 85%, PEEP 16 and  "requiring proning and paralysis. Now continues to be febrile. He is currently being treated for severe pulmonary Blastomycosis with bacterial cultures being negative to date. However fever might indicate ongoing infection with Blastomyces vs superimposed bacterial infection/VAP. Recommend repeating cultures and reinitiating antibiotic coverage based on clinical status and repeat fevers     #BAL Cytology with fungal elements resembling Cryptococcus:  Patient underwent BAL on 8/6, cultures with Blastomyces. Cytology report mentioned \"GMS special stain is performed and fungal organisms are present, suggestive of Cryptococcus\". Patient is extremely immunocompromised and could have more than one infectious process, however his serum Crypto antigen was negative on 8/7. Discussed case with Pathologist, who mentioned seeing fungal elements and that diagnosis was microscopic and not on cytology. With a negative serum Crypto antigen, likelihood of pulmonary Crypto very low, especially for someone with severe pulmonary disease. Will repeat serum antigen testing and send for BAL PCR. No indication to add empiric Flucytosine     #GPC in blood culture from OSH:  OSH obtained blood cultures at time of presentation. 1/2 cultures with GPCs - now identified as Staph hominis (CoNS). With only 1/2 positive culture, likely skin contaminant. Other blood Cx from 8/5, as well as blood culture from  from 8/6 remain negative. Patient presented in circulatory shock but has been weaned off pressors within 24 hours of arrival. With persistent negative cultures and hemodynamic stability, recommend stopping Vancomycin    #Renal Transplant recipient:  History of Alport syndrome c/b ESRD s/p DDKT (x3, 1988, 2005 and now again in 2/2020) (CMV +/-, EBV unknown/+)     Other Infectious Disease issues include:  - QTc interval: 423 msec 8/6/21  - Bacterial prophylaxis: On Meropenem treatment  - Pneumocystis prophylaxis: Dapsone  - Viral serostatus & " prophylaxis: CMV +/-, EBV unknown/+, completed Valcyte ppx, please monitor CMV levels  - Fungal prophylaxis: On Ambisome, Itraconazole  - Immunization status: Has not received COVID vaccine  - Gamma globulin status: Unknown  - Isolation status: Good hand hygiene, standard isolation.      TERESA Waller  Staff Physician, Infectious Diseases  Pager [phone]         Interval History:   Seen and examined. Patient remains critically ill. Febrile overnight again to 102.5F. Proned, no plans to supinate today. Worsening vent settings, up to PEEP 16 and FiO2 85%. On 2 pressors - Vasopressin and NE at time of exam. Was evaluated by ECMO team, not a candidate at present    Review of labs - Creatinine now appears to be worsening - 2.62 today. WBC count increasing, 20.6k today  Blood Cx from 8/10 remain negative, respiratory cultures with broad based budding yeast/Blasto on gram stain. C.diff negative 8/10, repeat CrAg negative 8/11 8/6 BAL studies -  3+ Broad based budding yeast, morphology consistent with possible Blastomyces dermatiditis on KOH smear.  BAL aspergillus galactomannan positive at 0.80, serum Aspergillus GM 0.43 (negative)  BDG positive at 113 from 8/6 and 257 from 8/7  Fungal cultures from BAL now growing Blastomyces, confirmed by DNA probe  Urine Histoplasma antigen >20.0. Serum Histoplasma antigen above the limit of quantification  Remainder of studies remain negative including BAL PJP and CMV. Bronch AFB Cx negative to date. Additional serologies pending      Transplants:  2/28/2020 (Kidney), 12/7/1988 (Kidney), 3/2/2005 (Kidney), Postoperative day:  531.  Coordinator Jody García    Review of Systems:  Unable to obtain as patient intubated and sedated       History of presenting illness:   45 y/o gentleman, PMHx Alport syndrome c/b ESRD s/p DDKT (x3, 1988, 2005 and now again in 2/2020) (CMV +/-, EBV unknown/+), thrombocytopenia, anemia of chronic disease, secondary renal  "hyperparathyroidism, HTN, CML (in remission since 2016) and GERD who presents as a transfer from [facility] for hypoxic respiratory failure     Patient intubated and sedated and no family members at bedside. History obtained from discussion with primary team and chart review. Patient originally presented to [place] ER with 2 weeks of dry cough and worsening dyspnea. He was reported treated for a sinus infection in early July with Azithromycin which had led to an improvement in his symptoms. However, starting July 21, his symptoms worsened     On presentation to the ER, he was noted to be hypoxic, initially 82-85% on room air. Was placed on O2 by nasal cannula, initially on 4L then escalated to 6L. CXR obtained at OSH ER showed bilateral infiltrates. Was not able to obtain CT PE given concern about renal function. He had blood cultures collected at OSH, and was given Levofloxacin (given reported allergy to Penicillins and cephalosporins). However, while at the ER, his respiratory function worsened and he was first on BIPAP and then electively intubated prior to transfer. A CT scan at the OSH showed bilateral opacities consistent with \"atypical pneumonia\"     At the time of transfer here, he clinically worsened, was in circulatory shock requiring 3 pressors. Was started on Vancomycin, Meropenem and Micafungin. A CXR at [facility] showed \"Bilateral diffuse nodular opacities, left greater than is right, possibly a combination of edema and/or infection\". Given clinical condition, he underwent bronchoscopy with BAL on 8/6. A KOH smear performed on the bronchoscopy showed 3+ Broad based budding yeast, morphology consistent with possible Blastomyces dermatiditis     He was started on Ambisome this morning and ID consulted. Micafungin was stopped  By the time he was seen, he had been weaned off all pressors. His vent settings had also slightly improved - down to PEEP 8/50% FiO2 from PEEP 10/80% FiO2 on arrival           " Current Medications & Allergies:       sodium chloride 0.9%  250 mL Intravenous Q24H     acetaminophen  650 mg Oral or Feeding Tube Q24H     dextrose 5% water  10-20 mL Intravenous Q24H    And     amphotericin B liposome (AMBISOME) intermittent infusion  5 mg/kg (Dosing Weight) Intravenous Q24H    And     dextrose 5% water  10-20 mL Intravenous Q24H     artificial tears   Both Eyes Q8H     atorvastatin  10 mg Oral Daily     dapsone  50 mg Oral or Feeding Tube Daily     diphenhydrAMINE  25 mg Oral Q24H    Or     diphenhydrAMINE  25 mg Intravenous Q24H     [Held by provider] furosemide  40 mg Intravenous Q6H     hydrocortisone sodium succinate PF  50 mg Intravenous Q8H     insulin aspart  1-6 Units Subcutaneous Q4H     itraconazole  200 mg Oral Q8H    Followed by     [START ON 8/17/2021] itraconazole  200 mg Oral BID     loratadine  10 mg Oral Daily     meropenem  1 g Intravenous Q12H     metoclopramide  10 mg Intravenous BID     multivitamins w/minerals  15 mL Per Feeding Tube Daily     pantoprazole  40 mg Oral QAM AC     potassium chloride  20 mEq Oral BID     protein modular  1 packet Per Feeding Tube TID       Infusions/Drips:    bumetanide 1 mg/hr (08/12/21 1300)     dextrose       EPINEPHrine       epoprostenol (VELETRI) 20 mcg/mL in sterile water inhalation solution 20 ng/kg/min (08/12/21 0525)     fentaNYL 200 mcg/hr (08/12/21 1300)     heparin 1,600 Units/hr (08/12/21 1300)     midazolam 8 mg/hr (08/12/21 1200)     norepinephrine 0.22 mcg/kg/min (08/12/21 1300)     propofol (DIPRIVAN) infusion 45 mcg/kg/min (08/12/21 1319)     vasopressin Stopped (08/12/21 1147)     vecuronium (NORCURON) infusion ADULT Stopped (08/12/21 1310)       Allergies   Allergen Reactions     Blood Transfusion Related (Informational Only) Other (See Comments)     Patient has a history of a clinically significant antibody against RBC antigens.  A delay in compatible RBCs may occur.     Cephalosporins Other (See Comments) and Rash      fever     Compazine [Prochlorperazine]      Gammagard [Immune Globulin] Other (See Comments)     Ed got spinal meningitis and MD stated to never get again for fear of death.       Vancomycin      Per care everywhere patient has Red Man's syndrome     Penicillins Hives and Rash     Per OSH records            Physical Exam:   Vitals were reviewed.  All vitals stable.  Patient Vitals for the past 24 hrs:   Temp Temp src Pulse Resp SpO2   08/12/21 1310 -- -- 88 -- 92 %   08/12/21 1300 -- -- 90 28 92 %   08/12/21 1245 -- -- 89 -- 92 %   08/12/21 1230 -- -- 89 -- 91 %   08/12/21 1220 -- -- -- -- (!) 89 %   08/12/21 1215 -- -- 88 -- 90 %   08/12/21 1200 99.1  F (37.3  C) Oral 85 28 91 %   08/12/21 1146 -- -- 80 -- 93 %   08/12/21 1145 -- -- 81 -- 93 %   08/12/21 1130 -- -- 84 -- 92 %   08/12/21 1115 -- -- 85 -- 93 %   08/12/21 1100 -- -- 86 28 92 %   08/12/21 1045 -- -- 82 -- 91 %   08/12/21 1030 -- -- 81 -- 92 %   08/12/21 1015 -- -- 79 -- 92 %   08/12/21 1013 -- -- 80 -- 92 %   08/12/21 1000 -- -- 77 28 91 %   08/12/21 0945 -- -- 76 -- 92 %   08/12/21 0940 -- -- 78 -- 93 %   08/12/21 0936 -- -- 77 -- 93 %   08/12/21 0930 -- -- 78 -- 93 %   08/12/21 0920 -- -- 76 -- 93 %   08/12/21 0915 -- -- 77 -- 93 %   08/12/21 0910 -- -- 75 -- 92 %   08/12/21 0909 -- -- 75 -- 92 %   08/12/21 0908 -- -- 75 -- 92 %   08/12/21 0900 -- -- 74 28 92 %   08/12/21 0855 -- -- 74 -- 90 %   08/12/21 0845 -- -- 71 -- 90 %   08/12/21 0830 -- -- 74 -- 90 %   08/12/21 0815 -- -- 71 -- 93 %   08/12/21 0800 100.1  F (37.8  C) Oral 63 28 97 %   08/12/21 0745 -- -- 75 -- 97 %   08/12/21 0730 -- -- 68 -- 96 %   08/12/21 0715 -- -- 70 -- 96 %   08/12/21 0700 -- -- 67 28 97 %   08/12/21 0645 -- -- -- -- 95 %   08/12/21 0615 -- -- 69 -- 94 %   08/12/21 0600 -- -- 66 28 96 %   08/12/21 0545 -- -- 66 -- 96 %   08/12/21 0530 -- -- 64 -- 96 %   08/12/21 0515 -- -- 65 -- 96 %   08/12/21 0500 -- -- 62 28 96 %   08/12/21 0445 -- -- 65 -- 96 %   08/12/21 0430 -- --  67 -- 96 %   08/12/21 0400 99.5  F (37.5  C) Axillary 118 28 94 %   08/12/21 0345 -- -- 71 -- 95 %   08/12/21 0330 -- -- 71 -- 94 %   08/12/21 0315 -- -- 70 -- 95 %   08/12/21 0245 -- -- 72 -- 94 %   08/12/21 0230 -- -- 71 -- 95 %   08/12/21 0215 -- -- 66 -- 96 %   08/12/21 0200 -- -- 66 28 95 %   08/12/21 0145 -- -- 66 -- 95 %   08/12/21 0130 -- -- 68 -- 95 %   08/12/21 0118 -- -- 68 -- 95 %   08/12/21 0115 -- -- 69 -- 95 %   08/12/21 0100 -- -- 68 26 94 %   08/12/21 0045 -- -- 77 -- 94 %   08/12/21 0030 -- -- 70 -- 93 %   08/12/21 0028 -- -- 72 -- 94 %   08/12/21 0015 -- -- 73 26 92 %   08/12/21 0000 98.8  F (37.1  C) Axillary 71 26 93 %   08/11/21 2345 -- -- 72 -- 93 %   08/11/21 2330 -- -- 73 -- 94 %   08/11/21 2315 -- -- 73 -- 94 %   08/11/21 2300 -- -- 73 26 93 %   08/11/21 2241 -- -- 73 -- 93 %   08/11/21 2230 -- -- 73 -- (!) 88 %   08/11/21 2215 -- -- 69 -- 96 %   08/11/21 2200 -- -- 69 24 95 %   08/11/21 2145 -- -- 71 -- 95 %   08/11/21 2136 -- -- 71 -- 95 %   08/11/21 2130 -- -- 68 -- 95 %   08/11/21 2115 -- -- 69 -- 95 %   08/11/21 2111 -- -- 70 -- 94 %   08/11/21 2100 -- -- 70 24 95 %   08/11/21 2045 -- -- 71 -- 95 %   08/11/21 2030 -- -- 75 -- 96 %   08/11/21 2015 -- -- 72 -- 97 %   08/11/21 2000 100.3  F (37.9  C) Axillary 74 24 97 %   08/11/21 1945 -- -- 73 -- 97 %   08/11/21 1930 -- -- 73 -- 97 %   08/11/21 1915 -- -- 73 -- 96 %   08/11/21 1900 -- -- 77 -- 95 %   08/11/21 1845 -- -- -- -- 94 %   08/11/21 1830 -- -- 77 -- 93 %   08/11/21 1828 -- -- 77 -- 93 %   08/11/21 1820 -- -- 80 -- 92 %   08/11/21 1815 -- -- 77 -- 92 %   08/11/21 1800 -- -- 79 24 93 %   08/11/21 1745 -- -- 79 -- 93 %   08/11/21 1740 (!) 101.9  F (38.8  C) Oral -- -- --   08/11/21 1730 -- -- 79 -- 92 %   08/11/21 1715 -- -- 79 -- 92 %   08/11/21 1700 -- -- 79 24 92 %   08/11/21 1645 -- -- 77 -- 92 %   08/11/21 1630 -- -- 81 -- 93 %   08/11/21 1615 -- -- 80 -- 92 %   08/11/21 1600 (!) 102.5  F (39.2  C) Oral 79 24 91 %   08/11/21  1545 -- -- 79 -- 91 %   08/11/21 1530 -- -- 78 -- 91 %   08/11/21 1515 -- -- 78 -- 91 %   08/11/21 1500 -- -- 79 24 (!) 88 %   08/11/21 1445 -- -- 75 -- 90 %   08/11/21 1430 -- -- 74 -- 90 %   08/11/21 1415 -- -- 75 -- 91 %   08/11/21 1400 -- -- 77 24 92 %   08/11/21 1345 -- -- 73 -- 93 %     Ranges for vital signs:  Temp:  [98.8  F (37.1  C)-102.5  F (39.2  C)] 99.1  F (37.3  C)  Pulse:  [] 88  Resp:  [24-28] 28  MAP:  [40 mmHg-168 mmHg] 76 mmHg  Arterial Line BP: (103-185)/() 120/53  FiO2 (%):  [80 %-95 %] 85 %  SpO2:  [88 %-97 %] 92 %  Vitals:    08/09/21 0000 08/10/21 0400 08/11/21 0400   Weight: 73.3 kg (161 lb 9.6 oz) 73.1 kg (161 lb 2.5 oz) 75 kg (165 lb 5.5 oz)       Physical Examination:  GENERAL:  well-developed, ill appearing, intubated and sedated in ICU bed, proned and paralyzed  HEAD:  Head is normocephalic, atraumatic   EYES:  Eyes have anicteric sclerae without conjunctival injection   ENT:  Oropharynx is moist without exudates or ulcers. ETT +, nasal bleeding today  NECK:  No cervical lymphadenopathy  LUNGS:  Mechanically ventilated, coarse breath sounds - only able to auscultate posteriorly, decreased throughout  CARDIOVASCULAR:  Tachy, sinus rhythm, per team he has been having heart blocks, unable to auscultate.  ABDOMEN:  Unable to examine due to proning  SKIN:  No acute rashes.  Line in place without any surrounding erythema or exudate.  NEUROLOGIC:  Intubated and sedated, unable to assess         Laboratory Data:     Absolute CD4, Drury T Cells   Date Value Ref Range Status   08/07/2021 7 (L) 441-2,156 cells/uL Final       Inflammatory Markers    Recent Labs   Lab Test 08/06/21  1354   .0*       Metabolic Studies       Recent Labs   Lab Test 08/12/21  1252 08/12/21  1240 08/12/21  1238 08/12/21  0911 08/12/21  0321 08/07/21  1650 08/07/21  1351 08/06/21  2016 08/06/21  1706 08/06/21  1354 08/06/21  1354 02/28/20  1308 02/28/20  0341   NA  --  142  --  145* 145*   < >  --     < > 139  --  136  136   < > 137   POTASSIUM  --  4.2  --  3.4 3.3*   < >  --    < > 5.2  --  5.7*  5.7*   < > 5.0   CHLORIDE  --  106  --  107 108   < >  --    < > 112*  --  112*  112*   < > 102   CO2  --  23  --  26 26   < >  --    < > 16*  --  13*  13*   < > 22   ANIONGAP  --  13  --  12 11   < >  --    < > 11  --  11  11   < > 13   BUN  --  116*  --  115* 112*   < >  --    < > 52*  --  48*  48*   < > 65*   CR  --  2.62*  --  2.57* 2.47*   < >  --    < > 3.05*  --  2.89*  2.89*   < > 11.90*   GFRESTIMATED  --  28*  --  29* 30*   < >  --    < > 24*  --  25*  25*   < > 5*   * 181*  --  139* 151*  138*   < >  --    < > 92  99  --  76  76   < > 92   A1C  --   --   --   --   --   --   --   --   --   --   --   --  5.2   ANTWAN  --  8.6  --  9.0 8.6   < >  --    < > 9.0  --  9.2  9.2   < > 7.7*   PHOS  --   --   --   --  6.2*   < >  --    < > 6.3*  --  6.8*   < >  --    MAG  --   --   --   --  2.7*   < >  --    < > 2.0  --  2.0   < >  --    LACT  --   --  2.0  --   --   --   --   --  1.2  --  2.1*   < >  --    PCAL  --   --   --   --   --   --   --   --   --   --  9.31*  9.16*  --   --    FGTL  --   --   --   --   --   --  257  --  113   < >  --   --   --     < > = values in this interval not displayed.       Hepatic Studies    Recent Labs   Lab Test 08/11/21  7896 08/09/21  0353 08/08/21  0958 08/08/21  0333 08/06/21  1706   BILITOTAL 0.7 0.6  --  0.6 0.4   DBIL  --   --   --  0.2  --    ALKPHOS 65 62  --  52 71   PROTTOTAL 5.6* 5.9*  --  5.9* 5.4*   ALBUMIN 2.1* 2.6*  --  2.9* 1.3*   AST 22 27  --  21 21   ALT 18 20  --  15 16   LDH  --   --  250*  --  290*       Hematology Studies      Recent Labs   Lab Test 08/12/21  0322 08/11/21  0425 08/10/21  0324 08/09/21  0353 08/08/21  2208 08/08/21  0333 05/05/20  0000 03/16/20  0807 03/13/20  0735   WBC 20.6* 15.6* 8.8 7.6 5.9 5.8   < > 9.6 12.1*   ANEU  --   --   --   --   --   --   --  8.4* 10.7*   ALYM  --   --   --   --   --   --   --  0.2* 0.3*   TAHIR   --   --   --   --   --   --   --  0.6 0.6   AEOS  --   --   --   --   --   --   --  0.2 0.4   HGB 7.6* 7.3* 7.6* 7.6* 7.9* 6.8*   < > 9.0* 6.7*   HCT 26.6* 25.5* 26.2* 25.3* 26.4* 22.6*   < > 27.6* 20.3*    253 219 213 223 200   < > 309 357    < > = values in this interval not displayed.       Clotting Studies    Recent Labs   Lab Test 04/03/20  0730 03/05/20  1327 02/28/20  0341 01/26/17  0647   INR 1.21* 1.18* 1.11 0.99   PTT  --  30 30 29       Iron Testing    Recent Labs   Lab Test 08/12/21  0322 08/08/21  0333 08/07/21  2106 05/18/21  0000 01/05/21  0000 06/29/20  0914 03/05/20  1327 03/04/20  1018 03/03/20  0729   IRON  --   --   --   --   --  163  --   --  139   FEB  --   --   --   --   --  277  --   --  142*   IRONSAT  --   --   --   --   --  59*  --   --  98*   YOLIS  --   --   --  2,884  --  2,163*  --   --  3,397*   MCV 98 93  --   --    < >  --  91  --  91   FOLIC  --   --   --   --   --   --  23.8  --   --    B12  --   --   --   --   --   --  1,407*  --   --    HAPT  --   --  309*  --   --   --  184  --   --    RETP  --  1.3  --   --   --   --  0.4*   < >  --    RETICABSCT  --  0.030  --   --   --   --  10.9*   < >  --     < > = values in this interval not displayed.       Arterial Blood Gas Testing    Recent Labs   Lab Test 08/12/21  1238 08/12/21  0321 08/11/21  2356 08/11/21 2011 08/11/21  1719   PH 7.30* 7.34* 7.32* 7.29* 7.29*   PCO2 50* 48* 53* 59* 58*   PO2 61* 88 69* 89 62*   HCO3 25 26 27 28 28   O2PER 90 85 85 90 95        Urine Studies     Recent Labs   Lab Test 08/10/21  1301 03/16/20  0940 03/12/20  1125   URINEPH 5.0 6.0 7.0   NITRITE Negative Negative Negative   LEUKEST Negative Small* Trace*   WBCU <1 18* 7*       Medication levels    Recent Labs   Lab Test 08/08/21  0333 08/07/21  2106 12/08/20  1408 09/15/20  1400 06/29/20  0914   VANCOMYCIN  --  12.0  --    < >  --    TACROL  --   --   --   --  10.3   MPACID 2.73  --   --   --   --    85501  --   --  8.2*  --   --    MPAG 126.0*   --   --   --   --    69088  --   --  97  --   --     < > = values in this interval not displayed.       Body fluid stats    Recent Labs   Lab Test 08/06/21  1655   FCOL Colorless   FAPR Clear   FWBC 428   FNEU 56   FLYM 2   FMONO 42       Microbiology:    Last Culture results with specimen source  Culture   Date Value Ref Range Status   08/10/2021 No growth after 1 day  Preliminary   08/10/2021 No growth after 1 day  Preliminary   08/10/2021 No growth, less than 1 day  Preliminary   08/06/2021 1+ Normal pat  Final   08/06/2021 Blastomyces dermatitidis (A)  Preliminary     Comment:     Identified by DNA Probe  Testing performed by the [facility], [address] [place] [id number]     08/06/2021 Culture negative, monitoring continues  Preliminary   08/06/2021 No Growth  Final   08/06/2021 3+ Normal pat  Final     Culture Micro   Date Value Ref Range Status   03/16/2020 No growth  Final   03/13/2020 Canceled, Test credited  Specimen not received    Final   03/24/2005 No growth  Final   03/24/2005   Final    Canceled, Test credited Test canceled by PCU/Clinic   03/11/2005 No growth  Final   03/09/2005   Final    Single colony Coagulase negative Staphylococcus Susceptibility testing not     Comment:      routinely done   03/09/2005 No growth  Final   03/09/2005 No growth  Final   03/09/2005 No growth  Final    Specimen Description   Date Value Ref Range Status   03/16/2020 Unspecified Urine  Final   03/13/2020 Unknown  Final   03/24/2005 Blood Right Arm  Final   03/24/2005 Blood  Final   03/11/2005 Blood Arterial blood DIALYSIS  Final   03/09/2005 Catheter tip  CENTRAL LINE  Final   03/09/2005 Arm  Final   03/09/2005 White port  Final   03/09/2005 Unspecified Urine  Final        Last check of C difficile  C Difficile Toxin B by PCR   Date Value Ref Range Status   08/11/2021 Negative Negative Final     Comment:     A negative result does not exclude actual disease due to C. difficile and may  be due to improper collection, handling and storage of the specimen or the number of organisms in the specimen is below the detection limit of the assay.       Infection Studies to assess Diarrhea No lab results found.    Virology:  Coronavirus-19 testing    No lab results found.    Invalid input(s): SCV2R    Respiratory virus (non-coronavirus-19) testing    Recent Labs   Lab Test 08/06/21  1804   IFLUA Not Detected   FLUAH1 Not Detected   BC4520 Not Detected   FLUAH3 Not Detected   IFLUB Not Detected   PIV1 Not Detected   PIV2 Not Detected   PIV3 Not Detected   PIV4 Not Detected   RSVA Not Detected   RSVB Not Detected   HMPV Not Detected   ADENOV Not Detected   LUJAN Not Detected         BK Virus PCR Quant Result (External)   Date Value Ref Range Status   07/26/2021 None Detected None Detected IU/mL Final   06/22/2021 None Detected None Detected IU/mL Final   05/18/2021 None Detected None Detected IU/mL Final   04/20/2021 None Detected None Detected IU/mL Final   02/23/2021 None Detected None Detected IU/ml Final   01/05/2021 None Detected None Detected IU/mL Final   12/08/2020 None Detected None Detected Final   11/24/2020 None Detected None Detected Final   10/27/2020 None Detected None Detected IU/mL Final   08/31/2020 None Detected None Detected Final   08/10/2020 None Detected None Detected IU/mL Final   07/20/2020 None Detected None Detected IU/mL Final   06/22/2020 None Detected None Detected Final   06/01/2020 None Detected None Detected IU/mL Final   05/04/2020 None Detected None Detected IU/mL Final   04/27/2020 None Detected None Detected IU/mL Final   04/16/2020 None Detected None Detected IU/mL Final     BK Virus Result   Date Value Ref Range Status   01/26/2017 BK Virus DNA Not Detected BKNEG copies/mL Final       Imaging:  Recent Results (from the past 48 hour(s))   XR Abdomen Port 1 View    Narrative    Exam: XR ABDOMEN PORT 1 VIEWS, 8/10/2021 1:56 PM    Indication: Post-pyloric tube  placement    Comparison: 8/9/2021, 8/6/2021    Findings:   Portable supine AP view of the lower chest and upper abdomen was  obtained. The gastric tube tip and sidehole project over the distal  stomach. The feeding tube tip projects over the central upper abdomen.  Nonobstructive bowel gas pattern in the visualized upper abdomen. No  pneumatosis or portal venous gas. Diffuse pulmonary opacities are  grossly stable.      Impression    Impression:   The feeding tube tip projects over the upper central abdomen, possibly  within the distal stomach or third portion of the duodenum. Consider  lateral view for improved localization.    TESFAYE LECHUGA MD         SYSTEM ID:  [id number]   XR Abdomen Port 1 View    Narrative    EXAMINATION:  XR ABDOMEN PORT 1 VIEWS 8/11/2021 11:37 AM     COMPARISON: Abdominal x-ray, 8/10/2021.    HISTORY: confirm post pyloric placement.    FINDINGS: Frontal view of the abdomen demonstrated a paucity of bowel  gas. The feeding tube coiled in the stomach with the tip projecting  over the expected position of the pylorus. The sidehole and the tip of  the gastric tube project over the body of the stomach. Femoral  approach vascular catheters with tips projecting over the external  iliac and common iliac vessel location. Seminal vesicle calcification.  Multiple surgical clips are also visible in the lower abdomen/pelvis.  Vascular calcification No abnormally dilated loops of bowel.  No  pneumatosis or portal venous gas. Bilateral diffuse opacities at lung  bases, unchanged compared to same day chest x-ray.       Impression    IMPRESSION:    1. The feeding tube is coiled in the stomach with the tip projecting  over the expected position of distal stomach, consistent with  prepyloric position.   2. Enteric tube tip and sidehole projects over the stomach.    I have personally reviewed the examination and initial interpretation  and I agree with the findings.    SAMIA ACEVES MD         SYSTEM ID:   [id number]   XR Chest Port 1 View    Narrative    Portable chest    INDICATION: Compare to previous    COMPARISON: 8/19/2021     FINDINGS: Size appears normal. Diffuse organizing opacifications are  noted in the upper lobes predominantly but also in the mid to lower  lungs. Costophrenic angles are reasonably sharp. NG tube and feeding  tube both progressed beyond the inferior margin of the image. Apparent  endotracheal tube is somewhat obscured by the other overlying tubing  there is no obvious mainstem bronchus intubation.      Impression    IMPRESSION: Diffuse possible organizing pneumonia versus edema. Cannot  exclude ARDS.    RAMA CUNNINGHAM MD         SYSTEM ID:  [id number]   XR Abdomen Port 1 View    Narrative    XR ABDOMEN PORT 1 VIEWS  8/11/2021 3:12 PM      HISTORY: post-pyloric tube placement    COMPARISON: 8/11/2021, 8/10/2021, 8/9/2021    FINDINGS: AP view of the abdomen. Feeding tube tip remains coiled over  the expected location of the distal stomach. Gastric tube sidehole and  tip projecting over the stomach. Right lower extremity CVCs are  stable. Nonobstructive bowel gas pattern. Surgical clips project over  the pelvis. Interstitial opacities in the lung bases.      Impression    IMPRESSION: Feeding tube remains coiled over the expected location of  the distal stomach.    I have personally reviewed the examination and initial interpretation  and I agree with the findings.    ORESTES LONGORIA DO         SYSTEM ID:  [id number]

## 2021-08-12 NOTE — PROGRESS NOTES
Critical Care Services Progress Note:  I personally examined and evaluated the patient today. I formulated today s plan with the house staff team or resident(s) and agree with the findings and plan in the associated note (see separately attested resident note).   I have evaluated all laboratory values and imaging studies from the past 24 hours.  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  Intubated on 8/6.       Overnight events/pertinent findings today:  Clinical status continues to worsen.  Urine output has dropped.  Plateau pressures worse.  Shock is worse. He remains prone  Data  ABG 7.30/50/61    BUN 90-->107-->115, Cr 2.16-->2.20-->2.57        Vt 350, PEEP 16. FIO2 90%. Pplat 34. On flolan 20.     Assessment/plan:    ARDS: Blastomycosis pneumonia. Continues to worsen. On higher PEEP, requiring paralysis and prone ventilation.   - not an ECMO candidate  - will keep prone  - flolan  - neuromuscular blockade  - amphotericin  - Start a bumex drip.     Hypernatremia -   - free water 30 ml/hr     Shock: back on levophed.  MAP goal 65.  Likely from worsening lung disease. Levophed    Goals of care: Last night, family expressed potentially transitioning to comfort care today. We will discuss this more with them today.  I think it would be appropriate.  He has clinically worsened despite appropriate treatment for his blastomycosis.  His immune system is severely blunted.  I do not think he has zero chance of survival, but the odds are extremely low for him to survive this and it is likely he will require dialysis and potentially suffer from other chronic organ dysfunction.      Rest per resident note.    I spent a total of 45 minutes (excluding procedure time) personally providing and directing critical care services at the bedside and on the critical care unit for this patient.     Cristopher Ward MD

## 2021-08-12 NOTE — SIGNIFICANT EVENT
SPIRITUAL HEALTH SERVICES Significant Event  [employer] Sacrament of ANOINTING  [facility] 4c    Pt anointed by Father Praful Kauffman   Pager [phone]

## 2021-08-13 LAB
BACTERIA BRONCH: ABNORMAL
BACTERIA SPT CULT: NO GROWTH
DEPRECATED CALCIDIOL+CALCIFEROL SERPL-MC: <37 UG/L (ref 20–75)
GRAM STAIN RESULT: ABNORMAL
GRAM STAIN RESULT: ABNORMAL
VITAMIN D2 SERPL-MCNC: <5 UG/L
VITAMIN D3 SERPL-MCNC: 32 UG/L

## 2021-08-13 NOTE — DISCHARGE SUMMARY
[facility]    Death Summary - Medicine & Pediatrics    Date of Admission:  8/6/2021  Date of Death: 8/12/2021  Attending Provider: Dr. Ward  Service: Critical Care    Discharge Diagnoses   # Acute Hypoxemic Respiratory Failure, ARDS  # Pneumonia, due to Blastomycoses and now possibly Cryptococcus   # Shock, likely 2/2 sepsis  # Alport's Syndrome  # ESRD s/p renal transplant (02/2020, 2005, 1988)     Cause of death: Respiratory failure    Hospital Course   Jeremiah Cruz is a 46 year old male with PMH significant for Alport's Syndrome complicated by ESRD s/p kidney transplant (02/2020,2005, 1988), thrombocytopenia, anemia of CKD, secondary renal hyperparathyroidism, HTN, CML (on in remission since 2016) and GERD who developed acute hypoxemic respiratory failure likely 2/2 sepsis requiring intubation. Found to have blastomyces on BAL (on Amphotericin B). He progressively had worsening oxygenation despite increasing ventilation requirements. He was on nearly maximum vent setting as well as having increasing pressor requirements on norepinephrine, and vasopressin. Given his multiple comorbidities and severely immunosuppressed status patient was continuing to get worse on near maximal therapy. After several discussions with family about the patient's poor prognosis they elected to pursue comfort cares. Patient was able to pass away peacefully surrounded by his family.     Patient's care was discussed w/ Dr. Sylvia Mason MD  [facility]    ______________________________________________________________________      Significant Results and Procedures   Most Recent 3 CBC's:Recent Labs   Lab Test 08/12/21  0322 08/11/21  0425 08/10/21  0324   WBC 20.6* 15.6* 8.8   HGB 7.6* 7.3* 7.6*   MCV 98 100 99    253 219     Most Recent 3 BMP's:Recent Labs   Lab Test 08/12/21  1252 08/12/21  1240 08/12/21  0911  08/12/21  0321   NA  --  142 145* 145*   POTASSIUM  --  4.2 3.4 3.3*   CHLORIDE  --  106 107 108   CO2  --  23 26 26   BUN  --  116* 115* 112*   CR  --  2.62* 2.57* 2.47*   ANIONGAP  --  13 12 11   ANTWAN  --  8.6 9.0 8.6   * 181* 139* 151*  138*     Most Recent 2 LFT's:Recent Labs   Lab Test 08/11/21  2356 08/09/21  0353   AST 22 27   ALT 18 20   ALKPHOS 65 62   BILITOTAL 0.7 0.6     Most Recent 3 INR's:Recent Labs   Lab Test 04/03/20  0730 03/05/20  1327 02/28/20  0341   INR 1.21* 1.18* 1.11     Most Recent 3 Troponin's:Recent Labs   Lab Test 08/06/21  1354   TROPONIN 0.025     Most Recent ABG:Recent Labs   Lab Test 08/12/21  1238   PH 7.30*   PO2 61*   PCO2 50*   HCO3 25   WIL -1.9   ,   Results for orders placed or performed during the hospital encounter of 08/06/21   US Renal Transplant    Narrative    EXAMINATION:  RENAL TRANSPLANT,  8/6/2021 10:43 AM     COMPARISON: Ultrasound 3/13/2020.    HISTORY: VASQUEZ on transplanted kidney    TECHNIQUE:  Grey-scale, color Doppler and spectral flow analysis.    FINDINGS:  The transplant kidney is located right lower quadrant, and measures  10.2 cm. Parenchyma is of normal thickness and echogenicity. No focal  lesions. No hydronephrosis. No perinephric fluid collection.    Decompressed urinary bladder with Stein catheter in place.    Renal artery flow:   98 cm/sec peak systolic at hilum.  113 peak systolic at anastomosis.  Arcuate artery resistive indices (upper to lower): 0.87, 0.86, 0.86    Renal Vein Flow:  10 cm/sat hilum.   19 cm/s at anastomosis.    Iliac artery flow:  195 cm/s peak systolic above anastomosis.  152 cm/s peak systolic below anastomosis.    Iliac vein flow:  Patent above and below the anastomosis.      Impression    IMPRESSION:   1. Normal grayscale imaging of the transplant kidney.  2. Improved renal arterial velocity at the anastomosis at 113 cm/s,  previously 194 cm/s on ultrasound dated 2/13/2020. However, the  arcuate artery resistive indices  have increased.     I have personally reviewed the examination and initial interpretation  and I agree with the findings.    PAUL RUIZ MD         SYSTEM ID:  [id number]   XR Chest Port 1 View    Narrative    EXAM: XR CHEST PORT 1 VIEW  8/6/2021 10:10 AM      HISTORY: interval assessment of ETT, respiratory failure    COMPARISON: X-ray: 2/28/2020    FINDINGS: Single view of the chest. The ET tube projects with mid  thoracic trachea. Gastric tube projects below the diaphragm and out of  the field of view. No pneumothorax. No significant pleural effusion.  Cardiac silhouette is within normal limits. Bilateral hazy nodular  opacities, left greater than right. Visualized upper abdomen is  unremarkable. No aggressive osseous lesions.      Impression    IMPRESSION:  1. Bilateral diffuse nodular opacities, left greater than is right.,  possibly a combination of edema and/or infection, vs neoplasia.  2. Tip of the ET tube reduction of the mid thoracic trachea.    I have personally reviewed the examination and initial interpretation  and I agree with the findings.    CESILIA MURILLO MD         SYSTEM ID:  [id number]   XR Abdomen Port 1 View    Narrative    Exam: XR ABDOMEN PORT 1 VIEWS, 8/6/2021 10:10 AM    Indication: og placement    Comparison: Abdominal radiograph 3/8/2020.    Findings:   Orogastric tube tip and side-port project over the expected location  of the stomach. Nonobstructive bowel loops in the visualized upper  abdomen. Diffuse bilateral hazy interstitial opacities in the  visualized lungs.      Impression    Impression:   1. Orogastric tube in appropriate position, side port projected over  body of stomach and tip near pylorus.  2. Diffuse bilateral hazy interstitial opacities, consistent with  infection/pulmonary edema, new from 3/8/2020.    I have personally reviewed the examination and initial interpretation  and I agree with the findings.    WYATT LOVELL MD         SYSTEM ID:  [id number]   [facility]  Extremity Venous Duplex Bilat     Value    Radiologist flags DVT (Urgent)    Narrative    EXAMINATION: DOPPLER VENOUS ULTRASOUND OF BILATERAL UPPER EXTREMTIES,  8/6/2021 3:35 PM     COMPARISON: None.    HISTORY: Unable to advance wire for IJ CVA, suspect clot, has b/l AVF    TECHNIQUE:  Gray-scale evaluation with compression, spectral flow, and  color Doppler assessment of the deep venous system of both upper  extremities.    FINDINGS:  Right upper extremity: Normal blood flow and waveforms are  demonstrated in the internal jugular, innominate, subclavian, and  axillary veins. There is normal compressibility of the brachial,  basilic and cephalic veins. Patent arteriovenous fistula in the mid  right forearm.    Left upper extremity: There is occlusive thrombus in one of the 2  paired brachial veins at the antecubital fossa. Occlusive thrombus in  the left cephalic vein extending from mid upper arm to the wrist. Left  basilic vein was not visualized. Nonfunctioning fistula in left upper  arm without flow.      Impression    IMPRESSION:  1.  Occlusive thrombus in 1 of the 2 paired left brachial veins at the  antecubital fossa.  2.  Occlusive thrombus in the left cephalic vein extending from mid  upper arm to the wrist.  3.  Occlusion of the nonfunctioning fistula in left upper arm.  4.  No evidence of deep venous thrombosis in right upper extremity.  Patent arterial venous fistula in right forearm.    [Urgent Result: DVT]    Finding was identified on 8/6/2021 3:36 PM.     Dr. Rodriguez was contacted by Dr. Guerrero at 8/6/2021 3:44 PM and  verbalized understanding of the urgent finding.      I have personally reviewed the examination and initial interpretation  and I agree with the findings.    PAUL RUIZ MD         SYSTEM ID:  [id number]   XR Chest Port 1 View    Narrative    Portable chest    INDICATION: Patient critically ill, intubated, with blasts on  bronchoalveolar lavage    COMPARISON: 8/6/2021    Findings:  Heart size appears normal. Diffuse patchy opacities appear  increased, especially in the right lung. Endotracheal tube tip is  approximately 4.1 cm above the lawrence. NG/OG tube progresses beyond  the inferior margin of the image. Heart size appears normal.      Impression    IMPRESSION: Increasing ARDS, likely related to infection. Differential  considerations of edema or pulmonary hemorrhage.    RAMA CUNNINGHAM MD         SYSTEM ID:  [id number]   XR Chest Port 1 View    Narrative    EXAM: XR CHEST PORT 1 VIEW  8/8/2021 6:56 AM     HISTORY:  pt critically ill, intubated, with blasto on BAL       COMPARISON:  Chest x-ray 8/7/2021    FINDINGS: AP radiograph of the chest. Endotracheal tube projecting  over the midthoracic trachea. Enteric tube with side-port overlying  the stomach and tip inferior to the field of view.    Stable cardiomediastinal silhouette. Unchanged upper lobe predominant  mixed interstitial and patchy airspace opacities. Small bilateral  pleural effusions. No pneumothorax. Visualized upper abdomen is  unremarkable. Bones are stable.      Impression    IMPRESSION:  1. Unchanged mixed interstitial and patchy airspace opacities  concerning for ARDS likely due to infection given the patient's  history.  2. Endotracheal tube projects over the midthoracic trachea.    I have personally reviewed the examination and initial interpretation  and I agree with the findings.    RAMA CUNNINGHAM MD         SYSTEM ID:  [id number]   XR Chest Port 1 View    Narrative    EXAM: XR CHEST PORT 1 VIEW  8/9/2021 6:58 AM     HISTORY:  pt critically ill, intubated, with blasto on BAL       COMPARISON:  8/8/2021    FINDINGS:   Portable semiupright view of the chest. Endotracheal tube tip projects  over the upper thoracic trachea. Enteric tube course below the  diaphragm and out of the field of view. Trachea is midline. Stable  cardiac silhouette. Diffuse mixed opacities throughout the lungs,  grossly unchanged. No  significant pleural effusion. No pneumothorax  appreciated.      Impression    IMPRESSION:   1. Grossly unchanged diffuse mixed opacities throughout the lungs.  2. Stable support devices.    I have personally reviewed the examination and initial interpretation  and I agree with the findings.    LEEANN MAY MD         SYSTEM ID:  [id number]   XR Abdomen Port 1 View    Narrative    EXAMINATION:  XR ABDOMEN PORT 1 VIEWS 8/9/2021 2:47 PM     COMPARISON: Abdominal x-ray, 8/6/2021..    HISTORY: post-pyloric tube placement.    FINDINGS: Frontal view of the abdomen. A feeding tube coiled in the  stomach with the tip projecting over the expected position of the  pylorus. The sidehole and the tip of gastric tube project over the  greater curvature of the stomach. Right femoral venous catheter and  surgical clips are visible in the right lower quadrant/hemipelvis.  Small to moderate amount of gas in the stomach may be related to  feeding tube placement. No abnormally dilated loops of bowel. No  pneumatosis or portal venous gas. Diffuse mixed opacity at lung bases,  unchanged compared to same day chest x-ray.      Impression    IMPRESSION: Feeding tube coiled in the stomach with the tip projecting  over the expected position of the pylorus, most likely prepyloric.    I have personally reviewed the examination and initial interpretation  and I agree with the findings.    RAH BLACKWELL MD         SYSTEM ID:  [id number]   XR Abdomen Port 1 View    Narrative    Exam: XR ABDOMEN PORT 1 VIEWS, 8/10/2021 1:56 PM    Indication: Post-pyloric tube placement    Comparison: 8/9/2021, 8/6/2021    Findings:   Portable supine AP view of the lower chest and upper abdomen was  obtained. The gastric tube tip and sidehole project over the distal  stomach. The feeding tube tip projects over the central upper abdomen.  Nonobstructive bowel gas pattern in the visualized upper abdomen. No  pneumatosis or portal venous gas. Diffuse pulmonary  opacities are  grossly stable.      Impression    Impression:   The feeding tube tip projects over the upper central abdomen, possibly  within the distal stomach or third portion of the duodenum. Consider  lateral view for improved localization.    TESFAYE LECHUGA MD         SYSTEM ID:  [id number]   XR Chest Port 1 View    Narrative    Portable chest    INDICATION: Compare to previous    COMPARISON: 8/19/2021     FINDINGS: Size appears normal. Diffuse organizing opacifications are  noted in the upper lobes predominantly but also in the mid to lower  lungs. Costophrenic angles are reasonably sharp. NG tube and feeding  tube both progressed beyond the inferior margin of the image. Apparent  endotracheal tube is somewhat obscured by the other overlying tubing  there is no obvious mainstem bronchus intubation.      Impression    IMPRESSION: Diffuse possible organizing pneumonia versus edema. Cannot  exclude ARDS.    RAMA CUNNINGHAM MD         SYSTEM ID:  [id number]   XR Abdomen Port 1 View    Narrative    EXAMINATION:  XR ABDOMEN PORT 1 VIEWS 8/11/2021 11:37 AM     COMPARISON: Abdominal x-ray, 8/10/2021.    HISTORY: confirm post pyloric placement.    FINDINGS: Frontal view of the abdomen demonstrated a paucity of bowel  gas. The feeding tube coiled in the stomach with the tip projecting  over the expected position of the pylorus. The sidehole and the tip of  the gastric tube project over the body of the stomach. Femoral  approach vascular catheters with tips projecting over the external  iliac and common iliac vessel location. Seminal vesicle calcification.  Multiple surgical clips are also visible in the lower abdomen/pelvis.  Vascular calcification No abnormally dilated loops of bowel.  No  pneumatosis or portal venous gas. Bilateral diffuse opacities at lung  bases, unchanged compared to same day chest x-ray.       Impression    IMPRESSION:    1. The feeding tube is coiled in the stomach with the tip projecting  over  the expected position of distal stomach, consistent with  prepyloric position.   2. Enteric tube tip and sidehole projects over the stomach.    I have personally reviewed the examination and initial interpretation  and I agree with the findings.    SAMIA ACEVES MD         SYSTEM ID:  [id number]   XR Abdomen Port 1 View    Narrative    XR ABDOMEN PORT 1 VIEWS  2021 3:12 PM      HISTORY: post-pyloric tube placement    COMPARISON: 2021, 8/10/2021, 2021    FINDINGS: AP view of the abdomen. Feeding tube tip remains coiled over  the expected location of the distal stomach. Gastric tube sidehole and  tip projecting over the stomach. Right lower extremity CVCs are  stable. Nonobstructive bowel gas pattern. Surgical clips project over  the pelvis. Interstitial opacities in the lung bases.      Impression    IMPRESSION: Feeding tube remains coiled over the expected location of  the distal stomach.    I have personally reviewed the examination and initial interpretation  and I agree with the findings.    ORESTES LONGORIA DO         SYSTEM ID:  [id number]   Echo Complete     Value    LVEF  60-65%    Narrative    [id number]  [id number]  [id number]  [id number]^ALEXIS^LENA     [facility],[place]  Echocardiography Laboratory  [address] [place] [address]     Name: JONNY KINCAID  MRN: [id number]  : 1975  Study Date: 2021 08:07 AM  Age: 46 yrs  Gender: Male  Patient Location: [facility]  Reason For Study: Respiratory Failure  Ordering Physician: LENA ESPINOZA  Performed By: Fabby Fernandez RDCS     BSA: 1.8 m2  Height: 64 in  Weight: 158 lb  HR: 71  BP: 99/56 mmHg  ______________________________________________________________________________  Procedure  Complete Portable Echo Adult. Echocardiogram with two-dimensional, color and  spectral Doppler performed.  ______________________________________________________________________________  Interpretation  Summary  Left ventricular size, wall motion and function are normal. The ejection  fraction is 60-65%.  The right ventricle is normal size. Global right ventricular function is  normal.  Mild aortic valve stenosis.  Pulmonary artery systolic pressure cannot be assessed.  The inferior vena cava was normal in size with preserved respiratory  variability.  No pericardial effusion is present.  ______________________________________________________________________________  Left Ventricle  Left ventricular size, wall motion and function are normal. The ejection  fraction is 60-65%. Grade II or moderate diastolic dysfunction.     Right Ventricle  The right ventricle is normal size. Global right ventricular function is  normal.     Atria  The atria cannot be assessed.     Mitral Valve  Mild mitral annular calcification is present. Trace mitral insufficiency is  present.     Aortic Valve  Mild aortic valve stenosis. The valve leaflets are not well visualized.     Tricuspid Valve  The tricuspid valve is normal. Mild tricuspid insufficiency is present.  Pulmonary artery systolic pressure cannot be assessed.     Pulmonic Valve  The pulmonic valve is normal.     Vessels  The aorta root is normal. The thoracic aorta is normal. The pulmonary artery  cannot be assessed. The inferior vena cava was normal in size with preserved  respiratory variability.     Pericardium  No pericardial effusion is present.     Compared to Previous Study  This study was compared with the study from 1/26/2017 . The left ventricular  function has remained the same.  ______________________________________________________________________________  MMode/2D Measurements & Calculations     IVSd: 1.0 cm  LVIDd: 4.9 cm  LVIDs: 3.1 cm  LVPWd: 0.79 cm  FS: 37.7 %  LV mass(C)d: 157.1 grams  LV mass(C)dI: 88.8 grams/m2  Ao root diam: 2.9 cm  asc Aorta Diam: 3.1 cm  LVOT diam: 2.1 cm  LVOT area: 3.6 cm2  LA Volume (BP): 55.7 ml  LA Volume Index (BP): 31.5  ml/m2  RWT: 0.32     Doppler Measurements & Calculations  MV E max sidney: 131.0 cm/sec  MV A max sidney: 138.0 cm/sec  MV E/A: 0.95  MV dec slope: 448.0 cm/sec2  MV dec time: 0.29 sec  Ao V2 max: 300.0 cm/sec  Ao max P.0 mmHg  Ao V2 mean: 216.0 cm/sec  Ao mean P.0 mmHg  Ao V2 VTI: 68.5 cm  MARLEY(I,D): 2.3 cm2  MARLEY(V,D): 2.2 cm2  LV V1 max P.5 mmHg  LV V1 max: 184.0 cm/sec  LV V1 VTI: 44.5 cm  SV(LVOT): 158.3 ml  SI(LVOT): 89.4 ml/m2  AV Sidney Ratio (DI): 0.61  MARLEY Index (cm2/m2): 1.3  E/E' av.3  Lateral E/e': 19.4  Medial E/e': 19.1     ______________________________________________________________________________  Report approved by: MD Kwaku Valdes 2021 11:49 AM               Consultations This Hospital Stay   NUTRITION SERVICES ADULT IP CONSULT  NEPHROLOGY KIDNEY/PANCREAS TRANSPLANT ADULT IP CONSULT  PHARMACY TO DOSE VANCO  PHYSICAL THERAPY ADULT IP CONSULT  OCCUPATIONAL THERAPY ADULT IP CONSULT  PHARMACY IP CONSULT  VASCULAR ACCESS CARE ADULT IP CONSULT  PHARMACY IP CONSULT  PHARMACY IP CONSULT  VASCULAR ACCESS CARE ADULT IP CONSULT  VASCULAR ACCESS CARE ADULT IP CONSULT  CARE MANAGEMENT / SOCIAL WORK IP CONSULT  INFECTIOUS DISEASE TRANSPLANT SOT ADULT IP CONSULT  SOCIAL WORK IP CONSULT  NUTRITION SERVICES ADULT IP CONSULT  WOUND OSTOMY CONTINENCE NURSE  IP CONSULT  SPIRITUAL HEALTH SERVICES IP CONSULT    Primary Care Physician   Jimmie Michele

## 2021-08-15 LAB
BACTERIA BLD CULT: NO GROWTH
BACTERIA BLD CULT: NO GROWTH

## 2021-08-17 ENCOUNTER — POST MORTEM DOCUMENTATION (OUTPATIENT)
Dept: TRANSPLANT | Facility: CLINIC | Age: 46
End: 2021-08-17

## 2021-08-19 LAB — SCANNED LAB RESULT: NORMAL

## 2021-08-19 NOTE — PROGRESS NOTES
Received notification of patient's death from Jody Simons RN.  Place of death was reported as [facility].  Graft status at the time of death was reported as Functioning.  TIS verification is: Complete

## 2021-08-20 LAB — BACTERIA BRONCH: NORMAL

## 2021-09-08 LAB — BACTERIA BLD CULT: NO GROWTH

## 2021-10-02 LAB — ACID FAST STAIN (ARUP): NORMAL

## 2023-04-25 NOTE — TELEPHONE ENCOUNTER
PHYSICIAN ORDERS      DATE & TIME ISSUED: 2020 10:30 AM  PATIENT NAME: Jonny Cruz   : 1975     [facility] MR# [if applicable]: [id number]     DIAGNOSIS:  kidney transplant  PICC line  ICD-10 CODE: z 94.0  Z07(picc line ) z45.2(adjustment of vascular access device       Order   Reposition PICC line  Or may replace PICC line- Provider to determine     Hx kidney transplant  On 20   PICC line placed for labs draw only   Home care nurse (Jeimy) reports able to flush but not able to draw             Dr Nehemiah Wilkinson  Transplant  Nephrology   Any questions  transplant  Office  Transplant Coordinator  [phone]   IR provider phone if need    Jonny Cruz is staying at his sister house in [place] [phone]            Order   IR PICC Placement > 5 Yrs of Age [[id number]] (Order [id number])   Exam Information     Exam Date  Exam Time  Accession #  Performing Department  Results     3/5/20  12:13 PM  [id number]  [facility], [place], Interventional Radiology     PACS Images      Show images for IR PICC Placement > 5 Yrs of Age    Study Result     Jonny Cruz  MRN: [id number]  ?  [id number]     JONNY CRUZ  [id number]  1975;  44 years     IR PICC PLACEMENT > 5 YRS OF AGE  SL picc line placement     -----     PRE-OPERATIVE DIAGNOSIS:  poor venous access     POST-OPERATIVE DIAGNOSIS:  Same     PROCEDURE:  1.  Ultrasound guidance for venous access  2.  Placement of a peripherally inserted central venous catheter  (PICC)  3.  Fluoroscopic guidance placement                                                                      IMPRESSION:  Completed placement of?4?Indonesian?32.5?cm single lumen,  valved,?peripherally inserted central catheter (PICC) via  LEFT?Medial?Brachial vein. Tip lying in the?left subclavian  vein.?Venography demonstrates tortuous central venous anatomy.??Okay  to use immediately. Dx:?poor venous access. Ashley.  "?LOCAL     -----     CLINICAL HISTORY:  per above       PERFORMED BY:  ROBBIN Altman, BELLA (Interventional  Radiology)     CONSENT:  The patient understood the limitations, alternatives, and  risks of the procedure and agreed to the procedure.  Written informed  consent was obtained and is documented in the patient record.     MEDICATIONS:  1% lidocaine was used for local anesthesia     NURSING:  The patient was placed on continuous vital signs monitoring.   Vital signs were monitored by nursing staff under my supervision.     FLUOROSCOPY TIME (minutes):  <5     DESCRIPTION:  The LEFT upper extremity was prepped and draped in the  usual sterile fashion.  Ultrasound revealed a patent medial brachial  vein, and the overlying tissue was anesthetized and skin dermatotomy  was made.  Under ultrasound guidance, venipuncture was made with  needle.  A permanent copy of the image documenting a patent vein was  entered is in the patient record.     There is difficulty navigating the wire centrally. Venography  performed through venous access point shows tortuous venous anatomy  leading centrally.      Wire advanced centrally under fluoroscopic guidance and over-the-wire  exchange of needle for peel-away sheath was made.  Under fluoroscopic  guidance, an 0.018 wire was advanced to the left subclavian vein and a  measurement was obtained.  The wire and inner peel-away dilator were  removed and trimmed PICC catheter was advanced through the peel-away  sheath under fluoroscopic guidance, with the catheter tip placed in  the left subclavian vein.  The PICC catheter aspirated and flushed  freely and was locked with saline.  PICC secured and sterile dressing  applied.     COMPLICATIONS:  No immediate complication;  the patient remained  stable throughout the procedure     ESTIMATED BLOOD LOSS:  Minimal     SPECIMENS:  None     -----  \"The physician assistant (PA) who performed this procedure and signed  the above report " "is licensed to practice in the [facility]  pursuant to [place] Statute 147A.09.  This includes meeting the Statute and  [place] Board of [employer] requirement of an active  Delegation Agreement, which documents delegation of services by  primary and alternate supervising physicians.\"   -----     CATARINO TAMAYO PA-C       " Patient seen and examined at bedside. feels well. no active complaints     MEDICATIONS  (STANDING):  aspirin  chewable 81 milliGRAM(s) Oral daily  atenolol  Tablet 50 milliGRAM(s) Oral two times a day  atorvastatin 80 milliGRAM(s) Oral at bedtime  furosemide    Tablet 20 milliGRAM(s) Oral daily  levothyroxine 75 MICROGram(s) Oral daily  losartan 100 milliGRAM(s) Oral daily  pantoprazole    Tablet 40 milliGRAM(s) Oral two times a day    MEDICATIONS  (PRN):  albuterol    90 MICROgram(s) HFA Inhaler 2 Puff(s) Inhalation every 6 hours PRN Shortness of Breath and/or Wheezing  loratadine 10 milliGRAM(s) Oral daily PRN allergy  melatonin 3 milliGRAM(s) Oral at bedtime PRN Insomnia        Vital Signs Last 24 Hrs  T(C): 37.3 (25 Apr 2023 10:03), Max: 37.3 (25 Apr 2023 06:00)  T(F): 99.2 (25 Apr 2023 10:03), Max: 99.2 (25 Apr 2023 10:03)  HR: 92 (25 Apr 2023 10:03) (72 - 99)  BP: 146/63 (25 Apr 2023 10:03) (108/78 - 148/56)  BP(mean): --  RR: 18 (25 Apr 2023 10:03) (14 - 20)  SpO2: 100% (25 Apr 2023 10:03) (98% - 100%)    Parameters below as of 25 Apr 2023 10:03  Patient On (Oxygen Delivery Method): room air          PHYSICAL EXAM:     GENERAL:  Appears stated age, well-groomed  CHEST:  CTA b/l  HEART:  S1 s2+   ABDOMEN:  Soft, non-tender, non-distended  EXTEREMITIES:  no cyanosis,clubbing or edema  NEURO:  Alert, oriented, no asterixis                            9.6    10.11 )-----------( 104      ( 25 Apr 2023 07:24 )             27.5       04-25    137  |  102  |  14  ----------------------------<  84  3.7   |  24  |  0.87    Ca    7.9<L>      25 Apr 2023 07:24  Phos  2.6     04-25  Mg     1.50     04-25

## 2024-06-13 NOTE — PROVIDER NOTIFICATION
Paged on-call that patient's cross match came back positive for antibodies. Per lab will take longer to get blood from blood bank   [Appropriately responsive] : appropriately responsive [Alert] : alert [No Acute Distress] : no acute distress [No Lymphadenopathy] : no lymphadenopathy [Regular Rate Rhythm] : regular rate rhythm [No Murmurs] : no murmurs [Clear to Auscultation B/L] : clear to auscultation bilaterally [Soft] : soft [Non-tender] : non-tender [Non-distended] : non-distended [No HSM] : No HSM [No Lesions] : no lesions [No Mass] : no mass [Oriented x3] : oriented x3 [Examination Of The Breasts] : a normal appearance [No Masses] : no breast masses were palpable [Labia Majora] : normal [Labia Minora] : normal [Atrophy] : atrophy [Normal] : normal [Uterine Adnexae] : normal [FreeTextEntry1] : groin erythema

## 2024-11-07 NOTE — DEATH PRONOUNCEMENT
MD DEATH PRONOUNCEMENT    Called to pronounce Jeremiah Cruz dead.    Physical Exam: Unresponsive to noxious stimuli, Spontaneous respirations absent, Breath sounds absent, Carotid pulse absent, Heart sounds absent, Pupillary light reflex absent and Corneal blink reflex absent    Patient was pronounced dead at 16:45 PM, 2021.    Preliminary Cause of Death: Respiratory Failure    Active Problems:    Respiratory failure (H)     Infectious disease present?: YES    Communicable disease present? (examples: HIV, chicken pox, TB, Ebola, CJD) :  NO    Multi-drug resistant organism present? (example: MRSA): NO    Please consider an autopsy if any of the following exist:  NO Unexpected or unexplained death during or following any dental, medical, or surgical diagnostic treatment procedures.   NO Death of mother at or up to seven days after delivery.     NO All  and pediatric deaths.     NO Death where the cause is sufficiently obscure to delay completion of the death certificate.   NO Deaths in which autopsy would confirm a suspected illness/condition that would affect surviving family members or recipients of transplanted organs.     The following deaths must be reported to the 's Office:  NO A death that may be due entirely or in part to any factors other than natural disease (recent surgery, recent trauma, suspected abuse/neglect).   NO A death that may be an accident, suicide, or homicide.     NO Any sudden, unexpected death in which there is no prior history of significant heart disease or any other condition associated with sudden death.   NO A death under suspicious, unusual, or unexpected circumstances.    NO Any death which is apparently due to natural causes but in which the  does not have a personal physician familiar with the patient s medical history, social, or environmental situation or the circumstances of the terminal event.   NO Any death apparently due to Sudden Infant  Death Syndrome.     NO Deaths that occur during, in association with, or as consequences of a diagnostic, therapeutic, or anesthetic procedure.   NO Any death in which a fracture of a major bone has occurred within the past (6) six months.   NO A death of persons note seen by their physician within 120 days of demise.     NO Any death in which the  was an inmate of a public institution or was in the custody of Law Enforcement personnel.   NO  All unexpected deaths of children   NO Solid organ donors   NO Unidentified bodies   NO Deaths of persons whose bodies are to be cremated or otherwise disposed of so that the bodies will later be unavailable for examination;   NO Deaths unattended by a physician outside of a licensed healthcare facility or licensed residential hospice program   NO Deaths occurring within 24 hours of arrival to a health care facility if death is unexpected.    NO Deaths associated with the decedent s employment.   NO Deaths attributed to acts of terrorism.   NO Any death in which there is uncertainty as to whether it is a medical examiner s care should be discussed with the medical investigator.        Body disposition: Autopsy was discussed with family member:  Family members in person.  Permission for autopsy was declined.    Luis Mason MD  Internal Medicine-Pediatrics PGY-3   [facility]         negative...

## 2024-11-25 NOTE — OP NOTE
I have escribed Zithromax to the preferred pharmacy due to a confirmed positive B pertussis in a close contact.  See medication order.     While being treated, he should not expose other's that he hasn't been around or hasn't been around with the known contact.     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 Left kidney  Re- transplant,  Donation after Brain Death, intra-abdominal, without vascular reconstruction. A J-J ureteral stent was placed.  2. Kidney allograft preparation on Back Table    Surgeon:  ZAC BOOGIE    Fellow/Assistant:  Herman Vickers, fellow, Oliver Kaur, resident. There was no qualified resident to assist with this procedure.    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 First was remote and lost in 1999 with rejection (LDKT from sister). Second was lost very shortly after transplant to acute/hyperacute rejection and explanted shortly after implant in 2005. He has been on dialysis for 21 years. He received an organ offer for a Donation after Brain Death kidney allograft. After discussing the risks and benefits of proceeding, the patient agreed to proceed with surgery and provided informed consent.  Findings: Integrity of recipient artery: normal, very minimal disease.  The bladder was extremely atrophic.   Intraoperative Events: none    Final ABO/Crossmatch verification: After the donor organ arrived to the operating room and prior to anastomosis, I participated in the transplant pre-verification upon organ receipt timeout by visually verifying the donor ID, organ and laterality, donor blood type, recipient unique identifier, recipient blood type, and that the donor and recipient are blood type compatible. The crossmatch was done prospectively; the T cell flow crossmatch result was negative and B cell flow crossmatch result  was negative prior to anastomosis.  The patient received Thymoglobulin, Cellcept and Solumedrol on induction.    Donor Organ Information:   Donor UNOS ID:  [id number]    Donor arterial clamp on:  2/27/2020  6:06 PM    Total ischemic time:  1194 min    Cold ischemic time:  1,159 min    Warm ischemic time:  35 min    Preservation fluid:  UW      Back Table Details:   Procedure:  Bench preparation of the kidney allograft for transplantation without vascular reconstruction    Surgeon:  ZAC BOOGIE    Faculty Co-Surgeon:  ZAC BOOGIE    Fellow/Assistant:  Herman Vickers, fellow, resident Darian    Donor arrival to recipient room:  2/28/2020 11:03 AM    Graft injury:  no    Graft biopsy:  yes    Organ received on:  Ice    Pump resistance:      Pump flow:      Arterial anatomy:  single    Donor arterial quality:  normal    Venous anatomy:  single    Ureteral anatomy:  single    Any reconstruction:  no    Artery:      Vein:       Complications: None.    Findings:  Left kidney, single vessels. Placed on right external iliac vein (proximal) and common iliac artery. Native artery minimally diseased. Scant urine production immediately in OR. Bladder very small and thin-walled. URETERAL STENT PLACED.       None.    Back Table Preparation:  The donor kidney was received and inspected. It had been flushed with UW. The graft was prepared on the back table by removing perinephric fat and ligating venous tributaries and lymphatics. The ureter was also cleaned of excess tissue. If required, reconstruction was performed as detailed above. The kidney was stored in iced cold preservation solution until ready for transplantation. Faculty was present for the critical portions of the procedure.    Operative Procedure:   Arterial anastomosis start:  2/28/2020  1:25 PM    Arterial unclamp:  2/28/2020  2:00 PM    Extra vessels used:   n/a      The patient was brought to the operating room, placed in a supine  position, and a time out was performed. Sequential compression devices were placed on both lower extremities and general endotracheal anesthesia was induced.  The patient was given IV antibiotics and a Stein catheter. A central line was not placed by Anesthesia service- we made multiple attempts but due to thrombosis and chronic central stenosis we were unable to achieve central access. The abdomen was then shaved, prepped, and draped in the usual sterile fashion.  An incision was made in the midline and carried down through the subcutaneous tissue and the abdominal wall fascia.  The right common iliac artery and vein were exposed. The retractor system was placed.     The patient was heparinized. We applied atraumatic vascular clamps and the donor kidney was brought to the operative field. We made a venotomy and the renal vein was anastomosed to the recipient right external iliac vein in an end-to-side fashion. An arteriotomy was made and the donor renal artery was anastomosed to the recipient right common iliac artery  in an end to side fashion. The patient was simultaneously loaded with IV mannitol, Lasix and volume. The renal artery was protected and the clamps were removed. After several cardiac cycles, we opened the renal artery and the kidney had good reperfusion and was firm and pink .    The transplant ureter was managed by creating a Liche anterior multistitch anastomosis with absorbable suture. A stent was placed across the anastomosis. The kidney made some urine prior to implantation.    Hemostasis was obtained, the anastomoses inspected, and the kidney placed in the iliac fossa. After placement, the vessel lay was inspected and found to be acceptable. The kidney position was behind the right colon on its side.  It looked like he had already had an appendectomy previously.  The field was irrigated. A drain was placed. The retractor was removed and the abdominal wall fascia reapproximated. Subcutaneous  tissues were irrigated and hemostasis obtained.  The skin was reapproximated with staples and a dry dressing was applied.   All needle, sponge and instrument counts were correct x 2. The patient was awakened, extubated, and transferred to PACU for post-op monitoring. Faculty was present for key portions of the procedure.    Physician Attestation   I was present for the key portions of the procedure and I was immediately available for the entire procedure between opening and closing.    Carol Samuel MD  Date of Service (when I saw the patient): 2/28/2020

## 2025-01-18 NOTE — TELEPHONE ENCOUNTER
Dr. Lyons at nurses station  Orders received to reorder home meds  Fasting and 2 hr postprandial blood sugars daily     Prior Authorization Infusion/Clinic Administered Request    Location: [facility]  Diagnosis and ICD:Kidney Transplant Z94.0  Drug/Therapy:   belatacept Aug 3 2020 belatacept conversion   [facility]  [phone]  Fax      Previously Tried and Failed Therapies:     Date of provider note with supporting information:     Urgency (When is the patient scheduled?): 02.1 Non-Traumatic - Anoxic, Seizure    Would you like to include any research articles?         If yes please call [phone] for further instructions about sending that information

## 2025-06-30 NOTE — PROGRESS NOTES
CLINICAL NUTRITION SERVICES - REASSESSMENT NOTE     Nutrition Prescription    RECOMMENDATIONS FOR MDs/PROVIDERS TO ORDER:  Continue current TF regimen via new FT. If it is gastric, can advance TF towards goal rate if comfortable doing so. Otherwise, could continue to attempt Reglan to see if FT will advance on it's own.    Malnutrition Status:    Non-severe malnutrition in the context of acute illness     Recommendations already ordered by Registered Dietitian (RD):  FT placed.  TF orders changed to trophic rate given presumed gastric FT position with plan to prone.  Discussed both with MD, pharmD and RN.       Update  Unable to interview d/t intubation. Pt had a chronic cough x 2 weeks PTA, unclear if appetite or PO intake was reduced over that period of time as well.     Plan to prone pt, so small bore FT desired.    CURRENT NUTRITION ORDERS  Diet: NPO with TF of Osmolite 1.5 Jd @ 50 ml/hr + 1 pkts ProSource TID (3 pkts total) will provide: 1920 kcals (29 kcal/kg), 108 g PRO (1.6 g/kg), 914 mL H2O, 244 g CHO, and 0 g fiber daily.   Intake/Tolerance: has not reached goal yet.    Previous goals:  Total avg nutritional intake to meet a minimum of 25 kcal/kg and 1.2 g PRO/kg daily (per dosing wt 66 kg).  Eval: too soon to eval this goal    Previous nutrition dx:  Inadequate protein-energy intake  Still appropriate    PHYSICAL FINDINGS  See malnutrition section below.  No abnormal nutrition-related physical findings observed.     MALNUTRITION  % Intake: Unable to assess (TF just started via OG 8/6, had not reached goal rate yet given increasing pressors).  % Weight Loss: weight is up  Subcutaneous Fat Loss: None observed  Muscle Loss: Upper thigh and Posterior calf:  mild  Fluid Accumulation/Edema: Moderate anasarca  Malnutrition Diagnosis: Non-severe malnutrition in the context of acute illness     Current NUTRITION DIAGNOSIS  Inadequate protein-energy intake related to resp status inhibiting ability to take PO as  evidenced by pt not yet receiving goal TF volume over the past 3 days, will need small bore FT for proning, pt with moderate anasarca.      INTERVENTIONS  Implementation   Collaboration with other providers  Feeding tube placed.    Goals  Total avg nutritional intake to meet a minimum of 25 kcal/kg and 1.2 g PRO/kg daily (per dosing wt 66 kg).     Monitoring/Evaluation  Progress toward goals will be monitored and evaluated per protocol.    Demi Shane, PIETER, LD  ([facility] dietitian, [phone] (Mon-Fri))     Detail Level: Detailed

## (undated) DEVICE — ESU HANDPIECE ABC BEND-A-BEAM 6" 134006

## (undated) DEVICE — ESU GROUND PAD THERMOGUARD PLUS ABC PEDS 7-382

## (undated) DEVICE — DRAPE IOBAN INCISE 23X17" 6650EZ

## (undated) DEVICE — SYR 01ML 27GA 0.5" NDL TBC 309623

## (undated) DEVICE — Device

## (undated) DEVICE — CATH PLUG W/CAP 000076

## (undated) DEVICE — LINEN TOWEL PACK X30 5481

## (undated) DEVICE — GOWN IMPERVIOUS BREATHABLE SMART LG 89015

## (undated) DEVICE — SU VICRYL 3-0 SH 27" UND J416H

## (undated) DEVICE — GLOVE PROTEXIS BLUE W/NEU-THERA 6.5  2D73EB65

## (undated) DEVICE — SUCTION TIP POOLE K770

## (undated) DEVICE — SUCTION SLEEVE NEPTUNE 2 165MM 0703-005-165

## (undated) DEVICE — STPL SKIN 35W ROTATING HEAD PRW35

## (undated) DEVICE — DRAIN JACKSON PRATT ROUND SIL 19FR W/TROCAR LF JP-2232

## (undated) DEVICE — LINEN TOWEL PACK X6 WHITE 5487

## (undated) DEVICE — SU PROLENE 6-0 RB-2DA 30" 8711H

## (undated) DEVICE — SU ETHILON 3-0 PS-1 18" 1663H

## (undated) DEVICE — CLIP HORIZON MED BLUE 002200

## (undated) DEVICE — SU SILK 3-0 SH CR 8X18" C013D

## (undated) DEVICE — SU PROLENE 5-0 RB-1DA 36"  8556H

## (undated) DEVICE — CLIP HORIZON LG ORANGE 004200

## (undated) DEVICE — DRAIN JACKSON PRATT RESERVOIR 100ML SU130-1305

## (undated) DEVICE — PREP CHLORAPREP 26ML TINTED ORANGE  260815

## (undated) DEVICE — SOL NACL 0.9% IRRIG 500ML BOTTLE 2F7123

## (undated) DEVICE — DRAPE SHEET REV FOLD 3/4 9349

## (undated) DEVICE — INSERT FOGARTY 33MM TRACTION HYDRAJAW HYDRA33

## (undated) DEVICE — DRAPE SLUSH/WARMER 66X44" ORS-320

## (undated) DEVICE — CATH FOLEY 3WAY 18FR 30ML LATEX 0167SI18

## (undated) DEVICE — SU SILK 4-0 TIE 12X30" A303H

## (undated) DEVICE — SU PDS II 4-0 RB-1 27" Z304H

## (undated) DEVICE — SU PROLENE 1 CTX 30" 8455H

## (undated) DEVICE — SU SILK 1 TIE 6X30" A307H

## (undated) DEVICE — SU SILK 0 TIE 6X30" A306H

## (undated) DEVICE — TUBING IRRIG CYSTO/BLADDER SET 81" LF 2C4040

## (undated) DEVICE — ADH SKIN CLOSURE PREMIERPRO EXOFIN 1.0ML 3470

## (undated) DEVICE — SURGICEL ABSORBABLE HEMOSTAT SNOW 4"X4" 2083

## (undated) DEVICE — SU PDS II 5-0 RB-1 27" Z303H

## (undated) DEVICE — SU MONOCRYL 4-0 PS-2 27" UND Y426H

## (undated) DEVICE — NDL COUNTER 20CT 31142493

## (undated) DEVICE — VESSEL LOOPS RED MINI 31145710

## (undated) DEVICE — SOL NACL 0.9% INJ 1000ML BAG 2B1324X

## (undated) DEVICE — VESSEL LOOPS YELLOW MAXI 31145694

## (undated) DEVICE — SU SILK 3-0 TIE 12X30" A304H

## (undated) DEVICE — DEVICE CATH STABILIZATION STATLOCK FOLEY 3-WAY FOL0105

## (undated) DEVICE — WIPES FOLEY CARE SURESTEP PROVON DFC100

## (undated) DEVICE — BASIN SET SINGLE STERILE 13752-624

## (undated) DEVICE — SU SILK 2-0 TIE 12X30" A305H

## (undated) DEVICE — SU PDS II 6-0 RB-2DA 30" Z149H

## (undated) DEVICE — ESU PENCIL W/SMOKE EVAC ROCKER E2350HS

## (undated) DEVICE — DRAPE MAYO STAND 23X54 8337

## (undated) RX ORDER — KETOROLAC TROMETHAMINE 30 MG/ML
INJECTION, SOLUTION INTRAMUSCULAR; INTRAVENOUS
Status: DISPENSED
Start: 2020-02-28

## (undated) RX ORDER — SODIUM CHLORIDE 9 MG/ML
INJECTION, SOLUTION INTRAVENOUS
Status: DISPENSED
Start: 2020-04-03

## (undated) RX ORDER — SODIUM CHLORIDE 450 MG/100ML
INJECTION, SOLUTION INTRAVENOUS
Status: DISPENSED
Start: 2020-02-28

## (undated) RX ORDER — FENTANYL CITRATE 50 UG/ML
INJECTION, SOLUTION INTRAMUSCULAR; INTRAVENOUS
Status: DISPENSED
Start: 2020-02-28

## (undated) RX ORDER — ESMOLOL HYDROCHLORIDE 10 MG/ML
INJECTION INTRAVENOUS
Status: DISPENSED
Start: 2020-02-28

## (undated) RX ORDER — GLYCOPYRROLATE 0.2 MG/ML
INJECTION, SOLUTION INTRAMUSCULAR; INTRAVENOUS
Status: DISPENSED
Start: 2020-02-28

## (undated) RX ORDER — DEXAMETHASONE SODIUM PHOSPHATE 4 MG/ML
INJECTION, SOLUTION INTRA-ARTICULAR; INTRALESIONAL; INTRAMUSCULAR; INTRAVENOUS; SOFT TISSUE
Status: DISPENSED
Start: 2020-02-28

## (undated) RX ORDER — PHENYLEPHRINE HCL IN 0.9% NACL 1 MG/10 ML
SYRINGE (ML) INTRAVENOUS
Status: DISPENSED
Start: 2020-02-28

## (undated) RX ORDER — EPHEDRINE SULFATE 50 MG/ML
INJECTION, SOLUTION INTRAMUSCULAR; INTRAVENOUS; SUBCUTANEOUS
Status: DISPENSED
Start: 2020-02-28

## (undated) RX ORDER — ONDANSETRON 2 MG/ML
INJECTION INTRAMUSCULAR; INTRAVENOUS
Status: DISPENSED
Start: 2020-02-28

## (undated) RX ORDER — PROPOFOL 10 MG/ML
INJECTION, EMULSION INTRAVENOUS
Status: DISPENSED
Start: 2020-02-28

## (undated) RX ORDER — LIDOCAINE HYDROCHLORIDE 10 MG/ML
INJECTION, SOLUTION EPIDURAL; INFILTRATION; INTRACAUDAL; PERINEURAL
Status: DISPENSED
Start: 2020-04-03

## (undated) RX ORDER — HYDROMORPHONE HYDROCHLORIDE 1 MG/ML
INJECTION, SOLUTION INTRAMUSCULAR; INTRAVENOUS; SUBCUTANEOUS
Status: DISPENSED
Start: 2020-02-28

## (undated) RX ORDER — SODIUM CHLORIDE 9 MG/ML
INJECTION, SOLUTION INTRAVENOUS
Status: DISPENSED
Start: 2020-02-28

## (undated) RX ORDER — LIDOCAINE HYDROCHLORIDE 20 MG/ML
INJECTION, SOLUTION EPIDURAL; INFILTRATION; INTRACAUDAL; PERINEURAL
Status: DISPENSED
Start: 2020-02-28

## (undated) RX ORDER — DEXTROSE MONOHYDRATE, SODIUM CHLORIDE, AND POTASSIUM CHLORIDE 50; 1.49; 9 G/1000ML; G/1000ML; G/1000ML
INJECTION, SOLUTION INTRAVENOUS
Status: DISPENSED
Start: 2020-02-28

## (undated) RX ORDER — ATROPINE SULFATE 0.4 MG/ML
AMPUL (ML) INJECTION
Status: DISPENSED
Start: 2020-02-28

## (undated) RX ORDER — LIDOCAINE HYDROCHLORIDE 10 MG/ML
INJECTION, SOLUTION EPIDURAL; INFILTRATION; INTRACAUDAL; PERINEURAL
Status: DISPENSED
Start: 2020-03-05

## (undated) RX ORDER — FENTANYL CITRATE 50 UG/ML
INJECTION, SOLUTION INTRAMUSCULAR; INTRAVENOUS
Status: DISPENSED
Start: 2020-04-03

## (undated) RX ORDER — HYDROMORPHONE HCL/0.9% NACL/PF 0.2MG/0.2
SYRINGE (ML) INTRAVENOUS
Status: DISPENSED
Start: 2020-02-28